# Patient Record
Sex: FEMALE | Race: WHITE | NOT HISPANIC OR LATINO | Employment: OTHER | URBAN - METROPOLITAN AREA
[De-identification: names, ages, dates, MRNs, and addresses within clinical notes are randomized per-mention and may not be internally consistent; named-entity substitution may affect disease eponyms.]

---

## 2018-04-11 LAB — HEMOCCULT STL QL IA: NEGATIVE

## 2018-08-27 NOTE — TELEPHONE ENCOUNTER
FUTURE VISIT INFORMATION      FUTURE VISIT INFORMATION:    Date: 08/29/2018    Time: 5:00 pm      [place] [facility] [place]   REFERRAL INFORMATION:    Referring provider:  Self      Referring providers clinic:      Reason for visit/diagnosis        NOTES (FOR ALL VISITS) STATUS DETAILS   OFFICE NOTE from referring provider N/A    OFFICE NOTE from other specialist N/A    DISCHARGE SUMMARY from hospital N/A    DISCHARGE REPORT from the ER Internal 11/20/2015, 08/29/2012   OPERATIVE REPORT N/A    MEDICATION LIST Internal    IMAGING  (FOR ALL VISITS)     EMG N/A    EEG N/A    ECT N/A    MRI (HEAD, NECK, SPINE) N/A    CT (HEAD, NECK, SPINE) N/A    OTHER     XR SPINE THORACIC 3 VIEWS (03/20/2017 10:06 AM)   Care Everywhere   PACS  03/20/2017

## 2018-08-28 ENCOUNTER — PATIENT OUTREACH (OUTPATIENT)
Dept: CARE COORDINATION | Facility: CLINIC | Age: 73
End: 2018-08-28

## 2018-08-29 ENCOUNTER — OFFICE VISIT (OUTPATIENT)
Dept: NEUROLOGY | Facility: CLINIC | Age: 73
End: 2018-08-29
Payer: MEDICARE

## 2018-08-29 ENCOUNTER — PRE VISIT (OUTPATIENT)
Dept: NEUROLOGY | Facility: CLINIC | Age: 73
End: 2018-08-29

## 2018-08-29 VITALS
WEIGHT: 144.5 LBS | BODY MASS INDEX: 23.22 KG/M2 | OXYGEN SATURATION: 98 % | TEMPERATURE: 98.1 F | HEIGHT: 66 IN | HEART RATE: 84 BPM | SYSTOLIC BLOOD PRESSURE: 150 MMHG | DIASTOLIC BLOOD PRESSURE: 75 MMHG

## 2018-08-29 DIAGNOSIS — G24.3 CERVICAL DYSTONIA: Primary | ICD-10-CM

## 2018-08-29 PROBLEM — S83.522A SPRAIN OF POSTERIOR CRUCIATE LIGAMENT OF LEFT KNEE: Status: ACTIVE | Noted: 2018-01-15

## 2018-08-29 PROBLEM — M43.6 TORTICOLLIS: Status: ACTIVE | Noted: 2018-08-29

## 2018-08-29 PROBLEM — F33.9 DEPRESSION, RECURRENT (H): Status: ACTIVE | Noted: 2017-01-09

## 2018-08-29 PROBLEM — M23.92 ACUTE INTERNAL DERANGEMENT OF LEFT KNEE: Status: ACTIVE | Noted: 2018-01-03

## 2018-08-29 RX ORDER — DIAZEPAM 2 MG
TABLET ORAL PRN
COMMUNITY
Start: 2018-07-18 | End: 2018-08-29

## 2018-08-29 RX ORDER — CALCIUM CARBONATE 750 MG/1
1-2 TABLET, CHEWABLE ORAL PRN
COMMUNITY
Start: 2014-09-29 | End: 2022-04-28

## 2018-08-29 RX ORDER — ACETAMINOPHEN 500 MG
500 TABLET ORAL PRN
COMMUNITY
Start: 2018-08-08 | End: 2021-10-07

## 2018-08-29 RX ORDER — ONDANSETRON 4 MG/1
4 TABLET, FILM COATED ORAL PRN
COMMUNITY
Start: 2018-05-09 | End: 2021-04-16

## 2018-08-29 ASSESSMENT — MOVEMENT DISORDERS SOCIETY - UNIFIED PARKINSONS DISEASE RATING SCALE (MDS-UPDRS)
TREMOR: SLIGHT: SHAKING OR TREMOR OCCURS BUT DOES NOT CAUSE PROBLEMS WITH ANY ACTIVITIES.
SALIVA_AND_DROOLING: NORMAL: NOT AT ALL (NO PROBLEMS).
HYGIENE: NORMAL: NOT AT ALL (NO PROBLEMS).
GETTING_OUT_OF_BED_CAR_DEEP_CHAIR: NORMAL: NOT AT ALL (NO PROBLEMS).
HANDWRITING: NORMAL: NOT AT ALL (NO PROBLEMS).
EATING_TASKS: NORMAL: NOT AT ALL (NO PROBLEMS).
CHEWING_AND_SWALLOWING: NORMAL: NO PROBLEMS.
TOTAL_SCORE: 1
DRESSING: NORMAL: NOT AT ALL (NO PROBLEMS).
HOBBIES_AND_OTHER_ACTIVITIES: NORMAL:  NOT AT ALL (NO PROBLEMS).
SPEECH: NORMAL: NOT AT ALL (NO PROBLEMS).
TURNING_IN_BED: NORMAL: NOT AT ALL (NO PROBLEMS).
WALKING_AND_BALANCE: NORMAL: NOT AT ALL (NO PROBLEMS).
FREEZING: NORMAL: NOT AT ALL (NO PROBLEMS).

## 2018-08-29 ASSESSMENT — ENCOUNTER SYMPTOMS
PALPITATIONS: 1
ARTHRALGIAS: 1
LEG PAIN: 0
MUSCLE CRAMPS: 1
JOINT SWELLING: 0
LIGHT-HEADEDNESS: 0
STIFFNESS: 1
SYNCOPE: 0
BACK PAIN: 0
HYPERTENSION: 1
ORTHOPNEA: 0
HYPOTENSION: 0
NECK PAIN: 1
MUSCLE WEAKNESS: 0
MYALGIAS: 1
SLEEP DISTURBANCES DUE TO BREATHING: 0
EXERCISE INTOLERANCE: 0

## 2018-08-29 ASSESSMENT — PAIN SCALES - GENERAL: PAINLEVEL: MILD PAIN (3)

## 2018-08-29 NOTE — PATIENT INSTRUCTIONS
1. I highly recommend you consider botox injections (we would start with a low dose and avoid injecting muscles that may have any risk of swallowing difficulty)    2.Unfortunately, they are no longer recruiting for the transcranial magnetic stimulation study

## 2018-08-29 NOTE — PROGRESS NOTES
Department of Neurology  Movement Disorders Division   Initial Clinic Evaluation     Patient: Morenita Moore   MRN: [id number]   : 1945   Date of Visit: 2018     Referring Physician: self-referred      History of Present Illness  Morenita Moore is a 73 year old right-handed woman with cervical dystonia with onset at approximately age 34 years.She had tension in the neck and head-pulling to the left. She developed sensory tricks. She recalls that it came and went during her 30s, then plateaued in her 40s with perhaps years of remission. However, since her 50s it has been more problematic.    She was previously followed at [place] and was recommended botox, but she has been afraid to have these injections due to fear of swallowing difficulty.    She developed tremor (at least she was only aware of it then) more recently last winter after she tried citalopram for depression (after 2 deaths in the family). She describes tremor in the trunk, as well as head tremor.    No writer's cramp.     No family history of dystonia or tremor.    She is unable to do many things because of her cervical dystonia: she would be more active, dance, likely would be working. She is embarrassed due to the abnormal head posture.     Medications tried:  In the past she has tried diazepam, which is quite helpful, but she doesn't take it due to the addictive potential.  Baclofen - helped for a while then lost benefit. Cause imbalance.          Review of Systems:  Other than that noted at the end of this note, the remainder of 12 systems reviewed were negative.    Medications:  Current Outpatient Prescriptions   Medication Sig Dispense Refill     acetaminophen (TYLENOL) 500 MG tablet Take 500 mg by mouth as needed       albuterol (PROVENTIL HFA: VENTOLIN HFA) 108 (90 BASE) MCG/ACT inhaler Inhale 2 puffs into the lungs every 6 hours.         calcium carbonate 750 MG CHEW Take 1-2 tablets by mouth as needed        "fluticasone-salmeterol (ADVAIR) 100-50 MCG/DOSE diskus inhaler Inhale 1 puff into the lungs every 12 hours       OMEPRAZOLE PO Take 20 mg by mouth as needed        ondansetron (ZOFRAN) 4 MG tablet Take 4 mg by mouth as needed       CIPROFLOXACIN PO Take 500 mg by mouth daily       ipratropium - albuterol 0.5 mg/2.5 mg/3 mL (DUONEB) 0.5-2.5 (3) MG/3ML nebulization Take 3 mLs by nebulization every 4 hours as needed for shortness of breath / dyspnea. (Patient not taking: Reported on 8/29/2018) 1 Box 1          Movement Disorder-related Medications                                     None                                                                                                         Allergies: is allergic to levalbuterol hydrochloride; cats; dust mites; fluticasone-salmeterol; proair hfa [albuterol]; and escitalopram.    Past Medical History:   Past Medical History:   Diagnosis Date     Anxiety      Asthma 2012    adult-onset asthma diagnosed in 2012     Cancer (H)      DCIS (ductal carcinoma in situ) 2001    stage 0 status post left mastectomy in 2001     Depressive disorder      Uncomplicated asthma        Mildly depressed mood.     Past Surgical History:   Past Surgical History:   Procedure Laterality Date     BREAST SURGERY         Social History:   Social History     Social History     Marital status: Single     Spouse name: N/A     Number of children: N/A     Years of education: N/A     Social History Main Topics     Smoking status: Former Smoker     Smokeless tobacco: Never Used     Alcohol use No     Drug use: None     Sexual activity: Not Asked     Other Topics Concern     None     Social History Narrative       Family History:  Family History   Problem Relation Age of Onset     Cancer Sister      breast cancer            Physical Exam:  The patient's  height is 1.676 m (5' 6\") and weight is 65.5 kg (144 lb 8 oz). Her oral temperature is 98.1  F (36.7  C). Her blood pressure is 150/75 and her pulse is " 84. Her oxygen saturation is 98%.    Neurological Examination:   She is alert and oriented and has fluent speech without dysarthria and is able to provide an interval medical history  Extraocular movements are full. She has normal smooth pursuits and saccades. Her face is symmetric with equal activation.   Normal tone. Full strength in all 4 extremities. Normal reflexes.   She has moderate right torticollis to 40-50 degrees, left laterocollis 25-35 degrees, slight retrocollis, intermittent sagittal shift, left shoulder elevation.  She also has some involvement of the spine with some slumping to the right.     [place] Spasmodic Torticollis Rating Scale (TWSTRS)      I. Torticollis Severity Scale    ROTATION TURN: right  3   LATEROCOLLIS TILT: left 2   ANTEROCOLLIS  OR 0   RETROCOLLIS  1   LATERAL SHIFT right or left 0   SAGITTAL SHIFT forward of backward 1   DURATION FACTOR (weighted x 2) SCORE:  4  x2 = 8   EFFECT OF SENSORY TRICKS  1   SHOULDER ELEVATION/ ANTERIOR DISPLACEMENT right or left 2 (elevation)   RANGE OF MOTION (without sensory tricks) 2   TIME (without sensory tricks) 4     TOTAL SCORE: 24     II. Disability Scale    WORK 4   ACTIVITIES OF DAILY LIVING 0   DRIVING 3   READING 3   TELEVISION 3   ACTIVITIES OUTSIDE THE HOME 4   EMBARRASSMENT RELATED TO ABOVE 1   TOTAL SCORE: 18     III. Pain Scale     Best Worst Average   NECK PAIN (within past week) 3 6 4     PAIN SEVERITY: [(2 x average) + worst + best]                                                 4       4.25   DURATION 5   PAIN 2   TOTAL SCORE 11.25         Scale Score   Severity Scale 24   Disability Scale 18   Pain Scale 11.25   Total Score 53.25            Impression:  Morenita Moore is a 73 year old right-handed woman with cervical dystonia with onset at approximately age 34 years who had possible spontaneous remission (or at least lessening) of symptoms in her 40s and significant worsening in her 50s. She now has significant  disability related to her cervical dystonia. She has been hesitant to try botox injections due to fear of side effects and frustration that it would require repeated injections to maintain. However, after detailed discussion today reviewing that botox is 1st line therapy for cervical dystonia. She would like to proceed at this time.     Recommendations:   1. I highly recommend you consider botox injections (we would start with a low dose and avoid injecting muscles that may have any risk of swallowing difficulty) for treatment of your cervical dystonia.    2.Unfortunately, they are no longer recruiting for the transcranial magnetic stimulation study        Time spent with patient: Greater than 50% of this 65 minute visit was spent in counseling and coordination of care related to the issues detailed above, including diagnosis, treatment options, and plan of care.      Thank you for the opportunity to share in the care of this patient.  Please feel free to contact me if you have any further questions or comments.    Sincerely,    Gladis Oliva       Answers for HPI/ROS submitted by the patient on 8/29/2018   General Symptoms: No  Skin Symptoms: No  HENT Symptoms: No  EYE SYMPTOMS: No  HEART SYMPTOMS: Yes  LUNG SYMPTOMS: No  INTESTINAL SYMPTOMS: No  URINARY SYMPTOMS: No  GYNECOLOGIC SYMPTOMS: No  BREAST SYMPTOMS: No  SKELETAL SYMPTOMS: Yes  BLOOD SYMPTOMS: No  NERVOUS SYSTEM SYMPTOMS: No  MENTAL HEALTH SYMPTOMS: No  Chest pain or pressure: No  Fast or irregular heartbeat: Yes  Pain in legs with walking: No  Trouble breathing while lying down: No  Fingers or toes appear blue: No  High blood pressure: Yes  Low blood pressure: No  Fainting: No  Murmurs: No  Pacemaker: No  Varicose veins: No  Edema or swelling: No  Wake up at night with shortness of breath: No  Light-headedness: No  Exercise intolerance: No  Back pain: No  Muscle aches: Yes  Neck pain: Yes  Swollen joints: No  Joint pain: Yes  Bone pain: No  Muscle  cramps: Yes  Muscle weakness: No  Joint stiffness: Yes  Bone fracture: No  PHQ-2 Score: 0

## 2018-08-29 NOTE — NURSING NOTE
Chief Complaint   Patient presents with     Consult     [facility] NEW - MOVEMENT DISORDER     Jamie Miller, EMT

## 2018-08-29 NOTE — MR AVS SNAPSHOT
After Visit Summary   2018    Morenita Moore    MRN: [id number]           Patient Information     Date Of Birth          1945        Visit Information        Provider Department      2018 5:00 PM Gladis Oliva MD [facility] Neurology        Care Instructions    1. I highly recommend you consider botox injections (we would start with a low dose and avoid injecting muscles that may have any risk of swallowing difficulty)    2.Unfortunately, they are no longer recruiting for the transcranial magnetic stimulation study              Follow-ups after your visit        Follow-up notes from your care team     Return in about 3 weeks (around 2018), or RV botox at 11:30AM on  (OK per [facility]).      Your next 10 appointments already scheduled     Sep 20, 2018 11:30 AM CDT   (Arrive by 11:15 AM)   Return Botox with Gladis Oliva MD   [facility] Neurology ([facility])    [address] [place] [id number] [phone]              Who to contact     Please call your clinic at [phone] to:    Ask questions about your health    Make or cancel appointments    Discuss your medicines    Learn about your test results    Speak to your doctor            Additional Information About Your Visit        MyChart Information     [employer] is an electronic gateway that provides easy, online access to your medical records. With [facility], you can request a clinic appointment, read your test results, renew a prescription or communicate with your care team.     To sign up for [employer] visit the website at [email or web address]   You will be asked to enter the access code listed below, as well as some personal information. Please follow the directions to create your username and password.     Your access code is: [email or web address]  Expires: 2018  6:30 AM     Your access code will  in 90 days. If you need help or a new  "code, please contact your [facility] or call [phone] for assistance.        Care EveryWhere ID     This is your Care EveryWhere ID. This could be used by other organizations to access your [place] medical records  [id number]        Your Vitals Were     Pulse Temperature Height Pulse Oximetry Breastfeeding? BMI (Body Mass Index)    84 98.1  F (36.7  C) (Oral) 1.676 m (5' 6\") 98% No 23.32 kg/m2       Blood Pressure from Last 3 Encounters:   08/29/18 150/75   11/10/15 (!) 143/112   08/29/12 131/89    Weight from Last 3 Encounters:   08/29/18 65.5 kg (144 lb 8 oz)   11/10/15 64.9 kg (143 lb)   08/29/12 68 kg (150 lb)              Today, you had the following     No orders found for display         Today's Medication Changes          These changes are accurate as of 8/29/18  6:09 PM.  If you have any questions, ask your nurse or doctor.               Stop taking these medicines if you haven't already. Please contact your care team if you have questions.     diazepam 2 MG tablet   Commonly known as:  VALIUM   Stopped by:  Gladis Oliva MD           LORazepam 0.5 MG tablet   Commonly known as:  ATIVAN   Stopped by:  Gladis Oliva MD                    Primary Care Provider Office Phone # Fax #    Cuong Barba [phone]       [address] [place] [id number]        Equal Access to Services     LUCRECIA BINGHAM AH: Hadii liz madera hadasho Sonicole, waaxda luqadaha, qaybta kaalmada adeegyada, wax adalgisa dior [facility] . So [facility] [phone].    ATENCIÓN: Si habla español, tiene a woods disposición servicios gratuitos de asistencia lingüística. Griffin al [phone].    We comply with applicable federal civil rights laws and [place] laws. We do not discriminate on the basis of race, color, national origin, age, disability, sex, sexual orientation, or gender identity.            Thank you!     Thank you for choosing [facility] NEUROLOGY  " for your care. Our goal is always to provide you with excellent care. Hearing back from our patients is one way we can continue to improve our services. Please take a few minutes to complete the written survey that you may receive in the mail after your visit with us. Thank you!             Your Updated Medication List - Protect others around you: Learn how to safely use, store and throw away your medicines at www.disposemymeds.org.          This list is accurate as of 8/29/18  6:09 PM.  Always use your most recent med list.                   Brand Name Dispense Instructions for use Diagnosis    acetaminophen 500 MG tablet    TYLENOL     Take 500 mg by mouth as needed        albuterol 108 (90 Base) MCG/ACT inhaler    PROAIR HFA/PROVENTIL HFA/VENTOLIN HFA     Inhale 2 puffs into the lungs every 6 hours.        calcium carbonate 750 MG Chew      Take 1-2 tablets by mouth as needed        CIPROFLOXACIN PO      Take 500 mg by mouth daily        fluticasone-salmeterol 100-50 MCG/DOSE diskus inhaler    ADVAIR     Inhale 1 puff into the lungs every 12 hours        ipratropium - albuterol 0.5 mg/2.5 mg/3 mL 0.5-2.5 (3) MG/3ML neb solution    DUONEB    1 Box    Take 3 mLs by nebulization every 4 hours as needed for shortness of breath / dyspnea.        OMEPRAZOLE PO      Take 20 mg by mouth as needed        ondansetron 4 MG tablet    ZOFRAN     Take 4 mg by mouth as needed

## 2018-08-29 NOTE — LETTER
2018       RE: Morenita Moore  [address]  [place] [address]     Dear Colleague,    Thank you for referring your patient, Morenita Moore, to the [facility] NEUROLOGY at [facility]. Please see a copy of my visit note below.    Department of Neurology  Movement Disorders Division   Initial Clinic Evaluation     Patient: Morenita Moore   MRN: [id number]   : 1945   Date of Visit: 2018     Referring Physician: self-referred      History of Present Illness  Morenita Moore is a 73 year old right-handed woman with cervical dystonia with onset at approximately age 34 years.She had tension in the neck and head-pulling to the left. She developed sensory tricks. She recalls that it came and went during her 30s, then plateaued in her 40s with perhaps years of remission. However, since her 50s it has been more problematic.    She was previously followed at [place] and was recommended botox, but she has been afraid to have these injections due to fear of swallowing difficulty.    She developed tremor (at least she was only aware of it then) more recently last winter after she tried citalopram for depression (after 2 deaths in the family). She describes tremor in the trunk, as well as head tremor.    No writer's cramp.     No family history of dystonia or tremor.    She is unable to do many things because of her cervical dystonia: she would be more active, dance, likely would be working. She is embarrassed due to the abnormal head posture.     Medications tried:  In the past she has tried diazepam, which is quite helpful, but she doesn't take it due to the addictive potential.  Baclofen - helped for a while then lost benefit. Cause imbalance.          Review of Systems:  Other than that noted at the end of this note, the remainder of 12 systems reviewed were negative.    Medications:  Current Outpatient Prescriptions   Medication Sig  Dispense Refill     acetaminophen (TYLENOL) 500 MG tablet Take 500 mg by mouth as needed       albuterol (PROVENTIL HFA: VENTOLIN HFA) 108 (90 BASE) MCG/ACT inhaler Inhale 2 puffs into the lungs every 6 hours.         calcium carbonate 750 MG CHEW Take 1-2 tablets by mouth as needed       fluticasone-salmeterol (ADVAIR) 100-50 MCG/DOSE diskus inhaler Inhale 1 puff into the lungs every 12 hours       OMEPRAZOLE PO Take 20 mg by mouth as needed        ondansetron (ZOFRAN) 4 MG tablet Take 4 mg by mouth as needed       CIPROFLOXACIN PO Take 500 mg by mouth daily       ipratropium - albuterol 0.5 mg/2.5 mg/3 mL (DUONEB) 0.5-2.5 (3) MG/3ML nebulization Take 3 mLs by nebulization every 4 hours as needed for shortness of breath / dyspnea. (Patient not taking: Reported on 8/29/2018) 1 Box 1          Movement Disorder-related Medications                                     None                                                                                                         Allergies: is allergic to levalbuterol hydrochloride; cats; dust mites; fluticasone-salmeterol; proair hfa [albuterol]; and escitalopram.    Past Medical History:   Past Medical History:   Diagnosis Date     Anxiety      Asthma 2012    adult-onset asthma diagnosed in 2012     Cancer (H)      DCIS (ductal carcinoma in situ) 2001    stage 0 status post left mastectomy in 2001     Depressive disorder      Uncomplicated asthma        Mildly depressed mood.     Past Surgical History:   Past Surgical History:   Procedure Laterality Date     BREAST SURGERY         Social History:   Social History     Social History     Marital status: Single     Spouse name: N/A     Number of children: N/A     Years of education: N/A     Social History Main Topics     Smoking status: Former Smoker     Smokeless tobacco: Never Used     Alcohol use No     Drug use: None     Sexual activity: Not Asked     Other Topics Concern     None     Social History Narrative       Family  "History:  Family History   Problem Relation Age of Onset     Cancer Sister      breast cancer            Physical Exam:  The patient's  height is 1.676 m (5' 6\") and weight is 65.5 kg (144 lb 8 oz). Her oral temperature is 98.1  F (36.7  C). Her blood pressure is 150/75 and her pulse is 84. Her oxygen saturation is 98%.    Neurological Examination:   She is alert and oriented and has fluent speech without dysarthria and is able to provide an interval medical history  Extraocular movements are full. She has normal smooth pursuits and saccades. Her face is symmetric with equal activation.   Normal tone. Full strength in all 4 extremities. Normal reflexes.   She has moderate right torticollis to 40-50 degrees, left laterocollis 25-35 degrees, slight retrocollis, intermittent sagittal shift, left shoulder elevation.  She also has some involvement of the spine with some slumping to the right.     [place] Spasmodic Torticollis Rating Scale (TWSTRS)      I. Torticollis Severity Scale    ROTATION TURN: right  3   LATEROCOLLIS TILT: left 2   ANTEROCOLLIS  OR 0   RETROCOLLIS  1   LATERAL SHIFT right or left 0   SAGITTAL SHIFT forward of backward 1   DURATION FACTOR (weighted x 2) SCORE:  4  x2 = 8   EFFECT OF SENSORY TRICKS  1   SHOULDER ELEVATION/ ANTERIOR DISPLACEMENT right or left 2 (elevation)   RANGE OF MOTION (without sensory tricks) 2   TIME (without sensory tricks) 4     TOTAL SCORE: 24     II. Disability Scale    WORK 4   ACTIVITIES OF DAILY LIVING 0   DRIVING 3   READING 3   TELEVISION 3   ACTIVITIES OUTSIDE THE HOME 4   EMBARRASSMENT RELATED TO ABOVE 1   TOTAL SCORE: 18     III. Pain Scale     Best Worst Average   NECK PAIN (within past week) 3 6 4     PAIN SEVERITY: [(2 x average) + worst + best]                                                 4       4.25   DURATION 5   PAIN 2   TOTAL SCORE 11.25         Scale Score   Severity Scale 24   Disability Scale 18   Pain Scale 11.25   Total Score 53.25        "     Impression:  Morenita Moore is a 73 year old right-handed woman with cervical dystonia with onset at approximately age 34 years who had possible spontaneous remission (or at least lessening) of symptoms in her 40s and significant worsening in her 50s. She now has significant disability related to her cervical dystonia. She has been hesitant to try botox injections due to fear of side effects and frustration that it would require repeated injections to maintain. However, after detailed discussion today reviewing that botox is 1st line therapy for cervical dystonia. She would like to proceed at this time.     Recommendations:   1. I highly recommend you consider botox injections (we would start with a low dose and avoid injecting muscles that may have any risk of swallowing difficulty) for treatment of your cervical dystonia.    2.Unfortunately, they are no longer recruiting for the transcranial magnetic stimulation study        Time spent with patient: Greater than 50% of this 65 minute visit was spent in counseling and coordination of care related to the issues detailed above, including diagnosis, treatment options, and plan of care.      Thank you for the opportunity to share in the care of this patient.  Please feel free to contact me if you have any further questions or comments.      Again, thank you for allowing me to participate in the care of your patient.      Sincerely,    Gladis Oliva MD

## 2018-08-30 ASSESSMENT — PATIENT HEALTH QUESTIONNAIRE - PHQ9: SUM OF ALL RESPONSES TO PHQ QUESTIONS 1-9: 0

## 2019-07-12 ENCOUNTER — TRANSFERRED RECORDS (OUTPATIENT)
Dept: HEALTH INFORMATION MANAGEMENT | Facility: CLINIC | Age: 74
End: 2019-07-12

## 2020-02-13 ENCOUNTER — COMMUNICATION - HEALTHEAST (OUTPATIENT)
Dept: SCHEDULING | Facility: CLINIC | Age: 75
End: 2020-02-13

## 2020-02-19 ENCOUNTER — OFFICE VISIT - HEALTHEAST (OUTPATIENT)
Dept: INTERNAL MEDICINE | Facility: CLINIC | Age: 75
End: 2020-02-19

## 2020-02-19 DIAGNOSIS — I10 ESSENTIAL HYPERTENSION: ICD-10-CM

## 2020-02-19 DIAGNOSIS — I31.39 IDIOPATHIC PERICARDIAL EFFUSION: ICD-10-CM

## 2020-02-19 DIAGNOSIS — F41.1 GENERALIZED ANXIETY DISORDER: ICD-10-CM

## 2020-02-19 DIAGNOSIS — Z87.09 HISTORY OF ASTHMA: ICD-10-CM

## 2020-02-19 DIAGNOSIS — B37.0 THRUSH: ICD-10-CM

## 2020-02-19 ASSESSMENT — ANXIETY QUESTIONNAIRES
1. FEELING NERVOUS, ANXIOUS, OR ON EDGE: MORE THAN HALF THE DAYS
6. BECOMING EASILY ANNOYED OR IRRITABLE: NOT AT ALL
3. WORRYING TOO MUCH ABOUT DIFFERENT THINGS: SEVERAL DAYS
5. BEING SO RESTLESS THAT IT IS HARD TO SIT STILL: NOT AT ALL
2. NOT BEING ABLE TO STOP OR CONTROL WORRYING: SEVERAL DAYS
7. FEELING AFRAID AS IF SOMETHING AWFUL MIGHT HAPPEN: SEVERAL DAYS
GAD7 TOTAL SCORE: 7
4. TROUBLE RELAXING: MORE THAN HALF THE DAYS
IF YOU CHECKED OFF ANY PROBLEMS ON THIS QUESTIONNAIRE, HOW DIFFICULT HAVE THESE PROBLEMS MADE IT FOR YOU TO DO YOUR WORK, TAKE CARE OF THINGS AT HOME, OR GET ALONG WITH OTHER PEOPLE: SOMEWHAT DIFFICULT

## 2020-02-19 ASSESSMENT — MIFFLIN-ST. JEOR: SCORE: 998.57

## 2020-02-27 ENCOUNTER — OFFICE VISIT - HEALTHEAST (OUTPATIENT)
Dept: INTERNAL MEDICINE | Facility: CLINIC | Age: 75
End: 2020-02-27

## 2020-02-27 DIAGNOSIS — J45.40 MODERATE PERSISTENT ASTHMA IN ADULT WITHOUT COMPLICATION: ICD-10-CM

## 2020-02-27 DIAGNOSIS — G47.00 INSOMNIA, UNSPECIFIED TYPE: ICD-10-CM

## 2020-02-27 DIAGNOSIS — R06.02 SHORTNESS OF BREATH: ICD-10-CM

## 2020-02-27 DIAGNOSIS — I31.9 PERICARDITIS, UNSPECIFIED CHRONICITY, UNSPECIFIED TYPE: ICD-10-CM

## 2020-02-27 DIAGNOSIS — F41.1 GAD (GENERALIZED ANXIETY DISORDER): ICD-10-CM

## 2020-02-27 ASSESSMENT — MIFFLIN-ST. JEOR: SCORE: 988.96

## 2020-02-27 ASSESSMENT — PATIENT HEALTH QUESTIONNAIRE - PHQ9: SUM OF ALL RESPONSES TO PHQ QUESTIONS 1-9: 12

## 2020-03-02 ENCOUNTER — COMMUNICATION - HEALTHEAST (OUTPATIENT)
Dept: INTERNAL MEDICINE | Facility: CLINIC | Age: 75
End: 2020-03-02

## 2020-03-02 DIAGNOSIS — R06.02 SHORTNESS OF BREATH: ICD-10-CM

## 2020-03-03 ENCOUNTER — COMMUNICATION - HEALTHEAST (OUTPATIENT)
Dept: SCHEDULING | Facility: CLINIC | Age: 75
End: 2020-03-03

## 2020-03-04 ENCOUNTER — COMMUNICATION - HEALTHEAST (OUTPATIENT)
Dept: INTERNAL MEDICINE | Facility: CLINIC | Age: 75
End: 2020-03-04

## 2020-03-04 ENCOUNTER — OFFICE VISIT - HEALTHEAST (OUTPATIENT)
Dept: INTERNAL MEDICINE | Facility: CLINIC | Age: 75
End: 2020-03-04

## 2020-03-04 DIAGNOSIS — Z79.52 CURRENT CHRONIC USE OF SYSTEMIC STEROIDS: ICD-10-CM

## 2020-03-04 DIAGNOSIS — L29.9 PRURITIC DISORDER: ICD-10-CM

## 2020-03-04 DIAGNOSIS — F41.1 GAD (GENERALIZED ANXIETY DISORDER): ICD-10-CM

## 2020-03-04 DIAGNOSIS — I31.9 PERICARDITIS, UNSPECIFIED CHRONICITY, UNSPECIFIED TYPE: ICD-10-CM

## 2020-03-04 LAB
ALBUMIN SERPL-MCNC: 4.2 G/DL (ref 3.5–5)
ALP SERPL-CCNC: 75 U/L (ref 45–120)
ALT SERPL W P-5'-P-CCNC: 18 U/L (ref 0–45)
ANION GAP SERPL CALCULATED.3IONS-SCNC: 9 MMOL/L (ref 5–18)
AST SERPL W P-5'-P-CCNC: 12 U/L (ref 0–40)
BASOPHILS # BLD AUTO: 0 THOU/UL (ref 0–0.2)
BASOPHILS NFR BLD AUTO: 1 % (ref 0–2)
BILIRUB SERPL-MCNC: 0.6 MG/DL (ref 0–1)
BUN SERPL-MCNC: 13 MG/DL (ref 8–28)
CALCIUM SERPL-MCNC: 9.7 MG/DL (ref 8.5–10.5)
CHLORIDE BLD-SCNC: 102 MMOL/L (ref 98–107)
CO2 SERPL-SCNC: 26 MMOL/L (ref 22–31)
CREAT SERPL-MCNC: 0.68 MG/DL (ref 0.6–1.1)
EOSINOPHIL # BLD AUTO: 0.1 THOU/UL (ref 0–0.4)
EOSINOPHIL NFR BLD AUTO: 1 % (ref 0–6)
ERYTHROCYTE [DISTWIDTH] IN BLOOD BY AUTOMATED COUNT: 12.1 % (ref 11–14.5)
GFR SERPL CREATININE-BSD FRML MDRD: >60 ML/MIN/1.73M2
GLUCOSE BLD-MCNC: 104 MG/DL (ref 70–125)
HCT VFR BLD AUTO: 39 % (ref 35–47)
HGB BLD-MCNC: 13.7 G/DL (ref 12–16)
LYMPHOCYTES # BLD AUTO: 1.1 THOU/UL (ref 0.8–4.4)
LYMPHOCYTES NFR BLD AUTO: 16 % (ref 20–40)
MCH RBC QN AUTO: 32.8 PG (ref 27–34)
MCHC RBC AUTO-ENTMCNC: 35.1 G/DL (ref 32–36)
MCV RBC AUTO: 93 FL (ref 80–100)
MONOCYTES # BLD AUTO: 0.4 THOU/UL (ref 0–0.9)
MONOCYTES NFR BLD AUTO: 6 % (ref 2–10)
NEUTROPHILS # BLD AUTO: 5.4 THOU/UL (ref 2–7.7)
NEUTROPHILS NFR BLD AUTO: 77 % (ref 50–70)
PLATELET # BLD AUTO: 389 THOU/UL (ref 140–440)
PMV BLD AUTO: 6.9 FL (ref 7–10)
POTASSIUM BLD-SCNC: 4.1 MMOL/L (ref 3.5–5)
PROT SERPL-MCNC: 7 G/DL (ref 6–8)
RBC # BLD AUTO: 4.17 MILL/UL (ref 3.8–5.4)
SODIUM SERPL-SCNC: 137 MMOL/L (ref 136–145)
WBC: 7 THOU/UL (ref 4–11)

## 2020-03-04 ASSESSMENT — MIFFLIN-ST. JEOR: SCORE: 979.88

## 2020-03-05 ENCOUNTER — COMMUNICATION - HEALTHEAST (OUTPATIENT)
Dept: INTERNAL MEDICINE | Facility: CLINIC | Age: 75
End: 2020-03-05

## 2020-03-06 ENCOUNTER — HOSPITAL ENCOUNTER (OUTPATIENT)
Dept: CT IMAGING | Facility: CLINIC | Age: 75
Discharge: HOME | End: 2020-03-06

## 2020-03-06 ENCOUNTER — OFFICE VISIT - HEALTHEAST (OUTPATIENT)
Dept: INTERNAL MEDICINE | Facility: CLINIC | Age: 75
End: 2020-03-06

## 2020-03-06 DIAGNOSIS — J45.40 MODERATE PERSISTENT ASTHMA IN ADULT WITHOUT COMPLICATION: ICD-10-CM

## 2020-03-06 DIAGNOSIS — R63.4 WEIGHT LOSS: ICD-10-CM

## 2020-03-06 DIAGNOSIS — R10.84 ABDOMINAL PAIN, GENERALIZED: ICD-10-CM

## 2020-03-06 DIAGNOSIS — R05.9 COUGH: ICD-10-CM

## 2020-03-06 ASSESSMENT — MIFFLIN-ST. JEOR: SCORE: 988.96

## 2020-03-09 ENCOUNTER — COMMUNICATION - HEALTHEAST (OUTPATIENT)
Dept: PULMONOLOGY | Facility: OTHER | Age: 75
End: 2020-03-09

## 2020-03-09 ENCOUNTER — COMMUNICATION - HEALTHEAST (OUTPATIENT)
Dept: INTERNAL MEDICINE | Facility: CLINIC | Age: 75
End: 2020-03-09

## 2020-03-09 ENCOUNTER — AMBULATORY - HEALTHEAST (OUTPATIENT)
Dept: PULMONOLOGY | Facility: OTHER | Age: 75
End: 2020-03-09

## 2020-03-09 DIAGNOSIS — R06.02 SOB (SHORTNESS OF BREATH): ICD-10-CM

## 2020-03-11 ENCOUNTER — COMMUNICATION - HEALTHEAST (OUTPATIENT)
Dept: INTERNAL MEDICINE | Facility: CLINIC | Age: 75
End: 2020-03-11

## 2020-03-12 ENCOUNTER — COMMUNICATION - HEALTHEAST (OUTPATIENT)
Dept: INTERNAL MEDICINE | Facility: CLINIC | Age: 75
End: 2020-03-12

## 2020-03-12 ENCOUNTER — COMMUNICATION - HEALTHEAST (OUTPATIENT)
Dept: GERIATRIC MEDICINE | Facility: CLINIC | Age: 75
End: 2020-03-12

## 2020-03-13 ENCOUNTER — COMMUNICATION - HEALTHEAST (OUTPATIENT)
Dept: INTERNAL MEDICINE | Facility: CLINIC | Age: 75
End: 2020-03-13

## 2020-03-13 DIAGNOSIS — R30.0 DYSURIA: ICD-10-CM

## 2020-03-17 ENCOUNTER — COMMUNICATION - HEALTHEAST (OUTPATIENT)
Dept: INTERNAL MEDICINE | Facility: CLINIC | Age: 75
End: 2020-03-17

## 2020-03-17 ENCOUNTER — RECORDS - HEALTHEAST (OUTPATIENT)
Dept: ADMINISTRATIVE | Facility: OTHER | Age: 75
End: 2020-03-17

## 2020-03-23 ENCOUNTER — COMMUNICATION - HEALTHEAST (OUTPATIENT)
Dept: INTERNAL MEDICINE | Facility: CLINIC | Age: 75
End: 2020-03-23

## 2020-03-23 ENCOUNTER — RECORDS - HEALTHEAST (OUTPATIENT)
Dept: ADMINISTRATIVE | Facility: OTHER | Age: 75
End: 2020-03-23

## 2020-03-23 DIAGNOSIS — R06.02 SHORTNESS OF BREATH: ICD-10-CM

## 2020-03-23 DIAGNOSIS — J45.40 MODERATE PERSISTENT ASTHMA IN ADULT WITHOUT COMPLICATION: ICD-10-CM

## 2020-03-24 ENCOUNTER — AMBULATORY - HEALTHEAST (OUTPATIENT)
Dept: LAB | Facility: CLINIC | Age: 75
End: 2020-03-24

## 2020-03-24 DIAGNOSIS — R30.0 DYSURIA: ICD-10-CM

## 2020-03-24 LAB
ALBUMIN UR-MCNC: NEGATIVE MG/DL
APPEARANCE UR: CLEAR
BILIRUB UR QL STRIP: NEGATIVE
COLOR UR AUTO: YELLOW
GLUCOSE UR STRIP-MCNC: NEGATIVE MG/DL
HGB UR QL STRIP: NEGATIVE
KETONES UR STRIP-MCNC: NEGATIVE MG/DL
LEUKOCYTE ESTERASE UR QL STRIP: NEGATIVE
NITRATE UR QL: NEGATIVE
PH UR STRIP: 7 [PH] (ref 5–8)
SP GR UR STRIP: 1.01 (ref 1–1.03)
UROBILINOGEN UR STRIP-ACNC: NORMAL

## 2020-03-25 ENCOUNTER — COMMUNICATION - HEALTHEAST (OUTPATIENT)
Dept: INTERNAL MEDICINE | Facility: CLINIC | Age: 75
End: 2020-03-25

## 2020-03-25 DIAGNOSIS — R06.02 SHORTNESS OF BREATH: ICD-10-CM

## 2020-03-26 ENCOUNTER — RECORDS - HEALTHEAST (OUTPATIENT)
Dept: ADMINISTRATIVE | Facility: OTHER | Age: 75
End: 2020-03-26

## 2020-03-27 ENCOUNTER — COMMUNICATION - HEALTHEAST (OUTPATIENT)
Dept: GERIATRIC MEDICINE | Facility: CLINIC | Age: 75
End: 2020-03-27

## 2020-04-01 ENCOUNTER — COMMUNICATION - HEALTHEAST (OUTPATIENT)
Dept: INTERNAL MEDICINE | Facility: CLINIC | Age: 75
End: 2020-04-01

## 2020-04-01 ENCOUNTER — AMBULATORY - HEALTHEAST (OUTPATIENT)
Dept: CARDIOLOGY | Facility: CLINIC | Age: 75
End: 2020-04-01

## 2020-04-01 DIAGNOSIS — R06.02 SHORTNESS OF BREATH: ICD-10-CM

## 2020-04-06 ENCOUNTER — OFFICE VISIT - HEALTHEAST (OUTPATIENT)
Dept: CARDIOLOGY | Facility: CLINIC | Age: 75
End: 2020-04-06

## 2020-04-06 DIAGNOSIS — I31.9 PERICARDITIS: ICD-10-CM

## 2020-04-06 DIAGNOSIS — R06.09 DYSPNEA ON EXERTION: ICD-10-CM

## 2020-04-06 DIAGNOSIS — I47.10 SVT (SUPRAVENTRICULAR TACHYCARDIA) (H): ICD-10-CM

## 2020-04-13 ENCOUNTER — OFFICE VISIT - HEALTHEAST (OUTPATIENT)
Dept: INTERNAL MEDICINE | Facility: CLINIC | Age: 75
End: 2020-04-13

## 2020-04-13 DIAGNOSIS — I31.9 PERICARDITIS, UNSPECIFIED CHRONICITY, UNSPECIFIED TYPE: ICD-10-CM

## 2020-04-13 DIAGNOSIS — J45.40 MODERATE PERSISTENT ASTHMA IN ADULT WITHOUT COMPLICATION: ICD-10-CM

## 2020-04-15 ENCOUNTER — AMBULATORY - HEALTHEAST (OUTPATIENT)
Dept: PULMONOLOGY | Facility: OTHER | Age: 75
End: 2020-04-15

## 2020-04-15 ENCOUNTER — OFFICE VISIT - HEALTHEAST (OUTPATIENT)
Dept: PULMONOLOGY | Facility: OTHER | Age: 75
End: 2020-04-15

## 2020-04-15 DIAGNOSIS — R91.8 PULMONARY NODULES: ICD-10-CM

## 2020-04-15 DIAGNOSIS — J45.909 UNCOMPLICATED ASTHMA, UNSPECIFIED ASTHMA SEVERITY, UNSPECIFIED WHETHER PERSISTENT: ICD-10-CM

## 2020-04-15 ASSESSMENT — MIFFLIN-ST. JEOR: SCORE: 988.96

## 2020-04-23 ENCOUNTER — COMMUNICATION - HEALTHEAST (OUTPATIENT)
Dept: SCHEDULING | Facility: CLINIC | Age: 75
End: 2020-04-23

## 2020-04-30 ENCOUNTER — NURSE TRIAGE (OUTPATIENT)
Dept: NURSING | Facility: CLINIC | Age: 75
End: 2020-04-30

## 2020-04-30 ENCOUNTER — COMMUNICATION - HEALTHEAST (OUTPATIENT)
Dept: INTERNAL MEDICINE | Facility: CLINIC | Age: 75
End: 2020-04-30

## 2020-04-30 ENCOUNTER — VIRTUAL VISIT (OUTPATIENT)
Dept: URGENT CARE | Facility: CLINIC | Age: 75
End: 2020-04-30
Payer: MEDICARE

## 2020-04-30 ENCOUNTER — COMMUNICATION - HEALTHEAST (OUTPATIENT)
Dept: SCHEDULING | Facility: CLINIC | Age: 75
End: 2020-04-30

## 2020-04-30 DIAGNOSIS — I31.9 PERICARDITIS, UNSPECIFIED CHRONICITY, UNSPECIFIED TYPE: ICD-10-CM

## 2020-04-30 DIAGNOSIS — R23.9 ABNORMAL APPEARANCE OF SKIN: Primary | ICD-10-CM

## 2020-04-30 PROCEDURE — 99207 ZZC NO BILLABLE SERVICE THIS VISIT: CPT | Performed by: NURSE PRACTITIONER

## 2020-04-30 NOTE — TELEPHONE ENCOUNTER
Morenita called into [facility] regarding inflammation under the skin on her arms.  Denies rash, not on top of the skin.  Looks thinner with protruding veins on the top part of the arm.     Per protocol through [facility] pt to be seen within 4 hours.  Pt will schedule virtual urgent care visit kev.     Millie Tariq RN   [facility] RN Triage

## 2020-04-30 NOTE — PROGRESS NOTES
"  Morenita Moore is a 74 year old female who is being evaluated via a billable telephone visit.      The patient has been notified of following:     \"This telephone visit will be conducted via a call between you and your physician/provider. We have found that certain health care needs can be provided without the need for a physical exam.  This service lets us provide the care you need with a short phone conversation.  If a prescription is necessary we can send it directly to your pharmacy.  If lab work is needed we can place an order for that and you can then stop by our lab to have the test done at a later time.    If during the course of the call the physician/provider feels a telephone visit is not appropriate, you will not be charged for this service.\"     Patient has given verbal consent for Telephone visit?  Yes    Chief Complaint:    Chief Complaint   Patient presents with     Derm Problem       HPI: Morenita Moore is an 74 year old female who presents for evaluation and treatment of skin problem. She reports the vessels in her skin seem to be closer to the surface and she has some purple spots on her arms. She denies her arms being swollen or hot, there are no vesicles. She feels well otherwise. She denies any new detergents, lotions, or soaps. She has never had this issue before. She is concerned that this may be related to a virus she had last fall that they never figured out.        Family History   Family History   Problem Relation Age of Onset     Cancer Sister         breast cancer       Social History  Social History     Socioeconomic History     Marital status: Single     Spouse name: Not on file     Number of children: Not on file     Years of education: Not on file     Highest education level: Not on file   Occupational History     Not on file   Social Needs     Financial resource strain: Not on file     Food insecurity     Worry: Not on file     Inability: Not on file     " Transportation needs     Medical: Not on file     Non-medical: Not on file   Tobacco Use     Smoking status: Former Smoker     Smokeless tobacco: Never Used   Substance and Sexual Activity     Alcohol use: No     Drug use: Not on file     Sexual activity: Not on file   Lifestyle     Physical activity     Days per week: Not on file     Minutes per session: Not on file     Stress: Not on file   Relationships     Social connections     Talks on phone: Not on file     Gets together: Not on file     Attends Zoroastrianism service: Not on file     Active member of club or organization: Not on file     Attends meetings of clubs or organizations: Not on file     Relationship status: Not on file     Intimate partner violence     Fear of current or ex partner: Not on file     Emotionally abused: Not on file     Physically abused: Not on file     Forced sexual activity: Not on file   Other Topics Concern     Parent/sibling w/ CABG, MI or angioplasty before 65F 55M? Not Asked   Social History Narrative     Not on file        Surgical History:  Past Surgical History:   Procedure Laterality Date     BREAST SURGERY          Problem List:  Patient Active Problem List   Diagnosis     Acute internal derangement of left knee     Asthma in adult     Breast cancer in situ     Depression, recurrent (H)     Sprain of posterior cruciate ligament of left knee     SVT (supraventricular tachycardia) (H)     Torticollis        Allergies:  Allergies   Allergen Reactions     Levalbuterol Hydrochloride Shortness Of Breath     Cats Other (See Comments)     Itchy eyes     Dust Mites      Other reaction(s): Wheezing  Wheezing/shortness/breath/see (IRP 6/1)     Fluticasone-Salmeterol Swelling     Proair Hfa [Albuterol] Other (See Comments)     DRY THROAT, SWALLOWING ISSUES     Escitalopram Anxiety        Current Meds:    Current Outpatient Medications:      acetaminophen (TYLENOL) 500 MG tablet, Take 500 mg by mouth as needed, Disp: , Rfl:      albuterol  (PROVENTIL HFA: VENTOLIN HFA) 108 (90 BASE) MCG/ACT inhaler, Inhale 2 puffs into the lungs every 6 hours.  , Disp: , Rfl:      calcium carbonate 750 MG CHEW, Take 1-2 tablets by mouth as needed, Disp: , Rfl:      CIPROFLOXACIN PO, Take 500 mg by mouth daily, Disp: , Rfl:      fluticasone-salmeterol (ADVAIR) 100-50 MCG/DOSE diskus inhaler, Inhale 1 puff into the lungs every 12 hours, Disp: , Rfl:      ipratropium - albuterol 0.5 mg/2.5 mg/3 mL (DUONEB) 0.5-2.5 (3) MG/3ML nebulization, Take 3 mLs by nebulization every 4 hours as needed for shortness of breath / dyspnea. (Patient not taking: Reported on 8/29/2018), Disp: 1 Box, Rfl: 1     OMEPRAZOLE PO, Take 20 mg by mouth as needed , Disp: , Rfl:      ondansetron (ZOFRAN) 4 MG tablet, Take 4 mg by mouth as needed, Disp: , Rfl:      PHYSICAL EXAM:   No physical exam is completed due to telephone visit.  Patient is alert and oriented over the phone. No coughing, wheezing or shortness of breath is heard.       ASSESSMENT:     1. Abnormal appearance of skin           PLAN:     Instructed patient that she would need to come in to be seen today or tomorrow for an examination of the skin. She does not want to come in to a clinic. Advised her to call and schedule a video visit with her primary care provider. She requests an ID referral, but she needs to be seen at an Urgent Care or primary medicine for physical examination first.    Patient instructed to follow up with PCP in 1-2days  Sooner if symptoms worsen.  Worrisome symptoms discussed with instructions to go to the ED.  Patient verbalized understanding and agreed with this plan.     Patricia Carlos CNP  4/30/2020, 5:19 PM      Phone call duration:  26 minutes

## 2020-05-01 ENCOUNTER — OFFICE VISIT (OUTPATIENT)
Dept: URGENT CARE | Facility: URGENT CARE | Age: 75
End: 2020-05-01
Payer: MEDICARE

## 2020-05-01 ENCOUNTER — TRANSFERRED RECORDS (OUTPATIENT)
Dept: HEALTH INFORMATION MANAGEMENT | Facility: CLINIC | Age: 75
End: 2020-05-01

## 2020-05-01 VITALS
TEMPERATURE: 102.9 F | DIASTOLIC BLOOD PRESSURE: 70 MMHG | HEART RATE: 125 BPM | BODY MASS INDEX: 18.72 KG/M2 | OXYGEN SATURATION: 98 % | WEIGHT: 116 LBS | SYSTOLIC BLOOD PRESSURE: 134 MMHG

## 2020-05-01 DIAGNOSIS — I87.8 PROMINENT VEIN: ICD-10-CM

## 2020-05-01 DIAGNOSIS — R50.9 FEVER IN ADULT: Primary | ICD-10-CM

## 2020-05-01 PROCEDURE — 99204 OFFICE O/P NEW MOD 45 MIN: CPT | Performed by: FAMILY MEDICINE

## 2020-05-01 RX ORDER — ASPIRIN 81 MG/1
81 TABLET ORAL DAILY
COMMUNITY
End: 2021-12-06

## 2020-05-01 NOTE — PROGRESS NOTES
SUBJECTIVE:   Morenita Moore is a 74 year old female with a history of intrinsic asthma (non-allergic, adult-onset asthma) presenting with a chief complaint of 8 days of progressively worsening prominent veins at the bilateral arms and now at the legs.  The tips of the fingers had recently turned white and the forearms have turned gray.  .   She has a fever up to 102.9 F today.    Onset of symptoms was 7 days ago.  Course of illness is worsening. .    Current and Associated symptoms: No shortness of breath.  No cough.  No headache.  No loss of taste/smell.  No chest pain/discomfort.  No  muscle aches.   No sore throat.  No urinary problems.  No diarrhea.  No abdominal pain.      Patient started Baby Aspirin one week ago.    Treatment measures tried include none.  .    Past Medical History:   Diagnosis Date     Anxiety      Asthma 2012    adult-onset asthma diagnosed in 2012     Cancer (H)      DCIS (ductal carcinoma in situ) 2001    stage 0 status post left mastectomy in 2001     Depressive disorder      Uncomplicated asthma    Pericarditis    Current Outpatient Medications   Medication Sig Dispense Refill     aspirin 81 MG EC tablet Take 81 mg by mouth daily       calcium carbonate 750 MG CHEW Take 1-2 tablets by mouth as needed       fluticasone-salmeterol (ADVAIR) 100-50 MCG/DOSE diskus inhaler Inhale 1 puff into the lungs every 12 hours       acetaminophen (TYLENOL) 500 MG tablet Take 500 mg by mouth as needed       albuterol (PROVENTIL HFA: VENTOLIN HFA) 108 (90 BASE) MCG/ACT inhaler Inhale 2 puffs into the lungs every 6 hours.         CIPROFLOXACIN PO Take 500 mg by mouth daily       ipratropium - albuterol 0.5 mg/2.5 mg/3 mL (DUONEB) 0.5-2.5 (3) MG/3ML nebulization Take 3 mLs by nebulization every 4 hours as needed for shortness of breath / dyspnea. (Patient not taking: Reported on 8/29/2018) 1 Box 1     OMEPRAZOLE PO Take 20 mg by mouth as needed        ondansetron (ZOFRAN) 4 MG tablet Take 4 mg by  mouth as needed       Social History     Tobacco Use     Smoking status: Former Smoker     Smokeless tobacco: Never Used   Substance Use Topics     Alcohol use: No       ROS:  CONSTITUTIONAL:POSITIVE  for fevers.   INTEGUMENTARY/SKIN: NEGATIVE for worrisome rashes  RESP:NEGATIVE for significant cough or SOB  CV: NEGATIVE for chest pain, palpitations or peripheral edema  MUSCULOSKELETAL: POSITIVE  for distended veins in the arms and legs.      OBJECTIVE:  /70   Pulse 125   Temp 102.9  F (39.4  C) (Tympanic)   Wt 52.6 kg (116 lb)   SpO2 98%   BMI 18.72 kg/m    GENERAL APPEARANCE: healthy, alert and no distress.  No acute respiratory distress.    Extremities: forearms have very prominent, distended veins without accompanying edema nor erythema.  No cyanosis at the hands/fingers.  The lower legs and feet also have distended veins (not as distended compared to the upper extremities).   SKIN: No cyanosis.      ASSESSMENT:  Fever  Distended veins in the arms, limbs.  I am concerned about a COVID-19-related hypercoagulopathy.      PLAN:  I gave report to the [facility], since she has been seen there in the past and since the patient lives in [facility].    Patient will go to the emergency room via private vehicle.      Rory Sethi MD

## 2020-05-01 NOTE — PATIENT INSTRUCTIONS
I recommend that you go to the emergency room now for further evaluation of the distended veins in your arms and legs accompanied by fever.

## 2020-05-02 ENCOUNTER — COMMUNICATION - HEALTHEAST (OUTPATIENT)
Dept: EMERGENCY MEDICINE | Facility: CLINIC | Age: 75
End: 2020-05-02

## 2020-05-18 ENCOUNTER — COMMUNICATION - HEALTHEAST (OUTPATIENT)
Dept: SCHEDULING | Facility: CLINIC | Age: 75
End: 2020-05-18

## 2020-05-19 ENCOUNTER — NURSE TRIAGE (OUTPATIENT)
Dept: NURSING | Facility: CLINIC | Age: 75
End: 2020-05-19

## 2020-05-19 ENCOUNTER — COMMUNICATION - HEALTHEAST (OUTPATIENT)
Dept: SCHEDULING | Facility: CLINIC | Age: 75
End: 2020-05-19

## 2020-05-19 ENCOUNTER — OFFICE VISIT - HEALTHEAST (OUTPATIENT)
Dept: INTERNAL MEDICINE | Facility: CLINIC | Age: 75
End: 2020-05-19

## 2020-05-19 DIAGNOSIS — I47.10 SVT (SUPRAVENTRICULAR TACHYCARDIA) (H): ICD-10-CM

## 2020-05-19 DIAGNOSIS — R25.1 TREMOR, PHYSIOLOGICAL: ICD-10-CM

## 2020-05-19 DIAGNOSIS — I10 HYPERTENSION, UNSPECIFIED TYPE: ICD-10-CM

## 2020-05-19 DIAGNOSIS — F41.1 GAD (GENERALIZED ANXIETY DISORDER): ICD-10-CM

## 2020-05-22 ENCOUNTER — COMMUNICATION - HEALTHEAST (OUTPATIENT)
Dept: SCHEDULING | Facility: CLINIC | Age: 75
End: 2020-05-22

## 2020-05-27 ENCOUNTER — COMMUNICATION - HEALTHEAST (OUTPATIENT)
Dept: INTERNAL MEDICINE | Facility: CLINIC | Age: 75
End: 2020-05-27

## 2020-05-28 ENCOUNTER — OFFICE VISIT - HEALTHEAST (OUTPATIENT)
Dept: INTERNAL MEDICINE | Facility: CLINIC | Age: 75
End: 2020-05-28

## 2020-05-28 DIAGNOSIS — R23.9 SKIN CHANGE: ICD-10-CM

## 2020-05-31 ENCOUNTER — COMMUNICATION - HEALTHEAST (OUTPATIENT)
Dept: SCHEDULING | Facility: CLINIC | Age: 75
End: 2020-05-31

## 2020-05-31 ENCOUNTER — NURSE TRIAGE (OUTPATIENT)
Dept: NURSING | Facility: CLINIC | Age: 75
End: 2020-05-31

## 2020-05-31 ENCOUNTER — RECORDS - HEALTHEAST (OUTPATIENT)
Dept: ADMINISTRATIVE | Facility: OTHER | Age: 75
End: 2020-05-31

## 2020-06-02 ENCOUNTER — COMMUNICATION - HEALTHEAST (OUTPATIENT)
Dept: INTERNAL MEDICINE | Facility: CLINIC | Age: 75
End: 2020-06-02

## 2020-06-02 ENCOUNTER — OFFICE VISIT - HEALTHEAST (OUTPATIENT)
Dept: INTERNAL MEDICINE | Facility: CLINIC | Age: 75
End: 2020-06-02

## 2020-06-02 DIAGNOSIS — I10 ESSENTIAL HYPERTENSION: ICD-10-CM

## 2020-06-02 LAB
C REACTIVE PROTEIN LHE: 0.1 MG/DL (ref 0–0.8)
ERYTHROCYTE [SEDIMENTATION RATE] IN BLOOD BY WESTERGREN METHOD: 8 MM/HR (ref 0–20)
POTASSIUM BLD-SCNC: 4 MMOL/L (ref 3.5–5)

## 2020-06-02 ASSESSMENT — MIFFLIN-ST. JEOR: SCORE: 1028.65

## 2020-06-03 ENCOUNTER — COMMUNICATION - HEALTHEAST (OUTPATIENT)
Dept: INTERNAL MEDICINE | Facility: CLINIC | Age: 75
End: 2020-06-03

## 2020-06-03 ENCOUNTER — COMMUNICATION - HEALTHEAST (OUTPATIENT)
Dept: SCHEDULING | Facility: CLINIC | Age: 75
End: 2020-06-03

## 2020-06-04 ENCOUNTER — OFFICE VISIT - HEALTHEAST (OUTPATIENT)
Dept: INTERNAL MEDICINE | Facility: CLINIC | Age: 75
End: 2020-06-04

## 2020-06-04 DIAGNOSIS — R25.1 TREMOR, PHYSIOLOGICAL: ICD-10-CM

## 2020-06-04 DIAGNOSIS — I47.10 SVT (SUPRAVENTRICULAR TACHYCARDIA) (H): ICD-10-CM

## 2020-06-04 DIAGNOSIS — R42 DIZZINESS: ICD-10-CM

## 2020-06-04 DIAGNOSIS — R30.0 DYSURIA: ICD-10-CM

## 2020-06-05 ENCOUNTER — COMMUNICATION - HEALTHEAST (OUTPATIENT)
Dept: INTERNAL MEDICINE | Facility: CLINIC | Age: 75
End: 2020-06-05

## 2020-06-05 ENCOUNTER — OFFICE VISIT - HEALTHEAST (OUTPATIENT)
Dept: INTERNAL MEDICINE | Facility: CLINIC | Age: 75
End: 2020-06-05

## 2020-06-05 ENCOUNTER — COMMUNICATION - HEALTHEAST (OUTPATIENT)
Dept: SCHEDULING | Facility: CLINIC | Age: 75
End: 2020-06-05

## 2020-06-05 DIAGNOSIS — R25.1 TREMOR: ICD-10-CM

## 2020-06-05 ASSESSMENT — MIFFLIN-ST. JEOR: SCORE: 1024.11

## 2020-06-10 ENCOUNTER — COMMUNICATION - HEALTHEAST (OUTPATIENT)
Dept: INTERNAL MEDICINE | Facility: CLINIC | Age: 75
End: 2020-06-10

## 2020-06-15 ENCOUNTER — OFFICE VISIT - HEALTHEAST (OUTPATIENT)
Dept: INTERNAL MEDICINE | Facility: CLINIC | Age: 75
End: 2020-06-15

## 2020-06-15 DIAGNOSIS — R25.1 TREMOR, PHYSIOLOGICAL: ICD-10-CM

## 2020-06-15 DIAGNOSIS — R42 LIGHT HEADED: ICD-10-CM

## 2020-06-15 DIAGNOSIS — D64.9 ANEMIA, UNSPECIFIED TYPE: ICD-10-CM

## 2020-06-15 DIAGNOSIS — E55.9 VITAMIN D DEFICIENCY: ICD-10-CM

## 2020-06-15 DIAGNOSIS — R63.4 ABNORMAL WEIGHT LOSS: ICD-10-CM

## 2020-06-15 DIAGNOSIS — R21 RASH: ICD-10-CM

## 2020-06-15 DIAGNOSIS — F41.1 GAD (GENERALIZED ANXIETY DISORDER): ICD-10-CM

## 2020-06-15 LAB
ALBUMIN SERPL-MCNC: 4.3 G/DL (ref 3.5–5)
ALBUMIN UR-MCNC: NEGATIVE MG/DL
ALP SERPL-CCNC: 82 U/L (ref 45–120)
ALT SERPL W P-5'-P-CCNC: 14 U/L (ref 0–45)
ANION GAP SERPL CALCULATED.3IONS-SCNC: 12 MMOL/L (ref 5–18)
APPEARANCE UR: CLEAR
AST SERPL W P-5'-P-CCNC: 13 U/L (ref 0–40)
BACTERIA #/AREA URNS HPF: ABNORMAL HPF
BILIRUB SERPL-MCNC: 0.7 MG/DL (ref 0–1)
BILIRUB UR QL STRIP: NEGATIVE
BUN SERPL-MCNC: 12 MG/DL (ref 8–28)
C REACTIVE PROTEIN LHE: <0.1 MG/DL (ref 0–0.8)
CALCIUM SERPL-MCNC: 9.8 MG/DL (ref 8.5–10.5)
CHLORIDE BLD-SCNC: 106 MMOL/L (ref 98–107)
CO2 SERPL-SCNC: 23 MMOL/L (ref 22–31)
COLOR UR AUTO: YELLOW
CREAT SERPL-MCNC: 0.65 MG/DL (ref 0.6–1.1)
ERYTHROCYTE [DISTWIDTH] IN BLOOD BY AUTOMATED COUNT: 13.5 % (ref 11–14.5)
ERYTHROCYTE [SEDIMENTATION RATE] IN BLOOD BY WESTERGREN METHOD: 11 MM/HR (ref 0–20)
GFR SERPL CREATININE-BSD FRML MDRD: >60 ML/MIN/1.73M2
GLUCOSE BLD-MCNC: 90 MG/DL (ref 70–125)
GLUCOSE UR STRIP-MCNC: NEGATIVE MG/DL
HCT VFR BLD AUTO: 41.3 % (ref 35–47)
HGB BLD-MCNC: 13.3 G/DL (ref 12–16)
HGB UR QL STRIP: ABNORMAL
KETONES UR STRIP-MCNC: NEGATIVE MG/DL
LEUKOCYTE ESTERASE UR QL STRIP: NEGATIVE
MCH RBC QN AUTO: 30.9 PG (ref 27–34)
MCHC RBC AUTO-ENTMCNC: 32.2 G/DL (ref 32–36)
MCV RBC AUTO: 96 FL (ref 80–100)
NITRATE UR QL: NEGATIVE
PH UR STRIP: 7 [PH] (ref 5–8)
PLATELET # BLD AUTO: 303 THOU/UL (ref 140–440)
PMV BLD AUTO: 10.2 FL (ref 8.5–12.5)
POTASSIUM BLD-SCNC: 4.4 MMOL/L (ref 3.5–5)
PROT SERPL-MCNC: 6.9 G/DL (ref 6–8)
RBC # BLD AUTO: 4.3 MILL/UL (ref 3.8–5.4)
RBC #/AREA URNS AUTO: ABNORMAL HPF
RHEUMATOID FACT SERPL-ACNC: <15 IU/ML (ref 0–30)
SODIUM SERPL-SCNC: 141 MMOL/L (ref 136–145)
SP GR UR STRIP: 1.01 (ref 1–1.03)
SQUAMOUS #/AREA URNS AUTO: ABNORMAL LPF
TSH SERPL DL<=0.005 MIU/L-ACNC: 1.79 UIU/ML (ref 0.3–5)
UROBILINOGEN UR STRIP-ACNC: ABNORMAL
VIT B12 SERPL-MCNC: 501 PG/ML (ref 213–816)
WBC #/AREA URNS AUTO: ABNORMAL HPF
WBC: 6.7 THOU/UL (ref 4–11)

## 2020-06-15 ASSESSMENT — MIFFLIN-ST. JEOR: SCORE: 1042.26

## 2020-06-16 LAB
25(OH)D3 SERPL-MCNC: 35.8 NG/ML (ref 30–80)
ANA SER QL: 0.3 U

## 2020-06-18 ENCOUNTER — COMMUNICATION - HEALTHEAST (OUTPATIENT)
Dept: INTERNAL MEDICINE | Facility: CLINIC | Age: 75
End: 2020-06-18

## 2020-06-18 DIAGNOSIS — R25.1 TREMOR, PHYSIOLOGICAL: ICD-10-CM

## 2020-06-18 LAB
ARSENIC, WHOLE BLOOD: <10 NG/ML
LAB SAMPLE TYPE: NORMAL
LAB STATE REPORTED TO: NORMAL
LEAD, WHOLE BLOOD - HISTORICAL: <1 UG/DL
MERCURY, WHOLE BLOOD - HISTORICAL: <5 NG/ML
SPECIMEN STATUS: NORMAL

## 2020-06-25 ENCOUNTER — TELEPHONE (OUTPATIENT)
Dept: NEUROLOGY | Facility: CLINIC | Age: 75
End: 2020-06-25

## 2020-06-25 ENCOUNTER — AMBULATORY - HEALTHEAST (OUTPATIENT)
Dept: SCHEDULING | Facility: CLINIC | Age: 75
End: 2020-06-25

## 2020-06-25 ENCOUNTER — RECORDS - HEALTHEAST (OUTPATIENT)
Dept: ADMINISTRATIVE | Facility: OTHER | Age: 75
End: 2020-06-25

## 2020-06-25 ENCOUNTER — OFFICE VISIT (OUTPATIENT)
Dept: NEUROLOGY | Facility: CLINIC | Age: 75
End: 2020-06-25
Payer: MEDICARE

## 2020-06-25 VITALS — BODY MASS INDEX: 19.99 KG/M2 | HEIGHT: 65 IN | WEIGHT: 120 LBS

## 2020-06-25 DIAGNOSIS — R25.1 TREMOR: Primary | ICD-10-CM

## 2020-06-25 DIAGNOSIS — R42 DIZZINESS: ICD-10-CM

## 2020-06-25 DIAGNOSIS — R25.1 TREMOR: ICD-10-CM

## 2020-06-25 PROCEDURE — 99443 ZZC PHYSICIAN TELEPHONE EVALUATION 21-30 MIN: CPT | Performed by: PSYCHIATRY & NEUROLOGY

## 2020-06-25 RX ORDER — LORAZEPAM 0.5 MG/1
TABLET ORAL PRN
COMMUNITY
Start: 2020-06-06 | End: 2021-12-06

## 2020-06-25 RX ORDER — METOPROLOL TARTRATE 25 MG/1
12.5 TABLET, FILM COATED ORAL
COMMUNITY
Start: 2020-06-16 | End: 2021-04-16

## 2020-06-25 ASSESSMENT — MIFFLIN-ST. JEOR: SCORE: 1055.2

## 2020-06-25 NOTE — TELEPHONE ENCOUNTER
I left message asking patient to call me back regarding plan. Patient had phone visit with Dr. Purcell today who ordered MR of brain, request for records from [facility] and follow up visit.  Anika Canas on 6/25/2020 at 10:05 AM

## 2020-06-25 NOTE — NURSING NOTE
Phone visit [phone].  Having lab draw next Tuesday if any further lab work recommended.  Chief Complaint   Patient presents with     Tremors     Lightheaded, off balance.     Anika Canas on 6/25/2020 at 8:52 AM

## 2020-06-25 NOTE — LETTER
6/25/2020         RE: Morenita Moore  [address]  [place] [address]        Dear Colleague,    Thank you for referring your patient, Morenita Moore, to the [facility] NEUROLOGY [place]. Please see a copy of my visit note below.          Morenita Moore  Age:74 year old  MRN [id number]  PCP Clinic, [place]    Consult requested by: Aminah Tamayo  Regarding: Tremor and dizziness    Allergies: Levalbuterol hydrochloride; Cats; Dust mites; Fluticasone-salmeterol; Proair hfa [albuterol]; Amitriptyline; and Escitalopram  Medications:  Current Outpatient Medications   Medication Sig Dispense Refill     acetaminophen (TYLENOL) 500 MG tablet Take 500 mg by mouth as needed       albuterol (PROVENTIL HFA: VENTOLIN HFA) 108 (90 BASE) MCG/ACT inhaler Inhale 2 puffs into the lungs every 6 hours.         aspirin 81 MG EC tablet Take 81 mg by mouth daily       calcium carbonate 750 MG CHEW Take 1-2 tablets by mouth as needed       fluticasone-salmeterol (ADVAIR) 100-50 MCG/DOSE diskus inhaler Inhale 1 puff into the lungs every 12 hours       LORazepam (ATIVAN) 0.5 MG tablet daily        metoprolol tartrate (LOPRESSOR) 25 MG tablet Take 12.5 mg by mouth        ondansetron (ZOFRAN) 4 MG tablet Take 4 mg by mouth as needed       CIPROFLOXACIN PO Take 500 mg by mouth daily       ipratropium - albuterol 0.5 mg/2.5 mg/3 mL (DUONEB) 0.5-2.5 (3) MG/3ML nebulization Take 3 mLs by nebulization every 4 hours as needed for shortness of breath / dyspnea. (Patient not taking: Reported on 8/29/2018) 1 Box 1     OMEPRAZOLE PO Take 20 mg by mouth as needed          History of Present Illness: A telephone encounter was done today in lieu of office visit due to the COVID-19 pandemic.  Patient was agreeable to this type of encounter.  A telephone encounter was done with Ms. Moore at the request of Dr. Tamayo.  She is a 74-year-old right-handed female who has had a history of cervical dystonia in  the past since her 30s and also has had issues of tremor.  She had been seen in the past at the [facility] neurological clinic and had been put on clonazepam.  The tremor had occurred after she had been tried on an SSRI for anxiety problems.  The clonazepam helped but she was worried about becoming dependent on the medication.  She went off of it abruptly and had symptoms of withdrawal.  She has been given lorazepam and this has helped with her anxiety as well as the tremor.  She had been doing fairly well until the mid part of May when she noticed increasing dizziness problems this is not a vertiginous type of process.  She has not had any hearing loss.  She has had occasional tinnitus.  She had gone to the emergency room at [facility] on 5/31/2020, with dizziness she had an elevation of her blood pressure to 170 systolic.  She underwent a CT scan of the head which showed nonspecific white matter change and was unchanged from her other CTs done in the past.  In 2018, she had had an MRI scan of the brain done which had report demonstrated small vessel changes mild volume loss, which were nonspecific in nature.  She does have a history of viral pericarditis last November this treated with colchicine.  She had been put on 1800 mg of ibuprofen that did not tolerated apparently due to acute kidney dysfunction.  She does report some blurriness of vision.  There is no loss of vision or double vision.  No speech or swallowing problems.  No focal weakness or numbness problems or coordination in general is a day.  Ivone history of tremor.  She is currently on metoprolol and lorazepam of this in general is helped with her tremor there is been no clear resting tremor that is problematic.  No tremor of her voice.  Caffeine consumption-will have a couple coffee and tea in the morning and will have decaffeinated coffee in the afternoon.  Heavy metal screen had been normal.  B12 level been normal 1.  TSH has been normal.  CRP  normal at 0.1 WANDA negative.  Rheumatoid factor negative.  Sed rate normal at 11.  Vitamin D level was in the low normal range.  Will apparently be getting testing of the renal glasses and there        PAST ILLNESSES:   Medical:   Past Medical History:   Diagnosis Date     Anxiety      Asthma 2012    adult-onset asthma diagnosed in 2012     Cancer (H)      DCIS (ductal carcinoma in situ) 2001    stage 0 status post left mastectomy in 2001     Depressive disorder      Uncomplicated asthma    Viral pericarditis  Bicuspid aortic valve  Internal derangement of the left knee  Cervical dystonia  Physiologic tremor  Patient Active Problem List   Diagnosis     Acute internal derangement of left knee     Asthma in adult     Breast cancer in situ     Depression, recurrent (H)     Sprain of posterior cruciate ligament of left knee     SVT (supraventricular tachycardia) (H)     Torticollis        Surgical:    Past Surgical History:   Procedure Laterality Date     BREAST SURGERY           SOCIAL: She is .  She has 1 son who is age 26.  Social History     Socioeconomic History     Marital status: Single     Spouse name: Not on file     Number of children: Not on file     Years of education: Not on file     Highest education level: Not on file   Occupational History     Not on file   Social Needs     Financial resource strain: Not on file     Food insecurity     Worry: Not on file     Inability: Not on file     Transportation needs     Medical: Not on file     Non-medical: Not on file   Tobacco Use     Smoking status: Former Smoker     Smokeless tobacco: Never Used   Substance and Sexual Activity     Alcohol use: No     Drug use: Never     Sexual activity: Not on file   Lifestyle     Physical activity     Days per week: Not on file     Minutes per session: Not on file     Stress: Not on file   Relationships     Social connections     Talks on phone: Not on file     Gets together: Not on file     Attends Confucianism service:  Not on file     Active member of club or organization: Not on file     Attends meetings of clubs or organizations: Not on file     Relationship status: Not on file     Intimate partner violence     Fear of current or ex partner: Not on file     Emotionally abused: Not on file     Physically abused: Not on file     Forced sexual activity: Not on file   Other Topics Concern     Parent/sibling w/ CABG, MI or angioplasty before 65F 55M? Not Asked   Social History Narrative     Not on file     Employment:     FAMILY HISTORY:  Family History   Problem Relation Age of Onset     Cancer Sister         breast cancer     Parents: Father  in his 70s apparently had cancer.  Mother  at age 97 with congestive heart failure.  Had atrial fibrillation and breast cancer.  Siblings: Sister  at age 42 from breast cancer.  Children: Has 1 son age 26 who is had testicular cancer                        REVIEW OF SYSTEMS  Constitutional: Did have 30 pound weight loss from 140 pounds to 110 pounds in January of this year has gained 10 pounds back.  No fever or chills.  Skin: Has had a rash.  Will be seeing her dermatologist again.  HEENT: See HPI  Respiratory: Occasional shortness of breath.  No cough.  Cardiovascular: Rare chest discomfort.  Rare palpitations.  Gastrointestinal: No abdominal pain, blood in the stool black tarry stools  Genitourinary: Recent frequency.  Musculoskeletal: Positive joint pain  Neurologic: See above.  Psychiatric: Positive for anxiety.  Negative for depression.    Endocrine: Negative for diabetes.      PHYSICAL EXAMINATION:    General appearance: Unable-telephone encounter      NEUROLOGIC EXAMINATION:  Oriented x3.  Speech appeared fluent.  No tremor or voice was appreciated.  LAB DATA /imaging:EXAM DATE:         2018    [facility] RADIOLOGY    EXAM: MRI HEAD W/O CONTRAST  LOCATION: [facility]  DATE/TIME: 2018 12:00 PM    INDICATION: Dizziness and Giddiness, Tremor,  Unspecified  COMPARISON: None.  TECHNIQUE: Routine multiplanar multisequence head MRI without intravenous  contrast.    FINDINGS:  INTRACRANIAL CONTENTS: No evidence for acute or subacute infarction based on  diffusion-weighted imaging. There is leftward tilt of the head, secondary to  known cervical dystonia (per clinical history form). No mass, acute hemorrhage,  or extra-axial fluid collections. Scattered nonspecific foci of T2/FLAIR  hyperintense signal in the cerebral white matter. Mild generalized cerebral  atrophy. No hydrocephalus. Normal position of the cerebellar tonsils.    SELLA: No significant abnormality accounting for technique.    OSSEOUS STRUCTURES/SOFT TISSUES: No aggressive osseous lesion involving the  calvarium, skull base, or visualized upper cervical spine. The major  intracranial vascular flow voids are maintained.    ORBITS: No significant abnormality accounting for technique.    SINUSES/MASTOIDS: No significant paranasal sinus mucosal disease. No significant  middle ear or mastoid effusion.      CONCLUSION:  1.  No acute or subacute infarction, intracranial hemorrhage, or mass effect.  2.  Mild global brain parenchymal volume loss with presumed sequelae of very  mild chronic small vessel ischemic disease, though within normal limits for  patient age.  3.  Leftward tilt of the head, secondary to known cervical dystonia.  EXAM: CT HEAD BRAIN WO  LOCATION: [facility] MEDICAL IMAGING  DATE/TIME: 5/31/2020 9:19 PM    INDICATION: dizziness, htn  COMPARISON: 12/31/2019 head CT  TECHNIQUE: Routine without IV contrast. Multiplanar reformats. Dose reduction  techniques were used.    FINDINGS:  INTRACRANIAL CONTENTS: No intracranial hemorrhage, extraaxial collection, or  mass effect.  No CT evidence of acute infarct. Mild volume loss and presumed  chronic small vessel ischemia are stable.     VISUALIZED ORBITS/SINUSES/MASTOIDS: No intraorbital abnormality. No paranasal  sinus mucosal disease. No middle ear  or mastoid effusion.    BONES/SOFT TISSUES: No acute abnormality.    IMPRESSION:  1.  No change. No acute intercranial abnormality      IMPRESSION: Increased tremor and dizziness.  Does not sound like she has had symptoms of Parkinson's disease from discussion the patient.  We did discuss doing a noncontrast MRI of the brain, given the dizziness and increased tremor noted in mid-later in May.  We discussed this would have greater sensitivity to make sure there was no evidence of an ischemic event or other process that may have occurred during that time we also discussed we have comparison that can be done to the MRI done in December 2018.  There may have been a worsening of her tremor that sounds to have possibly been an essential tremor with increased stress going on in her life.  It is reassuring that the laboratory studies to date have been on I also like to get her outside records from the [facility] for review with plans for follow-up afterwards.  Start of telephone encounter: 9:08 AM  End of telephone encounter 9:32 AM  additional time with review of outside records 10 minutes      Antoni Purcell MD, MD    Again, thank you for allowing me to participate in the care of your patient.        Sincerely,        Antoni Purcell MD, MD

## 2020-06-25 NOTE — PATIENT INSTRUCTIONS
Will first check a noncontrast MRI scan of the head to see if there was any changes compared to the MRI that was done in 2018 and that might explain the dizziness and increased tremor you had in mid May.  We will also see about getting your records from the [facility] for review.  We will see about seeing you in the office after getting that information.

## 2020-06-25 NOTE — PROGRESS NOTES
Morenita Moore  Age:74 year old  MRN [id number]  PCP Clinic, [place]    Consult requested by: Aminah Tamayo  Regarding: Tremor and dizziness    Allergies: Levalbuterol hydrochloride; Cats; Dust mites; Fluticasone-salmeterol; Proair hfa [albuterol]; Amitriptyline; and Escitalopram  Medications:  Current Outpatient Medications   Medication Sig Dispense Refill     acetaminophen (TYLENOL) 500 MG tablet Take 500 mg by mouth as needed       albuterol (PROVENTIL HFA: VENTOLIN HFA) 108 (90 BASE) MCG/ACT inhaler Inhale 2 puffs into the lungs every 6 hours.         aspirin 81 MG EC tablet Take 81 mg by mouth daily       calcium carbonate 750 MG CHEW Take 1-2 tablets by mouth as needed       fluticasone-salmeterol (ADVAIR) 100-50 MCG/DOSE diskus inhaler Inhale 1 puff into the lungs every 12 hours       LORazepam (ATIVAN) 0.5 MG tablet daily        metoprolol tartrate (LOPRESSOR) 25 MG tablet Take 12.5 mg by mouth        ondansetron (ZOFRAN) 4 MG tablet Take 4 mg by mouth as needed       CIPROFLOXACIN PO Take 500 mg by mouth daily       ipratropium - albuterol 0.5 mg/2.5 mg/3 mL (DUONEB) 0.5-2.5 (3) MG/3ML nebulization Take 3 mLs by nebulization every 4 hours as needed for shortness of breath / dyspnea. (Patient not taking: Reported on 8/29/2018) 1 Box 1     OMEPRAZOLE PO Take 20 mg by mouth as needed          History of Present Illness: A telephone encounter was done today in lieu of office visit due to the COVID-19 pandemic.  Patient was agreeable to this type of encounter.  A telephone encounter was done with Ms. Moore at the request of Dr. Tamayo.  She is a 74-year-old right-handed female who has had a history of cervical dystonia in the past since her 30s and also has had issues of tremor.  She had been seen in the past at the [facility] neurological clinic and had been put on clonazepam.  The tremor had occurred after she had been tried on an SSRI for anxiety problems.  The clonazepam helped but she was worried  about becoming dependent on the medication.  She went off of it abruptly and had symptoms of withdrawal.  She has been given lorazepam and this has helped with her anxiety as well as the tremor.  She had been doing fairly well until the mid part of May when she noticed increasing dizziness problems this is not a vertiginous type of process.  She has not had any hearing loss.  She has had occasional tinnitus.  She had gone to the emergency room at [facility] on 5/31/2020, with dizziness she had an elevation of her blood pressure to 170 systolic.  She underwent a CT scan of the head which showed nonspecific white matter change and was unchanged from her other CTs done in the past.  In 2018, she had had an MRI scan of the brain done which had report demonstrated small vessel changes mild volume loss, which were nonspecific in nature.  She does have a history of viral pericarditis last November this treated with colchicine.  She had been put on 1800 mg of ibuprofen that did not tolerated apparently due to acute kidney dysfunction.  She does report some blurriness of vision.  There is no loss of vision or double vision.  No speech or swallowing problems.  No focal weakness or numbness problems or coordination in general is a day.  Ivone history of tremor.  She is currently on metoprolol and lorazepam of this in general is helped with her tremor there is been no clear resting tremor that is problematic.  No tremor of her voice.  Caffeine consumption-will have a couple coffee and tea in the morning and will have decaffeinated coffee in the afternoon.  Heavy metal screen had been normal.  B12 level been normal 1.  TSH has been normal.  CRP normal at 0.1 WANDA negative.  Rheumatoid factor negative.  Sed rate normal at 11.  Vitamin D level was in the low normal range.  Will apparently be getting testing of the renal glasses and there        PAST ILLNESSES:   Medical:   Past Medical History:   Diagnosis Date     Anxiety       Asthma 2012    adult-onset asthma diagnosed in 2012     Cancer (H)      DCIS (ductal carcinoma in situ) 2001    stage 0 status post left mastectomy in 2001     Depressive disorder      Uncomplicated asthma    Viral pericarditis  Bicuspid aortic valve  Internal derangement of the left knee  Cervical dystonia  Physiologic tremor  Patient Active Problem List   Diagnosis     Acute internal derangement of left knee     Asthma in adult     Breast cancer in situ     Depression, recurrent (H)     Sprain of posterior cruciate ligament of left knee     SVT (supraventricular tachycardia) (H)     Torticollis        Surgical:    Past Surgical History:   Procedure Laterality Date     BREAST SURGERY           SOCIAL: She is .  She has 1 son who is age 26.  Social History     Socioeconomic History     Marital status: Single     Spouse name: Not on file     Number of children: Not on file     Years of education: Not on file     Highest education level: Not on file   Occupational History     Not on file   Social Needs     Financial resource strain: Not on file     Food insecurity     Worry: Not on file     Inability: Not on file     Transportation needs     Medical: Not on file     Non-medical: Not on file   Tobacco Use     Smoking status: Former Smoker     Smokeless tobacco: Never Used   Substance and Sexual Activity     Alcohol use: No     Drug use: Never     Sexual activity: Not on file   Lifestyle     Physical activity     Days per week: Not on file     Minutes per session: Not on file     Stress: Not on file   Relationships     Social connections     Talks on phone: Not on file     Gets together: Not on file     Attends [employer] service: Not on file     Active member of club or organization: Not on file     Attends meetings of clubs or organizations: Not on file     Relationship status: Not on file     Intimate partner violence     Fear of current or ex partner: Not on file     Emotionally abused: Not on file      Physically abused: Not on file     Forced sexual activity: Not on file   Other Topics Concern     Parent/sibling w/ CABG, MI or angioplasty before 65F 55M? Not Asked   Social History Narrative     Not on file     Employment:     FAMILY HISTORY:  Family History   Problem Relation Age of Onset     Cancer Sister         breast cancer     Parents: Father  in his 70s apparently had cancer.  Mother  at age 97 with congestive heart failure.  Had atrial fibrillation and breast cancer.  Siblings: Sister  at age 42 from breast cancer.  Children: Has 1 son age 26 who is had testicular cancer                        REVIEW OF SYSTEMS  Constitutional: Did have 30 pound weight loss from 140 pounds to 110 pounds in January of this year has gained 10 pounds back.  No fever or chills.  Skin: Has had a rash.  Will be seeing her dermatologist again.  HEENT: See HPI  Respiratory: Occasional shortness of breath.  No cough.  Cardiovascular: Rare chest discomfort.  Rare palpitations.  Gastrointestinal: No abdominal pain, blood in the stool black tarry stools  Genitourinary: Recent frequency.  Musculoskeletal: Positive joint pain  Neurologic: See above.  Psychiatric: Positive for anxiety.  Negative for depression.    Endocrine: Negative for diabetes.      PHYSICAL EXAMINATION:    General appearance: Unable-telephone encounter      NEUROLOGIC EXAMINATION:  Oriented x3.  Speech appeared fluent.  No tremor or voice was appreciated.  LAB DATA /imaging:EXAM DATE:         2018    [facility] RADIOLOGY    EXAM: MRI HEAD W/O CONTRAST  LOCATION: [facility] RADIOLOGY [facility]  DATE/TIME: 2018 12:00 PM    INDICATION: Dizziness and Giddiness, Tremor, Unspecified  COMPARISON: None.  TECHNIQUE: Routine multiplanar multisequence head MRI without intravenous  contrast.    FINDINGS:  INTRACRANIAL CONTENTS: No evidence for acute or subacute infarction based on  diffusion-weighted imaging. There is leftward tilt of the head, secondary  to  known cervical dystonia (per clinical history form). No mass, acute hemorrhage,  or extra-axial fluid collections. Scattered nonspecific foci of T2/FLAIR  hyperintense signal in the cerebral white matter. Mild generalized cerebral  atrophy. No hydrocephalus. Normal position of the cerebellar tonsils.    SELLA: No significant abnormality accounting for technique.    OSSEOUS STRUCTURES/SOFT TISSUES: No aggressive osseous lesion involving the  calvarium, skull base, or visualized upper cervical spine. The major  intracranial vascular flow voids are maintained.    ORBITS: No significant abnormality accounting for technique.    SINUSES/MASTOIDS: No significant paranasal sinus mucosal disease. No significant  middle ear or mastoid effusion.      CONCLUSION:  1.  No acute or subacute infarction, intracranial hemorrhage, or mass effect.  2.  Mild global brain parenchymal volume loss with presumed sequelae of very  mild chronic small vessel ischemic disease, though within normal limits for  patient age.  3.  Leftward tilt of the head, secondary to known cervical dystonia.  EXAM: CT HEAD BRAIN WO  LOCATION: [facility] MEDICAL IMAGING  DATE/TIME: 5/31/2020 9:19 PM    INDICATION: dizziness, htn  COMPARISON: 12/31/2019 head CT  TECHNIQUE: Routine without IV contrast. Multiplanar reformats. Dose reduction  techniques were used.    FINDINGS:  INTRACRANIAL CONTENTS: No intracranial hemorrhage, extraaxial collection, or  mass effect.  No CT evidence of acute infarct. Mild volume loss and presumed  chronic small vessel ischemia are stable.     VISUALIZED ORBITS/SINUSES/MASTOIDS: No intraorbital abnormality. No paranasal  sinus mucosal disease. No middle ear or mastoid effusion.    BONES/SOFT TISSUES: No acute abnormality.    IMPRESSION:  1.  No change. No acute intercranial abnormality      IMPRESSION: Increased tremor and dizziness.  Does not sound like she has had symptoms of Parkinson's disease from discussion the patient.  We did  discuss doing a noncontrast MRI of the brain, given the dizziness and increased tremor noted in mid-later in May.  We discussed this would have greater sensitivity to make sure there was no evidence of an ischemic event or other process that may have occurred during that time we also discussed we have comparison that can be done to the MRI done in December 2018.  There may have been a worsening of her tremor that sounds to have possibly been an essential tremor with increased stress going on in her life.  It is reassuring that the laboratory studies to date have been on I also like to get her outside records from the [facility] for review with plans for follow-up afterwards.  Start of telephone encounter: 9:08 AM  End of telephone encounter 9:32 AM  additional time with review of outside records 10 minutes      Antoni Purcell MD, MD

## 2020-06-25 NOTE — LETTER
July 14, 2020      Morenita Moore  [address]   [place] [address]        Dear ,    We are writing to inform you of your test results.  The MRI of the brain showed no changes as compared to the study done on 12/20/2018.  In particular, no evidence of acute stroke, hemorrhage or mass lesion.  No changes that would explain tremor were seen on this study.  I am enclosing a copy of the MRI results below.        EXAM: MR BRAIN WO CONTRAST  LOCATION: [facility]  DATE/TIME: 7/1/2020 9:41 AM    INDICATION: Tremor, unspecified  COMPARISON: CT head 5/31/2020. Head MRI 12/20/2018.  TECHNIQUE: Routine multiplanar multisequence head MRI without intravenous contrast.    FINDINGS:  INTRACRANIAL CONTENTS: Multiple sequences degraded by patient motion. Some rapid imaging sequences were employed. No acute or subacute infarct. No mass, acute hemorrhage, or extra-axial fluid collections. Scattered nonspecific T2/FLAIR hyperintensities   within the cerebral white matter most consistent with mild chronic microvascular ischemic change. Mild generalized cerebral atrophy. No hydrocephalus. Mild cerebellar atrophy.     SELLA: No abnormality accounting for technique.    OSSEOUS STRUCTURES/SOFT TISSUES: Normal marrow signal. The major intracranial vascular flow voids are maintained.     ORBITS: Prior bilateral cataract surgery. Visualized portions of the orbits are otherwise unremarkable.     SINUSES/MASTOIDS: No paranasal sinus mucosal disease. No middle ear or mastoid effusion.     IMPRESSION:   1.  No acute intracranial process.  2.  Generalized brain atrophy and presumed microvascular ischemic changes similar to findings on the 2018 MRI.      If you have any questions or concerns, please call the clinic at the number listed above.       Sincerely,        Antoni Purcell MD, MD

## 2020-06-29 ENCOUNTER — COMMUNICATION - HEALTHEAST (OUTPATIENT)
Dept: SCHEDULING | Facility: CLINIC | Age: 75
End: 2020-06-29

## 2020-06-29 ENCOUNTER — COMMUNICATION - HEALTHEAST (OUTPATIENT)
Dept: GERIATRIC MEDICINE | Facility: CLINIC | Age: 75
End: 2020-06-29

## 2020-06-30 ENCOUNTER — AMBULATORY - HEALTHEAST (OUTPATIENT)
Dept: INTERNAL MEDICINE | Facility: CLINIC | Age: 75
End: 2020-06-30

## 2020-06-30 ENCOUNTER — AMBULATORY - HEALTHEAST (OUTPATIENT)
Dept: LAB | Facility: CLINIC | Age: 75
End: 2020-06-30

## 2020-06-30 DIAGNOSIS — Z11.59 ENCOUNTER FOR HEPATITIS C SCREENING TEST FOR LOW RISK PATIENT: ICD-10-CM

## 2020-06-30 DIAGNOSIS — R25.1 TREMOR, PHYSIOLOGICAL: ICD-10-CM

## 2020-06-30 LAB
CORTIS SERPL-MCNC: 14.6 UG/DL
HBV SURFACE AG SERPL QL IA: NEGATIVE
HCV AB SERPL QL IA: NEGATIVE

## 2020-07-01 ENCOUNTER — HOSPITAL ENCOUNTER (OUTPATIENT)
Dept: MRI IMAGING | Facility: HOSPITAL | Age: 75
Discharge: HOME | End: 2020-07-01

## 2020-07-01 ENCOUNTER — TRANSFERRED RECORDS (OUTPATIENT)
Dept: HEALTH INFORMATION MANAGEMENT | Facility: CLINIC | Age: 75
End: 2020-07-01

## 2020-07-01 DIAGNOSIS — R25.1 TREMOR: ICD-10-CM

## 2020-07-01 DIAGNOSIS — R42 DIZZINESS: ICD-10-CM

## 2020-07-14 NOTE — TELEPHONE ENCOUNTER
I left message for patient that I will forward her message to Dr. Purcell for review.  Please see MRI result in [facility] Everywhere.  Is there message I can give patient at this time?  Anika Canas on 7/14/2020 at 1:33 PM

## 2020-07-14 NOTE — TELEPHONE ENCOUNTER
May inform the patient that the MRI of the brain showed no changes as compared to the study done on 12/20/2018.  In particular, no evidence of acute stroke, hemorrhage or mass lesion.  No changes that would explain tremor seen on this study.  I will send the patient a copy of the report along with the results letter.

## 2020-07-28 ENCOUNTER — RECORDS - HEALTHEAST (OUTPATIENT)
Dept: ADMINISTRATIVE | Facility: OTHER | Age: 75
End: 2020-07-28

## 2020-08-03 ENCOUNTER — COMMUNICATION - HEALTHEAST (OUTPATIENT)
Dept: INTERNAL MEDICINE | Facility: CLINIC | Age: 75
End: 2020-08-03

## 2020-08-10 ENCOUNTER — COMMUNICATION - HEALTHEAST (OUTPATIENT)
Dept: INTERNAL MEDICINE | Facility: CLINIC | Age: 75
End: 2020-08-10

## 2020-08-18 ENCOUNTER — OFFICE VISIT - HEALTHEAST (OUTPATIENT)
Dept: INTERNAL MEDICINE | Facility: CLINIC | Age: 75
End: 2020-08-18

## 2020-08-18 DIAGNOSIS — R25.1 TREMOR, PHYSIOLOGICAL: ICD-10-CM

## 2020-08-18 DIAGNOSIS — I47.10 SVT (SUPRAVENTRICULAR TACHYCARDIA) (H): ICD-10-CM

## 2020-08-18 DIAGNOSIS — I31.9 PERICARDITIS, UNSPECIFIED CHRONICITY, UNSPECIFIED TYPE: ICD-10-CM

## 2020-08-18 DIAGNOSIS — J45.40 MODERATE PERSISTENT ASTHMA IN ADULT WITHOUT COMPLICATION: ICD-10-CM

## 2020-08-18 DIAGNOSIS — K21.9 GASTROESOPHAGEAL REFLUX DISEASE WITHOUT ESOPHAGITIS: ICD-10-CM

## 2020-08-18 ASSESSMENT — MIFFLIN-ST. JEOR: SCORE: 1074.01

## 2020-08-18 ASSESSMENT — PATIENT HEALTH QUESTIONNAIRE - PHQ9: SUM OF ALL RESPONSES TO PHQ QUESTIONS 1-9: 0

## 2020-08-27 ENCOUNTER — OFFICE VISIT - HEALTHEAST (OUTPATIENT)
Dept: FAMILY MEDICINE | Facility: CLINIC | Age: 75
End: 2020-08-27

## 2020-08-27 ENCOUNTER — COMMUNICATION - HEALTHEAST (OUTPATIENT)
Dept: SCHEDULING | Facility: CLINIC | Age: 75
End: 2020-08-27

## 2020-08-27 DIAGNOSIS — M20.40 HAMMER TOE, UNSPECIFIED LATERALITY: ICD-10-CM

## 2020-08-27 DIAGNOSIS — S90.221A CONTUSION OF TOENAIL OF RIGHT FOOT, INITIAL ENCOUNTER: ICD-10-CM

## 2020-09-25 ENCOUNTER — COMMUNICATION - HEALTHEAST (OUTPATIENT)
Dept: CARDIOLOGY | Facility: CLINIC | Age: 75
End: 2020-09-25

## 2020-09-25 ENCOUNTER — COMMUNICATION - HEALTHEAST (OUTPATIENT)
Dept: GERIATRIC MEDICINE | Facility: CLINIC | Age: 75
End: 2020-09-25

## 2020-09-29 ENCOUNTER — COMMUNICATION - HEALTHEAST (OUTPATIENT)
Dept: GERIATRIC MEDICINE | Facility: CLINIC | Age: 75
End: 2020-09-29

## 2020-10-01 ENCOUNTER — AMBULATORY - HEALTHEAST (OUTPATIENT)
Dept: CARDIOLOGY | Facility: CLINIC | Age: 75
End: 2020-10-01

## 2020-10-05 ENCOUNTER — COMMUNICATION - HEALTHEAST (OUTPATIENT)
Dept: INTERNAL MEDICINE | Facility: CLINIC | Age: 75
End: 2020-10-05

## 2020-10-05 DIAGNOSIS — K21.9 GASTROESOPHAGEAL REFLUX DISEASE WITHOUT ESOPHAGITIS: ICD-10-CM

## 2020-10-07 ENCOUNTER — OFFICE VISIT - HEALTHEAST (OUTPATIENT)
Dept: INTERNAL MEDICINE | Facility: CLINIC | Age: 75
End: 2020-10-07

## 2020-10-07 ENCOUNTER — COMMUNICATION - HEALTHEAST (OUTPATIENT)
Dept: SCHEDULING | Facility: CLINIC | Age: 75
End: 2020-10-07

## 2020-10-07 DIAGNOSIS — J45.40 MODERATE PERSISTENT ASTHMA IN ADULT WITHOUT COMPLICATION: ICD-10-CM

## 2020-10-07 DIAGNOSIS — R05.9 COUGH: ICD-10-CM

## 2020-10-12 ENCOUNTER — COMMUNICATION - HEALTHEAST (OUTPATIENT)
Dept: CARDIOLOGY | Facility: CLINIC | Age: 75
End: 2020-10-12

## 2020-10-12 ENCOUNTER — AMBULATORY - HEALTHEAST (OUTPATIENT)
Dept: PULMONOLOGY | Facility: OTHER | Age: 75
End: 2020-10-12

## 2020-10-12 DIAGNOSIS — R06.02 SOB (SHORTNESS OF BREATH): ICD-10-CM

## 2020-10-13 ENCOUNTER — AMBULATORY - HEALTHEAST (OUTPATIENT)
Dept: FAMILY MEDICINE | Facility: CLINIC | Age: 75
End: 2020-10-13

## 2020-10-13 DIAGNOSIS — R05.9 COUGH: ICD-10-CM

## 2020-10-14 ENCOUNTER — COMMUNICATION - HEALTHEAST (OUTPATIENT)
Dept: INTERNAL MEDICINE | Facility: CLINIC | Age: 75
End: 2020-10-14

## 2020-10-15 ENCOUNTER — COMMUNICATION - HEALTHEAST (OUTPATIENT)
Dept: SCHEDULING | Facility: CLINIC | Age: 75
End: 2020-10-15

## 2020-10-27 ENCOUNTER — COMMUNICATION - HEALTHEAST (OUTPATIENT)
Dept: CARDIOLOGY | Facility: CLINIC | Age: 75
End: 2020-10-27

## 2020-10-28 ENCOUNTER — OFFICE VISIT - HEALTHEAST (OUTPATIENT)
Dept: CARDIOLOGY | Facility: CLINIC | Age: 75
End: 2020-10-28

## 2020-10-28 DIAGNOSIS — I31.9 PERICARDITIS: ICD-10-CM

## 2020-10-28 DIAGNOSIS — I47.10 SVT (SUPRAVENTRICULAR TACHYCARDIA) (H): ICD-10-CM

## 2020-10-28 ASSESSMENT — MIFFLIN-ST. JEOR: SCORE: 1087.62

## 2020-10-30 ENCOUNTER — OFFICE VISIT - HEALTHEAST (OUTPATIENT)
Dept: INTERNAL MEDICINE | Facility: CLINIC | Age: 75
End: 2020-10-30

## 2020-10-30 DIAGNOSIS — I31.9 PERICARDITIS, UNSPECIFIED CHRONICITY, UNSPECIFIED TYPE: ICD-10-CM

## 2020-10-30 DIAGNOSIS — J45.40 MODERATE PERSISTENT ASTHMA IN ADULT WITHOUT COMPLICATION: ICD-10-CM

## 2020-10-30 DIAGNOSIS — I47.10 SVT (SUPRAVENTRICULAR TACHYCARDIA) (H): ICD-10-CM

## 2020-11-02 ENCOUNTER — OFFICE VISIT - HEALTHEAST (OUTPATIENT)
Dept: RHEUMATOLOGY | Facility: CLINIC | Age: 75
End: 2020-11-02

## 2020-11-02 ENCOUNTER — COMMUNICATION - HEALTHEAST (OUTPATIENT)
Dept: RHEUMATOLOGY | Facility: CLINIC | Age: 75
End: 2020-11-02

## 2020-11-16 ENCOUNTER — COMMUNICATION - HEALTHEAST (OUTPATIENT)
Dept: GERIATRIC MEDICINE | Facility: CLINIC | Age: 75
End: 2020-11-16

## 2020-11-23 ENCOUNTER — COMMUNICATION - HEALTHEAST (OUTPATIENT)
Dept: SCHEDULING | Facility: CLINIC | Age: 75
End: 2020-11-23

## 2020-11-23 ENCOUNTER — NURSE TRIAGE (OUTPATIENT)
Dept: NURSING | Facility: CLINIC | Age: 75
End: 2020-11-23

## 2020-11-23 ENCOUNTER — RECORDS - HEALTHEAST (OUTPATIENT)
Dept: ADMINISTRATIVE | Facility: OTHER | Age: 75
End: 2020-11-23

## 2020-11-23 DIAGNOSIS — F33.8 SEASONAL AFFECTIVE DISORDER (H): ICD-10-CM

## 2020-11-23 DIAGNOSIS — Z11.59 SCREENING FOR VIRAL DISEASE: ICD-10-CM

## 2020-11-24 ENCOUNTER — AMBULATORY - HEALTHEAST (OUTPATIENT)
Dept: LAB | Facility: CLINIC | Age: 75
End: 2020-11-24

## 2020-11-24 DIAGNOSIS — Z11.59 SCREENING FOR VIRAL DISEASE: ICD-10-CM

## 2020-11-27 ENCOUNTER — COMMUNICATION - HEALTHEAST (OUTPATIENT)
Dept: SCHEDULING | Facility: CLINIC | Age: 75
End: 2020-11-27

## 2020-12-02 ENCOUNTER — COMMUNICATION - HEALTHEAST (OUTPATIENT)
Dept: GERIATRIC MEDICINE | Facility: CLINIC | Age: 75
End: 2020-12-02

## 2020-12-14 ENCOUNTER — COMMUNICATION - HEALTHEAST (OUTPATIENT)
Dept: SCHEDULING | Facility: CLINIC | Age: 75
End: 2020-12-14

## 2020-12-14 ENCOUNTER — OFFICE VISIT (OUTPATIENT)
Dept: URGENT CARE | Facility: URGENT CARE | Age: 75
End: 2020-12-14
Payer: MEDICARE

## 2020-12-14 VITALS
SYSTOLIC BLOOD PRESSURE: 142 MMHG | TEMPERATURE: 98 F | RESPIRATION RATE: 20 BRPM | DIASTOLIC BLOOD PRESSURE: 78 MMHG | OXYGEN SATURATION: 98 % | HEART RATE: 68 BPM

## 2020-12-14 DIAGNOSIS — H81.10 BENIGN PAROXYSMAL POSITIONAL VERTIGO, UNSPECIFIED LATERALITY: Primary | ICD-10-CM

## 2020-12-14 LAB
ANION GAP SERPL CALCULATED.3IONS-SCNC: <1 MMOL/L (ref 3–14)
BASOPHILS # BLD AUTO: 0.1 10E9/L (ref 0–0.2)
BASOPHILS NFR BLD AUTO: 0.8 %
BUN SERPL-MCNC: 12 MG/DL (ref 7–30)
CALCIUM SERPL-MCNC: 9.7 MG/DL (ref 8.5–10.1)
CHLORIDE SERPL-SCNC: 111 MMOL/L (ref 94–109)
CO2 SERPL-SCNC: 30 MMOL/L (ref 20–32)
CREAT SERPL-MCNC: 0.7 MG/DL (ref 0.52–1.04)
CREAT SERPL-MCNC: 0.7 MG/DL (ref 0.52–1.04)
DIFFERENTIAL METHOD BLD: ABNORMAL
EOSINOPHIL # BLD AUTO: 0.1 10E9/L (ref 0–0.7)
EOSINOPHIL NFR BLD AUTO: 1.2 %
ERYTHROCYTE [DISTWIDTH] IN BLOOD BY AUTOMATED COUNT: 13.3 % (ref 10–15)
GFR ESTIMATE EXT - HISTORICAL: 84 ML/MIN/1.73M2
GFR ESTIMATE, IF BLACK EXT - HISTORICAL: 98 ML/MIN/1.73M2
GFR SERPL CREATININE-BSD FRML MDRD: 84 ML/MIN/{1.73_M2}
GLUCOSE SERPL-MCNC: 106 MG/DL (ref 70–99)
HCT VFR BLD AUTO: 39.4 % (ref 35–47)
HGB BLD-MCNC: 13.1 G/DL (ref 11.7–15.7)
LYMPHOCYTES # BLD AUTO: 0.7 10E9/L (ref 0.8–5.3)
LYMPHOCYTES NFR BLD AUTO: 10.9 %
MCH RBC QN AUTO: 31.6 PG (ref 26.5–33)
MCHC RBC AUTO-ENTMCNC: 33.2 G/DL (ref 31.5–36.5)
MCV RBC AUTO: 95 FL (ref 78–100)
MONOCYTES # BLD AUTO: 0.3 10E9/L (ref 0–1.3)
MONOCYTES NFR BLD AUTO: 5.2 %
NEUTROPHILS # BLD AUTO: 5.4 10E9/L (ref 1.6–8.3)
NEUTROPHILS NFR BLD AUTO: 81.9 %
PLATELET # BLD AUTO: 276 10E9/L (ref 150–450)
POTASSIUM SERPL-SCNC: 4.4 MMOL/L (ref 3.4–5.3)
RBC # BLD AUTO: 4.15 10E12/L (ref 3.8–5.2)
SODIUM SERPL-SCNC: 141 MMOL/L (ref 133–144)
WBC # BLD AUTO: 6.5 10E9/L (ref 4–11)

## 2020-12-14 PROCEDURE — 99214 OFFICE O/P EST MOD 30 MIN: CPT | Performed by: PHYSICIAN ASSISTANT

## 2020-12-14 PROCEDURE — U0003 INFECTIOUS AGENT DETECTION BY NUCLEIC ACID (DNA OR RNA); SEVERE ACUTE RESPIRATORY SYNDROME CORONAVIRUS 2 (SARS-COV-2) (CORONAVIRUS DISEASE [COVID-19]), AMPLIFIED PROBE TECHNIQUE, MAKING USE OF HIGH THROUGHPUT TECHNOLOGIES AS DESCRIBED BY CMS-2020-01-R: HCPCS | Performed by: PHYSICIAN ASSISTANT

## 2020-12-14 PROCEDURE — 36415 COLL VENOUS BLD VENIPUNCTURE: CPT | Performed by: PHYSICIAN ASSISTANT

## 2020-12-14 PROCEDURE — 85025 COMPLETE CBC W/AUTO DIFF WBC: CPT | Performed by: PHYSICIAN ASSISTANT

## 2020-12-14 PROCEDURE — 80048 BASIC METABOLIC PNL TOTAL CA: CPT | Performed by: PHYSICIAN ASSISTANT

## 2020-12-14 NOTE — PATIENT INSTRUCTIONS
I suspect your symptoms are from Vertigo.   I want you to move your head slowly. These symptoms will come and go.  I am reluctant to start you on medication because of your reactions to medications.   If they do not improve in one week, please follow-up with PCP.       Patient Education     Benign Paroxysmal Positional Vertigo    Benign paroxysmal positional vertigo is a common condition. You feel as if the room is spinning after changing position, moving your head quickly, or even just rolling over in bed.  Vertigo is a false feeling of motion plus disorientation that makes it seem as though the room is spinning. A vertigo attack may cause sudden nausea, vomiting, and heavy sweating. Severe vertigo causes a loss of balance. You may even fall down.  Vertigo is caused by a problem with the inner ear. The inner ear is located behind the middle ear. It is a part of the balance center of the body. It contains small calcium particles within fluid-filled canals (semi-circular canals). These particles can move out of position. This may happen as a result of aging, head injury, or disease of the inner ear. Once that happens, moving your head in certain ways may cause the particles to stimulate the inner ear. This creates the feeling of vertigo.  An episode of vertigo may last seconds, minutes, or hours. Once you are over the first episode of vertigo, it may never return. Sometimes symptoms return off and on for several weeks or longer.  Home care  Follow these guidelines when caring for yourself at home:    Rest quietly in bed if your symptoms are severe. Change position slowly. There is usually 1 position that will feel best. This might be lying on 1 side or lying on your back with your head slightly raised on pillows. Until you have no symptoms, you are at a higher risk of falling. Let someone help you when you get up. Get rid of home hazards such as loose electrical cords and throw rugs. Don t walk in unfamiliar areas  that are not lighted. Use night lights in bathrooms and kitchen areas.    Do not drive or work with dangerous machinery for 1 week after symptoms go away. This is in case symptoms return suddenly.    Take medicine as prescribed to relieve your symptoms. Unless another medicine was prescribed for nausea, vomiting, and vertigo, you may use over-the-counter motion sickness medicine. Examples of this include meclizine and dimenhydrinate.  Follow-up care  Follow up with your healthcare provider, or as directed. Tell your provider about any ringing in your ear or hearing loss.  If you had a CT or MRI scan, a specialist will review it. You will be told of any new findings that may affect your care.  When to seek medical advice  Call your healthcare provider right away if any of these occur:    Vertigo gets worse even after taking prescribed medicine    Repeated vomiting even after taking prescribed medicine    Weakness that gets worse    Fainting    Severe headache or unusual drowsiness or confusion    Weakness of an arm or leg or 1 side of the face    Trouble walking    Trouble with speech or vision    Seizure    Trouble hearing    Fever of 100.4 F (38 C) or higher, or as directed by your healthcare provider    Fast heart rate    Chest pain   [employer] last reviewed this educational content on 11/1/2017 2000-2020 [employer]. [address], [place] [address]. All rights reserved. This information is not intended as a substitute for professional medical care. Always follow your healthcare professional's instructions.

## 2020-12-14 NOTE — PROGRESS NOTES
CHIEF COMPLAINT:   Chief Complaint   Patient presents with     Urgent Care     dizzy/tired/up BP        HPI: Morenita Moore is a 75 year old female who presents to clinic today for evaluation of dizziness. Patient reports that this AM, she was bent over putting on her socks and shoes, when she had an acute onset that the room was spinning around her. The symptoms lasted for about 5 minutes and then resolved. Again, she went to [facility] and the symptoms returned and resolved after about 5 minutes.   She has a long history of lightheadedness, but has not had dizziness like this in the past. Additionally, she has had fatigue for the past 3 days, but also is not sleeping well. Feels intermittently SOB, and has been using her Advair.  Denies fever, chills, URI symptoms, Chest pain, abdominal pain, nausea, vomiting, diarrhea, weakness in her face or extremities or syncope.     Past Medical History:   Diagnosis Date     Anxiety      Asthma 2012    adult-onset asthma diagnosed in 2012     Cancer (H)      DCIS (ductal carcinoma in situ) 2001    stage 0 status post left mastectomy in 2001     Depressive disorder      Uncomplicated asthma      Past Surgical History:   Procedure Laterality Date     BREAST SURGERY       Social History     Tobacco Use     Smoking status: Former Smoker     Smokeless tobacco: Never Used   Substance Use Topics     Alcohol use: No     Current Outpatient Medications   Medication     acetaminophen (TYLENOL) 500 MG tablet     albuterol (PROVENTIL HFA: VENTOLIN HFA) 108 (90 BASE) MCG/ACT inhaler     aspirin 81 MG EC tablet     calcium carbonate 750 MG CHEW     CIPROFLOXACIN PO     fluticasone-salmeterol (ADVAIR) 100-50 MCG/DOSE diskus inhaler     LORazepam (ATIVAN) 0.5 MG tablet     metoprolol tartrate (LOPRESSOR) 25 MG tablet     OMEPRAZOLE PO     ondansetron (ZOFRAN) 4 MG tablet     ipratropium - albuterol 0.5 mg/2.5 mg/3 mL (DUONEB) 0.5-2.5 (3) MG/3ML nebulization     No current  facility-administered medications for this visit.      Allergies   Allergen Reactions     Levalbuterol Hydrochloride Shortness Of Breath     Cats Other (See Comments)     Itchy eyes     Dust Mites      Other reaction(s): Wheezing  Wheezing/shortness/breath/see (IRP 6/1)     Fluticasone-Salmeterol Swelling     Proair Hfa [Albuterol] Other (See Comments)     DRY THROAT, SWALLOWING ISSUES     Amitriptyline Palpitations     Escitalopram Anxiety       10 point ROS of systems including Constitutional, Eyes, Respiratory, Cardiovascular, Gastroenterology, Genitourinary, Integumentary, Muscularskeletal, Psychiatric were all negative except for pertinent positives noted in my HPI.        Exam:  BP (!) 142/78   Pulse 68   Temp 98  F (36.7  C)   Resp 20   SpO2 98%   Constitutional: healthy, alert and no distress  Head: Normocephalic, atraumatic.  Eyes: conjunctiva clear, no drainage  ENT: TMs clear and shiny manjinder, nasal mucosa pink and moist, throat without tonsillar hypertrophy or erythema  Neck: neck is supple, no cervical lymphadenopathy or nuchal rigidity  Cardiovascular: RRR  Respiratory: CTA bilaterally, no rhonchi or rales  Gastrointestinal: soft and nontender  Skin: no rashes  Neurologic: CN 2-12 intact. No facial or extremity weakness. Gait normal. Coordination intact. Neg Pronator. Sensation and strength intact.     Results for orders placed or performed in visit on 12/14/20   CBC with platelets and differential     Status: Abnormal   Result Value Ref Range    WBC 6.5 4.0 - 11.0 10e9/L    RBC Count 4.15 3.8 - 5.2 10e12/L    Hemoglobin 13.1 11.7 - 15.7 g/dL    Hematocrit 39.4 35.0 - 47.0 %    MCV 95 78 - 100 fl    MCH 31.6 26.5 - 33.0 pg    MCHC 33.2 31.5 - 36.5 g/dL    RDW 13.3 10.0 - 15.0 %    Platelet Count 276 150 - 450 10e9/L    % Neutrophils 81.9 %    % Lymphocytes 10.9 %    % Monocytes 5.2 %    % Eosinophils 1.2 %    % Basophils 0.8 %    Absolute Neutrophil 5.4 1.6 - 8.3 10e9/L    Absolute Lymphocytes 0.7 (L)  0.8 - 5.3 10e9/L    Absolute Monocytes 0.3 0.0 - 1.3 10e9/L    Absolute Eosinophils 0.1 0.0 - 0.7 10e9/L    Absolute Basophils 0.1 0.0 - 0.2 10e9/L    Diff Method Automated Method    Basic metabolic panel  (Ca, Cl, CO2, Creat, Gluc, K, Na, BUN)     Status: Abnormal   Result Value Ref Range    Sodium 141 133 - 144 mmol/L    Potassium 4.4 3.4 - 5.3 mmol/L    Chloride 111 (H) 94 - 109 mmol/L    Carbon Dioxide 30 20 - 32 mmol/L    Anion Gap <1 (L) 3 - 14 mmol/L    Glucose 106 (H) 70 - 99 mg/dL    Urea Nitrogen 12 7 - 30 mg/dL    Creatinine 0.70 0.52 - 1.04 mg/dL    GFR Estimate 84 >60 mL/min/[1.73_m2]    GFR Estimate If Black 98 >60 mL/min/[1.73_m2]    Calcium 9.7 8.5 - 10.1 mg/dL         ASSESSMENT/PLAN:  1. Benign paroxysmal positional vertigo, unspecified laterality  Patients dizziness is vertiginous in nature. She is completely neurologically intact and not currently dizzy. Additionally, does not have cardiac / pulm symptoms to suggest cardiac component of dizziness. She has hx of pericarditis, heart exam is normal. No increased CP.   No lab abnormality to provide different explanation of symptoms.   She has reactions to several medications, so will hold off on Meclizine for now  Encouraged her to move her head slowly. Follow-up with PCP in one week if symptoms persist.  - CBC with platelets and differential  - Basic metabolic panel  (Ca, Cl, CO2, Creat, Gluc, K, Na, BUN)  - Symptomatic COVID-19 Virus (Coronavirus) by PCR      Diagnosis and treatment plan was reviewed with patient and/or family.   We went over any labs or imaging. Discussed worsening symptoms or little to no relief despite treatment plan to follow-up with PCP or return to clinic.  Patient verbalizes understanding. All questions were addressed and answered.   Ivone Oliveira PA-C

## 2020-12-15 LAB
SARS-COV-2 RNA SPEC QL NAA+PROBE: NOT DETECTED
SPECIMEN SOURCE: NORMAL

## 2020-12-16 ENCOUNTER — COMMUNICATION - HEALTHEAST (OUTPATIENT)
Dept: SCHEDULING | Facility: CLINIC | Age: 75
End: 2020-12-16

## 2020-12-17 ENCOUNTER — OFFICE VISIT - HEALTHEAST (OUTPATIENT)
Dept: INTERNAL MEDICINE | Facility: CLINIC | Age: 75
End: 2020-12-17

## 2020-12-17 DIAGNOSIS — R42 VERTIGO: ICD-10-CM

## 2020-12-17 DIAGNOSIS — G47.00 INSOMNIA, UNSPECIFIED TYPE: ICD-10-CM

## 2020-12-17 DIAGNOSIS — I47.10 SVT (SUPRAVENTRICULAR TACHYCARDIA) (H): ICD-10-CM

## 2020-12-29 ENCOUNTER — COMMUNICATION - HEALTHEAST (OUTPATIENT)
Dept: GERIATRIC MEDICINE | Facility: CLINIC | Age: 75
End: 2020-12-29

## 2020-12-29 ASSESSMENT — ACTIVITIES OF DAILY LIVING (ADL): DEPENDENT_IADLS:: TRANSPORTATION;SHOPPING;LAUNDRY;CLEANING

## 2021-01-26 ENCOUNTER — RECORDS - HEALTHEAST (OUTPATIENT)
Dept: SCHEDULING | Facility: CLINIC | Age: 76
End: 2021-01-26

## 2021-01-26 ENCOUNTER — COMMUNICATION - HEALTHEAST (OUTPATIENT)
Dept: GERIATRIC MEDICINE | Facility: CLINIC | Age: 76
End: 2021-01-26

## 2021-01-26 ENCOUNTER — COMMUNICATION - HEALTHEAST (OUTPATIENT)
Dept: SCHEDULING | Facility: CLINIC | Age: 76
End: 2021-01-26

## 2021-01-27 ENCOUNTER — COMMUNICATION - HEALTHEAST (OUTPATIENT)
Dept: GERIATRIC MEDICINE | Facility: CLINIC | Age: 76
End: 2021-01-27

## 2021-02-01 ENCOUNTER — OFFICE VISIT - HEALTHEAST (OUTPATIENT)
Dept: INTERNAL MEDICINE | Facility: CLINIC | Age: 76
End: 2021-02-01

## 2021-02-01 DIAGNOSIS — Z00.00 WELLNESS EXAMINATION: ICD-10-CM

## 2021-02-01 DIAGNOSIS — R42 DIZZINESS: ICD-10-CM

## 2021-02-01 DIAGNOSIS — E55.9 VITAMIN D DEFICIENCY: ICD-10-CM

## 2021-02-01 LAB
ALBUMIN SERPL-MCNC: 4.3 G/DL (ref 3.5–5)
ALP SERPL-CCNC: 82 U/L (ref 45–120)
ALT SERPL W P-5'-P-CCNC: 12 U/L (ref 0–45)
ANION GAP SERPL CALCULATED.3IONS-SCNC: 9 MMOL/L (ref 5–18)
AST SERPL W P-5'-P-CCNC: 16 U/L (ref 0–40)
BILIRUB SERPL-MCNC: 0.6 MG/DL (ref 0–1)
BUN SERPL-MCNC: 10 MG/DL (ref 8–28)
CALCIUM SERPL-MCNC: 9.1 MG/DL (ref 8.5–10.5)
CHLORIDE BLD-SCNC: 106 MMOL/L (ref 98–107)
CO2 SERPL-SCNC: 25 MMOL/L (ref 22–31)
CREAT SERPL-MCNC: 0.67 MG/DL (ref 0.6–1.1)
ERYTHROCYTE [DISTWIDTH] IN BLOOD BY AUTOMATED COUNT: 12.9 % (ref 11–14.5)
GFR SERPL CREATININE-BSD FRML MDRD: >60 ML/MIN/1.73M2
GLUCOSE BLD-MCNC: 89 MG/DL (ref 70–125)
HCT VFR BLD AUTO: 41.1 % (ref 35–47)
HGB BLD-MCNC: 13.7 G/DL (ref 12–16)
MCH RBC QN AUTO: 31.4 PG (ref 27–34)
MCHC RBC AUTO-ENTMCNC: 33.3 G/DL (ref 32–36)
MCV RBC AUTO: 94 FL (ref 80–100)
PLATELET # BLD AUTO: 267 THOU/UL (ref 140–440)
PMV BLD AUTO: 8.9 FL (ref 7–10)
POTASSIUM BLD-SCNC: 4 MMOL/L (ref 3.5–5)
PROT SERPL-MCNC: 6.8 G/DL (ref 6–8)
RBC # BLD AUTO: 4.37 MILL/UL (ref 3.8–5.4)
SODIUM SERPL-SCNC: 140 MMOL/L (ref 136–145)
VIT B12 SERPL-MCNC: 683 PG/ML (ref 213–816)
WBC: 5.2 THOU/UL (ref 4–11)

## 2021-02-01 ASSESSMENT — PATIENT HEALTH QUESTIONNAIRE - PHQ9: SUM OF ALL RESPONSES TO PHQ QUESTIONS 1-9: 0

## 2021-02-02 ENCOUNTER — COMMUNICATION - HEALTHEAST (OUTPATIENT)
Dept: INTERNAL MEDICINE | Facility: CLINIC | Age: 76
End: 2021-02-02

## 2021-02-02 LAB — 25(OH)D3 SERPL-MCNC: 37.5 NG/ML (ref 30–80)

## 2021-02-08 ENCOUNTER — COMMUNICATION - HEALTHEAST (OUTPATIENT)
Dept: FAMILY MEDICINE | Facility: CLINIC | Age: 76
End: 2021-02-08

## 2021-02-08 DIAGNOSIS — R25.1 TREMOR: ICD-10-CM

## 2021-02-08 DIAGNOSIS — R42 DIZZINESS: ICD-10-CM

## 2021-02-08 DIAGNOSIS — G24.3 CERVICAL DYSTONIA: ICD-10-CM

## 2021-02-11 ENCOUNTER — TRANSCRIBE ORDERS (OUTPATIENT)
Dept: OTHER | Age: 76
End: 2021-02-11

## 2021-02-11 DIAGNOSIS — R25.1 TREMOR: Primary | ICD-10-CM

## 2021-02-11 DIAGNOSIS — R42 DIZZINESS: ICD-10-CM

## 2021-02-16 ENCOUNTER — DOCUMENTATION ONLY (OUTPATIENT)
Dept: CARE COORDINATION | Facility: CLINIC | Age: 76
End: 2021-02-16

## 2021-02-22 ENCOUNTER — TELEPHONE (OUTPATIENT)
Dept: FAMILY MEDICINE | Facility: CLINIC | Age: 76
End: 2021-02-22

## 2021-02-22 NOTE — TELEPHONE ENCOUNTER
Patient called asking for advice on establishing with a new provider. Advised to go to website and view the provider profiles.     Ambrosio HOUSTON RN, BSN

## 2021-03-09 ENCOUNTER — IMMUNIZATION (OUTPATIENT)
Dept: NURSING | Facility: CLINIC | Age: 76
End: 2021-03-09
Payer: MEDICARE

## 2021-03-09 PROCEDURE — 0001A PR COVID VAC PFIZER DIL RECON 30 MCG/0.3 ML IM: CPT

## 2021-03-09 PROCEDURE — 91300 PR COVID VAC PFIZER DIL RECON 30 MCG/0.3 ML IM: CPT

## 2021-03-10 ENCOUNTER — NURSE TRIAGE (OUTPATIENT)
Dept: NURSING | Facility: CLINIC | Age: 76
End: 2021-03-10

## 2021-03-10 ENCOUNTER — PRE VISIT (OUTPATIENT)
Dept: NEUROLOGY | Facility: CLINIC | Age: 76
End: 2021-03-10

## 2021-03-10 NOTE — TELEPHONE ENCOUNTER
FUTURE VISIT INFORMATION      FUTURE VISIT INFORMATION:    Date: 3/15/2021    Time: 1030am    [place] [facility] [place]  REFERRAL INFORMATION:    Referring provider:  Dr. Tamayo    Referring providers clinic:  [facility]     Reason for visit/diagnosis  Dizziness     RECORDS REQUESTED FROM:       Clinic name Comments Records Status Imaging Status   [facility]  Dr. Tamayo-2/1/2021, 12/17/2020    MR Brain-7/1/2020 Care EVerywhere Requested to PACS         [facility] CT Head-5/31/2020, 12/31/2019    MR Head-12/21/2018 Care Everywhere Requested to PACS                       3/10/2021-Request for Images faxed to [facility], Spoke with [facility] to have images pushed ASAP-MR @ 114pm    4/12/2021-[facility] and [facility] Images now in PACS-MR @ 226pm

## 2021-03-11 ENCOUNTER — COMMUNICATION - HEALTHEAST (OUTPATIENT)
Dept: GERIATRIC MEDICINE | Facility: CLINIC | Age: 76
End: 2021-03-11

## 2021-03-11 ENCOUNTER — COMMUNICATION - HEALTHEAST (OUTPATIENT)
Dept: INTERNAL MEDICINE | Facility: CLINIC | Age: 76
End: 2021-03-11

## 2021-03-11 ENCOUNTER — COMMUNICATION - HEALTHEAST (OUTPATIENT)
Dept: SCHEDULING | Facility: CLINIC | Age: 76
End: 2021-03-11

## 2021-03-11 ENCOUNTER — COMMUNICATION - HEALTHEAST (OUTPATIENT)
Dept: FAMILY MEDICINE | Facility: CLINIC | Age: 76
End: 2021-03-11

## 2021-03-11 DIAGNOSIS — T78.40XD ALLERGIC REACTION, SUBSEQUENT ENCOUNTER: ICD-10-CM

## 2021-03-17 ENCOUNTER — COMMUNICATION - HEALTHEAST (OUTPATIENT)
Dept: INTERNAL MEDICINE | Facility: CLINIC | Age: 76
End: 2021-03-17

## 2021-03-17 DIAGNOSIS — I31.9 PERICARDITIS, UNSPECIFIED CHRONICITY, UNSPECIFIED TYPE: ICD-10-CM

## 2021-03-19 ENCOUNTER — TRANSCRIBE ORDERS (OUTPATIENT)
Dept: OTHER | Age: 76
End: 2021-03-19

## 2021-03-19 DIAGNOSIS — I31.9 PERICARDITIS: Primary | ICD-10-CM

## 2021-03-22 ENCOUNTER — TELEPHONE (OUTPATIENT)
Dept: INFECTIOUS DISEASES | Facility: CLINIC | Age: 76
End: 2021-03-22

## 2021-03-22 NOTE — TELEPHONE ENCOUNTER
[facility] Call Center    Phone Message    May a detailed message be left on voicemail: yes     Reason for Call: Other: Patient is being referred by Dr Aminah Tamayo to be seen for Pericarditis. Writer contacted patient to schedule her appointment but patient stated she is unable to do a video visit due to technology issues. Sending encounter to clinic to help schedule appointment for patient since patient is unable to do a video visit. Please review and contact patient to schedule.     Action Taken: Message routed to:  [facility] ([facility]): ID    Travel Screening: Not Applicable

## 2021-03-23 ENCOUNTER — COMMUNICATION - HEALTHEAST (OUTPATIENT)
Dept: GERIATRIC MEDICINE | Facility: CLINIC | Age: 76
End: 2021-03-23

## 2021-03-24 NOTE — TELEPHONE ENCOUNTER
Aster Farah Abby, LC; Roxana Payne MD 1 hour ago (2:41 PM)     Hello,   Patient is now scheduled on 4/6 at 1:30 with Dr. Payne for an in-person visit.   Thank you,   Aster

## 2021-03-24 NOTE — TELEPHONE ENCOUNTER
RECORDS RECEIVED FROM: [facility]   DATE RECEIVED: 04.06.2021   NOTES (Gather within 2 years) STATUS DETAILS   OFFICE NOTE from referring provider   Care Everywhere 03.17.2021 Aminah Tamayo MD     OFFICE NOTE from other specialist N/A    DISCHARGE SUMMARY from hospital N/A    DISCHARGE REPORT from the ER N/A    LABS (any labs) Internal / CE    MEDICATION LIST Internal  / [facility]    IMAGING  (NEED IMAGES AND REPORTS)     Osteomyelitis: Foot imaging  N/A    Liver Abscess: Abdominal imaging N/A    Other (anything related to diagnoses N/A

## 2021-03-27 ENCOUNTER — HEALTH MAINTENANCE LETTER (OUTPATIENT)
Age: 76
End: 2021-03-27

## 2021-03-30 ENCOUNTER — IMMUNIZATION (OUTPATIENT)
Dept: NURSING | Facility: CLINIC | Age: 76
End: 2021-03-30
Attending: FAMILY MEDICINE
Payer: MEDICARE

## 2021-03-30 PROCEDURE — 0002A PR COVID VAC PFIZER DIL RECON 30 MCG/0.3 ML IM: CPT

## 2021-03-30 PROCEDURE — 91300 PR COVID VAC PFIZER DIL RECON 30 MCG/0.3 ML IM: CPT

## 2021-03-31 ENCOUNTER — COMMUNICATION - HEALTHEAST (OUTPATIENT)
Dept: GERIATRIC MEDICINE | Facility: CLINIC | Age: 76
End: 2021-03-31

## 2021-04-05 ASSESSMENT — ENCOUNTER SYMPTOMS
LIGHT-HEADEDNESS: 1
SORE THROAT: 0
LEG PAIN: 0
INCREASED ENERGY: 1
NECK MASS: 0
TINGLING: 0
SMELL DISTURBANCE: 0
MUSCLE CRAMPS: 0
PARALYSIS: 0
HYPOTENSION: 0
ALTERED TEMPERATURE REGULATION: 0
MUSCLE WEAKNESS: 0
WEIGHT GAIN: 0
ARTHRALGIAS: 1
POLYPHAGIA: 0
WEAKNESS: 0
FEVER: 0
HALLUCINATIONS: 0
MEMORY LOSS: 0
JOINT SWELLING: 1
PALPITATIONS: 0
WEIGHT LOSS: 0
NECK PAIN: 1
SLEEP DISTURBANCES DUE TO BREATHING: 0
SPEECH CHANGE: 0
DECREASED APPETITE: 1
SINUS CONGESTION: 0
FATIGUE: 1
ORTHOPNEA: 0
SINUS PAIN: 0
HOARSE VOICE: 0
NUMBNESS: 0
SEIZURES: 0
EXERCISE INTOLERANCE: 1
POLYDIPSIA: 0
SYNCOPE: 0
CHILLS: 0
DISTURBANCES IN COORDINATION: 0
DIZZINESS: 1
TREMORS: 1
NIGHT SWEATS: 0
BACK PAIN: 1
HYPERTENSION: 0
MYALGIAS: 1
TASTE DISTURBANCE: 0
STIFFNESS: 1
LOSS OF CONSCIOUSNESS: 0
TROUBLE SWALLOWING: 0
HEADACHES: 0

## 2021-04-06 ENCOUNTER — RECORDS - HEALTHEAST (OUTPATIENT)
Dept: ADMINISTRATIVE | Facility: OTHER | Age: 76
End: 2021-04-06

## 2021-04-06 ENCOUNTER — OFFICE VISIT (OUTPATIENT)
Dept: INFECTIOUS DISEASES | Facility: CLINIC | Age: 76
End: 2021-04-06
Attending: INTERNAL MEDICINE
Payer: MEDICARE

## 2021-04-06 ENCOUNTER — PRE VISIT (OUTPATIENT)
Dept: INFECTIOUS DISEASES | Facility: CLINIC | Age: 76
End: 2021-04-06

## 2021-04-06 VITALS
WEIGHT: 128.6 LBS | DIASTOLIC BLOOD PRESSURE: 80 MMHG | SYSTOLIC BLOOD PRESSURE: 164 MMHG | HEART RATE: 92 BPM | BODY MASS INDEX: 21.4 KG/M2 | OXYGEN SATURATION: 97 % | TEMPERATURE: 98.2 F

## 2021-04-06 DIAGNOSIS — I30.0 ACUTE IDIOPATHIC PERICARDITIS: ICD-10-CM

## 2021-04-06 DIAGNOSIS — I31.9 PERICARDITIS: ICD-10-CM

## 2021-04-06 DIAGNOSIS — Z11.59 ENCOUNTER FOR SCREENING FOR OTHER VIRAL DISEASES: ICD-10-CM

## 2021-04-06 DIAGNOSIS — R53.83 FATIGUE, UNSPECIFIED TYPE: ICD-10-CM

## 2021-04-06 DIAGNOSIS — R53.83 FATIGUE, UNSPECIFIED TYPE: Primary | ICD-10-CM

## 2021-04-06 PROCEDURE — 86635 COCCIDIOIDES ANTIBODY: CPT | Mod: 90 | Performed by: PATHOLOGY

## 2021-04-06 PROCEDURE — 86780 TREPONEMA PALLIDUM: CPT | Mod: GZ | Performed by: INTERNAL MEDICINE

## 2021-04-06 PROCEDURE — G0463 HOSPITAL OUTPT CLINIC VISIT: HCPCS

## 2021-04-06 PROCEDURE — 36415 COLL VENOUS BLD VENIPUNCTURE: CPT | Performed by: PATHOLOGY

## 2021-04-06 PROCEDURE — 87385 HISTOPLASMA CAPSUL AG IA: CPT | Mod: 90 | Performed by: PATHOLOGY

## 2021-04-06 PROCEDURE — 87340 HEPATITIS B SURFACE AG IA: CPT | Performed by: INTERNAL MEDICINE

## 2021-04-06 PROCEDURE — 99205 OFFICE O/P NEW HI 60 MIN: CPT | Performed by: INTERNAL MEDICINE

## 2021-04-06 PROCEDURE — 86706 HEP B SURFACE ANTIBODY: CPT | Performed by: INTERNAL MEDICINE

## 2021-04-06 PROCEDURE — 99000 SPECIMEN HANDLING OFFICE-LAB: CPT | Performed by: PATHOLOGY

## 2021-04-06 PROCEDURE — 86704 HEP B CORE ANTIBODY TOTAL: CPT | Performed by: INTERNAL MEDICINE

## 2021-04-06 PROCEDURE — 99417 PROLNG OP E/M EACH 15 MIN: CPT | Performed by: INTERNAL MEDICINE

## 2021-04-06 RX ORDER — PROMETHAZINE HYDROCHLORIDE 12.5 MG/1
12.5 TABLET ORAL PRN
COMMUNITY
Start: 2021-03-11 | End: 2021-08-10

## 2021-04-06 NOTE — NURSING NOTE
Chief Complaint   Patient presents with     Consult     initial visit       BP (!) 164/80   Pulse 92   Temp 98.2  F (36.8  C)   Wt 58.3 kg (128 lb 9.6 oz)   SpO2 97%   BMI 21.40 kg/m        Becca ADAMS, [place]

## 2021-04-06 NOTE — LETTER
2021       RE: Morenita Moore  [address]  [place] [address]     Dear Colleague,    Thank you for referring your patient, Morenita Moore, to the [facility]FECT[facility] [place] at [facility]. Please see a copy of my visit note below.    [facility]  New Patient Visit  2021     Chief Complaint:  Chronic fatigue and dizziness following pericarditis    HPI:  Morenita Moore is a 75 year old female with a hx of paracarditis thought to be 2/2 virus in 2019 who presents as she has had symptoms since.     She developed chest pain and lightheadedness in 2019. Since then she has had work-ups in the ER, cardiology clinic, and rheumatology clinic. She did have a TTE at one point which showed trivial pericardial effusion. Clinically, she was given the diagnosis of pericarditis. She has been on various treatments including prednisone (self prescribed), colchicine, and NSAIDs. She reports some improvement with all of these therapies. She did have a elevated WANDA and saw a rheumatologist. No other findings were seen on exam or labs, an elevated WANDA was thought to be a nonspecific finding.    She reports that although she was treated with cochicine and her chest pain has improved she has had trouble with fatigue and dizziness. She says these have both improved but continue. She had a brief period of vertigo but this was related to metoprolol and resolved with the discontinuation of that drug. During the last year she has lost significant weight. She reports about 30 lbs of unintentional weight loss.     She says prior to the pericarditis she had had a lot of stress with moving,  her son took off, her son's dad went to a nursing home, and her mother .     She reports she has lived in [place], [place] and [place]. She visited [place] but few other places.     She comes as she  would like to know what virus caused her pericarditis.    ROS: Complete 12-point ROS is negative except as noted above.    Past Medical History:  Past Medical History:   Diagnosis Date     Anxiety      Asthma 2012    adult-onset asthma diagnosed in 2012     Cancer (H)      DCIS (ductal carcinoma in situ) 2001    stage 0 status post left mastectomy in 2001     Depressive disorder      Uncomplicated asthma        Past Surgical History:  Past Surgical History:   Procedure Laterality Date     BREAST SURGERY         Social History:  Social History     Socioeconomic History     Marital status: Single     Spouse name: Not on file     Number of children: Not on file     Years of education: Not on file     Highest education level: Not on file   Occupational History     Not on file   Social Needs     Financial resource strain: Not on file     Food insecurity     Worry: Not on file     Inability: Not on file     Transportation needs     Medical: Not on file     Non-medical: Not on file   Tobacco Use     Smoking status: Former Smoker     Smokeless tobacco: Never Used   Substance and Sexual Activity     Alcohol use: No     Drug use: Never     Sexual activity: Not on file   Lifestyle     Physical activity     Days per week: Not on file     Minutes per session: Not on file     Stress: Not on file   Relationships     Social connections     Talks on phone: Not on file     Gets together: Not on file     Attends Denominational service: Not on file     Active member of club or organization: Not on file     Attends meetings of clubs or organizations: Not on file     Relationship status: Not on file     Intimate partner violence     Fear of current or ex partner: Not on file     Emotionally abused: Not on file     Physically abused: Not on file     Forced sexual activity: Not on file   Other Topics Concern     Parent/sibling w/ CABG, MI or angioplasty before 65F 55M? Not Asked   Social History Narrative     Not on file       Family Medical  History:  Family History   Problem Relation Age of Onset     Cancer Sister         breast cancer       Allergies:     Allergies   Allergen Reactions     Levalbuterol Hydrochloride Shortness Of Breath     Cats Other (See Comments)     Itchy eyes     Dust Mites      Other reaction(s): Wheezing  Wheezing/shortness/breath/see (IRP 6/1)     Fluticasone-Salmeterol Swelling     Proair Hfa [Albuterol] Other (See Comments)     DRY THROAT, SWALLOWING ISSUES     Amitriptyline Palpitations     Escitalopram Anxiety       Medications:  Current Outpatient Medications   Medication Sig Dispense Refill     acetaminophen (TYLENOL) 500 MG tablet Take 500 mg by mouth as needed       albuterol (PROVENTIL HFA: VENTOLIN HFA) 108 (90 BASE) MCG/ACT inhaler Inhale 2 puffs into the lungs every 6 hours.         aspirin 81 MG EC tablet Take 81 mg by mouth daily       calcium carbonate 750 MG CHEW Take 1-2 tablets by mouth as needed       fluticasone-salmeterol (ADVAIR) 100-50 MCG/DOSE diskus inhaler Inhale 1 puff into the lungs every 12 hours       LORazepam (ATIVAN) 0.5 MG tablet daily        promethazine (PHENERGAN) 12.5 MG tablet Take 12.5 mg by mouth as needed       CIPROFLOXACIN PO Take 500 mg by mouth daily       ipratropium - albuterol 0.5 mg/2.5 mg/3 mL (DUONEB) 0.5-2.5 (3) MG/3ML nebulization Take 3 mLs by nebulization every 4 hours as needed for shortness of breath / dyspnea. (Patient not taking: Reported on 8/29/2018) 1 Box 1     metoprolol tartrate (LOPRESSOR) 25 MG tablet Take 12.5 mg by mouth        OMEPRAZOLE PO Take 20 mg by mouth as needed        ondansetron (ZOFRAN) 4 MG tablet Take 4 mg by mouth as needed         Immunizations:  Immunization History   Administered Date(s) Administered     COVID-19,PF,Pfizer 03/09/2021, 03/30/2021     Influenza Vaccine IM > 6 months Valent IIV4 10/18/2019, 10/30/2020     Pneumo Conj 13-V (2010&after) 02/15/2016     Pneumococcal 23 valent 10/19/2009, 09/27/2017     Pneumococcal, Unspecified  10/19/2009     TDAP Vaccine (Boostrix) 06/23/2009, 09/09/2015     Zoster vaccine, live 07/21/2009       Exam:  B/P: 164/80, T: 98.2, P: 92, R: Data Unavailable, Weight: 128 lbs 9.6 oz  Gen: Alert and in no distress. Appears stated age.  Psych: Normal affect. Alert and oriented.   HEENT: PERRL. No icterus. Oropharynx pink and moist without lesions.   Neck: No lymphadenopathy.   CV: Regular rate and rhythm without m/r/g.   Chest: Clear to auscultation bilaterally without wheezes or crackles.   Abdomen: Soft, non-distended. Non-tender. Normal bowel sounds.   Extremities: Warm and well perfused.   Skin: No rashes or lesions noted.     Labs:  WBC   Date Value Ref Range Status   12/14/2020 6.5 4.0 - 11.0 10e9/L Final       No results found for: CRP    Creatinine   Date Value Ref Range Status   12/14/2020 0.70 0.52 - 1.04 mg/dL Final   11/10/2015 0.66 0.52 - 1.04 mg/dL Final   08/29/2012 0.64 0.52 - 1.04 mg/dL Final       1/6/2020 CRP and ESR wnl  1/8/2020 HIV negative, WANDA positive 1:320   1/20/2020 Anti DNA negative, C3 and C4 wnl, Ro, SSB  1/23/2020 rapid strep negative  5/1/2020 negative aerobic and anaerobic blood cultures  5/1/2020 Rapid flu A and B negative  6/15/2020 heavy metals negative  Assessment and Plan:  Morenita Moore is a 75 year old female with a hx of pericarditis who would like help knowing what caused her pericarditis.     Pericarditis. It appears the patient has improved her symptoms. She saw my colleague Dr. Rory Grullon at [facility] on 5/18/2020 who told her that pericarditis is usually caused by short term viruses and it is basically impossible to determine which months later. I feel similarly and told her so. Most studies have never found a cause of presumed viral pericarditis. Most viruses such as coxsackievirus, echovirus, adenoviruses, there is no way to find them 18 months later and even for things we might test like mumps we have no way of knowing if her having titers would be related  to her pericarditis. I offered to test for coccidioides, syphilis, Hepatitis B, Histoplasma. She is eager for this testing. I told her I highly doubt these will be revealing. She would still like the testing.  -coccidioides  -RPR  -Hepatitis B panel  -histoplasma antigen    Return to clinic if infection found or new issue arises.     Roxana Payne MD    The entire visit including talking with and examining the patient as well as reviewing records here and at other institutions took 90 minutes     Answers for HPI/ROS submitted by the patient on 4/5/2021   General Symptoms: Yes  Skin Symptoms: No  HENT Symptoms: Yes  EYE SYMPTOMS: No  HEART SYMPTOMS: Yes  LUNG SYMPTOMS: No  INTESTINAL SYMPTOMS: No  URINARY SYMPTOMS: No  GYNECOLOGIC SYMPTOMS: No  BREAST SYMPTOMS: No  SKELETAL SYMPTOMS: Yes  BLOOD SYMPTOMS: No  NERVOUS SYSTEM SYMPTOMS: Yes  MENTAL HEALTH SYMPTOMS: No  Fever: No  Loss of appetite: Yes  Weight loss: No  Weight gain: No  Fatigue: Yes  Night sweats: No  Chills: No  Increased stress: No  Excessive hunger: No  Excessive thirst: No  Feeling hot or cold when others believe the temperature is normal: No  Loss of height: No  Post-operative complications: No  Surgical site pain: No  Hallucinations: No  Change in or Loss of Energy: Yes  Hyperactivity: No  Confusion: No  Ear pain: Yes  Ear discharge: No  Hearing loss: No  Tinnitus: Yes  Nosebleeds: No  Congestion: No  Sinus pain: No  Trouble swallowing: No   Voice hoarseness: No  Mouth sores: No  Sore throat: No  Tooth pain: No  Gum tenderness: No  Bleeding gums: No  Change in taste: No  Change in sense of smell: No  Dry mouth: No  Hearing aid used: No  Neck lump: No  Chest pain or pressure: Yes  Fast or irregular heartbeat: No  Pain in legs with walking: No  Trouble breathing while lying down: No  Fingers or toes appear blue: No  High blood pressure: No  Low blood pressure: No  Fainting: No  Murmurs: No  Pacemaker: No  Varicose veins: Yes  Edema or swelling:  No  Wake up at night with shortness of breath: No  Light-headedness: Yes  Exercise intolerance: Yes  Back pain: Yes  Muscle aches: Yes  Neck pain: Yes  Swollen joints: Yes  Joint pain: Yes  Bone pain: No  Muscle cramps: No  Muscle weakness: No  Joint stiffness: Yes  Bone fracture: No  Trouble with coordination: No  Dizziness or trouble with balance: Yes  Fainting or black-out spells: No  Memory loss: No  Headache: No  Seizures: No  Speech problems: No  Tingling: No  Tremor: Yes  Weakness: No  Difficulty walking: Yes  Paralysis: No  Numbness: No

## 2021-04-07 LAB
HBV CORE AB SERPL QL IA: NONREACTIVE
HBV SURFACE AB SERPL IA-ACNC: 0 M[IU]/ML
HBV SURFACE AG SERPL QL IA: NONREACTIVE
T PALLIDUM AB SER QL: NONREACTIVE

## 2021-04-09 LAB
LAB SCANNED RESULT: NORMAL
LAB SCANNED RESULT: NORMAL

## 2021-04-10 LAB — COCCIDIOIDES AB SPEC QL ID: NORMAL

## 2021-04-12 ENCOUNTER — COMMUNICATION - HEALTHEAST (OUTPATIENT)
Dept: INTERNAL MEDICINE | Facility: CLINIC | Age: 76
End: 2021-04-12

## 2021-04-12 ENCOUNTER — OFFICE VISIT - HEALTHEAST (OUTPATIENT)
Dept: INTERNAL MEDICINE | Facility: CLINIC | Age: 76
End: 2021-04-12

## 2021-04-12 DIAGNOSIS — R42 DIZZINESS: ICD-10-CM

## 2021-04-13 ENCOUNTER — COMMUNICATION - HEALTHEAST (OUTPATIENT)
Dept: INTERNAL MEDICINE | Facility: CLINIC | Age: 76
End: 2021-04-13

## 2021-04-13 DIAGNOSIS — R25.1 TREMOR: ICD-10-CM

## 2021-04-14 ASSESSMENT — ENCOUNTER SYMPTOMS
ORTHOPNEA: 0
SMELL DISTURBANCE: 0
WEIGHT LOSS: 0
NIGHT SWEATS: 0
NUMBNESS: 0
BACK PAIN: 1
LEG PAIN: 0
STIFFNESS: 1
TASTE DISTURBANCE: 0
SORE THROAT: 0
ARTHRALGIAS: 1
JOINT SWELLING: 1
MUSCLE WEAKNESS: 0
MUSCLE CRAMPS: 0
PARALYSIS: 0
SINUS PAIN: 0
MYALGIAS: 1
INCREASED ENERGY: 1
SEIZURES: 0
WEIGHT GAIN: 0
SPEECH CHANGE: 0
LIGHT-HEADEDNESS: 1
FATIGUE: 1
SYNCOPE: 0
TINGLING: 0
NECK PAIN: 1
HEADACHES: 1
PALPITATIONS: 0
NECK MASS: 0
POLYPHAGIA: 0
DISTURBANCES IN COORDINATION: 0
ALTERED TEMPERATURE REGULATION: 0
DIZZINESS: 1
LOSS OF CONSCIOUSNESS: 0
WEAKNESS: 0
HOARSE VOICE: 0
DECREASED APPETITE: 1
TREMORS: 1
SLEEP DISTURBANCES DUE TO BREATHING: 0
SINUS CONGESTION: 0
HALLUCINATIONS: 0
FEVER: 0
EXERCISE INTOLERANCE: 1
HYPOTENSION: 0
POLYDIPSIA: 0
TROUBLE SWALLOWING: 0
HYPERTENSION: 0
CHILLS: 0
MEMORY LOSS: 0

## 2021-04-16 ENCOUNTER — OFFICE VISIT (OUTPATIENT)
Dept: NEUROLOGY | Facility: CLINIC | Age: 76
End: 2021-04-16
Attending: INTERNAL MEDICINE
Payer: MEDICARE

## 2021-04-16 ENCOUNTER — RECORDS - HEALTHEAST (OUTPATIENT)
Dept: ADMINISTRATIVE | Facility: OTHER | Age: 76
End: 2021-04-16

## 2021-04-16 VITALS
SYSTOLIC BLOOD PRESSURE: 166 MMHG | RESPIRATION RATE: 16 BRPM | WEIGHT: 131 LBS | BODY MASS INDEX: 21.83 KG/M2 | DIASTOLIC BLOOD PRESSURE: 85 MMHG | HEART RATE: 89 BPM | OXYGEN SATURATION: 99 % | HEIGHT: 65 IN

## 2021-04-16 DIAGNOSIS — R42 DIZZINESS: Primary | ICD-10-CM

## 2021-04-16 PROCEDURE — 99205 OFFICE O/P NEW HI 60 MIN: CPT | Performed by: INTERNAL MEDICINE

## 2021-04-16 RX ORDER — ASCORBIC ACID 500 MG
500 TABLET ORAL
COMMUNITY
End: 2021-10-07

## 2021-04-16 ASSESSMENT — PAIN SCALES - GENERAL: PAINLEVEL: MILD PAIN (2)

## 2021-04-16 ASSESSMENT — MIFFLIN-ST. JEOR: SCORE: 1100.09

## 2021-04-16 NOTE — LETTER
4/16/2021      RE: Morenita Moore  [address]  [place] [address]       [facility] Neurology Consultation    Morenita Moore MRN# [id number]   Age: 75 year old YOB: 1945     Requesting physician: Aminah Guaman     Reason for Consultation: head tremor, dizziness      History of Presenting Symptoms:   Morenita Moore is a 75 year old female who presents today for evaluation of head tremor, and dizziness/balance problems.     Patient had lots of significant family stress in her life around 2018. She got depressed. She was on an selective serotonin reuptake inhibitor (she looks on celexa and lexapro) and she developed a tremor in the head. She also noticed some dizziness on the medications. She went off of these medications, but the head tremor has persisted since. She denies head tremor prior to this. Tremor is in the head, neck, and the trunk. She denies any tremor in the hands. Patient has history of cervical dystonia. She saw Dr Oliva in 2018, but decided not to get botox.    In November 2018 patient had a severe unidentified viral infection. She developed pericarditis. She lost 34 pounds. She wasn't eating as much. She was significantly fatigued. It is around then that the dizziness started. The dizziness is her main complaint today. She has dizziness most days. She describes it as a feeling of off balance. She notices it when she turns too fast or moves her head too much. It can feel like a swaying sensation. Morning and at night are the worst. She denies any feeling of getting close to passing out. She doesn't have dizziness with lying down. She denies any spinning sensation. She had vertigo previous thought to be related to a blood pressure medication. She has has feeling of fatigue with the dizziness.    Patient has history of SVT which is currently under control.       Past Medical History:     Patient Active Problem List   Diagnosis     Acute  internal derangement of left knee     Asthma in adult     Breast cancer in situ     Depression, recurrent (H)     Sprain of posterior cruciate ligament of left knee     SVT (supraventricular tachycardia) (H)     Torticollis     Past Medical History:   Diagnosis Date     Anxiety      Asthma 2012    adult-onset asthma diagnosed in 2012     Cancer (H)      DCIS (ductal carcinoma in situ) 2001    stage 0 status post left mastectomy in 2001     Depressive disorder      Uncomplicated asthma         Past Surgical History:     Past Surgical History:   Procedure Laterality Date     BREAST SURGERY          Social History:     Social History     Tobacco Use     Smoking status: Former Smoker     Smokeless tobacco: Never Used   Substance Use Topics     Alcohol use: No     Drug use: Never        Family History:     Family History   Problem Relation Age of Onset     Cancer Sister         breast cancer        Medications:     Current Outpatient Medications   Medication Sig     acetaminophen (TYLENOL) 500 MG tablet Take 500 mg by mouth as needed     albuterol (PROVENTIL HFA: VENTOLIN HFA) 108 (90 BASE) MCG/ACT inhaler Inhale 2 puffs into the lungs every 6 hours.       aspirin 81 MG EC tablet Take 81 mg by mouth daily     calcium carbonate 750 MG CHEW Take 1-2 tablets by mouth as needed     CIPROFLOXACIN PO Take 500 mg by mouth daily     fluticasone-salmeterol (ADVAIR) 100-50 MCG/DOSE diskus inhaler Inhale 1 puff into the lungs every 12 hours     ipratropium - albuterol 0.5 mg/2.5 mg/3 mL (DUONEB) 0.5-2.5 (3) MG/3ML nebulization Take 3 mLs by nebulization every 4 hours as needed for shortness of breath / dyspnea. (Patient not taking: Reported on 8/29/2018)     LORazepam (ATIVAN) 0.5 MG tablet daily      metoprolol tartrate (LOPRESSOR) 25 MG tablet Take 12.5 mg by mouth      OMEPRAZOLE PO Take 20 mg by mouth as needed      ondansetron (ZOFRAN) 4 MG tablet Take 4 mg by mouth as needed     promethazine (PHENERGAN) 12.5 MG tablet Take  "12.5 mg by mouth as needed     No current facility-administered medications for this visit.         Allergies:     Allergies   Allergen Reactions     Levalbuterol Hydrochloride Shortness Of Breath     Cats Other (See Comments)     Itchy eyes     Dust Mites      Other reaction(s): Wheezing  Wheezing/shortness/breath/see (IRP 6/1)     Fluticasone-Salmeterol Swelling     Proair Hfa [Albuterol] Other (See Comments)     DRY THROAT, SWALLOWING ISSUES     Amitriptyline Palpitations     Escitalopram Anxiety        Review of Systems:   A comprehensive 10 point review of systems (constitutional, ENT, cardiac, peripheral vascular, lymphatic, respiratory, GI, , Musculoskeletal, skin, Neurological) was performed and found to be negative except as described in this note.      Physical Exam:   Vitals: BP (!) 166/85   Pulse 89   Resp 16   Ht 1.651 m (5' 5\")   Wt 59.4 kg (131 lb)   SpO2 99%   BMI 21.80 kg/m     Sitting: /85 HR 75 / Standing 3 minutes 163/88 HR 75  General: Seated comfortably in no acute distress.  Lungs: breathing comfortably  Extremities: no edema  Skin: No rashes  Neurologic:     Mental Status: Fully alert, attentive and oriented. Normal memory and fund of knowledge. Language normal, speech clear and fluent, no paraphasic errors.      Cranial Nerves: Visual fields intact. EOMI with normal smooth pursuit. Facial sensation intact/symmetric. Facial movements symmetric. Hearing not formally tested but intact to conversation. Palate elevation symmetric, uvula midline. No dysarthria. Shoulder shrug strong bilaterally. Tongue protrusion midline.     Motor: Continuous head tremor with side to side and up/down components. No tremors in the extremities or other abnormal movements observed. Muscle tone with slight paratonia in the arms, but otherwise normal tone. Possible subtle bradykinesia in bilateral finger taps and open/close of hands. Strength 5/5 throughout upper and lower extremities.     Deep Tendon " Reflexes: 2+/symmetric throughout upper and lower extremities. Toes downgoing bilaterally.     Sensory: Vibration is 10 seconds in bilateral pinky toe and 25 seconds in bilateral thumbs. Intact/symmetric to light touch, temperature, vibration and proprioception throughout upper and lower extremities. Negative Romberg.      Coordination: Finger-nose-finger and heel-shin intact without dysmetria.      Gait: Normal, steady casual gait. After resting for a few seconds and stabilizing self, able to walk on toes, heels and tandem without difficulty. On pull test takes 2-3 steps backwards.   HINTS exam negative         Data: Pertinent prior to visit   Imaging:  MRI brain without contrast 7/2020  1.  No acute intracranial process.  2.  Generalized brain atrophy and presumed microvascular ischemic changes similar to findings on the 2018 MRI.    Procedures:  None    Laboratory:  TSH normal 1/2020         Assessment and Plan:   Assessment:  Morenita Moore is a 75 year old female who presents today for evaluation of head tremor, and dizziness/balance problems. Head tremor developed after started selective serotonin reuptake inhibitor in 2018. She has prior history of cervical dystonia. We discussed that I believe head tremor is likely secondary to worsening of cervical dystonia. She is currently not interested in botox due to possible side effects. She thinks the head tremor is tolerable.     Dizziness/balance issues developed after nonspecific viral infection in 2018 as well. She also had pericarditis. Dizziness is present when she moves her head too fast or turns too quickly. It is described as intermittent swaying, feeling of imbalance sensation. Neurological exam today does not point to clear etiology of symptoms. Head tremor is prominent, but I suspect this is likely an unrelated issue to the dizziness. Prior MRI brain in 7/2020 did not show any clear abnormality correlating with symptoms. There is mild  bradykinesia on exam, but no clear overt parkinsonism. Orthostatic vitals were negative. We discussed pursuing VNG with audiology to evaluate for peripheral vestibular dysfunction. Patient would also benefit from referral for vestibular therapy.      Plan:  - VNG  - PT referral for vestibular therapy     Follow up in Neurology clinic in 2-3 months or earlier as needed should new concerns arise.    Hong Hansen MD   of Neurology  [facility]      The total time of this encounter amounted to 63 minutes. This time included time spent with the patient, prep work, ordering tests, and performing post visit documentation.        Hong Hansen MD

## 2021-04-16 NOTE — LETTER
4/16/2021       RE: Morenita Moore  [address]  [place] [address]     Dear Colleague,    Thank you for referring your patient, Morenita Moore, to the [facility] NEUROLOGY CLIN[facility] [place] at [facility]. Please see a copy of my visit note below.    [facility] Neurology Consultation    Morenita Moore MRN# [id number]   Age: 75 year old YOB: 1945     Requesting physician: Aminah Guaman     Reason for Consultation: head tremor, dizziness      History of Presenting Symptoms:   Morenita Moore is a 75 year old female who presents today for evaluation of head tremor, and dizziness/balance problems.     Patient had lots of significant family stress in her life around 2018. She got depressed. She was on an selective serotonin reuptake inhibitor (she looks on celexa and lexapro) and she developed a tremor in the head. She also noticed some dizziness on the medications. She went off of these medications, but the head tremor has persisted since. She denies head tremor prior to this. Tremor is in the head, neck, and the trunk. She denies any tremor in the hands. Patient has history of cervical dystonia. She saw Dr Oliva in 2018, but decided not to get botox.    In November 2018 patient had a severe unidentified viral infection. She developed pericarditis. She lost 34 pounds. She wasn't eating as much. She was significantly fatigued. It is around then that the dizziness started. The dizziness is her main complaint today. She has dizziness most days. She describes it as a feeling of off balance. She notices it when she turns too fast or moves her head too much. It can feel like a swaying sensation. Morning and at night are the worst. She denies any feeling of getting close to passing out. She doesn't have dizziness with lying down. She denies any spinning sensation. She had vertigo previous  thought to be related to a blood pressure medication. She has has feeling of fatigue with the dizziness.    Patient has history of SVT which is currently under control.       Past Medical History:     Patient Active Problem List   Diagnosis     Acute internal derangement of left knee     Asthma in adult     Breast cancer in situ     Depression, recurrent (H)     Sprain of posterior cruciate ligament of left knee     SVT (supraventricular tachycardia) (H)     Torticollis     Past Medical History:   Diagnosis Date     Anxiety      Asthma 2012    adult-onset asthma diagnosed in 2012     Cancer (H)      DCIS (ductal carcinoma in situ) 2001    stage 0 status post left mastectomy in 2001     Depressive disorder      Uncomplicated asthma         Past Surgical History:     Past Surgical History:   Procedure Laterality Date     BREAST SURGERY          Social History:     Social History     Tobacco Use     Smoking status: Former Smoker     Smokeless tobacco: Never Used   Substance Use Topics     Alcohol use: No     Drug use: Never        Family History:     Family History   Problem Relation Age of Onset     Cancer Sister         breast cancer        Medications:     Current Outpatient Medications   Medication Sig     acetaminophen (TYLENOL) 500 MG tablet Take 500 mg by mouth as needed     albuterol (PROVENTIL HFA: VENTOLIN HFA) 108 (90 BASE) MCG/ACT inhaler Inhale 2 puffs into the lungs every 6 hours.       aspirin 81 MG EC tablet Take 81 mg by mouth daily     calcium carbonate 750 MG CHEW Take 1-2 tablets by mouth as needed     CIPROFLOXACIN PO Take 500 mg by mouth daily     fluticasone-salmeterol (ADVAIR) 100-50 MCG/DOSE diskus inhaler Inhale 1 puff into the lungs every 12 hours     ipratropium - albuterol 0.5 mg/2.5 mg/3 mL (DUONEB) 0.5-2.5 (3) MG/3ML nebulization Take 3 mLs by nebulization every 4 hours as needed for shortness of breath / dyspnea. (Patient not taking: Reported on 8/29/2018)     LORazepam (ATIVAN) 0.5 MG  "tablet daily      metoprolol tartrate (LOPRESSOR) 25 MG tablet Take 12.5 mg by mouth      OMEPRAZOLE PO Take 20 mg by mouth as needed      ondansetron (ZOFRAN) 4 MG tablet Take 4 mg by mouth as needed     promethazine (PHENERGAN) 12.5 MG tablet Take 12.5 mg by mouth as needed     No current facility-administered medications for this visit.         Allergies:     Allergies   Allergen Reactions     Levalbuterol Hydrochloride Shortness Of Breath     Cats Other (See Comments)     Itchy eyes     Dust Mites      Other reaction(s): Wheezing  Wheezing/shortness/breath/see (IRP 6/1)     Fluticasone-Salmeterol Swelling     Proair Hfa [Albuterol] Other (See Comments)     DRY THROAT, SWALLOWING ISSUES     Amitriptyline Palpitations     Escitalopram Anxiety        Review of Systems:   A comprehensive 10 point review of systems (constitutional, ENT, cardiac, peripheral vascular, lymphatic, respiratory, GI, , Musculoskeletal, skin, Neurological) was performed and found to be negative except as described in this note.      Physical Exam:   Vitals: BP (!) 166/85   Pulse 89   Resp 16   Ht 1.651 m (5' 5\")   Wt 59.4 kg (131 lb)   SpO2 99%   BMI 21.80 kg/m     Sitting: /85 HR 75 / Standing 3 minutes 163/88 HR 75  General: Seated comfortably in no acute distress.  Lungs: breathing comfortably  Extremities: no edema  Skin: No rashes  Neurologic:     Mental Status: Fully alert, attentive and oriented. Normal memory and fund of knowledge. Language normal, speech clear and fluent, no paraphasic errors.      Cranial Nerves: Visual fields intact. EOMI with normal smooth pursuit. Facial sensation intact/symmetric. Facial movements symmetric. Hearing not formally tested but intact to conversation. Palate elevation symmetric, uvula midline. No dysarthria. Shoulder shrug strong bilaterally. Tongue protrusion midline.     Motor: Continuous head tremor with side to side and up/down components. No tremors in the extremities or other " abnormal movements observed. Muscle tone with slight paratonia in the arms, but otherwise normal tone. Possible subtle bradykinesia in bilateral finger taps and open/close of hands. Strength 5/5 throughout upper and lower extremities.     Deep Tendon Reflexes: 2+/symmetric throughout upper and lower extremities. Toes downgoing bilaterally.     Sensory: Vibration is 10 seconds in bilateral pinky toe and 25 seconds in bilateral thumbs. Intact/symmetric to light touch, temperature, vibration and proprioception throughout upper and lower extremities. Negative Romberg.      Coordination: Finger-nose-finger and heel-shin intact without dysmetria.      Gait: Normal, steady casual gait. After resting for a few seconds and stabilizing self, able to walk on toes, heels and tandem without difficulty. On pull test takes 2-3 steps backwards.   HINTS exam negative         Data: Pertinent prior to visit   Imaging:  MRI brain without contrast 7/2020  1.  No acute intracranial process.  2.  Generalized brain atrophy and presumed microvascular ischemic changes similar to findings on the 2018 MRI.    Procedures:  None    Laboratory:  TSH normal 1/2020         Assessment and Plan:   Assessment:  Morenita Moore is a 75 year old female who presents today for evaluation of head tremor, and dizziness/balance problems. Head tremor developed after started selective serotonin reuptake inhibitor in 2018. She has prior history of cervical dystonia. We discussed that I believe head tremor is likely secondary to worsening of cervical dystonia. She is currently not interested in botox due to possible side effects. She thinks the head tremor is tolerable.     Dizziness/balance issues developed after nonspecific viral infection in 2018 as well. She also had pericarditis. Dizziness is present when she moves her head too fast or turns too quickly. It is described as intermittent swaying, feeling of imbalance sensation. Neurological exam today  does not point to clear etiology of symptoms. Head tremor is prominent, but I suspect this is likely an unrelated issue to the dizziness. Prior MRI brain in 7/2020 did not show any clear abnormality correlating with symptoms. There is mild bradykinesia on exam, but no clear overt parkinsonism. Orthostatic vitals were negative. We discussed pursuing VNG with audiology to evaluate for peripheral vestibular dysfunction. Patient would also benefit from referral for vestibular therapy.      Plan:  - VNG  - PT referral for vestibular therapy     Follow up in Neurology clinic in 2-3 months or earlier as needed should new concerns arise.    Hong Hansen MD   of Neurology  [facility]      The total time of this encounter amounted to 63 minutes. This time included time spent with the patient, prep work, ordering tests, and performing post visit documentation.        Again, thank you for allowing me to participate in the care of your patient.      Sincerely,    Hong Hansen MD

## 2021-04-16 NOTE — PROGRESS NOTES
[facility] Neurology Consultation    Morenita Moore MRN# [id number]   Age: 75 year old YOB: 1945     Requesting physician: Aminah Guaman     Reason for Consultation: head tremor, dizziness      History of Presenting Symptoms:   Morenita Moore is a 75 year old female who presents today for evaluation of head tremor, and dizziness/balance problems.     Patient had lots of significant family stress in her life around 2018. She got depressed. She was on an selective serotonin reuptake inhibitor (she looks on celexa and lexapro) and she developed a tremor in the head. She also noticed some dizziness on the medications. She went off of these medications, but the head tremor has persisted since. She denies head tremor prior to this. Tremor is in the head, neck, and the trunk. She denies any tremor in the hands. Patient has history of cervical dystonia. She saw Dr Oliva in 2018, but decided not to get botox.    In November 2018 patient had a severe unidentified viral infection. She developed pericarditis. She lost 34 pounds. She wasn't eating as much. She was significantly fatigued. It is around then that the dizziness started. The dizziness is her main complaint today. She has dizziness most days. She describes it as a feeling of off balance. She notices it when she turns too fast or moves her head too much. It can feel like a swaying sensation. Morning and at night are the worst. She denies any feeling of getting close to passing out. She doesn't have dizziness with lying down. She denies any spinning sensation. She had vertigo previous thought to be related to a blood pressure medication. She has has feeling of fatigue with the dizziness.    Patient has history of SVT which is currently under control.       Past Medical History:     Patient Active Problem List   Diagnosis     Acute internal derangement of left knee     Asthma in adult     Breast cancer in situ     Depression,  recurrent (H)     Sprain of posterior cruciate ligament of left knee     SVT (supraventricular tachycardia) (H)     Torticollis     Past Medical History:   Diagnosis Date     Anxiety      Asthma 2012    adult-onset asthma diagnosed in 2012     Cancer (H)      DCIS (ductal carcinoma in situ) 2001    stage 0 status post left mastectomy in 2001     Depressive disorder      Uncomplicated asthma         Past Surgical History:     Past Surgical History:   Procedure Laterality Date     BREAST SURGERY          Social History:     Social History     Tobacco Use     Smoking status: Former Smoker     Smokeless tobacco: Never Used   Substance Use Topics     Alcohol use: No     Drug use: Never        Family History:     Family History   Problem Relation Age of Onset     Cancer Sister         breast cancer        Medications:     Current Outpatient Medications   Medication Sig     acetaminophen (TYLENOL) 500 MG tablet Take 500 mg by mouth as needed     albuterol (PROVENTIL HFA: VENTOLIN HFA) 108 (90 BASE) MCG/ACT inhaler Inhale 2 puffs into the lungs every 6 hours.       aspirin 81 MG EC tablet Take 81 mg by mouth daily     calcium carbonate 750 MG CHEW Take 1-2 tablets by mouth as needed     CIPROFLOXACIN PO Take 500 mg by mouth daily     fluticasone-salmeterol (ADVAIR) 100-50 MCG/DOSE diskus inhaler Inhale 1 puff into the lungs every 12 hours     ipratropium - albuterol 0.5 mg/2.5 mg/3 mL (DUONEB) 0.5-2.5 (3) MG/3ML nebulization Take 3 mLs by nebulization every 4 hours as needed for shortness of breath / dyspnea. (Patient not taking: Reported on 8/29/2018)     LORazepam (ATIVAN) 0.5 MG tablet daily      metoprolol tartrate (LOPRESSOR) 25 MG tablet Take 12.5 mg by mouth      OMEPRAZOLE PO Take 20 mg by mouth as needed      ondansetron (ZOFRAN) 4 MG tablet Take 4 mg by mouth as needed     promethazine (PHENERGAN) 12.5 MG tablet Take 12.5 mg by mouth as needed     No current facility-administered medications for this visit.   "       Allergies:     Allergies   Allergen Reactions     Levalbuterol Hydrochloride Shortness Of Breath     Cats Other (See Comments)     Itchy eyes     Dust Mites      Other reaction(s): Wheezing  Wheezing/shortness/breath/see (IRP 6/1)     Fluticasone-Salmeterol Swelling     Proair Hfa [Albuterol] Other (See Comments)     DRY THROAT, SWALLOWING ISSUES     Amitriptyline Palpitations     Escitalopram Anxiety        Review of Systems:   A comprehensive 10 point review of systems (constitutional, ENT, cardiac, peripheral vascular, lymphatic, respiratory, GI, , Musculoskeletal, skin, Neurological) was performed and found to be negative except as described in this note.      Physical Exam:   Vitals: BP (!) 166/85   Pulse 89   Resp 16   Ht 1.651 m (5' 5\")   Wt 59.4 kg (131 lb)   SpO2 99%   BMI 21.80 kg/m     Sitting: /85 HR 75 / Standing 3 minutes 163/88 HR 75  General: Seated comfortably in no acute distress.  Lungs: breathing comfortably  Extremities: no edema  Skin: No rashes  Neurologic:     Mental Status: Fully alert, attentive and oriented. Normal memory and fund of knowledge. Language normal, speech clear and fluent, no paraphasic errors.      Cranial Nerves: Visual fields intact. EOMI with normal smooth pursuit. Facial sensation intact/symmetric. Facial movements symmetric. Hearing not formally tested but intact to conversation. Palate elevation symmetric, uvula midline. No dysarthria. Shoulder shrug strong bilaterally. Tongue protrusion midline.     Motor: Continuous head tremor with side to side and up/down components. No tremors in the extremities or other abnormal movements observed. Muscle tone with slight paratonia in the arms, but otherwise normal tone. Possible subtle bradykinesia in bilateral finger taps and open/close of hands. Strength 5/5 throughout upper and lower extremities.     Deep Tendon Reflexes: 2+/symmetric throughout upper and lower extremities. Toes downgoing bilaterally.     " Sensory: Vibration is 10 seconds in bilateral pinky toe and 25 seconds in bilateral thumbs. Intact/symmetric to light touch, temperature, vibration and proprioception throughout upper and lower extremities. Negative Romberg.      Coordination: Finger-nose-finger and heel-shin intact without dysmetria.      Gait: Normal, steady casual gait. After resting for a few seconds and stabilizing self, able to walk on toes, heels and tandem without difficulty. On pull test takes 2-3 steps backwards.   HINTS exam negative         Data: Pertinent prior to visit   Imaging:  MRI brain without contrast 7/2020  1.  No acute intracranial process.  2.  Generalized brain atrophy and presumed microvascular ischemic changes similar to findings on the 2018 MRI.    Procedures:  None    Laboratory:  TSH normal 1/2020         Assessment and Plan:   Assessment:  oMrenita Moore is a 75 year old female who presents today for evaluation of head tremor, and dizziness/balance problems. Head tremor developed after started selective serotonin reuptake inhibitor in 2018. She has prior history of cervical dystonia. We discussed that I believe head tremor is likely secondary to worsening of cervical dystonia. She is currently not interested in botox due to possible side effects. She thinks the head tremor is tolerable.     Dizziness/balance issues developed after nonspecific viral infection in 2018 as well. She also had pericarditis. Dizziness is present when she moves her head too fast or turns too quickly. It is described as intermittent swaying, feeling of imbalance sensation. Neurological exam today does not point to clear etiology of symptoms. Head tremor is prominent, but I suspect this is likely an unrelated issue to the dizziness. Prior MRI brain in 7/2020 did not show any clear abnormality correlating with symptoms. There is mild bradykinesia on exam, but no clear overt parkinsonism. Orthostatic vitals were negative. We discussed  pursuing VNG with audiology to evaluate for peripheral vestibular dysfunction. Patient would also benefit from referral for vestibular therapy.      Plan:  - VNG  - PT referral for vestibular therapy     Follow up in Neurology clinic in 2-3 months or earlier as needed should new concerns arise.    Hong Hansen MD   of Neurology  [facility]      The total time of this encounter amounted to 63 minutes. This time included time spent with the patient, prep work, ordering tests, and performing post visit documentation.

## 2021-04-19 ENCOUNTER — COMMUNICATION - HEALTHEAST (OUTPATIENT)
Dept: INTERNAL MEDICINE | Facility: CLINIC | Age: 76
End: 2021-04-19

## 2021-04-19 DIAGNOSIS — R25.1 TREMOR: ICD-10-CM

## 2021-04-28 ENCOUNTER — TELEPHONE (OUTPATIENT)
Dept: NEUROLOGY | Facility: CLINIC | Age: 76
End: 2021-04-28

## 2021-04-28 ENCOUNTER — TELEPHONE (OUTPATIENT)
Dept: INFECTIOUS DISEASES | Facility: CLINIC | Age: 76
End: 2021-04-28

## 2021-04-28 DIAGNOSIS — H92.03 OTALGIA OF BOTH EARS: Primary | ICD-10-CM

## 2021-04-28 NOTE — TELEPHONE ENCOUNTER
[facility]    Phone Message    May a detailed message be left on voicemail: yes     Reason for Call: Symptoms or Concerns     If patient has red-flag symptoms, warm transfer to triage line    Current symptom or concern: Pain in ears    Symptoms have been present for:  3 day(s)    Has patient previously been seen for this? Yes    By Dr. Hansen    Are there any new or worsening symptoms? Yes: Pt reports that after seeing audiologist, she is experiencing a sharp pain in her left ear and some pain in the right ear. She is wondering if Dr. Hansen thinks she should see an ENT.    Please call pt back to advise.      Action Taken: Message routed to:  [facility] Neurology    Travel Screening: Not Applicable

## 2021-04-28 NOTE — TELEPHONE ENCOUNTER
"I called pt back to let he know Dr. Hansen placed a referral for her to be seen in ENT for her new sharp ear pain. She also wanted him to know she has a new pain to the left side of her head. Not sure if it is radiating from her ear or if it is separate. She said it is not a pressure or throbbing pain; \"just a new pain\".     I will update Dr. Hansen and have schedulers call to help set up ENT visit.     Eryn PLATT  "

## 2021-04-28 NOTE — TELEPHONE ENCOUNTER
"[facility] Call Center    Phone Message    May a detailed message be left on voicemail: yes     Reason for Call:     Pt requesting a call back from Dr Payne, would like to discuss ear pain and \"viral conditions\"     Action Taken: Message routed to:  [facility] endo    Travel Screening: Not Applicable                                                                      "

## 2021-04-30 ENCOUNTER — OFFICE VISIT - HEALTHEAST (OUTPATIENT)
Dept: INTERNAL MEDICINE | Facility: CLINIC | Age: 76
End: 2021-04-30

## 2021-04-30 DIAGNOSIS — R42 VERTIGO: ICD-10-CM

## 2021-04-30 DIAGNOSIS — Z12.31 VISIT FOR SCREENING MAMMOGRAM: ICD-10-CM

## 2021-04-30 DIAGNOSIS — I31.9 PERICARDITIS, UNSPECIFIED CHRONICITY, UNSPECIFIED TYPE: ICD-10-CM

## 2021-04-30 DIAGNOSIS — H92.03 OTALGIA OF BOTH EARS: ICD-10-CM

## 2021-04-30 DIAGNOSIS — M43.6 TORTICOLLIS: ICD-10-CM

## 2021-04-30 ASSESSMENT — MIFFLIN-ST. JEOR: SCORE: 1088.18

## 2021-04-30 NOTE — TELEPHONE ENCOUNTER
FUTURE VISIT INFORMATION      FUTURE VISIT INFORMATION:    Date: 6/8/2021    Time: 1PM    [place] [facility]  REFERRAL INFORMATION:    Referring provider:  Hong Hansen MD    Referring providers clinic:  [facility] NEurology     Reason for visit/diagnosis  Otalgia of both ears [H92.03]    RECORDS REQUESTED FROM:       Clinic name Comments Records Status Imaging Status   [facility]  5/3/2021 vestibular eval with Jodie Bello PT [facility]    [facility] NEurology  4/28/2021 referral   4/16/2021 note from Hong Hansen MD [facility] imaging  7/1/2020 MRI Brain  [facility] PACS   [facility] 04/12/2021 note from Aminah Tamayo MD   [facility] everywhere     [facility] 5/31/2020 CT Head Brain  [facility] everywhere  PACS

## 2021-05-03 ENCOUNTER — HOSPITAL ENCOUNTER (OUTPATIENT)
Dept: PHYSICAL THERAPY | Facility: CLINIC | Age: 76
End: 2021-05-03
Attending: INTERNAL MEDICINE
Payer: MEDICARE

## 2021-05-03 ENCOUNTER — RECORDS - HEALTHEAST (OUTPATIENT)
Dept: HEALTH INFORMATION MANAGEMENT | Facility: CLINIC | Age: 76
End: 2021-05-03

## 2021-05-03 DIAGNOSIS — R42 DIZZINESS: Primary | ICD-10-CM

## 2021-05-03 DIAGNOSIS — R26.89 BALANCE PROBLEMS: ICD-10-CM

## 2021-05-03 PROCEDURE — 97162 PT EVAL MOD COMPLEX 30 MIN: CPT | Mod: GP | Performed by: PHYSICAL THERAPIST

## 2021-05-09 NOTE — PROGRESS NOTES
[facility]        OUTPATIENT PHYSICAL THERAPY FUNCTIONAL EVALUATION  PLAN OF TREATMENT FOR OUTPATIENT REHABILITATION  (COMPLETE FOR INITIAL CLAIMS ONLY)  Patient's Last Name, First Name, M.I.  YOB: 1945  Morenita Moore     Provider's Name   [facility]   Medical Record No.  [id number]     Start of Care Date:  05/03/21   Onset Date:  04/16/21   Type:     _X__PT   ____OT  ____SLP Medical Diagnosis:   Dizziness     PT Diagnosis:  Decreased safe functional mobility Visits from SOC:  1                              __________________________________________________________________________________  Plan of Treatment/Functional Goals:  balance training, ADL retraining, IADL retraining, gait training, motor coordination training, neuromuscular re-education, ROM, strengthening, stretching, transfer training, manual therapy, other (see comments)(Vestibular rheab)           GOALS  Postural stability  The patietn will be able to maintain rhomberg and sharpened rhomberg stance with EO/EC conditioning x 30 seconds with minimal observed sway to demonstrate a significant improvement in postural stability.   07/31/21    DGI  The patietn will score 19/24 or greater on DGI assessment iwth us eof least restrictive AD to demonstrate a significant improvement in dynamic balance and to show that she is at a minimal risk of falling with particiaption in community based ambualtion.   07/31/21    30s STS  The patient will be able to perfomr 13 STS trasnfers in 30 seconds or less without UE suport to demonstrate a significant improvement in LE strength and to show that she is at a minimal risk of falling iwth participation in functional mobility tasks.   07/31/21                                                           Therapy Frequency:  (1-2x/week)   Predicted Duration of Therapy  Intervention:  4-6 weeks    Jodie Bello, PT                                    I CERTIFY THE NEED FOR THESE SERVICES FURNISHED UNDER        THIS PLAN OF TREATMENT AND WHILE UNDER MY CARE     (Physician co-signature of this document indicates review and certification of the therapy plan).                Certification Date From:    5/3/21  Certification Date To:   7/31/21    Referring Provider:  Dr Hong Hansen    Initial Assessment  See Epic Evaluation- Start of Care Date: 05/03/21

## 2021-05-09 NOTE — PROGRESS NOTES
05/03/21 1200   Quick Adds   Quick Adds Vestibular Eval   Type of Visit Initial OP PT Evaluation   General Information   Start of Care Date 05/03/21   Referring Physician Dr Hong Hansen   Orders Evaluate and Treat as Indicated   Order Date 04/16/21   Medical Diagnosis Dizziness   Onset of illness/injury or Date of Surgery 04/16/21   Special Instructions Dizziness since viral infection   Pertinent history of current problem (include personal factors and/or comorbidities that impact the POC) Here today with history of cervical dystonia, head tremor and dizziness. Dizziness started November of either 2018 or 2019 per patient reporting. Started as a light headed sensation though patient indicating this to be an off balance or off kilter sensation at this time. She notes worsening symptoms when moving, performing head turns and when turning her body. She also notes a pressure in her head and intermittent ear pain. States that dizziness will wax and wane. No observable pattern to symptoms. Of note, the virus in 2018 or 2019 did cause pericarditis, GI issues, lung problems and other systemic presentations. Patient had been on a steroid for about 2 months following. No anti-biotics were used per patient reporting. Patient has not had PT in the past for dizziness or balance. Had PT for neck and knee. Much improvement with PT for knee. Limited for neck. Will be meeting with an ENT in July. Of note, is not pursuing BOTOX injections for cervical dystonia, reports a general decline in vision recently (blurry vision) and intermittent ringing in the ears. Patient also noting that BP can fluctuate greatly throughout the day. Also presenting with head tremor at baseline.    [facility]. )   Assistive Devices Comments utilize a cart for support and to carry items. This appearing as 4WW with basket.    Patient/Family Goals Statement Address Dizziness and off kilter  sensation; fatigue.    Fall Risk Screen   Fall screen completed by PT   Have you fallen 2 or more times in the past year? No   Have you fallen and had an injury in the past year? No   Is patient a fall risk? Yes   Fall screen comments Inherently at risk of falling due to dizziness symptosm and apparent instability without use of device- see further comments below.    Abuse Screen (yes response referral indicated)   Feels Unsafe at Home or Work/School no   Physical Signs of Abuse Present no   Functional Scales   Functional Scales and Outcomes DHI = 62/100   Pain   Pain comments Reproting headaches intermittently, this new since dizziness.    Cognitive Status Examination   Orientation orientation to person, place and time   Level of Consciousness alert   Follows Commands and Answers Questions 100% of the time   Personal Safety and Judgment intact   Cognitive Comment Did not formally assess memory or cognition. Patient appearing to be approrpiate historian with regards to personal and medical history.    Posture   Posture Forward head position;Protracted shoulders   Posture Comments Dystonia noted through cervical spine causing head titl. Compensatory curvature noted through spine in scoliotic presentation.    Range of Motion (ROM)   ROM Comment B LEs functionally assessed through general mobility screening and found to be within functional limits.    Strength   Strength Comments Strength screening deffered this date to allow time for vestibualr screening. Will conduct further screening at subsequent session.    Transfer Skills   Transfer Comments Mod IND with heavy use of UE support to compelte task.    Gait   Gait Comments Ataxic presentation with excessive sway and excessive deviation from midline with integration of head turns. Performing this date with AD as patient does ambualte within home with cart.    Balance   Balance Comments Deficits in both static and dynamic balance with functional mobility screening.  Patient demonstrating difficulty assuming rhomberg stance. Able to maintain stance with EC conditioning, though excessive sway noted. Unable to hold rhomberg with EC conditioning. Unable to assume sharpened rhomberg stance.    Sensory Examination   Sensory Perception no deficits were identified   Sensory Perception Comments Per patient reporting.    Muscle Tone   Muscle Tone Comments Dystonia noted through cervical spine. Compensatory curvature noted through tthoracic and lumbar spine. Limited ability to correct.    Cervicogenic Screen   Neck ROM globally restricted and dyskinetic due to dystonia.    Oculomotor Exam   Smooth Pursuit Other   Smooth Pursuit Comment Tracking normally, felt that tremor was aggrevating.    Saccades Other   Saccades Comments tracking normally, though slow.    VOR Comments Unable to accurately assess due to cervical dystonia   Rapid Head Thrust Comments Unable to accurately assess due to cervical dystonia   Infrared Goggle Exam or Frenzel Lense Exam   Vestibular Suppressant in Last 24 Hours? Yes   Planned Therapy Interventions   Planned Therapy Interventions balance training;ADL retraining;IADL retraining;gait training;motor coordination training;neuromuscular re-education;ROM;strengthening;stretching;transfer training;manual therapy;other (see comments)  (Vestibular rheab)   Clinical Impression   Criteria for Skilled Therapeutic Interventions Met yes, treatment indicated   PT Diagnosis Decreased safe functional mobility   Influenced by the following impairments Deficits in vestibular function, compensatory head titl due to dystonia, head tremor, deficits in strength and balance.    Functional limitations due to impairments Decreased safe functional mobility   Clinical Presentation Evolving/Changing   Clinical Decision Making (Complexity) Moderate complexity   Therapy Frequency   (1-2x/week)   Predicted Duration of Therapy Intervention (days/wks) 4-6 weeks   Risk & Benefits of therapy have  been explained Yes   Patient, Family & other staff in agreement with plan of care Yes   Clinical Impression Comments Patient presenting with intermittent dizziness complaints since expeirencing a viral infection in 2018. Describes dizziness as light headed and off kilter. Also presents with notable balance deficits, cervical dystonia, compensatory head titl, and head tremor. Further assessment needed for strength, though anticiapting deficits based on functional mobility screening and observed compensations. Will benefit from skilled PT services to address limitatiosn and facilitate improved IND functional mobility.    GOALS   PT Eval Goals 1;2;3;4   Goal 1   Goal Identifier Postural stability   Goal Description The patietn will be able to maintain rhomberg and sharpened rhomberg stance with EO/EC conditioning x 30 seconds with minimal observed sway to demonstrate a significant improvement in postural stability.    Target Date 07/31/21   Goal 2   Goal Identifier DGI   Goal Description The patietn will score 19/24 or greater on DGI assessment iwth us eof least restrictive AD to demonstrate a significant improvement in dynamic balance and to show that she is at a minimal risk of falling with particiaption in community based ambualtion.    Target Date 07/31/21   Goal 3   Goal Identifier 30s STS   Goal Description The patient will be able to perfomr 13 STS trasnfers in 30 seconds or less without UE suport to demonstrate a significant improvement in LE strength and to show that she is at a minimal risk of falling iw participation in functional mobility tasks.    Target Date 07/31/21   Total Evaluation Time   PT Rodo, Moderate Complexity Minutes (85693) 60

## 2021-05-12 ENCOUNTER — HOSPITAL ENCOUNTER (OUTPATIENT)
Dept: PHYSICAL THERAPY | Facility: CLINIC | Age: 76
End: 2021-05-12
Payer: MEDICARE

## 2021-05-12 DIAGNOSIS — R42 DIZZINESS: Primary | ICD-10-CM

## 2021-05-12 DIAGNOSIS — R26.89 BALANCE PROBLEMS: ICD-10-CM

## 2021-05-12 PROCEDURE — 97110 THERAPEUTIC EXERCISES: CPT | Mod: GP | Performed by: PHYSICAL THERAPIST

## 2021-05-12 PROCEDURE — 97112 NEUROMUSCULAR REEDUCATION: CPT | Mod: GP | Performed by: PHYSICAL THERAPIST

## 2021-05-18 ENCOUNTER — OFFICE VISIT (OUTPATIENT)
Dept: OTOLARYNGOLOGY | Facility: CLINIC | Age: 76
End: 2021-05-18
Payer: MEDICARE

## 2021-05-18 ENCOUNTER — OFFICE VISIT (OUTPATIENT)
Dept: AUDIOLOGY | Facility: CLINIC | Age: 76
End: 2021-05-18
Payer: MEDICARE

## 2021-05-18 ENCOUNTER — RECORDS - HEALTHEAST (OUTPATIENT)
Dept: ADMINISTRATIVE | Facility: OTHER | Age: 76
End: 2021-05-18

## 2021-05-18 VITALS — WEIGHT: 127.87 LBS | BODY MASS INDEX: 21.3 KG/M2 | HEIGHT: 65 IN

## 2021-05-18 DIAGNOSIS — H90.3 BILATERAL SENSORINEURAL HEARING LOSS: ICD-10-CM

## 2021-05-18 DIAGNOSIS — H90.3 SENSORINEURAL HEARING LOSS (SNHL) OF BOTH EARS: Primary | ICD-10-CM

## 2021-05-18 DIAGNOSIS — H92.03 EAR PAIN, BILATERAL: Primary | ICD-10-CM

## 2021-05-18 DIAGNOSIS — R42 DIZZINESS: ICD-10-CM

## 2021-05-18 DIAGNOSIS — R42 DIZZINESS AND GIDDINESS: ICD-10-CM

## 2021-05-18 PROCEDURE — 92504 EAR MICROSCOPY EXAMINATION: CPT | Performed by: REGISTERED NURSE

## 2021-05-18 PROCEDURE — 92550 TYMPANOMETRY & REFLEX THRESH: CPT | Performed by: AUDIOLOGIST

## 2021-05-18 PROCEDURE — 99204 OFFICE O/P NEW MOD 45 MIN: CPT | Mod: 25 | Performed by: REGISTERED NURSE

## 2021-05-18 PROCEDURE — 92557 COMPREHENSIVE HEARING TEST: CPT | Performed by: AUDIOLOGIST

## 2021-05-18 ASSESSMENT — PATIENT HEALTH QUESTIONNAIRE - PHQ9: SUM OF ALL RESPONSES TO PHQ QUESTIONS 1-9: 2

## 2021-05-18 ASSESSMENT — PAIN SCALES - GENERAL: PAINLEVEL: SEVERE PAIN (6)

## 2021-05-18 ASSESSMENT — MIFFLIN-ST. JEOR: SCORE: 1085.88

## 2021-05-18 NOTE — PROGRESS NOTES
Otolaryngology Clinic  May 18, 2021    Chief Complaint:   Dizziness       History of Present Illness:   Morenita Moore is a 75 year old female with a complex medical history that includes cervical dystonia, SVTs, hx of pericarditis, tremors and anxiety/depression who presents today for evaluation of bilateral otalgia and dizziness. Patient reports that ear pain began about 5 weeks ago. Started in left but also began to occur on the right side. Describes pain as primarily aching pain with intermittent acute stabbing pain, left ear greater than right ear lasting 1-2 minutes. She has not had ear pain recently but this was concerning to patient.     More concerning to patient is her dizziness that she is experiencing. Patient states that dizziness started after she had a severe unidentified viral infection in November 2019. She is being followed by neurology for these symptoms and is currently in vestibular therapy. She is scheduled for vestibular testing at the end of July. Patient reports that dizziness is primarily a sense of imbalance and a swaying sensation. She did have an episode of vertigo felt to be related to metoprolol. Since stopping medication, she denies any room spinning vertigo. She states that when she moves her had too fast she feels imbalanced requiring her to hold onto something to steady herself. She denies any nausea/vomiting or falls. She is concerned that waiting too long may worsen her dizziness and wonders if there is any imaging that can be done to evaluate her inner ear function to see if it is related to her dizziness symptoms.     Patient denies any current otalgia, otorrhea, hearing loss, history of frequent ear infections, or ear surgeries.     Patient does report that she has had recent eye pressure and blurred vision and is now on a prednisone burst prescribed by neurology that have improved symptoms.     Past Medical History:  Past Medical History:   Diagnosis Date     Anxiety   "    Asthma 2012    adult-onset asthma diagnosed in 2012     Cancer (H)      DCIS (ductal carcinoma in situ) 2001    stage 0 status post left mastectomy in 2001     Depressive disorder      Uncomplicated asthma      Past Surgical History:  Past Surgical History:   Procedure Laterality Date     BREAST SURGERY       Medications:  Current Outpatient Medications   Medication Sig Dispense Refill     acetaminophen (TYLENOL) 500 MG tablet Take 500 mg by mouth as needed       albuterol (PROVENTIL HFA: VENTOLIN HFA) 108 (90 BASE) MCG/ACT inhaler Inhale 2 puffs into the lungs every 6 hours.         calcium carbonate 750 MG CHEW Take 1-2 tablets by mouth as needed       calcium carbonate-vitamin D (OYSTER SHELL CALCIUM/D) 500-200 MG-UNIT tablet Take 1 tablet by mouth       Cyanocobalamin 50 MCG TABS Take 50 mcg by mouth       fluticasone-salmeterol (ADVAIR) 100-50 MCG/DOSE diskus inhaler Inhale 1 puff into the lungs every 12 hours       predniSONE (DELTASONE) 50 MG tablet Take 1 tablet (50 mg) by mouth daily 5 tablet 0     vitamin C (ASCORBIC ACID) 500 MG tablet Take 500 mg by mouth       aspirin 81 MG EC tablet Take 81 mg by mouth daily       LORazepam (ATIVAN) 0.5 MG tablet as needed        promethazine (PHENERGAN) 12.5 MG tablet Take 12.5 mg by mouth as needed       Allergies:  Allergies   Allergen Reactions     Levalbuterol Hydrochloride Shortness Of Breath     Cats Other (See Comments)     Itchy eyes     Dust Mites      Other reaction(s): Wheezing  Wheezing/shortness/breath/see (IRP 6/1)     Amitriptyline Palpitations     Escitalopram Anxiety        Social History:  Social History     Tobacco Use     Smoking status: Former Smoker     Smokeless tobacco: Never Used     Tobacco comment: smoked about 10 years   Substance Use Topics     Alcohol use: No     Drug use: Never     ROS: 10 point ROS neg other than the symptoms noted above in the HPI.    Physical Exam:    Ht 1.651 m (5' 5\")   Wt 58 kg (127 lb 13.9 oz)   BMI 21.28 " kg/m       Constitutional:  The patient was unaccompanied, well-groomed, and in no acute distress.     Skin: Normal:  Warm without rash    Neurologic: Alert and oriented x 3. HB 1/6 bilaterally   Psychiatric: The patient's affect was anxious and cooperative   Communication:  Normal; communicates verbally, normal voice quality.    Respiratory: Breathing comfortably without stridor or exertion of accessory muscles.    Head/Face:  Normocephalic and atraumatic.     Eyes: Pupils were equal and reactive.  Extraocular movement intact.    Ears: Pinnae and tragus non-tender.        Otologic microscope exam:    Patient's ear pathology required use of the binocular microscope for the purpose of cleaning and improving visualization in order to assure a more accurate diagnostic evaluation.    Right ear was examined under the microscope. TM intact with scarring on posterior inferior portion of drum, nicely aerated middle ear space.    Left ear was also examined under the microscope.  Normal appearing TM, nicely aerated middle ear space.      Audiogram: 5/18/2021- data independently reviewed  Normal hearing sloping to moderately severe SNHL bilaterally  WR right: 88% left: 100%   Acoustic Reflexes: present at 1K   Tympanograms: type Ad right, type A left          Assessment and Plan:    1. Bilateral ear pain and pressure  Patient with 5 week history of ear pain/pressure that seems to have resolved with prednisone burst. There is no indication of any effusion or otitis media that could be cause of ear symptoms on today's exam. There was scarring on right drum that is appears to be due to a TM perforation although patient does not have any recollection of a perforation in the past. There may have been some sort of inflammatory process causing this pain and pressure. Because it is not bothersome to patient at this time, will continue to monitor.     2. Dizziness  Patient with chronic dizziness and imbalance that is currently being  managed by neurology. Based on patient's history presented today, this does not seem to be ear related but cannot confirm without balance testing. Patient is scheduled at the end of July. Explained to patient that balance testing is the best test to determine the function of the inner ear. Also encouraged patient to continue with vestibular therapy as this is the typical treatment that we recommend to many of our patients with imbalance. Based on balance testing results, patient can be scheduled with neurotology for further management if a peripheral cause is found to be contributing to balance issues.     3. Bilateral SNHL  Patient with bilateral sensorineural hearing loss. This hearing loss is not bothersome to patient at this time. She would be a candidate for amplification but she is not interested in pursuing hearing aids at this time.     Patient will follow up as needed after balance testing or if ear pain returns.    Mildred Liriano DNP, APRN, CNP  Otolaryngology  Head & Neck Surgery  [phone]    45 minutes spent on the date of the encounter doing chart review, history and exam, documentation and further activities per the note

## 2021-05-18 NOTE — PROGRESS NOTES
AUDIOLOGY REPORT    SUMMARY: Audiology visit completed. See audiogram for results.      RECOMMENDATIONS: Follow-up with ENT.    Mini Lo CCC-A  Licensed Audiologist   [place] #[id number]

## 2021-05-18 NOTE — LETTER
5/18/2021       RE: Morenita Moore  [address]  [place] [address]     Dear Colleague,    Thank you for referring your patient, Morenita Moore, to the [facility] [place] at [facility]. Please see a copy of my visit note below.      Otolaryngology Clinic  May 18, 2021    Chief Complaint:   Dizziness       History of Present Illness:   Morenita Moore is a 75 year old female with a complex medical history that includes cervical dystonia, SVTs, hx of pericarditis, tremors and anxiety/depression who presents today for evaluation of bilateral otalgia and dizziness. Patient reports that ear pain began about 5 weeks ago. Started in left but also began to occur on the right side. Describes pain as primarily aching pain with intermittent acute stabbing pain, left ear greater than right ear lasting 1-2 minutes. She has not had ear pain recently but this was concerning to patient.     More concerning to patient is her dizziness that she is experiencing. Patient states that dizziness started after she had a severe unidentified viral infection in November 2019. She is being followed by neurology for these symptoms and is currently in vestibular therapy. She is scheduled for vestibular testing at the end of July. Patient reports that dizziness is primarily a sense of imbalance and a swaying sensation. She did have an episode of vertigo felt to be related to metoprolol. Since stopping medication, she denies any room spinning vertigo. She states that when she moves her had too fast she feels imbalanced requiring her to hold onto something to steady herself. She denies any nausea/vomiting or falls. She is concerned that waiting too long may worsen her dizziness and wonders if there is any imaging that can be done to evaluate her inner ear function to see if it is related to her dizziness symptoms.     Patient denies any  current otalgia, otorrhea, hearing loss, history of frequent ear infections, or ear surgeries.     Patient does report that she has had recent eye pressure and blurred vision and is now on a prednisone burst prescribed by neurology that have improved symptoms.     Past Medical History:  Past Medical History:   Diagnosis Date     Anxiety      Asthma 2012    adult-onset asthma diagnosed in 2012     Cancer (H)      DCIS (ductal carcinoma in situ) 2001    stage 0 status post left mastectomy in 2001     Depressive disorder      Uncomplicated asthma      Past Surgical History:  Past Surgical History:   Procedure Laterality Date     BREAST SURGERY       Medications:  Current Outpatient Medications   Medication Sig Dispense Refill     acetaminophen (TYLENOL) 500 MG tablet Take 500 mg by mouth as needed       albuterol (PROVENTIL HFA: VENTOLIN HFA) 108 (90 BASE) MCG/ACT inhaler Inhale 2 puffs into the lungs every 6 hours.         calcium carbonate 750 MG CHEW Take 1-2 tablets by mouth as needed       calcium carbonate-vitamin D (OYSTER SHELL CALCIUM/D) 500-200 MG-UNIT tablet Take 1 tablet by mouth       Cyanocobalamin 50 MCG TABS Take 50 mcg by mouth       fluticasone-salmeterol (ADVAIR) 100-50 MCG/DOSE diskus inhaler Inhale 1 puff into the lungs every 12 hours       predniSONE (DELTASONE) 50 MG tablet Take 1 tablet (50 mg) by mouth daily 5 tablet 0     vitamin C (ASCORBIC ACID) 500 MG tablet Take 500 mg by mouth       aspirin 81 MG EC tablet Take 81 mg by mouth daily       LORazepam (ATIVAN) 0.5 MG tablet as needed        promethazine (PHENERGAN) 12.5 MG tablet Take 12.5 mg by mouth as needed       Allergies:  Allergies   Allergen Reactions     Levalbuterol Hydrochloride Shortness Of Breath     Cats Other (See Comments)     Itchy eyes     Dust Mites      Other reaction(s): Wheezing  Wheezing/shortness/breath/see (IRP 6/1)     Amitriptyline Palpitations     Escitalopram Anxiety        Social History:  Social History  "    Tobacco Use     Smoking status: Former Smoker     Smokeless tobacco: Never Used     Tobacco comment: smoked about 10 years   Substance Use Topics     Alcohol use: No     Drug use: Never     ROS: 10 point ROS neg other than the symptoms noted above in the HPI.    Physical Exam:    Ht 1.651 m (5' 5\")   Wt 58 kg (127 lb 13.9 oz)   BMI 21.28 kg/m       Constitutional:  The patient was unaccompanied, well-groomed, and in no acute distress.     Skin: Normal:  Warm without rash    Neurologic: Alert and oriented x 3. HB 1/6 bilaterally   Psychiatric: The patient's affect was anxious and cooperative   Communication:  Normal; communicates verbally, normal voice quality.    Respiratory: Breathing comfortably without stridor or exertion of accessory muscles.    Head/Face:  Normocephalic and atraumatic.     Eyes: Pupils were equal and reactive.  Extraocular movement intact.    Ears: Pinnae and tragus non-tender.        Otologic microscope exam:    Patient's ear pathology required use of the binocular microscope for the purpose of cleaning and improving visualization in order to assure a more accurate diagnostic evaluation.    Right ear was examined under the microscope. TM intact with scarring on posterior inferior portion of drum, nicely aerated middle ear space.    Left ear was also examined under the microscope.  Normal appearing TM, nicely aerated middle ear space.      Audiogram: 5/18/2021- data independently reviewed  Normal hearing sloping to moderately severe SNHL bilaterally  WR right: 88% left: 100%   Acoustic Reflexes: present at 1K   Tympanograms: type Ad right, type A left          Assessment and Plan:    1. Bilateral ear pain and pressure  Patient with 5 week history of ear pain/pressure that seems to have resolved with prednisone burst. There is no indication of any effusion or otitis media that could be cause of ear symptoms on today's exam. There was scarring on right drum that is appears to be due to a TM " perforation although patient does not have any recollection of a perforation in the past. There may have been some sort of inflammatory process causing this pain and pressure. Because it is not bothersome to patient at this time, will continue to monitor.     2. Dizziness  Patient with chronic dizziness and imbalance that is currently being managed by neurology. Based on patient's history presented today, this does not seem to be ear related but cannot confirm without balance testing. Patient is scheduled at the end of July. Explained to patient that balance testing is the best test to determine the function of the inner ear. Also encouraged patient to continue with vestibular therapy as this is the typical treatment that we recommend to many of our patients with imbalance. Based on balance testing results, patient can be scheduled with neurotology for further management if a peripheral cause is found to be contributing to balance issues.     3. Bilateral SNHL  Patient with bilateral sensorineural hearing loss. This hearing loss is not bothersome to patient at this time. She would be a candidate for amplification but she is not interested in pursuing hearing aids at this time.     Patient will follow up as needed after balance testing or if ear pain returns.    Mildred Liriano DNP, APRN, CNP  Otolaryngology  Head & Neck Surgery  [phone]    45 minutes spent on the date of the encounter doing chart review, history and exam, documentation and further activities per the note        Again, thank you for allowing me to participate in the care of your patient.      Sincerely,    Ludy Liriano, NP

## 2021-05-19 ENCOUNTER — HOSPITAL ENCOUNTER (OUTPATIENT)
Dept: PHYSICAL THERAPY | Facility: CLINIC | Age: 76
End: 2021-05-19
Payer: MEDICARE

## 2021-05-19 DIAGNOSIS — R26.89 BALANCE PROBLEMS: ICD-10-CM

## 2021-05-19 DIAGNOSIS — R42 DIZZINESS: Primary | ICD-10-CM

## 2021-05-19 PROCEDURE — 97112 NEUROMUSCULAR REEDUCATION: CPT | Performed by: PHYSICAL THERAPIST

## 2021-05-21 ENCOUNTER — HOSPITAL ENCOUNTER (OUTPATIENT)
Dept: PHYSICAL THERAPY | Facility: CLINIC | Age: 76
End: 2021-05-21
Payer: MEDICARE

## 2021-05-21 DIAGNOSIS — R26.89 BALANCE PROBLEMS: Primary | ICD-10-CM

## 2021-05-21 DIAGNOSIS — R42 DIZZINESS: ICD-10-CM

## 2021-05-21 PROCEDURE — 97112 NEUROMUSCULAR REEDUCATION: CPT | Performed by: PHYSICAL THERAPIST

## 2021-05-21 PROCEDURE — 97110 THERAPEUTIC EXERCISES: CPT | Performed by: PHYSICAL THERAPIST

## 2021-05-25 ENCOUNTER — RECORDS - HEALTHEAST (OUTPATIENT)
Dept: ADMINISTRATIVE | Facility: CLINIC | Age: 76
End: 2021-05-25

## 2021-05-25 NOTE — TELEPHONE ENCOUNTER
FUTURE VISIT INFORMATION      FUTURE VISIT INFORMATION:    Date: 6/3/21    Time: 8:20am    Location: [facility]  REFERRAL INFORMATION:    Referring provider:  self    Referring providers clinic:  N/A    Reason for visit/diagnosis  Blurry vision, trouble focusing    RECORDS REQUESTED FROM:       No recs to collect per pt

## 2021-05-26 ENCOUNTER — RECORDS - HEALTHEAST (OUTPATIENT)
Dept: ADMINISTRATIVE | Facility: CLINIC | Age: 76
End: 2021-05-26

## 2021-05-26 VITALS — SYSTOLIC BLOOD PRESSURE: 145 MMHG | DIASTOLIC BLOOD PRESSURE: 80 MMHG

## 2021-05-26 VITALS — SYSTOLIC BLOOD PRESSURE: 148 MMHG | HEART RATE: 69 BPM | DIASTOLIC BLOOD PRESSURE: 84 MMHG

## 2021-05-27 VITALS — HEART RATE: 80 BPM | DIASTOLIC BLOOD PRESSURE: 76 MMHG | SYSTOLIC BLOOD PRESSURE: 142 MMHG

## 2021-05-27 ASSESSMENT — PATIENT HEALTH QUESTIONNAIRE - PHQ9
SUM OF ALL RESPONSES TO PHQ QUESTIONS 1-9: 12
SUM OF ALL RESPONSES TO PHQ QUESTIONS 1-9: 0
SUM OF ALL RESPONSES TO PHQ QUESTIONS 1-9: 0

## 2021-05-28 ASSESSMENT — ANXIETY QUESTIONNAIRES: GAD7 TOTAL SCORE: 7

## 2021-05-28 ASSESSMENT — ASTHMA QUESTIONNAIRES
ACT_TOTALSCORE: 25
ACT_TOTALSCORE: 22
ACT_TOTALSCORE: 11
ACT_TOTALSCORE: 19
ACT_TOTALSCORE: 22
ACT_TOTALSCORE: 18

## 2021-06-02 ENCOUNTER — HOSPITAL ENCOUNTER (OUTPATIENT)
Dept: PHYSICAL THERAPY | Facility: CLINIC | Age: 76
End: 2021-06-02
Payer: MEDICARE

## 2021-06-02 DIAGNOSIS — R42 DIZZINESS: Primary | ICD-10-CM

## 2021-06-02 DIAGNOSIS — R26.89 BALANCE PROBLEMS: ICD-10-CM

## 2021-06-02 PROCEDURE — 97112 NEUROMUSCULAR REEDUCATION: CPT | Mod: GP | Performed by: PHYSICAL THERAPIST

## 2021-06-03 ENCOUNTER — PRE VISIT (OUTPATIENT)
Dept: OPHTHALMOLOGY | Facility: CLINIC | Age: 76
End: 2021-06-03

## 2021-06-03 ENCOUNTER — OFFICE VISIT (OUTPATIENT)
Dept: OPHTHALMOLOGY | Facility: CLINIC | Age: 76
End: 2021-06-03
Payer: MEDICARE

## 2021-06-03 DIAGNOSIS — Z96.1 PSEUDOPHAKIA, BOTH EYES: Primary | ICD-10-CM

## 2021-06-03 DIAGNOSIS — Z98.890 HX OF DETACHED RETINA REPAIR: ICD-10-CM

## 2021-06-03 DIAGNOSIS — H52.13 MYOPIA OF BOTH EYES: ICD-10-CM

## 2021-06-03 DIAGNOSIS — Z86.69 HX OF DETACHED RETINA REPAIR: ICD-10-CM

## 2021-06-03 PROCEDURE — 92004 COMPRE OPH EXAM NEW PT 1/>: CPT | Performed by: OPHTHALMOLOGY

## 2021-06-03 PROCEDURE — 92015 DETERMINE REFRACTIVE STATE: CPT | Mod: GY | Performed by: OPHTHALMOLOGY

## 2021-06-03 ASSESSMENT — TONOMETRY
IOP_METHOD: ICARE
OD_IOP_MMHG: 12
OS_IOP_MMHG: 13

## 2021-06-03 ASSESSMENT — SLIT LAMP EXAM - LIDS
COMMENTS: NORMAL
COMMENTS: NORMAL

## 2021-06-03 ASSESSMENT — VISUAL ACUITY
OD_CC+: -2
OS_CC: 20/40
OD_CC: 20/25
METHOD: SNELLEN - LINEAR
CORRECTION_TYPE: GLASSES

## 2021-06-03 ASSESSMENT — CUP TO DISC RATIO
OD_RATIO: 0.1
OS_RATIO: 0.1

## 2021-06-03 ASSESSMENT — EXTERNAL EXAM - LEFT EYE: OS_EXAM: NORMAL

## 2021-06-03 ASSESSMENT — REFRACTION_MANIFEST
OD_CYLINDER: +1.75
OD_ADD: +2.25
OS_AXIS: 160
OD_AXIS: 152
OD_SPHERE: -1.75
OS_CYLINDER: +1.50
OS_SPHERE: -2.75
OS_ADD: +2.25

## 2021-06-03 ASSESSMENT — REFRACTION_WEARINGRX
OD_AXIS: 165
OS_AXIS: 156
SPECS_TYPE: SVL
OD_SPHERE: -1.50
OS_CYLINDER: +1.25
OS_SPHERE: -2.75
OD_CYLINDER: +1.25

## 2021-06-03 ASSESSMENT — EXTERNAL EXAM - RIGHT EYE: OD_EXAM: NORMAL

## 2021-06-03 ASSESSMENT — CONF VISUAL FIELD
OS_NORMAL: 1
COMMENTS: CHECKED WITH HM OU
OD_NORMAL: 1

## 2021-06-03 NOTE — NURSING NOTE
Chief Complaints and History of Present Illnesses   Patient presents with     Consult For     Difficulty tracking/focusing at near     Chief Complaint(s) and History of Present Illness(es)     Consult For     Laterality: both eyes    Associated symptoms: dryness (pt notes that she has tried systane AT and noted that they dont help and burned eyes when instilled per pt).  Negative for eye pain, tearing, flashes and floaters    Treatments tried: artificial tears    Pain scale: 0/10    Comments: Difficulty tracking/focusing at near              Comments     Patient notes that she has been doing PT for dizziness, caused from a virus long ago per pt, has been causing issues now with eyes she feels, having difficulties seeing at near, does NOT have any type of gls with reading RX, and difficulty tracking when reading.    POHx: cataract sx OU, repaired RD OS 2012    Michela ALBA Coco 3, 2021 8:27 AM

## 2021-06-03 NOTE — PROGRESS NOTES
HPI  Morenita Moore is a 75 year old female here for comprehensive eye exam.  She has longstanding medical issues she states are related to remote viral infection.  She has been following with neurology and now seeing physical therapy for vertigo.   Says she only has distance glasses and reads without correction.  She never tolerated a bifocal. She has difficulty tracking and focusing at near     PMH:   Past Medical History:   Diagnosis Date     Anxiety      Asthma 2012    adult-onset asthma diagnosed in 2012     Cancer (H)      DCIS (ductal carcinoma in situ) 2001    stage 0 status post left mastectomy in 2001     Depressive disorder      Uncomplicated asthma     cervical dystonia in the past since her 30s   physiologic tremor  Viral pericarditis  Torticollis    Reviewed last brain mri 7/2020:  IMPRESSION:   1. No acute intracranial process.  2. Generalized brain atrophy and presumed microvascular ischemic changes similar to findings on the 2018 MRI.      POH: Glasses for myopia,   POHx: cataract sx each eye 2017 right eye , 2012 os, repaired RD OS 2012, status-post YAG capsulotomy both eyes   Oc Meds: none  FH: Denies any glaucoma, age related macular degeneration, or other known eye diseases         Assessment & Plan     (Z96.1) Pseudophakia, both eyes - Both Eyes  (primary encounter diagnosis)  Comment: status-post YAG capsulotomy, clear media  Plan: follow    (H52.13) Myopia of both eyes - Both Eyes  Comment: has distance prescription but never had reading glasses, takes glasses off to read   Plan:     Manifest refraction done and prescription for glasses given for near work as needed may help with tracking/focusing, however her vertigo issues may contribute    (Z98.890,  Z86.69) Hx of detached retina repair - Left Eye  Comment:  Best corrected visual acuity today 20/40 left eye, retina attached   Plan: reviewed signs and symptoms of flashes/floaters and to call if this occurs     -----------------------------------------------------------------------------------       Patient disposition:   Return in about 1 year (around 6/3/2022) for Comprehensive Exam, OCT macula OU.  Patient to call sooner as needed.    Complete documentation of historical and exam elements from today's encounter can be found in the full encounter summary report (not reduplicated in this progress note). I personally obtained the chief complaint(s) and history of present illness.  I have confirmed and edited as necessary the CC, HPI, PMH/PSH, social history, FMH, ROS, and exam/neuro findings as obtained by the technician or others. I have examined this patient myself and I personally viewed the image(s) and studies listed above and the documentation reflects my findings and interpretation.     Juen Espana MD

## 2021-06-04 VITALS
OXYGEN SATURATION: 100 % | HEART RATE: 76 BPM | BODY MASS INDEX: 21.16 KG/M2 | HEIGHT: 65 IN | WEIGHT: 127 LBS | DIASTOLIC BLOOD PRESSURE: 80 MMHG | SYSTOLIC BLOOD PRESSURE: 128 MMHG

## 2021-06-04 VITALS
HEART RATE: 79 BPM | OXYGEN SATURATION: 100 % | DIASTOLIC BLOOD PRESSURE: 79 MMHG | BODY MASS INDEX: 21.46 KG/M2 | TEMPERATURE: 98.1 F | RESPIRATION RATE: 16 BRPM | SYSTOLIC BLOOD PRESSURE: 168 MMHG | WEIGHT: 127 LBS

## 2021-06-04 VITALS
BODY MASS INDEX: 18.44 KG/M2 | WEIGHT: 108 LBS | DIASTOLIC BLOOD PRESSURE: 68 MMHG | HEIGHT: 64 IN | HEART RATE: 90 BPM | SYSTOLIC BLOOD PRESSURE: 130 MMHG | OXYGEN SATURATION: 96 %

## 2021-06-04 VITALS
DIASTOLIC BLOOD PRESSURE: 66 MMHG | BODY MASS INDEX: 19.49 KG/M2 | HEART RATE: 80 BPM | OXYGEN SATURATION: 98 % | SYSTOLIC BLOOD PRESSURE: 138 MMHG | WEIGHT: 117 LBS | HEIGHT: 65 IN

## 2021-06-04 VITALS
HEIGHT: 65 IN | HEART RATE: 82 BPM | TEMPERATURE: 98.3 F | BODY MASS INDEX: 19.33 KG/M2 | SYSTOLIC BLOOD PRESSURE: 152 MMHG | WEIGHT: 116 LBS | OXYGEN SATURATION: 97 % | DIASTOLIC BLOOD PRESSURE: 77 MMHG

## 2021-06-04 VITALS
HEART RATE: 87 BPM | OXYGEN SATURATION: 99 % | SYSTOLIC BLOOD PRESSURE: 140 MMHG | BODY MASS INDEX: 18.78 KG/M2 | DIASTOLIC BLOOD PRESSURE: 72 MMHG | WEIGHT: 110 LBS | HEIGHT: 64 IN | TEMPERATURE: 97.8 F

## 2021-06-04 VITALS
DIASTOLIC BLOOD PRESSURE: 80 MMHG | HEIGHT: 64 IN | SYSTOLIC BLOOD PRESSURE: 140 MMHG | OXYGEN SATURATION: 100 % | WEIGHT: 110 LBS | BODY MASS INDEX: 18.78 KG/M2 | HEART RATE: 104 BPM

## 2021-06-04 VITALS
SYSTOLIC BLOOD PRESSURE: 138 MMHG | WEIGHT: 112.12 LBS | OXYGEN SATURATION: 99 % | HEART RATE: 92 BPM | BODY MASS INDEX: 19.14 KG/M2 | DIASTOLIC BLOOD PRESSURE: 74 MMHG | HEIGHT: 64 IN

## 2021-06-04 VITALS — WEIGHT: 110 LBS | HEIGHT: 64 IN | BODY MASS INDEX: 18.78 KG/M2

## 2021-06-04 VITALS
SYSTOLIC BLOOD PRESSURE: 140 MMHG | DIASTOLIC BLOOD PRESSURE: 80 MMHG | BODY MASS INDEX: 19.99 KG/M2 | OXYGEN SATURATION: 100 % | WEIGHT: 120 LBS | HEART RATE: 78 BPM | HEIGHT: 65 IN

## 2021-06-05 VITALS
OXYGEN SATURATION: 99 % | SYSTOLIC BLOOD PRESSURE: 118 MMHG | DIASTOLIC BLOOD PRESSURE: 62 MMHG | WEIGHT: 130 LBS | HEART RATE: 74 BPM | BODY MASS INDEX: 21.97 KG/M2

## 2021-06-05 VITALS
HEIGHT: 65 IN | HEART RATE: 82 BPM | SYSTOLIC BLOOD PRESSURE: 122 MMHG | WEIGHT: 130 LBS | BODY MASS INDEX: 21.66 KG/M2 | RESPIRATION RATE: 16 BRPM | DIASTOLIC BLOOD PRESSURE: 70 MMHG

## 2021-06-05 VITALS
HEART RATE: 92 BPM | DIASTOLIC BLOOD PRESSURE: 74 MMHG | OXYGEN SATURATION: 98 % | HEIGHT: 65 IN | BODY MASS INDEX: 21.68 KG/M2 | WEIGHT: 130.13 LBS | SYSTOLIC BLOOD PRESSURE: 146 MMHG | TEMPERATURE: 98.1 F

## 2021-06-05 VITALS
DIASTOLIC BLOOD PRESSURE: 70 MMHG | BODY MASS INDEX: 21.97 KG/M2 | WEIGHT: 130 LBS | TEMPERATURE: 96.2 F | SYSTOLIC BLOOD PRESSURE: 146 MMHG | OXYGEN SATURATION: 100 % | HEART RATE: 96 BPM

## 2021-06-06 NOTE — TELEPHONE ENCOUNTER
Who is calling: Patient   Reason for Call:  Requesting a call back in regards to general Medical conditions that she has. Patient states she forgot to ask at most recent apt . Patient did not want to go into detail. Please call back   Date of last appointment with primary care: 03/06/2020  Okay to leave a detailed message: Yes

## 2021-06-06 NOTE — TELEPHONE ENCOUNTER
She had a normal urinalysis on 2/13 and has normal kidney function.  I'm not sure what she means by monitoring her urine but if she wants to arrange another urinalysis, please forward the order.  Thanks.

## 2021-06-06 NOTE — PROGRESS NOTES
Office Visit   Morenita Moore   74 y.o. female    Date of Visit: 2/27/2020    Chief Complaint   Patient presents with     Establish care visit     Shortness of breath     Follow-up     ER follow up        Assessment and Plan   1. Moderate persistent asthma in adult without complication  She has a history of asthma and is on inhalers.  She has been having more shortness of breath and I do think reevaluation in pulmonary would be beneficial.  Recent chest x-ray was satisfactory.  She is not anemic and a thorough evaluation has been done recently that has been negative.  She is in agreement with this plan.  - Ambulatory referral to Pulmonology    2. Shortness of breath  Recent heart evaluation was normal.  D-dimer was normal and BNP was normal.  Again we will have her see pulmonary.  - Ambulatory referral to Pulmonology    3. Pericarditis, unspecified chronicity, unspecified type  She sees a cardiologist.  Recommend that she follow-up with them as planned.  Recent echo was satisfactory.    4. Insomnia, unspecified type  She has a lot of trouble with anxiety and sleep disorder.  I did discuss starting trazodone and she is interested in trying that at bedtime.  I am giving her prescription.  - traZODone (DESYREL) 50 MG tablet; Take 1 tablet (50 mg total) by mouth at bedtime.  Dispense: 90 tablet; Refill: 3    5. CELESTE (generalized anxiety disorder)  She has been on Ativan.  I discussed that I am not comfortable continuing this for her anxiety.  I think that she should see a psychiatrist to determine best treatment for her anxiety.  She is also going to be seeing a therapist.       No follow-ups on file.     History of Present Illness   This 74 y.o. old female comes in with a couple of concerns.  She has had an increase in her anxiety recently.  She was diagnosed in December with pericarditis.  She was treated with colchicine and ibuprofen.  Since then she has been worried that she is developed problems with her  kidneys and liver.  She is also developed shortness of breath.  She has been evaluated multiple times in the last few months and has had labs that have shown normal kidney function, electrolytes, liver test, thyroid, BNP, d-dimer, and vitamin levels.  She was evaluated by rheumatology due to an elevated antinuclear antibody but follow-up testing was negative.  She has had echocardiograms that have shown normal heart function and a normal chest x-ray.  Today she states that she continues to have shortness of breath and she is quite anxious.  Over the last 4 years she has had a lot of situational stressors that have increased her anxiety.  At one point she was put on Klonopin and was taking significant dosing.  She states that she stopped it last summer.  Over the last couple of months she was put back on Ativan by another provider.  She is scheduled to see a therapist and we discussed that it would also be helpful for her to see a psychiatrist.  She has no other acute concerns today.  We did review all of her recent test results.    Review of Systems: As above, systems otherwise reviewed and negative.     Medications, Allergies and Problem List   Patient Active Problem List   Diagnosis     Breast cancer in situ     Asthma in adult     Acute internal derangement of left knee     Depression, recurrent (H)     CELESTE (generalized anxiety disorder)     SVT (supraventricular tachycardia) (H)     Torticollis     Tremor, physiological     Pericarditis     Neck pain     Insomnia, unspecified type     Current Outpatient Medications   Medication Sig Dispense Refill     albuterol (VENTOLIN HFA) 90 mcg/actuation inhaler Inhale 2 puffs 4 (four) times a day as needed.       fluticasone propion-salmeteroL (ADVAIR HFA) 230-21 mcg/actuation inhaler Inhale 1 puff daily.       LORazepam (ATIVAN) 0.5 MG tablet Take 0.25 mg by mouth 3 (three) times a day as needed.       predniSONE (DELTASONE) 20 MG tablet Take 20 mg by mouth daily for 5  "days. 5 tablet 0     traZODone (DESYREL) 50 MG tablet Take 1 tablet (50 mg total) by mouth at bedtime. 90 tablet 3     No current facility-administered medications for this visit.      Allergies   Allergen Reactions     Levalbuterol Shortness Of Breath     Cat Dander Itching and Other (See Comments)     Itchy eyes     Amitriptyline Palpitations     Escitalopram Anxiety          Physical Exam     /72 (Patient Site: Right Arm, Patient Position: Sitting)   Pulse 87   Temp 97.8  F (36.6  C)   Ht 5' 4\" (1.626 m)   Wt 110 lb (49.9 kg)   SpO2 99%   BMI 18.88 kg/m      General: This is an alert female, no apparent distress.     Additional Information   Social History     Tobacco Use     Smoking status: Former Smoker     Types: Cigarettes     Last attempt to quit: 1988     Years since quittin.1     Smokeless tobacco: Never Used     Tobacco comment: Quit in 's   Substance Use Topics     Alcohol use: Never     Frequency: Never     Drug use: Not on file          Aminah Tamayo MD    " Statement Selected

## 2021-06-06 NOTE — TELEPHONE ENCOUNTER
Spoke with patient- went over CT results again- she was concerned about malignancy    Advised that nothing noted from radiologist to suggest that    She is mainly concerned about her prednisone taper- she is feeling a bit better in regards to her fatigue, itchiness, and breathing. Currently she is on the 10mg- she thinks going down to 5mg would be too much of a decrease. Wondering if she should have a longer course of time on the 10mg before tapering down further?

## 2021-06-06 NOTE — TELEPHONE ENCOUNTER
Medication Request  Medication name:     1.   Disp Refills Start End    albuterol (VENTOLIN HFA) 90 mcg/actuation inhaler   9/19/2018     Sig - Route: Inhale 2 puffs 4 (four) times a day as needed. - Inhalation    Class: Historical Med    Notes to Pharmacy: May substitute the equivalent medication per insurance preference.        2.   Disp Refills Start End    predniSONE (DELTASONE) 20 MG tablet 5 tablet 0 2/27/2020 3/3/2020    Sig - Route: Take 20 mg by mouth daily for 5 days. - Oral    Sent to pharmacy as: predniSONE 20 mg tablet (DELTASONE)    E-Prescribing Status: Receipt confirmed by pharmacy (2/27/2020 12:41 PM CST)        Requested Pharmacy: [facility] #[id number]  Reason for request: New prescription   When did you use medication last?:  Today  Patient offered appointment:  n/a  Okay to leave a detailed message: yes      FYI: This medication is listed as an historical medication or was prescribed by another provider. Please review and advise. Patient stated that the Prednisone has helped her and she is able to breathe. Patient is questioning if she can get another prescription for Prednisone just to get her through until she can get in to see her cardiology.     Please change PCP. Patient had established care with Dr. Tamayo 2/27/2020.

## 2021-06-06 NOTE — TELEPHONE ENCOUNTER
I recommend that we see how she does until then.  If her breathing worsens again, then she should come in for a recheck.  She can also call for cancellations at Pulmonary.  Thanks.

## 2021-06-06 NOTE — TELEPHONE ENCOUNTER
Spoke with patient- she would like a UA ordered to see if there is protein in her urine    She is concerned about her NSAID use and kidney function    Lab appt set for Monday morning

## 2021-06-06 NOTE — PROGRESS NOTES
"OFFICE VISIT NOTE    Subjective:   Chief Complaint:  Fatigue (wants to check blood, also losing weight)    74-year-old woman who is in today with a complaint of itching, fatigue, concerned about her heart.  She does have an appointment to see cardiology tomorrow.  She tells me she has had a \"virus\" for about 4 months.  Apparently she developed some viral pericarditis.  Continues to complain of shortness of breath and generalized weakness.  More itching of the arms neck and legs recently.    Current Outpatient Medications   Medication Sig     albuterol (VENTOLIN HFA) 90 mcg/actuation inhaler Inhale 2 puffs 4 (four) times a day as needed.     fluticasone propion-salmeteroL (ADVAIR HFA) 230-21 mcg/actuation inhaler Inhale 1 puff daily.     LORazepam (ATIVAN) 0.5 MG tablet Take 0.25 mg by mouth as needed.      predniSONE (DELTASONE) 20 MG tablet Take 20 mg by mouth daily for 5 days.     traZODone (DESYREL) 50 MG tablet Take 1 tablet (50 mg total) by mouth at bedtime.       PSFHx: Tobacco Status:  She  reports that she quit smoking about 32 years ago. Her smoking use included cigarettes. She has never used smokeless tobacco.    Review of Systems:  A comprehensive review of systems is negative except for the comments above    Objective:    Ht 5' 4\" (1.626 m)   Wt 108 lb (49 kg)   BMI 18.54 kg/m    GENERAL: No acute distress.  This is a nervous woman who is in no distress.  She does answer questions appropriately.  Blood pressure is 130/68.  Pulse is 90.  Oxygen saturations 96%.  She has some secondary excoriations of the arms from constant scratching.  There is some minor ecchymoses from scratching.  There is no edema.  No jaundice or cyanosis.  Lungs are clear.  Heart shows a sinus rhythm.  No pericardial rub is heard.  No gallop.  No JVD.  Neurologic exam seems grossly intact.  She has a chronic spastic torticollis.    Assessment & Plan   Morenita Moore is a 74 y.o. female.    Generalized anxiety disorder.  She " seems overly concerned about some of her symptoms.  She tells me she has kidney failure but her creatinine was normal.  Asked her to quit scratching which I think is making the itching even worse.  Use a lubricating lotion.  I am going to check her kidney functions, CBC, liver profile.  She will keep a cardiology appointment.  I do not see any signs here of heart failure, pericarditis etc.  She continues on prednisone 20 mg daily.  Hopefully, this can be weaned in the future.    Diagnoses and all orders for this visit:    Pericarditis, unspecified chronicity, unspecified type    CELESTE (generalized anxiety disorder)    Pruritic disorder  -     Comprehensive Metabolic Panel  -     HM1(CBC and Differential)  -     HM1 (CBC with Diff)            Bart Roper MD  Transcription using voice recognition software, may contain typographical errors.

## 2021-06-06 NOTE — TELEPHONE ENCOUNTER
I think that adding Boost with each meal can be helpful and she could start that before her visit next week.  Thanks.

## 2021-06-06 NOTE — TELEPHONE ENCOUNTER
"Declined triage - just wants to be seen in clinic           CC:  \"Extraordinary fatigue\"      Recently seen by Dr Tamayo - noted pericarditis in history but she is concerned is something else causing this         \"Wondeirng if I am anemic?\"       Would like to be seen this afternoon for eval of this       \"just swing by\", \"really quick\"  (would like to have lab tests done to eval anemia)          I will send over to scheduling to coordinate a visit           roman alvarez rn                 "

## 2021-06-06 NOTE — TELEPHONE ENCOUNTER
Spoke with the patient and relayed message below from Dr. Tamayo.  She verbalized understanding and had no further questions at this time.  Ember MALONE, [place]/CMT....................4:15 PM

## 2021-06-06 NOTE — TELEPHONE ENCOUNTER
Medication Request  Medication name: Nutritional Supplements  Requested Pharmacy: [facility] #[id number]  Reason for request: Weight loss  When did you use medication last?:  n/a  Patient offered appointment:  patient declined  Okay to leave a detailed message: yes  [phone]    FYI: Patient stated that she is aware that she has an appointment scheduled with the nutritionist next week but she is concerned about her weight loss.

## 2021-06-06 NOTE — TELEPHONE ENCOUNTER
Patient Returning Call  Reason for call:  CT  Information relayed to patient:  The writer read the following to patient per Dr Tamayo:  Please call and let her know that her CT did not show any abnormalities that would cause her weight loss.  No masses or signs of cancer.  She has some nodules in her lungs which appear benign and are likely due to her history of asthma.  These would not cause her breathing symptoms.  We'll await her appointment in pulmonary.  She also has a few benign cysts which do not need any follow up.  Thanks.   Patient has additional questions:  Yes  If YES, what are your questions/concerns:  Patient breathing at this time is ok.  Patient does have a concern  as she cannot get into pulmo until April 15 2020. The prednisone taper will be finished by then and patient is afraid her shortness of breath pepe return.  Please advise.  Okay to leave a detailed message?: Yes [phone]

## 2021-06-06 NOTE — TELEPHONE ENCOUNTER
Left message to call back for: Morenita  Information to relay to patient:  Please relay message from Dr Tamayo below. Thanks.

## 2021-06-06 NOTE — PROGRESS NOTES
Assessment/Plan  1. Generalized anxiety disorder  Significant issue.  At least 15 to 20 minutes of my time was spent reassuring the patient and reviewing all of her labs and notes with her indicating that she has normal renal function.  Was hoping to have imaging and/or further labs done today, both of which I declined to order.  Counseled on importance of establishing care with a psychiatrist instead of a therapist as they can write prescriptions to help manage her lorazepam.  She believed [facility] would be sending a therapist to her residence, and passively noted that she could work with the psychiatrist at [facility]; but she wants to manage it herself.  Counseled her that I would not feel comfortable writing these prescriptions without her seeing a psychiatrist.  Adamant that she does not want to be on any antidepressants, and I reassured her that there are other alternatives for anxiety but it would be best if the specialist manage this.    2. Idiopathic pericardial effusion  5. Essential hypertension  Unsure of etiology, does not appear to be rheumatologic based on work-up.  Currently not taking any ibuprofen/NSAIDs or colchicine due to concern about her kidney function.  Potentially postviral etiology when originally diagnosed. Without symptoms or pertinent exam findings today.  Systolic blood pressure based on review of records from [facility] and ED visit from 2/13/2020 at [facility] indicates elevated blood pressure.  Unsure what role anxiety/stress plays a role.  Currently not on any medication.  Definitely needs to be addressed once patient decides where she wants to establish care.  Likely can be addressed during cardiology referral.  States she has tried BuSpar in the past.    3. Thrush  4. History of asthma  Asymptomatic at the moment.  Unable to complete asthma action plan due to time limits and persistent focus on her kidney function.  Has albuterol inhaler as needed.  Likely  secondary to steroid inhaler use.  Counseled patient pressures resolved and likely did not need to continue to use nystatin swish and swallow.     Much or all of the text in this note was generated through the use of Dragon Dictate voice-to-text software. Errors in spelling or words which seem out of context are unintentional. Sound alike errors, in particular, may have escaped editing    Post Discharge Medication Reconciliation Status: discharge medications reconciled and changed, per note/orders (see AVS)    Gideon Boogie MD    No follow-ups on file.    Subjective  This 74 y.o. old female is concerned about her kidney function.    Seen at [facility] ED on 2/13/20 for flank pain.  Per documentation, patient was incredibly anxious about 2 weeks of flank pain, with associated clear-colored urine.  Stating her urine had never been clear in the past.  Is concerned of kidney failure/injury.  Denies any trauma or pain on her flank.  Was requesting elaborate testing, some of which was done.  BMP, troponin, d-dimer, CBC, chest radiograph and UA all unremarkable (the latter only consistent for hyperinflated lungs, scoliosis and degenerative disease in the thoracic spine).  EKG was not done.  Sees cardiology at [facility] for this. Reportedly told that symptoms likely MSK in nature in the setting of anxiety and recommended to follow-up with PCP.    History pertinent for breast cancer with mastectomy, SVT, asthma, major depression, cervical dystonia, anxiety disorder    Inquiring more about the diagnosis of pericarditis, TTE from 12/11/2019 pertinent for trivial pericardial effusion, grade 1 diastolic dysfunction, trivial AR, EF 60-65% with normal global systolic function.  Initially treated with colchicine however when seen by cardiology on 1/8/2020, she complained of recurrent chest discomfort/pain.  Labs ordered.  Assessment suggest patient had atypical symptoms suggestive of pericarditis.  Counseled to stop aspirin,  continue to take colchicine 0.6 mg twice daily for 6 months and given course of ibuprofen with taper over a span total of 4 weeks.  To note patient has not smoked cigarettes for over 30 years and denies a family history of heart disease.  WANDA positive with titer 1:320 and recommended to follow-up with rheumatologist.  TSH and HIV were normal.  Provider that saw her in rheumatology clinic on 1/20/2020 deduced symptoms not consistent with CTD and reportedly counseled patient that WANDA is nonspecific.  Wanted to check dsDNA, complement and other labs during that visit.  Anti-Ro, antiDsDNA, SSB, C4 and C3 all negative  Then seen by?  PCP on 2/7/2020 with multiple concerns.  Communicated concerns about pericarditis, sore throat, taper of her lorazepam for chronic anxiety.  Reportedly had recently been treated with nystatin for concern about thrush whilst on inhaled steroids.  Diagnosed with oropharyngeal candidiasis, CELESTE and labs were done (BMP normal).    Today she reports request to no longer see Dr. Vicente cardiologist at [facility] and will rather be referred to a new cardiologist for this diagnosis of pericarditis.  Mainly because she is concerned that her kidneys are failing because the urine is now clear.  To note, she is incontinent of urine at times.  Denies flank pain, chest pain, palpitations, fever, sweats or chills.  Will endorse a periodic chest flutter.  Despite reviewing all the multiple BMPs that have been done during her ambulatory and ED visit, showing normal renal function and GFR, she is adamant that she has kidney failure/kidney disease.  As a result, she has stopped taking both the colchicine and the ibuprofen.    In respect to her anxiety.  States she was initially started with clonazepam to help with her cervical dystonia.  Then neurologist decided to taper off with lorazepam.  She is managing this taper on her own.  Prescription last written by Wero Phillips on 1/13/30, with 6 scripts prior to the  "written by Roxana Randall (starting 8/13/19).  Currently takes 0.5-0.25 tabs of a 0.5 mg tablet once daily.  States she has established relationship with a therapist at [facility] and they will be coming to her house as part of a special program called ?[facility].  Reluctant to see a psychiatrist because she is not \"crazy\".  States she is adamant with trying to wean off her lorazepam. Endorses poor reaction to all antidepressants as an additional reason for not wanting to see a psychiatrist. States her last 3 to 4 years have been very hard, with lots of loss in her family/friendship [place].  Is drinking alcohol.    ROS A comprehensive review of systems was performed and was otherwise negative    Medications, allergies, and problem list were reviewed and updated    Exam  Vitals:    02/19/20 1017   BP: 138/74   Patient Site: Right Arm   Patient Position: Sitting   Cuff Size: Adult Regular   Pulse: 92   SpO2: 99%   Weight: 112 lb 1.9 oz (50.9 kg)   Height: 5' 4\" (1.626 m)   General appearance: Pleasant, nontoxic-appearing, thin, Jonathan anxious with periodic tearing when being told that her kidney function is normal, alert and oriented x4  EYES: Eyelids, conjunctiva, and sclera were normal.   HEAD, EARS, NOSE, MOUTH, AND THROAT: Head and face were normal. Hearing was normal to voice. Oropharynx was normal, without thrush  NECK: Rightward deviating cervical dystonia  RESPIRATORY: Bilaterally with no crackles, wheezing or rhonchi  CARDIOVASCULAR: Tachycardic, regular S1 and S2.  Radial pulses intact.  No edema.  No friction rub  GASTROINTESTINAL: NABS, soft, nontender, nondistended, no flank pain/CVA tenderness bilaterally  NEUROLOGIC: Alert and oriented to person, place, time, and circumstance. Speech was pressured at times.   PSYCHIATRIC:  Mood and affect were normal and the patient had normal recent and remote memory.  Very anxious.  The patient's judgment and insight was poor  Additional Information   Current " Outpatient Medications   Medication Sig Dispense Refill     albuterol (VENTOLIN HFA) 90 mcg/actuation inhaler Inhale 2 puffs 4 (four) times a day as needed.       fluticasone propion-salmeteroL (ADVAIR HFA) 230-21 mcg/actuation inhaler Inhale 1 puff daily.       LORazepam (ATIVAN) 0.5 MG tablet Take 0.25 mg by mouth 3 (three) times a day as needed.       nystatin (MYCOSTATIN) 100,000 unit/mL suspension Take 500,000 Units by mouth 4 (four) times a day.       No current facility-administered medications for this visit.      Allergies   Allergen Reactions     Levalbuterol Shortness Of Breath     Cat Dander Itching and Other (See Comments)     Itchy eyes     Amitriptyline Palpitations     Escitalopram Anxiety     Social History     Social History Narrative     Not on file     Social History     Tobacco Use     Smoking status: Former Smoker     Types: Cigarettes     Last attempt to quit: 1988     Years since quittin.1     Smokeless tobacco: Never Used   Substance Use Topics     Alcohol use: Not on file     Drug use: Not on file     Family History   Problem Relation Age of Onset     Asthma Sister      Breast cancer Sister      No past surgical history on file.    Review and/or order of clinical lab tests: Extensive review of lab studies done from [facility] ED visit from 20, and [facility] visits from 2020, 2020 and 2020.  All indicating more or less normal liver, renal and bone marrow function   Review and/or order of radiology tests:Review of echocardiogram from 2019 indicating more or less normal cardiac function.  EKG from 2020-[facility]  Review and summarization of old records and/or obtaining history from someone other than the patient and.or discussion of case with another health care provider: Review of cardiology, rheumatology and internal medicine documentation from January and 2020 all at [facility], as well [facility] ED visit from 2020.  Notes indicate patient's  management minimal pericardial effusion, and work-up for her multiple complaints/concerns about her renal function and anxiety management.  Also reviewed  highlighting prescription pattern for her benzodiazepine regimen.    Time: total time spent with the patient was 45 minutes of which >50% was spent in counseling and coordination of care

## 2021-06-06 NOTE — TELEPHONE ENCOUNTER
Orders being requested: Prescription needed for Boost   Reason service is needed/diagnosis: weight loss  When are orders needed by: asap  Where to send Orders: Fax: [phone] [facility]  Okay to leave detailed message?  Yes    Patient will not be attending her appointment today.

## 2021-06-06 NOTE — PROGRESS NOTES
[facility] Care Coordination Contact    Member became effective with  [facility] on 3/1/20 with [facility]  Previous Health Plan: [facility] [employer]  [facility]  Previous care coordinators name and number: Ale Silverman [phone].  Waiver Type: EW     Last MMIS Entry: Date 1/7/20 and [facility]  MMIS visit date if different from above: N/A  Services Listed in MMIS: Homemaking  UTF received: No: Requested on 3/12/20 through [facility].     Prem Borja  Care Management Specialist  [facility]  [phone]

## 2021-06-06 NOTE — TELEPHONE ENCOUNTER
Spoke with patient and she has not tried these yet but would like an order sent in. Placed order on your desk. Need to know how she should use these. She is worried with the COVID19 about all the weight she lost. She doesn't want to loose more.  Shruti Sampson CSS

## 2021-06-06 NOTE — PROGRESS NOTES
Office Visit   Morenita Moore   74 y.o. female    Date of Visit: 3/6/2020    Chief Complaint   Patient presents with     Follow-up     On Prednisone, weight loss        Assessment and Plan   1. Moderate persistent asthma in adult without complication  She has a history of asthma and has ongoing symptoms of shortness of breath.  It improved a lot with prednisone and she is hoping to taper down rather than just stop suddenly.  I have given her a new prescription with recommendations for tapering.  When I saw her a couple of weeks ago I did order a pulmonary consultation but that has not been arranged yet.  She is going to work with the schedulers on that.  - predniSONE (DELTASONE) 10 mg tablet; 20 mg for 3 days, then 10 mg for 3 days, then 5 mg for 3 days, then stop..  Dispense: 12 tablet; Refill: 0    2. Cough  As above we will have her see pulmonary.  Her chest x-ray was stable recently.    3. Weight loss  She notes that over the last 10 months she has had about 35 pounds of weight loss.  She is unsure why this is been happening.  She has had a lot of blood work done both here and when she was at [facility] and no cause of weight loss has been found.  She has had a mammogram in the last few months and is up-to-date on colon cancer screening.  I am going to get a CT scan of her chest and abdomen to evaluate this weight loss further.  She also notes some mild abdominal pain.    4. Abdominal pain, generalized       No follow-ups on file.     History of Present Illness   This 74 y.o. old female comes in with a couple of questions.  I saw her for the first time just about 10 days ago.  She was previously being evaluated at [facility].  She has had a lot of issues over the past year or 2.  Recently she was diagnosed with pericarditis but that has resolved and she has had normal cardiac follow-up.  When I saw her she was having significant difficulties with sleep and we recommended that she start trazodone.  She has not started  "it yet.  She was also having some increased shortness of breath and has a history of asthma.  She was given a course of prednisone which she states did help but she is hoping to taper off of it rather than stop after 5 days of treatment.  Today she has some questions regarding her test results that I did try to reassure her about.  In addition she notes that she has lost about 30 pounds over the past year.  She has not tried to lose weight.  She notes some mild abdominal pain.  We reviewed that she has had a normal mammogram and a colonoscopy in the last couple of years.    Review of Systems: As above, systems otherwise reviewed and negative.     Medications, Allergies and Problem List   Patient Active Problem List   Diagnosis     Breast cancer in situ     Asthma in adult     Acute internal derangement of left knee     Depression, recurrent (H)     CELESTE (generalized anxiety disorder)     SVT (supraventricular tachycardia) (H)     Torticollis     Tremor, physiological     Pericarditis     Neck pain     Insomnia, unspecified type     Current Outpatient Medications   Medication Sig Dispense Refill     albuterol (VENTOLIN HFA) 90 mcg/actuation inhaler Inhale 2 puffs 4 (four) times a day as needed. 1 Inhaler 0     fluticasone propion-salmeteroL (ADVAIR HFA) 230-21 mcg/actuation inhaler Inhale 1 puff daily.       LORazepam (ATIVAN) 0.5 MG tablet Take 0.25 mg by mouth as needed.        predniSONE (DELTASONE) 10 mg tablet 20 mg for 3 days, then 10 mg for 3 days, then 5 mg for 3 days, then stop.. 12 tablet 0     traZODone (DESYREL) 50 MG tablet Take 1 tablet (50 mg total) by mouth at bedtime. 90 tablet 3     No current facility-administered medications for this visit.      Allergies   Allergen Reactions     Levalbuterol Shortness Of Breath     Cat Dander Itching and Other (See Comments)     Itchy eyes     Amitriptyline Palpitations     Escitalopram Anxiety          Physical Exam     /80   Pulse (!) 104   Ht 5' 4\" " (1.626 m)   Wt 110 lb (49.9 kg)   SpO2 100%   BMI 18.88 kg/m      General: This is an alert female, appears anxious but in no apparent distress.     Additional Information   Social History     Tobacco Use     Smoking status: Former Smoker     Types: Cigarettes     Last attempt to quit: 1988     Years since quittin.2     Smokeless tobacco: Never Used     Tobacco comment: Quit in 20's   Substance Use Topics     Alcohol use: Never     Frequency: Never     Drug use: Not on file          Aminah Tamayo MD

## 2021-06-06 NOTE — TELEPHONE ENCOUNTER
Left detailed message for the patient relaying message below from Dr. Tamayo regarding refill requests.  Asked that she call with any further questions.    Prescriptions have been set up for Dr. Tamayo to review per message below.  Ember MALONE CMA/MEI....................9:37 AM

## 2021-06-06 NOTE — TELEPHONE ENCOUNTER
----- Message from Aminah Tamayo MD sent at 3/9/2020  9:09 AM CDT -----  Please call and let her know that her CT did not show any abnormalities that would cause her weight loss.  No masses or signs of cancer.  She has some nodules in her lungs which appear benign and are likely due to her history of asthma.  These would not cause her breathing symptoms.  We'll await her appointment in pulmonary.  She also has a few benign cysts which do not need any follow up.  Thanks.

## 2021-06-06 NOTE — TELEPHONE ENCOUNTER
Left her a detailed message with PCP response    Did advise that Medicare requires nutritional supplements sent to medical supply stores in order to be covered    She can check with them to see where it would be covered or can purchase OTC at [facility] for now.

## 2021-06-06 NOTE — TELEPHONE ENCOUNTER
Dr. Tamayo,    Please advise on requested medications.    Stacey QUIROGA LPN .......... 7:51 AM  03/02/20

## 2021-06-06 NOTE — TELEPHONE ENCOUNTER
There was no sign of malignancy and I don't think she needs a slower taper.  This is the appropriate way to taper off prednisone.  Thanks.

## 2021-06-06 NOTE — TELEPHONE ENCOUNTER
Symptom  Describe your symptoms: reports urine output is decreased, urine has a lot of bubbles in it and has itching on her arms bilateral. Patient is requesting to have her urine monitored. Please advise!  Any pain: no  New/Ongoing: Ongoing  How long have you been having symptoms:  4  week(s)  Have you been seen for this:  Yes, Appt: declines  Have your symptoms changed since this visit?:  No  Appointment offered?: Patient declines  Triage offered?: Yes, declined  Home remedies tried: none  Requested Pharmacy: [facility]  Okay to leave a detailed message? Yes

## 2021-06-06 NOTE — TELEPHONE ENCOUNTER
"Dr. Tamayo,  Please advise on the following concern from the patient: \"Patient breathing at this time is ok.  Patient does have a concern  as she cannot get into pulmo until April 15 2020. The prednisone taper will be finished by then and patient is afraid her shortness of breath pepe return.  Please advise.\"  Thank you.  Ember MALONE CMA/MEI....................4:09 PM    "

## 2021-06-06 NOTE — TELEPHONE ENCOUNTER
Triage call: NO PCP with [facility]    Thinks that she yeast in her system    /??  178/??  - after initial triage disposition- she reports a heaviness in her chest - doesn't feel like pressure  - has asthma     Has Chills as well- doesn't have a temperature      2 hours ago and small amount of clear urine-   Not having any issues with incontinence    Stools are light brown    Pain- not bad- aching pain on the right and left side 4-5/10   No abdominal pain     Still has sore throat- nystatin     Recent hx of pericarditis     Hx of SVTs     Hx of anxiety    Triaged to be seen in the office today- reviewed additional care advice with patient and she verbalizes understanding. Patient warm transferred to scheduling for appointment. She will keep her appointment with Dr Klein tomorrow @ 10 am     After scheduling patient she started talking about having shortness of breath and a heaviness in her chest. With new information triage disposition changed to be seen in the ER at this time- reviewed disposition with patient and she she states that she will go into either [facility] or [facility].     Kathe Gurrola RN [facility] Triage/Med Refill 2/13/2020 9:09 AM     Reason for Disposition    MODERATE pain (e.g., interferes with normal activities or awakens from sleep)    Systolic BP >= 160 OR Diastolic >= 100, and any cardiac or neurologic symptoms (e.g., chest pain, difficulty breathing, unsteady gait, blurred vision)    Protocols used: FLANK PAIN-A-OH, HIGH BLOOD PRESSURE-A-OH

## 2021-06-07 NOTE — TELEPHONE ENCOUNTER
Triage nurse is arranging her to be seen at urgent care [facility]- any thoughts on referral?

## 2021-06-07 NOTE — TELEPHONE ENCOUNTER
Since she is worried about abnormal weight loss, I think that she should have sugar which helps with weight gain.  Please let her know.  Thanks.

## 2021-06-07 NOTE — TELEPHONE ENCOUNTER
Valeria Breaux for updated Boost prescription as requested below?  Please advise.  Thank you.  Ember MALONE, [place]/MEI....................8:54 AM

## 2021-06-07 NOTE — PROGRESS NOTES
"Morenita Moore is a 74 y.o. female who is being evaluated via a billable telephone visit.      The patient has been notified of following:     \"This telephone visit will be conducted via a call between you and your physician/provider. We have found that certain health care needs can be provided without the need for a physical exam.  This service lets us provide the care you need with a short phone conversation.  If a prescription is necessary we can send it directly to your pharmacy.  If lab work is needed we can place an order for that and you can then stop by our lab to have the test done at a later time.    Telephone visits are billed at different rates depending on your insurance coverage. During this emergency period, for some insurers they may be billed the same as an in-person visit.  Please reach out to your insurance provider with any questions.    If during the course of the call the physician/provider feels a telephone visit is not appropriate, you will not be charged for this service.\"    Patient has given verbal consent to a Telephone visit? Yes    Patient would like to receive their AVS by AVS Preference: [place].    Additional provider notes:   I am speaking with Morenita as a phone consultation for shortness of breath, wheezing and asthma evaluation. She was started on advair by her PMD. She has hx of severe pericarditis Dec 2019. She had some weight loss wich has improved.   She has a history of asthma diagnosed at the [facility]. She used to lived in [place]. She was also see by [facility] in 2016, was on flovent and recommended pulmonary rehab at that time but she did not return to [facility] for follow up. She says she was diagnosed with adult onset asthma based on symptoms of wheezing and shortness of breath. She has been on advair and albuterol with improvement in symptoms. She uses the albuterol once per day at night.  Her shortness of breath is mostly related to her heart.   She saw rheum for " nonspecific +WANDA level and pericardial effusion. No evidence of rheum disease based on serologic evaluation.  Denies hemoptysis, phlegm production. She notes occasional wheezing. She has no dyspnea on exertion these days.   She only leaves the house to go to [employer].   She lives in an apartment building.   She has cat allergies.     She is a former smoker, 1 pack per day for 10 years. She hasn't smoked for the last 30 years.   She is a retired  for 5 years. She moved to the [facility] a few years ago to take care of family. She has one son who is healthy.    PFTs from 2016  Feno mildly elevated at 27  Spirometry  Spirometry:   Date ratio FEV1 FVC   August, 2012 1.62 2.41   November 05, 2013 59 1.57/65% 2.62/86%   April 27, 2016 73 1.83/76% 2.56/80%    Labs:  WANDA positive 1:320 homogenous  SSB neg  CRP, ESR wnl  dsDNA neg  Complement levels wnl  CBC wnl but had lymphopenia on 3/4; no eos  HIV neg  LFTs wnl  TSH wnl    CT chest March 2020  IMPRESSION:   1.  Pulmonary nodules with the largest being a 0.6 cm groundglass nodule. See guidelines below.  2.  Small pericardial effusion.   3.  A 6.5 cm cyst in the right mid abdominal mesentery. 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  4.  A simple 3.8 cm left adnexal cyst. See guidelines below.  5.  Trace free fluid in the pelvis.    Echo Dec 2019  Final Impressions:   1. Normal LV size, normal wall thickness, normal global systolic function with an estimated EF of 60 - 65%.   2. Right ventricular cavity size is normal, global systolic RV function is normal.   3. The aortic valve is trileaflet and sclerotic, no stenosis and trivial regurgitation.   4. Grade 1 pattern of LV diastolic filling.   5. Trivial pericardial effusion.   6. Likely liver cysts.    Impression: 74F w/ history of percarditis, previously diagnosed asthma (followed at [facility] [place] and [facility]), former minimal smoking history, presents as a phone visit to  discuss her asthma. She appears to be well-controlled on ICS/LABA and HARRISON therapy. Her symptoms of episodic wheezing, chest tightness, and elevated Alverto to 27ppb in 2016 do support the diagnosis of asthma. She does NOT have COPD based on spirometry from 2016, with normal FEV1/FVC ratio. No recent exacerbations. Discussed pathophysiology of asthma and rationale for controller vs. Rescue inhalers. I also explained that we would need to repeat her PFTs once the clinics open again. Her CT chest done recently showed healthy appearing lungs with some benign appearing ground glass nodules that we will continue to keep an eye on.    Recommendations:  #Asthma, hx elevated Alverto, well controlled  - continue Advair HFA 1-2 puffs two times a day, recommended to add spacer. She will youtube proper inhaler technique. Rinse/gargle/spit after use.  - continue albuterol rescue inhaler prn  - UTD with flu, pneumonia shots  - COVID-19 precautious reinforced  - allergen avoidance discussed  - encouraged her to exercise and remain active as able  - azithromycin and prednisone will  Be prescribed for asthma action plan.     #CEDRIC ground glass nodule, 0.6cm  - repeat CT chest in 6 months (at next visit) to document stability.    #Hx Pericarditis with normal echo:  - continue follow up with cardiology, rheumatology.    Phone call duration: 25 minutes      Pacheco Perdomo MD (Avi)  [facility]  Pulmonary & Critical Care  Pager [phone]  Clinic [phone]

## 2021-06-07 NOTE — TELEPHONE ENCOUNTER
Referral Request  Type of referral: Infectious disease  Who s requesting: Patient  Why the request: Patient stated her she has redness on her arms, has thin skin, and her veins are popping out. Patient stated she had a virus that attacked her heart this past November and feels it's related to her current symptoms. Patient stated she is wanting to know what virus she currently has. Patient stated the clinic at [facility] is within walking-distance to her home and she does not have to take a cab.    Patient was transferred to triage.  Have you been seen for this request: No:  Appointment Offered:  Yes  Does patient have a preference on a group/provider? [facility] fax [phone]  Okay to leave a detailed message?  Yes

## 2021-06-07 NOTE — TELEPHONE ENCOUNTER
FYI - Status Update  Who is Calling: Patient  Update: Patient stated to fax to [facility] at fax number [phone]. patient stated that the fax number below is incorrect. Patient stated that the fax number is also provided on the form that is faxed to the clinic.  Okay to leave a detailed message?:  No

## 2021-06-07 NOTE — TELEPHONE ENCOUNTER
Left message to call back for: Morenita  Information to relay to patient:  Consultation order has been placed.

## 2021-06-07 NOTE — TELEPHONE ENCOUNTER
Who is calling:  Patient   Reason for Call:  Caller is wondering if Aminah Tamayo MD can do an order for the Glucose control Boost instead due to it having less sugar and patient would prefer that over the regular. 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   Date of last appointment with primary care:   Okay to leave a detailed message: Yes

## 2021-06-07 NOTE — PROGRESS NOTES
Spoke with [facility], provider does not accepts new members [employer] no more for [facility] and Transportation. Auth did not sent to provider and are. Due to the COVID-19, provider expressed it is hard to see member for face-to-face for intake as well. [facility] does not accepts [employer]/MSC+ members. Emailed to CC. CC can task again once CC know another provider that can provide these services.       Prem Borja  Care Management Specialist  [facility]  [phone]

## 2021-06-07 NOTE — TELEPHONE ENCOUNTER
Refill Approved    Rx renewed per Medication Renewal Policy. Medication was last renewed on 3/2/20.    Beata Yanez, [facility] Triage/Med Refill 3/24/2020     Requested Prescriptions   Pending Prescriptions Disp Refills     albuterol (VENTOLIN HFA) 90 mcg/actuation inhaler 1 Inhaler 0     Sig: Inhale 2 puffs 4 (four) times a day as needed.       Albuterol/Levalbuterol Refill Protocol Passed - 3/23/2020  7:59 AM        Passed - PCP or prescribing provider visit in last year     Last office visit with prescriber/PCP: 3/6/2020 Aminah Tamayo MD OR same dept: 3/6/2020 Aminah Tamayo MD OR same specialty: 3/6/2020 Aminah Tamayo MD Last physical: Visit date not found       Next appt within 3 mo: Visit date not found  Next physical within 3 mo: Visit date not found  Prescriber OR PCP: Aminah Tamayo MD  Last diagnosis associated with med order: 1. Shortness of breath  - albuterol (VENTOLIN HFA) 90 mcg/actuation inhaler; Inhale 2 puffs 4 (four) times a day as needed.  Dispense: 1 Inhaler; Refill: 0    If protocol passes may refill for 6 months if within 3 months of last provider visit (or a total of 9 months). If patient requesting >1 inhaler per month refill x 6 months and have patient make appointment with provider.

## 2021-06-07 NOTE — TELEPHONE ENCOUNTER
Spoke to pt. She states [facility] will see her but needs the order placed first. She is not able to do a video visit. Thanks.

## 2021-06-07 NOTE — TELEPHONE ENCOUNTER
I agree with her being seen possibly with a video visit due to her skin changes.  I don't think we are going to be able to determine what caused her pericarditis months ago and so I'm a little reluctant to order an ID consult without new evidence of a new infection.  She can sure contact the clinic close to her home and see if they will see her.  If she finds out that they will and then needs me to put in an order, I could sure do that, but they sometimes do not.  Please let me know.  Thanks.

## 2021-06-07 NOTE — PROGRESS NOTES
DME Provider: Alisha  Date Faxed: 4/15/2020  Ordering Provider: Leanne  Equipment ordered: Aerochamber with Mask

## 2021-06-07 NOTE — PROGRESS NOTES
"Morenita Moore is a 74 y.o. female who is being evaluated via a billable telephone visit.      The patient has been notified of following:     \"This telephone visit will be conducted via a call between you and your physician/provider. We have found that certain health care needs can be provided without the need for a physical exam.  This service lets us provide the care you need with a short phone conversation.  If a prescription is necessary we can send it directly to your pharmacy.  If lab work is needed we can place an order for that and you can then stop by our lab to have the test done at a later time.    Telephone visits are billed at different rates depending on your insurance coverage. During this emergency period, for some insurers they may be billed the same as an in-person visit.  Please reach out to your insurance provider with any questions.    If during the course of the call the physician/provider feels a telephone visit is not appropriate, you will not be charged for this service.\"    Patient has given verbal consent to a Telephone visit? Yes    Patient would like to receive their AVS by AVS Preference: [place].      Office Visit   Morenita Moore   74 y.o. female    Date of Visit: 4/13/2020    Chief Complaint   Patient presents with     Follow-up        Assessment and Plan   1. Pericarditis, unspecified chronicity, unspecified type  We discussed her symptoms through this telephone visit.  She states that her breathing is gradually improving.  She will have an occasional pain in her chest cavity but for the most part is much better.  She had a phone visit with cardiology last week and is going to keep them informed of her symptoms.  She has no acute concerns in this regard today.    2. Moderate persistent asthma in adult without complication  She has a visit scheduled with pulmonary later this week.  Her breathing has improved but does continue to be a little bit bothersome.  We will continue " with her inhalers.  She was having quite a bit of weight loss but that has now improved.  She has no acute concerns today and we did review her recent testing.  - fluticasone propion-salmeteroL (ADVAIR HFA) 230-21 mcg/actuation inhaler; Inhale 1 puff daily.  Dispense: 1 Inhaler; Refill: 11       Return in about 3 months (around 7/13/2020) for Annual physical.     History of Present Illness   This 74 y.o. old female was evaluated with a telephone visit today.  She developed pericarditis in December and had severe symptoms.  It has taken a long time for her to get back to her baseline.  She does continue to feel some weakness as well as shortness of breath.  She does note that she is continuing to have improvement though and no worrisome symptoms at this time.  She lost quite a bit of weight and was worried about that.  We did CT scanning as well as significant lab evaluation.  She does have lung nodules but no signs of cancer or other alarming abnormalities.  She states that over the last couple of weeks that she is now starting to gain weight again.  Her appetite is improving.  She is not having any significant symptoms.  She does have underlying asthma and has an evaluation with pulmonary scheduled for later this week.  Again she has no acute concerns today.    Review of Systems: As above, systems otherwise reviewed and negative.     Medications, Allergies and Problem List   Patient Active Problem List   Diagnosis     Breast cancer in situ     Asthma in adult     Depression, recurrent (H)     CELESTE (generalized anxiety disorder)     SVT (supraventricular tachycardia) (H)     Torticollis     Tremor, physiological     Pericarditis     Insomnia, unspecified type     Current Outpatient Medications   Medication Sig Dispense Refill     albuterol (VENTOLIN HFA) 90 mcg/actuation inhaler Inhale 2 puffs 4 (four) times a day as needed. 1 Inhaler 3     fluticasone propion-salmeteroL (ADVAIR HFA) 230-21 mcg/actuation inhaler  Inhale 1 puff daily. 1 Inhaler 11     LORazepam (ATIVAN) 0.5 MG tablet Take 0.25 mg by mouth as needed.        No current facility-administered medications for this visit.      Allergies   Allergen Reactions     Levalbuterol Shortness Of Breath     Cat Dander Itching and Other (See Comments)     Itchy eyes     Amitriptyline Palpitations     Escitalopram Anxiety          Physical Exam     There were no vitals taken for this visit.    During our phone discussion she did not seem to be short of breath and did not cough throughout.     Additional Information   Social History     Tobacco Use     Smoking status: Former Smoker     Types: Cigarettes     Last attempt to quit: 1988     Years since quittin.3     Smokeless tobacco: Never Used     Tobacco comment: Quit in 's   Substance Use Topics     Alcohol use: Never     Frequency: Never     Drug use: Not on file          Aminah Tamayo MD        Phone call duration:  14 minutes    Vielka Phan CMA

## 2021-06-07 NOTE — TELEPHONE ENCOUNTER
Spoke with the patient and relayed message below from Dr. Tamayo.  She verbalized understanding and had no further questions at this time.  Ember MALONE, [place]/CMT....................10:29 AM

## 2021-06-07 NOTE — TELEPHONE ENCOUNTER
Forms Request  Name of form/paperwork: Other:  [employer] forms for boost  Have you been seen for this request: N/A  Do we have the form: yes.  Patient states that [employer] told her that they never received the forms for her boost.  Please re fax forms to [phone].  Call patient when faxed again.    Okay to leave a detailed message? Yes

## 2021-06-07 NOTE — TELEPHONE ENCOUNTER
"Pt wondering what to do if she gets COVID19. Not having symptoms but wants the information \"just in case\".     Reviewed COVID19 information and instructions with pt per protocol. Advised pt if she develops symptoms to call and speak to a nurse. Pt given phone number for main call back.     Pt verbalizes understanding and no further concerns at this time.     COVID 19 Nurse Triage Plan/Patient Instructions    Please be aware that novel coronavirus (COVID-19) may be circulating in the community. If you develop symptoms such as fever, cough, or SOB or if you have concerns about the presence of another infection including coronavirus (COVID-19), please contact your health care provider or visit [email or web address].     Disposition/Instructions    Additional COVID19 information to add for patients.     Additional General Information About COVID-19    COVID-19 - General Information:  Regardless of if you have been tested or not:  Patient who have symptoms (cough, fever, or shortness of breath), need to isolate for 7 days from when symptoms started AND 72 hours after fever resolves (without fever reducing medications) AND improvement of respiratory symptoms (whichever is longer).      Isolate yourself at home (in own room/own bathroom if possible)    Do Not allow any visitors    Do Not go to work or school    Do Not go to Buddhism,  centers, shopping, or other public places.    Do Not shake hands.    Avoid close and intimate contact with others (hugging, kissing).    Follow CDC recommendations for household cleaning of frequently touched services.     After the initial 7 days, continue to isolate yourself from household members as much as possible. To continue decrease the risk of community spread and exposure, you and any members of your household should limit activities in public for 14 days after starting home isolation.     You can reference the following CDC link for helpful home isolation/care " tips:  https://www.cdc.gov/coronavirus/2019-ncov/downloads/10Things.pdf    COVID-19 - Symptoms:     The COVID-19 can cause a respiratory illness, such as bronchitis or pneumonia.    The most common symptoms are: cough, fever, and shortness of breath.     Other symptoms are: body aches, chills, diarrhea, fatigue, headache, runny nose, and sore throat     COVID-19 - Exposure Risk Factors:    Exposure to a person who has been diagnosed with COVID-19 .    Travel from an area with recent local transmission of COVID-19 .    The CDC (www.cdc.gov) has the most up-to-date list of where the COVID-19 outbreak is occurring.    COVID-19 - Spreading:     The virus likely spreads through respiratory droplets produced when a person coughs or sneezes. These respiratory droplets can travel approximately 6 feet and can remain on surfaces.  Common disinfectants will kill the virus.    The CDC currently does not recommend healthy people wearing masks.    COVID-19 - Protect Yourself:     Avoid close contact with people known to have this new COVID-19 infection.    Wash hands often with soap and water or alcohol-based hand .    Avoid touching the eyes, nose or mouth.       Thank you for limiting contact with others, wearing a simple mask to cover your cough, practice good hand hygiene habits and accessing our virtual services where possible to limit the spread of this virus.    For more information about COVID19 and options for caring for yourself at home, please visit the CDC website at https://www.cdc.gov/coronavirus/2019-ncov/about/steps-when-sick.html  For more options for care at [facility], please visit our website at [email or web address]    For more information, please use the [place] Department of Health COVID-19 Website: https://www.health.state.[place]/diseases/coronavirus/index.html  [place] Department of Health ([facility]) COVID-19 Hotlines (Interpreters available):      Health questions:  Phone Number: [phone] or 1-131.259.8495 and Hours: 7 a.m. to 7 p.m.    Schools and  questions: Phone Number: [phone] or [phone] and Hours 7 a.m. to 7 p.m.                    Reason for Disposition    COVID -19, questions about    Protocols used: CORONAVIRUS (COVID-19) EXPOSURE-A- 3.30.20

## 2021-06-07 NOTE — PROGRESS NOTES
ROS was done over the phone and was positive for shortness of breath, irregular heart beat, anxiety.  All other ROS was WNL.

## 2021-06-07 NOTE — TELEPHONE ENCOUNTER
Valeria Breaux for refill requested?  Refill has been set up for you to review.  Ember MALONE, [place]/CMT....................8:50 AM

## 2021-06-07 NOTE — TELEPHONE ENCOUNTER
"Pt states she had a virus last November and has a heart inflammation.   Pt states they did a CT scan, had lost a lot of weight.   Pt states she is wanting testing to see if she can see a provider to find out what type of illness/virus she has.     Pt is wanting a referral from the primary doctor to see infectious disease regarding her symptoms.  Pt states she can walk to the infectious disease clinic and prefers to see them so she does not need to take a cab.  Pt states they told her they would see her in clinic but would need a referral.     Symptoms:   Both arms havered inflammation under her skin on the forearm. Denies it being a rash and states it is not on the top of the skin. Skin also looks thinner.   Top part of her arm has protruding veins are sticking out and grey looking.   Pt also states she has lot quite a bit of weight.     States she feels \"okay\"   Per protocol pt to be seen within 4 hours.     Pt transferred to scheduling to make appointment with urgent care tonight for phone visit.   Millie Tariq, RN   [facility] RN Triage      COVID 19 Nurse Triage Plan/Patient Instructions    Please be aware that novel coronavirus (COVID-19) may be circulating in the community. If you develop symptoms such as fever, cough, or SOB or if you have concerns about the presence of another infection including coronavirus (COVID-19), please contact your health care provider or visit [email or web address].     Disposition/Instructions    Patient to have an Urgent Care Telephone Visit with a provider. Follow System Ambulatory Workflow for COVID 19.     Urgent Care Telephone Visits are available between the hours of 8 am to 9 pm. Staff will assist patent in scheduling an appointment for this Urgent Care Telephone Visit.     Call Back If: Your symptoms worsen before you are able to complete your Urgent Care Telephone Visit with a provider.        Thank you for limiting contact with others, wearing a simple mask to cover " your cough, practice good hand hygiene habits and accessing our virtual services where possible to limit the spread of this virus.    For more information about COVID19 and options for caring for yourself at home, please visit the CDC website at https://www.cdc.gov/coronavirus/2019-ncov/about/steps-when-sick.html  For more options for care at [facility], please visit our website at [email or web address]    For more information, please use the [place] Department of Health COVID-19 Website: https://www.health.[facility].[place]./diseases/coronavirus/index.html  [place] Department of Health ([facility]) COVID-19 Hotlines (Interpreters available):      Health questions: Phone Number: 100.816.2639 or 1-635.201.1186 and Hours: 7 a.m. to 7 p.m.    Schools and  questions: Phone Number: [phone] or [phone] and Hours 7 a.m. to 7 p.m.                        Reason for Disposition    [1] Localized purple or blood-colored spots or dots AND [2] not from injury or friction AND [3] no fever    Protocols used: RASH OR REDNESS - [id number]

## 2021-06-07 NOTE — TELEPHONE ENCOUNTER
Medication Request  Medication name: fluticasone propion-salmeteroL (ADVAIR HFA) 230-21 mcg/actuation inhaler   Requested Pharmacy: [facility]  Reason for request: pharmacy wants refills on hand, but medication is listed as historical  When did you use medication last?:  n/a  Patient offered appointment:  patient declined  Okay to leave a detailed message: yes

## 2021-06-07 NOTE — TELEPHONE ENCOUNTER
Who is calling:  Patient  Reason for Call:  Patient stated I am requesting the boost glucose control due to my prediabetic state.  I did not realize there was so many options and I know the boost order was entered but the [employer] stated There were several types to chose from.  Date of last appointment with primary care: 3/6/20  Okay to leave a detailed message: Yes

## 2021-06-07 NOTE — PROGRESS NOTES
[facility] Care Coordination Contact    Member became effective with [facility] on 03/01/2020 with [employer] MSC+.  Previous Health Plan: [facility] MSC+  Previous Care System: [facility]  Previous care coordinators name and number:   Name:  Ale Robles RN  Phone #:  [phone]  Waiver Type: EW  Last MMIS Entry: Date 01/07/20 and Type Annual  MMIS visit date if different from above: NA  Services Listed in MMIS: Homemaking, [facility] worker, supplies and equipment  Member currently receiving the following home care services:   None  Member currently receiving the following community resources:   [facility] services  UTF received: Yes: Received and saved to member file      3/27/2020 - Care Coordinator called Emani today to review her LTCC, POC and complete the Transitional HRA.  She shared that she current has [facility] services via telephone during the COVID19 situation.  She shared getting food has become harder during this time.  Care Coordinator discussed home delivered meals with Morenita and Morenita decided she would be interested in [employer].  She would prefer half regular meals and half vegetarian meals if possible.  She'd like as many meals as she can get.  Morenita also stated she would be interested in Homemaking Services if they provided transportation.  Care Coordinator will look into this for her.  Care Coordinator will contact [employer] to make referral.  Morenita reports her health was bad in the past few months but she is now feeling better and doing better.  She is waiting on a Boost order from [facility] medical that her MD prescribed her.  No further questions, cocnerns, or needs.  LTCC, POC and Transitional HRA updated/completed.    ASHLEIGH Guerra  [facility]  [phone]

## 2021-06-07 NOTE — TELEPHONE ENCOUNTER
Name of form/paperwork: Other:  [employer] [employer] for Boost Supplement    Date form received by clinic:  Has the form been received?  When is the form needed by? As soon as possible   Patient Notified form requests are processed in 3-5 business days: Yes. This writer asked the caller to inform the patient.   (If patient needs for sooner, please note that in this message) Needs form faxed today. Caller states the patient is stressed out over attempting to get this completed.  Okay to leave a detailed message: No    Fax: [phone]

## 2021-06-08 ENCOUNTER — PRE VISIT (OUTPATIENT)
Dept: OTOLARYNGOLOGY | Facility: CLINIC | Age: 76
End: 2021-06-08

## 2021-06-08 NOTE — PROGRESS NOTES
Office Visit - Follow up    Morenita Moore   74 y.o. female    Date of Visit: 6/2/2020    Chief Complaint   Patient presents with     Dizziness     lightheaded     Follow-up     ER 5/31/2020       Subjective: Hypertension.    Pericarditis in February 2020 complicated by acute kidney injury from ibuprofen use for same.    PCP is Dr. Tamayo.    Prior history of generalized anxiety disorder.    Recently in the emergency room for shaky spell and serum creatinine level was normal 0.66 with potassium level 3.2.  Blood pressure has been up and down somewhat labile.  1 7154 systolic.  Now on metoprolol and as needed clonidine if systolic pressure over 160 per the direction of the emergency room physician.    No blood in stool or urine medication list reviewed and reconciled.    ROS: A comprehensive review of systems was performed and was otherwise negative    Medications:  Prior to Admission medications    Medication Sig Start Date End Date Taking? Authorizing Provider   ascorbic acid, vitamin C, (ASCORBIC ACID WITH KHUSHBOO HIPS) 500 MG tablet Take 500 mg by mouth daily.   Yes PROVIDER, HISTORICAL   aspirin (ASPIR-81 ORAL) Take by mouth.   Yes PROVIDER, HISTORICAL   calcium-vitamin D 500 mg(1,250mg) -200 unit per tablet Take 1 tablet by mouth 2 (two) times a day with meals.   Yes PROVIDER, HISTORICAL   cyanocobalamin (VITAMIN B-12) 50 mcg tablet Take 50 mcg by mouth daily. weekly   Yes PROVIDER, HISTORICAL   fluticasone propion-salmeteroL (ADVAIR HFA) 230-21 mcg/actuation inhaler Inhale 2 puffs 2 (two) times a day.   Yes PROVIDER, HISTORICAL   LORazepam (ATIVAN) 0.5 MG tablet Take 0.25 mg by mouth as needed.  1/9/20  Yes PROVIDER, HISTORICAL   triamcinolone (KENALOG) 0.1 % cream  5/19/20  Yes PROVIDER, HISTORICAL   albuterol (VENTOLIN HFA) 90 mcg/actuation inhaler Inhale 2 puffs 4 (four) times a day as needed. 4/1/20   Ina Garcia MD   metoprolol tartrate (LOPRESSOR) 25 MG tablet Take 0.5 tablets (12.5 mg total) by  mouth 2 (two) times a day.  Patient taking differently: Take 12.5 mg by mouth 2 (two) times a day. Takes 1/4 in am and 1/2 in pm 5/19/20   Aminah Tamayo MD       Allergies:   Allergies   Allergen Reactions     Levalbuterol Shortness Of Breath     Amitriptyline Palpitations     Escitalopram Anxiety       Immunizations:   Immunization History   Administered Date(s) Administered     Influenza high dose,seasonal,PF, 65+ yrs 10/15/2014     Influenza, Seasonal, Inj PF IIV3 11/16/2011, 10/30/2012, 10/15/2013     Influenza,seasonal quad, PF, =/> 6months 12/14/2017, 10/25/2018, 10/18/2019     Influenza,seasonal, Inj IIV3 11/16/2011, 10/30/2012, 10/15/2013, 10/15/2014     Pneumo Conj 13-V (2010&after) 02/15/2016     Pneumo Polysac 23-V 10/19/2009, 09/27/2017     Tdap 06/23/2009, 09/09/2015     ZOSTER, LIVE 07/21/2009       Exam Chest clear to auscultation and percussion.  Heart tones regular rhythm without murmur rub or gallop.  Abdomen soft nontender no organomegaly.  No peritoneal signs.  Extremities free of edema cyanosis or clubbing.  Neck veins nondistended no thyromegaly or scleral icterus noted, carotids full.  Skin warm and dry easily conversant good spirited.  Normal intelligence.  Neurologically intact no gross localizing findings.  Not resting.    138/66 pulse 80 regular respirations 18 O2 sats 98% weight 217 pounds up 1 pound from previous.  Patient complains of redness in her extremities especially the upper.  She is not resting seems anxious.    Assessment and Plan  Hypertension controlled.  138/66.    Hypokalemia recent potassium level 3.2 May 31, 2020 with normal serum creatinine 0.66.  Recheck potassium level today.    Skin inflammation and history of pericarditis February 2020 recheck sedimentation rate and CRP.    Shakiness not fully explained and generalized anxiety disorder.    Multiple drug allergies including albuterol amitriptyline and citalopram.    Call or see for telephone visit or video  virtual visit with PCP Dr. Aminah Tamayo next week.    Time: total time spent with the patient was 25 minutes of which >50% was spent in counseling and coordination of care    The following low BMI interventions were performed this visit: weight gain advised    Alexis Burnett MD    Patient Active Problem List   Diagnosis     Breast cancer in situ     Asthma in adult     Depression, recurrent (H)     CELESTE (generalized anxiety disorder)     SVT (supraventricular tachycardia) (H)     Torticollis     Tremor, physiological     Pericarditis     Insomnia, unspecified type

## 2021-06-08 NOTE — TELEPHONE ENCOUNTER
To me and Dr. Tamayo is not on vacation this week I would run this by her tomorrow and let her make the decision.

## 2021-06-08 NOTE — PROGRESS NOTES
Morenita Moore is a 74 y.o. female who is being evaluated via a billable telephone visit.        Telephone visit   Morenita Moore   74 y.o. female    Date of Visit: 5/28/2020    Chief Complaint   Patient presents with     Follow-up     Inflammation and redness all over body        Assessment and Plan   1. Skin change  She notes that her skin on her arms is reddened and changing.  Her wrists and ankles seem swollen.  She would like to be seen and I think that that would be beneficial.  I am going to ask that she get scheduled with 1 of my partners at the [facility].         No follow-ups on file.     History of Present Illness   This 74 y.o. old female was evaluated with a telephone visit.  She notes that over the last week or so she has developed redness and what she feels is skin breakdown.  She notes some wrist swelling and ankle swelling.  She would like to be seen in person and that sounds appropriate.  She is not having any other symptoms of infection.  She was having some high blood pressure and has done well on the metoprolol that we started last week.  She has no other acute concerns today.    Review of Systems: As above, systems otherwise reviewed and negative.     Medications, Allergies and Problem List   Patient Active Problem List   Diagnosis     Breast cancer in situ     Asthma in adult     Depression, recurrent (H)     CELESTE (generalized anxiety disorder)     SVT (supraventricular tachycardia) (H)     Torticollis     Tremor, physiological     Pericarditis     Insomnia, unspecified type     Current Outpatient Medications   Medication Sig Dispense Refill     albuterol (VENTOLIN HFA) 90 mcg/actuation inhaler Inhale 2 puffs 4 (four) times a day as needed. 1 Inhaler 3     ascorbic acid, vitamin C, (ASCORBIC ACID WITH KHUSHBOO HIPS) 500 MG tablet Take 500 mg by mouth daily.       aspirin (ASPIR-81 ORAL) Take by mouth.       calcium-vitamin D 500 mg(1,250mg) -200 unit per tablet Take 1 tablet by mouth 2  "(two) times a day with meals.       cyanocobalamin (VITAMIN B-12) 50 mcg tablet Take 50 mcg by mouth daily.       fluticasone propion-salmeteroL (ADVAIR HFA) 230-21 mcg/actuation inhaler Inhale 2 puffs 2 (two) times a day.       metoprolol tartrate (LOPRESSOR) 25 MG tablet Take 0.5 tablets (12.5 mg total) by mouth 2 (two) times a day. (Patient taking differently: Take 12.5 mg by mouth 2 (two) times a day. Takes 1/4 in am and 1/2 in pm) 30 tablet 11     triamcinolone (KENALOG) 0.1 % cream        LORazepam (ATIVAN) 0.5 MG tablet Take 0.25 mg by mouth as needed.        No current facility-administered medications for this visit.      Allergies   Allergen Reactions     Levalbuterol Shortness Of Breath     Amitriptyline Palpitations     Escitalopram Anxiety          Physical Exam     /84   Pulse 69     During our discussion there was no evidence of shortness of breath.     Additional Information   Social History     Tobacco Use     Smoking status: Former Smoker     Types: Cigarettes     Last attempt to quit: 1988     Years since quittin.4     Smokeless tobacco: Never Used     Tobacco comment: Quit in 's   Substance Use Topics     Alcohol use: Never     Frequency: Never     Drug use: Not on file            Aminah Tamayo MD      The patient has been notified of following:     \"This telephone visit will be conducted via a call between you and your physician/provider. We have found that certain health care needs can be provided without the need for a physical exam.  This service lets us provide the care you need with a short phone conversation.  If a prescription is necessary we can send it directly to your pharmacy.  If lab work is needed we can place an order for that and you can then stop by our lab to have the test done at a later time.    Telephone visits are billed at different rates depending on your insurance coverage. During this emergency period, for some insurers they may be billed the same " "as an in-person visit.  Please reach out to your insurance provider with any questions.    If during the course of the call the physician/provider feels a telephone visit is not appropriate, you will not be charged for this service.\"    Patient has given verbal consent to a Telephone visit? Yes    What phone number would you like to be contacted at? [phone]    Patient would like to receive their AVS by AVS Preference: [place].        Phone call duration:  2 minutes    Vielka Phan CMA  "

## 2021-06-08 NOTE — TELEPHONE ENCOUNTER
Call back from patient. EMS responded to house yesterday. Evaluated at house. Feeling better by time they arrived.  Did not go to ED.  Did telephone visit with [facility] provider after that and was deemed doing well.  This morning she woke up this am and after breakfast is having a spell this morning of elevated blood pressure. She is worried that it elevates after eating and is working herself up. She denies anxiety vigorously. She mentions a rash that she is not really attending to it seems.  It is she states all  Over and mostly on her arms. She thinks it is related to something last November.    Have her making an telehealth visit today with her primary Dr. Tamayo to discuss elevated blood pressure and rash.  She has it scheduled for 2:30 pm.     Morenita agrees to this plan.  Tried to tell before hanging up if it worsens to call back in.      Reason for Disposition    Nursing judgment    Protocols used: NO PROTOCOL AVAILABLE - SICK ADULT-A-[place]

## 2021-06-08 NOTE — TELEPHONE ENCOUNTER
Dr. Tamayo,  Spoke with the patient and relayed message below.  She verbalized understanding and states that she is feeling better today.  Patient has been trying to talk to her infectious disease doctor.  She has been taking metoprolol and that has made thing better.  Patient states that she has not been taking sertraline at this time and she is not sure if she wants to take it again.  She does have a phone visit with you on 6/2/2020.  Ember MALONE CMA/MEI....................8:37 AM

## 2021-06-08 NOTE — TELEPHONE ENCOUNTER
"Lightheaded.  Not feeling well.  Blood pressure 187 \ 86 pulse 111.  She has SVT.  She states her pulse is going down.  It was 76 at last check. She denies it feeling like anxiety. She states she has to hold on things to walk around the room. She is not feeling well.  She does not have a car and she has no one to take her to ER. Protocol indicates she should go.   She did not want to call 911 but she states she might faint.  Told her if she faints no one could find her so we need to call 911.   Offered to call 911 and so Aaron and JOSE did call 911.  They have someone on route to her now.  They will medically assess her.    Morenita agrees to this plan.      Answer Assessment - Initial Assessment Questions  1. DESCRIPTION: \"Describe your dizziness.\"      Lightheadedness/ Can't stand up.   2. LIGHTHEADED: \"Do you feel lightheaded?\" (e.g., somewhat faint, woozy, weak upon standing)      Shaky, woozy  3. VERTIGO: \"Do you feel like either you or the room is spinning or tilting?\" (i.e. vertigo)      No  4. SEVERITY: \"How bad is it?\"  \"Do you feel like you are going to faint?\" \"Can you stand and walk?\"    - MILD - walking normally    - MODERATE - interferes with normal activities (e.g., work, school)     - SEVERE - unable to stand, requires support to walk, feels like passing out now.       Maybe faint/ shaking  5. ONSET:  \"When did the dizziness begin?\"      Few days ago.   6. AGGRAVATING FACTORS: \"Does anything make it worse?\" (e.g., standing, change in head position)Standing        7. HEART RATE: \"Can you tell me your heart rate?\" \"How many beats in 15 seconds?\"  (Note: not all patients can do this)  HF was 111 on bP cuff        8. CAUSE: \"What do you think is causing the dizziness?\"      Tested negative for covid  9. RECURRENT SYMPTOM: \"Have you had dizziness before?\" If so, ask: \"When was the last time?\" \"What happened that time?\"      Yes, not this bad.   10. OTHER SYMPTOMS: \"Do you have any other symptoms?\" (e.g., " "fever, chest pain, vomiting, diarrhea, bleeding)        No  11. PREGNANCY: \"Is there any chance you are pregnant?\" \"When was your last menstrual period?\"        No    Protocols used: DIZZINESS-A-OH      "

## 2021-06-08 NOTE — TELEPHONE ENCOUNTER
Please contact her to follow up on how she's doing.  This came to me after I was gone for the day Friday.  Thanks.

## 2021-06-08 NOTE — TELEPHONE ENCOUNTER
Who is calling:  Patient.  Reason for Call:  Wanted provider to know that her tremors are getting worse and wants to be seen by neurology as soon as possible.  Did transfer patient to specialty scheduling to help with appointment.  Date of last appointment with primary care: 6/4/2020  Okay to leave a detailed message: Yes

## 2021-06-08 NOTE — TELEPHONE ENCOUNTER
Dr Garcia,    Are you able to put in orders?  She has an appointment on 6/2 with Dr Belkis Tomlin CMA ([facility])

## 2021-06-08 NOTE — TELEPHONE ENCOUNTER
Patient Returning Call  Reason for call:  Patient called back.  Information relayed to patient:  n/a  Patient has additional questions:  Yes  If YES, what are your questions/concerns:  Calling on the status of this message. Please review today and call patient.  Okay to leave a detailed message?: Yes

## 2021-06-08 NOTE — TELEPHONE ENCOUNTER
Pt was looking for Potassium results which aren't back yet. Pt wanted to make sure the correct pharm of [facility] on [address] was in the chart in case a rx needs to be called in.

## 2021-06-08 NOTE — TELEPHONE ENCOUNTER
Question following Office Visit  When did you see your provider: yesterday.    What is your question: Patient is very concerned about what she states is excessive urination.   She is concerned about her lab levels of kidney and liver  were. Wondering if this was causing the excessive urination.  Writer shared the results from [place] 5/31/20. Morenita also is asking if she should have a repeat UA?  She states her urinary tract does feel irritated. The next question is can the medication Macrobid be changed to amoxicillin.  Morenita states it is much easier on her kidney and more effective in the past .  She use [facility] [address].     Okay to leave a detailed message: Yes [phone]

## 2021-06-08 NOTE — TELEPHONE ENCOUNTER
Her kidney function was perfectly normal on 5/31.  Her urine was normal on 5/31.  And, she should be done with the Macrobid so I would not see a reason to start amoxicillin.  If she wants to come in for a repeat urine test, please help her set it up and I will order it.  Thanks.

## 2021-06-08 NOTE — TELEPHONE ENCOUNTER
"Peeing x 3 days.  [facility]. A lot of urine is coming out.    Seen in clinic yesterday and labs.    Today woke up today and was so dizzy.    Bp 168/? At 45 minutes ago.  Shaking again, trembling.    Just took a \"clonidine\" to make her Bp go down , she got it from the ED. It is not on her med list.    She would like call back from care team    Bailey Graves RN [facility] Triage Nurse Advisor        "

## 2021-06-08 NOTE — TELEPHONE ENCOUNTER
What specific questions would she like asked? Patient already was triaged per protcol and declined.  She asked to talk to clinic team.  She is confused and having a hard tinme deciding what to do since she is now having an incease in her symptoms.  Urinating every hour or so.  She feels this is not normal. Elevated BP. She says it is not anxiety related and declined offer for a visit.    She would like to talk to provider or know what to do?    Bailey Graves RN [facility] Triage Nurse Advisor  Reason for Disposition    Urinating more frequently than usual (i.e., frequency)    Additional Information    Negative: Shock suspected (e.g., cold/pale/clammy skin, too weak to stand, low BP, rapid pulse)    Negative: Sounds like a life-threatening emergency to the triager    Negative: Followed a genital area injury    Negative: Followed a genital area injury (penis, scrotum)    Negative: Vaginal discharge    Negative: Pus (white, yellow) or bloody discharge from end of penis    Negative: [1] Taking antibiotic for urinary tract infection (UTI) AND [2] female    Negative: [1] Taking antibiotic for urinary tract infection (UTI) AND [2] male    Negative: [1] Discomfort (pain, burning or stinging) when passing urine AND [2] pregnant    Negative: [1] Discomfort (pain, burning or stinging) when passing urine AND [2] postpartum (from 0 to 6 weeks after delivery)    Negative: [1] Discomfort (pain, burning or stinging) when passing urine AND [2] female    Negative: [1] Discomfort (pain, burning or stinging) when passing urine AND [2] male    Negative: Pain or itching in the vulvar area    Negative: Pain in scrotum is main symptom    Negative: Blood in the urine is main symptom    Negative: Symptoms arising from use of a urinary catheter ([place] or [place])    Negative: [1] Unable to urinate (or only a few drops) > 4 hours AND     [2] bladder feels very full (e.g., palpable bladder or strong urge to urinate)    Negative: [1] Decreased  urination and [2] drinking very little AND [2] dehydration suspected (e.g., dark urine, no urine > 12 hours, very dry mouth, very lightheaded)    Negative: Patient sounds very sick or weak to the triager    Negative: Fever > 100.5 F (38.1 C)    Negative: Side (flank) or lower back pain present    Negative: [1] Can't control passage of urine (i.e., urinary incontinence) AND [2] new onset (< 2 weeks) or worsening    Protocols used: URINARY SYMPTOMS-A-AH

## 2021-06-08 NOTE — PROGRESS NOTES
Morenita Moore is a 74 y.o. female who is being evaluated via a billable telephone visit.        Telephone visit   Morenita Moore   74 y.o. female    Date of Visit: 5/19/2020    Chief Complaint   Patient presents with     Dizziness     For 3 days, no nausea or vomitting, also very shakey        Assessment and Plan   1. CELETSE (generalized anxiety disorder)  I think a lot of her symptoms could be related to anxiety.  She does as well.  She has had significant lab work with no abnormalities.  She like to try medication for anxiety and I am giving her prescription for sertraline.  Discussed potential side effects.  We will have a follow-up virtual visit in 2 weeks with her to reassess.  - sertraline (ZOLOFT) 25 MG tablet; Take 1 tablet (25 mg total) by mouth daily.  Dispense: 30 tablet; Refill: 2    2. Hypertension, unspecified type  Her blood pressure at times has been quite high but then it does come down.  Again she wonders if it is related to her anxiety.  She also has a history of a tremor and SVT.  I think that we could possibly treat her with a beta-blocker to keep her blood pressure down.  I am giving her prescription for metoprolol to take 12.5 mg twice daily.  She is going to try the sertraline first but may add this as well.    3. SVT (supraventricular tachycardia) (H)  - metoprolol tartrate (LOPRESSOR) 25 MG tablet; Take 0.5 tablets (12.5 mg total) by mouth 2 (two) times a day.  Dispense: 30 tablet; Refill: 11    4. Tremor, physiological  - metoprolol tartrate (LOPRESSOR) 25 MG tablet; Take 0.5 tablets (12.5 mg total) by mouth 2 (two) times a day.  Dispense: 30 tablet; Refill: 11         Return in about 2 weeks (around 6/2/2020) for Video Visit.     History of Present Illness   This 74 y.o. old female is evaluated with a telephone visit today.  She has a history of anxiety.  She had pericarditis last winter.  Over the last few days she has been having increased blood pressures and tachycardia.  She feels  dizzy.  She has not had any fevers or chills or other symptoms of infection.  She feels that she is much more anxious and that is probably what is causing her symptoms.  She did take an Ativan and states that that was helpful.  She has no other acute concerns today.  She was in the emergency department on May 1 and had significant evaluation that was negative.  We reviewed those test today.    Review of Systems: As above, systems otherwise reviewed and negative.     Medications, Allergies and Problem List   Patient Active Problem List   Diagnosis     Breast cancer in situ     Asthma in adult     Depression, recurrent (H)     CELESTE (generalized anxiety disorder)     SVT (supraventricular tachycardia) (H)     Torticollis     Tremor, physiological     Pericarditis     Insomnia, unspecified type     Current Outpatient Medications   Medication Sig Dispense Refill     albuterol (VENTOLIN HFA) 90 mcg/actuation inhaler Inhale 2 puffs 4 (four) times a day as needed. 1 Inhaler 3     ascorbic acid, vitamin C, (ASCORBIC ACID WITH KHUSHBOO HIPS) 500 MG tablet Take 500 mg by mouth daily.       aspirin (ASPIR-81 ORAL) Take by mouth.       calcium-vitamin D 500 mg(1,250mg) -200 unit per tablet Take 1 tablet by mouth 2 (two) times a day with meals.       cyanocobalamin (VITAMIN B-12) 50 mcg tablet Take 50 mcg by mouth daily.       fluticasone propion-salmeteroL (ADVAIR HFA) 230-21 mcg/actuation inhaler Inhale 2 puffs 2 (two) times a day.       LORazepam (ATIVAN) 0.5 MG tablet Take 0.25 mg by mouth as needed.        triamcinolone (KENALOG) 0.1 % cream        metoprolol tartrate (LOPRESSOR) 25 MG tablet Take 0.5 tablets (12.5 mg total) by mouth 2 (two) times a day. 30 tablet 11     sertraline (ZOLOFT) 25 MG tablet Take 1 tablet (25 mg total) by mouth daily. 30 tablet 2     No current facility-administered medications for this visit.      Allergies   Allergen Reactions     Levalbuterol Shortness Of Breath     Amitriptyline Palpitations      "Escitalopram Anxiety          Physical Exam     /80     During our discussion there was no evidence of shortness of breath.     Additional Information   Social History     Tobacco Use     Smoking status: Former Smoker     Types: Cigarettes     Last attempt to quit: 1988     Years since quittin.4     Smokeless tobacco: Never Used     Tobacco comment: Quit in 's   Substance Use Topics     Alcohol use: Never     Frequency: Never     Drug use: Not on file            Aminah Tamayo MD      The patient has been notified of following:     \"This telephone visit will be conducted via a call between you and your physician/provider. We have found that certain health care needs can be provided without the need for a physical exam.  This service lets us provide the care you need with a short phone conversation.  If a prescription is necessary we can send it directly to your pharmacy.  If lab work is needed we can place an order for that and you can then stop by our lab to have the test done at a later time.    Telephone visits are billed at different rates depending on your insurance coverage. During this emergency period, for some insurers they may be billed the same as an in-person visit.  Please reach out to your insurance provider with any questions.    If during the course of the call the physician/provider feels a telephone visit is not appropriate, you will not be charged for this service.\"    Patient has given verbal consent to a Telephone visit? Yes    What phone number would you like to be contacted at? [phone]    Patient would like to receive their AVS by AVS Preference: [place].            Phone call duration:  15 minutes    Vielka Phan CMA  "

## 2021-06-08 NOTE — TELEPHONE ENCOUNTER
Orders being requested: inflammatory markers, hepatitis, and any blood test to test for viruses  Reason service is needed/diagnosis: arms are red in color, patient stated she went to urgent care for this 2 weeks ago to [facility], see triage note on 4/30/20 also  When are orders needed by: as soon as possible   Where to send Orders: [facility]  Okay to leave detailed message?  Yes        FYI - patient stated she forgot to let Aminah Tamayo MD know that she was having tremors when she had her shakiness. Patient stated the tremors have minimized though due to the metoprolol.
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
## 2021-06-08 NOTE — PROGRESS NOTES
Office Visit - Follow up    Morenita Moore   74 y.o. female    Date of Visit: 6/5/2020    Chief Complaint   Patient presents with     Tremors     Dizziness       Subjective: Tremor.    Lightheaded.  Patient is a patient with torticollis and prior history of physiological tremor.  She has been seen by neurology.  In the past lorazepam helped her tremor.  She already is on metoprolol.  She is also had a history of SVT.  Patient is intolerant of SSRIs.  Tremor was worse when she took citalopram.  Clonazepam really helped the tremor.    No blood in stool or urine.  Medication list reviewed reconciled.    PCP Dr. Aminah Tamayo [facility].    ROS: A comprehensive review of systems was performed and was otherwise negative    Medications:  Prior to Admission medications    Medication Sig Start Date End Date Taking? Authorizing Provider   ascorbic acid, vitamin C, (ASCORBIC ACID WITH KHUSHBOO HIPS) 500 MG tablet Take 500 mg by mouth daily.   Yes PROVIDER, HISTORICAL   aspirin (ASPIR-81 ORAL) Take by mouth.   Yes PROVIDER, HISTORICAL   calcium-vitamin D 500 mg(1,250mg) -200 unit per tablet Take 1 tablet by mouth 2 (two) times a day with meals.   Yes PROVIDER, HISTORICAL   cyanocobalamin (VITAMIN B-12) 50 mcg tablet Take 50 mcg by mouth daily. weekly   Yes PROVIDER, HISTORICAL   fluticasone propion-salmeteroL (ADVAIR HFA) 230-21 mcg/actuation inhaler Inhale 2 puffs 2 (two) times a day.   Yes PROVIDER, HISTORICAL   LORazepam (ATIVAN) 0.5 MG tablet Take 0.25 mg by mouth as needed.  1/9/20  Yes PROVIDER, HISTORICAL   metoprolol tartrate (LOPRESSOR) 25 MG tablet Take 0.5 tablets (12.5 mg total) by mouth 2 (two) times a day. 6/4/20  Yes Aminah Tamayo MD   triamcinolone (KENALOG) 0.1 % cream  5/19/20  Yes PROVIDER, HISTORICAL   albuterol (VENTOLIN HFA) 90 mcg/actuation inhaler Inhale 2 puffs 4 (four) times a day as needed. 4/1/20   Ina Garcia MD   LORazepam (ATIVAN) 0.5 MG tablet Take 1 tablet (0.5 mg total) by mouth 2  (two) times a day as needed for anxiety. 6/5/20   Alexis Burnett MD   nitrofurantoin, macrocrystal-monohydrate, (MACROBID) 100 MG capsule Take 1 capsule (100 mg total) by mouth 2 (two) times a day for 5 days. 6/4/20 6/9/20  Aminah Tamayo MD       Allergies:   Allergies   Allergen Reactions     Levalbuterol Shortness Of Breath     Amitriptyline Palpitations     Escitalopram Anxiety       Immunizations:   Immunization History   Administered Date(s) Administered     Influenza high dose,seasonal,PF, 65+ yrs 10/15/2014     Influenza, Seasonal, Inj PF IIV3 11/16/2011, 10/30/2012, 10/15/2013     Influenza,seasonal quad, PF, =/> 6months 12/14/2017, 10/25/2018, 10/18/2019     Influenza,seasonal, Inj IIV3 11/16/2011, 10/30/2012, 10/15/2013, 10/15/2014     Pneumo Conj 13-V (2010&after) 02/15/2016     Pneumo Polysac 23-V 10/19/2009, 09/27/2017     Tdap 06/23/2009, 09/09/2015     ZOSTER, LIVE 07/21/2009       Exam Chest clear to auscultation and percussion.  Heart tones regular rhythm without murmur rub or gallop.  Abdomen soft nontender no organomegaly.  No peritoneal signs.  Extremities free of edema cyanosis or clubbing.  Neck veins nondistended no thyromegaly or scleral icterus noted, carotids full.  Skin warm and dry easily conversant good spirited.  Normal intelligence.  Neurologically intact no gross localizing findings.  174/72 recheck 152/77 pulse 82 respirations 18 O2 sats 97% this afternoon at 2:50 PM her temperature is 98.3 degrees weight down 1 pound from previous at 116 pounds BMI 20.    Assessment and Plan  Tremor with history of torticollis.  Neurologic consultation has been ordered.  Refill lorazepam per patient's request caution regarding excessive use of same take 1 tablet twice a day dispense #21 refill.    Hypertension not resting with history of supraventricular tachycardia continue metoprolol same.    Multiple drug allergies including Lexapro amitriptyline plus albuterol.    Encouraged follow-up  with PCP Dr. MARILEE Tamayo within the next 7 days.    Time: total time spent with the patient was 25 minutes of which >50% was spent in counseling and coordination of care    The following low BMI interventions were performed this visit: weight gain advised    Alexis Burnett MD    Patient Active Problem List   Diagnosis     Breast cancer in situ     Asthma in adult     Depression, recurrent (H)     CELESTE (generalized anxiety disorder)     SVT (supraventricular tachycardia) (H)     Torticollis     Tremor, physiological     Pericarditis     Insomnia, unspecified type

## 2021-06-08 NOTE — PROGRESS NOTES
Office Visit   Morenita Moore   74 y.o. female    Date of Visit: 6/15/2020    Chief Complaint   Patient presents with     Follow-up     Lightheadedness, tremors        Assessment and Plan   1. Tremor, physiological  She feels her tremor is worsening.  A neurology consultation was ordered a few weeks ago but unfortunately has not been scheduled yet.  I am going to reorder that so that we can get her in sooner than later.  She also has significant symptoms of feeling lightheaded and has noted a rash.  These are of unclear etiology.  She is worried about her water.  She also had some weight loss.  I am going to recheck her blood work.  She is in agreement with this plan.  - Ambulatory referral to Neurology  - Heavy Metals Screen, Blood    2. Abnormal weight loss   This has stabilized.  Again she is worried about heavy metals in her water and we will check that today.  - Heavy Metals Screen, Blood    3. Light headed  She does have ongoing lightheadedness.  Her blood pressure is satisfactory.  She will be seeing neurology.  I did review her cardiology evaluation from last winter when she had pericarditis.  Her heart exam today is normal.  I do not see any signs of recurrent pericarditis.  Blood pressure is satisfactory and her pulse is normal.  I will recheck her labs as she is concerned about thyroid and electrolytes.  - Thyroid [place]  - Comprehensive Metabolic Panel  - Urinalysis-UC if Indicated  - Vitamin D, Total (25-Hydroxy)  - Antinuclear Antibody (WANDA) Cascade  - Rheumatoid Factor Quant  - Sedimentation Rate  - C-Reactive Protein(CRP)  - Heavy Metals Screen, Blood    4. Rash  She saw dermatology about her rash.  It seems to be improving but I can see that it continues on her arms.  She had an abnormal antinuclear antibody test done in January and I will go ahead and read check that.  - Antinuclear Antibody (WANDA) Cascade  - Rheumatoid Factor Quant  - Sedimentation Rate  - C-Reactive Protein(CRP)  - Heavy  Metals Screen, Blood    5. CELESTE (generalized anxiety disorder)  We discussed that some of her symptoms could certainly be related to her anxiety.  States that she does not tolerate SSRIs.   - HM2(CBC w/o Differential)  - Thyroid [place]  - Vitamin D, Total (25-Hydroxy)    6. Anemia, unspecified type  - HM2(CBC w/o Differential)  - Thyroid [place]  - Comprehensive Metabolic Panel  - Vitamin B12  - Antinuclear Antibody (WANDA) Cascade  - Rheumatoid Factor Quant  - Sedimentation Rate  - C-Reactive Protein(CRP)  - Heavy Metals Screen, Blood    7. Vitamin D deficiency  - Vitamin D, Total (25-Hydroxy)       Return in about 2 months (around 8/15/2020) for In-Clinic Visit.     History of Present Illness   This 74 y.o. old female comes in to follow-up.  She has multiple concerns.  Last November she was diagnosed with pericarditis.  It was of unclear etiology.  She was seen by cardiology and I did review those records today.  She has had follow-up with them and had stabilization and improvement.  There is no concerns about ongoing problems with her heart.  However over the last couple of months she has had significant symptoms of lightheadedness, rash, worsening tremor, and just feeling that something severe is wrong.  She wonders about encephalopathy or recurrent virus.  She has not had chest pain.  No fevers or chills.  She wonders about her thyroid.  It was normal in January.  She did have an antinuclear antibody that was positive at 1 point and she saw a rheumatology then.  I reviewed that and the rheumatologist did not feel that it was cause for concern.  She recently saw dermatology due to her rash and is using a cortisone cream on it.  I ordered a neurology consult not too long ago but unfortunately that has not been scheduled for her.  She feels the tremor is quite concerning and again worsening.      Review of Systems: As above, systems otherwise reviewed and negative.     Medications, Allergies and Problem List  "  Patient Active Problem List   Diagnosis     Breast cancer in situ     Asthma in adult     Depression, recurrent (H)     CELESTE (generalized anxiety disorder)     SVT (supraventricular tachycardia) (H)     Torticollis     Tremor, physiological     Pericarditis     Insomnia, unspecified type     Current Outpatient Medications   Medication Sig Dispense Refill     albuterol (VENTOLIN HFA) 90 mcg/actuation inhaler Inhale 2 puffs 4 (four) times a day as needed. 1 Inhaler 3     ascorbic acid, vitamin C, (ASCORBIC ACID WITH KHUSHBOO HIPS) 500 MG tablet Take 500 mg by mouth daily.       aspirin (ASPIR-81 ORAL) Take by mouth.       calcium-vitamin D 500 mg(1,250mg) -200 unit per tablet Take 1 tablet by mouth 2 (two) times a day with meals.       cyanocobalamin (VITAMIN B-12) 50 mcg tablet Take 50 mcg by mouth daily. weekly       fluticasone propion-salmeteroL (ADVAIR HFA) 230-21 mcg/actuation inhaler Inhale 2 puffs 2 (two) times a day.       LORazepam (ATIVAN) 0.5 MG tablet Take 1 tablet (0.5 mg total) by mouth 2 (two) times a day as needed for anxiety. 20 tablet 1     metoprolol tartrate (LOPRESSOR) 25 MG tablet Take 0.5 tablets (12.5 mg total) by mouth 2 (two) times a day. 90 tablet 3     triamcinolone (KENALOG) 0.1 % cream        No current facility-administered medications for this visit.      Allergies   Allergen Reactions     Levalbuterol Shortness Of Breath     Amitriptyline Palpitations     Escitalopram Anxiety          Physical Exam     /80 (Patient Site: Left Arm, Patient Position: Sitting)   Pulse 78   Ht 5' 4.5\" (1.638 m)   Wt 120 lb (54.4 kg)   SpO2 100%   BMI 20.28 kg/m      General:  Patient is alert and in no apparent distress.  She does have a visible resting tremor.  Neck:  Supple with no adenopathy or thyroid abnormality noted.  Cardiovascular:  Regular rate and rhythm, normal S1/S2, no murmurs, rubs, or gallop.  Pulmonary:  Lungs are clear to auscultation bilaterally with normal respiratory " effort.           Additional Information   Social History     Tobacco Use     Smoking status: Former Smoker     Types: Cigarettes     Last attempt to quit: 1988     Years since quittin.4     Smokeless tobacco: Never Used     Tobacco comment: Quit in 20's   Substance Use Topics     Alcohol use: Never     Frequency: Never     Drug use: Not on file          Aminah Tamayo MD

## 2021-06-08 NOTE — TELEPHONE ENCOUNTER
Dr. Tamayo,  Spoke with the patient and relayed message below.  She verbalized understanding, but states that she never started the macrobid.  Patient states that she wanted to make sure that it was an infection and not an irritation, she doesn't like taking unneeded medications.  Morenita states that she still feels pressure and an achiness.  Patient still feels like something isn't right.  Should she start the prescription for macrobid?  Please advise.  Thank you.  Ember MALONE CMA/MEI....................4:25 PM

## 2021-06-08 NOTE — TELEPHONE ENCOUNTER
Let us try this note again.  Assuming Dr. Tamayo is not on vacation this week I would prefer to let her deal with this tomorrow.

## 2021-06-08 NOTE — TELEPHONE ENCOUNTER
Question following Office Visit  When did you see your provider: Today   What is your question: Patient states that when she came for office visit this morning she requested to send her prescriptions to Different [facility] pharmacy but she funded out her original pharmacy is now re-opened requested to send her Rx to [facility] # [id number].  Okay to leave a detailed message: No

## 2021-06-08 NOTE — TELEPHONE ENCOUNTER
Patient calling. She is reporting that she  Talked to Dr. Tamayo yesterday, and she is calling today to say that she is getting worse.with the Tremors     On BP meds that are helping. Lopressor.    Tremors are getting worse, patient is very concerned that her whole body is shaking, and she states it is involuntary, and not   Psychological .  She is asking if she can take some Ativan , she states she has some left over.    Patient is asking for an appointment with MD, and Dr. Tamayo   Not available today.    Appointment made for patient today at  2:40pm with Dr. Burnett.    Hortensia Angela RN  [facility] Triage/refill nurse        Reason for Disposition    [1] Caller requesting NON-URGENT health information AND [2] PCP's office is the best resource    Protocols used: INFORMATION ONLY CALL-A-

## 2021-06-08 NOTE — TELEPHONE ENCOUNTER
Calling in as a follow up to her visit today.  Marlen is feeling very shaky lately, and she feels as though maybe she could be hypoglycemic.  She would like a lab test to check her blood sugar.  States that she is shaky and light-headed in the morning. Shaky now.  Advised to go drink a glass of juice and to see if that would make a difference. States that she has been doing that and makes slight difference. Shakiness started about a week ago. Would like labs.  Blood sugar, and an inflammatory marker?  Was hoping to speak to Dr. Tamayo's nurse, but was diverted to [facility].  Could someone call her.  She would like to see someone in person.  Also would like to see her ID MD in person as well.    Reason for Disposition    [1] Caller requesting NON-URGENT health information AND [2] PCP's office is the best resource    Protocols used: INFORMATION ONLY CALL-A-

## 2021-06-08 NOTE — TELEPHONE ENCOUNTER
Dr. Garcia,  Please advise on what you would like ordered and we can help set that up for you to review.  Thank you.  Ember MALONE, [place]/CMT....................4:19 PM

## 2021-06-08 NOTE — PROGRESS NOTES
Morenita Moore is a 74 y.o. female who is being evaluated via a billable telephone visit.        Telephone visit   Morenita Moore   74 y.o. female    Date of Visit: 6/4/2020    Chief Complaint   Patient presents with     Follow-up     [place] [facility] 5/31- tremors, blood pressure spikes     Urinary Frequency     For 3 days, no pain        Assessment and Plan   1. Dizziness  She has been having dizziness and tremor off and on.  It worsened recently.  She has had a lot of lab work that was satisfactory.  She has not been taking the metoprolol routinely and I did recommend that she take half tablet twice daily scheduled.  I discussed that some symptoms can be due to missing doses of metoprolol.  Also discussed that her goal pulse is around 60 bpm and blood pressure should be above 110 systolic.  She has multiple concerns and symptoms recently.  I do feel that she would benefit from being able to see me in the clinic.  I am going to set up a visit with her during my face-to-face week during [facility].  She is in agreement with this plan.    2. Tremor, physiological  In addition to the ongoing dizziness she has had a tremor.  I am not able to evaluate that today.  Again I am going to bring her into the clinic to see me for a face-to-face visit.  I am also going to order a consultation in neurology as she is having a worsening tremor.  - metoprolol tartrate (LOPRESSOR) 25 MG tablet; Take 0.5 tablets (12.5 mg total) by mouth 2 (two) times a day.  Dispense: 90 tablet; Refill: 3  - Ambulatory referral to Neurology    3. SVT (supraventricular tachycardia) (H)  We discussed the importance of taking the metoprolol scheduled.  - metoprolol tartrate (LOPRESSOR) 25 MG tablet; Take 0.5 tablets (12.5 mg total) by mouth 2 (two) times a day.  Dispense: 90 tablet; Refill: 3    4. Dysuria  She is now had a couple days of increased urination.  We discussed that her urine was negative for infection about 5 days ago but with the new symptoms  I am going to send a prescription for nitrofurantoin.  - nitrofurantoin, macrocrystal-monohydrate, (MACROBID) 100 MG capsule; Take 1 capsule (100 mg total) by mouth 2 (two) times a day for 5 days.  Dispense: 10 capsule; Refill: 0       Return in about 11 days (around 6/15/2020) for In-Clinic Visit - 40 minutes.     History of Present Illness   This 74 y.o. old female is evaluated with a telephone visit today.  She has been having difficulty with multiple symptoms.  She has had numerous visits at the ER and the clinic.  I have not been able to see patients face-to-face due to the pandemic for several weeks.  Over the last week she has had a lot of trouble with dizziness and tremor.  She has not been taking metoprolol scheduled.  We discussed that missing doses of this could certainly make her symptoms worse.  She has had lab work that is all been satisfactory.  The tremor and the dizziness are quite bothersome.  It seems to worsen when her blood pressure and pulse go up.  She also notes over the last couple of days she has had increased urination.  States she went to the bathroom about 14 times yesterday.  No fevers or chills.  She is also been having some skin changes and saw dermatology recently.    Review of Systems: As above, systems otherwise reviewed and negative.     Medications, Allergies and Problem List   Patient Active Problem List   Diagnosis     Breast cancer in situ     Asthma in adult     Depression, recurrent (H)     CELESTE (generalized anxiety disorder)     SVT (supraventricular tachycardia) (H)     Torticollis     Tremor, physiological     Pericarditis     Insomnia, unspecified type     Current Outpatient Medications   Medication Sig Dispense Refill     albuterol (VENTOLIN HFA) 90 mcg/actuation inhaler Inhale 2 puffs 4 (four) times a day as needed. 1 Inhaler 3     ascorbic acid, vitamin C, (ASCORBIC ACID WITH KHUSHBOO HIPS) 500 MG tablet Take 500 mg by mouth daily.       aspirin (ASPIR-81 ORAL) Take by  "mouth.       calcium-vitamin D 500 mg(1,250mg) -200 unit per tablet Take 1 tablet by mouth 2 (two) times a day with meals.       cyanocobalamin (VITAMIN B-12) 50 mcg tablet Take 50 mcg by mouth daily. weekly       fluticasone propion-salmeteroL (ADVAIR HFA) 230-21 mcg/actuation inhaler Inhale 2 puffs 2 (two) times a day.       LORazepam (ATIVAN) 0.5 MG tablet Take 0.25 mg by mouth as needed.        triamcinolone (KENALOG) 0.1 % cream        metoprolol tartrate (LOPRESSOR) 25 MG tablet Take 0.5 tablets (12.5 mg total) by mouth 2 (two) times a day. 90 tablet 3     nitrofurantoin, macrocrystal-monohydrate, (MACROBID) 100 MG capsule Take 1 capsule (100 mg total) by mouth 2 (two) times a day for 5 days. 10 capsule 0     No current facility-administered medications for this visit.      Allergies   Allergen Reactions     Levalbuterol Shortness Of Breath     Amitriptyline Palpitations     Escitalopram Anxiety          Physical Exam     There were no vitals taken for this visit.    During our discussion there was no evidence of shortness of breath.     Additional Information   Social History     Tobacco Use     Smoking status: Former Smoker     Types: Cigarettes     Last attempt to quit: 1988     Years since quittin.4     Smokeless tobacco: Never Used     Tobacco comment: Quit in 's   Substance Use Topics     Alcohol use: Never     Frequency: Never     Drug use: Not on file          Aminah Tamayo MD      The patient has been notified of following:     \"This telephone visit will be conducted via a call between you and your physician/provider. We have found that certain health care needs can be provided without the need for a physical exam.  This service lets us provide the care you need with a short phone conversation.  If a prescription is necessary we can send it directly to your pharmacy.  If lab work is needed we can place an order for that and you can then stop by our lab to have the test done at a later " "time.    Telephone visits are billed at different rates depending on your insurance coverage. During this emergency period, for some insurers they may be billed the same as an in-person visit.  Please reach out to your insurance provider with any questions.    If during the course of the call the physician/provider feels a telephone visit is not appropriate, you will not be charged for this service.\"    Patient has given verbal consent to a Telephone visit? Yes    What phone number would you like to be contacted at? [phone]        Phone call duration:  17 minutes    Vielka Phan [facility]  "

## 2021-06-08 NOTE — TELEPHONE ENCOUNTER
Spoke with the patient and relayed message below from Dr. Garcia.  Patient verbalized understanding and has scheduled a phone visit with Dr. Tamayo for tomorrow morning.  Ember MALONE CMA/MEI....................3:10 PM

## 2021-06-08 NOTE — TELEPHONE ENCOUNTER
New Appointment Needed  What is the reason for the visit:    Same Date/Next Day Appt Request - in office  What is the reason for your visit?:  Inflammation in hands with skin breakdown    Provider Preference: Any available  How soon do you need to be seen?: Friday 5/29  Waitlist offered?: No  Okay to leave a detailed message:  Yes

## 2021-06-08 NOTE — PROGRESS NOTES
Please call and let her know that all of her labs were normal including her test for autoimmune (WANDA), B12 level, kidney function, glucose, electrolytes, liver tests, thyroid, inflammation markers (ESR, CRP), CBC, and rheumatoid factor.  There is no sign of any autoimmune, infection, or vitamin deficiency causing her symptoms.  The test for heavy metals is still pending and will take a few days.  Thanks.

## 2021-06-08 NOTE — TELEPHONE ENCOUNTER
Who is calling:  Patient   Reason for Call:  Calling and asking for Ember to please call her again.  Date of last appointment with primary care: 5/19/2020  Okay to leave a detailed message: Yes

## 2021-06-08 NOTE — TELEPHONE ENCOUNTER
If she's having ongoing urinary symptoms, she could take the Macrobid.  It is a better antibiotic for UTI than amoxicillin.  If her symptoms are not better when I see her next week, we can then check her urine then.  Thanks.

## 2021-06-09 NOTE — PROGRESS NOTES
[facility] Care Coordination Contact    Completed following tasks:  Updated services in access and Submitted referrals/auths for ICLS 6 hrs/wk (24units/wk[facility]       Member Signature - POC Change:  Per CC, member has made a change to their POC.  Care Plan Change Letter mailed to member for signature with a self-addressed return envelope.    Prem Borja  Care Management Specialist  [facility]  [phone]

## 2021-06-09 NOTE — TELEPHONE ENCOUNTER
Who is calling:  Patient  Reason for Call:  The patient calls back to ask if a Hep B and C screening should also be added to her labs draw on 6/30/20.  She will ask the lab when she comes in if these were added.  Also wanted PCP to know that she is having an MRI done on 7/1/20 ordered by her Neurologist.  Date of last appointment with primary care: 6/15/20  Okay to leave a detailed message: Yes

## 2021-06-09 NOTE — TELEPHONE ENCOUNTER
----- Message from Aminah Tamayo MD sent at 6/18/2020  9:57 AM CDT -----  Please call and let her know that her heavy metal screen and her vitamin D level also came back normal.  ALIZA

## 2021-06-09 NOTE — PROGRESS NOTES
Change In Care letter received from member, save letter in member's folder.     Prem Borja  Care Management Specialist  [facility]  [phone]

## 2021-06-09 NOTE — TELEPHONE ENCOUNTER
Called pt with normal results. Pt is crying and upset. Wondering if there is something wrong with her adrenal glands and also wants her Cortisol level checked. Please advise. Thanks.

## 2021-06-10 NOTE — TELEPHONE ENCOUNTER
Who is calling:  Patient   Reason for Call:  Patient states she is scheduled appointment with provider   tomorrow for having dark stools , patient states her stools are better now its not dark any more , she is having more frequent stools still but its not an emergency to see the provider, she will reschedule tomorrows appointment .  Date of last appointment with primary care: 06/15/20  Okay to leave a detailed message: No

## 2021-06-10 NOTE — TELEPHONE ENCOUNTER
Patient has been scheduled to see Dr. Tamayo on 8/11/2020.  She states that she is just having dark stools at this time and will call if she starts to have any blood in the stool.   Patient had no further questions at this time.  Ember MALONE CMA/MEI....................8:55 AM

## 2021-06-10 NOTE — TELEPHONE ENCOUNTER
States her 2nd toe on right foot has a hammer toe, and noticed the toe has turned  purple and has mild pain.  Swollen a little.  She reports no injury , just turned purple yesterday. Kept getting more and more purple.  NO COVID SYMPTOMS.  Has feeling in the toe.  Patient advised to have the toe examined today. She was advised to go to [facility] , as there were no other available   Appointments today.    Patient going to [facility] , she reports.    Hortensia Angela RN  Care Connection Triage/refill nurse    Additional Information    Negative: Followed an injury    Negative: Wound looks infected    Negative: Caused by an animal bite    Negative: Caused by frostbite    Negative: Athlete's Foot suspected (i.e., itchy red rash in web space between toes)    Negative: Foot pain is the main symptom    Negative: Foot is cool or blue in comparison to other foot    Purple or black skin on toe (EXCEPTION: simple recalled injury with bruise)    Protocols used: TOE PAIN-A-OH

## 2021-06-10 NOTE — TELEPHONE ENCOUNTER
Sooner appointment then when?  I'm not allowed back in the clinic for face to face care until 8/11/20.  That's the first I could see her.  If she needs to be seen sooner than that, she would need to see someone else.  Thanks.

## 2021-06-10 NOTE — PROGRESS NOTES
Office Visit   Morenita Moore   75 y.o. female    Date of Visit: 8/18/2020    Chief Complaint   Patient presents with     Follow-up        Assessment and Plan   1. Moderate persistent asthma in adult without complication  Her asthma has been well controlled recently.  We will continue with the Advair.  - fluticasone propion-salmeteroL (ADVAIR HFA) 230-21 mcg/actuation inhaler; Inhale 2 puffs 2 (two) times a day.  Dispense: 3 Inhaler; Refill: 3    2. SVT (supraventricular tachycardia) (H)  She has had significant improvement with metoprolol.  I did discuss the importance of taking it twice a day.  She will let me know if she has any new concerns going forward.  - metoprolol tartrate (LOPRESSOR) 25 MG tablet; Take 1 tablet (25 mg total) by mouth 2 (two) times a day.  Dispense: 180 tablet; Refill: 3    3. Tremor, physiological  This has been well controlled and she has been feeling pretty good lately.  - metoprolol tartrate (LOPRESSOR) 25 MG tablet; Take 1 tablet (25 mg total) by mouth 2 (two) times a day.  Dispense: 180 tablet; Refill: 3    4. Pericarditis, unspecified chronicity, unspecified type  She feels that her symptoms have finally resolved.  She occasionally will have a little discomfort and I recommended that she take her aspirin daily.  She feels she is back to her baseline and has no concerns in this regard today.    5. Gastroesophageal reflux disease without esophagitis  With the aspirin she has had a little bit of black stools at times.  I think it would be a good idea for her to take omeprazole so that she can also take her aspirin daily.  If she starts having more black stools she will let me know.  It is been several weeks since the last time that happened.  - omeprazole (PRILOSEC) 20 MG capsule; Take 1 capsule (20 mg total) by mouth daily before breakfast.  Dispense: 90 capsule; Refill: 3         Return in about 6 months (around 2/18/2021) for Annual physical.     History of Present Illness    This 75 y.o. old female comes in to follow-up.  I last saw her a couple of months ago.  She has a chronic history of asthma, dystonia, and SVT.  Last winter she developed pericarditis and had significant ongoing symptoms until just a couple of months ago.  She has had quite a bit of evaluation.  She was having a lot of tremor, weight loss, and chest symptoms.  Fortunately those things have now resolved.  She feels that she is back to her baseline.  She has been able to eat better and has gained a little bit of weight back.  She will just occasionally have a little bit of chest discomfort but not very often.  Her blood pressure and pulse are controlled with the metoprolol.  She has no other acute concerns today.  Again she feels she is back to her baseline.    Review of Systems: As above, systems otherwise reviewed and negative.     Medications, Allergies and Problem List   Patient Active Problem List   Diagnosis     Breast cancer in situ     Asthma in adult     SVT (supraventricular tachycardia) (H)     Torticollis     Tremor, physiological     Pericarditis     Gastroesophageal reflux disease without esophagitis     Current Outpatient Medications   Medication Sig Dispense Refill     albuterol (VENTOLIN HFA) 90 mcg/actuation inhaler Inhale 2 puffs 4 (four) times a day as needed. 1 Inhaler 3     ascorbic acid, vitamin C, (ASCORBIC ACID WITH KHUSHBOO HIPS) 500 MG tablet Take 500 mg by mouth daily.       aspirin (ASPIR-81 ORAL) Take by mouth.       calcium-vitamin D 500 mg(1,250mg) -200 unit per tablet Take 1 tablet by mouth 2 (two) times a day with meals.       cyanocobalamin (VITAMIN B-12) 50 mcg tablet Take 50 mcg by mouth daily. weekly       fluticasone propion-salmeteroL (ADVAIR HFA) 230-21 mcg/actuation inhaler Inhale 2 puffs 2 (two) times a day. 3 Inhaler 3     LORazepam (ATIVAN) 0.5 MG tablet Take 1 tablet (0.5 mg total) by mouth 2 (two) times a day as needed for anxiety. 20 tablet 1     metoprolol tartrate  "(LOPRESSOR) 25 MG tablet Take 1 tablet (25 mg total) by mouth 2 (two) times a day. 180 tablet 3     triamcinolone (KENALOG) 0.1 % cream        omeprazole (PRILOSEC) 20 MG capsule Take 1 capsule (20 mg total) by mouth daily before breakfast. 90 capsule 3     No current facility-administered medications for this visit.      Allergies   Allergen Reactions     Levalbuterol Shortness Of Breath     Amitriptyline Palpitations     Escitalopram Anxiety          Physical Exam     /80 (Patient Site: Left Arm, Patient Position: Sitting)   Pulse 76   Ht 5' 4.5\" (1.638 m)   Wt 127 lb (57.6 kg)   SpO2 100%   BMI 21.46 kg/m      General: This is an alert female, sitting comfortably, no apparent distress.     Additional Information   Social History     Tobacco Use     Smoking status: Former Smoker     Types: Cigarettes     Last attempt to quit: 1988     Years since quittin.6     Smokeless tobacco: Never Used     Tobacco comment: Quit in 20's   Substance Use Topics     Alcohol use: Never     Frequency: Never     Drug use: Not on file          Aminah Tamayo MD    "

## 2021-06-10 NOTE — TELEPHONE ENCOUNTER
Who is calling:  Patient   Reason for Call:  Caller stated that she spoke to triage got some advise to watch for any bleeding then go transfer to scheduling, and spoke with scheduling but they said that there were no sooner appointments and got transfer back to the care team to see if we can get a sooner appointment. Michael stated that she has been having blood stool for couple weeks now but with no pain at all and isn't sure if she does need to come in or not.   Date of last appointment with primary care:   Okay to leave a detailed message: Yes

## 2021-06-11 NOTE — PROGRESS NOTES
[facility] Care Coordination Contact  Completed following tasks:    Updated services in access and Submitted referrals/auths for ICLS from 6 to 10 hrs/wk effective 9/21/20-1/31/21 with [facility].      Prem Borja  Care Management Specialist  [facility]  [phone]

## 2021-06-11 NOTE — TELEPHONE ENCOUNTER
----- Message from Nathalie Dyer sent at 9/25/2020  9:46 AM CDT -----  General phone call:  DOES DR FOSTER WANT A CT SCAN OF HER HEART?  PULMONARY ORDERED A CT SCAN OF HER HEART, CAN WE DO THIS AT ONCE?  ALSO, SHE IS STILL HAVING  PAIN IN HER CHEST, LIKE PERICARDITIS.  PLEASE CALL    Caller: PATIENT  Primary cardiologist: DR FOSTER  Detailed reason for call: SEE ABOVE  New or active symptoms? YES, SEE ABOVE  Best phone number: [phone]  Best time to contact: ANY TIME  Ok to leave a detailed message? YES  Device? NO    Additional Info:

## 2021-06-11 NOTE — PROGRESS NOTES
[facility] Care Coordination Contact    Care Coordinator received a voicemail from Morenita requesting an increase in her ICLS hours from 6 hours per week to 10 hours per week as she is going over her hours often due to having more appointments and shopping htat needs to get done.      Care Coordinator also received an e-mail from Nirav Welch with [facility] Care requesting the same increase in ICLS hours - 6 hours to 10 hours.    Care Coordinator text Morenita and emailed Nirav back to let them both know Care Coordinator will increase Morenita's ICLS hours to 10 hours per week as of 9/21/2020.    Care Coordinator updated Morenita's POC to change service plan/ICLS hours and then tasked CMS to submit auth to [facility].    Gloria Stanford, ASHLEIGH  [facility]  [phone]

## 2021-06-11 NOTE — PROGRESS NOTES
[facility] Care Coordination Contact      [facility] Six-Month Telephone Assessment    6 month telephone assessment completed on 9/25/2020.    ER visits: Yes -  [facility]  Hospitalizations: No  TCU stays: No  Significant health status changes: None  Falls/Injuries: No  ADL/IADL changes: No  Changes in services: Yes: Recently started receiving ICLS services and just requested to increase ICLS hours to 10 hours per day.  Care Coordinator will make that change.  Morenita continues to report wanting to move and is working with a [facility]  and she has her name on a few wait lists for apartments.  Morenita continues to see her PCP for a few medical concerns but reports it is being managed.  Reports no falls in past 6 months.  Has had a few Er visits in the past 6 months.  No hospital stays.  Other than the ICLS hours increase, Morenita had no questions, concerns or needs at this time.    Caregiver Assessment follow up:  Care Coordinator spoke with Morenita for her 6 month interim assessment.  She shared that her ICLS services are going well.  She really enjoys her staff person and all the help she provides to her.  She requested to increase her ICLS hours     Goals: See POC in chart for goal progress documentation.      Will see member in 6 months for an annual health risk assessment.   Encouraged member to call CC with any questions or concerns in the meantime.     Gloria Stanford, ASHLEIGH  [facility]  [phone]

## 2021-06-12 NOTE — TELEPHONE ENCOUNTER
COVID 19 Nurse Triage Plan/Patient Instructions    Please be aware that novel coronavirus (COVID-19) may be circulating in the community. If you develop symptoms such as fever, cough, or SOB or if you have concerns about the presence of another infection including coronavirus (COVID-19), please contact your health care provider or visit [email or web address].     Disposition/Instructions    Virtual Visit with provider recommended. Reference Visit Selection Guide.    Thank you for taking steps to prevent the spread of this virus.  o Limit your contact with others.  o Wear a simple mask to cover your cough.  o Wash your hands well and often.    Resources    [facility]: About COVID-19: [email or web address]    CDC: What to Do If You're Sick: www.cdc.gov/coronavirus/2019-ncov/about/steps-when-sick.html    CDC: Ending Home Isolation: www.cdc.gov/coronavirus/2019-ncov/hcp/disposition-in-home-patients.html     CDC: Caring for Someone: www.cdc.gov/coronavirus/2019-ncov/if-you-are-sick/care-for-someone.html     [facility]: Interim Guidance for Hospital Discharge to Home: www.[facility].[place]/diseases/coronavirus/hcp/hospdischarge.pdf    [facility] clinical trials (COVID-19 research studies): clinicalaffairs.[facility]-clinical-trials     Below are the COVID-19 hotlines at the [place] Department of [facility] ([facility]). Interpreters are available.   o For health questions: Call 213-926-8964 or 1-963.485.4500 (7 a.m. to 7 p.m.)  o For questions about schools and childcare: Call 812-217-9951 or 1-732.549.4780 (7 a.m. to 7 p.m.)         RN triage -   Call from pt   Pt states she has been sick since Saturday   Started w/ sniffles and sneezing -- then cough   No rattling -- occ wheeze - not today   Taking malena seltzer   Not feverish -- no thermometer  Pt states she thinks she has a cold and usually needs antibx and steroids  Pt wants to know about getting covid 19 test   Reviewed home care advice and isolation advice    Transferred to    Shawnee Dixon RN [facility] Connection RN triage         Reason for Disposition    HIGH RISK patient (e.g., age > 64 years, diabetes, heart or lung disease, weak immune system) (Exception: Has already been evaluated by healthcare provider and has no new or worsening symptoms)    Additional Information    Negative: SEVERE difficulty breathing (e.g., struggling for each breath, speaks in single words)    Negative: Difficult to awaken or acting confused (e.g., disoriented, slurred speech)    Negative: Bluish (or gray) lips or face now    Negative: Shock suspected (e.g., cold/pale/clammy skin, too weak to stand, low BP, rapid pulse)    Negative: Sounds like a life-threatening emergency to the triager    Negative: [1] COVID-19 exposure AND [2] no symptoms    Negative: COVID-19 and Breastfeeding, questions about    Negative: [1] Adult with possible COVID-19 symptoms AND [2] triager concerned about severity of symptoms or other causes    Negative: SEVERE or constant chest pain or pressure (Exception: mild central chest pain, present only when coughing)    Negative: MODERATE difficulty breathing (e.g., speaks in phrases, SOB even at rest, pulse 100-120)    Negative: Patient sounds very sick or weak to the triager    Negative: [1] Fever > 100.0 F (37.8 C) AND [2] bedridden (e.g., nursing home patient, CVA, chronic illness, recovering from surgery)    Negative: [1] Fever > 101 F (38.3 C) AND [2] age > 60    Negative: Fever > 103 F (39.4 C)    Negative: Chest pain or pressure    Negative: MILD difficulty breathing (e.g., minimal/no SOB at rest, SOB with walking, pulse <100)    Protocols used: CORONAVIRUS (COVID-19) DIAGNOSED OR [id number]-EX 8.4.20

## 2021-06-12 NOTE — TELEPHONE ENCOUNTER
Tried troubleshooting for 20+ minutes with patient for video call although video component connected via both [employer] and [employer], significant echo involved in audio component for both [employer] and [employer] hence, unable to proceed with visit.     Also tried combination of audio with phone and video through [employer] however patient lost video component in the process.  Patient plans on having Internet set up over the next few weeks suggested that when fully connected she can contact our staff to perform a test video visit prior to rescheduling.    Emphasized to the patient that she should only schedule another new patient visit when has video access and tested with our staff.  Patient expressed understanding and agreement.  For now states is stable enough to follow-up with PCP.

## 2021-06-12 NOTE — TELEPHONE ENCOUNTER
Spoke with the patient and relayed message below from Dr. Tamayo.  Patient verbalized understanding and will direct her concerns to Dr. Storey.      Message has been sent to Dr. Storey as requested below by Dr. Tamayo.  Ember MALONE CMA/MEI....................12:07 PM

## 2021-06-12 NOTE — PROGRESS NOTES
Morenita called to say she had started her action plan medications because she felt shortness of breath and had a cough and runny nose. Denies fever or Covid exposure. Medications reordered to have on hand and instructed to call if no improvement.

## 2021-06-12 NOTE — TELEPHONE ENCOUNTER
Please call patient to reschedule (to next available) appointment scheduled 10/14. She has a persistent harsh cough and will have COVID swab 10/13 so results will not be available for 10/14.  Thank you - Anne

## 2021-06-12 NOTE — TELEPHONE ENCOUNTER
Left detailed message with message below and the clinic direct number for return call with further questions or concerns.

## 2021-06-12 NOTE — PATIENT INSTRUCTIONS - HE
Summary of Your Rheumatology Visit    Next Appointment:   Months    Medications:      Referrals:      Tests:        Injections:        Other:

## 2021-06-12 NOTE — PROGRESS NOTES
"Morenita Moore is a 75 y.o. female who is being evaluated via a billable telephone visit.      The patient has been notified of following:     \"This telephone visit will be conducted via a call between you and your physician/provider. We have found that certain health care needs can be provided without the need for a physical exam.  This service lets us provide the care you need with a short phone conversation.  If a prescription is necessary we can send it directly to your pharmacy.  If lab work is needed we can place an order for that and you can then stop by our lab to have the test done at a later time.    Telephone visits are billed at different rates depending on your insurance coverage. During this emergency period, for some insurers they may be billed the same as an in-person visit.  Please reach out to your insurance provider with any questions.    If during the course of the call the physician/provider feels a telephone visit is not appropriate, you will not be charged for this service.\"    Patient has given verbal consent to a Telephone visit? Yes    What phone number would you like to be contacted at? [phone]    Patient would like to receive their AVS by AVS Preference: Mail a copy.    Additional provider notes: The patient reports on 10/3/2020 she started to have sneezing and sniffles.  She reports taking some Magi-[place] which was somewhat helpful.  She reports that she has a history of asthma, she gets these colds that sometimes turn into bacterial infections.    She reports continuing today to have some sniffles, sneezing, and dry cough.  She reports no shortness of breath, fever, or chills.    She reports running out of her first batch of Magi-[place] so she bought some new Magi-[place].  She reports that this had a nasal decongestant in it.  She reports taking this is the same time of her metoprolol and this made her slightly dizzy.  She reports talking with the pharmacist and they " informed that she should not be taking a nasal decongestant with her metoprolol.  She denies any dizziness or lightheadedness today.    She would like COVID-19 testing ordered.  She is due for follow-up with her primary care provider on 10/20.    Assessment/Plan:  1. Cough/Moderate persistent asthma in adult without complication: Noted out is sneezing and sniffles.  Has turned into a dry cough.  She is not short of breath.  She is not running a fever.  She is not having chills.  COVID-19 testing ordered.  Encouraged her to self isolate until results are back.  She does have a history of asthma.  She continues on her albuterol inhaler as needed, Advair. Discussed home supportive measures.  - Symptomatic COVID-19 Virus (CORONAVIRUS) PCR; Future  -Continue off of Magi-[place].  -Start a daily Zyrtec or Claritin over-the-counter for your allergies.  -Tylenol as needed for pain or fever.  -Follow-up if starting to have shortness of breath, chest pain, chest pressure, fever, or chills.    Phone call duration:  11 minutes    Barby Cunningham, CNP

## 2021-06-12 NOTE — TELEPHONE ENCOUNTER
Who is calling:  Patient   Reason for Call:  Patient is requesting for Echo- cardiogram order. Patient states she is going to see  Cardiology on 10/20/20 . Patient stated that she need Echo- Cardiogram  Done before she see Cardiology . Patient requested a call from clinic when orders are placed . For questions please reach out patient .  Date of last appointment with primary care: unknown   Okay to leave a detailed message: No

## 2021-06-12 NOTE — TELEPHONE ENCOUNTER
FYI - Status Update  Who is Calling: Patient  Update: Please let Aminah Tamayo MD know I am taking Magi-[place] right now due to my cold.  Okay to leave a detailed message?:  Yes

## 2021-06-12 NOTE — PROGRESS NOTES
Office Visit   Morenita Moore   75 y.o. female    Date of Visit: 10/30/2020    Chief Complaint   Patient presents with     Follow-up     Flu Vaccine        Assessment and Plan   1. Pericarditis, unspecified chronicity, unspecified type  Her symptoms have resolved.  She saw cardiology yesterday and no further evaluation was necessary.  Blood pressure is well controlled and she will continue with her current medications.  We did review all of her evaluation and there is no outstanding issues with this.    2. SVT (supraventricular tachycardia) (H)  She is having fewer episodes of SVT.  She will continue with metoprolol.    3. Moderate persistent asthma in adult without complication  Recently had an exacerbation but she is back to her baseline and doing well.         No follow-ups on file.     History of Present Illness   This 75 y.o. old female comes in to follow-up.  She had difficulty this past year with pericarditis and an ongoing viral illness.  She was quite sick for a long time.  She developed SVT, tremor, dizziness and high blood pressure.  We reviewed all of her testing which was significant.  In the end all of her test came back normal.  She is now back to her baseline.  She is no longer having trouble with her blood pressure or SVT.  She has no acute concerns today.    Review of Systems: As above, systems otherwise reviewed and negative.     Medications, Allergies and Problem List   Patient Active Problem List   Diagnosis     Breast cancer in situ     Asthma in adult     SVT (supraventricular tachycardia) (H)     Torticollis     Tremor, physiological     Pericarditis     Gastroesophageal reflux disease without esophagitis     Current Outpatient Medications   Medication Sig Dispense Refill     albuterol (VENTOLIN HFA) 90 mcg/actuation inhaler Inhale 2 puffs 4 (four) times a day as needed. 1 Inhaler 3     ascorbic acid, vitamin C, (ASCORBIC ACID WITH KHUSHBOO HIPS) 500 MG tablet Take 500 mg by mouth daily.        aspirin (ASPIR-81 ORAL) Take by mouth.       calcium-vitamin D 500 mg(1,250mg) -200 unit per tablet Take 1 tablet by mouth 2 (two) times a day with meals.       cyanocobalamin (VITAMIN B-12) 50 mcg tablet Take 50 mcg by mouth daily. weekly       fluticasone propion-salmeteroL (ADVAIR HFA) 230-21 mcg/actuation inhaler Inhale 2 puffs 2 (two) times a day. 3 Inhaler 3     LORazepam (ATIVAN) 0.5 MG tablet Take 1 tablet (0.5 mg total) by mouth 2 (two) times a day as needed for anxiety. 20 tablet 1     metoprolol tartrate (LOPRESSOR) 25 MG tablet Take 1 tablet (25 mg total) by mouth 2 (two) times a day. (Patient taking differently: 1/2 tablet three times a day) 180 tablet 3     omeprazole (PRILOSEC) 20 MG capsule Take 1 capsule (20 mg total) by mouth daily before breakfast. 90 capsule 3     No current facility-administered medications for this visit.      Allergies   Allergen Reactions     Levalbuterol Shortness Of Breath     Cat Dander Itching and Other (See Comments)     Itchy eyes     Amitriptyline Palpitations     Escitalopram Anxiety          Physical Exam     /62   Pulse 74   Wt 130 lb (59 kg)   SpO2 99%   BMI 21.97 kg/m      General: This is an alert female in no apparent distress.     Additional Information   Social History     Tobacco Use     Smoking status: Former Smoker     Types: Cigarettes     Quit date: 1988     Years since quittin.8     Smokeless tobacco: Never Used     Tobacco comment: Quit in 's   Substance Use Topics     Alcohol use: Never     Frequency: Never     Drug use: Not on file          Aminah Tamaoy MD

## 2021-06-12 NOTE — TELEPHONE ENCOUNTER
Medication Question or Clarification  Who is calling: Patient  What medication are you calling about (include dose and sig)?:   omeprazole (PRILOSEC) 20 MG capsule 90 capsule 3 2020     Sig - Route: Take 1 capsule (20 mg total) by mouth daily before breakfast. - Oral    Class: No Print        Who prescribed the medication?: Aminah Tamayo MD  What is your question/concern?: The above medication is marked class no print and never made it to the pharmacy.  Patient said My Rx has  and I need this to be approved and sent to my pharmacy.   Requested Pharmacy: [facility] to leave a detailed message?: Yes

## 2021-06-12 NOTE — TELEPHONE ENCOUNTER
----- Message from Nathalie Dyer sent at 10/12/2020 10:33 AM CDT -----  General phone call:  PATIENT SEEING DR FOSTER Wednesday, HOWEVER PT NOW HAS A LUNG INFECTION, COUGH, NOT SURE IF SHE SHOULD COME IN, PLEASE CALL    Caller: PATIENT  Primary cardiologist: DR FOSTER  Detailed reason for call: SEE ABOVE  New or active symptoms? YES, SEE ABOVE  Best phone number: [phone]  Best time to contact: ANY TIME  Ok to leave a detailed message? YES  Device? NO    Additional Info:

## 2021-06-12 NOTE — TELEPHONE ENCOUNTER
Wellness Screening Tool  Symptom Screening:  Do you have one of the following NEW symptoms:    Fever (subjective or >100.0)?  No    A new cough?  No    Shortness of breath?  No     Chills? No     New loss of taste or smell? No     Generalized body aches? No     New persistent headache? No     New sore throat? No     Nausea, vomiting, or diarrhea?  No    Within the past 2 weeks, have you been exposed to someone with a known positive illness below:    COVID-19 (known or suspected)?  No    Chicken pox?  No    Mealses?  No    Pertussis?  No    Patient notified of visitor policy- They may have one person accompany them to their appointment, but they will need to wear a mask and will be screened upon arrival for symptoms: Yes  Pt informed to wear a mask: Yes  Pt notified if they develop any symptoms listed above, prior to their appointment, they are to call the clinic directly at [phone] for further instructions.  Yes  Patient's appointment status: 10/28

## 2021-06-12 NOTE — TELEPHONE ENCOUNTER
I defer to Dr. Storey on what tests she needs before his appointment.  Please have her forward her message to him.  Thanks

## 2021-06-13 NOTE — PROGRESS NOTES
[facility] Care Coordination Contact    Completed following tasks:    Updated services in access and Submitted referrals/auths for J[employer] shopping cart(grocery cart) $80.00, and Heating pad $40.00 with [employer].      Prem Borja  Care Management Specialist  [facility]  [phone]

## 2021-06-13 NOTE — TELEPHONE ENCOUNTER
Morenita asks if PCP can prescribe a SAD (seasonal affective disorder) lamp for her. FNA informed that this can be purchase at [employer] or [employer].     She states she might not be able to pay for it and asks if this PCP can prescribe so insurance can cover.    Message routed to PCP.    FNA also set her up for COVID antibody testing. See notes below.    Deidre Navarro RN/[place] Nurse Advisor        Reason for Disposition    Nursing judgment    Protocols used: INFORMATION ONLY CALL - NO TRIAGE-A-OH

## 2021-06-13 NOTE — PROGRESS NOTES
Morenita Moore is a 75 y.o. female who is being evaluated via a billable telephone visit.        Telephone visit   Morenita Moore   75 y.o. female    Date of Visit: 12/17/2020    Chief Complaint   Patient presents with     Dizziness     Off and on since Tuesday, no nausea- Went to Urgent Care Tuesday     Insomnia        Assessment and Plan   1. Vertigo  Her symptoms are most consistent with positional vertigo.  She has had 3 brief episodes over the last couple of days.  My recommendation is that she continue to just monitor her symptoms.  Recommend that she push fluids and continue with her current medications.  We reviewed that her MRI last summer was normal.    2. SVT (supraventricular tachycardia) (H)  She has been taking a low-dose of metoprolol and I do encourage her to continue with that.    3. Insomnia, unspecified type  She is having trouble with insomnia and has been told that she snores a lot.  We will refer her to the sleep clinic to evaluate for possible sleep apnea.  - Ambulatory referral to Sleep Medicine       No follow-ups on file.     History of Present Illness   This 75 y.o. old female is evaluated with a telephone visit today.  Over the last few days she has had 3 episodes of vertigo.  She describes the room spinning.  The first time it occurred after leaning over.  She has not had any fevers or chills or other symptoms of infection.  Again the episode lasted just a couple of minutes.  She also notes that she has had increasing trouble with sleep recently.  She has been told that she snores.  Wonders if she has sleep apnea.  She has no other acute concerns.  She had labs and had a evaluation at urgent care 2 days prior to this visit.    Review of Systems: As above, systems otherwise reviewed and negative.     Medications, Allergies and Problem List   Patient Active Problem List   Diagnosis     Breast cancer in situ     Asthma in adult     SVT (supraventricular tachycardia) (H)      Torticollis     Tremor, physiological     Pericarditis     Gastroesophageal reflux disease without esophagitis     Current Outpatient Medications   Medication Sig Dispense Refill     albuterol (VENTOLIN HFA) 90 mcg/actuation inhaler Inhale 2 puffs 4 (four) times a day as needed. 1 Inhaler 3     ascorbic acid, vitamin C, (ASCORBIC ACID WITH KHUSHBOO HIPS) 500 MG tablet Take 500 mg by mouth daily.       aspirin (ASPIR-81 ORAL) Take by mouth.       calcium-vitamin D 500 mg(1,250mg) -200 unit per tablet Take 1 tablet by mouth 2 (two) times a day with meals.       cyanocobalamin (VITAMIN B-12) 50 mcg tablet Take 50 mcg by mouth daily. weekly       fluticasone propion-salmeteroL (ADVAIR HFA) 230-21 mcg/actuation inhaler Inhale 2 puffs 2 (two) times a day. 3 Inhaler 3     LORazepam (ATIVAN) 0.5 MG tablet Take 1 tablet (0.5 mg total) by mouth 2 (two) times a day as needed for anxiety. 20 tablet 1     metoprolol tartrate (LOPRESSOR) 25 MG tablet Take 1 tablet (25 mg total) by mouth 2 (two) times a day. (Patient taking differently: 1/2 tablet three times a day) 180 tablet 3     omeprazole (PRILOSEC) 20 MG capsule Take 1 capsule (20 mg total) by mouth daily before breakfast. 90 capsule 3     No current facility-administered medications for this visit.      Allergies   Allergen Reactions     Levalbuterol Shortness Of Breath     Cat Dander Itching and Other (See Comments)     Itchy eyes     Amitriptyline Palpitations     Escitalopram Anxiety          Physical Exam     There were no vitals taken for this visit.    During our discussion there was no evidence of shortness of breath.     Additional Information   Social History     Tobacco Use     Smoking status: Former Smoker     Types: Cigarettes     Quit date: 1988     Years since quittin.9     Smokeless tobacco: Never Used     Tobacco comment: Quit in 's   Substance Use Topics     Alcohol use: Never     Frequency: Never     Drug use: Not on file            Aminah JONES  "MD [place]      The patient has been notified of following:     \"This telephone visit will be conducted via a call between you and your physician/provider. We have found that certain health care needs can be provided without the need for a physical exam.  This service lets us provide the care you need with a short phone conversation.  If a prescription is necessary we can send it directly to your pharmacy.  If lab work is needed we can place an order for that and you can then stop by our lab to have the test done at a later time.    Telephone visits are billed at different rates depending on your insurance coverage. During this emergency period, for some insurers they may be billed the same as an in-person visit.  Please reach out to your insurance provider with any questions.    If during the course of the call the physician/provider feels a telephone visit is not appropriate, you will not be charged for this service.\"    Patient has given verbal consent to a Telephone visit? Yes    What phone number would you like to be contacted at? [phone]    Patient would like to receive their AVS by AVS Preference: [place].      Phone call duration:  12 minutes    Vielka Phan CMA  "

## 2021-06-13 NOTE — PROGRESS NOTES
[facility] Care Coordination Contact    Called member to schedule annual HRA home visit. HRA has been scheduled for December 29 at 10:00am.     ASHLEIGH Guerra  [facility]  [phone]

## 2021-06-13 NOTE — PROGRESS NOTES
[facility] Care Coordination Contact    [facility] Care Coordination Contact     Situation: Patient primary care clinic location, [facility]  is closing. CC calling to review letters that were sent to Member, and discuss next steps.      Assessment: Member reports that they have decided to stay with Dr. Tamayo and change to the [facility].      Plan/Recommendations: CC will send information to CMS to update spreadsheet and submit the PCC change to [employer] as needed.        JERAMIE Coreas  [facility]  [phone]

## 2021-06-13 NOTE — TELEPHONE ENCOUNTER
Since I started on the Metoprolol., I seem to be getting Vertigo.  I get episodes of it during the day,  She reports she just seems to be getting it   In the middle of the day sometimes for no reason.    Been on Metoprolol for a few months., and she is not sure this is what is causing her vertigo/.    Patient asking for telephone appointment with her MD to discuss her Vertigo.   Patient sent to PSR for telephone appointment.    Hotrensia Angela RN  [facility] Triage/refill nurse    Reason for Disposition    Patient wants to be seen    Additional Information    Negative: Vomiting occurs with dizziness    Negative: Lightheadedness (dizziness) present now, after 2 hours of rest and fluids    Negative: Spinning or tilting sensation (vertigo) present now    Negative: Fever > 103 F (39.4 C)    Negative: Fever > 100.0 F (37.8 C) and has diabetes mellitus or a weak immune system (e.g., HIV positive, cancer chemotherapy, organ transplant, splenectomy, chronic steroids)    Protocols used: DIZZINESS-A-OH

## 2021-06-13 NOTE — TELEPHONE ENCOUNTER
"Patient is calling requesting COVID serologic antibody testing.  NOTE: Serologic testing is a blood test for 'antibodies' which are made at 10-14 days after you have had symptoms of COVID or were exposed and had an asymptomatic infection.  This does NOT test you for 'active' infection or tell you if you are contagious.    Are you a healthcare worker?  No  Do you currently have a cough, fever, body aches, shortness of breath, or difficulty breathing?  No  Did you previously have cough, fever, body aches, shortness of breath, or difficulty breathing that have now resolved? Has had previous covid symptoms.   Symptoms began in January 2020   Symptoms started > 14 days ago. Lab order placed per SARS-CoV-2 Serology test Standing Order using indication \"Previously symptomatic >14d since onset, currently asymptomatic\" and diagnosis code \"Screening for viral disease\" (Z11.59)          The patient was informed: \"Testing is limited each day and it may take time for testing to be available to everyone who has called. You will receive a call within 48-72 hours to schedule the serology testing. Please confirm the best number to reach you is [phone]. If you have any questions about scheduling, call [phone].\"       "

## 2021-06-13 NOTE — TELEPHONE ENCOUNTER
"Pt states \"I've been dizzy on and off for months.\"  Currently tremorous also.    Evaluated for these issues 6/25/2020.  However no improvement over the months.    Currently states \"I'm with my , going to an urgent care.\"  Apparently [facility].    When attempting triage, pt states \"I'm just real sleepy.\"  Not answering triage questions accurately.  Re-states \"I'm just real sleepy.\"  Asked pt if she can walk ....  Pt states \"I don't know.\"    Pt and her  now arrived at [facility].  Urged pt to accept their provider's advice in case ED/911 is advised....  Pt disconnects call.    Flor Allison RN  Care Connection Triage     Reason for Disposition    Difficult to awaken or acting confused (disoriented, slurred speech) and new onset    Additional Information    Negative: Difficult to awaken or acting confused (disoriented, slurred speech) and has diabetes    Protocols used: CONFUSION - DELIRIUM-A-OH    _________________    COVID 19 Nurse Triage Plan/Patient Instructions    Please be aware that novel coronavirus (COVID-19) may be circulating in the community. If you develop symptoms such as fever, cough, or SOB or if you have concerns about the presence of another infection including coronavirus (COVID-19), please contact your health care provider or visit [email or web address].     Disposition/Instructions    Additional COVID19 information to add for patients.   How can I protect others?  If you have symptoms (fever, cough, body aches or trouble breathing): Stay home and away from others (self-isolate) until:    At least 10 days have passed since your symptoms started, And     You ve had no fever--and no medicine that reduces fever--for 1 full day (24 hours), And      Your other symptoms have resolved (gotten better).     If you don t have symptoms, but a test showed that you have COVID-19 (you tested positive):    Stay home and away from others (self-isolate). Follow the tips under \"How do I " "self-isolate?\" below for 10 days (20 days if you have a weak immune system).    You don't need to be retested for COVID-19 before going back to school or work. As long as you're fever-free and feeling better, you can go back to school, work and other activities after waiting the 10 or 20 days.     How do I self-isolate?    Stay in your own room, even for meals. Use your own bathroom if you can.     Stay away from others in your home. No hugging, kissing or shaking hands. No visitors.    Don t go to work, school or anywhere else.     Clean  high touch  surfaces often (doorknobs, counters, handles, etc.). Use a household cleaning spray or wipes. You ll find a full list on the EPA website:  www.epa.gov/pesticide-registration/list-n-disinfectants-use-against-sars-cov-2.    Cover your mouth and nose with a mask, tissue or washcloth to avoid spreading germs.    Wash your hands and face often. Use soap and water.    Caregivers in these groups are at risk for severe illness due to COVID-19:  o People 65 years and older  o People who live in a nursing home or long-term care facility  o People with chronic disease (lung, heart, cancer, diabetes, kidney, liver, immunologic)  o People who have a weakened immune system, including those who:  - Are in cancer treatment  - Take medicine that weakens the immune system, such as corticosteroids  - Had a bone marrow or organ transplant  - Have an immune deficiency  - Have poorly controlled HIV or AIDS  - Are obese (body mass index of 40 or higher)  - Smoke regularly    Caregivers should wear gloves while washing dishes, handling laundry and cleaning bedrooms and bathrooms.    Use caution when washing and drying laundry: Don t shake dirty laundry, and use the warmest water setting that you can.    For more tips, go to www.cdc.gov/coronavirus/2019-ncov/downloads/10Things.pdf.    How can I take care of myself?  1. Get lots of rest. Drink extra fluids (unless a doctor has told you not to). "     2. Take Tylenol (acetaminophen) for fever or pain. If you have liver or kidney problems, ask your family doctor if it s okay to take Tylenol.     Adults can take either:     650 mg (two 325 mg pills) every 4 to 6 hours, or     1,000 mg (two 500 mg pills) every 8 hours as needed.     Note: Don t take more than 3,000 mg in one day.   Acetaminophen is found in many medicines (both prescribed and over-the-counter medicines). Read all labels to be sure you don t take too much.     For children, check the Tylenol bottle for the right dose. The dose is based on the child s age or weight.    3. If you have other health problems (like cancer, heart failure, an organ transplant or severe kidney disease): Call your specialty clinic if you don t feel better in the next 2 days.    4. Know when to call 911: Emergency warning signs include:    Trouble breathing or shortness of breath    Pain or pressure in the chest that doesn t go away    Feeling confused like you haven t felt before, or not being able to wake up    Bluish-colored lips or face    What are the symptoms of COVID-19?     The most common symptoms are cough, fever and trouble breathing.     Less common symptoms include body aches, chills, diarrhea (loose, watery poops), fatigue (feeling very tired), headache, runny nose, sore throat and loss of smell.    COVID-19 can cause severe coughing (bronchitis) and lung infection (pneumonia).    How does it spread?     The virus may spread when a person coughs or sneezes into the air. The virus can travel about 6 feet this way, and it can live on surfaces.      Common  (household disinfectants) will kill the virus.    Who is at risk?  Anyone can catch COVID-19 if they re around someone who has the virus.    How can others protect themselves?     Stay away from people who have COVID-19 (or symptoms of COVID-19).    Wash hands often with soap and water. Or, use hand  with at least 60% alcohol.    Avoid  touching the eyes, nose or mouth.     Wear a face mask when you go out in public, when sick or when caring for a sick person.    Where can I get more information?    [facility]: About COVID-19: [email or web address]    CDC: What to Do If You re Sick: www.cdc.gov/coronavirus/2019-ncov/about/steps-when-sick.html    CDC: Ending Home Isolation: www.cdc.gov/coronavirus/2019-ncov/hcp/disposition-in-home-patients.html     CDC: Caring for Someone: www.cdc.gov/coronavirus/2019-ncov/if-you-are-sick/care-for-someone.html     [facility]: Interim Guidance for Hospital Discharge to Home: www.[facility]./diseases/coronavirus/hcp/hospdischarge.pdf    [facility] clinical trials (COVID-19 research studies): clinicalaffairs.[facility]-clinical-trials     Below are the COVID-19 hotlines at the [place] [facility] ([facility]). Interpreters are available.   o For health questions: Call 849-614-7612 or 1-193.290.2430 (7 a.m. to 7 p.m.)  o For questions about schools and childcare: Call 948-338-4865 or 1-693.986.8525 (7 a.m. to 7 p.m.)              Thank you for taking steps to prevent the spread of this virus.  o Limit your contact with others.  o Wear a simple mask to cover your cough.  o Wash your hands well and often.    Resources    [facility]: About COVID-19: [email or web address]    CDC: What to Do If You're Sick: www.cdc.gov/coronavirus/2019-ncov/about/steps-when-sick.html    CDC: Ending Home Isolation: www.cdc.gov/coronavirus/2019-ncov/hcp/disposition-in-home-patients.html     CDC: Caring for Someone: www.cdc.gov/coronavirus/2019-ncov/if-you-are-sick/care-for-someone.html     [facility]: Interim Guidance for Hospital Discharge to Home: www.[facility].us/diseases/coronavirus/hcp/hospdischarge.pdf    [facility] clinical trials (COVID-19 research studies): clinicalaffairs.[facility]/n-clinical-trials     Below are the COVID-19 hotlines at the [facility] ([facility]). Interpreters are available.   o For health questions: Call 678-097-2397 or 1-241.501.9740 (7 a.m. to 7 p.m.)  o For questions about schools and childcare: Call 975-219-5620 or 1-893.194.8575 (7 a.m. to 7 p.m.)

## 2021-06-13 NOTE — TELEPHONE ENCOUNTER
Patient notified PCP placed an order for SAD lamp. Order printed and will be fax to [facility] at [phone]. Advised patient to return call with any further questions or concerns.

## 2021-06-14 NOTE — TELEPHONE ENCOUNTER
"RN Triage:     Patient is calling in about a virus she had last year. She said it attacked her heart. She was never told what virus this is, she stated it was not covid19.   She is dizzy. She stated she did not feel like the room is spinning. She stopped her metoprolol, she said since stopping the medication the vertigo has stopped.   She does not feel like she is going to pass out now. Her feeling dizzy comes and goes. Heart is not racing and she has no palpitations. Tremors in her whole body which is not new. 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 She stated she wanted wait until her physical mid February. Patient advised that she should probably speak to a provider about the dizziness and stopping her metoprolol.   Patient ended the call stating she had another call to take. She stated she will call back to make an appointment.     Betty Springer RN, BSN Care Connection Triage Nurse    Reason for Disposition    Dizziness not present now, but is a chronic symptom (recurrent or ongoing AND lasting > 4 weeks)    Additional Information    Negative: Shock suspected (e.g., cold/pale/clammy skin, too weak to stand, low BP, rapid pulse)    Negative: Difficult to awaken or acting confused (e.g., disoriented, slurred speech)    Negative: Fainted, and still feels dizzy afterwards    Negative: SEVERE difficulty breathing (e.g., struggling for each breath, speaks in single words)    Negative: Overdose (accidental or intentional) of medications    Negative: New neurologic deficit that is present now: * Weakness of the face, arm, or leg on one side of the body * Numbness of the face, arm, or leg on one side of the body * Loss of speech or garbled speech    Negative: Heart beating < 50 beats per minute OR > 140 beats per minute    Negative: Sounds like a life-threatening emergency to the triager    Negative: Chest pain    Negative: " Rectal bleeding, bloody stool, or tarry-black stool    Negative: Vomiting is the main symptom    Negative: Diarrhea is the main symptom    Negative: Headache is the main symptom    Negative: Heat exhaustion suspected (i.e., dehydration from heat exposure)    Negative: Patient states that he/she is having an anxiety/panic attack    Negative: SEVERE dizziness (e.g., unable to stand, requires support to walk, feels like passing out now)    Negative: SEVERE headache or neck pain    Negative: Spinning or tilting sensation (vertigo) present now and one or more stroke risk factors (i.e., hypertension, diabetes, prior stroke/TIA, heart attack, age over 60) (Exception: prior physician evaluation for this AND no different/worse than usual)    Negative: Loss of vision or double vision    Negative: Extra heart beats OR irregular heart beating (i.e., 'palpitations')    Negative: Difficulty breathing    Negative: Drinking very little and has signs of dehydration (e.g., no urine > 12 hours, very dry mouth, very lightheaded)    Negative: Follows bleeding (e.g., stomach, rectum, vagina) (Exception: became dizzy from sight of small amount blood)    Negative: Patient sounds very sick or weak to the triager    Negative: Lightheadedness (dizziness) present now, after 2 hours of rest and fluids    Negative: Spinning or tilting sensation (vertigo) present now    Negative: Fever > 103 F (39.4 C)    Negative: Fever > 100.0 F (37.8 C) and has diabetes mellitus or a weak immune system (e.g., HIV positive, cancer chemotherapy, organ transplant, splenectomy, chronic steroids)    Negative: Vomiting occurs with dizziness    Negative: Patient wants to be seen    Negative: Taking a medicine that could cause dizziness (e.g., blood pressure medications, diuretics)    Negative: Diabetes    Protocols used: DIZZINESS-A-OH

## 2021-06-14 NOTE — PROGRESS NOTES
[facility] Care Coordination Contact  Completed following tasks:    Updated services in access and Submitted referrals/auths for Forehead Thermometer of $95.00 with APA auth from 1/27/21-6/30/21.     Prem Borja  Care Management Specialist  [facility]  [phone]

## 2021-06-14 NOTE — PROGRESS NOTES
Assessment and Plan:     1. Wellness examination  Her examination is satisfactory today.  She is going to be due for mammogram but gets them elsewhere.  She is up-to-date on vaccinations.  I did recommend that she get the COVID-19 vaccine when it is available to her.  We reviewed the documentation for her wellness visit and she is not having any problems with activities of daily living, falling, or memory.  She has an advanced care directive.  She is otherwise up-to-date on age-appropriate health maintenance.    2. Dizziness  Her only concern today is that she feels dizzy.  It seems to wax and wane.  She wonders if it is a brain problem or an ENT problem.  She drinks plenty of fluids and eats well.  I will go ahead and recheck her labs.  She would like a referral to both neurology and ENT to evaluate further.  - [facility]  - Ambulatory referral to ENT [facility], [place] [address](CBC w/o Differential)  - Comprehensive Metabolic Panel  - Vitamin B12    3. Vitamin D deficiency  - Vitamin D, Total (25-Hydroxy)      The patient's current medical problems were reviewed.    The following health maintenance schedule was reviewed with the patient and provided in printed form in the after visit summary:   Health Maintenance   Topic Date Due     ZOSTER VACCINES (2 of 3) 09/15/2009     MAMMOGRAM  08/14/2020     ASTHMA ACTION PLAN  02/27/2021     LIPID  03/16/2021     Asthma Control Test  02/01/2022     MEDICARE ANNUAL WELLNESS VISIT  02/01/2022     FALL RISK ASSESSMENT  02/01/2022     COLORECTAL CANCER SCREENING  10/09/2023     TD 18+ HE  09/09/2025     ADVANCE CARE PLANNING  02/01/2026     DEXA SCAN  07/17/2033     DEPRESSION ACTION PLAN  Completed     HEPATITIS C SCREENING  Completed     Pneumococcal Vaccine: 65+ Years  Completed     INFLUENZA VACCINE RULE BASED  Completed     Pneumococcal Vaccine: Pediatrics (0 to 5 Years) and At-Risk Patients (6 to 64  Years)  Aged Out     HEPATITIS B VACCINES  Aged Out        Subjective:   Chief Complaint: Morenita Moore is an 75 y.o. female here for an Annual Wellness visit.   HPI: She comes in for an annual wellness visit.  We reviewed the documentation for her wellness visit and she is not having any problems with activities of daily living, falls, or memory.  She has an advanced care directive.  She will be due for mammogram and gets them at the [facility].  She is otherwise up-to-date on age-appropriate health maintenance.  Her only concern today is that she has ongoing symptoms of dizziness.  She feels off balance at times.  Feels a little bit foggy sometimes.  Wonders if it could be a problem with her brain or ear.  Would like to see both neurology and ENT.  She states that she has been eating well and drinking plenty of fluids.  She has no other acute concerns today.    Review of Systems:   Please see above.  The rest of the review of systems are negative for all systems.    Patient Care Team:  Aminah Tamayo MD as PCP - General (Internal Medicine)  Aminah Tamayo MD as Assigned PCP  Gloria Stanford SW as Lead Care Coordinator (Primary Care - CC)  Pacheco Perdomo MD as Assigned Pulmonology Provider  Devin Vilchis MD as Assigned Heart and Vascular Provider     Patient Active Problem List   Diagnosis     Breast cancer in situ     Asthma in adult     SVT (supraventricular tachycardia) (H)     Torticollis     Tremor, physiological     Pericarditis     Gastroesophageal reflux disease without esophagitis     History reviewed. No pertinent past medical history.   Past Surgical History:   Procedure Laterality Date     MASTECTOMY      DCIS      Family History   Problem Relation Age of Onset     Asthma Sister      Breast cancer Sister      Heart failure Mother          at 97      Social History     Socioeconomic History     Marital status: Single     Spouse name: Not on file     Number of children: 1      Years of education: Not on file     Highest education level: Not on file   Occupational History     Occupation: Sales -    Social Needs     Financial resource strain: Not on file     Food insecurity     Worry: Not on file     Inability: Not on file     Transportation needs     Medical: Not on file     Non-medical: Not on file   Tobacco Use     Smoking status: Former Smoker     Types: Cigarettes     Quit date: 1988     Years since quittin.1     Smokeless tobacco: Never Used     Tobacco comment: Quit in 20's   Substance and Sexual Activity     Alcohol use: Never     Frequency: Never     Drug use: Not on file     Sexual activity: Not Currently   Lifestyle     Physical activity     Days per week: Not on file     Minutes per session: Not on file     Stress: Not on file   Relationships     Social connections     Talks on phone: Not on file     Gets together: Not on file     Attends Orthodox service: Not on file     Active member of club or organization: Not on file     Attends meetings of clubs or organizations: Not on file     Relationship status: Not on file     Intimate partner violence     Fear of current or ex partner: Not on file     Emotionally abused: Not on file     Physically abused: Not on file     Forced sexual activity: Not on file   Other Topics Concern     Not on file   Social History Narrative     Not on file      Current Outpatient Medications   Medication Sig Dispense Refill     albuterol (VENTOLIN HFA) 90 mcg/actuation inhaler Inhale 2 puffs 4 (four) times a day as needed. 1 Inhaler 3     aspirin (ASPIR-81 ORAL) Take by mouth.       calcium-vitamin D 500 mg(1,250mg) -200 unit per tablet Take 1 tablet by mouth 2 (two) times a day with meals.       cyanocobalamin (VITAMIN B-12) 50 mcg tablet Take 50 mcg by mouth daily. weekly       fluticasone propion-salmeteroL (ADVAIR HFA) 230-21 mcg/actuation inhaler Inhale 2 puffs 2 (two) times a day. 3 Inhaler 3     LORazepam  (ATIVAN) 0.5 MG tablet Take 1 tablet (0.5 mg total) by mouth 2 (two) times a day as needed for anxiety. 20 tablet 1     omeprazole (PRILOSEC) 20 MG capsule Take 1 capsule (20 mg total) by mouth daily before breakfast. 90 capsule 3     ascorbic acid, vitamin C, (ASCORBIC ACID WITH KHUSHBOO HIPS) 500 MG tablet Take 500 mg by mouth daily.       No current facility-administered medications for this visit.       Objective:   Vital Signs:   Visit Vitals  /70   Pulse 96   Temp (!) 96.2  F (35.7  C)   Wt 130 lb (59 kg)   SpO2 100%   BMI 21.97 kg/m           VisionScreening:  No exam data present     PHYSICAL EXAM  General:  Patient is alert and in no apparent distress.  Neck:  Supple with no adenopathy or thyroid abnormality noted.  Cardiovascular:  Regular rate and rhythm, normal S1/S2, no murmurs, rubs, or gallop.  Pulmonary:  Lungs are clear to auscultation bilaterally with normal respiratory effort.  Gastrointestinal:  Abdomen is soft, non-tender, non-distended, with no organomegaly, rebound or guarding.  Extremities:  No joint abnormalities with no LE edema.  Strong dorsalis pedis pulses.  Neurologic Cranial nerves are intact.  No focal deficits.  Psychiatric:  Pleasant, no confusion or agitation   Skin:  Warm and well perfused with no rashes or abnormalities.      Assessment Results 2/1/2021   Activities of Daily Living 2-4 - Moderate impairment   Instrumental Activities of Daily Living 1 - Full function   Mini Cog Total Score 5   Some recent data might be hidden     A Mini-Cog score of 0-2 suggests the possibility of dementia, score of 3-5 suggests no dementia    Identified Health Risks:     She is at risk for lack of exercise and has been provided with information to increase physical activity for the benefit of her well-being.  The patient reports that she has difficulty with activities of daily living.   She is at risk for falling and has been provided with information to reduce the risk of falling at home.

## 2021-06-14 NOTE — PROGRESS NOTES
"Care Coordinator received a return email back from Gloria at [facility] stating:    Gloria,  I will put the forehead thermometer thru ender glass with an auth. I will send a quote seperately.  The neck brace can go thru [facility] I would need a size for that first. It is a cervical collar universal size 2.5\" or 3.25\". Please let me know and I can send an RX to the PCP. Please let me know which PCP she has spoke to on the need for the brace as well.  Thank you Gloria      Care Coordinator emailed Gloria back to let her know that Care Coordinator will auth the thermometer and talk to Morenita about the neck brace.      Gloria Stanford, [facility]  [phone]  "

## 2021-06-14 NOTE — PROGRESS NOTES
[facility] Care Coordination Contact  [facility] Home Visit Assessment     Home visit for Health Risk Assessment with Morenita Moore completed on 12/29/2020    Type of residence:: Apartment  Current living arrangement:: I live alone     Assessment completed with: Patient    Current Care Plan  Member currently receiving the following home care services:   None  Member currently receiving the following community resources: [facility] Programs, Other (see comment)(ICLS)    Medication Review  Medication reconciliation completed in Epic: YES  Medication set-up & administration: Independent-does not set up  Self-administers medications  Medication Risk Assessment Medication (1 or more, place referral to MTM):  N/A: No risk factors identified  MTM Referral Placed: No: No risk factors idenified    Mental/Behavioral Health   Depression Screening:            Mental health DX:: No        Falls Assessment:   Fallen 2 or more times in the past year?: No   Any fall with injury in the past year?: No    ADL/IADL Dependencies:   Dependent ADLs:: Independent  Dependent IADLs:: Transportation, Shopping, Laundry, Cleaning    [facility] sponsored benefits: Shared information re: Silver Sneakers/gym memberships, ASA, Calcium +D.    PCA Assessment completed at visit: No     Elderly Waiver Eligibility: Yes - will continue on EW    Care Plan & Recommendations: Annual Health Risk Assessment/EW Reassessment visit completed with Morenita via phone due to COVID-19 restrictions.  Morenita reports that her health is getting better since her viral infection she had last year.  She reports she still gets dizzy often but just takes her time, sits down as needed.  Morenita is up to date on visits with her PCP and her PCP is aware of her dizziness.  Morenita shared that she is going to be moving to a new apartment in Feb 2021 and she is very excited.  Morenita is very happy with her ICLS staff and really appreciates the support with shopping,  laundry, etc.  Morenita reports no falls in the past year.  Morenita reports no hospital stays in the past year but had 5 ER visits this past year.      Morenita asked if it would be possible to increase her ICLS hours during the weeks she is moving to have her worker help with packing and moving.  Care Coordinator will talk to supervisor about this question and get back to Morenita.  No further questions, concerns or needs at this time.    See [facility] for detailed assessment information.    Follow-Up Plan: Member informed of future contact, plan to f/u with member with a 6 month telephone assessment.  Contact information shared with member and family, encouraged member to call with any questions or concerns at any time.    [place] care continuum providers: Please refer to Health Care Home on the Epic Problem List to view this patient's [facility] Care Plan Summary.    Gloria Stanford, ASHLEIGH  [facility]  [phone]

## 2021-06-14 NOTE — TELEPHONE ENCOUNTER
Patient returned call and wanted to be transferred over to scheduling. Patient states that a nurse already triaged her sx. Patient just wanted to see if she can get in sooner for her physical with Dr. Tamayo. Patient was transferred over to scheduling who was informed patient was already triaged.  Gisella took over conversation and will assist patient with scheduling a sooner appointment. RN advised patient if she develops any new or worsening sx to call back nurse line. RN also gave mychart help desk number ot patient to help her get her mychart set up for when the COVID-19 vaccine becomes available to her. Patient verbalized understanding and agrees with plan.     Mayela Cast RN, BSN Nurse Triage Advisor 2:26 PM 1/26/2021

## 2021-06-14 NOTE — PROGRESS NOTES
[facility] Care Coordination Contact    Received after visit chart from care coordinator.  Completed following tasks:    Mailed copy of care plan to client.     Updated services in access and Submitted referrals/auths for : ICLS 65 hrs for the month of 2/1/21-2/28/21 (10 hrs/wk plus additional 25 hours for moviing), and from 3/1/21-1/31/22, 10 hrs/wk with [facility].     Provider Signature - No POC Shared:  Member indicates that they do not want their POC shared with any EW providers.    Mailed: POC Sig page and GIOVANNY form to member with a stamped  return envelope.    Prem Borja  Care Management Specialist  [facility]  [phone]

## 2021-06-15 NOTE — TELEPHONE ENCOUNTER
Patient calling, and would like to change her drug store.    Changed to [facility] in [place], [place].    Hortensia Angela RN  Care Connection Triage/refill nurse    Reason for Disposition    Information only question and nurse able to answer    Protocols used: NO PROTOCOL AVAILABLE - INFORMATION ONLY-A-[place]

## 2021-06-15 NOTE — PROGRESS NOTES
The thermometer delivered to member p/[facility].    Prem Borja  Care Management Specialist  [facility]  [phone]

## 2021-06-15 NOTE — TELEPHONE ENCOUNTER
Spoke to Morenita.      1.  She continues to have lightheadedness. She would like to pursue another neurologist who specializes in this area.  She is wondering about going to the [facility].  Would Dr. Tamayo think this is appropriate and do a referral there?    2.  Order for soft collar cervical dystonia     3.  Ativan refill.  She is only taking a 1/2 tablet about every 3 to 4 days as this helps with tremors and being off balance.

## 2021-06-15 NOTE — TELEPHONE ENCOUNTER
Prior Authorization Request  Who s requesting:  Pharmacy  Pharmacy Name and Location: [facility]  Medication Name: Promethazine HCI 12.5 mg  Insurance Plan:   Insurance Member ID Number:    CoverMyMeds Key: [id number]  Informed patient that prior authorizations can take up to 10 business days for response:   No  Okay to leave a detailed message: No

## 2021-06-15 NOTE — TELEPHONE ENCOUNTER
Reason for Call:  Other call back      Detailed comments: PT RECEIVED HER 1VACCINE - REACTION OF shortness of breath, AND STUFFY NOSE - SOME HEART PALPITATIONS-- BUT BETTER TODAY    PATIENT REQUESTING MEDICATION  PROMETHAZINE - HELPS FOR HER DIZZINESS    Phone Number Patient can be reached at: Cell number on file:    Telephone Information:   Mobile [phone]       Best Time: ANYTIME    Can we leave a detailed message on this number?: Yes    Call taken on 3/11/2021 at 8:09 AM by Sherie Laguerre

## 2021-06-15 NOTE — TELEPHONE ENCOUNTER
Order for collar faxed to [facility].  Contacted pt to let her know about referral and ativan. Pt understanding.

## 2021-06-15 NOTE — TELEPHONE ENCOUNTER
Reason for Call:  Other   1.pt is confused about neurology appt, wants to see in person not the same one she was seeing before. Wants one that knows more about dizziness.     2 Would also like RX for soft collar neck brace please fax to [facility] [phone]     3. also would like a refill of  Ativan    4 would like to go over lab results    Phone Number Patient can be reached at: Home number on file [phone] (home)    Best Time: anytime    Can we leave a detailed message on this number?: Yes    Call taken on 2/8/2021 at 2:15 PM by Rajan Malik

## 2021-06-15 NOTE — TELEPHONE ENCOUNTER
Central PA team  [phone]  Pool: [facility] ([id number])          PA has been initiated.       PA form completed and faxed insurance via Cover My Meds     Key:  [id number] - PA Case ID: [id number] - Rx #: [id number]     Medication:  Promethazine HCl 12.5MG tablets    Insurance:  [employer]        Response will be received via fax and may take up to 5-10 business days depending on plan

## 2021-06-15 NOTE — PROGRESS NOTES
[facility] Care Coordination    Care Coordinator received a voicemail from Morenita stating she does not like the forehead thermometer she received.  She said the tempeture is inaccurate and changes every single time she uses it.  She stated she called [facility] and spoke with a man named Saul who said to have Care Coordinator contact [facility] and they will help get her a new thermometer.  Morenita stated she would probably prefer an under the tongue thermometer this time.  Care Coordinator stated I will contact [facility] about getting her a new thermometer and then follow up with her once I hear back from [facility].    Care Coordinator e-mailed [facility] requesting links or pictures of other thermometers Morenita could get.  Care Coordinator stated Morenita is not happy with the forehead one she has and would like to return if possible.  Care Coordinator requested a return e-mail.    Gloria Stanford, [facility]  [phone]

## 2021-06-16 NOTE — PROGRESS NOTES
Mailed: Annual Exam Incentive form to member.    Prem Borja  Care Management Specialist  [facility]  [phone]

## 2021-06-16 NOTE — PROGRESS NOTES
"[facility] Care Coordination    Care Coordinator received a call from Morenita stating her ICLS worker's last day working with her was yesterday.  Morenita stated she called [facility] to ask about a new staff and if she could get one and they said \"We will try.\".  Morenita stated she really needs a new ICLS worker and wondered if we should look for a new agency.  Care Coordinator stated that I would call the ICLS provider and ask about a new staff and if they cannot find her a new staff in a reasonable amount of time we will find a new provider.  Care Coordinator stated that I would call Morenita back after I hear from ICLS provider.    Care Coordinator called [facility] and left a voicemail requesting a return call to discuss getting a new ICLS worker for Morenita.    Gloria Stanford, [facility]  [phone]    "

## 2021-06-16 NOTE — PROGRESS NOTES
[facility] Care Coordination    Care Coordinator e-mailed [facility] to order a soft cervical collar for Morenita.  Care Coordinator attached the PCP orders pulled from Epic.    ASHLEIGH Guerra  [facility]  [phone]

## 2021-06-16 NOTE — PROGRESS NOTES
[facility] Care Coordination    Completed following tasks:    Updated services in access and Submitted referrals/auths for Digital thermometer $25 with [facility].     Prem Borja  Care Management Specialist  [facility]  [phone]

## 2021-06-16 NOTE — TELEPHONE ENCOUNTER
Prescription Monitoring Program activity reviewed with no discrepancies noted.      Last fill per : 2/11/21  Quantity/days supply: 20 tabs x 10 days     Controlled Substance Agreement on file: No  Date: NA    Last office visit with provider:  10/30/2020 Aminah Tamayo MD    Please advise.

## 2021-06-16 NOTE — TELEPHONE ENCOUNTER
Telephone Encounter by Keerthi Herbert LPN at 3/18/2020 12:46 PM     Author: Keerthi Herbert LPN Service: -- Author Type: Licensed Nurse    Filed: 3/18/2020 12:46 PM Encounter Date: 3/17/2020 Status: Signed    : Keerthi Herbert LPN (Licensed Nurse)       Hortensia Sampson, [place]  You 1 hour ago (11:08 AM)       No, I left it on her desk so maybe check with Kathy

## 2021-06-16 NOTE — TELEPHONE ENCOUNTER
Telephone Encounter by Ember Connolly CMA at 3/23/2020 10:26 AM     Author: Ember Connolly CMA Service: -- Author Type: Medical Assistant    Filed: 3/23/2020 10:28 AM Encounter Date: 3/23/2020 Status: Signed    : Ember Connolly CMA (Medical Assistant)       Spoke with the patient and relayed message below from Dr. Tamayo:  Aminah Tamayo MD [place], Kristen Care Team [place] 1 hour ago (8:54 AM)       I sent the prescription.  Thanks.    Routing comment      She verbalized understanding and had no further questions at this time.  Ember MALONE CMA/MEI....................10:28 AM

## 2021-06-16 NOTE — TELEPHONE ENCOUNTER
Reason for Call:  Medication or medication refill:    Do you use a [facility]?  Name of the pharmacy and phone number for the current request:   [facility]    Name of the medication requested:   Lorazepam    Other request: n/a    Can we leave a detailed message on this number? Yes    Phone number patient can be reached at: Cell number on file:    Telephone Information:   Mobile [phone]       Best Time: anytime    Call taken on 4/13/2021 at 8:15 AM by Sherie Laguerre

## 2021-06-16 NOTE — TELEPHONE ENCOUNTER
Telephone Encounter by Ember Connolly CMA at 3/16/2020  8:33 AM     Author: Ember Connolly CMA Service: -- Author Type: Medical Assistant    Filed: 3/16/2020  8:33 AM Encounter Date: 3/13/2020 Status: Signed    : Ember Connolly CMA (Medical Assistant)       Per the following message from Dr. Tamayo:  Aminah Tamayo MD Hanzel, Kristen Care Team Pool 2 days ago       This is ordered.  Thanks.      Ember MALONE CMA/MEI....................8:33 AM

## 2021-06-16 NOTE — PROGRESS NOTES
Morenita Moore is a 75 y.o. female who is being evaluated via a billable telephone visit.      What phone number would you like to be contacted at? [phone]  How would you like to obtain your AVS? AVS Preference: Mail a copy.      Telephone Visit   Morenita Moore   75 y.o. female    Date of Visit: 4/12/2021    Chief Complaint   Patient presents with     Follow Up     med check, new Dr information        Assessment and Plan   1. Dizziness  She has ongoing dizziness which came on after a viral illness and pericarditis over a year ago.  She sees Neurology later this week and will proceed with that consult.           Return in about 3 months (around 7/12/2021) for Establish Care - [place].     History of Present Illness   This 75 y.o. old female is evaluated with a telephone visit.  She has had ongoing dizziness and fatigue since having a viral illness and pericarditis over a year ago.  No recent changes, just touching base.  No other acute concerns today.    Review of Systems: As above, systems otherwise reviewed and negative.     Medications, Allergies and Problem List   Patient Active Problem List   Diagnosis     Breast cancer in situ     Asthma in adult     SVT (supraventricular tachycardia) (H)     Torticollis     Tremor, physiological     Pericarditis     Gastroesophageal reflux disease without esophagitis     Current Outpatient Medications   Medication Sig Dispense Refill     albuterol (VENTOLIN HFA) 90 mcg/actuation inhaler Inhale 2 puffs 4 (four) times a day as needed. 1 Inhaler 3     ascorbic acid, vitamin C, (ASCORBIC ACID WITH KHUSHBOO HIPS) 500 MG tablet Take 500 mg by mouth daily.       aspirin (ASPIR-81 ORAL) Take by mouth.       calcium-vitamin D 500 mg(1,250mg) -200 unit per tablet Take 1 tablet by mouth 2 (two) times a day with meals.       cyanocobalamin (VITAMIN B-12) 50 mcg tablet Take 50 mcg by mouth daily. weekly       fluticasone propion-salmeteroL (ADVAIR HFA) 230-21 mcg/actuation inhaler  Inhale 2 puffs 2 (two) times a day. 3 Inhaler 3     LORazepam (ATIVAN) 0.5 MG tablet Take 1 tablet (0.5 mg total) by mouth 2 (two) times a day as needed for anxiety. 20 tablet 1     omeprazole (PRILOSEC) 20 MG capsule Take 1 capsule (20 mg total) by mouth daily before breakfast. 90 capsule 3     promethazine (PHENERGAN) 12.5 MG tablet Take 1 tablet (12.5 mg total) by mouth every 8 (eight) hours as needed for nausea. 20 tablet 0     No current facility-administered medications for this visit.      Allergies   Allergen Reactions     Levalbuterol Shortness Of Breath     Cat Dander Itching and Other (See Comments)     Itchy eyes     Amitriptyline Palpitations     Escitalopram Anxiety          Physical Exam     There were no vitals taken for this visit.    During our discussion, no evidence of shortness of breath.     Additional Information   Social History     Tobacco Use     Smoking status: Former Smoker     Types: Cigarettes     Quit date: 1988     Years since quittin.3     Smokeless tobacco: Never Used     Tobacco comment: Quit in 's   Substance Use Topics     Alcohol use: Never     Frequency: Never     Drug use: Not on file            Aminah Tamayo MD      Phone call duration: 11 minutes

## 2021-06-16 NOTE — TELEPHONE ENCOUNTER
Referral Requested  Referral being requested: Infectious Disease at the [facility]    Reason service is needed/diagnosis: She can not get an appointment for sometime with the [facility] Infectious Disease.  Patient is going to the [facility] for Neurology and would like to see Infectious Disease doctor there as well.    When is the referral needed: As soon as possible   Where to send referral (if applicable): [facility]  Okay to leave detailed message  No    She has a chronic viral infection that no one can diagnosis.  The viral infection gives her chronic fatigue.  Patient states Dr. Tamayo knows about her condition.      She did get her Covid-19 vaccine on 3/9/20 with bad reaction.  She had shortness of breath, an episode of tongue and lip swelling.

## 2021-06-16 NOTE — TELEPHONE ENCOUNTER
Reason for Call:  Other call back      Detailed comments: Pt stating Rx for:  Diazepam was sent to wrong pharmacy, please  Send to:  [facility] [place]  Phone:  [phone]  Store #:  [id number]    Please call patient when RX has been resent    Phone Number Patient can be reached at: Cell number on file:    Telephone Information:   Mobile [phone]       Best Time: anytime    Can we leave a detailed message on this number?: Yes    Call taken on 4/19/2021 at 8:07 AM by Sherie Laguerre

## 2021-06-17 NOTE — TELEPHONE ENCOUNTER
Telephone Encounter by Keerthi Herbert LPN at 6/18/2020  2:59 PM     Author: Keerthi Herbert LPN Service: -- Author Type: Licensed Nurse    Filed: 6/18/2020  3:09 PM Encounter Date: 6/18/2020 Status: Signed    : Keerthi Herbert LPN (Licensed Nurse)       Aminah Tamayo MD Hanzel, Kristen Care Team Pool 2 hours ago (12:13 PM)       Please let her know that I've ordered a cortisol level.  It needs to be done first thing in the am.  We will also await her Neurology evaluation.  Thanks.    Routing comment      Called and scheduled patient for lab work- she is scheduled for tele visit w/neuro next Thursday

## 2021-06-17 NOTE — TELEPHONE ENCOUNTER
Telephone Encounter by Jasmyne Granados at 3/15/2021  3:08 PM     Author: Jasmyne Granados Service: -- Author Type: --    Filed: 3/15/2021  3:09 PM Encounter Date: 3/11/2021 Status: Signed    : Jasmyne Granados APPROVED:    Approval start date: 01/01/2021  Approval end date:  03/15/2022    Pharmacy has been notified of approval and will contact patient when medication is ready for pickup.

## 2021-06-17 NOTE — TELEPHONE ENCOUNTER
Telephone Encounter by Nelly Avalos LPN at 4/19/2021  9:10 AM     Author: Nelly Avalos LPN Service: -- Author Type: Licensed Nurse    Filed: 4/19/2021  9:19 AM Encounter Date: 4/19/2021 Status: Signed    : Nelly Avalos LPN (Licensed Nurse)       Spoke to Morenita.  She corrected herself in that it should be lorazepam.     Dr. Tamayo sent this prescription on 4/13/21 but it went to the wrong pharmacy.    Incorrect pharmacy was notified to cancel the prescription.  Please resend.    Medication: Lorazepam 0.5 mg  Last Date Filled 2/16/21   pulled: YES        Only PCP Prescribing? : YES  Taken as prescribed from physician notes? YES    CSA in last year: NO  Random Utox in last year: NO  Opioids + benzodiazepines? YES     Patient had a visit with Dr. Tamayo on 4/12/21.    Is patient on the Executive Review Team? No    All responses suggest: Refilling prescription

## 2021-06-17 NOTE — TELEPHONE ENCOUNTER
"Telephone Encounter by Anne Chung RN at 9/25/2020 10:16 AM     Author: Anne Chung RN Service: -- Author Type: Registered Nurse    Filed: 9/25/2020 10:53 AM Encounter Date: 9/25/2020 Status: Signed    : Anne Chung RN (Registered Nurse)       Dr. Vilchis,  Please see patient concerns below.  Follow up is scheduled 10/7/20.  Do you have any new orders or recommendations in the interim?  Thank you - Anne    ------------------------------------  PC to patient to discuss her concerns. She reports an achiness in her chest identical to previous pericarditis (diagnosed 11/25/19)  stating she has \"residual pain around my heart\". She is sure the pain is not cardiac and denies lightheadedness, dizziness, shortness of breath, palpitations, nausea and syncope.    She is taking aspirin 81 mg daily recommended by Dr. Tamayo.  She is followed by Dr. Perdomo of Pulmonology and a 6 month f/u Chest CT was ordered for abnormalities found on CT in March of 2020.  Since having abnormal kidney function from ibuprofen she wonders if Dr. Vilchis will order a CT of her heart. Informed patient this is doubtful; however Dr. Vilchis will be updated and he will likely make recommendations after she is seen in clinic 10/7/2020.   Encouraged patient to call Dr. Perdomo's clinic with Chest CT questions and concerns.  Patient will only be called back if new recommendations or orders are made in the interim. She verbalized understanding and has no further questions or concerns at this time.  ------------------------------  Virtual Visit     4/15/2020  [facility]  Pacheco Perdomo MD   Critical Care Medicine  Uncomplicated asthma, unspecified asthma severity, unspecified whether persistent +1 more   Dx  Establish Care   , Asthma , Consult ; Referred by Aminah Tamayo MD   Reason for Visit      #CEDRIC ground glass nodule, 0.6cm  - repeat CT chest in 6 months (at next visit) to document stability.       "

## 2021-06-17 NOTE — PROGRESS NOTES
"  Office Visit - Follow Up   Morenita Moore   75 y.o. female    Date of Visit: 4/30/2021    Chief Complaint   Patient presents with     Ear Pain     bilateral, mostly left side x 2 weeks, sharp pain on and off, some dizziness     Mammogram     patient due, orders pending        Assessment and Plan   1. Vertigo  I reviewed her neurology visit.  Agree with vestibular therapy through physical therapy    2. Otalgia of both ears  Her ear exam is unremarkable today    3. Torticollis  Follows with neurology    4. Pericarditis, unspecified chronicity, unspecified type  Cardiology, stable.  She does have a little bit of hypertension and historically her blood pressures have generally been okay.  I encouraged close follow-up and she should establish with a new primary physician    5. Visit for screening mammogram  - Mammo Screening Bilateral; Future    Return in about 4 weeks (around 5/28/2021) for follow up with PCP.     History of Present Illness   This 75 y.o. old patient historically of Dr. Aminah Tamayo comes in for evaluation of bilateral ear pain.  She has been struggling recently.  She had an episode of viral pericarditis.  This seems to be improving.  Has been having some vertigo as well as neck pain and saw neurology.  She has some torticollis.  She has been recommended to do vestibular therapy by neurology.  Recently both ears hurt for a brief period but now doing better.  No fever chills sore throat cough.    Review of Systems: A comprehensive review of systems was negative except as noted.     Medications, Allergies and Problem List   Reviewed, reconciled and updated  Post Discharge Medication Reconciliation Status:      Physical Exam   General Appearance:   No acute distress    /74   Pulse 92   Temp 98.1  F (36.7  C) (Tympanic)   Ht 5' 4.5\" (1.638 m)   Wt 130 lb 2 oz (59 kg)   SpO2 98%   BMI 21.99 kg/m      HEENT exam is unremarkable  Neck supple no thyromegaly or nodule palpable  Lymphatic no " cervical lymphadenopathy  Cardiovascular regular rate and rhythm no murmur gallop or rub  Pulmonary lungs are clear to auscultation bilaterally  Gastrointestinal abdomen soft nontender nondistended no organomegaly  Neurologic exam is non focal  Psychiatric pleasant, no confusion or agitation        Additional Information   Current Outpatient Medications   Medication Sig Dispense Refill     albuterol (VENTOLIN HFA) 90 mcg/actuation inhaler Inhale 2 puffs 4 (four) times a day as needed. 1 Inhaler 3     ascorbic acid, vitamin C, (ASCORBIC ACID WITH KHUSHBOO HIPS) 500 MG tablet Take 500 mg by mouth daily.       aspirin (ASPIR-81 ORAL) Take by mouth.       calcium-vitamin D 500 mg(1,250mg) -200 unit per tablet Take 1 tablet by mouth 2 (two) times a day with meals.       cyanocobalamin (VITAMIN B-12) 50 mcg tablet Take 50 mcg by mouth daily. weekly       fluticasone propion-salmeteroL (ADVAIR HFA) 230-21 mcg/actuation inhaler Inhale 2 puffs 2 (two) times a day. 3 Inhaler 3     LORazepam (ATIVAN) 0.5 MG tablet Take 1 tablet (0.5 mg total) by mouth 2 (two) times a day as needed for anxiety. 20 tablet 1     omeprazole (PRILOSEC) 20 MG capsule Take 1 capsule (20 mg total) by mouth daily before breakfast. 90 capsule 3     promethazine (PHENERGAN) 12.5 MG tablet Take 1 tablet (12.5 mg total) by mouth every 8 (eight) hours as needed for nausea. 20 tablet 0     No current facility-administered medications for this visit.      Allergies   Allergen Reactions     Levalbuterol Shortness Of Breath     Cat Dander Itching and Other (See Comments)     Itchy eyes     Amitriptyline Palpitations     Escitalopram Anxiety     Social History     Tobacco Use     Smoking status: Former Smoker     Types: Cigarettes     Quit date: 1988     Years since quittin.3     Smokeless tobacco: Never Used     Tobacco comment: Quit in    Substance Use Topics     Alcohol use: Never     Frequency: Never     Drug use: Not on file       Review and/or  order of clinical lab tests:  Review and/or order of radiology tests:  Review and/or order of medicine tests:  Discussion of test results with performing physician:  Decision to obtain old records and/or obtain history from someone other than the patient:  Review and summarization of old records and/or obtaining history from someone other than the patient and.or discussion of case with another health care provider:  Independent visualization of image, tracing or specimen itself:    Time:      Khalif Medina MD

## 2021-06-18 ENCOUNTER — HOSPITAL ENCOUNTER (OUTPATIENT)
Dept: PHYSICAL THERAPY | Facility: CLINIC | Age: 76
End: 2021-06-18
Payer: MEDICARE

## 2021-06-18 DIAGNOSIS — R42 DIZZINESS: ICD-10-CM

## 2021-06-18 DIAGNOSIS — R26.89 BALANCE PROBLEMS: Primary | ICD-10-CM

## 2021-06-18 PROCEDURE — 97112 NEUROMUSCULAR REEDUCATION: CPT | Mod: GP | Performed by: PHYSICAL THERAPIST

## 2021-06-18 NOTE — PATIENT INSTRUCTIONS - HE
Patient Instructions by Aminah Tamayo MD at 2/1/2021 10:40 AM     Author: Aminah Tamayo MD Service: -- Author Type: Physician    Filed: 2/1/2021 11:09 AM Encounter Date: 2/1/2021 Status: Signed    : Aminah Tamayo MD (Physician)         Patient Education     Exercise for a Healthier Heart  You may wonder how you can improve the health of your heart. If youre thinking about exercise, youre on the right track. You dont need to become an athlete, but you do need a certain amount of brisk exercise to help strengthen your heart. If you have been diagnosed with a heart condition, your doctor may recommend exercise to help stabilize your condition. To help make exercise a habit, choose safe, fun activities.       Be sure to check with your health care provider before starting an exercise program.    Why exercise?  Exercising regularly offers many healthy rewards. It can help you do all of the following:    Improve your blood cholesterol levels to help prevent further heart trouble    Lower your blood pressure to help prevent a stroke or heart attack    Control diabetes, or reduce your risk of getting this disease    Improve your heart and lung function    Reach and maintain a healthy weight    Make your muscles stronger and more limber so you can stay active    Prevent falls and fractures by slowing the loss of bone mass (osteoporosis)    Manage stress better  Exercise tips  Ease into your routine. Set small goals. Then build on them.  Exercise on most days. Aim for a total of 150 or more minutes of moderate to  vigorous intensity activity each week. Consider 40 minutes, 3 to 4 times a week. For best results, activity should last for 40 minutes on average. It is OK to work up to the 40 minute period over time. Examples of moderate-intensity activity is walking one mile in 15 minutes or 30 to 45 minutes of yard work.  Step up your daily activity level. Along with your exercise program, try being more  active throughout the day. Walk instead of drive. Do more household tasks or yard work.  Choose one or more activities you enjoy. Walking is one of the easiest things you can do. You can also try swimming, riding a bike, or taking an exercise class.  Stop exercising and call your doctor if you:    Have chest pain or feel dizzy or lightheaded    Feel burning, tightness, pressure, or heaviness in your chest, neck, shoulders, back, or arms    Have unusual shortness of breath    Have increased joint or muscle pain    Have palpitations or an irregular heartbeat      1165-6093 [employer]. [address] [place] [address]367. All rights reserved. This information is not intended as a substitute for professional medical care. Always follow your healthcare professional's instructions.         Patient Education   Activities of Daily Living  Your Health Risk Assessment indicates you have difficulties with activities of daily living such as eating, getting dressed, grooming, bathing, walking, or using the toilet. Please make a follow up appointment for us to address this issue in more detail.     Patient Education   Instrumental Activities of Daily Living  Your Health Risk Assessment indicates you have difficulties with instrumental activities of daily living which include laundry, housekeeping, banking, shopping, using the telephone, food preparation, transportation, or taking your own medications. Please make a follow up appointment for us to address this issue in more detail.    [employer] has resources available on the following website: [email or web address]     Also, here is a local agency that provides help with meals and other assistance:   [facility] Line: [phone]     Patient Education   Preventing Falls in the Home  As you get older, falls are more likely. Thats because your reaction time slows. Your muscles and joints may also get stiffer, making them less flexible.  Illness, medications, and vision changes can also affect your balance. A fall could leave you unable to live on your own. To make your home safer, follow these tips:    Floors    Put nonskid pads under area rugs.    Remove throw rugs.    Replace worn floor coverings.    Tack carpets firmly to each step on carpeted stairs. Put nonskid strips on the edges of uncarpeted stairs.    Keep floors and stairs free of clutter and cords.    Arrange furniture so there are clear pathways.    Clean up any spills right away.    Bathrooms    Install grab bars in the tub or shower.    Apply nonskid strips or put a nonskid rubber mat in the tub or shower.    Sit on a bath chair to bathe.    Use bathmats with nonskid backing.    Lighting    Keep a flashlight in each room.    Put a nightlight along the pathway between the bedroom and the bathroom.    3543-2014 [employer]. [address] [place] [address]. All rights reserved. This information is not intended as a substitute for professional medical care. Always follow your healthcare professional's instructions.         Patient Education   Personalized Prevention Plan  You are due for the preventive services outlined below.  Your care team is available to assist you in scheduling these services.  If you have already completed any of these items, please share that information with your care team to update in your medical record.  Health Maintenance   Topic Date Due   ? ZOSTER VACCINES (2 of 3) 09/15/2009   ? MAMMOGRAM  08/14/2020   ? ASTHMA ACTION PLAN  02/27/2021   ? LIPID  03/16/2021   ? Asthma Control Test  02/01/2022   ? MEDICARE ANNUAL WELLNESS VISIT  02/01/2022   ? FALL RISK ASSESSMENT  02/01/2022   ? COLORECTAL CANCER SCREENING  10/09/2023   ? TD 18+ HE  09/09/2025   ? ADVANCE CARE PLANNING  02/01/2026   ? DEXA SCAN  07/17/2033   ? DEPRESSION ACTION PLAN  Completed   ? HEPATITIS C SCREENING  Completed   ? Pneumococcal Vaccine: 65+ Years  Completed   ?  INFLUENZA VACCINE RULE BASED  Completed   ? Pneumococcal Vaccine: Pediatrics (0 to 5 Years) and At-Risk Patients (6 to 64 Years)  Aged Out   ? HEPATITIS B VACCINES  Aged Out

## 2021-06-20 NOTE — LETTER
Letter by Aminah Tamayo MD at      Author: Aminah Tamayo MD Service: -- Author Type: --    Filed:  Encounter Date: 6/3/2020 Status: (Other)         Morenita Moore  [address] [facility] [address]  [place] [address]             Coco 3, 2020         Dear Ms. Moore,    Below are the results from your recent visit:    Resulted Orders   Potassium   Result Value Ref Range    Potassium 4.0 3.5 - 5.0 mmol/L   Sedimentation Rate   Result Value Ref Range    Sed Rate 8 0 - 20 mm/hr   C-Reactive Protein(CRP)   Result Value Ref Range    CRP 0.1 0.0 - 0.8 mg/dL       All very good results     Please call with questions or contact us using [employer].    Sincerely,        Electronically signed by Aminah Tamayo MD

## 2021-06-20 NOTE — LETTER
Letter by Bart Roper MD at      Author: Bart Roper MD Service: -- Author Type: --    Filed:  Encounter Date: 3/4/2020 Status: (Other)         Morenita Moore  [address]  [place] [address]             March 4, 2020         Dear Ms. Moore,    Below are the results from your recent visit:    Resulted Orders   Comprehensive Metabolic Panel   Result Value Ref Range    Sodium 137 136 - 145 mmol/L    Potassium 4.1 3.5 - 5.0 mmol/L    Chloride 102 98 - 107 mmol/L    CO2 26 22 - 31 mmol/L    Anion Gap, Calculation 9 5 - 18 mmol/L    Glucose 104 70 - 125 mg/dL    BUN 13 8 - 28 mg/dL    Creatinine 0.68 0.60 - 1.10 mg/dL    GFR MDRD Af Amer >60 >60 mL/min/1.73m2    GFR MDRD Non Af Amer >60 >60 mL/min/1.73m2    Bilirubin, Total 0.6 0.0 - 1.0 mg/dL    Calcium 9.7 8.5 - 10.5 mg/dL    Protein, Total 7.0 6.0 - 8.0 g/dL    Albumin 4.2 3.5 - 5.0 g/dL    Alkaline Phosphatase 75 45 - 120 U/L    AST 12 0 - 40 U/L    ALT 18 0 - 45 U/L    Narrative    Fasting Glucose reference range is 70-99 mg/dL per  American Diabetes Association (ADA) guidelines.   HM1 (CBC with Diff)   Result Value Ref Range    WBC 7.0 4.0 - 11.0 thou/uL    RBC 4.17 3.80 - 5.40 mill/uL    Hemoglobin 13.7 12.0 - 16.0 g/dL    Hematocrit 39.0 35.0 - 47.0 %    MCV 93 80 - 100 fL    MCH 32.8 27.0 - 34.0 pg    MCHC 35.1 32.0 - 36.0 g/dL    RDW 12.1 11.0 - 14.5 %    Platelets 389 140 - 440 thou/uL    MPV 6.9 (L) 7.0 - 10.0 fL    Neutrophils % 77 (H) 50 - 70 %    Lymphocytes % 16 (L) 20 - 40 %    Monocytes % 6 2 - 10 %    Eosinophils % 1 0 - 6 %    Basophils % 1 0 - 2 %    Neutrophils Absolute 5.4 2.0 - 7.7 thou/uL    Lymphocytes Absolute 1.1 0.8 - 4.4 thou/uL    Monocytes Absolute 0.4 0.0 - 0.9 thou/uL    Eosinophils Absolute 0.1 0.0 - 0.4 thou/uL    Basophils Absolute 0.0 0.0 - 0.2 thou/uL       Morenita, recent labs are reviewed.  Your electrolytes, blood sugar, kidney functions, and all liver function tests, are normal.  Hemoglobin is good at  13.7.  I see no evidence here of iron deficiency.  I know that was a concern of yours.    Please call with questions or contact us using [employer].    Sincerely,        Electronically signed by Bart Roper MD

## 2021-06-20 NOTE — LETTER
Letter by Aminah Tamayo MD at      Author: Aminah Tamayo MD Service: -- Author Type: --    Filed:  Encounter Date: 6/3/2020 Status: (Other)         Morenita Moore  [address]  [place] [address]             Coco 3, 2020         Dear Ms. [place],    Below are the results from your recent visit:    Resulted Orders   Sedimentation Rate   Result Value Ref Range    Sed Rate 8 0 - 20 mm/hr       All very good results     Please call with questions or contact us using [employer].    Sincerely,        Electronically signed by Aminah Tamayo MD

## 2021-06-20 NOTE — LETTER
Letter by Pacheco Perdomo MD at      Author: Pacheco Perdomo MD Service: -- Author Type: --    Filed:  Encounter Date: 3/9/2020 Status: (Other)         Morenita Moore  [address]  [place] [address]    March 9, 2020    Dear Ms. Moore,    Welcome to [facility]! Your appointment information is below.   Please bring the following to your appointment:    Insurance Card, so we may scan it for our records    Drivers license or valid ID, so we may scan it for our records    Co-pay (as applicable per your insurance plan)    A current list of your medications including over the counter products such as vitamins and supplements    Your medical records including copies of X-Ray films if you are transferring your care from another clinic.  If you do not have your records, please fill out the release of information form and we will request those records.     Provider: Pacheco Perdomo MD  Appointment Date: April 15, 2020  Arrival Time: 1:00PM for PFT and 2:00PM for Consult   Arrive 15 minutes early     Location: [facility]         [address]        [place], [address]    **Please allow adequate time for your commute and parking. If you are more than 10 minutes late, you may be asked to reschedule.     If you need to cancel or reschedule your appointment, please notify us at least 24 hours prior to your appointment time so we are able to make this time available for another patient.    Thank you for choosing the [facility] for your health care needs. If you have any questions, please do not hesitate to contact us at any time at   [phone]. We look forward to caring for you.     Sincerely,     [facility] [place] staff

## 2021-06-20 NOTE — LETTER
Letter by Severson, Tammie F, LPN at      Author: Severson, Tammie F, LPN Service: -- Author Type: --    Filed:  Encounter Date: 5/2/2020 Status: (Other)       5/2/2020        Morenita Moore  [address] [facility] [place] [address]    This letter provides a written record that you were tested for COVID-19 on 5/1/20.     Your result was negative.    This means that we didnt find the virus that causes COVID-19 in your sample. A test may show negative when you do actually have the virus. This can happen when the virus is in the early stages of infection, before you feel illness symptoms.    Even if you dont have symptoms, they may still appear. For safety, its very important to follow these rules.    Keep yourself away from others (self-isolation):      Stay home. Dont go to work, school or anywhere else.     Stay away from others in your home. No hugging, kissing or shaking hands.    Dont let anyone visit.    Cover your mouth and nose with a mask, tissue or wash cloth to avoid spreading germs.    Stay in self-isolation until you meet ALL of the guidelines below:    1. You have had no fever for at least 72 hours (that is 3 full days of no fever without the use of medicine that reduces fevers), AND  2. other symptoms (such as cough, shortness of breath) have gotten better, AND  3. at least 7 days have passed since your symptoms first appeared.    Going back to work  Check with your employer for any guidelines to follow for going back to work.    Employers: This document serves as formal notice that your employee tested negative for COVID-19, as of the testing date shown above.    For questions regarding this letter or your Negative COVID-19 result, call [phone] between 8A to 6:30P (M-F) and 10A to 6:30P (weekends).

## 2021-06-20 NOTE — LETTER
Letter by Gloria Stanford SW at      Author: Gloria Stanford SW Service: -- Author Type: --    Filed:  Encounter Date: 3/12/2020 Status: (Other)         March 12, 2020    OPHELIA ZEE  [address]  [place] [address]        Dear  Frandy Xaviercome to [facility] ([facility] health program. My name is ASHLEIGH Guerra. I am your MSC+ care coordinator.     I will call you soon to see how you are doing and determine what needs you may have. My job is to help connect you to services, complete an assessment, and develop a care plan with you. There is no charge to you for the care coordination and assessment services. Our goal is to keep you as healthy and independent as possible.     [facility] includes the benefits you may receive from Medical Assistance.    Soon, you will receive a new MSC+ member identification (ID) card from [facility]. When you receive it, please use this card along with your [place] [facility] Programs card and Prescription Drug Coverage Program card. When you receive, it please use this card where you get your health services. If you have Medicare, you will need to show your Medicare card when you get health services.    If you have questions, please call me at [phone]. If you reach my voice mail, leave a message and your phone number. If you are hearing impaired, please call the [place] Relay at 245 or [phone] (jhfagv-[email or web address] relay service).    Sincerely,        ASHLEIGH Guerra    E-mail: [email or web address]  [phone]      [facility] [id number] DHS Approved ([id number])  [id number] (11/18)

## 2021-06-21 NOTE — LETTER
Letter by Aminah Tamayo MD at      Author: Aminah Tamayo MD Service: -- Author Type: --    Filed:  Encounter Date: 2/2/2021 Status: (Other)         oMrenita Moore  [address]  [place] [address]             February 2, 2021         Dear Ms. Moore,    Below are the results from your recent visit:    Resulted Orders   HM2(CBC w/o Differential)   Result Value Ref Range    WBC 5.2 4.0 - 11.0 thou/uL    RBC 4.37 3.80 - 5.40 mill/uL    Hemoglobin 13.7 12.0 - 16.0 g/dL    Hematocrit 41.1 35.0 - 47.0 %    MCV 94 80 - 100 fL    MCH 31.4 27.0 - 34.0 pg    MCHC 33.3 32.0 - 36.0 g/dL    RDW 12.9 11.0 - 14.5 %    Platelets 267 140 - 440 thou/uL    MPV 8.9 7.0 - 10.0 fL   Comprehensive Metabolic Panel   Result Value Ref Range    Sodium 140 136 - 145 mmol/L    Potassium 4.0 3.5 - 5.0 mmol/L    Chloride 106 98 - 107 mmol/L    CO2 25 22 - 31 mmol/L    Anion Gap, Calculation 9 5 - 18 mmol/L    Glucose 89 70 - 125 mg/dL    BUN 10 8 - 28 mg/dL    Creatinine 0.67 0.60 - 1.10 mg/dL    GFR MDRD Af Amer >60 >60 mL/min/1.73m2    GFR MDRD Non Af Amer >60 >60 mL/min/1.73m2    Bilirubin, Total 0.6 0.0 - 1.0 mg/dL    Calcium 9.1 8.5 - 10.5 mg/dL    Protein, Total 6.8 6.0 - 8.0 g/dL    Albumin 4.3 3.5 - 5.0 g/dL    Alkaline Phosphatase 82 45 - 120 U/L    AST 16 0 - 40 U/L    ALT 12 0 - 45 U/L    Narrative    Fasting Glucose reference range is 70-99 mg/dL per  American Diabetes Association (ADA) guidelines.   Vitamin B12   Result Value Ref Range    Vitamin B-12 683 213 - 816 pg/mL   Vitamin D, Total (25-Hydroxy)   Result Value Ref Range    Vitamin D, Total (25-Hydroxy) 37.5 30.0 - 80.0 ng/mL    Narrative    Deficiency <10.0 ng/mL  Insufficiency 10.0-29.9 ng/mL  Sufficiency 30.0-80.0 ng/mL  Toxicity (possible) >100.0 ng/mL       Hi Morenita.  Your labs were all normal this week.  Please let me know if you have any questions.  [facility]      Electronically signed by Amniah Tamayo MD

## 2021-06-21 NOTE — LETTER
Letter by Aminah Tamayo MD at      Author: Aminah Tamayo MD Service: -- Author Type: --    Filed:  Encounter Date: 2/2/2021 Status: (Other)

## 2021-06-21 NOTE — LETTER
Letter by Gloria Stanford SW at      Author: Gloria Stanford SW Service: -- Author Type: --    Filed:  Encounter Date: 1/26/2021 Status: (Other)       January 26, 2021    MORENITA ZEE  [address] [facility] [place] [address]        Dear Morenita:    At [facility], we are dedicated to improving your health and well-being. Enclosed is the Comprehensive Care Plan that we developed with you on 12/29/20. Please review the Care Plan carefully.    As a reminder, some of the things we discussed at your visit include:    Your physical and mental health    Ways to reduce falls    Health care needs you may have    Dont forget to contact your care coordinator if you:    Have been hospitalized or plan to be hospitalized     Have had a fall     Have experienced a change in physical health    Are experiencing emotional problems     If you do not agree with your Care Plan, have questions about it, or have experienced a change in your needs, please call me at [phone]. If you are hearing impaired, please call the [place] Relay at 965 or [phone] (nmphbc-qc-ixokwl relay service).    Sincerely,          ASHLEIGH Guerra    E-mail: [email or web address]  [phone]      Alisha Talley                [id number] [place] ([id number]     [id number] (11/18)

## 2021-06-25 ENCOUNTER — OFFICE VISIT (OUTPATIENT)
Dept: NEUROLOGY | Facility: CLINIC | Age: 76
End: 2021-06-25
Payer: MEDICARE

## 2021-06-25 VITALS
OXYGEN SATURATION: 98 % | WEIGHT: 130.4 LBS | DIASTOLIC BLOOD PRESSURE: 93 MMHG | RESPIRATION RATE: 16 BRPM | BODY MASS INDEX: 21.7 KG/M2 | SYSTOLIC BLOOD PRESSURE: 170 MMHG | HEART RATE: 89 BPM

## 2021-06-25 DIAGNOSIS — R42 DIZZINESS: Primary | ICD-10-CM

## 2021-06-25 PROCEDURE — 99215 OFFICE O/P EST HI 40 MIN: CPT | Performed by: INTERNAL MEDICINE

## 2021-06-25 ASSESSMENT — PAIN SCALES - GENERAL: PAINLEVEL: NO PAIN (0)

## 2021-06-25 NOTE — NURSING NOTE
Chief Complaint   Patient presents with     RECHECK     [facility] RETURN - 2 MO. F/U IN PERSON     Leon Jones

## 2021-06-25 NOTE — PROGRESS NOTES
[facility] Follow Up Visit    Morenita Moore MRN# [id number]   Age: 75 year old YOB: 1945     Brief history of symptoms: The patient was initially seen in neurologic consultation on 4/16/2021 for evaluation of dizziness. Please see the comprehensive neurologic consultation note from that date in the Epic records for details.     Interval history:   After last visit patient was given 5 day course of prednisone for left ear pain and dizziness. She had improvement in left ear pain, but dizziness continues. She is doing vestibular therapy and believes that symptoms are mildly improved with this. Her anxiety has been severe lately. The dizziness gets worse when her anxiety is high. Her blood pressure has also been elevated as of late. She is going to schedule a visit with a primary care provider.         Past Medical History:     Patient Active Problem List   Diagnosis     Acute internal derangement of left knee     Asthma in adult     Breast cancer in situ     Depression, recurrent (H)     Sprain of posterior cruciate ligament of left knee     SVT (supraventricular tachycardia) (H)     Torticollis     Past Medical History:   Diagnosis Date     Anxiety      Asthma 2012    adult-onset asthma diagnosed in 2012     Cancer (H)      DCIS (ductal carcinoma in situ) 2001    stage 0 status post left mastectomy in 2001     Depressive disorder      Uncomplicated asthma         Past Surgical History:     Past Surgical History:   Procedure Laterality Date     BREAST SURGERY       CATARACT IOL, RT/LT       RETINAL REATTACHMENT          Social History:     Social History     Tobacco Use     Smoking status: Former Smoker     Smokeless tobacco: Never Used     Tobacco comment: smoked about 10 years   Substance Use Topics     Alcohol use: No     Drug use: Never        Family History:     Family History   Problem Relation Age of Onset     Cancer Sister         breast cancer     Glaucoma No family hx of      Macular  Degeneration No family hx of         Medications:     Current Outpatient Medications   Medication Sig     acetaminophen (TYLENOL) 500 MG tablet Take 500 mg by mouth as needed     albuterol (PROVENTIL HFA: VENTOLIN HFA) 108 (90 BASE) MCG/ACT inhaler Inhale 2 puffs into the lungs every 6 hours.       aspirin 81 MG EC tablet Take 81 mg by mouth daily     calcium carbonate 750 MG CHEW Take 1-2 tablets by mouth as needed     calcium carbonate-vitamin D (OYSTER SHELL CALCIUM/D) 500-200 MG-UNIT tablet Take 1 tablet by mouth     Cyanocobalamin 50 MCG TABS Take 50 mcg by mouth     fluticasone-salmeterol (ADVAIR) 100-50 MCG/DOSE diskus inhaler Inhale 1 puff into the lungs every 12 hours     LORazepam (ATIVAN) 0.5 MG tablet as needed      promethazine (PHENERGAN) 12.5 MG tablet Take 12.5 mg by mouth as needed     vitamin C (ASCORBIC ACID) 500 MG tablet Take 500 mg by mouth     No current facility-administered medications for this visit.         Allergies:     Allergies   Allergen Reactions     Levalbuterol Hydrochloride Shortness Of Breath     Cats Other (See Comments)     Itchy eyes     Dust Mites      Other reaction(s): Wheezing  Wheezing/shortness/breath/see (IRP 6/1)     Amitriptyline Palpitations     Escitalopram Anxiety        Review of Systems:   As above     Physical Exam:   Vitals: BP (!) 170/93   Pulse 89   Resp 16   Wt 59.1 kg (130 lb 6.4 oz)   SpO2 98%   BMI 21.70 kg/m     General: Seated comfortably in no acute distress.  Lungs: breathing comfortably  Extremities: no edema  Skin: No rashes  Neurologic:     Mental Status: Fully alert, attentive. Normal memory and fund of knowledge. Language normal, speech clear and fluent, no paraphasic errors.     Cranial Nerves: Visual fields intact. EOMI with normal smooth pursuit. Facial sensation intact/symmetric. Facial movements symmetric. Hearing not formally tested but intact to conversation. Palate elevation symmetric, uvula midline. No dysarthria. Shoulder shrug  strong bilaterally. Tongue protrusion midline.     Motor: Left head tilt and continuous horizontal neck tremor. Muscle tone normal throughout. Normal/symmetric rapid finger tapping. Strength 5/5 throughout upper and lower extremities. Bilateral hammer toes.     Deep Tendon Reflexes: 2+/symmetric throughout upper and lower extremities. Toes downgoing bilaterally.     Sensory: Intact/symmetric to light touch, temperature, vibration and proprioception throughout upper and lower extremities. Negative Romberg.      Coordination: Finger-nose-finger and heel-shin intact without dysmetria. Rapid alternating movements intact/symmetric with normal speed and rhythm.     Gait: Casual gait with normal stance width and stride length. Mild ataxia with casual gait at times. Has mild difficulties with heel, toe, and tandem gait intermittently needing support.     Data reviewed on previous visits    Imaging:  MRI brain without contrast 7/2020  1.  No acute intracranial process.  2.  Generalized brain atrophy and presumed microvascular ischemic changes similar to findings on the 2018 MRI.    Laboratory:  TSH normal 1/2020    Pertinent Investigations since last visit:   None         Assessment and Plan:   Assessment:  Morenita Moore is a 75 year old female who presents for follow-up of head tremor and dizziness/balance problems. Head tremor developed after started selective serotonin reuptake inhibitor in 2018. She has prior history of cervical dystonia. Head tremor is likely secondary to worsening of cervical dystonia. She is currently not interested in botox due to possible side effects. She thinks the head tremor is tolerable.       Dizziness/balance issues developed after nonspecific viral infection in 2018 as well. She also had pericarditis. Dizziness is present when she moves her head too fast or turns too quickly. It is described as intermittent swaying, feeling of imbalance sensation. Unclear etiology of dizziness.  There is no overt parkinsonism on examination. Prior MRI brain in 7/2020 did not show any clear abnormality correlating with symptoms. Orthostatic vitals are negative. VNG with audiology is scheduled for next month. I encouraged her to continue vestibular therapy exercises. We discussed possibility of possible PPPD component to symptoms. Sertraline or Venlafaxine trial could be considered, but will first investigate risks of worsening cervical dystonia with these medications. I would be hesitant to also start Venlafaxine currently given high blood pressure. Referral to Dr Bruno in neuro-otology can be considered if there is not improvement with above measures.    Plan:  - VNG as scheduled  - Continue vestibular therapy   - Consider SSRI or SNRI     Follow up in Neurology clinic pending above    Hong Hansen MD   of Neurology  [facility]    The total time of this encounter today amounted to 56 minutes. This time included time spent with the patient, prep work, ordering tests, and performing post visit documentation.

## 2021-06-25 NOTE — LETTER
6/25/2021       RE: Morenita Moore  [address]  [place] [facility] [place] [address]     Dear Colleague,    Thank you for referring your patient, Morenita Moore, to the [facility]. Please see a copy of my visit note below.    [facility] Follow Up Visit    Morenita Moore MRN# [id number]   Age: 75 year old YOB: 1945     Brief history of symptoms: The patient was initially seen in neurologic consultation on 4/16/2021 for evaluation of dizziness. Please see the comprehensive neurologic consultation note from that date in the Epic records for details.     Interval history:   After last visit patient was given 5 day course of prednisone for left ear pain and dizziness. She had improvement in left ear pain, but dizziness continues. She is doing vestibular therapy and believes that symptoms are mildly improved with this. Her anxiety has been severe lately. The dizziness gets worse when her anxiety is high. Her blood pressure has also been elevated as of late. She is going to schedule a visit with a primary care provider.         Past Medical History:     Patient Active Problem List   Diagnosis     Acute internal derangement of left knee     Asthma in adult     Breast cancer in situ     Depression, recurrent (H)     Sprain of posterior cruciate ligament of left knee     SVT (supraventricular tachycardia) (H)     Torticollis     Past Medical History:   Diagnosis Date     Anxiety      Asthma 2012    adult-onset asthma diagnosed in 2012     Cancer (H)      DCIS (ductal carcinoma in situ) 2001    stage 0 status post left mastectomy in 2001     Depressive disorder      Uncomplicated asthma         Past Surgical History:     Past Surgical History:   Procedure Laterality Date     BREAST SURGERY       CATARACT IOL, RT/LT       RETINAL REATTACHMENT          Social History:     Social History     Tobacco Use      Smoking status: Former Smoker     Smokeless tobacco: Never Used     Tobacco comment: smoked about 10 years   Substance Use Topics     Alcohol use: No     Drug use: Never        Family History:     Family History   Problem Relation Age of Onset     Cancer Sister         breast cancer     Glaucoma No family hx of      Macular Degeneration No family hx of         Medications:     Current Outpatient Medications   Medication Sig     acetaminophen (TYLENOL) 500 MG tablet Take 500 mg by mouth as needed     albuterol (PROVENTIL HFA: VENTOLIN HFA) 108 (90 BASE) MCG/ACT inhaler Inhale 2 puffs into the lungs every 6 hours.       aspirin 81 MG EC tablet Take 81 mg by mouth daily     calcium carbonate 750 MG CHEW Take 1-2 tablets by mouth as needed     calcium carbonate-vitamin D (OYSTER SHELL CALCIUM/D) 500-200 MG-UNIT tablet Take 1 tablet by mouth     Cyanocobalamin 50 MCG TABS Take 50 mcg by mouth     fluticasone-salmeterol (ADVAIR) 100-50 MCG/DOSE diskus inhaler Inhale 1 puff into the lungs every 12 hours     LORazepam (ATIVAN) 0.5 MG tablet as needed      promethazine (PHENERGAN) 12.5 MG tablet Take 12.5 mg by mouth as needed     vitamin C (ASCORBIC ACID) 500 MG tablet Take 500 mg by mouth     No current facility-administered medications for this visit.         Allergies:     Allergies   Allergen Reactions     Levalbuterol Hydrochloride Shortness Of Breath     Cats Other (See Comments)     Itchy eyes     Dust Mites      Other reaction(s): Wheezing  Wheezing/shortness/breath/see (IRP 6/1)     Amitriptyline Palpitations     Escitalopram Anxiety        Review of Systems:   As above     Physical Exam:   Vitals: BP (!) 170/93   Pulse 89   Resp 16   Wt 59.1 kg (130 lb 6.4 oz)   SpO2 98%   BMI 21.70 kg/m     General: Seated comfortably in no acute distress.  Lungs: breathing comfortably  Extremities: no edema  Skin: No rashes  Neurologic:     Mental Status: Fully alert, attentive. Normal memory and fund of knowledge.  Language normal, speech clear and fluent, no paraphasic errors.     Cranial Nerves: Visual fields intact. EOMI with normal smooth pursuit. Facial sensation intact/symmetric. Facial movements symmetric. Hearing not formally tested but intact to conversation. Palate elevation symmetric, uvula midline. No dysarthria. Shoulder shrug strong bilaterally. Tongue protrusion midline.     Motor: Left head tilt and continuous horizontal neck tremor. Muscle tone normal throughout. Normal/symmetric rapid finger tapping. Strength 5/5 throughout upper and lower extremities. Bilateral hammer toes.     Deep Tendon Reflexes: 2+/symmetric throughout upper and lower extremities. Toes downgoing bilaterally.     Sensory: Intact/symmetric to light touch, temperature, vibration and proprioception throughout upper and lower extremities. Negative Romberg.      Coordination: Finger-nose-finger and heel-shin intact without dysmetria. Rapid alternating movements intact/symmetric with normal speed and rhythm.     Gait: Casual gait with normal stance width and stride length. Mild ataxia with casual gait at times. Has mild difficulties with heel, toe, and tandem gait intermittently needing support.     Data reviewed on previous visits    Imaging:  MRI brain without contrast 7/2020  1.  No acute intracranial process.  2.  Generalized brain atrophy and presumed microvascular ischemic changes similar to findings on the 2018 MRI.    Laboratory:  TSH normal 1/2020    Pertinent Investigations since last visit:   None         Assessment and Plan:   Assessment:  Morenita Moore is a 75 year old female who presents for follow-up of head tremor and dizziness/balance problems. Head tremor developed after started selective serotonin reuptake inhibitor in 2018. She has prior history of cervical dystonia. Head tremor is likely secondary to worsening of cervical dystonia. She is currently not interested in botox due to possible side effects. She thinks  the head tremor is tolerable.       Dizziness/balance issues developed after nonspecific viral infection in 2018 as well. She also had pericarditis. Dizziness is present when she moves her head too fast or turns too quickly. It is described as intermittent swaying, feeling of imbalance sensation. Unclear etiology of dizziness. There is no overt parkinsonism on examination. Prior MRI brain in 7/2020 did not show any clear abnormality correlating with symptoms. Orthostatic vitals are negative. VNG with audiology is scheduled for next month. I encouraged her to continue vestibular therapy exercises. We discussed possibility of possible PPPD component to symptoms. Sertraline or Venlafaxine trial could be considered, but will first investigate risks of worsening cervical dystonia with these medications. I would be hesitant to also start Venlafaxine currently given high blood pressure. Referral to Dr Bruno in neuro-otology can be considered if there is not improvement with above measures.    Plan:  - VNG as scheduled  - Continue vestibular therapy   - Consider SSRI or SNRI     Follow up in Neurology clinic pending above    Hong Hansen MD   of Neurology  [facility]    The total time of this encounter today amounted to 56 minutes. This time included time spent with the patient, prep work, ordering tests, and performing post visit documentation.        Again, thank you for allowing me to participate in the care of your patient.      Sincerely,    Hong Hansen MD

## 2021-06-28 ENCOUNTER — MYC MEDICAL ADVICE (OUTPATIENT)
Dept: NEUROLOGY | Facility: CLINIC | Age: 76
End: 2021-06-28

## 2021-06-28 DIAGNOSIS — F41.9 ANXIETY: ICD-10-CM

## 2021-06-28 DIAGNOSIS — R42 DIZZINESS: Primary | ICD-10-CM

## 2021-06-28 NOTE — PROGRESS NOTES
"Progress Notes by Devin Vilchis MD at 4/6/2020  1:10 PM     Author: Devin Vilchis MD Service: -- Author Type: Physician    Filed: 4/9/2020  4:14 PM Encounter Date: 4/6/2020 Status: Addendum    : Devin Vilchis MD (Physician)    Related Notes: Original Note by Devin Vilchis MD (Physician) filed at 4/6/2020  2:19 PM       The patient has been notified of following:     \"This telephone visit will be conducted via a call between you and your physician/provider. We have found that certain health care needs can be provided without the need for a physical exam.  This service lets us provide the care you need with a phone conversation.  If a prescription is necessary we can send it directly to your pharmacy.  If lab work is needed we can place an order for that and you can then stop by our lab to have the test done at a later time. If during the course of the call the physician/provider feels a telephone visit is not appropriate, you will not be charged for this service.\"     \"The patient has chosen to have the visit conducted as a telephone visit, to reduce risk of exposure given the current status of Coronavirus in our community. This telephone visit is being conducted via a call between the patient and physician/provider. Health care needs are being provided without a physical exam.\"   HEART CARE PHONE ENCOUNTER        Appointment is being conducted as a telephone visit, to reduce risk of exposure given the current status of Coronovirus in our community. This telephone visit is being conducted via a call between the patient and physician/provider. Health care needs are being provided without a physical exam.     Assessment/Recommendations   Assessment/Plan:    1. Pleuritic chest pain consistent with pericarditis.  Symptomatically improved at the present time.  We discussed the various medication regimens she has been treated with recently, it sounds as if aspirin was the most effective for her.  " We discussed that if her symptoms return that she could resume aspirin therapy, but would discontinue aspirin at this time.  2. Dyspnea on exertion, fatigue.  She has been quite sedentary over the last 5 months, and this could be contributing to her dyspnea on exertion.  Alternatively, some malnutrition related to her significant weight loss could could be contributing to her fatigue.  Coronary disease appears somewhat less likely given her overall risk profile.  I have recommended that she initiate a regular walking program, 20 minutes daily.  If she does not feel improvement in her exertional dyspnea in 1 month she will call and we will evaluate further for a cardiac cause of her symptoms.      Follow Up Plan: Follow up as needed, patient will call in 1 month if symptoms are not improved.  I have reviewed the note as documented.  This accurately captures the substance of my conversation with the patient.    Total time of call between patient and provider was 17 minutes        History of Present Illness/Subjective    Morenita Moore is a 74 y.o. female who is being evaluated via a billable telephone visit.  She has a history of paroxysmal supraventricular tachycardia and palpitations, along with a history of asthma depression, and breast cancer.  She was diagnosed with pericarditis 12/2019 after noting nonexertional pleuritic chest pain following a upper respiratory illness.  She was initially treated with prednisone with improvement in her symptoms.  She was then switched to colchicine , then ibuprofen which she tolerated poorly.  In January a 14-day monitor did show a 6 beat run of supraventricular tachycardia, with heart rate from  beats a minute and no pauses.  She did take aspirin 1000 mg twice daily with improvement in her symptoms.    She notes that over the last month, her pleuritic chest pain has resolved.  She still occasionally takes a baby aspirin or half a baby aspirin, about twice a week.  She  "is concerned that she is still somewhat short of breath with activities though she does not engage in any regular exercise.  She reports that she has lost 30 pounds over the last several months, a CT scan last month showed small pericardial effusion and small pulmonary nodules.    She reports walking 2 miles a day until she developed her viral illness 2019.  She has been very limited in her activity since that time.  She does feel that she becomes short of breath with activities, somewhat reminiscent of her asthma symptoms.  She also notes episodes of \"squeezing\" in her chest lasting 2 seconds without associated pain or shortness of breath.  These episodes can occur day or night at rest and are unrelated to activity.    He quit smoking 30 years ago.  She has no history of hypertension, diabetes mellitus, or hyperlipidemia.  There is no family history of premature coronary atherosclerosis.          Echocardiogram 2019 at [facility]:  1. Normal LV size, normal wall thickness, normal global systolic function with an estimated EF of 60 - 65%.   2. Right ventricular cavity size is normal, global systolic RV function is normal.   3. The aortic valve is trileaflet and sclerotic, no stenosis and trivial regurgitation.   4. Grade 1 pattern of LV diastolic filling.   5. Trivial pericardial effusion.   6. Likely liver cysts.    I have reviewed and updated the patient's Past Medical History, Social History, Family History and Medication List.     Physical Examination not perform given phone encounter Review of Systems                                                Medical History  Surgical History Family History Social History   No past medical history on file. Past Surgical History:   Procedure Laterality Date   ? MASTECTOMY      DCIS    Family History   Problem Relation Age of Onset   ? Asthma Sister    ? Breast cancer Sister    ? Heart failure Mother          at 97    Social History     Socioeconomic History "   ? Marital status: Single     Spouse name: Not on file   ? Number of children: 1   ? Years of education: Not on file   ? Highest education level: Not on file   Occupational History   ? Occupation: Sales -    Social Needs   ? Financial resource strain: Not on file   ? Food insecurity     Worry: Not on file     Inability: Not on file   ? Transportation needs     Medical: Not on file     Non-medical: Not on file   Tobacco Use   ? Smoking status: Former Smoker     Types: Cigarettes     Last attempt to quit: 1988     Years since quittin.2   ? Smokeless tobacco: Never Used   ? Tobacco comment: Quit in 's   Substance and Sexual Activity   ? Alcohol use: Never     Frequency: Never   ? Drug use: Not on file   ? Sexual activity: Not Currently   Lifestyle   ? Physical activity     Days per week: Not on file     Minutes per session: Not on file   ? Stress: Not on file   Relationships   ? Social connections     Talks on phone: Not on file     Gets together: Not on file     Attends Evangelical service: Not on file     Active member of club or organization: Not on file     Attends meetings of clubs or organizations: Not on file     Relationship status: Not on file   ? Intimate partner violence     Fear of current or ex partner: Not on file     Emotionally abused: Not on file     Physically abused: Not on file     Forced sexual activity: Not on file   Other Topics Concern   ? Not on file   Social History Narrative   ? Not on file          Exercise History: Minimal in recent months    Medications  Allergies   Current Outpatient Medications   Medication Sig Dispense Refill   ? albuterol (VENTOLIN HFA) 90 mcg/actuation inhaler Inhale 2 puffs 4 (four) times a day as needed. 1 Inhaler 3   ? fluticasone propion-salmeteroL (ADVAIR HFA) 230-21 mcg/actuation inhaler Inhale 1 puff daily. 1 Inhaler 11   ? LORazepam (ATIVAN) 0.5 MG tablet Take 0.25 mg by mouth as needed.      ? predniSONE (DELTASONE) 10 mg  tablet 20 mg for 3 days, then 10 mg for 3 days, then 5 mg for 3 days, then stop.. 12 tablet 0   ? traZODone (DESYREL) 50 MG tablet Take 1 tablet (50 mg total) by mouth at bedtime. 90 tablet 3     No current facility-administered medications for this visit.     Allergies   Allergen Reactions   ? Levalbuterol Shortness Of Breath   ? Cat Dander Itching and Other (See Comments)     Itchy eyes   ? Amitriptyline Palpitations   ? Escitalopram Anxiety         Lab Results    Chemistry/lipid CBC Cardiac Enzymes/BNP/TSH/INR   Lab Results   Component Value Date    CREATININE 0.68 03/04/2020    BUN 13 03/04/2020    K 4.1 03/04/2020     03/04/2020     03/04/2020    CO2 26 03/04/2020    Lab Results   Component Value Date    WBC 7.0 03/04/2020    HGB 13.7 03/04/2020    HCT 39.0 03/04/2020    MCV 93 03/04/2020     03/04/2020    Lab Results   Component Value Date    TROPONINI <0.01 02/27/2020    BNP <10 02/27/2020        Devin Vilchis MD, FACC

## 2021-06-29 RX ORDER — SERTRALINE HYDROCHLORIDE 25 MG/1
TABLET, FILM COATED ORAL
Qty: 60 TABLET | Refills: 2 | Status: SHIPPED | OUTPATIENT
Start: 2021-06-29 | End: 2021-06-29

## 2021-06-29 RX ORDER — SERTRALINE HYDROCHLORIDE 25 MG/1
TABLET, FILM COATED ORAL
Qty: 60 TABLET | Refills: 2 | Status: SHIPPED | OUTPATIENT
Start: 2021-06-29 | End: 2021-08-10

## 2021-06-29 NOTE — PROGRESS NOTES
Progress Notes by Arvin Taylor PA-C at 8/27/2020  9:40 AM     Author: Arvin Taylor PA-C Service: -- Author Type: Physician Assistant    Filed: 8/27/2020 10:52 AM Encounter Date: 8/27/2020 Status: Signed    : Arvin Taylor PA-C (Physician Assistant)       Chief Complaint:    Chief Complaint   Patient presents with   ? Toe issue     started yesterday, R 2nd toe is turning purple       HPI: Morenita Moore is an 75 y.o. female who presents for evaluation and treatment of discolored R second toe.  Symptoms started 1 days ago and have not changed.  Patient has had hammer toe of this digit. She denies any acute injury.  The toe is slightly painful, but she is able to walk on it.  She denies any numbness or tingling in the toe.  No dysfunction of the toe.    ROS:    Review of Systems   Constitutional: Negative.  Negative for activity change, appetite change, fatigue, fever and unexpected weight change.   HENT: Negative.  Negative for congestion, ear discharge, ear pain, sinus pressure, sinus pain, sore throat and trouble swallowing.    Eyes: Negative for pain, discharge, redness and itching.   Respiratory: Negative.  Negative for chest tightness, shortness of breath and wheezing.    Cardiovascular: Negative.  Negative for chest pain and palpitations.   Gastrointestinal: Negative.  Negative for abdominal pain, blood in stool, diarrhea, nausea and vomiting.   Endocrine: Negative.  Negative for polydipsia.   Genitourinary: Negative.  Negative for dysuria, frequency and urgency.   Musculoskeletal: Negative.  Negative for arthralgias and myalgias.   Skin: Positive for color change. Negative for rash and wound.   Allergic/Immunologic: Negative.  Negative for immunocompromised state.   Neurological: Negative.  Negative for dizziness and headaches.   Hematological: Negative.  Negative for adenopathy.        Family History  Family History   Problem Relation Age of Onset   ? Asthma Sister    ? Breast cancer Sister     ? Heart failure Mother          at 97       Social History  Social History     Socioeconomic History   ? Marital status: Single     Spouse name: Not on file   ? Number of children: 1   ? Years of education: Not on file   ? Highest education level: Not on file   Occupational History   ? Occupation: Sales -    Social Needs   ? Financial resource strain: Not on file   ? Food insecurity     Worry: Not on file     Inability: Not on file   ? Transportation needs     Medical: Not on file     Non-medical: Not on file   Tobacco Use   ? Smoking status: Former Smoker     Types: Cigarettes     Last attempt to quit: 1988     Years since quittin.6   ? Smokeless tobacco: Never Used   ? Tobacco comment: Quit in 's   Substance and Sexual Activity   ? Alcohol use: Never     Frequency: Never   ? Drug use: Not on file   ? Sexual activity: Not Currently   Lifestyle   ? Physical activity     Days per week: Not on file     Minutes per session: Not on file   ? Stress: Not on file   Relationships   ? Social connections     Talks on phone: Not on file     Gets together: Not on file     Attends Tenriism service: Not on file     Active member of club or organization: Not on file     Attends meetings of clubs or organizations: Not on file     Relationship status: Not on file   ? Intimate partner violence     Fear of current or ex partner: Not on file     Emotionally abused: Not on file     Physically abused: Not on file     Forced sexual activity: Not on file   Other Topics Concern   ? Not on file   Social History Narrative   ? Not on file        Surgical History:  Past Surgical History:   Procedure Laterality Date   ? MASTECTOMY      DCIS        Problem List:  Patient Active Problem List   Diagnosis   ? Breast cancer in situ   ? Asthma in adult   ? SVT (supraventricular tachycardia) (H)   ? Torticollis   ? Tremor, physiological   ? Pericarditis   ? Gastroesophageal reflux disease without esophagitis         Allergies:  Allergies   Allergen Reactions   ? Levalbuterol Shortness Of Breath   ? Amitriptyline Palpitations   ? Escitalopram Anxiety        Current Meds:    Current Outpatient Medications:   ?  albuterol (VENTOLIN HFA) 90 mcg/actuation inhaler, Inhale 2 puffs 4 (four) times a day as needed., Disp: 1 Inhaler, Rfl: 3  ?  ascorbic acid, vitamin C, (ASCORBIC ACID WITH KHUSHBOO HIPS) 500 MG tablet, Take 500 mg by mouth daily., Disp: , Rfl:   ?  aspirin (ASPIR-81 ORAL), Take by mouth., Disp: , Rfl:   ?  calcium-vitamin D 500 mg(1,250mg) -200 unit per tablet, Take 1 tablet by mouth 2 (two) times a day with meals., Disp: , Rfl:   ?  cyanocobalamin (VITAMIN B-12) 50 mcg tablet, Take 50 mcg by mouth daily. weekly, Disp: , Rfl:   ?  fluticasone propion-salmeteroL (ADVAIR HFA) 230-21 mcg/actuation inhaler, Inhale 2 puffs 2 (two) times a day., Disp: 3 Inhaler, Rfl: 3  ?  LORazepam (ATIVAN) 0.5 MG tablet, Take 1 tablet (0.5 mg total) by mouth 2 (two) times a day as needed for anxiety., Disp: 20 tablet, Rfl: 1  ?  metoprolol tartrate (LOPRESSOR) 25 MG tablet, Take 1 tablet (25 mg total) by mouth 2 (two) times a day., Disp: 180 tablet, Rfl: 3  ?  omeprazole (PRILOSEC) 20 MG capsule, Take 1 capsule (20 mg total) by mouth daily before breakfast., Disp: 90 capsule, Rfl: 3  ?  triamcinolone (KENALOG) 0.1 % cream, , Disp: , Rfl:      PHYSICAL EXAM:   Vital signs noted and reviewed by Arvin Taylor    /79 (Patient Site: Right Arm, Patient Position: Sitting, Cuff Size: Adult Regular)   Pulse 79   Temp 98.1  F (36.7  C) (Oral)   Resp 16   Wt 127 lb (57.6 kg)   SpO2 100%   BMI 21.46 kg/m       PEFR:  Physical Exam  Vitals signs reviewed.   Constitutional:       Appearance: Normal appearance. She is well-developed. She is not ill-appearing or toxic-appearing.   HENT:      Head: Normocephalic and atraumatic.      Right Ear: Hearing, tympanic membrane, ear canal and external ear normal. No drainage, swelling or tenderness.  Tympanic membrane is not perforated, erythematous, retracted or bulging.      Left Ear: Hearing, tympanic membrane, ear canal and external ear normal. No drainage, swelling or tenderness. Tympanic membrane is not perforated, erythematous, retracted or bulging.      Nose: No nasal deformity, mucosal edema, congestion or rhinorrhea.      Right Sinus: No maxillary sinus tenderness or frontal sinus tenderness.      Left Sinus: No maxillary sinus tenderness or frontal sinus tenderness.      Mouth/Throat:      Pharynx: No pharyngeal swelling, oropharyngeal exudate, posterior oropharyngeal erythema or uvula swelling.      Tonsils: No tonsillar exudate or tonsillar abscesses. 0 on the right. 0 on the left.   Eyes:      General: Lids are normal.         Right eye: No foreign body or discharge.         Left eye: No foreign body or discharge.      Conjunctiva/sclera:      Right eye: Right conjunctiva is not injected.      Left eye: Left conjunctiva is not injected.   Neck:      Musculoskeletal: Full passive range of motion without pain and normal range of motion. No neck rigidity.   Cardiovascular:      Rate and Rhythm: Normal rate and regular rhythm.      Heart sounds: Normal heart sounds, S1 normal and S2 normal. No murmur. No friction rub. No gallop.    Pulmonary:      Effort: Pulmonary effort is normal. No accessory muscle usage, prolonged expiration, respiratory distress or retractions.      Breath sounds: Normal breath sounds and air entry. No stridor, decreased air movement or transmitted upper airway sounds. No decreased breath sounds, wheezing or rales.   Abdominal:      General: Bowel sounds are normal.      Palpations: Abdomen is soft.      Tenderness: There is no abdominal tenderness. There is no right CVA tenderness or left CVA tenderness.   Musculoskeletal:      Right foot: Normal capillary refill. No tenderness, bony tenderness, swelling, crepitus or deformity.        Feet:       Comments: Bruising and erythema  of the R foot 2nd digit. No swelling.  Significant hammer toe.   Lymphadenopathy:      Head:      Right side of head: No submental, submandibular, tonsillar, preauricular or posterior auricular adenopathy.      Left side of head: No submental, submandibular, tonsillar, preauricular or posterior auricular adenopathy.      Cervical:      Right cervical: No superficial or posterior cervical adenopathy.     Left cervical: No superficial or posterior cervical adenopathy.   Skin:     General: Skin is warm.      Coloration: Skin is not cyanotic or jaundiced.   Neurological:      Mental Status: She is alert.      Motor: No weakness or abnormal muscle tone.      Coordination: Coordination is intact.      Gait: Gait is intact. Gait normal.      Deep Tendon Reflexes: Reflexes are normal and symmetric.   Psychiatric:         Attention and Perception: Attention normal.         Mood and Affect: Mood normal.         Speech: Speech normal.         Behavior: Behavior normal.         Thought Content: Thought content normal.          Labs:     Results for orders placed or performed in visit on 06/30/20   Cortisol   Result Value Ref Range    Cortisol 14.6 ug/dL   Hepatitis B Surface Antigen (HBsAG)   Result Value Ref Range    Hepatitis B Surface Ag Negative Negative   Hepatitis C Antibody (Anti-HCV)   Result Value Ref Range    Hepatitis C Ab Negative Negative     Medical Decision Making:    Differential Diagnosis:    Toe fracture, hematoma     ASSESSMENT:     1. Contusion of toenail of right foot, initial encounter    2. Hammer toe, unspecified laterality         PLAN:     XR of the R foot was negative for acute fracture per my read.  Reassurance given that this will resolve on its own.  Patient has significant bilateral hammer toe of the second digit and bunions.  She has never seen a podiatrist for this.  Order placed for her to follow up with podiatry.  Sooner if symptoms worsen.  Worrisome symptoms discussed with instructions to go  to the ED.  Patient verbalized understanding and agreed with this plan.     Arvin Taylor  8/27/2020, 9:37 AM          7

## 2021-06-29 NOTE — PROGRESS NOTES
Progress Notes by Devin Vilchis MD at 10/28/2020  1:10 PM     Author: Devin Vilchis MD Service: -- Author Type: Physician    Filed: 10/28/2020  2:17 PM Encounter Date: 10/28/2020 Status: Signed    : Devin Vilchis MD (Physician)         Cardiology Clinic Office Note    Assessment / Plan:    1.  Pericarditis.  Symptomatic improvement with use of colchicine, aspirin.  Reassured that she can now resume full activities without limitations.  No evidence of myocardial involvement.  Echocardiogram 1 month after viral illness developed showed normal function.  2.  Physical deconditioning.  Strongly encouraged to begin a walking program back up to 20 to 30 minutes daily.  3.  Chest pains.  Atypical for angina.  Likely costochondritis.  Again, suggested regular moderate walking program.  Would suggest exercise nuclear stress testing for the development of chest pains with increasing exercise; this does not appear appropriate at this point      Follow-up as needed  ______________________________________________________________________    Subjective:    I had the opportunity to see Morenita Moore at the [facility]. Morenita Moore is a 75 y.o. female with a history of paroxysmal supraventricular tachycardia and palpitations, along with a history of asthma, depression, and breast cancer.  She was diagnosed with pericarditis 12/2019 after noting nonexertional pleuritic chest pain following a upper respiratory illness.  She was initially treated with prednisone with improvement in her symptoms.  She was then switched to colchicine , then ibuprofen which she tolerated poorly.  She did take aspirin 1000 mg twice daily with improvement in her symptoms.    In  1/2020, a 14-day monitor did show a 6 beat run of supraventricular tachycardia, with heart rate from  beats a minute and no pauses.  Is not had any symptomatic recurrences of this.    She returns today for  "routine follow-up, concerned about the long-term effects of the virus on her heart.  She still has occasional well localized left parasternal chest pains, reproducible with local pressure but not with inspiration.  She will occasionally take half of a baby aspirin or sometimes a full baby aspirin as she is quite concerned about the effects of nonsteroidal anti-inflammatory agents.  She had initially lost 30 pounds but reports having gained back about 20.  She is feeling considerably improved.  She still feels that her mind is \"foggy\" due to the virus but her activity tolerance is good.  She does not notice any chest discomfort with climbing the steps, which is her chief form of exercise.    She reports walking 2 miles a day until she developed her viral illness 11/2019.  She still has occasional flares of asthma and is currently course of prednisone for this.  She has not yet started any walking program.  He quit smoking 30 years ago.  She has no history of hypertension, diabetes mellitus, or hyperlipidemia.  There is no family history of premature coronary atherosclerosis.    ______________________________________________________________________    Problem List:  Patient Active Problem List   Diagnosis   ? Breast cancer in situ   ? Asthma in adult   ? SVT (supraventricular tachycardia) (H)   ? Torticollis   ? Tremor, physiological   ? Pericarditis   ? Gastroesophageal reflux disease without esophagitis     Medical History:  No past medical history on file.  Surgical History:  Past Surgical History:   Procedure Laterality Date   ? MASTECTOMY      DCIS     Social History:  Social History     Socioeconomic History   ? Marital status: Single     Spouse name: Not on file   ? Number of children: 1   ? Years of education: Not on file   ? Highest education level: Not on file   Occupational History   ? Occupation: Sales -    Social Needs   ? Financial resource strain: Not on file   ? Food " insecurity     Worry: Not on file     Inability: Not on file   ? Transportation needs     Medical: Not on file     Non-medical: Not on file   Tobacco Use   ? Smoking status: Former Smoker     Types: Cigarettes     Quit date: 1988     Years since quittin.8   ? Smokeless tobacco: Never Used   ? Tobacco comment: Quit in 20's   Substance and Sexual Activity   ? Alcohol use: Never     Frequency: Never   ? Drug use: Not on file   ? Sexual activity: Not Currently   Lifestyle   ? Physical activity     Days per week: Not on file     Minutes per session: Not on file   ? Stress: Not on file   Relationships   ? Social connections     Talks on phone: Not on file     Gets together: Not on file     Attends Restorationist service: Not on file     Active member of club or organization: Not on file     Attends meetings of clubs or organizations: Not on file     Relationship status: Not on file   ? Intimate partner violence     Fear of current or ex partner: Not on file     Emotionally abused: Not on file     Physically abused: Not on file     Forced sexual activity: Not on file   Other Topics Concern   ? Not on file   Social History Narrative   ? Not on file       Exercise History:  Minimal.  Previously walked regularly    Review of Systems:   General: Weight Loss  Eyes: WNL  Ears/Nose/Throat: WNL  Lungs: Cough  Heart: Chest Pain  Stomach: WNL  Bladder: WNL  Muscle/Joints: WNL  Skin: WNL  Nervous System: WNL  Mental Health: WNL     Blood: WNL          Family History:  Family History   Problem Relation Age of Onset   ? Asthma Sister    ? Breast cancer Sister    ? Heart failure Mother          at 97         Allergies:  Allergies   Allergen Reactions   ? Levalbuterol Shortness Of Breath   ? Amitriptyline Palpitations   ? Escitalopram Anxiety     Medications:  Current Outpatient Medications   Medication Sig Dispense Refill   ? albuterol (VENTOLIN HFA) 90 mcg/actuation inhaler Inhale 2 puffs 4 (four) times a day as needed. 1  "Inhaler 3   ? ascorbic acid, vitamin C, (ASCORBIC ACID WITH KHUSHBOO HIPS) 500 MG tablet Take 500 mg by mouth daily.     ? aspirin (ASPIR-81 ORAL) Take by mouth.     ? calcium-vitamin D 500 mg(1,250mg) -200 unit per tablet Take 1 tablet by mouth 2 (two) times a day with meals.     ? cyanocobalamin (VITAMIN B-12) 50 mcg tablet Take 50 mcg by mouth daily. weekly     ? fluticasone propion-salmeteroL (ADVAIR HFA) 230-21 mcg/actuation inhaler Inhale 2 puffs 2 (two) times a day. 3 Inhaler 3   ? LORazepam (ATIVAN) 0.5 MG tablet Take 1 tablet (0.5 mg total) by mouth 2 (two) times a day as needed for anxiety. 20 tablet 1   ? metoprolol tartrate (LOPRESSOR) 25 MG tablet Take 1 tablet (25 mg total) by mouth 2 (two) times a day. 180 tablet 3   ? omeprazole (PRILOSEC) 20 MG capsule Take 1 capsule (20 mg total) by mouth daily before breakfast. 90 capsule 3     No current facility-administered medications for this visit.        Objective:   Wt Readings from Last 3 Encounters:   10/28/20 130 lb (59 kg)   08/27/20 127 lb (57.6 kg)   08/18/20 127 lb (57.6 kg)     Vital signs:  /70 (Patient Site: Left Arm, Patient Position: Sitting, Cuff Size: Adult Regular)   Pulse 82   Resp 16   Ht 5' 4.5\" (1.638 m)   Wt 130 lb (59 kg)   BMI 21.97 kg/m        Physical Exam:    General Appearance : Awake, Alert, No acute distress.  Anxious, fidgety  HEENT: No Scleral icterus; the mucous membranes were pink and moist.  Conjunctivae not injected  Neck:  No cervical bruits, jugular venous distention, or thyromegaly.  Torticollis  Chest: Mild kyphosis.  Chest wall symmetric.  Left mastectomy  Lungs: Respirations unlabored; the lungs are clear to auscultation.  No wheezing   Cardiovascular:   Normal point of maximal impulse.  Auscultation reveals normal first and second heart sounds with no murmurs, rubs, or gallops.  Carotid, radial, and dorsalis pedal pulses are intact and symmetric.  Abdomen: No organomegaly, masses, bruits, or tenderness. " Bowels sounds are present  Extremities:  No clubbing, cyanosis.  No edema  Skin: No rash, bruising  Musculoskeletal: No tenderness.  Diffuse osteoarthritic changes in hands feet  Neurologic: Alert and oriented ×3. Speech is fluent.    Lab Results:      Echocardiogram 12/2019 at [facility]:  1. Normal LV size, normal wall thickness, normal global systolic function with an estimated EF of 60 - 65%.   2. Right ventricular cavity size is normal, global systolic RV function is normal.   3. The aortic valve is trileaflet and sclerotic, no stenosis and trivial regurgitation.   4. Grade 1 pattern of LV diastolic filling.   5. Trivial pericardial effusion.   6. Likely liver cysts.          MANISHA FOSTER MD [facility]  [phone]    This note created using Dragon voice recognition software.  Sound alike errors may have escaped editing.

## 2021-06-30 ENCOUNTER — TELEPHONE (OUTPATIENT)
Dept: NEUROLOGY | Facility: CLINIC | Age: 76
End: 2021-06-30

## 2021-06-30 ENCOUNTER — HOSPITAL ENCOUNTER (OUTPATIENT)
Dept: PHYSICAL THERAPY | Facility: CLINIC | Age: 76
End: 2021-06-30
Payer: MEDICARE

## 2021-06-30 DIAGNOSIS — R26.89 BALANCE PROBLEMS: ICD-10-CM

## 2021-06-30 DIAGNOSIS — R42 DIZZINESS: Primary | ICD-10-CM

## 2021-06-30 PROCEDURE — 97112 NEUROMUSCULAR REEDUCATION: CPT | Mod: GP | Performed by: PHYSICAL THERAPIST

## 2021-06-30 NOTE — TELEPHONE ENCOUNTER
Health Call Center    Phone Message    May a detailed message be left on voicemail: no     Reason for Call: Medication Question or concern regarding medication   Prescription Clarification  Name of Medication: sertraline (ZOLOFT) 25 MG tablet  Prescribing Provider: Dr. Hansen   Pharmacy: [id number] [place] [address] AT SEC OF [address]   What on the order needs clarification? Brenda requesting a call back due to having some questions regarding this medication.    Action Taken: Message routed to:  [facility] ([facility]    Travel Screening: Not Applicable

## 2021-06-30 NOTE — TELEPHONE ENCOUNTER
I returned call to the pharmacy. They stated pt insurance will only cover generic sertraline. Pt is willing to try generic first. If pt willing to trial generic that is okay with clinic. They will dispense 2 week supply. If pt tolerates and wants to increase to 50mg then pt will need 50mg tablet as insurance will only cover one pill daily. Gave okay to give 50mg in 2 weeks if tolerated.     Pt will update clinic if any issues with medication or side effects.     Eryn PLATT

## 2021-07-03 NOTE — ADDENDUM NOTE
Addendum Note by Bart Roper MD at 3/4/2020  7:40 AM     Author: Bart Roper MD Service: -- Author Type: Physician    Filed: 3/4/2020  7:59 AM Encounter Date: 3/4/2020 Status: Signed    : Bart Roper MD (Physician)    Addended by: BART ROPER on: 3/4/2020 07:59 AM        Modules accepted: Orders

## 2021-07-03 NOTE — ADDENDUM NOTE
Addendum Note by Shanda Cruz RN at 4/15/2020  2:00 PM     Author: Shanda Cruz RN Service: -- Author Type: Registered Nurse    Filed: 4/15/2020  1:43 PM Encounter Date: 4/15/2020 Status: Signed    : Shanda Cruz RN (Registered Nurse)    Addended by: SHANDA CRUZ on: 4/15/2020 01:43 PM        Modules accepted: Orders

## 2021-07-03 NOTE — ADDENDUM NOTE
Addendum Note by Pacheco Haider MD at 4/15/2020  2:00 PM     Author: Pacheco Haider MD Service: -- Author Type: Physician    Filed: 4/15/2020 10:18 AM Encounter Date: 4/15/2020 Status: Signed    : Pacheco Haider MD (Physician)    Addended by: PACHECO HAIDER on: 4/15/2020 10:18 AM        Modules accepted: Orders

## 2021-07-03 NOTE — ADDENDUM NOTE
Addendum Note by Shanda Cruz RN at 4/15/2020  2:00 PM     Author: Shanda Cruz RN Service: -- Author Type: Registered Nurse    Filed: 4/15/2020 10:47 AM Encounter Date: 4/15/2020 Status: Signed    : Shanda Cruz RN (Registered Nurse)    Addended by: SHANDA CRUZ on: 4/15/2020 10:47 AM        Modules accepted: Orders

## 2021-07-13 ENCOUNTER — RECORDS - HEALTHEAST (OUTPATIENT)
Dept: ADMINISTRATIVE | Facility: CLINIC | Age: 76
End: 2021-07-13

## 2021-07-14 PROBLEM — F33.9 DEPRESSION, RECURRENT (H): Status: RESOLVED | Noted: 2017-01-09 | Resolved: 2020-08-18

## 2021-07-16 ENCOUNTER — HOSPITAL ENCOUNTER (OUTPATIENT)
Dept: PHYSICAL THERAPY | Facility: CLINIC | Age: 76
End: 2021-07-16
Payer: MEDICARE

## 2021-07-16 DIAGNOSIS — R42 DIZZINESS: Primary | ICD-10-CM

## 2021-07-16 DIAGNOSIS — R26.89 BALANCE PROBLEMS: ICD-10-CM

## 2021-07-16 PROCEDURE — 97112 NEUROMUSCULAR REEDUCATION: CPT | Mod: GP | Performed by: PHYSICAL THERAPIST

## 2021-07-16 NOTE — PROGRESS NOTES
OUTPATIENT PHYSICAL THERAPY  PLAN OF TREATMENT FOR OUTPATIENT REHABILITATION AND PROGRESS NOTE           Patient's Last Name, First Name, Morenita Barragan Date of Birth  1945   Provider's Name  [facility] Medical Record No.  [id number]    Onset Date  4/16/2021 Start of Care Date  5/3/2021   Type:     _X_PT   ___OT   ___SLP Medical Diagnosis  Dizziness   PT Diagnosis  Decreased safe functional mobility Plan of Treatment  Frequency/Duration: up to 2x/week for 90 days  Certification date from 7/16/2021 to 10/13/2021     Goals:  Goal Identifier Modified goal: Postural stability   Goal Description The patient will be able to maintain rhomberg with eyes closed x 30 seconds with minimal observed sway to demonstrate a significant improvement in postural stability.   Target Date 10/13/21   Date Met      Progress (detail required for progress note): Modified goal to excluded sharpened rhomberg.  At evaluation, patient had difficulty assuming rhomberg stance and was able to maintain the position with eyes opened, however, excessive postural sway observed.  Today, patient able to obtain rhomberg stance independently and hold for 30 seconds with minimal postural sway and was able to hold position with eyes closed for 10-15 seconds with verbal cues to focus on breathing and moderate postural sway observed.     Goal Identifier DGI   Goal Description The patient will score 19/24 or greater on DGI assessment iw us eof least restrictive AD to demonstrate a significant improvement in dynamic balance and to show that she is at a minimal risk of falling with particiaption in community based ambualtion.    Target Date 10/13/21   Date Met      Progress (detail required for progress note):  Unknown due to not having assessed DGI secondary to time and focusing on establishing HEP.     Goal Identifier 30s STS   Goal Description The patient will be able to perfomr 13 STS trasnfers in  30 seconds or less without UE suport to demonstrate a significant improvement in LE strength and to show that she is at a minimal risk of falling iwth participation in functional mobility tasks.    Target Date 10/13/21   Date Met      Progress (detail required for progress note):  Today 9 reps no upper extremity support and no significant instability.  5/12/2021: 6 reps with no upper extremity support and no significant instability.     Beginning/End Dates of Progress Note Reporting Period:  5/3/2021 to 7/16/2021, total of 6 visits    Progress Toward Goals: See above and below comments.  Patient has made some progress towards above goals, however, continues to report significant dizziness symptoms.  Patient motivated to participate in physical therapy, however, with recent changes (looking for new medical providers, stopping Ativan) patient is feeling overwhelmed with attending physical therapy appointments at this time.  Patient has verbalized understanding of current HEP and will focus on performing while taking some off from physical therapy to focus on other medical issues and/or concerns.    Client Self (Subjective) Report for Progress Note Reporting Period: Patient arriving today and states that she is worse with regards to dizziness and balance because of going off Ativan; asking to slow treatment a bit.  Reports that she had problems with traveling this past week - with movement and in the car.  Patient is feeling a bit overwhelmed with everything currently going on (going off Ativan, finding new dentist and cardiologist, preparing for family to be in town).  Hasn't been able to do some of the exercises because of how she is feeling.    Objective Measurements:   Objective Measure: 30 second sit to/from stand  Details: 9 reps no UE support, no significant instability (5/12/21: 6 reps no UE, no sign. instability)      Objective Measure: rhomberg  Details: rhomberg EO: 30 seconds & EC: 10-15 seconds (verbal  cues to focus on breathing); unable to achieve tandem stance, able to step forward with support and hold for 5-10 seconds with CGA to min assist of one (Eval: difficulty assuming rhomberg.)       I CERTIFY THE NEED FOR THESE SERVICES FURNISHED UNDER        THIS PLAN OF TREATMENT AND WHILE UNDER MY CARE     (Physician co-signature of this document indicates review and certification of the therapy plan).                Referring Provider: Dr. Hong Marcial, PT

## 2021-07-23 ENCOUNTER — PATIENT OUTREACH (OUTPATIENT)
Dept: GERIATRIC MEDICINE | Facility: CLINIC | Age: 76
End: 2021-07-23

## 2021-07-23 NOTE — PROGRESS NOTES
[facility] Care Coordination Contact    Internal CC change effective 8/1/2021.  Mailed member CC Change letter.  Additional tasks to be completed by CMS include: update database & EPIC, enter CC Change in MMIS, and move member files on Q drive.  Isi Barbosa  Case Management Specialist  [facility]  [phone]

## 2021-07-23 NOTE — LETTER
July 23, 2021    MORENITA ZEE  [address],   [facility] [address]      Dear Morenita:    As a member of [facility] ([facility] you are provided a care coordinator. I will be your new care coordinator as of 8/1/2021. I will be calling you soon to see how you are doing and determine your needs.    If you have any questions, please feel free to call me at  [phone]. If you reach my voice mail, please leave a message and your phone number. If you are hearing impaired, please call the [place] Relay at [phone] or [phone] ([id number] relay service).    I look forward to speaking with you soon.    Sincerely,      ASHLEIGH Villalpando          [facility]  [id number] DHS Approved ([id number])  [id number] (11/18)

## 2021-08-10 ENCOUNTER — OFFICE VISIT (OUTPATIENT)
Dept: INTERNAL MEDICINE | Facility: CLINIC | Age: 76
End: 2021-08-10
Payer: MEDICARE

## 2021-08-10 ENCOUNTER — PATIENT OUTREACH (OUTPATIENT)
Dept: GERIATRIC MEDICINE | Facility: CLINIC | Age: 76
End: 2021-08-10

## 2021-08-10 VITALS
TEMPERATURE: 97.6 F | OXYGEN SATURATION: 95 % | SYSTOLIC BLOOD PRESSURE: 144 MMHG | HEIGHT: 65 IN | BODY MASS INDEX: 20.99 KG/M2 | RESPIRATION RATE: 16 BRPM | HEART RATE: 118 BPM | DIASTOLIC BLOOD PRESSURE: 82 MMHG | WEIGHT: 126 LBS

## 2021-08-10 DIAGNOSIS — I10 ESSENTIAL HYPERTENSION: ICD-10-CM

## 2021-08-10 DIAGNOSIS — I47.10 SVT (SUPRAVENTRICULAR TACHYCARDIA) (H): Primary | ICD-10-CM

## 2021-08-10 DIAGNOSIS — M43.6 TORTICOLLIS: ICD-10-CM

## 2021-08-10 DIAGNOSIS — R42 DIZZINESS: ICD-10-CM

## 2021-08-10 DIAGNOSIS — J45.20 MILD INTERMITTENT ASTHMA IN ADULT WITHOUT COMPLICATION: ICD-10-CM

## 2021-08-10 DIAGNOSIS — R25.1 TREMOR, PHYSIOLOGICAL: ICD-10-CM

## 2021-08-10 DIAGNOSIS — F32.5 MAJOR DEPRESSION IN REMISSION (H): ICD-10-CM

## 2021-08-10 DIAGNOSIS — I31.9 PERICARDITIS, UNSPECIFIED CHRONICITY, UNSPECIFIED TYPE: ICD-10-CM

## 2021-08-10 PROBLEM — K21.9 GASTROESOPHAGEAL REFLUX DISEASE WITHOUT ESOPHAGITIS: Status: ACTIVE | Noted: 2020-08-18

## 2021-08-10 PROBLEM — M54.2 NECK PAIN: Status: ACTIVE | Noted: 2019-07-09

## 2021-08-10 PROBLEM — F41.1 GAD (GENERALIZED ANXIETY DISORDER): Status: ACTIVE | Noted: 2019-12-26

## 2021-08-10 PROBLEM — Z78.9 IMPAIRED INSTRUMENTAL ACTIVITIES OF DAILY LIVING (IADL): Status: ACTIVE | Noted: 2019-07-09

## 2021-08-10 PROBLEM — M17.0 PRIMARY OSTEOARTHRITIS OF BOTH KNEES: Status: ACTIVE | Noted: 2020-09-21

## 2021-08-10 PROCEDURE — 99215 OFFICE O/P EST HI 40 MIN: CPT | Performed by: INTERNAL MEDICINE

## 2021-08-10 PROCEDURE — 99417 PROLNG OP E/M EACH 15 MIN: CPT | Performed by: INTERNAL MEDICINE

## 2021-08-10 PROCEDURE — 93000 ELECTROCARDIOGRAM COMPLETE: CPT | Performed by: INTERNAL MEDICINE

## 2021-08-10 PROCEDURE — 96127 BRIEF EMOTIONAL/BEHAV ASSMT: CPT | Performed by: INTERNAL MEDICINE

## 2021-08-10 RX ORDER — AMLODIPINE BESYLATE 2.5 MG/1
2.5 TABLET ORAL DAILY
Qty: 30 TABLET | Refills: 0 | Status: SHIPPED | OUTPATIENT
Start: 2021-08-10 | End: 2021-12-06

## 2021-08-10 ASSESSMENT — MIFFLIN-ST. JEOR: SCORE: 1072.41

## 2021-08-10 ASSESSMENT — PATIENT HEALTH QUESTIONNAIRE - PHQ9: SUM OF ALL RESPONSES TO PHQ QUESTIONS 1-9: 3

## 2021-08-10 NOTE — PROGRESS NOTES
[facility] Care Coordination Contact    Care coordinator received voicemail from member stating that she received the new care coordinator letter in the mail yesterday and was just calling to update care coordinator on her current health status and services. Member left contact information.     Care coordinator called member back, member did not answer so care coordinator left a voice mail message with care coordinator name and contact information and requested a call back.     ASHLEIGH Villalpando  [facility]  Cell: [phone]

## 2021-08-10 NOTE — PROGRESS NOTES
Assessment & Plan     (I10) Essential hypertension  Comment: Elevated blood pressure on several occasions(reviewed vitals flowsheet)  Plan: Discussed sodium restriction, and regular exercise program as physiologic means to achieve blood pressure control.  Patient was started on amLODIPine (NORVASC) 2.5 MG tablet 1 tablet daily as directed.explained clearly about the medication,insructions and side effects.  Follow-up in 1 month for blood pressure recheck       (I31.9) Pericarditis, unspecified chronicity, unspecified type  Plan: Diagnosed in 2019 and treated with colchicine and ibuprofen, follows up with cardiologist requesting referral to see cardiologist here.  Patient states she continues to have on and off pain due to pericarditis, patient was referred to cardiology in [place] for further evaluation and management.    (I47.1) SVT (supraventricular tachycardia) (H)     Plan: EKG obtained which showed sinus rhythm with a heart rate of 85 at this time , patient was referred to cardiology as requested.. Adult Cardiology Eval Referral, EKG 12-lead         complete w/read - Clinics            (F32.5) Major depression in remission (H)  Plan: Patient says she will be seeing psychologist and will be discussing CBT , does not want to see psychiatrist at this time . patient will be starting Zoloft 25 mg daily as recommended by her neurologist.     (R25.1) Tremor, physiological  Plan: Patient states she was initially on Klonopin but then switched to Ativan, patient has been tapering down lorazepam as recommended by her neurologist. Patient was informed that I do not prescribe benzodiazepines due to their side effects and addiction potential . Pt follows up with neurologist     (M43.6) Torticollis  Plan: stable    (J45.20) Mild intermittent asthma in adult without complication  Plan: stable on Advair regularly and prn albuterol        (R42) Dizziness  Plan: Patient will be following up with neurologist  and also ENT  and dizziness clinic.      Care everywhere reviewed and reviewed notes from pts neurologist/cardiologist/ Inf dz and PCP..  Review of external notes as documented elsewhere in note  Ordering of each unique test  70 minutes spent on the date of the encounter doing chart review, history and exam, documentation and further activities per the note       Return in about 4 weeks (around 9/7/2021) for BP Recheck.    Otilia Sánchez MD  [facility] JYOTI Gee is a 76 year old who presents for the following health issue   Patient is being seen to establish care and tachycardia.  HPI     Pt is a 76 year old female  who is seen here to day to establish care and follow-up on tachycardia/SVT/pericarditis.    Patient complains of having tachycardia on and off since few years and has been diagnosed with SVT after she was diagnosed with acute pericarditis in 11/2019, patient was treated with colchicine, ibuprofen from her cardiologist.. Patient states she continues to have on and off chest pains since her pericarditis, and she has been advised by her cardiologist to get MRI of the heart.  Patient would like to establish a cardiologist here.      Patient also has history of chronic dizziness and has been followed up by neurologist and has been recently diagnosed with PPPD and advised to start the Zoloft 25 mg daily from her neurologist but patient has not started the medication yet.     Patient states she also had tremors and she was prescribed Klonopin initially and and then changed to Ativan and currently she is weaning down Ativan and stopping.    Patient also has history of depression and was on citalopram in the past but did not tolerate.  Patient declines referral to psychiatrist but would be seeing psychologist for possible CBT.    Also has history of asthma which has been stable on inhalers.      Has history of on and off elevated blood pressures and has tried metoprolol in the  past but had dizziness and stopped metoprolol and  currently not on any medications for blood pressure.        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         amLODIPine (NORVASC) 2.5 MG tablet Take 1 tablet (2.5 mg) by mouth daily 30 tablet 0     aspirin 81 MG EC tablet Take 81 mg by mouth daily       calcium carbonate 750 MG CHEW Take 1-2 tablets by mouth as needed       calcium carbonate-vitamin D (OYSTER SHELL CALCIUM/D) 500-200 MG-UNIT tablet Take 1 tablet by mouth       Cyanocobalamin 50 MCG TABS Take 50 mcg by mouth       fluticasone-salmeterol (ADVAIR) 100-50 MCG/DOSE diskus inhaler Inhale 1 puff into the lungs every 12 hours       LORazepam (ATIVAN) 0.5 MG tablet as needed Through previous PCP, tapering down and discontinuing.       vitamin C (ASCORBIC ACID) 500 MG tablet Take 500 mg by mouth           Review of Systems   CONSTITUTIONAL: NEGATIVE for fever, chills, change in weight  EYES: NEGATIVE for vision changes or irritation  ENT/MOUTH: NEGATIVE for ear, mouth and throat problems  RESP: NEGATIVE for significant cough or SOB  CV: NEGATIVE for chest pain, palpitations or peripheral edema  NEURO: dizziness( seeing  "neurologist)  PSYCHIATRIC: NEGATIVE for changes in mood or affect      Objective  initial vital in clinic   BP (!) 181/94   Pulse 118   Temp 97.6  F (36.4  C) (Oral)   Resp 16   Ht 1.651 m (5' 5\")   Wt 57.2 kg (126 lb)   SpO2 95%   BMI 20.97 kg/m    Body mass index is 21.7 kg/m .  Rpt /82 and pulse 84   Physical Exam   GENERAL:  Pt was initially anxious per nursing staff.  But later was calm ,alert and no distress  EYES: Eyes grossly normal to inspection, PERRL and conjunctivae and sclerae normal  NECK: torticollis   RESP: lungs clear to auscultation - no rales, rhonchi or wheezes  CV: regular rate and rhythm currently ,    MS:   Chest wall tenderness Lt upper chest,no edema, no calf tenderness  NEURO: Normal strength and tone, mentation intact and speech normal  PSYCH: mentation appears normal, affect normal/bright           "

## 2021-08-11 ENCOUNTER — PATIENT OUTREACH (OUTPATIENT)
Dept: GERIATRIC MEDICINE | Facility: CLINIC | Age: 76
End: 2021-08-11

## 2021-08-11 ASSESSMENT — ASTHMA QUESTIONNAIRES: ACT_TOTALSCORE: 22

## 2021-08-11 NOTE — PROGRESS NOTES
[facility] Care Coordination Contact    Care coordinator received phone call from member to give an update on her current health status and changes. Member reports that she has been struggling with her health since she contracted a virus back in 2018. Member reports she has seen several doctors over the past two years. Member reports yesterday that she went to see her new PCP at [facility] and her blood pressure was high 144/82. PCP recommended that member schedule an appt with a cardiologist to check her heart. Member also wants discontinue a current medication she is taking and consulted with her doctor on how to do so safely. Member reports that she is currently seeing a dentist in [place] but would like to switch to the [facility] to complete the removal of three teeth and two crown replacements. Member stated she will need to be put under for this surgery and wants to consult with her oral surgeon first. Member reports no concerns with her current services, reports things are going well but she might need to add another staff at the end of August. Member reports she would like to get a new neck support and asked if care coordinator can help coordinate this. Care coordinator will reach out to [facility] and have them give member a call.       Care coordinator sent e-mail to [facility] requesting that they reach out to member to place a new order for neck support.         Care coordinator received quote back from [facility] for cervical neck support. Service authorization has been requested by CMS.   Care coordinator called member to inform her that cervical neck support request has been received and approved. Member reports that her [employer] staff is sick and unable to come this week. Member stated she left a voice mail message for the agency to find a replacement. Member would like to find a new [facility] surgery with the assistance of care coordinator.       Care coordinator contacted  [facility] to confirm if they have available [facility] staffing for member. Care coordinator left a voice mail message with contact information and requesting a call back. Care coordinator contacted [facility] and [place] locations to inquire on open staffing for ICLS services. Each location stated they do not have available staffing at this time. Care coordinator contacted member's current [facility] agency [facility] to confirm if they could find back up staffing or replacement staffing for member before she switches to a new agency. [facility] stated they did speak with member yesterday via telephone about getting a back up [facility]CL[employer] staff ongoing. [facility] stated they are working on finding a new/backup [employer] staff for member and will contact care coordinator with an update next week.   ASHLEIGH Villalpando  [facility]  Cell: [phone]

## 2021-08-12 ENCOUNTER — MYC MEDICAL ADVICE (OUTPATIENT)
Dept: INTERNAL MEDICINE | Facility: CLINIC | Age: 76
End: 2021-08-12

## 2021-08-12 DIAGNOSIS — J45.909 UNCOMPLICATED ASTHMA, UNSPECIFIED ASTHMA SEVERITY, UNSPECIFIED WHETHER PERSISTENT: Primary | ICD-10-CM

## 2021-08-13 ENCOUNTER — MYC MEDICAL ADVICE (OUTPATIENT)
Dept: INTERNAL MEDICINE | Facility: CLINIC | Age: 76
End: 2021-08-13

## 2021-08-13 NOTE — TELEPHONE ENCOUNTER
LMOM that Dr. Burnett does not feel comfortable refilling Ativan.  Please call one of the providers at the [place] [facility]

## 2021-08-16 NOTE — TELEPHONE ENCOUNTER
This patient is no longer a [facility] patient.  She has established with Dr. Sánchez, who is an internist in [place].  I will defer to Dr. Sánchez regarding management of her medications.  Dr. Tamayo no longer works at this clinic.     Click to add…

## 2021-08-17 DIAGNOSIS — R91.8 PULMONARY NODULES: Primary | ICD-10-CM

## 2021-08-17 NOTE — TELEPHONE ENCOUNTER
Safe Ativan taper should come from the prescribing physician under a supervising physician  as I am not the prescribing physician (pt is new to me) I have recommended that she f/u with addiction medicine for safe benzo taper.     Please inform pt about message from her previous clinic and also my recommendation about addiction clinic referral.

## 2021-08-19 ENCOUNTER — TELEPHONE (OUTPATIENT)
Dept: CARDIOLOGY | Facility: CLINIC | Age: 76
End: 2021-08-19

## 2021-08-19 NOTE — TELEPHONE ENCOUNTER
Reviewed with Dr Parekh and he will see patient first and discuss her symptoms and will most likely order leadless monitor at appt.  Message left for patient to discuss.   LC Obrien

## 2021-08-19 NOTE — TELEPHONE ENCOUNTER
Spoke to patient in follow up to OV scheduled with Dr Parekh on 8/23.  Patient referred per PCP for SVT.  Last leadless monitor worn was 1/2020 showing 1 episode of SVT.  Patient reports in the last few months she has been having frequent episodes of palpitations.  Episodes lasting seconds to minutes.  Explained to patient that often times a  monitor will need to be worn to identify what we are dealing with.  Informed patient that I will discuss with Dr Parekh the above to see if he wants monitor placed prior to appt.  Patient provided verbal understanding regarding above.  LC Obrien

## 2021-08-19 NOTE — TELEPHONE ENCOUNTER
Spoke to patient to let her know no monitor will be needed for appt.  Asked patient to put together her questions and concerns for regarding her appt.  Patient provided verbal understanding.  LC Obrien

## 2021-08-31 NOTE — TELEPHONE ENCOUNTER
Patient requested referral from previous provider to a specialist to taper off benzodiazepine.  LIZET Beltran R.N.

## 2021-09-03 NOTE — PROGRESS NOTES
Per [facility] - CC notified.    Morenita Moore 1945 cervial collar 8/12/2021 DELIVERED 8/18/21         Jodie Talavera  Care Management Specialist   [facility]   [phone]

## 2021-09-11 ENCOUNTER — HEALTH MAINTENANCE LETTER (OUTPATIENT)
Age: 76
End: 2021-09-11

## 2021-09-13 ENCOUNTER — MYC MEDICAL ADVICE (OUTPATIENT)
Dept: INTERNAL MEDICINE | Facility: CLINIC | Age: 76
End: 2021-09-13

## 2021-09-14 ENCOUNTER — TELEPHONE (OUTPATIENT)
Dept: AUDIOLOGY | Facility: CLINIC | Age: 76
End: 2021-09-14

## 2021-09-14 DIAGNOSIS — J45.909 UNCOMPLICATED ASTHMA, UNSPECIFIED ASTHMA SEVERITY, UNSPECIFIED WHETHER PERSISTENT: Primary | ICD-10-CM

## 2021-09-14 DIAGNOSIS — J45.909 UNCOMPLICATED ASTHMA, UNSPECIFIED ASTHMA SEVERITY, UNSPECIFIED WHETHER PERSISTENT: ICD-10-CM

## 2021-09-14 RX ORDER — ALBUTEROL SULFATE 90 UG/1
2 AEROSOL, METERED RESPIRATORY (INHALATION) EVERY 6 HOURS
Qty: 18 G | Refills: 11 | Status: SHIPPED | OUTPATIENT
Start: 2021-09-14 | End: 2022-03-23

## 2021-09-14 NOTE — TELEPHONE ENCOUNTER
"\"I m calling from the Audiology and Balance Testing department at the . This is just a call to remind you of your upcoming Balance Testing appointment on [Date], and to see if you have any questions or concerns regarding the balance testing you'll be doing. You should have received an itinerary via mail or via [employer], if you are active, that goes over what to expect and explains the dos and don ts both 48 hours before, and the day of. There is a list of medications for you to review on the itinerary that we would like you to stop taking beforehand. If you didn t receive the itinerary or you still have questions, please give our clinic a call at [phone]. Otherwise, we will see you on [Date] starting at [Time].\"    Please send encounter if patient would like to reschedule.    "

## 2021-09-15 DIAGNOSIS — J45.909 ASTHMA: Primary | ICD-10-CM

## 2021-09-15 DIAGNOSIS — J45.909 UNCOMPLICATED ASTHMA, UNSPECIFIED ASTHMA SEVERITY, UNSPECIFIED WHETHER PERSISTENT: ICD-10-CM

## 2021-09-15 RX ORDER — FLUTICASONE PROPIONATE AND SALMETEROL XINAFOATE 230; 21 UG/1; UG/1
2 AEROSOL, METERED RESPIRATORY (INHALATION) 2 TIMES DAILY
Qty: 36 G | Refills: 0 | Status: SHIPPED | OUTPATIENT
Start: 2021-09-15 | End: 2022-03-23

## 2021-09-16 ENCOUNTER — TELEPHONE (OUTPATIENT)
Dept: AUDIOLOGY | Facility: CLINIC | Age: 76
End: 2021-09-16

## 2021-09-16 NOTE — TELEPHONE ENCOUNTER
Pts  history of pericarditis has been updated in Epic when she established care in this clinic on  08/10/21, please advise to f/u with cardiologist  Pl advise pt to f/u with addiction clinic for weaning down on ativan

## 2021-09-28 ASSESSMENT — PATIENT HEALTH QUESTIONNAIRE - PHQ9
SUM OF ALL RESPONSES TO PHQ QUESTIONS 1-9: 4
10. IF YOU CHECKED OFF ANY PROBLEMS, HOW DIFFICULT HAVE THESE PROBLEMS MADE IT FOR YOU TO DO YOUR WORK, TAKE CARE OF THINGS AT HOME, OR GET ALONG WITH OTHER PEOPLE: SOMEWHAT DIFFICULT
SUM OF ALL RESPONSES TO PHQ QUESTIONS 1-9: 4

## 2021-09-28 ASSESSMENT — ANXIETY QUESTIONNAIRES
6. BECOMING EASILY ANNOYED OR IRRITABLE: NOT AT ALL
GAD7 TOTAL SCORE: 2
GAD7 TOTAL SCORE: 2
2. NOT BEING ABLE TO STOP OR CONTROL WORRYING: NOT AT ALL
5. BEING SO RESTLESS THAT IT IS HARD TO SIT STILL: NOT AT ALL
7. FEELING AFRAID AS IF SOMETHING AWFUL MIGHT HAPPEN: NOT AT ALL
4. TROUBLE RELAXING: NOT AT ALL
7. FEELING AFRAID AS IF SOMETHING AWFUL MIGHT HAPPEN: NOT AT ALL
GAD7 TOTAL SCORE: 2
8. IF YOU CHECKED OFF ANY PROBLEMS, HOW DIFFICULT HAVE THESE MADE IT FOR YOU TO DO YOUR WORK, TAKE CARE OF THINGS AT HOME, OR GET ALONG WITH OTHER PEOPLE?: SOMEWHAT DIFFICULT
1. FEELING NERVOUS, ANXIOUS, OR ON EDGE: SEVERAL DAYS
3. WORRYING TOO MUCH ABOUT DIFFERENT THINGS: SEVERAL DAYS

## 2021-09-29 ENCOUNTER — VIRTUAL VISIT (OUTPATIENT)
Dept: BEHAVIORAL HEALTH | Facility: CLINIC | Age: 76
End: 2021-09-29
Attending: INTERNAL MEDICINE
Payer: MEDICARE

## 2021-09-29 DIAGNOSIS — F13.20 BENZODIAZEPINE DEPENDENCE (H): ICD-10-CM

## 2021-09-29 PROCEDURE — 99205 OFFICE O/P NEW HI 60 MIN: CPT | Mod: 95 | Performed by: INTERNAL MEDICINE

## 2021-09-29 RX ORDER — BUSPIRONE HYDROCHLORIDE 5 MG/1
5 TABLET ORAL 3 TIMES DAILY
Qty: 63 TABLET | Refills: 0 | Status: SHIPPED | OUTPATIENT
Start: 2021-09-29 | End: 2021-12-06

## 2021-09-29 RX ORDER — LORAZEPAM 0.5 MG/1
0.25 TABLET ORAL EVERY MORNING
Qty: 8 TABLET | Refills: 0 | Status: SHIPPED | OUTPATIENT
Start: 2021-09-29 | End: 2021-12-06

## 2021-09-29 ASSESSMENT — PATIENT HEALTH QUESTIONNAIRE - PHQ9: SUM OF ALL RESPONSES TO PHQ QUESTIONS 1-9: 4

## 2021-09-29 ASSESSMENT — ANXIETY QUESTIONNAIRES: GAD7 TOTAL SCORE: 2

## 2021-09-29 NOTE — NURSING NOTE
This video/telephone visit will be conducted via a call between you and your physician/provider. We have found that certain health care needs can be provided without the need for an in-person physical exam. This service lets us provide the care you need with a video /telephone conversation. If a prescription is necessary we can send it directly to your pharmacy. If lab work is needed we can place an order for that and you can then stop by our lab to have the test done at a later time.    Just as we bill insurance for in-person visits, we also bill insurance for video/telephone visits. If you have questions about your insurance coverage, we recommend that you speak with your insurance company.    Patient has given verbal consent for video/Telephone visit? Yes  Patient would like the video visit invitation sent by: [place] TOLEDO/ANGY: Bennett MUNSON LPN  New pt, referred to us for Ativan taper, currently alternates 0.25 mg every other day with 0.125 mg every other day. She is following up with neurology and  going to schedule a balance test sometime soon.   Patient verified allergies, medications and pharmacy via phone. PHQ : 4 and CELESTE: 2 done verbally with writer. Patient states she  is ready for visit.    ________________________________________  Medications Phoned  to Pharmacy [] yes [x]no  Name of Pharmacist:  List Medications, including dose, quantity and instructions    Medications ordered this visit were e-scribed.  Verified by order class [x] yes  [] no  Buspar and Lorazepam  Medication changes or discontinuations were communicated to patient's pharmacy: [] yes  [x] no    Dictation completed at time of chart check: [] yes  [x] no    I have checked the documentation for today s encounters and the above information has been reviewed and completed.

## 2021-09-29 NOTE — NURSING NOTE
"Date:     Tele-Visit Details    Type of service:  Video Visit    Time Service Began (time 1st connected with pt): 1422    Time Service Ended (time completely finished with pt): 1511    Originating Location (pt. Location): Patient Home    Distant Location (provider location): [facility]     Reason for Televisit: COVID 19    Mode of Communication:  Video Conference via AmWell    Physician has received verbal consent for a video visit from the patient? Yes      Addiction Medicine Outpatient Clinic  New Patient Assessment    Date:  9/29/2021    Referred By:  Rosemarie  Reason for Referral:      DIAGNOSES  HTN  Viral illness 2019 with  Pericarditis.  No organism was her identified   supraventricular tachycardia  Asthma  Depression  Anxiety  Tremor  Torticollis    Vestibular migraine she has intermittent dizziness and several episodes of vertigo: \"Difficulty getting off Ativan\"    HPI: Marlen is a 76-year-old female who was referred to the addiction clinic by her primary care physician for help getting off of benzodiazepines.  Insists that she wants to stop taking them but has not been able to tolerate the increased anxiety and tremulousness that she gets when she reduces the dose.  She reports that she was first prescribed Klonopin about 5 years ago and took 0.5 mg 2 times a day for tremors.  About 2 years ago she was switched to Ativan but has found that she cannot tolerate tapering off of it completely.  Initially she was 1 mg twice daily as needed and reports that she generally took this much.  She currently is taking 0.5 mg Ativan daily alternating with 0.25 mg on the other days.  Last week when she stopped completely she developed palpitations fatigue and lightheadedness and could not leave her home.  She also reports that she felt mentally \"foggy\" and recently has been feeling off balance as well.    Patient does admit to cravings for benzodiazepines when she is " off of them.  She also reports interference with her being able to leave her home or participate with friends.  She has not used more than planned but has had successful attempts to control or cut down her use.    She notes that she developed torticollis in her neck after her divorce but it never fully resolved.  She at one point was given citalopram and then she developed tremors in her head and trunk which are improved by the benzodiazepines but not completely eliminated.  She is followed by neurology at [facility] in [place].  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  Also considered diagnosis of PPPD.   she has declined Botox injections.    Past medical History  Patient Active Problem List   Diagnosis     Acute internal derangement of left knee     Asthma in adult     Breast cancer in situ     Sprain of posterior cruciate ligament of left knee     SVT (supraventricular tachycardia) (H)     Torticollis     Gastroesophageal reflux disease without esophagitis     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)     Major depression in remission (H)     Neck pain     Pericarditis     Primary osteoarthritis of both knees     CELESTE (generalized anxiety disorder)     Presbyopia     Senile nuclear sclerosis     Tremor, physiological     Essential hypertension     Dizziness       Mental Health History  Depression particularly following her divorce 40 years ago.  Generalized anxiety disorder    Family History  Sister with breast cancer      Social History  Patient lives alone in an assisted living facility.  She does function fairly independently but recently has stopped driving because of the dizziness.  She has a son who is 27 years old.      CURRENT MEDICATIONS  Prior to Admission medications    Medication Sig Start Date End Date Taking? Authorizing Provider   acetaminophen (TYLENOL) 500 MG tablet Take 500 mg by mouth as  needed 8/8/18  Yes Reported, Patient   albuterol (PROAIR HFA/PROVENTIL HFA/VENTOLIN HFA) 108 (90 Base) MCG/ACT inhaler Inhale 2 puffs into the lungs every 6 hours 9/14/21  Yes Pacheco Perdomo MD   aspirin 81 MG EC tablet Take 81 mg by mouth daily   Yes Reported, Patient            calcium carbonate 750 MG CHEW Take 1-2 tablets by mouth as needed 9/29/14  Yes Reported, Patient   calcium carbonate-vitamin D (OYSTER SHELL CALCIUM/D) 500-200 MG-UNIT tablet Take 1 tablet by mouth   Yes Reported, Patient   Cyanocobalamin 50 MCG TABS Take 50 mcg by mouth   Yes Reported, Patient   fluticasone-salmeterol (ADVAIR-HFA) 230-21 MCG/ACT inhaler Inhale 2 puffs into the lungs 2 times daily 9/15/21  Yes Pacheco Perdomo MD           LORazepam (ATIVAN) 0.5 MG tablet as needed Through previous PCP, tapering down and discontinuing. 6/6/20  Yes Reported, Patient   vitamin C (ASCORBIC ACID) 500 MG tablet Take 500 mg by mouth   Yes Reported, Patient   amLODIPine (NORVASC) 2.5 MG tablet Take 1 tablet (2.5 mg) by mouth daily 8/10/21   Otilia Sánchez MD        ALLERGIES  Allergies   Allergen Reactions     Levalbuterol Hydrochloride Shortness Of Breath     Cats Other (See Comments)     Itchy eyes     Dust Mites      Other reaction(s): Wheezing  Wheezing/shortness/breath/see (IRP 6/1)     Amitriptyline Palpitations     Escitalopram Anxiety       MN :    Reviewed.  Lorazepam 0.5 mg 20 tabs on 2/11/2021, 4/19/2021, 6/2/2021, and 8/26/21      REVIEW OF SYSTEMS   Constitutional:   No sweats   Pulmonary   No current wheezing or cough   Cardiovascular   Occasional stabbing pain in left chest, similar to her pericarditis pain.  Also aware of intermittent palpitations which can be brought on by caffeine or her asthma inhaler.  Gastrointestinal    Appetite is normal   Denies nausea, vomiting, diarrhea or constipation.    Urologic   Denies dysuria or change in frequency.  Neurologic   Denies headache, or focal weakness or numbness.   Complains of intermittent vertigo, lightheadedness, questionable history of seizure.  Psychiatric   Denies depression.  Does endorse frequent symptoms of anxiety    Denies suicidal thoughts, plan or intent.   Rheumatologic   Denies muscle or joint pains.     Hematologic   No unusual bleeding or bruising:      PHYSICAL EXAM     This visit was via video and therefore exam is limited  Current Findings:    There were no vitals taken for this visit.      General appearance:    Appropriate dress and hygiene.   Skin:    No diaphoresis or piloerection noted.  Neurologic   Alert, oriented x 4  Tremor of head and trunk.  No significant tremor in her hands.   No chronic movements or facial grimacing  Psychiatric Mental Status Examination       Cooperative     Mood:   Anxious               Affect:   Constricted               Thought content:   Anxious and worried about medication side effects.  No SI, HI or hallucinations               Thought processes:  Linear                Speech:  Normal                Motor:   Tremors as above                Insight/judgement:   Fair/fair     LABORATORY   Laboratory 2/1/2021: Comprehensive metabolic panel completely normal                                    CBC within normal limits                                     Thyroid function tests obtained    IMAGING  MRI of brain without contrast 7/1/2020:  FINDINGS:  INTRACRANIAL CONTENTS: Multiple sequences degraded by patient motion. Some rapid imaging sequences were employed. No acute or subacute infarct. No mass, acute hemorrhage, or extra-axial fluid collections. Scattered nonspecific T2/FLAIR hyperintensities   within the cerebral white matter most consistent with mild chronic microvascular ischemic change. Mild generalized cerebral atrophy. No hydrocephalus. Mild cerebellar atrophy.          ASSESSMENT and PLAN:    1.  Chronic prescription benzodiazepine use-with evidence for moderate use disorder.    Patient reports she is motivated to  stop all benzodiazepines as she knows this can be a dangerous medication.  However she is also quite hesitant to try other medications for anxiety such as BuSpar, gabapentin or even metoprolol despite the fact that some of these may help with her tremor as well.  She reports that she reads information about medications and is concerned about side effects.    I did discuss three options for tapering off of benzodiazepines including:  A.  Slow taper of the Ativan that she has been on.  She has been doing this but has difficulty getting off completely.  Be.  Switch to a long-acting benzodiazepine, such as diazepam and an equivalent dose and taper that.  C.  Switch to an equivalent dose of phenobarbital and taper that.  Discussed the pros and cons of all three of these approaches.  She wants to try to continue to taper off taper off but now is willing to try BuSpar to help with anxiety as she comes off of the lorazepam.    If she is unsuccessful with this approach she will consider the diazepam.    2.    Generalized anxiety disorder -likely it is recurring as she is trying to stop the lorazepam.  Also likely that the head tremor and torticollis is a manifestation of her anxiety disorder as well.  Hopefully the BuSpar will help although I think gabapentin might be of more benefit quicker.  She is hesitant to take gabapentin because of side effects.  Furthermore given her adverse effect to citalopram she is very hesitant to take any SSRIs.      Sonam Houston MD  Addiction Medicine Service  [facility]   Page me (click here for Roseline Houston)

## 2021-09-29 NOTE — PROGRESS NOTES
"     Sonam Houston MD   Physician   Specialty: Addiction Medicine            Date:   Tele-Visit Details   Type of service: Video Visit   Time Service Began (time 1st connected with pt): 1422   Time Service Ended (time completely finished with pt): 1511     Originating Location (pt. Location): Patient Home   Distant Location (provider location): [facility]DI[facility]   Reason for Televisit: COVID 19   Mode of Communication: Video Conference via AmWell   Physician has received verbal consent for a video visit from the patient? Yes   Addiction Medicine Outpatient Clinic   New Patient Assessment   Date: 9/29/2021   Referred By: Rosemarie   Reason for Referral:   DIAGNOSES   HTN   Viral illness 2019 with Pericarditis. No organism was her identified   supraventricular tachycardia   Asthma   Depression   Anxiety   Tremor   Torticollis   Vestibular migraine she has intermittent dizziness and several episodes of vertigo: \"Difficulty getting off Ativan\"   HPI: Marlen is a 76-year-old female who was referred to the addiction clinic by her primary care physician for help getting off of benzodiazepines. Insists that she wants to stop taking them but has not been able to tolerate the increased anxiety and tremulousness that she gets when she reduces the dose. She reports that she was first prescribed Klonopin about 5 years ago and took 0.5 mg 2 times a day for tremors. About 2 years ago she was switched to Ativan but has found that she cannot tolerate tapering off of it completely. Initially she was 1 mg twice daily as needed and reports that she generally took this much. She currently is taking 0.5 mg Ativan daily alternating with 0.25 mg on the other days. Last week when she stopped completely she developed palpitations fatigue and lightheadedness and could not leave her home. She also reports that she felt mentally \"foggy\" and recently has been feeling off balance as well.   Patient " does admit to cravings for benzodiazepines when she is off of them. She also reports interference with her being able to leave her home or participate with friends. She has not used more than planned but has had successful attempts to control or cut down her use.   She notes that she developed torticollis in her neck after her divorce but it never fully resolved. She at one point was given citalopram and then she developed tremors in her head and trunk which are improved by the benzodiazepines but not completely eliminated. She is followed by neurology at [facility] clinic in [place]. 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 Also considered diagnosis of PPPD. she has declined Botox injections.   Past medical History        Patient Active Problem List   Diagnosis     Acute internal derangement of left knee     Asthma in adult     Breast cancer in situ     Sprain of posterior cruciate ligament of left knee     SVT (supraventricular tachycardia) (H)     Torticollis     Gastroesophageal reflux disease without esophagitis     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)     Major depression in remission (H)     Neck pain     Pericarditis     Primary osteoarthritis of both knees     CELESTE (generalized anxiety disorder)     Presbyopia     Senile nuclear sclerosis     Tremor, physiological     Essential hypertension     Dizziness     Mental Health History   Depression particularly following her divorce 40 years ago.   Generalized anxiety disorder   Family History   Sister with breast cancer   Social History   Patient lives alone in an assisted living facility. She does function fairly independently but recently has stopped driving because of the dizziness. She has a son who is 27 years old.   CURRENT MEDICATIONS            Prior to Admission medications    Medication Sig Start Date End Date Taking? Authorizing Provider    acetaminophen (TYLENOL) 500 MG tablet Take 500 mg by mouth as needed 8/8/18  Yes Reported, Patient   albuterol (PROAIR HFA/PROVENTIL HFA/VENTOLIN HFA) 108 (90 Base) MCG/ACT inhaler Inhale 2 puffs into the lungs every 6 hours 9/14/21  Yes Pacheco Perdomo MD   aspirin 81 MG EC tablet Take 81 mg by mouth daily   Yes Reported, Patient           calcium carbonate 750 MG CHEW Take 1-2 tablets by mouth as needed 9/29/14  Yes Reported, Patient   calcium carbonate-vitamin D (OYSTER SHELL CALCIUM/D) 500-200 MG-UNIT tablet Take 1 tablet by mouth   Yes Reported, Patient   Cyanocobalamin 50 MCG TABS Take 50 mcg by mouth   Yes Reported, Patient   fluticasone-salmeterol (ADVAIR-HFA) 230-21 MCG/ACT inhaler Inhale 2 puffs into the lungs 2 times daily 9/15/21  Yes Pacheco Perdomo MD           LORazepam (ATIVAN) 0.5 MG tablet as needed Through previous PCP, tapering down and discontinuing. 6/6/20  Yes Reported, Patient   vitamin C (ASCORBIC ACID) 500 MG tablet Take 500 mg by mouth   Yes Reported, Patient   amLODIPine (NORVASC) 2.5 MG tablet Take 1 tablet (2.5 mg) by mouth daily 8/10/21   Otilia Sánchez MD   ALLERGIES          Allergies   Allergen Reactions     Levalbuterol Hydrochloride Shortness Of Breath     Cats Other (See Comments)     Itchy eyes     Dust Mites      Other reaction(s): Wheezing   Wheezing/shortness/breath/see (IRP 6/1)     Amitriptyline Palpitations     Escitalopram Anxiety     MN :   Reviewed. Lorazepam 0.5 mg 20 tabs on 2/11/2021, 4/19/2021, 6/2/2021, and 8/26/21   REVIEW OF SYSTEMS   Constitutional: No sweats   Pulmonary   No current wheezing or cough   Cardiovascular   Occasional stabbing pain in left chest, similar to her pericarditis pain. Also aware of intermittent palpitations which can be brought on by caffeine or her asthma inhaler. Gastrointestinal   Appetite is normal   Denies nausea, vomiting, diarrhea or constipation.   Urologic   Denies dysuria or change in frequency.   Neurologic    Denies headache, or focal weakness or numbness. Complains of intermittent vertigo, lightheadedness, questionable history of seizure.   Psychiatric   Denies depression. Does endorse frequent symptoms of anxiety   Denies suicidal thoughts, plan or intent.   Rheumatologic   Denies muscle or joint pains.   Hematologic   No unusual bleeding or bruising:     PHYSICAL EXAM   This visit was via video and therefore exam is limited   There were no vitals taken for this visit.          General appearance:    Appropriate dress and hygiene.                                              Wearing neck collar       Skin:  No diaphoresis or piloerection noted.        Neurologic:   Alert, oriented x 4                            Tremor of head and trunk. No significant tremor in her hands.                            No myoclonic  movements or facial grimacing                            Rotational torticollos       Psychiatric Mental Status Examination                           Cooperative                           Mood: Anxious                           Affect: Constricted                            Thought content: Anxious and worried about medication side effects.                                                        No SI, HI or hallucinations                            Thought processes: Linear                            Speech: Normal                            Motor: Tremors as above                            Insight/judgement: Fair/fair       LABORATORY   Laboratory 2/1/2021: Comprehensive metabolic panel completely normal   CBC within normal limits   Thyroid function tests obtained     IMAGING   MRI of brain without contrast 7/1/2020:   FINDINGS:   INTRACRANIAL CONTENTS: Multiple sequences degraded by patient motion. Some rapid imaging sequences were employed. No acute or subacute infarct. No mass, acute hemorrhage, or extra-axial fluid collections. Scattered nonspecific T2/FLAIR hyperintensities   within the cerebral white  matter most consistent with mild chronic microvascular ischemic change. Mild generalized cerebral atrophy. No hydrocephalus. Mild cerebellar atrophy.       ASSESSMENT and PLAN:   1. Chronic prescription benzodiazepine use-with evidence for moderate use disorder. Patient reports she is motivated to stop all benzodiazepines as she knows this can be a dangerous medication. However she is also quite hesitant to try other medications for anxiety such as BuSpar, gabapentin or even metoprolol despite the fact that some of these may help with her tremor as well. She reports that she reads information about medications and is concerned about side effects.     I did discuss three options for tapering off of benzodiazepines including:          A. Slow taper of the Ativan that she has been on. She has been doing this but has difficulty getting off completely.          B. Switch to a long-acting benzodiazepine, such as diazepam and an equivalent dose and taper that.          C. Switch to an equivalent dose of phenobarbital and taper that.     Discussed the pros and cons of all three of these approaches. She wants to try to continue to taper off, and now is willing to try BuSpar to help with anxiety as she comes off of the lorazepam. If she is unsuccessful with this approach she will consider the diazepam.         Starting tomorrow: Begin buspar 5 mg tid                                      reduce to 0.25 mg lorazepam every other day, alternating with 0.5 mg x 1 wk,   Then 0.25 mg daily                                        After 1 more wk, discontinue lorazepam completely.                                        Expect this will be challenging for the patient and she will need support and encouragement.            2. Generalized anxiety disorder -likely it is recurring as she is trying to stop the lorazepam. Also likely that the head tremor and torticollis is a manifestation of her anxiety disorder, rather than related to  stopping citalopram years ago.    Hopefully the BuSpar will help although I think gabapentin might be of more benefit quicker. She is hesitant to take gabapentin because of side effects. Furthermore, given her adverse effect to citalopram she is very hesitant to take any SSRIs. Therefore treatment options for anxiety are limited   Could add hydroxyzine prn and increase buspar dose next visit if anxiety increased.       Return visit 2 wks.         Sonam Houston MD   Addiction Medicine Service   [facility]   Page me (click here for Roseline Houston)          Answers for HPI/ROS submitted by the patient on 9/28/2021  If you checked off any problems, how difficult have these problems made it for you to do your work, take care of things at home, or get along with other people?: Somewhat difficult  PHQ9 TOTAL SCORE: 4  CELESTE 7 TOTAL SCORE: 2

## 2021-09-29 NOTE — PATIENT INSTRUCTIONS
1.   Lorazepam 0.5 mg.   take 1/2 tablet daily and after 1 week take 1/2 tablet alternating with one fourth of a tablet daily.   ##8 tabs given.    2.  BuSpar 5 mg 3 times daily number #42 tabs    Next visit 2 weeks by video.

## 2021-10-08 ENCOUNTER — MYC MEDICAL ADVICE (OUTPATIENT)
Dept: INTERNAL MEDICINE | Facility: CLINIC | Age: 76
End: 2021-10-08

## 2021-10-08 DIAGNOSIS — Z01.89 PATIENT REQUEST FOR DIAGNOSTIC TESTING: Primary | ICD-10-CM

## 2021-10-14 NOTE — TELEPHONE ENCOUNTER
Patient calls requesting response to her [id number] message.    Patient would like lab work ordered due to her muscle pain.

## 2021-10-20 ENCOUNTER — LAB (OUTPATIENT)
Dept: LAB | Facility: CLINIC | Age: 76
End: 2021-10-20
Payer: COMMERCIAL

## 2021-10-20 DIAGNOSIS — Z01.89 PATIENT REQUEST FOR DIAGNOSTIC TESTING: ICD-10-CM

## 2021-10-20 LAB
ALBUMIN SERPL-MCNC: 4.1 G/DL (ref 3.5–5)
ALP SERPL-CCNC: 81 U/L (ref 45–120)
ALT SERPL W P-5'-P-CCNC: 14 U/L (ref 0–45)
ANION GAP SERPL CALCULATED.3IONS-SCNC: 9 MMOL/L (ref 5–18)
AST SERPL W P-5'-P-CCNC: 17 U/L (ref 0–40)
BILIRUB SERPL-MCNC: 0.5 MG/DL (ref 0–1)
BUN SERPL-MCNC: 10 MG/DL (ref 8–28)
C REACTIVE PROTEIN LHE: <0.1 MG/DL (ref 0–0.8)
CALCIUM SERPL-MCNC: 9.4 MG/DL (ref 8.5–10.5)
CHLORIDE BLD-SCNC: 106 MMOL/L (ref 98–107)
CK SERPL-CCNC: 107 U/L (ref 30–190)
CO2 SERPL-SCNC: 24 MMOL/L (ref 22–31)
CREAT SERPL-MCNC: 0.63 MG/DL (ref 0.6–1.1)
GFR SERPL CREATININE-BSD FRML MDRD: 89 ML/MIN/1.73M2
GLUCOSE BLD-MCNC: 97 MG/DL (ref 70–125)
POTASSIUM BLD-SCNC: 4.3 MMOL/L (ref 3.5–5)
PROT SERPL-MCNC: 7.2 G/DL (ref 6–8)
SODIUM SERPL-SCNC: 139 MMOL/L (ref 136–145)

## 2021-10-20 PROCEDURE — 80053 COMPREHEN METABOLIC PANEL: CPT

## 2021-10-20 PROCEDURE — 36415 COLL VENOUS BLD VENIPUNCTURE: CPT

## 2021-10-20 PROCEDURE — 86141 C-REACTIVE PROTEIN HS: CPT

## 2021-10-20 PROCEDURE — 82550 ASSAY OF CK (CPK): CPT

## 2021-11-03 ENCOUNTER — PATIENT OUTREACH (OUTPATIENT)
Dept: GERIATRIC MEDICINE | Facility: CLINIC | Age: 76
End: 2021-11-03
Payer: COMMERCIAL

## 2021-11-03 NOTE — PROGRESS NOTES
[facility] Care Coordination Contact    Called member to schedule annual HRA home visit. HRA visit scheduled for 11/9/021.   ASHLEIGH Villalpando  [facility]  Cell: [phone]

## 2021-11-16 ENCOUNTER — PATIENT OUTREACH (OUTPATIENT)
Dept: GERIATRIC MEDICINE | Facility: CLINIC | Age: 76
End: 2021-11-16
Payer: COMMERCIAL

## 2021-11-16 ASSESSMENT — ACTIVITIES OF DAILY LIVING (ADL)
DEPENDENT_IADLS:: TRANSPORTATION;SHOPPING;LAUNDRY;CLEANING
DEPENDENT_IADLS:: TRANSPORTATION;SHOPPING;LAUNDRY;CLEANING

## 2021-11-16 NOTE — PROGRESS NOTES
[facility] Care Coordination Contact    [facility] Home Visit Assessment     Home visit for Health Risk Assessment with Morenita Moore completed on November 16, 2021    Current Care Plan  Member currently receiving the following home care services:  None    Member currently receiving the following community resources:  Morenita is currently receiving ICLS services 10 hours per week.     Medication Review  Medication reconciliation completed in Epic: Yes  Medication set-up & administration: Independent-does not set up and Independent and sets up on own every two weeks.  Self-administers medications.  Medication Risk Assessment Medication (1 or more, place referral to MTM): N/A: No risk factors identified  MTM Referral Placed: No: No risk factors idenified    Mental/Behavioral Health   Depression Screening: No concerns to report at this time. Morenita reports that her mental health is stable.      Falls Assessment: Morenita reports no falls in the past year. Morenita reports she continues to use her walker or cart when walking long distances outside her apartment.       ADL/IADL Dependencies: Morenita continues to be independent in all of her ADL tasks. Morenita continues to work with her ICLS worker to assist with cleaning,laundry, transportation and grocery shopping tasks.           [facility] Health Plan sponsored benefits: Shared information re: Silver Sneakers/gym memberships, ASA, Calcium +D.    PCA Assessment completed at visit: No and Not Applicable     Elderly Waiver Eligibility: Yes-will continue on EW    Care Plan & Recommendations: Morenita will continue on the elderly waiver program and will continue to receive weekly ICLS services to assist her in completing her IDAL tasks as needed. No concerns to report at this time. Morenita would like to check with [employer] about getting walking poles to assist her with mobility vs using her walker. Care coordinator will follow up with [employer] on equipment  request.     See [facility] for detailed assessment information.    Follow-Up Plan: Member informed of future contact, plan to f/u with member with a 6 month telephone assessment.  Contact information shared with member and family, encouraged member to call with any questions or concerns at any time.    [place] care continuum providers: Please refer to Health Care Home on the [facility] Problem List to view this patient's [facility] Care Plan Summary.

## 2021-11-17 NOTE — PROGRESS NOTES
[facility]   Hello,                                  Do you guys supply walking poles for seniors to assist with mobility? I have a client looking for these specifically.     Thank you  ASHLEIGH Rosa  Care Coordinator  [address] [address]  [email or web address]   [email or web address]     Office: [phone]  Cell: [phone]  Fax: [phone]  ASHLEIGH Villalpando  [facility]  Cell: [phone]

## 2021-11-18 ASSESSMENT — PATIENT HEALTH QUESTIONNAIRE - PHQ9: SUM OF ALL RESPONSES TO PHQ QUESTIONS 1-9: 0

## 2021-11-30 ENCOUNTER — PATIENT OUTREACH (OUTPATIENT)
Dept: GERIATRIC MEDICINE | Facility: CLINIC | Age: 76
End: 2021-11-30
Payer: COMMERCIAL

## 2021-11-30 NOTE — PROGRESS NOTES
[facility] Care Coordination Contact    Received after visit chart from care coordinator.  Completed following tasks: Mailed copy of care plan to client, Updated services in access and Submitted referrals/auths for ICLS   and Provider Signature - No POC Shared:  Member indicates that they do not want their POC shared with any EW providers.     Mailed POC sig page to member to sign and return in SASE.     Jodie Talavera  Care Management Specialist   [facility]   [phone]

## 2021-11-30 NOTE — LETTER
November 30, 2021      MORENITA ZEE  [address]  [place] [address]      Dear Morenita:    At [facility], we are dedicated to improving your health and well-being. Enclosed is the Comprehensive Care Plan that we developed with you on 11/16/2021. Please review the Care Plan carefully.    As a reminder, some of the things we discussed at your visit include:    Your physical and mental health    Ways to reduce falls    Health care needs you may have    Don t forget to contact your care coordinator if you:    Have been hospitalized or plan to be hospitalized     Have had a fall     Have experienced a change in physical health    Are experiencing emotional problems     If you do not agree with your Care Plan, have questions about it, or have experienced a change in your needs, please call me at [phone]. If you are hearing impaired, please call the [place] [employer] at 582 or [phone] (wrgmti-wt-bnfiby relay service).    Sincerely,    ASHLEIGH Villalpando    E-mail: [email or web address]  Phone: [phone]      [facility] ([facility]) is a health plan that contracts with both Medicare and the [place] Medical Assistance (Medicaid) program to provide benefits of both programs to enrollees. Enrollment in [facility] depends on contract renewal.    [id number])     [id number] (11/18)

## 2021-12-02 NOTE — PROGRESS NOTES
[facility] Care Coordination Contact    Order placed with [facility] (p: [phone]; f: [phone]) for walking poles.  Order placed on 12/2/21. MONIQUE updated.  As required, authorization submitted to health plan. DME tracking updated.     Jodie Talavera  Care Management Specialist   [facility]   [phone]

## 2021-12-06 ENCOUNTER — VIRTUAL VISIT (OUTPATIENT)
Dept: NEUROLOGY | Facility: CLINIC | Age: 76
End: 2021-12-06
Payer: COMMERCIAL

## 2021-12-06 DIAGNOSIS — R25.1 TREMOR: ICD-10-CM

## 2021-12-06 DIAGNOSIS — R42 DIZZINESS: Primary | ICD-10-CM

## 2021-12-06 PROCEDURE — 99214 OFFICE O/P EST MOD 30 MIN: CPT | Mod: 95 | Performed by: INTERNAL MEDICINE

## 2021-12-06 NOTE — PROGRESS NOTES
[facility] Neurology Follow Up Visit    Morenita Moore MRN# [id number]   Age: 75 year old YOB: 1945     Brief history of symptoms: The patient was initially seen in neurologic consultation on 4/16/2021 for evaluation of dizziness. Please see the comprehensive neurologic consultation note from that date in the Epic records for details.     Interval history:   She has been off of the ativan since mid October. Her imbalance and dizziness have been a little better since getter off of the ativan. She believes it is about 50% better. She still feels a little off balance with things like going down the stairs. However she does have moments where she feels no dizziness at all. She had been on Ativan for about 2 years, so did have a little dizziness before it was started.     She still gets intermittent fatigue and brain fog. She gets muscle soreness, but this is improving.       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)     Major depression in remission (H)     Neck pain     Pericarditis     Primary osteoarthritis of both knees     CELESTE (generalized anxiety disorder)     Presbyopia     Senile nuclear sclerosis     Tremor, physiological     Essential hypertension     Dizziness     Past Medical History:   Diagnosis Date     Anxiety      Asthma 2012    adult-onset asthma diagnosed in 2012     Cancer (H)      DCIS (ductal carcinoma in situ) 2001    stage 0 status post left mastectomy in 2001     Depressive disorder         Past Surgical History:     Past Surgical History:   Procedure Laterality Date     BREAST SURGERY       CATARACT IOL, RT/LT       MASTECTOMY      DCIS      RETINAL REATTACHMENT          Social History:     Social History     Tobacco Use     Smoking status: Former Smoker     Smokeless tobacco: Never Used     Tobacco comment: smoked about 10 years   Vaping Use     Vaping Use: Never used   Substance Use Topics     Alcohol use: No     Drug use: Never        Family History:     Family History   Problem Relation Age of Onset     Cancer Sister         breast cancer     Glaucoma No family hx of      Macular Degeneration No family hx of      Asthma Sister      Breast Cancer Sister      Heart Failure Mother          at 97        Medications:     Current Outpatient Medications   Medication Sig     albuterol (PROAIR HFA/PROVENTIL HFA/VENTOLIN HFA) 108 (90 Base) MCG/ACT inhaler Inhale 2 puffs into the lungs every 6 hours     amLODIPine (NORVASC) 2.5 MG tablet Take 1 tablet (2.5 mg) by mouth daily     aspirin 81 MG EC tablet Take 81 mg by mouth daily     busPIRone (BUSPAR) 5 MG tablet Take 1 tablet (5 mg) by mouth 3 times daily     calcium carbonate 750 MG CHEW Take 1-2 tablets by mouth as needed     calcium carbonate-vitamin D (OYSTER SHELL CALCIUM/D) 500-200 MG-UNIT tablet Take 1 tablet by mouth     Cyanocobalamin 50 MCG TABS Take 50 mcg by mouth     fluticasone-salmeterol (ADVAIR-HFA) 230-21 MCG/ACT inhaler Inhale 2 puffs into the lungs 2 times daily     LORazepam (ATIVAN) 0.5 MG tablet Take 0.5 tablets (0.25 mg) by mouth every morning     LORazepam (ATIVAN) 0.5 MG tablet as needed Through previous PCP, tapering down and discontinuing.     No current facility-administered medications for this visit.        Allergies:     Allergies   Allergen Reactions     Levalbuterol Hydrochloride Shortness Of Breath     Cats Other (See Comments)     Itchy eyes     Dust Mites      Other reaction(s): Wheezing  Wheezing/shortness/breath/see (IRP /)     Amitriptyline Palpitations     Escitalopram Anxiety        Review of Systems:   As above     Physical Exam:   Vitals: There were no vitals  taken for this visit.   No examination given nature of phone visit     Data reviewed on previous visits    Imaging:  MRI brain without contrast 7/2020  1.  No acute intracranial process.  2.  Generalized brain atrophy and presumed microvascular ischemic changes similar to findings on the 2018 MRI.    Laboratory:  TSH normal 1/2020    Pertinent Investigations since last visit:   None         Assessment and Plan:   Assessment:  Morenita Moore is a 75 year old female who presents for follow-up of head tremor and dizziness/balance problems. Head tremor developed after started selective serotonin reuptake inhibitor in 2018. She has prior history of cervical dystonia. Head tremor is likely secondary to worsening of cervical dystonia. She is currently not interested in botox due to possible side effects. She thinks the head tremor is tolerable.       Dizziness/balance issues developed after nonspecific viral infection in 2018 as well. She also had pericarditis. Dizziness is present when she moves her head too fast or turns too quickly. It is described as intermittent swaying, feeling of imbalance sensation. Prior MRI brain in 7/2020 did not show any clear abnormality correlating with symptoms. Orthostatic vitals at previous visits were negative. She had been on ativan for 2 years and it was stopped in mid October. Dizziness is now at least 50% better and seems to be improving. There was dizziness present prior to ativan start, so it may not account for all of symptoms. If dizziness does not continue to improve we will consider VNG and/or thoracic outlet vascular ultrasound. It is also possible there is a component of PPPD to her symptoms, given description. I encouraged her to continue vestibular therapy exercises.     Plan:  - Continue vestibular therapy exercises    Follow up in Neurology clinic in 2-3 months or sooner if new issues arise    Hong Hansen MD   of Neurology  [facility]  [place]    The total time of this encounter today amounted to 37 minutes. This time included time spent with the patient on the telephone, prep work, ordering tests, and performing post visit documentation.

## 2021-12-06 NOTE — PROGRESS NOTES
Marlen is a 76 year old who is being evaluated via a billable telephone visit.      What phone number would you like to be contacted at? [phone]  How would you like to obtain your AVS? [place]  Phone call duration: 37 minutes

## 2021-12-06 NOTE — LETTER
12/6/2021       RE: Morenita Moore  [address]  [place] [address]     Dear Colleague,    Thank you for referring your patient, Morenita Moore, to the [facility]. Please see a copy of my visit note below.    [facility] Neurology Follow Up Visit    Morenita Moore MRN# [id number]   Age: 75 year old YOB: 1945     Brief history of symptoms: The patient was initially seen in neurologic consultation on 4/16/2021 for evaluation of dizziness. Please see the comprehensive neurologic consultation note from that date in the Epic records for details.     Interval history:   She has been off of the ativan since mid October. Her imbalance and dizziness have been a little better since getter off of the ativan. She believes it is about 50% better. She still feels a little off balance with things like going down the stairs. However she does have moments where she feels no dizziness at all. She had been on Ativan for about 2 years, so did have a little dizziness before it was started.     She still gets intermittent fatigue and brain fog. She gets muscle soreness, but this is improving.       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)     Major depression in remission (H)     Neck pain     Pericarditis     Primary osteoarthritis of both knees     CELESTE (generalized anxiety disorder)     Presbyopia     Senile nuclear sclerosis     Tremor, physiological     Essential hypertension     Dizziness     Past Medical History:   Diagnosis Date     Anxiety      Asthma 2012     adult-onset asthma diagnosed in 2012     Cancer (H)      DCIS (ductal carcinoma in situ)     stage 0 status post left mastectomy in      Depressive disorder         Past Surgical History:     Past Surgical History:   Procedure Laterality Date     BREAST SURGERY       CATARACT IOL, RT/LT       MASTECTOMY      DCIS     RETINAL REATTACHMENT          Social History:     Social History     Tobacco Use     Smoking status: Former Smoker     Smokeless tobacco: Never Used     Tobacco comment: smoked about 10 years   Vaping Use     Vaping Use: Never used   Substance Use Topics     Alcohol use: No     Drug use: Never        Family History:     Family History   Problem Relation Age of Onset     Cancer Sister         breast cancer     Glaucoma No family hx of      Macular Degeneration No family hx of      Asthma Sister      Breast Cancer Sister      Heart Failure Mother          at 97        Medications:     Current Outpatient Medications   Medication Sig     albuterol (PROAIR HFA/PROVENTIL HFA/VENTOLIN HFA) 108 (90 Base) MCG/ACT inhaler Inhale 2 puffs into the lungs every 6 hours     amLODIPine (NORVASC) 2.5 MG tablet Take 1 tablet (2.5 mg) by mouth daily     aspirin 81 MG EC tablet Take 81 mg by mouth daily     busPIRone (BUSPAR) 5 MG tablet Take 1 tablet (5 mg) by mouth 3 times daily     calcium carbonate 750 MG CHEW Take 1-2 tablets by mouth as needed     calcium carbonate-vitamin D (OYSTER SHELL CALCIUM/D) 500-200 MG-UNIT tablet Take 1 tablet by mouth     Cyanocobalamin 50 MCG TABS Take 50 mcg by mouth     fluticasone-salmeterol (ADVAIR-HFA) 230-21 MCG/ACT inhaler Inhale 2 puffs into the lungs 2 times daily     LORazepam (ATIVAN) 0.5 MG tablet Take 0.5 tablets (0.25 mg) by mouth every morning     LORazepam (ATIVAN) 0.5 MG tablet as needed Through previous PCP, tapering down and discontinuing.     No current facility-administered medications for this visit.        Allergies:     Allergies   Allergen Reactions      Levalbuterol Hydrochloride Shortness Of Breath     Cats Other (See Comments)     Itchy eyes     Dust Mites      Other reaction(s): Wheezing  Wheezing/shortness/breath/see (IRP 6/1)     Amitriptyline Palpitations     Escitalopram Anxiety        Review of Systems:   As above     Physical Exam:   Vitals: There were no vitals taken for this visit.   No examination given nature of phone visit     Data reviewed on previous visits    Imaging:  MRI brain without contrast 7/2020  1.  No acute intracranial process.  2.  Generalized brain atrophy and presumed microvascular ischemic changes similar to findings on the 2018 MRI.    Laboratory:  TSH normal 1/2020    Pertinent Investigations since last visit:   None         Assessment and Plan:   Assessment:  Morenita Moore is a 75 year old female who presents for follow-up of head tremor and dizziness/balance problems. Head tremor developed after started selective serotonin reuptake inhibitor in 2018. She has prior history of cervical dystonia. Head tremor is likely secondary to worsening of cervical dystonia. She is currently not interested in botox due to possible side effects. She thinks the head tremor is tolerable.       Dizziness/balance issues developed after nonspecific viral infection in 2018 as well. She also had pericarditis. Dizziness is present when she moves her head too fast or turns too quickly. It is described as intermittent swaying, feeling of imbalance sensation. Prior MRI brain in 7/2020 did not show any clear abnormality correlating with symptoms. Orthostatic vitals at previous visits were negative. She had been on ativan for 2 years and it was stopped in mid October. Dizziness is now at least 50% better and seems to be improving. There was dizziness present prior to ativan start, so it may not account for all of symptoms. If dizziness does not continue to improve we will consider VNG and/or thoracic outlet vascular ultrasound. It is also possible  there is a component of PPPD to her symptoms, given description. I encouraged her to continue vestibular therapy exercises.     Plan:  - Continue vestibular therapy exercises    Follow up in Neurology clinic in 2-3 months or sooner if new issues arise    Hong Hansen MD   of Neurology  [facility]    The total time of this encounter today amounted to 37 minutes. This time included time spent with the patient on the telephone, prep work, ordering tests, and performing post visit documentation.          Again, thank you for allowing me to participate in the care of your patient.      Sincerely,    Hong Hansen MD

## 2021-12-20 NOTE — PROGRESS NOTES
Per [facility] CC notified:     Morenita Moore 1945 walking poles 12/2/2021 Katey Kumari  DELIVERED  12/8/2021       Jodie Talavera  Care Management Specialist   [facility]   [phone]

## 2022-01-04 ENCOUNTER — PATIENT OUTREACH (OUTPATIENT)
Dept: GERIATRIC MEDICINE | Facility: CLINIC | Age: 77
End: 2022-01-04
Payer: COMMERCIAL

## 2022-01-11 NOTE — PROGRESS NOTES
[facility] Care Coordination Contact    Care coordinator received call from Morenita stating that she found a support staff that is kat to work with her and she would like to start [facility] services as soon as possible. Care coordinator will reach out to find a support planner to work with Morenita on creating [facility] and [employer] agency.   ASHLEIGH Villalpando  [facility]  Cell: [phone]      Great thank you! And yes she would like a phone visit as well ??     Name: Morenita Moore   Address: [facility] [address] [place] [address], [address]  Phone number: [phone]  PMI: [id number]  : 1945  Family contact : Mark- Son  [phone]  Waiver type: EW   Plan dates: 2022 to 2022  Budget/Case Mix: Case mix: B  Prorated budget for 22 is $21,307.00  FMS (if known or chosen yet) No chosen yet- thinking [place]?      Thank you!  Katey Vasquez \[facility] [place] [facility]\""  Care Coordinator  [address] [address]  [email or web address]   [email or web address]     Office: [phone]  Cell: [phone]  Fax: [phone]    Connect with [facility] on social media.       **The confidential information accompanying this transmission contains protected health information under state and federal law and is legally privileged.  This information is intended only for the use of the individual or entity named above and may be used only for carrying out treatment, payment or other healthcare operations.  The recipient or person responsible for delivering this information is prohibited by law from disclosing this information without proper authorization to any other party, unless required to do so by law or regulation.  If you are not the intended recipient, you are hereby notified that any review, dissemination, distribution or copying of this message is strictly prohibited.  If you have received this communication in error, please destroy the  materials and contact us immediately by calling the department number listed above.  No response indicates that the information was received by the appropriate authorized party.    From: Coco Bone <[email or web address]>   Sent: 2022 1:33 PM  To: Katey Vasquez <[email or web address]>  Subject: Re: new CDCS client         Darnell Del Toro,    Yes, feel free to send the referral information and I will contact the client.  Due to the pandemic, I am only doing phone meetings at this time, so hopefully the client is okay with that.    I will need:  Name, Address, Phone number, PMI,   Family contact   Waiver type   Plan dates  Budget/Case Mix  FMS (if known or chosen yet)    Thank you!      Coco Bone, Support Planner  [employer]  Cell: [phone]  Fax: [phone]  Mailing Address:  [address] [address]      On  at 1:21 PM Katey Vasquez <[email or web address]> wrote:  Good afternoon Coco,                   I have a client that is interested in switching over to CDCS services and is looking for a support planner. Do you have any openings to take on this client?     Let me know!     Thank you  Katey Vasquez, [facility]  Care Coordinator  [address] [address]  [email or web address]   [email or web address]     Office: [phone]  Cell: [phone]  Fax: [phone]

## 2022-02-03 ENCOUNTER — PATIENT OUTREACH (OUTPATIENT)
Dept: GERIATRIC MEDICINE | Facility: CLINIC | Age: 77
End: 2022-02-03
Payer: COMMERCIAL

## 2022-02-03 NOTE — PROGRESS NOTES
[facility] Care Coordination Contact    Member Signature - POC Change:  Per CC, member has made a change to their POC.  Care Plan Change Letter mailed to member for signature with a self-addressed return envelope.     Jodie Talavera  Care Management Specialist   [facility]   [phone]

## 2022-02-09 ENCOUNTER — OFFICE VISIT (OUTPATIENT)
Dept: CARDIOLOGY | Facility: CLINIC | Age: 77
End: 2022-02-09
Attending: INTERNAL MEDICINE
Payer: COMMERCIAL

## 2022-02-09 VITALS
HEART RATE: 98 BPM | DIASTOLIC BLOOD PRESSURE: 78 MMHG | BODY MASS INDEX: 20.14 KG/M2 | OXYGEN SATURATION: 98 % | RESPIRATION RATE: 18 BRPM | SYSTOLIC BLOOD PRESSURE: 126 MMHG | WEIGHT: 121 LBS

## 2022-02-09 DIAGNOSIS — I10 ESSENTIAL HYPERTENSION: ICD-10-CM

## 2022-02-09 DIAGNOSIS — I31.9 PERICARDITIS, UNSPECIFIED CHRONICITY, UNSPECIFIED TYPE: ICD-10-CM

## 2022-02-09 DIAGNOSIS — I47.10 SVT (SUPRAVENTRICULAR TACHYCARDIA) (H): Primary | ICD-10-CM

## 2022-02-09 PROCEDURE — 99204 OFFICE O/P NEW MOD 45 MIN: CPT | Performed by: INTERNAL MEDICINE

## 2022-02-09 NOTE — LETTER
2/9/2022    Otilia Sánchez MD  [address] [facility] [address]    RE: Morenita Moore       Dear Colleague,     I had the pleasure of seeing Morenita Moore in the [facility].  [facility] Heart Care Note    Assessment:  Palpitations and sensation of tachycardia-no documentation to review; a trained observer did find a regular heart rate 185 bpm one time and some previous evaluations would suggest SVT  History of pericarditis 2019-resolved  No known structural heart disease  Chronic anxiety  Vertigo  Cervical torticollis  Tremor    Plan:    You have a history of rapid heart rate and this has been described as SVT  On one occasion your heart rate was noted to be 185 bpm which would be consistent with reentrant SVT  Previous studies have shown a strong heart  Several years ago (2019) you did have pericarditis  We should try to define your arrhythmia-obtain a 30-day patient activated monitor  Obtain echocardiogram to assess heart strength  Wondered about starting beta-blocker but your prior experience was unfavorable having lots of dizziness on the medication  Do not believe you have any dangerous heart condition at this time-okay to go ahead with your dental work    I think we need to try document arrhythmias and reentrant SVT before considering ablation  Not a good candidate for beta-blocker therapy considering previous adverse reactions  Could consider calcium channel blocker  Could consider flecainide    Would want to assess the multi day monitor and echocardiogram before considering antiarrhythmic therapy      Subjective:    I had the opportunity to see.Morenita Moore , who is a 76 year old female with a known history of arrhythmias  Patient has long history of tachycardia    About 2014 she had an episode of very fast heart rate seem to come on suddenly.  She called the paramedics and was told her heart rate was 185 bpm that she had SVT.  Apparently had a terminated by  the time she got to the emergency room  Episodes of arrhythmia and tachycardia are quite difficult to sort out.    She does not give a clear description of abrupt onset onset of SVT  Does find her heart rate is elevated 1 time she was felt to be quite tachycardic and had a sinus tachycardia in the 120s  Did have a Holter monitor that was unremarkable did show a 6 beat jerri of atrial tachycardia but no SVT  On October 20 she had comprehensive blood work  CRP less than 0.1  CK normal  Comprehensive metabolic panel normal  Thyroid functions have been normal in the past  Previous EKGs have shown sinus rhythm with normal intervals with no preexcitation  Has been treated with beta-blockers on 3 occasions each time she had to stop the medicines because of lives dizziness and lightheadedness  She has had cardiac evaluation at [facility]  At [facility]  At [facility]  At [facility]-Dr. Vilchis  Tried reviewed multiple notes all talk about her history of palpitations and lack of documentation  Some mention of hypertension and treatment with low-dose lisinopril in the past    Patient has had increasing levels of anxiety  Has been weaned off benzodiapine -quite a difficult process            Problem List:  Patient Active Problem List   Diagnosis     Acute internal derangement of left knee     Asthma in adult     Breast cancer in situ     Sprain of posterior cruciate ligament of left knee     SVT (supraventricular tachycardia) (H)     Torticollis     Gastroesophageal reflux disease without esophagitis     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)     Major depression in remission (H)     Neck pain     Pericarditis     Primary osteoarthritis of both knees     CELESTE (generalized anxiety disorder)     Presbyopia     Senile nuclear sclerosis     Tremor, physiological     Essential hypertension     Dizziness     Medical History:  Past Medical History:    Diagnosis Date     Anxiety      Asthma     adult-onset asthma diagnosed in 2012     Cancer (H)      DCIS (ductal carcinoma in situ)     stage 0 status post left mastectomy in      Depressive disorder      Surgical History:  Past Surgical History:   Procedure Laterality Date     BREAST SURGERY       CATARACT IOL, RT/LT       MASTECTOMY      DCIS     RETINAL REATTACHMENT       Social History:  Social History     Socioeconomic History     Marital status: Single     Spouse name: Not on file     Number of children: Not on file     Years of education: Not on file     Highest education level: Not on file   Occupational History     Not on file   Tobacco Use     Smoking status: Former Smoker     Smokeless tobacco: Never Used     Tobacco comment: smoked about 10 years   Vaping Use     Vaping Use: Never used   Substance and Sexual Activity     Alcohol use: No     Drug use: Never     Sexual activity: Not on file   Other Topics Concern     Parent/sibling w/ CABG, MI or angioplasty before 65F 55M? Not Asked   Social History Narrative     Not on file     Social Determinants of Health     Financial Resource Strain: Not on file   Food Insecurity: Not on file   Transportation Needs: Not on file   Physical Activity: Not on file   Stress: Not on file   Social Connections: Not on file   Intimate Partner Violence: Not on file   Housing Stability: Not on file       Review of Systems   ,         Family History:  Family History   Problem Relation Age of Onset     Cancer Sister         breast cancer     Glaucoma No family hx of      Macular Degeneration No family hx of      Asthma Sister      Breast Cancer Sister      Heart Failure Mother          at 97         Allergies:  Allergies   Allergen Reactions     Levalbuterol Hydrochloride Shortness Of Breath     Cats Other (See Comments)     Itchy eyes     Dust Mites      Other reaction(s): Wheezing  Wheezing/shortness/breath/see (IRP )     Amitriptyline Palpitations      Escitalopram Anxiety       Medications:  Current Outpatient Medications   Medication Sig Dispense Refill     albuterol (PROAIR HFA/PROVENTIL HFA/VENTOLIN HFA) 108 (90 Base) MCG/ACT inhaler Inhale 2 puffs into the lungs every 6 hours 18 g 11     calcium carbonate-vitamin D (OYSTER SHELL CALCIUM/D) 500-200 MG-UNIT tablet Take 1 tablet by mouth       Cyanocobalamin 50 MCG TABS Take 50 mcg by mouth       fluticasone-salmeterol (ADVAIR-HFA) 230-21 MCG/ACT inhaler Inhale 2 puffs into the lungs 2 times daily 36 g 0     calcium carbonate 750 MG CHEW Take 1-2 tablets by mouth as needed         Objective:   Vital signs:  /78 (BP Location: Left arm, Patient Position: Sitting, Cuff Size: Adult Regular)   Pulse 98   Resp 18   Wt 54.9 kg (121 lb)   SpO2 98%   BMI 20.14 kg/m        Physical Exam:  Quite anxious  Has neck twisted in a torticollis fashion  Speech is fluent intact and smooth  Seems underweight      GENERAL APPEARANCE: Alert, cooperative and in no acute distress.  HEENT: No scleral icterus. No Xanthelasma. Oral mucous membranes pink and moist.  NECK: JVP  Normal cm. No Hepatojugular reflux. Thyroid not  Palpable  CHEST: clear to auscultation and percussion  CARDIOVASCULAR: S1, S2 without murmur    Brachial, radial  pulses are intact and symmetric.   No carotid bruits noted.  ABDOMEN: Non tender. BS+. No bruits.  EXTREMITIES: No cyanosis, clubbing or edema.    Lab Results:  LIPIDS:  No results found for: CHOL  No results found for: HDL  No components found for: LDLCALC  No results found for: TRIG  No components found for: CHOLHDL    BMP:  Lab Results   Component Value Date    BUN 10 10/20/2021     10/20/2021    CO2 24 10/20/2021       This note has been dictated using voice recognition software. Any grammatical or context distortions are unintentional and inherent to the software.      Messi Moise MD  [facility]  [phone]      cc:   Otilia Sánchez MD  [address] [place] [address]

## 2022-02-09 NOTE — PATIENT INSTRUCTIONS
Morenita Moore    Thank you for coming to the [facility] today for a cardiac evaluation  It was my pleasure to see you today  A good contact for any questions would be Oksana Esquivel  RN @ [phone]    You have a history of rapid heart rate and this has been described as SVT  On one occasion your heart rate was noted to be 185 bpm which would be consistent with reentrant SVT  Previous studies have shown a strong heart  Several years ago (2019) you did have pericarditis  We should try to define your arrhythmia-obtain a 30-day patient activated monitor  Obtain echocardiogram to assess heart strength  Wondered about starting beta-blocker but your prior experience was unfavorable having lots of dizziness on the medication  Do not believe you have any dangerous heart condition at this time-okay to go ahead with your dental work

## 2022-02-09 NOTE — PROGRESS NOTES
[facility] Heart Care Note    Assessment:  Palpitations and sensation of tachycardia-no documentation to review; a trained observer did find a regular heart rate 185 bpm one time and some previous evaluations would suggest SVT  History of pericarditis 2019-resolved  No known structural heart disease  Chronic anxiety  Vertigo  Cervical torticollis  Tremor    Plan:    You have a history of rapid heart rate and this has been described as SVT  On one occasion your heart rate was noted to be 185 bpm which would be consistent with reentrant SVT  Previous studies have shown a strong heart  Several years ago (2019) you did have pericarditis  We should try to define your arrhythmia-obtain a 30-day patient activated monitor  Obtain echocardiogram to assess heart strength  Wondered about starting beta-blocker but your prior experience was unfavorable having lots of dizziness on the medication  Do not believe you have any dangerous heart condition at this time-okay to go ahead with your dental work    I think we need to try document arrhythmias and reentrant SVT before considering ablation  Not a good candidate for beta-blocker therapy considering previous adverse reactions  Could consider calcium channel blocker  Could consider flecainide    Would want to assess the multi day monitor and echocardiogram before considering antiarrhythmic therapy      Subjective:    I had the opportunity to see.Morenita Moore , who is a 76 year old female with a known history of arrhythmias  Patient has long history of tachycardia    About 2014 she had an episode of very fast heart rate seem to come on suddenly.  She called the paramedics and was told her heart rate was 185 bpm that she had SVT.  Apparently had a terminated by the time she got to the emergency room  Episodes of arrhythmia and tachycardia are quite difficult to sort out.    She does not give a clear description of abrupt onset onset of SVT  Does find her heart rate is elevated  1 time she was felt to be quite tachycardic and had a sinus tachycardia in the 120s  Did have a Holter monitor that was unremarkable did show a 6 beat jerri of atrial tachycardia but no SVT  On October 20 she had comprehensive blood work  CRP less than 0.1  CK normal  Comprehensive metabolic panel normal  Thyroid functions have been normal in the past  Previous EKGs have shown sinus rhythm with normal intervals with no preexcitation  Has been treated with beta-blockers on 3 occasions each time she had to stop the medicines because of lives dizziness and lightheadedness  She has had cardiac evaluation at [facility]  At [facility]  At [facility]-Dr. Vilchis  Tried reviewed multiple notes all talk about her history of palpitations and lack of documentation  Some mention of hypertension and treatment with low-dose lisinopril in the past    Patient has had increasing levels of anxiety  Has been weaned off benzodiapine -quite a difficult process            Problem List:  Patient Active Problem List   Diagnosis     Acute internal derangement of left knee     Asthma in adult     Breast cancer in situ     Sprain of posterior cruciate ligament of left knee     SVT (supraventricular tachycardia) (H)     Torticollis     Gastroesophageal reflux disease without esophagitis     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)     Major depression in remission (H)     Neck pain     Pericarditis     Primary osteoarthritis of both knees     CELESTE (generalized anxiety disorder)     Presbyopia     Senile nuclear sclerosis     Tremor, physiological     Essential hypertension     Dizziness     Medical History:  Past Medical History:   Diagnosis Date     Anxiety      Asthma 2012    adult-onset asthma diagnosed in 2012     Cancer (H)      DCIS (ductal carcinoma in situ) 2001    stage 0 status post left mastectomy in 2001     Depressive disorder      Surgical  History:  Past Surgical History:   Procedure Laterality Date     BREAST SURGERY       CATARACT IOL, RT/LT       MASTECTOMY      DCIS     RETINAL REATTACHMENT       Social History:  Social History     Socioeconomic History     Marital status: Single     Spouse name: Not on file     Number of children: Not on file     Years of education: Not on file     Highest education level: Not on file   Occupational History     Not on file   Tobacco Use     Smoking status: Former Smoker     Smokeless tobacco: Never Used     Tobacco comment: smoked about 10 years   Vaping Use     Vaping Use: Never used   Substance and Sexual Activity     Alcohol use: No     Drug use: Never     Sexual activity: Not on file   Other Topics Concern     Parent/sibling w/ CABG, MI or angioplasty before 65F 55M? Not Asked   Social History Narrative     Not on file     Social Determinants of Health     Financial Resource Strain: Not on file   Food Insecurity: Not on file   Transportation Needs: Not on file   Physical Activity: Not on file   Stress: Not on file   Social Connections: Not on file   Intimate Partner Violence: Not on file   Housing Stability: Not on file       Review of Systems   ,         Family History:  Family History   Problem Relation Age of Onset     Cancer Sister         breast cancer     Glaucoma No family hx of      Macular Degeneration No family hx of      Asthma Sister      Breast Cancer Sister      Heart Failure Mother          at 97         Allergies:  Allergies   Allergen Reactions     Levalbuterol Hydrochloride Shortness Of Breath     Cats Other (See Comments)     Itchy eyes     Dust Mites      Other reaction(s): Wheezing  Wheezing/shortness/breath/see (IRP /)     Amitriptyline Palpitations     Escitalopram Anxiety       Medications:  Current Outpatient Medications   Medication Sig Dispense Refill     albuterol (PROAIR HFA/PROVENTIL HFA/VENTOLIN HFA) 108 (90 Base) MCG/ACT inhaler Inhale 2 puffs into the lungs every 6 hours  18 g 11     calcium carbonate-vitamin D (OYSTER SHELL CALCIUM/D) 500-200 MG-UNIT tablet Take 1 tablet by mouth       Cyanocobalamin 50 MCG TABS Take 50 mcg by mouth       fluticasone-salmeterol (ADVAIR-HFA) 230-21 MCG/ACT inhaler Inhale 2 puffs into the lungs 2 times daily 36 g 0     calcium carbonate 750 MG CHEW Take 1-2 tablets by mouth as needed         Objective:   Vital signs:  /78 (BP Location: Left arm, Patient Position: Sitting, Cuff Size: Adult Regular)   Pulse 98   Resp 18   Wt 54.9 kg (121 lb)   SpO2 98%   BMI 20.14 kg/m        Physical Exam:  Quite anxious  Has neck twisted in a torticollis fashion  Speech is fluent intact and smooth  Seems underweight      GENERAL APPEARANCE: Alert, cooperative and in no acute distress.  HEENT: No scleral icterus. No Xanthelasma. Oral mucous membranes pink and moist.  NECK: JVP  Normal cm. No Hepatojugular reflux. Thyroid not  Palpable  CHEST: clear to auscultation and percussion  CARDIOVASCULAR: S1, S2 without murmur    Brachial, radial  pulses are intact and symmetric.   No carotid bruits noted.  ABDOMEN: Non tender. BS+. No bruits.  EXTREMITIES: No cyanosis, clubbing or edema.    Lab Results:  LIPIDS:  No results found for: CHOL  No results found for: HDL  No components found for: LDLCALC  No results found for: TRIG  No components found for: CHOLHDL    BMP:  Lab Results   Component Value Date    BUN 10 10/20/2021     10/20/2021    CO2 24 10/20/2021         This note has been dictated using voice recognition software. Any grammatical or context distortions are unintentional and inherent to the software.  Messi Moise MD  [facility]  [phone]

## 2022-02-15 ENCOUNTER — HOSPITAL ENCOUNTER (OUTPATIENT)
Dept: CARDIOLOGY | Facility: CLINIC | Age: 77
Discharge: HOME | End: 2022-02-15
Attending: INTERNAL MEDICINE | Admitting: INTERNAL MEDICINE
Payer: COMMERCIAL

## 2022-02-15 DIAGNOSIS — I47.10 SVT (SUPRAVENTRICULAR TACHYCARDIA) (H): ICD-10-CM

## 2022-02-15 LAB — LVEF ECHO: NORMAL

## 2022-02-15 PROCEDURE — 93306 TTE W/DOPPLER COMPLETE: CPT | Mod: 26 | Performed by: INTERNAL MEDICINE

## 2022-02-15 PROCEDURE — 93306 TTE W/DOPPLER COMPLETE: CPT

## 2022-02-15 NOTE — PROGRESS NOTES
[facility] Care Coordination Contact    Call from Morenita she is requesting DME supplies. Care coordinator will contact [facility] supply to place referral for items:    [facility]-  Hello-              My member Morenita Moore : 1945 PMI: [id number]; called and spoke to someone at [facility] about ordering some supplies for her home. She is requestin lymphatic system brush   1 plastic brush for her shower.   1 box of face makes count 50.  1 tub mat.   1 shower stool.   3 packs of the light flow beverly 9  incontinence pads.     Thank you  Katey     Care coordinator received quote back from Sharon at [facility].   Care Coordinator updated POC and service authorizations for the waiver approved items.  ASHLEIGH Villalpando  [facility]  Cell: [phone]

## 2022-02-22 ENCOUNTER — TELEPHONE (OUTPATIENT)
Dept: INTERNAL MEDICINE | Facility: CLINIC | Age: 77
End: 2022-02-22
Payer: COMMERCIAL

## 2022-02-22 NOTE — TELEPHONE ENCOUNTER
Patient is calling to ask when she needs to be seen again with Dr Sánchez. She doesn't drive so it is not easy to come into the clinic.

## 2022-02-27 NOTE — TELEPHONE ENCOUNTER
No need for appointment at this time, patient has been recently evaluated by cardiologist and her blood pressure was normal

## 2022-02-28 ENCOUNTER — MYC MEDICAL ADVICE (OUTPATIENT)
Dept: INTERNAL MEDICINE | Facility: CLINIC | Age: 77
End: 2022-02-28
Payer: COMMERCIAL

## 2022-02-28 DIAGNOSIS — Z13.220 SCREENING FOR LIPID DISORDERS: Primary | ICD-10-CM

## 2022-03-03 NOTE — TELEPHONE ENCOUNTER
I do not see a lipid panel in patient's chart.   [employer] message sent to patient with Dr. Sánchez's below response.     Bessie RUSSO RN   [facility]

## 2022-03-03 NOTE — TELEPHONE ENCOUNTER
Last Metabolic panel  done 10/21 and was normal,  I don't see lipid panel in her chart, check when was her last lipids? If it is 5 yrs then we can rpt lipid panel. Orders in epic

## 2022-03-08 ENCOUNTER — MYC MEDICAL ADVICE (OUTPATIENT)
Dept: INTERNAL MEDICINE | Facility: CLINIC | Age: 77
End: 2022-03-08
Payer: COMMERCIAL

## 2022-03-08 DIAGNOSIS — I10 ESSENTIAL HYPERTENSION: ICD-10-CM

## 2022-03-08 DIAGNOSIS — Z01.89 PATIENT REQUEST FOR DIAGNOSTIC TESTING: Primary | ICD-10-CM

## 2022-03-08 NOTE — TELEPHONE ENCOUNTER
Please see patient's MyChart message and advise.     Patient wondering if she needs a CBC lab draw?    (Lab order pended for provider to review and sign if appropriate).    Last CBC was checked on 2/1/2021.     Patient last saw Dr. Sánchez on 8/10/2021 for an office visit.     Patient plans on getting her labs drawn on Thursday, 3/10/2021.     Bessie RUSSO RN   [facility]

## 2022-03-09 NOTE — TELEPHONE ENCOUNTER
[employer] message sent to patient with Dr. Sánchez's below response.     Bessie RUSSO RN   [facility]

## 2022-03-17 ENCOUNTER — ANCILLARY PROCEDURE (OUTPATIENT)
Dept: CT IMAGING | Facility: CLINIC | Age: 77
End: 2022-03-17
Attending: INTERNAL MEDICINE
Payer: COMMERCIAL

## 2022-03-17 DIAGNOSIS — R91.8 PULMONARY NODULES: ICD-10-CM

## 2022-03-17 PROCEDURE — 71250 CT THORAX DX C-: CPT | Mod: GC | Performed by: RADIOLOGY

## 2022-03-17 NOTE — PROGRESS NOTES
Per FEDERICO, CC notified:    Morenita Moore 1945 bath mat, surgical masks, bath brush x2 2/15/2022 Vicki Kumari DELIVERED 3/3/2022       Jodie Talavera  Care Management Specialist   [facility]   [phone]

## 2022-03-18 ENCOUNTER — ALLIED HEALTH/NURSE VISIT (OUTPATIENT)
Dept: PULMONOLOGY | Facility: OTHER | Age: 77
End: 2022-03-18
Attending: INTERNAL MEDICINE
Payer: COMMERCIAL

## 2022-03-18 ENCOUNTER — OFFICE VISIT (OUTPATIENT)
Dept: PULMONOLOGY | Facility: OTHER | Age: 77
End: 2022-03-18
Payer: COMMERCIAL

## 2022-03-18 VITALS
OXYGEN SATURATION: 99 % | WEIGHT: 121.6 LBS | HEART RATE: 88 BPM | SYSTOLIC BLOOD PRESSURE: 124 MMHG | BODY MASS INDEX: 20.24 KG/M2 | DIASTOLIC BLOOD PRESSURE: 74 MMHG | RESPIRATION RATE: 18 BRPM

## 2022-03-18 DIAGNOSIS — J45.20 MILD INTERMITTENT ASTHMA IN ADULT WITHOUT COMPLICATION: Primary | ICD-10-CM

## 2022-03-18 DIAGNOSIS — J45.909 UNCOMPLICATED ASTHMA, UNSPECIFIED ASTHMA SEVERITY, UNSPECIFIED WHETHER PERSISTENT: ICD-10-CM

## 2022-03-18 LAB — HGB BLD-MCNC: 13.4 G/DL

## 2022-03-18 PROCEDURE — 99214 OFFICE O/P EST MOD 30 MIN: CPT | Mod: 25 | Performed by: INTERNAL MEDICINE

## 2022-03-18 PROCEDURE — 94729 DIFFUSING CAPACITY: CPT | Performed by: INTERNAL MEDICINE

## 2022-03-18 PROCEDURE — 85018 HEMOGLOBIN: CPT

## 2022-03-18 PROCEDURE — 94726 PLETHYSMOGRAPHY LUNG VOLUMES: CPT | Performed by: INTERNAL MEDICINE

## 2022-03-18 PROCEDURE — 94060 EVALUATION OF WHEEZING: CPT | Performed by: INTERNAL MEDICINE

## 2022-03-18 ASSESSMENT — ASTHMA QUESTIONNAIRES
QUESTION_4 LAST FOUR WEEKS HOW OFTEN HAVE YOU USED YOUR RESCUE INHALER OR NEBULIZER MEDICATION (SUCH AS ALBUTEROL): ONE OR TWO TIMES PER DAY
ACT_TOTALSCORE: 18
QUESTION_1 LAST FOUR WEEKS HOW MUCH OF THE TIME DID YOUR ASTHMA KEEP YOU FROM GETTING AS MUCH DONE AT WORK, SCHOOL OR AT HOME: A LITTLE OF THE TIME
QUESTION_5 LAST FOUR WEEKS HOW WOULD YOU RATE YOUR ASTHMA CONTROL: WELL CONTROLLED
ACT_TOTALSCORE: 18
QUESTION_2 LAST FOUR WEEKS HOW OFTEN HAVE YOU HAD SHORTNESS OF BREATH: THREE TO SIX TIMES A WEEK
QUESTION_3 LAST FOUR WEEKS HOW OFTEN DID YOUR ASTHMA SYMPTOMS (WHEEZING, COUGHING, SHORTNESS OF BREATH, CHEST TIGHTNESS OR PAIN) WAKE YOU UP AT NIGHT OR EARLIER THAN USUAL IN THE MORNING: NOT AT ALL

## 2022-03-18 NOTE — ADDENDUM NOTE
Encounter addended by: Jodie Bello, PT on: 3/17/2022 7:32 PM   Actions taken: Clinical Note Signed, Episode resolved

## 2022-03-18 NOTE — PATIENT INSTRUCTIONS
Patient Education     Pulmonary Rehabilitation: Getting Started  Pulmonary rehabilitation (rehab) programs can help people with chronic lung disease such as COPD (chronic obstructive pulmonary disease). Emphysema and chronic bronchitis are two main types of COPD. It also includes other conditions such as cystic fibrosis, pulmonary fibrosis, and sarcoidosis. Pulmonary rehab programs are provided by healthcare providers who will:     Teach you the skills you need to live and breathe better    Encourage you to put these skills to good use through lifestyle changes    Help you set realistic goals so you can make these changes slowly and effectively    Pulmonary rehab professionals  The programs are often led by healthcare providers including nurses and respiratory therapists. The team may also include exercise specialists, physical and occupational therapists, dietitians, pharmacists, and counselors. Although most programs take place in a group setting, these team members will help you one-on-one when you need it.   Community-based and home-based programs work as well as hospital-based programs as long as they are held as often and are as intense. Standard home-based pulmonary rehab programs help with shortness of breath in people with COPD. Traditional, supervised pulmonary rehab is the best choice for people with COPD. These programs help manage your disease by helping with breathing methods, exercise, support, and counseling.   Talk with your healthcare provider about which rehab or self-management program is best for you. If you are not in a pulmonary rehab program, ask your healthcare provider or nurse about programs in your area or call your local hospital.   Pulmonary rehab programs  The programs may include:    Exercise training    Breathing methods    How to do things more easily    Nutritional support    Information about your condition    Self-management skills    Help to stop smoking    Emotional  support  Making changes that work for you  To reach your goals, you ll likely need to make changes to your lifestyle. To help make changes go more smoothly:     Expect new emotions. It s common to resist or feel angry or scared about having to make changes. You re not alone. Share your feelings with the pulmonary rehab team and people close to you.    Prepare yourself for slow, steady progress. Change doesn t happen overnight. To feel your best, you need to commit yourself to practicing your new skills. Over time, you ll be stronger, have more control of shortness of breath, and be able to do more. But only if you keep at it.    Get support. Get support from family and friends as you try new things. Tell the people in your life how they can help you reach your goals. Share your ideas and tips for success with other members of your pulmonary rehab group. And don t be embarrassed to ask for help.  My goals  Are there things you can t do now that you d like to be able to do when your pulmonary rehab program is finished? Check off the statements below that may apply to you.   I want to:  ? Breathe better.? Understand my lung disease and what I can do to feel better.? Have more energy to enjoy my family and friends.? Rely less on others.? Be able to walk further.? Be stronger.? Enjoy my hobbies and leisure activities.? Eat healthier foods.? Quit smoking.? Feel less anxious and depressed about my condition.? Travel and enjoy myself.? Have fewer visits to the hospital or emergency room.   Other goals:  ___________________________________________________________  ___________________________________________________________  ___________________________________________________________  ___________________________________________________________  [employer] last reviewed this educational content on 11/1/2019 2000-2021 [employer]. All rights reserved. This information is not intended as a substitute for  professional medical care. Always follow your healthcare professional's instructions.         ==========================    Patient Education     Using an Inhaler with a Spacer  To control asthma, you need to use your medicines the right way. Some medicines are inhaled using a device called a metered-dose inhaler (MDI). MDIs deliver medicine with a fine spray. Your healthcare provider has prescribed a special chamber or spacer to use with your inhaler. A spacer increases the amount of medicine that goes to your lungs. It may make your medicine work better.   Steps for using an inhaler with a spacer  Step 1:    Wash your hands.    Check the expiration date and the counter of the inhaler, if present.    Remove the cap from the inhaler.    Shake the inhaler well. If the inhaler is being used for the first time or has not been used for a while, prime it as directed by the product maker. Make sure to prime the inhaler in the air away from your face.    Check to make sure the metal canister is put correctly into the plastic boot, or manzano of the inhaler. See the package insert for instructions.    Attach the spacer to the inhaler. Then remove the cap from the spacer mouthpiece.  Step 2:    Breathe out normally, away from the spacer.    Put the mouthpiece of the spacer past your teeth and above your tongue. Close your lips tightly around it. If you are using a spacer with a mask, make sure the mask covers your nose and mouth with a good seal against your chin and cheeks. Make sure there is no space between your skin and the mask.    Keep your chin up or level.  Step 3:    Press the canister on the inhaler 1 time to release the medicine    Then breathe in through your mouth as slowly and deeply as you can. This should take about 5 to 10  seconds. If you breathe too quickly, you may hear a whistling sound in certain spacers.  Step 4:    Take the spacer mouthpiece out of your mouth. Close your lips.    Hold your breath up to  10 seconds if you are able.    Then hold your lips together and slowly breathe out through your mouth.    After using your inhaler, rinse your mouth with water by swishing, gargling, and spitting out the water. Never swallow it. Inhaled corticosteroids can cause a fungal infection called thrush in your mouth and throat.        If you re prescribed more than 1 puff of medicine at a time, wait up to 60 seconds, or as directed by your healthcare provider, between puffs. This number may be different for different medicines. Shake the inhaler again. Then repeat steps 2 to 4.   Kristin last reviewed this educational content on 5/1/2019 2000-2021 [employer]. All rights reserved. This information is not intended as a substitute for professional medical care. Always follow your healthcare professional's instructions.

## 2022-03-18 NOTE — PROGRESS NOTES
Pulmonary Clinic Follow-up Visit    Impression: 76F w/ history of percarditis, previously diagnosed asthma (followed at [facility] and [facility]), minimal smoking history, presents for follow up of asthma and pulmonary nodules after not being seen for 2 years. She feels her asthma is fairly well controlled but she is needing to use her rescue inhaler quite frequently. PFTs today showed very mild reduction in FEV1 with normal post-BD FEV/FVC ratio. She does have reduction in mid flow rates which could be consistent with small airways disease.  Will try and optimize her inhaler regimen by adding a spacer to her advair.   We also reviewed her CT scan which showed overall stable ground glass nodules.      Recommendations:  #Asthma, hx elevated Alverto, not optimally controlled.  - continue Advair HFA high dose 1-2 puffs two times a day, add spacer. Rinse/gargle/spit after use.  - continue albuterol rescue inhaler prn  - allergen avoidance discussed  - encouraged her to exercise and remain active as able  - declines pulmonary rehab referal.      #CEDRIC ground glass nodule, 0.6cm largest. Stable on recent scan.  - repeat CT chest in 2 years per Fleischner guidelines (March 2024).    #Protein-calorie malnutrition  - advised her to follow up with her PMD to discuss strategies to increase weight. Consider protein supplement shakes.      #RHM:  - UTD with flu, pneumococcal vaccines.  - UTD with covid-19 vaccine + booster.    Pacheco (Bebo Perdomo MD  [facility] Pulmonary & Critical Care  Clinic [phone]  Fax [phone]      CCx: asthma, pulmonary nodules follow up    HPI: Interim history: I last saw Marlen on 4/15/2020. I asked her to follow up 6 months from that visit. She returns almost 2 years later for follow up with a new CT scan of the chest.  Today, she reports she's doing OK.  Using albuterol bid. She is using the advair fairly regularly but not with the spacer.  No hospitalizations or ER  visits for her asthma.  Last episode of bronchitis was in 2017.   She is concerned that she is not really gaining weight.     ROS:  A 12-system review was obtained and was negative with the exception of the symptoms endorsed in the history of present illness.    PMH:  Past Medical History:   Diagnosis Date     Anxiety      Asthma     adult-onset asthma diagnosed in      Cancer (H)      DCIS (ductal carcinoma in situ)     stage 0 status post left mastectomy in      Depressive disorder        PSH:  Past Surgical History:   Procedure Laterality Date     BREAST SURGERY       CATARACT IOL, RT/LT       MASTECTOMY      DCIS     RETINAL REATTACHMENT         Allergies:  Allergies   Allergen Reactions     Levalbuterol Hydrochloride Shortness Of Breath     Cats Other (See Comments)     Itchy eyes     Dust Mites      Other reaction(s): Wheezing  Wheezing/shortness/breath/see (IRP )     Amitriptyline Palpitations     Escitalopram Anxiety       Family HX:  Family History   Problem Relation Age of Onset     Cancer Sister         breast cancer     Glaucoma No family hx of      Macular Degeneration No family hx of      Asthma Sister      Breast Cancer Sister      Heart Failure Mother          at 97       Social Hx:  Social History     Socioeconomic History     Marital status: Single     Spouse name: Not on file     Number of children: Not on file     Years of education: Not on file     Highest education level: Not on file   Occupational History     Not on file   Tobacco Use     Smoking status: Former Smoker     Packs/day: 1.00     Years: 6.00     Pack years: 6.00     Quit date:      Years since quittin.2     Smokeless tobacco: Never Used     Tobacco comment: smoked about 10 years   Vaping Use     Vaping Use: Never used   Substance and Sexual Activity     Alcohol use: No     Drug use: Never     Sexual activity: Not on file   Other Topics Concern     Parent/sibling w/ CABG, MI or angioplasty before 65F  55M? Not Asked   Social History Narrative     Not on file     Social Determinants of Health     Financial Resource Strain: Not on file   Food Insecurity: Not on file   Transportation Needs: Not on file   Physical Activity: Not on file   Stress: Not on file   Social Connections: Not on file   Intimate Partner Violence: Not on file   Housing Stability: Not on file       Current Meds:  Current Outpatient Medications   Medication Sig Dispense Refill     albuterol (PROAIR HFA/PROVENTIL HFA/VENTOLIN HFA) 108 (90 Base) MCG/ACT inhaler Inhale 2 puffs into the lungs every 6 hours 18 g 11     calcium carbonate-vitamin D (OYSTER SHELL CALCIUM/D) 500-200 MG-UNIT tablet Take 1 tablet by mouth       Cyanocobalamin 50 MCG TABS Take 50 mcg by mouth once a week        fluticasone-salmeterol (ADVAIR-HFA) 230-21 MCG/ACT inhaler Inhale 2 puffs into the lungs 2 times daily 36 g 0     calcium carbonate 750 MG CHEW Take 1-2 tablets by mouth as needed         Physical Exam:  /74 (BP Location: Left arm, Patient Position: Chair, Cuff Size: Adult Regular)   Pulse 88   Resp 18   Wt 55.2 kg (121 lb 9.6 oz)   SpO2 99%   BMI 20.24 kg/m    Gen: awake, alert, oriented, no distress  HEENT: nasal turbinates are unremarkable, no oropharyngeal lesions, no cervical or supraclavicular lymphadenopathy  CV: RRR, no M/G/R  Resp: CTAB, no focal crackles or wheezes  Skin: no apparent rashes  Ext: no cyanosis, clubbing or edema  Neuro: alert, nonfocal    Labs:  Reviewed  CBC nrmal, no eos in 2020  ESR 11 in 2020  Chem panel wnl 10/20/21    Imaging studies:  EXAMINATION: CT CHEST W/O CONTRAST, 3/17/2022 1:46 PM     CLINICAL HISTORY: Lung nodule, 6-8mm; Pulmonary nodules     COMPARISON: CT 3/6/2020.     TECHNIQUE: CT imaging obtained through the chest without contrast.  Coronal and axial MIP reformatted images obtained.      CONTRAST:  none.     FINDINGS:     Mediastinum: No cardiomegaly. No pericardial effusion. Normal caliber  thoracic aorta  midpoint trunk. Moderate coronary artery calcifications  in the left anterior descending. Calcified mediastinal lymph nodes.  Left breast mastectomy.     Lungs/pleura: The central airway is patent. No pneumothorax, pleural  effusions, or focal infectious consolidations.     Centrally lucent/cavitary nodule in the right lower lobe on series 4  image 161 measuring 4 mm, likely present on previous exam.     3 mm solid right lower lobe nodule on series 4 image 189 that  definitely seen on previous exam. Unchanged 5 mm left upper lobe  groundglass nodule on series 4 image 68.     New 5 mm ground glass nodule in the left upper lobe on series 4 image  81. Scattered calcified granulomas.     Simple cystic structures in the liver unchanged from previous exam.  Kyphotic curvature of the midthoracic spine. No acute or suspicious  osseous or soft tissue abnormalities.                                                                      IMPRESSION:  1. A few new or more conspicuous nodules are present as detailed  above, less than 6 mm.  Given history of left breast cancer, these are  technically indeterminant and short-term follow-up could be  considered.  2. Other nodules including a 5 mm left upper lobe groundglass nodule  and centrally lucent/cavitary nodule in the right lower lobe are  unchanged from previous exam.  3. Sequelae of prior granulomas disease.    TTE 2/15/2022  Interpretation Summary     Left ventricular size, wall motion and function are normal. The ejection  fraction is 60-65%.  Normal right ventricle size and systolic function.  Small pericardial effusion  There are no echocardiographic indications of cardiac tamponade.  No hemodynamically significant valvular abnormalities on 2D or color flow  imaging.    Pulmonary Function Testing  3/18/2022  FEV1 1,53L, 70%  FVC 84%  Ratio 0.64 (LLN 0.64)  No BD response.  TLC 85%  RV 89%  DLco 93% aung for hgb  Mid flow rates reduced with BD response.  Flow volume loop  suggests small airways disease

## 2022-03-18 NOTE — LETTER
3/18/2022         RE: Morenita Moore  [address]   [address]  [place] [address]        Dear Colleague,    Thank you for referring your patient, Morenita Moore, to the [facility]. Please see a copy of my visit note below.    Pulmonary Clinic Follow-up Visit    Impression: 76F w/ history of percarditis, previously diagnosed asthma (followed at [place] and [facility]), minimal smoking history, presents for follow up of asthma and pulmonary nodules after not being seen for 2 years. She feels her asthma is fairly well controlled but she is needing to use her rescue inhaler quite frequently. PFTs today showed very mild reduction in FEV1 with normal post-BD FEV/FVC ratio. She does have reduction in mid flow rates which could be consistent with small airways disease.  Will try and optimize her inhaler regimen by adding a spacer to her advair.   We also reviewed her CT scan which showed overall stable ground glass nodules.      Recommendations:  #Asthma, hx elevated Alverto, not optimally controlled.  - continue Advair HFA high dose 1-2 puffs two times a day, add spacer. Rinse/gargle/spit after use.  - continue albuterol rescue inhaler prn  - allergen avoidance discussed  - encouraged her to exercise and remain active as able  - declines pulmonary rehab referal.      #CEDRIC ground glass nodule, 0.6cm largest. Stable on recent scan.  - repeat CT chest in 2 years per Fleischner guidelines (March 2024).    #Protein-calorie malnutrition  - advised her to follow up with her PMD to discuss strategies to increase weight. Consider protein supplement shakes.      #RHM:  - UTD with flu, pneumococcal vaccines.  - UTD with covid-19 vaccine + booster.    Pacheco (Bebo Perdomo MD  [facility] Pulmonary & Critical Care  Clinic [phone]  Fax [phone]      CCx: asthma, pulmonary nodules follow up    HPI: Interim history: I last saw Marlen on 4/15/2020. I asked her to  follow up 6 months from that visit. She returns almost 2 years later for follow up with a new CT scan of the chest.  Today, she reports she's doing OK.  Using albuterol bid. She is using the advair fairly regularly but not with the spacer.  No hospitalizations or ER visits for her asthma.  Last episode of bronchitis was in 2017.   She is concerned that she is not really gaining weight.     ROS:  A 12-system review was obtained and was negative with the exception of the symptoms endorsed in the history of present illness.    PMH:  Past Medical History:   Diagnosis Date     Anxiety      Asthma     adult-onset asthma diagnosed in      Cancer (H)      DCIS (ductal carcinoma in situ)     stage 0 status post left mastectomy in      Depressive disorder        PSH:  Past Surgical History:   Procedure Laterality Date     BREAST SURGERY       CATARACT IOL, RT/LT       MASTECTOMY      DCIS     RETINAL REATTACHMENT         Allergies:  Allergies   Allergen Reactions     Levalbuterol Hydrochloride Shortness Of Breath     Cats Other (See Comments)     Itchy eyes     Dust Mites      Other reaction(s): Wheezing  Wheezing/shortness/breath/see (IRP )     Amitriptyline Palpitations     Escitalopram Anxiety       Family HX:  Family History   Problem Relation Age of Onset     Cancer Sister         breast cancer     Glaucoma No family hx of      Macular Degeneration No family hx of      Asthma Sister      Breast Cancer Sister      Heart Failure Mother          at 97       Social Hx:  Social History     Socioeconomic History     Marital status: Single     Spouse name: Not on file     Number of children: Not on file     Years of education: Not on file     Highest education level: Not on file   Occupational History     Not on file   Tobacco Use     Smoking status: Former Smoker     Packs/day: 1.00     Years: 6.00     Pack years: 6.00     Quit date:      Years since quittin.2     Smokeless tobacco: Never Used      Tobacco comment: smoked about 10 years   Vaping Use     Vaping Use: Never used   Substance and Sexual Activity     Alcohol use: No     Drug use: Never     Sexual activity: Not on file   Other Topics Concern     Parent/sibling w/ CABG, MI or angioplasty before 65F 55M? Not Asked   Social History Narrative     Not on file     Social Determinants of Health     Financial Resource Strain: Not on file   Food Insecurity: Not on file   Transportation Needs: Not on file   Physical Activity: Not on file   Stress: Not on file   Social Connections: Not on file   Intimate Partner Violence: Not on file   Housing Stability: Not on file       Current Meds:  Current Outpatient Medications   Medication Sig Dispense Refill     albuterol (PROAIR HFA/PROVENTIL HFA/VENTOLIN HFA) 108 (90 Base) MCG/ACT inhaler Inhale 2 puffs into the lungs every 6 hours 18 g 11     calcium carbonate-vitamin D (OYSTER SHELL CALCIUM/D) 500-200 MG-UNIT tablet Take 1 tablet by mouth       Cyanocobalamin 50 MCG TABS Take 50 mcg by mouth once a week        fluticasone-salmeterol (ADVAIR-HFA) 230-21 MCG/ACT inhaler Inhale 2 puffs into the lungs 2 times daily 36 g 0     calcium carbonate 750 MG CHEW Take 1-2 tablets by mouth as needed         Physical Exam:  /74 (BP Location: Left arm, Patient Position: Chair, Cuff Size: Adult Regular)   Pulse 88   Resp 18   Wt 55.2 kg (121 lb 9.6 oz)   SpO2 99%   BMI 20.24 kg/m    Gen: awake, alert, oriented, no distress  HEENT: nasal turbinates are unremarkable, no oropharyngeal lesions, no cervical or supraclavicular lymphadenopathy  CV: RRR, no M/G/R  Resp: CTAB, no focal crackles or wheezes  Skin: no apparent rashes  Ext: no cyanosis, clubbing or edema  Neuro: alert, nonfocal    Labs:  Reviewed  CBC nrmal, no eos in 2020  ESR 11 in 2020  Chem panel wnl 10/20/21    Imaging studies:  EXAMINATION: CT CHEST W/O CONTRAST, 3/17/2022 1:46 PM     CLINICAL HISTORY: Lung nodule, 6-8mm; Pulmonary nodules     COMPARISON:  CT 3/6/2020.     TECHNIQUE: CT imaging obtained through the chest without contrast.  Coronal and axial MIP reformatted images obtained.      CONTRAST:  none.     FINDINGS:     Mediastinum: No cardiomegaly. No pericardial effusion. Normal caliber  thoracic aorta midpoint trunk. Moderate coronary artery calcifications  in the left anterior descending. Calcified mediastinal lymph nodes.  Left breast mastectomy.     Lungs/pleura: The central airway is patent. No pneumothorax, pleural  effusions, or focal infectious consolidations.     Centrally lucent/cavitary nodule in the right lower lobe on series 4  image 161 measuring 4 mm, likely present on previous exam.     3 mm solid right lower lobe nodule on series 4 image 189 that  definitely seen on previous exam. Unchanged 5 mm left upper lobe  groundglass nodule on series 4 image 68.     New 5 mm ground glass nodule in the left upper lobe on series 4 image  81. Scattered calcified granulomas.     Simple cystic structures in the liver unchanged from previous exam.  Kyphotic curvature of the midthoracic spine. No acute or suspicious  osseous or soft tissue abnormalities.                                                                      IMPRESSION:  1. A few new or more conspicuous nodules are present as detailed  above, less than 6 mm.  Given history of left breast cancer, these are  technically indeterminant and short-term follow-up could be  considered.  2. Other nodules including a 5 mm left upper lobe groundglass nodule  and centrally lucent/cavitary nodule in the right lower lobe are  unchanged from previous exam.  3. Sequelae of prior granulomas disease.    TTE 2/15/2022  Interpretation Summary     Left ventricular size, wall motion and function are normal. The ejection  fraction is 60-65%.  Normal right ventricle size and systolic function.  Small pericardial effusion  There are no echocardiographic indications of cardiac tamponade.  No hemodynamically significant  valvular abnormalities on 2D or color flow  imaging.    Pulmonary Function Testing  3/18/2022  FEV1 1,53L, 70%  FVC 84%  Ratio 0.64 (LLN 0.64)  No BD response.  TLC 85%  RV 89%  DLco 93% aung for hgb  Mid flow rates reduced with BD response.  Flow volume loop suggests small airways disease        Again, thank you for allowing me to participate in the care of your patient.        Sincerely,        Pacheco Perdomo MD

## 2022-03-18 NOTE — PROGRESS NOTES
[facility]    Outpatient Physical Therapy Discharge Note  Patient: Morenita Moore  : 1945    Beginning/End Dates of Reporting Period:  5/3/2021 to 21     Referring Provider: Dr Hong Hansen         Therapy Diagnosis: Dr Hong Hansen         Client Self Report: Met with MD and recommending new medication. Haven't started new medication yet. Haven't been doing as many exercises- just haven't felt too good. Reporting that she is feeling a little dizzy today.     Objective Measurements:  See below    Outcome Measures (most recent score):  See below    Goals:  Goal Identifier Postural stability   Goal Description The patient will be able to maintain rhomberg and sharpened rhomberg stance with EO/EC conditioning x 30 seconds with minimal observed sway to demonstrate a significant improvement in postural stability.   Target Date 21   Date Met      Progress (detail required for progress note):       Goal Identifier DGI   Goal Description The patient will score 19/24 or greater on DGI assessment iwth us eof least restrictive AD to demonstrate a significant improvement in dynamic balance and to show that she is at a minimal risk of falling with particiaption in community based ambualtion.    Target Date 21   Date Met      Progress (detail required for progress note):       Goal Identifier 30s STS   Goal Description The patient will be able to perfomr 13 STS trasnfers in 30 seconds or less without UE suport to demonstrate a significant improvement in LE strength and to show that she is at a minimal risk of falling iwth participation in functional mobility tasks.    Target Date 21   Date Met      Progress (detail required for progress note):       Goal Identifier     Goal Description     Target Date     Date Met      Progress (detail required for progress note):       Goal Identifier      Goal Description     Target Date     Date Met      Progress (detail required for progress note):       Goal Identifier     Goal Description     Target Date     Date Met      Progress (detail required for progress note):       Goal Identifier     Goal Description     Target Date     Date Met      Progress (detail required for progress note):       Goal Identifier     Goal Description     Target Date     Date Met      Progress (detail required for progress note):         Plan:  Discharge from therapy.    Discharge:    Reason for Discharge: Patient has failed to schedule further appointments.    Equipment Issued: none    Discharge Plan: Patient to continue home program.

## 2022-03-20 LAB
DLCOCOR-%PRED-PRE: 93 %
DLCOCOR-PRE: 18.47 ML/MIN/MMHG
DLCOUNC-%PRED-PRE: 93 %
DLCOUNC-PRE: 18.47 ML/MIN/MMHG
DLCOUNC-PRED: 19.84 ML/MIN/MMHG
ERV-%PRED-PRE: 30 %
ERV-PRE: 0.29 L
ERV-PRED: 0.93 L
EXPTIME-PRE: 13.76 SEC
FEF2575-%PRED-POST: 46 %
FEF2575-%PRED-PRE: 39 %
FEF2575-POST: 0.83 L/SEC
FEF2575-PRE: 0.71 L/SEC
FEF2575-PRED: 1.77 L/SEC
FEFMAX-%PRED-PRE: 99 %
FEFMAX-PRE: 5.47 L/SEC
FEFMAX-PRED: 5.48 L/SEC
FEV1-%PRED-PRE: 70 %
FEV1-PRE: 1.53 L
FEV1FEV6-PRE: 67 %
FEV1FEV6-PRED: 79 %
FEV1FVC-PRE: 64 %
FEV1FVC-PRED: 78 %
FEV1SVC-PRE: 63 %
FEV1SVC-PRED: 70 %
FIFMAX-PRE: 4.77 L/SEC
FRCPLETH-%PRED-PRE: 83 %
FRCPLETH-PRE: 2.32 L
FRCPLETH-PRED: 2.77 L
FVC-%PRED-PRE: 84 %
FVC-PRE: 2.4 L
FVC-PRED: 2.82 L
IC-%PRED-PRE: 95 %
IC-PRE: 2.07 L
IC-PRED: 2.17 L
RVPLETH-%PRED-PRE: 89 %
RVPLETH-PRE: 1.95 L
RVPLETH-PRED: 2.17 L
TLCPLETH-%PRED-PRE: 85 %
TLCPLETH-PRE: 4.39 L
TLCPLETH-PRED: 5.11 L
VA-%PRED-PRE: 83 %
VA-PRE: 4.09 L
VC-%PRED-PRE: 78 %
VC-PRE: 2.43 L
VC-PRED: 3.1 L

## 2022-03-21 ENCOUNTER — LAB (OUTPATIENT)
Dept: LAB | Facility: CLINIC | Age: 77
End: 2022-03-21
Payer: COMMERCIAL

## 2022-03-21 DIAGNOSIS — Z13.220 SCREENING FOR LIPID DISORDERS: ICD-10-CM

## 2022-03-21 DIAGNOSIS — I10 ESSENTIAL HYPERTENSION: ICD-10-CM

## 2022-03-21 LAB
CHOLEST SERPL-MCNC: 162 MG/DL
ERYTHROCYTE [DISTWIDTH] IN BLOOD BY AUTOMATED COUNT: 13.7 % (ref 10–15)
FASTING STATUS PATIENT QL REPORTED: YES
HCT VFR BLD AUTO: 39.6 % (ref 35–47)
HDLC SERPL-MCNC: 57 MG/DL
HGB BLD-MCNC: 13.2 G/DL (ref 11.7–15.7)
LDLC SERPL CALC-MCNC: 82 MG/DL
MCH RBC QN AUTO: 31.2 PG (ref 26.5–33)
MCHC RBC AUTO-ENTMCNC: 33.3 G/DL (ref 31.5–36.5)
MCV RBC AUTO: 94 FL (ref 78–100)
PLATELET # BLD AUTO: 263 10E3/UL (ref 150–450)
RBC # BLD AUTO: 4.23 10E6/UL (ref 3.8–5.2)
TRIGL SERPL-MCNC: 113 MG/DL
WBC # BLD AUTO: 5.6 10E3/UL (ref 4–11)

## 2022-03-21 PROCEDURE — 85027 COMPLETE CBC AUTOMATED: CPT

## 2022-03-21 PROCEDURE — 80061 LIPID PANEL: CPT

## 2022-03-21 PROCEDURE — 36415 COLL VENOUS BLD VENIPUNCTURE: CPT

## 2022-03-23 ENCOUNTER — MYC MEDICAL ADVICE (OUTPATIENT)
Dept: INTERNAL MEDICINE | Facility: CLINIC | Age: 77
End: 2022-03-23
Payer: COMMERCIAL

## 2022-03-23 DIAGNOSIS — J45.909 UNCOMPLICATED ASTHMA, UNSPECIFIED ASTHMA SEVERITY, UNSPECIFIED WHETHER PERSISTENT: ICD-10-CM

## 2022-03-23 DIAGNOSIS — J45.909 ASTHMA: ICD-10-CM

## 2022-03-23 DIAGNOSIS — Z12.31 ENCOUNTER FOR SCREENING MAMMOGRAM FOR BREAST CANCER: Primary | ICD-10-CM

## 2022-03-23 RX ORDER — ALBUTEROL SULFATE 90 UG/1
2 AEROSOL, METERED RESPIRATORY (INHALATION) EVERY 6 HOURS
Qty: 18 G | Refills: 11 | Status: ON HOLD | OUTPATIENT
Start: 2022-03-23 | End: 2022-11-14

## 2022-03-23 RX ORDER — FLUTICASONE PROPIONATE AND SALMETEROL XINAFOATE 230; 21 UG/1; UG/1
2 AEROSOL, METERED RESPIRATORY (INHALATION) 2 TIMES DAILY
Qty: 36 G | Refills: 3 | Status: SHIPPED | OUTPATIENT
Start: 2022-03-23 | End: 2022-10-06

## 2022-03-25 NOTE — TELEPHONE ENCOUNTER
Please inform patient I have ordered mammogram, please advised to call breast center and make an appointment.   Lipids and CBC normal I have sent her a my chart message

## 2022-03-28 NOTE — TELEPHONE ENCOUNTER
[place] message sent with Dr. Sánchez's below response.     Bessie RUSSO RN   [facility]

## 2022-03-30 ENCOUNTER — MYC MEDICAL ADVICE (OUTPATIENT)
Dept: INTERNAL MEDICINE | Facility: CLINIC | Age: 77
End: 2022-03-30
Payer: COMMERCIAL

## 2022-03-31 NOTE — TELEPHONE ENCOUNTER
See patient's My Chart message.      Pneumo Conj 13-V (2010&after)  02/15/2016      Pneumococcal 23 valent   10/19/2009, 09/27/2017      Pneumococcal, Unspecified   10/19/2009

## 2022-04-04 NOTE — TELEPHONE ENCOUNTER
Patient has had both pneumonia vaccines after age 65 , so she is up-to-date on pneumonia vaccines at this time

## 2022-04-14 ENCOUNTER — PATIENT OUTREACH (OUTPATIENT)
Dept: GERIATRIC MEDICINE | Facility: CLINIC | Age: 77
End: 2022-04-14
Payer: COMMERCIAL

## 2022-04-14 NOTE — PROGRESS NOTES
[facility] Care Coordination Contact    Order placed with [facility] (p: [phone]; f: [phone]) for utility cart.  Order placed on 4/13/2022. Database updated.  As required, authorization submitted to health plan.  DME tracking updated.     Jodie Talavera  Care Management Specialist   [facility]   [phone]

## 2022-04-17 ENCOUNTER — HEALTH MAINTENANCE LETTER (OUTPATIENT)
Age: 77
End: 2022-04-17

## 2022-04-17 NOTE — TELEPHONE ENCOUNTER
Asthma is kicking in and had covid vaccine.    Will use her inhailer prior to next vaccine.    Tylenol for any faver over 99.9.    MD for J&J vaccine info.    Bailey Graves RN  [facility] Nurse Advisor     Private car

## 2022-04-18 DIAGNOSIS — K02.9 DENTAL CARIES: Primary | ICD-10-CM

## 2022-04-19 ENCOUNTER — MYC MEDICAL ADVICE (OUTPATIENT)
Dept: INTERNAL MEDICINE | Facility: CLINIC | Age: 77
End: 2022-04-19
Payer: COMMERCIAL

## 2022-04-21 NOTE — TELEPHONE ENCOUNTER
Patient is eligible for 2nd COVID booster vaccine as it has been 6 months from the previous dose , I recommend her to take the booster now, but it is up to her if she wants to wait.

## 2022-04-25 ENCOUNTER — NURSE TRIAGE (OUTPATIENT)
Dept: NURSING | Facility: CLINIC | Age: 77
End: 2022-04-25
Payer: COMMERCIAL

## 2022-04-25 NOTE — TELEPHONE ENCOUNTER
"Coronavirus (COVID-19)     Caller Name (Patient, parent, daughter/son, grandparent, etc)  Patient herself    Reason for call   Positive Coronavirus (COVID-19) lab results, assess symptoms,  review [facility] recommendations    Lab Result      Positive home test   Brief introduction script  Introduce self then review script:  \"I am calling on behalf of [employer].  We were notified that your Coronavirus test (COVID-19) for was POSITIVE for the virus.\"    Gather patient reported symptoms   Assessment   Current Symptoms at time of phone call, reported by patient: (if no symptoms, document No symptoms] Runny nose, body aches   Date of Symptom(s) onset (if applicable) today     If at time of call, Patients symptoms hare worsened, the Patient should contact 911 or have someone drive them to Emergency Dept promptly:      If Patient calling 911, inform 911 personal that you have tested positive for the Coronavirus (COVID-19).  Place mask on and await 911 to arrive.    If Emergency Dept, If possible, please have another adult drive you to the Emergency Dept but you need to wear mask when in contact with other people.      Monoclonal Antibody Administration    You may be eligible to receive a new treatment with a monoclonal antibody for preventing hospitalization in patients at high risk for complications from COVID-19. This medication is still experimental and available on a limited basis; it is given through an IV and must be given at an infusion center. Please note that not all people who are eligible will receive the medication since it is in limited supply.  Is the patient symptomatic and onset of symptoms within the last 7 days?  Yes  Is the patient interested in a visit with a provider to discuss treatment options?: No.  Reason patient declined:  Worried about side effects from the drugs    Review information with Patient    Your result was positive. This means you have COVID-19 (coronavirus).      How can " I protect others?    These guidelines are for isolating before returning to work, school or .       If you DO have symptoms:  o Stay home and away from others  - For at least 5 days after your symptoms started, AND   - You are fever free for 24 hours (with no medicine that reduces fever), AND  - Your other symptoms are better.  o Wear a mask for 10 full days any time you are around others.    If you DON'T have symptoms:  o Stay at home and away from others for at least 5 days after your positive test.  o Wear a mask for 10 full days any time you are around others.    There may be different guidelines for healthcare facilities. Please check with the specific sites before arriving.     If you've been told by a doctor that you were severely ill with COVID-19 or are immunocompromised, you should isolate for at least 10 days.    You should not go back to work until you meet the guidelines above for ending your home isolation. You don't need to be retested for COVID-19 before going back to work--studies show that you won't spread the virus if it's been at least 10 days since your symptoms started (or 20 days, if you have a weak immune system).    Employers, schools, and daycares: This is an official notice for this person's medical guidelines for returning in-person. They must meet the above guidelines before going back to work, school, or  in person.    You will receive a positive COVID-19 letter via [employer] or the mail soon with additional self-care information (exception, no letters sent to presurgical/preprocedure patients).     Would you like me to review some of that information with you now?  Yes    How can I take care of myself?      Get lots of rest. Drink extra fluids (unless a doctor has told you not to).      Take Tylenol (acetaminophen) for fever or pain. If you have liver or kidney problems, ask your family doctor if it's okay to take Tylenol.     Take either:     650 mg (two 325 mg pills)  every 4 to 6 hours, or     1,000 mg (two 500 mg pills) every 8 hours as needed.     Note: Do not take more than 3,000 mg in one day. Acetaminophen is found in many medicines (both prescribed and over-the-counter medicines). Read all labels to be sure you don't take too much.    For children, check the Tylenol bottle for the right dose (based on their age or weight).      If you have other health problems (like cancer, heart failure, an organ transplant or severe kidney disease): Call your specialty clinic if you don't feel better in the next 2 days.      Know when to call 911: Emergency warning signs include:    Trouble breathing or shortness of breath    Pain or pressure in the chest that doesn't go away    Feeling confused like you haven't felt before, or not being able to wake up    Bluish-colored lips or face        If you were tested for an upcoming procedure, please contact your provider for next steps.     Shi Salvador RN

## 2022-04-27 ENCOUNTER — TELEPHONE (OUTPATIENT)
Dept: INTERNAL MEDICINE | Facility: CLINIC | Age: 77
End: 2022-04-27
Payer: COMMERCIAL

## 2022-04-27 ENCOUNTER — MYC MEDICAL ADVICE (OUTPATIENT)
Dept: PULMONOLOGY | Facility: OTHER | Age: 77
End: 2022-04-27
Payer: COMMERCIAL

## 2022-04-27 DIAGNOSIS — J45.901 ASTHMA EXACERBATION: Primary | ICD-10-CM

## 2022-04-27 RX ORDER — PREDNISONE 20 MG/1
TABLET ORAL
Qty: 10 TABLET | Refills: 0 | Status: SHIPPED | OUTPATIENT
Start: 2022-04-27 | End: 2022-05-11

## 2022-04-27 RX ORDER — AZITHROMYCIN 250 MG/1
TABLET, FILM COATED ORAL
Qty: 6 TABLET | Refills: 0 | Status: SHIPPED | OUTPATIENT
Start: 2022-04-27 | End: 2022-05-02

## 2022-04-27 NOTE — TELEPHONE ENCOUNTER
Spoke with patient by phone. Not able to go out to get COVID PCR testing and not sure that home test was positive.      Will prescribe her an action plan for her symptoms (prednisone 40mg daily x 5 days and Zpack) and she can get that started. Also, encouraged her to buy a finger oximeter to monitor her oxygen levels at home.    Discussed with Dr. Feliz in clinic.

## 2022-04-27 NOTE — TELEPHONE ENCOUNTER
Patient did a home test for COVID today and its positive. She started getting sick late in the day Sunday. She would like medication for COVID  Patient uses [facility] in [facility]  Ok to call and  [phone]

## 2022-04-28 ENCOUNTER — NURSE TRIAGE (OUTPATIENT)
Dept: NURSING | Facility: CLINIC | Age: 77
End: 2022-04-28
Payer: COMMERCIAL

## 2022-04-28 NOTE — TELEPHONE ENCOUNTER
"Call to patient. Patient reports she talked to her \"lung doctor\" who said they could prescribe an antiviral and medication for cough. Patient informed of [place] options of either calling [employer] and press 7 or via [employer]  tile.    This RN also advised patient to watch for any red flag symptoms such as chest pain and trouble breathing and informed patient that we have a triage RN available 24/7. Patient verbalized understanding.    Bessie RUSSO RN   [facility]             "

## 2022-04-28 NOTE — TELEPHONE ENCOUNTER
Patient calling with questions about taking oral anti-viral medications for Covid.     Coronavirus (COVID-19) Notification    Caller Name (Patient, parent, daughter/son, grandparent, etc)  Patient positive for Covid, symptoms started Monday early. Patient has history of lung disease and takes Advair.       Gather patient reported symptoms   Assessment   Current Symptoms at time of phone call, reported by patient: (if no symptoms, document No symptoms] Cough, fatigue, body aches, sinus drainage   Date of Symptom(s) onset (if applicable) Monday 4/25         Monoclonal Antibody Administration    You may be eligible to receive a new treatment with a monoclonal antibody for preventing hospitalization in patients at high risk for complications from COVID-19. This medication is still experimental and available on a limited basis; it is given through an IV and must be given at an infusion center. Please note that not all people who are eligible will receive the medication since it is in limited supply.  Is the patient symptomatic and onset of symptoms within the last 7 days?  Yes  Is the patient interested in a visit with a provider to discuss treatment options?: Yes  Is the patient seen at [facility]?  No: Warm transfer caller to [phone] to be scheduled with a virtual urgent provider.  During transfer, instruct  on appropriate time frame for visit     Review information with Patient    Your result was positive. This means you have COVID-19 (coronavirus).      How can I protect others?    These guidelines are for isolating before returning to work, school or .       If you DO have symptoms:  o Stay home and away from others  - For at least 5 days after your symptoms started, AND   - You are fever free for 24 hours (with no medicine that reduces fever), AND  - Your other symptoms are better.  o Wear a mask for 10 full days any time you are around others.    If you DON'T have symptoms:  o Stay  at home and away from others for at least 5 days after your positive test.  o Wear a mask for 10 full days any time you are around others.    There may be different guidelines for healthcare facilities. Please check with the specific sites before arriving.     If you've been told by a doctor that you were severely ill with COVID-19 or are immunocompromised, you should isolate for at least 10 days.    You should not go back to work until you meet the guidelines above for ending your home isolation. You don't need to be retested for COVID-19 before going back to work--studies show that you won't spread the virus if it's been at least 10 days since your symptoms started (or 20 days, if you have a weak immune system).    Yoselin Story RN    COVID 19 Nurse Triage Plan/Patient Instructions    Please be aware that novel coronavirus (COVID-19) may be circulating in the community. If you develop symptoms such as fever, cough, or SOB or if you have concerns about the presence of another infection including coronavirus (COVID-19), please contact your health care provider or visit [email or web address].     Disposition/Instructions    Virtual Visit with provider recommended. Reference Visit Selection Guide.    Thank you for taking steps to prevent the spread of this virus.  o Limit your contact with others.  o Wear a simple mask to cover your cough.  o Wash your hands well and often.    Resources    [facility]: About COVID-19: [email or web address]    CDC: What to Do If You're Sick: www.cdc.gov/coronavirus/2019-ncov/about/steps-when-sick.html    CDC: Ending Home Isolation: www.cdc.gov/coronavirus/2019-ncov/hcp/disposition-in-home-patients.html     CDC: Caring for Someone: www.cdc.gov/coronavirus/2019-ncov/if-you-are-sick/care-for-someone.html     [facility]: Interim Guidance for Hospital Discharge to Home: www.health.[facility].mn.us/diseases/coronavirus/hcp/hospdischarge.pdf    [facility]  trials (COVID-19 research studies): [email or web address]-clinical-trials     Below are the COVID-19 hotlines at the [place] Department of Health ([facility]). Interpreters are available.   o For health questions: Call 677-384-6423 or 1-573.418.6929 (7 a.m. to 7 p.m.)  o For questions about schools and childcare: Call 452-803-0326 or 1-377.986.5442 (7 a.m. to 7 p.m.)                     Reason for Disposition    HIGH RISK for severe COVID complications (e.g., weak immune system, age > 64 years, obesity with BMI > 25, pregnant, chronic lung disease or other chronic medical condition) (Exception: Already seen by PCP and no new or worsening symptoms.)    Additional Information    Negative: SEVERE difficulty breathing (e.g., struggling for each breath, speaks in single words)    Negative: Difficult to awaken or acting confused (e.g., disoriented, slurred speech)    Negative: Bluish (or gray) lips or face now    Negative: Shock suspected (e.g., cold/pale/clammy skin, too weak to stand, low BP, rapid pulse)    Negative: Sounds like a life-threatening emergency to the triager    Negative: [1] Diagnosed or suspected COVID-19 AND [2] symptoms lasting 3 or more weeks    Negative: [1] COVID-19 exposure AND [2] no symptoms    Negative: COVID-19 vaccine reaction suspected (e.g., fever, headache, muscle aches) occurring 1 to 3 days after getting vaccine    Negative: COVID-19 vaccine, questions about    Negative: [1] Lives with someone known to have influenza (flu test positive) AND [2] flu-like symptoms (e.g., cough, runny nose, sore throat, SOB; with or without fever)    Negative: [1] Adult with possible COVID-19 symptoms AND [2] triager concerned about severity of symptoms or other causes    Negative: COVID-19 and breastfeeding, questions about    Negative: SEVERE or constant chest pain or pressure  (Exception: Mild central chest pain, present only when coughing.)    Negative: MODERATE difficulty breathing (e.g., speaks in  phrases, SOB even at rest, pulse 100-120)    Negative: Headache and stiff neck (can't touch chin to chest)    Negative: Oxygen level (e.g., pulse oximetry) 90 percent or lower    Negative: Chest pain or pressure    Negative: Patient sounds very sick or weak to the triager    Negative: MILD difficulty breathing (e.g., minimal/no SOB at rest, SOB with walking, pulse <100)    Negative: Fever > 103 F (39.4 C)    Negative: [1] Fever > 101 F (38.3 C) AND [2] over 60 years of age    Negative: [1] Fever > 100.0 F (37.8 C) AND [2] bedridden (e.g., nursing home patient, CVA, chronic illness, recovering from surgery)    Protocols used: CORONAVIRUS (COVID-19) DIAGNOSED OR [id number] 1.18.2022

## 2022-04-29 ENCOUNTER — VIRTUAL VISIT (OUTPATIENT)
Dept: URGENT CARE | Facility: CLINIC | Age: 77
End: 2022-04-29
Payer: COMMERCIAL

## 2022-04-29 DIAGNOSIS — U07.1 CLINICAL DIAGNOSIS OF COVID-19: Primary | ICD-10-CM

## 2022-04-29 DIAGNOSIS — J45.20 MILD INTERMITTENT ASTHMA IN ADULT WITHOUT COMPLICATION: ICD-10-CM

## 2022-04-29 PROCEDURE — 99213 OFFICE O/P EST LOW 20 MIN: CPT | Mod: 95 | Performed by: FAMILY MEDICINE

## 2022-04-29 NOTE — PROGRESS NOTES
"Marlen is a 76 year old who is being evaluated via a billable telephone visit.      COVID + home test Monday.  Sx muscle aches and fever and chills.  Dry cough.    What phone number would you like to be contacted at? cell  How would you like to obtain your AVS? [place]    Assessment & Plan     Clinical diagnosis of COVID-19    - nirmatrelvir and ritonavir (PAXLOVID) therapy pack; Take 2 Nirmatrelvir tablets and 1 Ritonavir tablet twice daily for 5 days.    Mild intermittent asthma in adult without complication    - beclomethasone HFA (QVAR REDIHALER) 80 MCG/ACT inhaler; Inhale 1 puff into the lungs 2 times daily             COVID-19 positive patient.  Encounter for consideration of medication intervention. Patient does qualify for a prescription. Full discussion with patient including medication options, risks and benefits. Potential drug interactions reviewed with patient.     Treatment Planned Paxlovid, Rx sent to [place] pharmacy  [facility] [phone]    [address] [place] [address]    Hours:  Mon-Fri: 8:00a - 6:00p  Sat-Sun: 8:00a - 4:00p    Drive-thru services available.    Temporary change to home medications:   HOLD Advair.  Will change to QVAR (beclomethasone) for 5 days while on Paxlovid.    Estimated body mass index is 20.24 kg/m  as calculated from the following:    Height as of 8/10/21: 1.651 m (5' 5\").    Weight as of 3/18/22: 55.2 kg (121 lb 9.6 oz).  GFR Estimate   Date Value Ref Range Status   10/20/2021 89 >60 mL/min/1.73m2 Final     Comment:     As of July 11, 2021, eGFR is calculated by the CKD-EPI creatinine equation, without race adjustment. eGFR can be influenced by muscle mass, exercise, and diet. The reported eGFR is an estimation only and is only applicable if the renal function is stable.   02/01/2021 >60 >60 mL/min/1.73m2 Final   12/14/2020 84 >60 mL/min/[1.73_m2] Final     Comment:     Starting 12/18/2018, serum creatinine based estimated GFR (eGFR) " will be   calculated using the Chronic Kidney Disease Epidemiology Collaboration   (CKD-EPI) equation.       Lab Results   Component Value Date    [id number] Not Detected 12/14/2020     Anabel Almanza MD  [facility] VIRTUAL URGENT CARE    Subjective   Marlen is a 76 year old who presents for the following health issues     HPI     + home test on MOnday for COVID.  Has not started prednisone or Zpak for respiratory sx.  Feels better today.  No cough or SOB.      Review of Systems   Constitutional, HEENT, cardiovascular, pulmonary, GI, , musculoskeletal, neuro, skin, endocrine and psych systems are negative, except as otherwise noted.      Objective           Vitals:  No vitals were obtained today due to virtual visit.    Physical Exam   healthy, alert and no distress  PSYCH: Alert and oriented times 3; coherent speech, normal   rate and volume, able to articulate logical thoughts, able   to abstract reason, no tangential thoughts, no hallucinations   or delusions  Her affect is normal and pleasant  RESP: No cough, no audible wheezing, able to talk in full sentences  Remainder of exam unable to be completed due to telephone visits                Phone call duration: 28 minutes

## 2022-05-03 NOTE — PROGRESS NOTES
Utility cart delivered 4/20/22 per [employer]. CC notified.     Jodie Talavera  Care Management Specialist   [facility]   [phone]

## 2022-05-06 ENCOUNTER — OFFICE VISIT (OUTPATIENT)
Dept: NEUROLOGY | Facility: CLINIC | Age: 77
End: 2022-05-06
Payer: COMMERCIAL

## 2022-05-06 ENCOUNTER — LAB (OUTPATIENT)
Dept: LAB | Facility: CLINIC | Age: 77
End: 2022-05-06
Payer: COMMERCIAL

## 2022-05-06 DIAGNOSIS — Z20.822 SUSPECTED 2019 NOVEL CORONAVIRUS INFECTION: ICD-10-CM

## 2022-05-06 DIAGNOSIS — R42 DIZZINESS: Primary | ICD-10-CM

## 2022-05-06 PROCEDURE — 99000 SPECIMEN HANDLING OFFICE-LAB: CPT | Performed by: PATHOLOGY

## 2022-05-06 PROCEDURE — 99214 OFFICE O/P EST MOD 30 MIN: CPT | Mod: CS | Performed by: INTERNAL MEDICINE

## 2022-05-06 PROCEDURE — U0005 INFEC AGEN DETEC AMPLI PROBE: HCPCS | Mod: 90 | Performed by: PATHOLOGY

## 2022-05-06 PROCEDURE — U0003 INFECTIOUS AGENT DETECTION BY NUCLEIC ACID (DNA OR RNA); SEVERE ACUTE RESPIRATORY SYNDROME CORONAVIRUS 2 (SARS-COV-2) (CORONAVIRUS DISEASE [COVID-19]), AMPLIFIED PROBE TECHNIQUE, MAKING USE OF HIGH THROUGHPUT TECHNOLOGIES AS DESCRIBED BY CMS-2020-01-R: HCPCS | Mod: 90 | Performed by: PATHOLOGY

## 2022-05-06 NOTE — PROGRESS NOTES
[facility] Neurology Follow Up Visit    Morenita Moore MRN# [id number]   Age: 75 year old YOB: 1945     Brief history of symptoms: The patient was initially seen in neurologic consultation on 2021 for evaluation of dizziness. Please see the comprehensive neurologic consultation note from that date in the Epic records for details.     Interval history:   She continues to be off of the ativan. She is continuing to have daily dizziness, but it is not as severe as it was last year. She describes it as a sense of imbalance or movement, worsened when in the upright position and moving her head too quickly.        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)     Major depression in remission (H)     Neck pain     Pericarditis     Primary osteoarthritis of both knees     CELESTE (generalized anxiety disorder)     Presbyopia     Senile nuclear sclerosis     Tremor, physiological     Essential hypertension     Dizziness     Past Medical History:   Diagnosis Date     Anxiety      Asthma     adult-onset asthma diagnosed in      Cancer (H)      DCIS (ductal carcinoma in situ) 2001    stage 0 status post left mastectomy in      Depressive disorder         Past Surgical History:     Past Surgical History:   Procedure Laterality Date     BREAST SURGERY       CATARACT IOL, RT/LT       MASTECTOMY      DCIS     RETINAL REATTACHMENT          Social History:     Social History     Tobacco Use     Smoking status: Former Smoker     Packs/day: 1.00     Years: 6.00     Pack years: 6.00     Quit date:      Years since quittin.4     Smokeless tobacco: Never Used     Tobacco  comment: smoked about 10 years   Vaping Use     Vaping Use: Never used   Substance Use Topics     Alcohol use: No     Drug use: Never        Family History:     Family History   Problem Relation Age of Onset     Cancer Sister         breast cancer     Glaucoma No family hx of      Macular Degeneration No family hx of      Asthma Sister      Breast Cancer Sister      Heart Failure Mother          at 97        Medications:     Current Outpatient Medications   Medication Sig     albuterol (PROAIR HFA/PROVENTIL HFA/VENTOLIN HFA) 108 (90 Base) MCG/ACT inhaler Inhale 2 puffs into the lungs every 6 hours     calcium carbonate-vitamin D (OYSTER SHELL CALCIUM/D) 500-200 MG-UNIT tablet Take 1 tablet by mouth     Cyanocobalamin 50 MCG TABS Take 50 mcg by mouth once a week      fluticasone-salmeterol (ADVAIR-HFA) 230-21 MCG/ACT inhaler Inhale 2 puffs into the lungs 2 times daily     No current facility-administered medications for this visit.        Allergies:     Allergies   Allergen Reactions     Levalbuterol Hydrochloride Shortness Of Breath     Cats Other (See Comments)     Itchy eyes     Dust Mites      Other reaction(s): Wheezing  Wheezing/shortness/breath/see (IRP /)     Qvar [Beclomethasone]      Per patient- allergic to QVAR     Amitriptyline Palpitations     Escitalopram Anxiety        Review of Systems:   As above     Physical Exam:   Vitals: There were no vitals taken for this visit.   General: Seated comfortably in no acute distress.  HEENT: Neck supple with normal range of motion. No paracervical muscle tenderness or tightness. Optic discs sharp and vasculature normal on funduscopic exam.   Lungs: breathing comfortably  Neurologic:     Mental Status: Fully alert, attentive. Normal memory and fund of knowledge. Language normal, speech clear and fluent, no paraphasic errors.      Cranial Nerves: Visual fields intact. PERRL. EOMI with normal smooth pursuit. No dysarthria. Shoulder shrug strong bilaterally.       Motor: Head turn to the left with constant head tremor through encounter. Muscle tone normal throughout extremities. Normal/symmetric rapid finger tapping. Strength 5/5 throughout upper and lower extremities.     Deep Tendon Reflexes: 2+/symmetric throughout upper and lower extremities.      Sensory: Intact/symmetric to light touch throughout upper and lower extremities. Negative Romberg.      Coordination: Finger-nose-finger and heel-shin intact without dysmetria. Rapid alternating movements intact/symmetric with normal speed and rhythm.     Gait: Steady casual gait, slight variability in speed. Able to walk on toes, heels with mild difficulty and variability in speed. Able to slowly take a few steps in tandem with occasional out step.      Data reviewed on previous visits    Imaging:  MRI brain without contrast 7/2020  1.  No acute intracranial process.  2.  Generalized brain atrophy and presumed microvascular ischemic changes similar to findings on the 2018 MRI.    Laboratory:  TSH normal 1/2020    Pertinent Investigations since last visit:   None         Assessment and Plan:   Assessment:  Morenita Moore is a 75 year old female who presents for follow-up of head tremor and dizziness/balance problems. Head tremor developed after started selective serotonin reuptake inhibitor in 2018. She has prior history of cervical dystonia. Head tremor is likely secondary to worsening of cervical dystonia. She is currently not interested in botox due to possible side effects and thinks current head tremor is tolerable.       Dizziness/balance issues developed after nonspecific viral infection in 2018 as well. She also had pericarditis. Dizziness is present when she moves her head too fast or turns too quickly. It is described as intermittent swaying, feeling of imbalance sensation. Prior MRI brain in 7/2020 did not show any clear abnormality correlating with symptoms. Orthostatic vitals at previous visits were  negative. She had been on ativan for 2 years and it was stopped in mid October 2021. Stopping the ativan has improved the dizziness, but hasn't completely improved it. I suspect symptoms are multifactorial. She has many features of PPPD. Cervical dystonia causing movement of her visual input also likely is playing at least a minor role. Based off of the dizziness starting after viral infection, vestibulopathy is also a possibility, but currently she is not interested in pursuing VNG. She would benefit from additional vestibular therapy with PT and this was ordered again. We could consider thoracic outlet vascular ultrasound in the future.       Plan:  - Physical therapy referral for vestibular therapy    Follow up in Neurology clinic pending clinical course    Hong Hansen MD   of Neurology  [facility]    The total time of this encounter today amounted to 36 minutes. This time included time spent with the patient on the telephone, prep work, ordering tests, and performing post visit documentation.

## 2022-05-06 NOTE — LETTER
5/6/2022       RE: Morenita Moore  [address]  [place] [address]     Dear Colleague,    Thank you for referring your patient, Morenita Moore, to the [facility]. Please see a copy of my visit note below.    [facility] Neurology Follow Up Visit    Morenita Moore MRN# [id number]   Age: 75 year old YOB: 1945     Brief history of symptoms: The patient was initially seen in neurologic consultation on 4/16/2021 for evaluation of dizziness. Please see the comprehensive neurologic consultation note from that date in the Epic records for details.     Interval history:   She continues to be off of the ativan. She is continuing to have daily dizziness, but it is not as severe as it was last year. She describes it as a sense of imbalance or movement, worsened when in the upright position and moving her head too quickly.        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)     Major depression in remission (H)     Neck pain     Pericarditis     Primary osteoarthritis of both knees     CELESTE (generalized anxiety disorder)     Presbyopia     Senile nuclear sclerosis     Tremor, physiological     Essential hypertension     Dizziness     Past Medical History:   Diagnosis Date     Anxiety      Asthma 2012    adult-onset asthma diagnosed in 2012     Cancer (H)      DCIS (ductal carcinoma in situ) 2001    stage 0 status post left mastectomy in 2001     Depressive disorder         Past Surgical History:     Past Surgical History:   Procedure Laterality Date     BREAST  SURGERY       CATARACT IOL, RT/LT       MASTECTOMY      DCIS     RETINAL REATTACHMENT          Social History:     Social History     Tobacco Use     Smoking status: Former Smoker     Packs/day: 1.00     Years: 6.00     Pack years: 6.00     Quit date:      Years since quittin.4     Smokeless tobacco: Never Used     Tobacco comment: smoked about 10 years   Vaping Use     Vaping Use: Never used   Substance Use Topics     Alcohol use: No     Drug use: Never        Family History:     Family History   Problem Relation Age of Onset     Cancer Sister         breast cancer     Glaucoma No family hx of      Macular Degeneration No family hx of      Asthma Sister      Breast Cancer Sister      Heart Failure Mother          at 97        Medications:     Current Outpatient Medications   Medication Sig     albuterol (PROAIR HFA/PROVENTIL HFA/VENTOLIN HFA) 108 (90 Base) MCG/ACT inhaler Inhale 2 puffs into the lungs every 6 hours     calcium carbonate-vitamin D (OYSTER SHELL CALCIUM/D) 500-200 MG-UNIT tablet Take 1 tablet by mouth     Cyanocobalamin 50 MCG TABS Take 50 mcg by mouth once a week      fluticasone-salmeterol (ADVAIR-HFA) 230-21 MCG/ACT inhaler Inhale 2 puffs into the lungs 2 times daily     No current facility-administered medications for this visit.        Allergies:     Allergies   Allergen Reactions     Levalbuterol Hydrochloride Shortness Of Breath     Cats Other (See Comments)     Itchy eyes     Dust Mites      Other reaction(s): Wheezing  Wheezing/shortness/breath/see (IRP )     Qvar [Beclomethasone]      Per patient- allergic to QVAR     Amitriptyline Palpitations     Escitalopram Anxiety        Review of Systems:   As above     Physical Exam:   Vitals: There were no vitals taken for this visit.   General: Seated comfortably in no acute distress.  HEENT: Neck supple with normal range of motion. No paracervical muscle tenderness or tightness. Optic discs sharp and vasculature normal on  funduscopic exam.   Lungs: breathing comfortably  Neurologic:     Mental Status: Fully alert, attentive. Normal memory and fund of knowledge. Language normal, speech clear and fluent, no paraphasic errors.      Cranial Nerves: Visual fields intact. PERRL. EOMI with normal smooth pursuit. No dysarthria. Shoulder shrug strong bilaterally.      Motor: Head turn to the left with constant head tremor through encounter. Muscle tone normal throughout extremities. Normal/symmetric rapid finger tapping. Strength 5/5 throughout upper and lower extremities.     Deep Tendon Reflexes: 2+/symmetric throughout upper and lower extremities.      Sensory: Intact/symmetric to light touch throughout upper and lower extremities. Negative Romberg.      Coordination: Finger-nose-finger and heel-shin intact without dysmetria. Rapid alternating movements intact/symmetric with normal speed and rhythm.     Gait: Steady casual gait, slight variability in speed. Able to walk on toes, heels with mild difficulty and variability in speed. Able to slowly take a few steps in tandem with occasional out step.      Data reviewed on previous visits    Imaging:  MRI brain without contrast 7/2020  1.  No acute intracranial process.  2.  Generalized brain atrophy and presumed microvascular ischemic changes similar to findings on the 2018 MRI.    Laboratory:  TSH normal 1/2020    Pertinent Investigations since last visit:   None         Assessment and Plan:   Assessment:  Morenita Moore is a 75 year old female who presents for follow-up of head tremor and dizziness/balance problems. Head tremor developed after started selective serotonin reuptake inhibitor in 2018. She has prior history of cervical dystonia. Head tremor is likely secondary to worsening of cervical dystonia. She is currently not interested in botox due to possible side effects and thinks current head tremor is tolerable.       Dizziness/balance issues developed after nonspecific  viral infection in 2018 as well. She also had pericarditis. Dizziness is present when she moves her head too fast or turns too quickly. It is described as intermittent swaying, feeling of imbalance sensation. Prior MRI brain in 7/2020 did not show any clear abnormality correlating with symptoms. Orthostatic vitals at previous visits were negative. She had been on ativan for 2 years and it was stopped in mid October 2021. Stopping the ativan has improved the dizziness, but hasn't completely improved it. I suspect symptoms are multifactorial. She has many features of PPPD. Cervical dystonia causing movement of her visual input also likely is playing at least a minor role. Based off of the dizziness starting after viral infection, vestibulopathy is also a possibility, but currently she is not interested in pursuing VNG. She would benefit from additional vestibular therapy with PT and this was ordered again. We could consider thoracic outlet vascular ultrasound in the future.       Plan:  - Physical therapy referral for vestibular therapy    Follow up in Neurology clinic pending clinical course      Hong Hansen MD   of Neurology  [facility]    The total time of this encounter today amounted to 36 minutes. This time included time spent with the patient on the telephone, prep work, ordering tests, and performing post visit documentation.

## 2022-05-07 ENCOUNTER — TELEPHONE (OUTPATIENT)
Dept: LAB | Facility: CLINIC | Age: 77
End: 2022-05-07
Payer: COMMERCIAL

## 2022-05-07 LAB — SARS-COV-2 RNA RESP QL NAA+PROBE: POSITIVE

## 2022-05-07 NOTE — TELEPHONE ENCOUNTER
Coronavirus (COVID-19) Notification    Caller Name (Patient, parent, daughter/son, grandparent, etc)  patient    Reason for call  Notify of Positive Coronavirus (COVID-19) lab results, assess symptoms,  review [facility] recommendations    Lab Result    Lab test:  2019-nCoV rRt-PCR or SARS-CoV-2 PCR    Oropharyngeal AND/OR nasopharyngeal swabs is POSITIVE for 2019-nCoV RNA/SARS-COV-2 PCR (COVID-19 virus)      Gather patient reported symptoms   Assessment   Current Symptoms at time of phone call, reported by patient: (if no symptoms, document: No symptoms] No symptoms   Date of symptom(s) onset (if applicable) n/a     If at time of call, Patients symptoms have worsened, the Patient should contact 911 or have someone drive them to Emergency Dept promptly:      If Patient calling 911, inform 911 personal that you have tested positive for the Coronavirus (COVID-19).  Place mask on and await 911 to arrive.    If Emergency Dept, If possible, please have another adult drive you to the Emergency Dept but you need to wear mask when in contact with other people.      Treatment Options:   Patient classified as COVID treatment eligible by Epic high risk algorithm: Yes  Is the patient symptomatic at the time of result notification? No    Review information with Patient    Your result was positive. This means you have COVID-19 (coronavirus).    How can I protect others?    These guidelines are for isolating before returning to work, school or .    If you DO have symptoms    Stay home and away from others     For at least 5 days after your symptoms started, AND    You are fever free for 24 hours (with no medicine that reduces fever), AND    Your other symptoms are better    Wear a mask for 10 full days anytime you are around others    If you DON'T have symptoms    Stay home and away from others for at least 5 days after your positive test    Wear a mask for 10 full days anytime you are around others    There may be  different guidelines for healthcare facilities.  Please check with the specific sites before arriving.    If you have been told by a doctor that you were severely ill with COVID-19 or are immunocompromised, you should isolate for at least 10 days.    You should not go back to work until you meet the guidelines above for ending your home isolation. You don't need to be retested for COVID-19 before going back to work--studies show that you won't spread the virus if it's been at least 10 days since your symptoms started (or 20 days, if you have a weak immune system).    Employers, schools, and daycares: This is an official notice for this person's medical guidelines for returning in-person.  They must meet the above guidelines before going back to work, school or  in person.    You will receive a positive COVID-19 letter via [employer] or the mail soon with additional self-care information (exception, no letters will be sent to presurgical/preprocedure patients).    Would you like me to review some of that information with you now?  No    If you were tested for an upcoming procedure, please contact your provider for next steps.    Palak Flynn LPN

## 2022-05-09 ENCOUNTER — TELEPHONE (OUTPATIENT)
Dept: FAMILY MEDICINE | Facility: CLINIC | Age: 77
End: 2022-05-09
Payer: COMMERCIAL

## 2022-05-09 NOTE — TELEPHONE ENCOUNTER
Dr. Almanza/ Provider-Please review    Call received from patient:   1. Two weeks ago felt sick  2. COVID-19 home test positive  3. Had virtual visit with Dr. Almanza   A. Recommended patient start antiviral but cannot take Advair while on antiviral   B. Instructed to use QVAR instead-this name was familiar to patient who then confirmed with [facility] she had severe reaction to QVAR in the past-she could not breathe  5. Did not take antiviral because cannot take QVAR and did not want to discontinue Advair  6. Wants QVAR discontinued from her medication list and added as an allergy    Patient informed message will be sent to Dr. Almanza and Urgent Care Provider [place].    Patient verbalized understanding and in agreement with plan.    Thank you!  KIRAN UlloaN, RN  [facility]

## 2022-05-11 ENCOUNTER — MYC MEDICAL ADVICE (OUTPATIENT)
Dept: PULMONOLOGY | Facility: OTHER | Age: 77
End: 2022-05-11
Payer: COMMERCIAL

## 2022-05-11 DIAGNOSIS — J45.909 UNCOMPLICATED ASTHMA, UNSPECIFIED ASTHMA SEVERITY, UNSPECIFIED WHETHER PERSISTENT: Primary | ICD-10-CM

## 2022-05-12 ENCOUNTER — PATIENT OUTREACH (OUTPATIENT)
Dept: GERIATRIC MEDICINE | Facility: CLINIC | Age: 77
End: 2022-05-12
Payer: COMMERCIAL

## 2022-05-12 NOTE — PROGRESS NOTES
[facility] Care Coordination Contact      [facility] Six-Month Telephone Assessment    6 month telephone assessment completed on 5/12/2022.    ER visits: No  Hospitalizations: No  TCU stays: No  Significant health status changes: None to report. Morenita reports that her health has been stable the past six months.   Falls/Injuries: No  ADL/IADL changes: No  Changes in services: No    Caregiver Assessment follow up:  NA    Goals: See POC in chart for goal progress documentation.  Reviewed goals with member.     Will see member in 6 months for an annual health risk assessment.   Encouraged member to call CC with any questions or concerns in the meantime.   ASHLEIGH Villalpando  [facility]  Cell: [phone]

## 2022-05-13 ENCOUNTER — TELEPHONE (OUTPATIENT)
Dept: PULMONOLOGY | Facility: OTHER | Age: 77
End: 2022-05-13
Payer: COMMERCIAL

## 2022-05-13 NOTE — TELEPHONE ENCOUNTER
DATE: 05/13/2022  DME PROVIDER: [facility]   SUPPLY ORDERED:O2 sat monitor   PROVIDER:Elke Lynn LPN

## 2022-05-17 ENCOUNTER — DOCUMENTATION ONLY (OUTPATIENT)
Dept: PULMONOLOGY | Facility: OTHER | Age: 77
End: 2022-05-17
Payer: COMMERCIAL

## 2022-05-17 NOTE — PROGRESS NOTES
Order for O2 sat monitor faxed to [facility] per patient request. Patient states they did not receive previous fax on 5/13.    Fax: [phone]

## 2022-05-18 ENCOUNTER — DOCUMENTATION ONLY (OUTPATIENT)
Dept: PULMONOLOGY | Facility: OTHER | Age: 77
End: 2022-05-18
Payer: COMMERCIAL

## 2022-05-18 NOTE — PROGRESS NOTES
Called [facility], they confirmed they have the order for O2 sat monitor.    June Castle LPN

## 2022-05-19 ENCOUNTER — MEDICAL CORRESPONDENCE (OUTPATIENT)
Dept: HEALTH INFORMATION MANAGEMENT | Facility: CLINIC | Age: 77
End: 2022-05-19
Payer: COMMERCIAL

## 2022-05-20 ENCOUNTER — PATIENT OUTREACH (OUTPATIENT)
Dept: GERIATRIC MEDICINE | Facility: CLINIC | Age: 77
End: 2022-05-20
Payer: COMMERCIAL

## 2022-05-20 ENCOUNTER — HOSPITAL ENCOUNTER (OUTPATIENT)
Dept: MAMMOGRAPHY | Facility: CLINIC | Age: 77
Discharge: HOME | End: 2022-05-20
Attending: INTERNAL MEDICINE | Admitting: INTERNAL MEDICINE
Payer: COMMERCIAL

## 2022-05-20 DIAGNOSIS — Z12.31 ENCOUNTER FOR SCREENING MAMMOGRAM FOR BREAST CANCER: ICD-10-CM

## 2022-05-20 PROCEDURE — 77067 SCR MAMMO BI INCL CAD: CPT | Mod: 52

## 2022-05-20 NOTE — PROGRESS NOTES
[facility] Care Coordination Contact    Order placed with [facility] (p: [phone]; f: [phone]) for oximeter.  Order placed on 5/19/22. Database updated.  As required, authorization submitted to health plan.    Jodie Talavera  Care Management Specialist   [facility]   [phone]

## 2022-05-27 ENCOUNTER — MYC MEDICAL ADVICE (OUTPATIENT)
Dept: INTERNAL MEDICINE | Facility: CLINIC | Age: 77
End: 2022-05-27
Payer: COMMERCIAL

## 2022-05-27 DIAGNOSIS — C50.919 MALIGNANT NEOPLASM OF FEMALE BREAST, UNSPECIFIED ESTROGEN RECEPTOR STATUS, UNSPECIFIED LATERALITY, UNSPECIFIED SITE OF BREAST (H): Primary | ICD-10-CM

## 2022-05-27 NOTE — TELEPHONE ENCOUNTER
DME pended. Please sign and print if in agreement and then order can be faxed as requested by patient.

## 2022-06-06 ENCOUNTER — MYC MEDICAL ADVICE (OUTPATIENT)
Dept: INTERNAL MEDICINE | Facility: CLINIC | Age: 77
End: 2022-06-06
Payer: COMMERCIAL

## 2022-06-06 DIAGNOSIS — Z12.11 SPECIAL SCREENING FOR MALIGNANT NEOPLASMS, COLON: Primary | ICD-10-CM

## 2022-06-06 NOTE — TELEPHONE ENCOUNTER
Please see my chart message and advise.  Thanks  Has lots going on.  Does she need an appointment?  Please advise, thanks.    1. What would you recommend for gas issue?  2. Would you prescribe Buspar for patient?  3. Anxiety and balance issues    FIT pended. Sign if appropriate.

## 2022-06-07 ENCOUNTER — TELEPHONE (OUTPATIENT)
Dept: INTERNAL MEDICINE | Facility: CLINIC | Age: 77
End: 2022-06-07
Payer: COMMERCIAL

## 2022-06-07 ASSESSMENT — ANXIETY QUESTIONNAIRES
2. NOT BEING ABLE TO STOP OR CONTROL WORRYING: NOT AT ALL
5. BEING SO RESTLESS THAT IT IS HARD TO SIT STILL: NOT AT ALL
6. BECOMING EASILY ANNOYED OR IRRITABLE: SEVERAL DAYS
4. TROUBLE RELAXING: NOT AT ALL
3. WORRYING TOO MUCH ABOUT DIFFERENT THINGS: NOT AT ALL
GAD7 TOTAL SCORE: 2
1. FEELING NERVOUS, ANXIOUS, OR ON EDGE: SEVERAL DAYS
7. FEELING AFRAID AS IF SOMETHING AWFUL MIGHT HAPPEN: NOT AT ALL

## 2022-06-07 ASSESSMENT — PATIENT HEALTH QUESTIONNAIRE - PHQ9: SUM OF ALL RESPONSES TO PHQ QUESTIONS 1-9: 1

## 2022-06-07 NOTE — TELEPHONE ENCOUNTER
Pt called to report she should be good to go. She has seen the neurologist, cardiologist, and PCP within last year.    Pt noted she would like to discuss medication to start for anxiety. Advised telephone visit.    Future Appointments   Date Time Provider Department Center   6/8/2022 12:00 PM Otilia Sánchez MD [facility] Joshua N, RN   [facility]

## 2022-06-07 NOTE — PROGRESS NOTES
Oximeter delivered 5/26 per [facility]. CC informed.    Jodie Talavera  Care Management Specialist   [facility]   [phone]

## 2022-06-08 ASSESSMENT — ANXIETY QUESTIONNAIRES
7. FEELING AFRAID AS IF SOMETHING AWFUL MIGHT HAPPEN: NOT AT ALL
8. IF YOU CHECKED OFF ANY PROBLEMS, HOW DIFFICULT HAVE THESE MADE IT FOR YOU TO DO YOUR WORK, TAKE CARE OF THINGS AT HOME, OR GET ALONG WITH OTHER PEOPLE?: SOMEWHAT DIFFICULT
GAD7 TOTAL SCORE: 2
GAD7 TOTAL SCORE: 2

## 2022-06-08 ASSESSMENT — PATIENT HEALTH QUESTIONNAIRE - PHQ9
10. IF YOU CHECKED OFF ANY PROBLEMS, HOW DIFFICULT HAVE THESE PROBLEMS MADE IT FOR YOU TO DO YOUR WORK, TAKE CARE OF THINGS AT HOME, OR GET ALONG WITH OTHER PEOPLE: SOMEWHAT DIFFICULT
SUM OF ALL RESPONSES TO PHQ QUESTIONS 1-9: 1

## 2022-06-15 ENCOUNTER — VIRTUAL VISIT (OUTPATIENT)
Dept: INTERNAL MEDICINE | Facility: CLINIC | Age: 77
End: 2022-06-15
Payer: COMMERCIAL

## 2022-06-15 DIAGNOSIS — Z78.0 ASYMPTOMATIC POSTMENOPAUSAL STATUS: ICD-10-CM

## 2022-06-15 DIAGNOSIS — J45.30 MILD PERSISTENT ASTHMA WITHOUT COMPLICATION: ICD-10-CM

## 2022-06-15 DIAGNOSIS — I10 ESSENTIAL HYPERTENSION: ICD-10-CM

## 2022-06-15 DIAGNOSIS — F13.20 BENZODIAZEPINE DEPENDENCE (H): ICD-10-CM

## 2022-06-15 DIAGNOSIS — R42 DIZZINESS: ICD-10-CM

## 2022-06-15 DIAGNOSIS — Z00.00 ENCOUNTER FOR MEDICARE ANNUAL WELLNESS EXAM: Primary | ICD-10-CM

## 2022-06-15 DIAGNOSIS — F32.5 MAJOR DEPRESSION IN REMISSION (H): ICD-10-CM

## 2022-06-15 DIAGNOSIS — F41.1 GAD (GENERALIZED ANXIETY DISORDER): ICD-10-CM

## 2022-06-15 DIAGNOSIS — G31.9 DEGENERATIVE DISEASE OF NERVOUS SYSTEM, UNSPECIFIED (H): ICD-10-CM

## 2022-06-15 PROCEDURE — 99213 OFFICE O/P EST LOW 20 MIN: CPT | Mod: 25 | Performed by: INTERNAL MEDICINE

## 2022-06-15 PROCEDURE — G0439 PPPS, SUBSEQ VISIT: HCPCS | Mod: 95 | Performed by: INTERNAL MEDICINE

## 2022-06-15 ASSESSMENT — ASTHMA QUESTIONNAIRES: ACT_TOTALSCORE: 22

## 2022-06-15 NOTE — NURSING NOTE
Patient calling back regarding the DME order.  Please see her mychart message from today.  States she will inform clinic where to fax to; she needs to check with her insurance first.

## 2022-06-15 NOTE — PATIENT INSTRUCTIONS
Patient Education   Personalized Prevention Plan  You are due for the preventive services outlined below.  Your care team is available to assist you in scheduling these services.  If you have already completed any of these items, please share that information with your care team to update in your medical record.  Health Maintenance Due   Topic Date Due     ANNUAL REVIEW OF HM ORDERS  Never done     Asthma Action Plan - yearly  Never done     Hepatitis B Vaccine (1 of 3 - Risk 3-dose series) Never done     Zoster (Shingles) Vaccine (2 of 3) 09/15/2009     Annual Wellness Visit  02/01/2022     FALL RISK ASSESSMENT  02/01/2022     COVID-19 Vaccine (4 - Booster for Pfizer series) 02/20/2022

## 2022-06-15 NOTE — PROGRESS NOTES
"Marlen is a 76 year old who is being evaluated via a billable video visit.      How would you like to obtain your AVS? MyChart  If the video visit is dropped, the invitation should be resent by: Text to cell phone: [phone]  Will anyone else be joining your video visit? No         Video Start Time: 10:02 AM      Subjective   Marlen is a 76 year old who presents for the following health issues     HPI     Annual Wellness Visit    Patient has been advised of split billing requirements and indicates understanding: Yes     Are you in the first 12 months of your Medicare Part B coverage?  No     Physical Health:    In general, how would you rate your overall physical health? good    Outside of work, how many days during the week do you exercise?none    Outside of work, approximately how many minutes a day do you exercise?not applicable    If you drink alcohol do you typically have >3 drinks per day or >7 drinks per week? No    Do you usually eat at least 4 servings of fruit and vegetables a day, include whole grains & fiber and avoid regularly eating high fat or \"junk\" foods? Yes    Do you have any problems taking medications regularly? No    Do you have any side effects from medications? none    Needs assistance for the following daily activities: transportation    Which of the following safety concerns are present in your home?  none identified     Hearing impairment: No    In the past 6 months, have you been bothered by leaking of urine? no    Ht 1.651 m (5' 5\")   Wt 55.3 kg (122 lb)   BMI 20.30 kg/m    Weight: Provided by patient  Height: Based on patient-provided information  BMI: Provided by patient  Blood Pressure: Provided by patient    Mental Health:    In general, how would you rate your overall mental or emotional health? good  PHQ-2 Score: (P) 1    Do you feel safe in your environment? Yes    Have you ever done Advance Care Planning? (For example, a Health Directive, POLST, or a discussion with a " medical provider or your loved ones about your wishes)? No, advance care planning information given to patient to review.  Advanced care planning was discussed at today's visit.            Patient has been advised of split billing requirements and indicates understanding: Yes      Today's PHQ-9         PHQ-9 Total Score: 1  PHQ-9 Q9 Thoughts of better off dead/self-harm past 2 weeks :   Not at all    How difficult have these problems made it for you to do your work, take care of things at home, or get along with other people: Somewhat difficult  Today's CELESTE-7 Score: 2    Do you feel safe in your environment? Yes     Fall risk         Cognitive Screenin) Repeat 3 items (Leader, Season, Table)    2) Clock draw: normal clock   3) 3 item recall: Recalls 3 objects  Results: 3 items recalled: COGNITIVE IMPAIRMENT LESS LIKELY    Mini-CogTM Copyright S Tre. Licensed by the author for use in [facility]; reprinted with permission ([email or web address]). All rights reserved.          Reviewed and updated as needed this visit by clinical staff   Tobacco  Allergies    Med Hx  Surg Hx  Fam Hx  Soc Hx          Reviewed and updated as needed this visit by Provider                Past Medical History:   Diagnosis Date     Anxiety      Asthma     adult-onset asthma diagnosed in      Cancer (H)      DCIS (ductal carcinoma in situ)     stage 0 status post left mastectomy in      Depressive disorder         Current Outpatient Medications   Medication Sig Dispense Refill     albuterol (PROAIR HFA/PROVENTIL HFA/VENTOLIN HFA) 108 (90 Base) MCG/ACT inhaler Inhale 2 puffs into the lungs every 6 hours 18 g 11     calcium carbonate-vitamin D (OYSTER SHELL CALCIUM/D) 500-200 MG-UNIT tablet Take 1 tablet by mouth       Cyanocobalamin 50 MCG TABS Take 50 mcg by mouth once a week        fluticasone-salmeterol (ADVAIR-HFA) 230-21 MCG/ACT inhaler Inhale 2 puffs into the lungs 2 times daily 36 g 3     Family History    Problem Relation Age of Onset     Cancer Sister         breast cancer     Glaucoma No family hx of      Macular Degeneration No family hx of      Asthma Sister      Breast Cancer Sister      Heart Failure Mother          at 97       Social History     Tobacco Use     Smoking status: Former Smoker     Packs/day: 1.00     Years: 6.00     Pack years: 6.00     Quit date:      Years since quittin.4     Smokeless tobacco: Never Used     Tobacco comment: smoked about 10 years   Substance Use Topics     Alcohol use: No        Also has multiple issues-patient was advised that we can discuss a few issues today and  advised to make another visit to discuss some sleep issues                Pt also follows up with neurologist for dizziness and requesting Shower chair.     Anxiety Follow up  and would like to start BuSpar    How are you doing with your depression since your last visit? No change    How are you doing with your anxiety since your last visit?  Worsened , pt c/o anxiety symptoms and would like to start Buspar.    Are you having other symptoms that might be associated with depression or anxiety? No    Have you had a significant life event? No     Do you have any concerns with your use of alcohol or other drugs? No    PHQ 2021   PHQ-9 Total Score 0 1 1   Q9: Thoughts of better off dead/self-harm past 2 weeks Not at all Not at all Not at all     CELESTE-7 SCORE 2021   Total Score 2 (minimal anxiety) - 2 (minimal anxiety)   Total Score 2 2 2        Asthma Follow-Up    Was ACT completed today?    Yes    ACT Total Scores 6/15/2022   ACT TOTAL SCORE (Goal Greater than or Equal to 20) 22   In the past 12 months, how many times did you visit the emergency room for your asthma without being admitted to the hospital? 0   In the past 12 months, how many times were you hospitalized overnight because of your asthma? 0          How many days per week do you miss taking your  asthma controller medication?  0    Have you had any Emergency Room Visits, Urgent Care Visits, or Hospital Admissions since your last office visit?  No      Current providers sharing in care for this patient include:   Patient Care Team:  Otilia Sánchez MD as PCP - General (Internal Medicine)  Clinic, [facility] as Karl Richard DO as MD (Neurology)  Hong Hansen MD as Assigned Neuroscience Provider  Roxana Payne MD as Assigned Infectious Disease Provider  Hong Hansen MD as Referring Physician (Neurology)  Janeth Cavanaugh AuD as Audiologist (Audiology)  Ludy Liriano NP as Nurse Practitioner  June Espana MD as MD (Ophthalmology)  June Espana MD as Assigned Surgical Provider  Katey Vasquez BSW as   Otilia Sánchez MD as Assigned PCP  Yulia Bruno MD as MD (Neurology)  Messi Moise MD as Assigned Heart and Vascular Provider    The following health maintenance items are reviewed in Epic and correct as of today:  Health Maintenance Due   Topic Date Due     ANNUAL REVIEW OF HM ORDERS  Never done     ASTHMA ACTION PLAN  Never done     HEPATITIS B IMMUNIZATION (1 of 3 - Risk 3-dose series) Never done     ZOSTER IMMUNIZATION (2 of 3) 09/15/2009     MEDICARE ANNUAL WELLNESS VISIT  02/01/2022     FALL RISK ASSESSMENT  02/01/2022     COVID-19 Vaccine (4 - Booster for Pfizer series) 02/20/2022         Review of Systems  CONSTITUTIONAL: NEGATIVE for fever, chills, change in weight  EYES: NEGATIVE for vision changes or irritation  ENT/MOUTH: NEGATIVE for ear, mouth and throat problems  RESP: NEGATIVE for significant cough or SOB  CV: NEGATIVE for chest pain, palpitations or peripheral edema  GI: NEGATIVE for nausea, abdominal pain, heartburn, or change in bowel habits  : NEGATIVE for frequency, dysuria, or hematuria  MUSCULOSKELETAL: NEGATIVE for significant arthralgias or myalgia  NEURO: dizziness ( sees  "neurologist )   ENDOCRINE: NEGATIVE for temperature intolerance, skin/hair changes  HEME: NEGATIVE for bleeding problems  PSYCHIATRIC: anxiety    OBJECTIVE:   /80 Ht 1.651 m (5' 5\")   Wt 55.3 kg (122 lb)   BMI 20.30 kg/m   Estimated body mass index is 20.3 kg/m  as calculated from the following:    Height as of this encounter: 1.651 m (5' 5\").    Weight as of this encounter: 55.3 kg (122 lb).  Physical Exam  GENERAL APPEARANCE: healthy, alert and no distress  EYES: Eyes grossly normal to inspection, PERRL and conjunctivae and sclerae normal  RESP:no audible cough or wheezing   PSYCH: mentation appears normal and affect normal/bright     ASSESSMENT / PLAN:      (Z00.00) Encounter for Medicare annual wellness exam  (primary encounter diagnosis)  Plan: all lab results reviewed. Up to date on mammogram, and colonoscopy, declined Covid 19 second booster.     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 Continue to monitor and contact if medication is needed.     (I10) Essential hypertension  Plan: not on any medications, home BP normal per pt .    (J45.30) Mild persistent asthma without complication  Plan: stable, follows up with pulmonary     (R42) Dizziness  Plan: follows up with neurologist.  Ordered Shower chair. Miscellaneous Order for DME - ONLY FOR DME             Patient has been advised of split billing requirements and indicates understanding: Yes    COUNSELING:  Reviewed preventive health counseling, as reflected in patient instructions       Regular exercise       Healthy diet/nutrition    Estimated body mass index is 20.3 kg/m  as calculated from the following:    Height as of this encounter: 1.651 m (5' 5\").    Weight " as of this encounter: 55.3 kg (122 lb).      She reports that she quit smoking about 42 years ago. She has a 6.00 pack-year smoking history. She has never used smokeless tobacco.     Reviewed patients plan of care and provided an AVS. The Basic Care Plan (routine screening as documented in Health Maintenance) for Morenita meets the Care Plan requirement. This Care Plan has been established and reviewed with the Patient.    Please abstract the following data from this visit with this patient into the appropriate field in Epic:  Colonoscopy done on this date: 2018 (approximately), by this group: [facility] , results were polyp .       Counseling Resources:  ATP IV Guidelines  Pooled Cohorts Equation Calculator  Breast Cancer Risk Calculator  Breast Cancer: Medication to Reduce Risk  FRAX Risk Assessment  ICSI Preventive Guidelines  Dietary Guidelines for Americans, 2010  USDA's MyPlate  ASA Prophylaxis  Lung CA Screening    Otilia Sánchez MD  [facility]    Identified Health Risks:            Video-Visit Details    Type of service:  Video Visit    Video End Time:10:28 AM    Originating Location (pt. Location): Home    Distant Location (provider location):  [facility]     Platform used for Video Visit: Dovetail

## 2022-06-16 ENCOUNTER — MYC MEDICAL ADVICE (OUTPATIENT)
Dept: INTERNAL MEDICINE | Facility: CLINIC | Age: 77
End: 2022-06-16
Payer: COMMERCIAL

## 2022-06-17 NOTE — TELEPHONE ENCOUNTER
Please see patient's mychart message below.  States she thought a Buspar prescription was to be sent to the pharmacy.    See telephone encounter 6/7/22.    Last virtual visit 6/15/22    GAD7 = 2 on 6/7/22    Please advise, thanks.  Routed to covering provider(s).

## 2022-06-17 NOTE — TELEPHONE ENCOUNTER
It appears this was discussed during 6/15/22 virtual visit but documentation is brief:    Patient complains of anxiety and would like to start BuSpar    How are you doing with your depression since your last visit? No change    How are you doing with your anxiety since your last visit?  Worsened    Per Epic patient was previously on Buspar ordered by another provider.  busPIRone (BUSPAR) 5 MG tablet (Discontinued) 63 tablet 0 9/29/2021 12/6/2021 --   Sig - Route: Take 1 tablet (5 mg) by mouth 3 times daily - Oral

## 2022-06-17 NOTE — TELEPHONE ENCOUNTER
I do not see documented in the chart that Dr. Sánchez discussed with the patient about the BuSpar.     Please schedule the patient with Dr. Sánchez to discuss about this medication

## 2022-06-20 NOTE — TELEPHONE ENCOUNTER
Routing to PCP.    Please advise on Buspar 5 mg Rx. Patient had a virtual visit 6/15/22. Please review [employer] messages.    Thank you.

## 2022-06-20 NOTE — TELEPHONE ENCOUNTER
We will wait for Dr. Sánchez to review this request    I see only a note from Patricia BENNETT.  I see no note from Dr. Sánchez

## 2022-06-26 VITALS
HEIGHT: 65 IN | BODY MASS INDEX: 20.33 KG/M2 | WEIGHT: 122 LBS | SYSTOLIC BLOOD PRESSURE: 128 MMHG | DIASTOLIC BLOOD PRESSURE: 80 MMHG

## 2022-07-08 ENCOUNTER — NURSE TRIAGE (OUTPATIENT)
Dept: NURSING | Facility: CLINIC | Age: 77
End: 2022-07-08

## 2022-07-08 NOTE — TELEPHONE ENCOUNTER
"Pt calls per conversation with her [facility] health insurance rep.  Ins rep tells pt \"no wellness visit shows up.\"  [employer] rep says \"Visit of 6/15/22 was not billed as a wellness visit.\"  \"Needs to be billed differently.\"    Per chart notes, visit of 6/5/22 was indeed indicated twice as a \"wellness visit.\"  Advised pt therefore to call her health ins customer service # on back of ins card and ask precisely what is needed for successful billing of this visit.  Pt agrees to plan.  Will f/u as needed.    Flor [facility] Nurse Advisor     Additional Information    General information question, no triage required and triager able to answer question    Protocols used: INFORMATION ONLY CALL-A-AH    ________________________      COVID 19 Nurse Triage Plan/Patient Instructions    Please be aware that novel coronavirus (COVID-19) may be circulating in the community. If you develop symptoms such as fever, cough, or SOB or if you have concerns about the presence of another infection including coronavirus (COVID-19), please contact your health care provider or visit [email or web address].     Disposition/Instructions    Additional COVID19 information to add for patients.   How can I protect others?  If you have symptoms (fever, cough, body aches or trouble breathing): Stay home and away from others (self-isolate) until:    At least 10 days have passed since your symptoms started, And     You ve had no fever--and no medicine that reduces fever--for 1 full day (24 hours), And      Your other symptoms have resolved (gotten better).     If you don t have symptoms, but a test showed that you have COVID-19 (you tested positive):    Stay home and away from others (self-isolate). Follow the tips under \"How do I self-isolate?\" below for 10 days (20 days if you have a weak immune system).    You don't need to be retested for COVID-19 before going back to school or work. As long as you're fever-free and feeling better, you can go " back to school, work and other activities after waiting the 10 or 20 days.     How do I self-isolate?    Stay in your own room, even for meals. Use your own bathroom if you can.     Stay away from others in your home. No hugging, kissing or shaking hands. No visitors.    Don t go to work, school or anywhere else.     Clean  high touch  surfaces often (doorknobs, counters, handles, etc.). Use a household cleaning spray or wipes. You ll find a full list on the EPA website:  www.epa.gov/pesticide-registration/list-n-disinfectants-use-against-sars-cov-2.    Cover your mouth and nose with a mask, tissue or washcloth to avoid spreading germs.    Wash your hands and face often. Use soap and water.    Caregivers in these groups are at risk for severe illness due to COVID-19:  o People 65 years and older  o People who live in a nursing home or long-term care facility  o People with chronic disease (lung, heart, cancer, diabetes, kidney, liver, immunologic)  o People who have a weakened immune system, including those who:  - Are in cancer treatment  - Take medicine that weakens the immune system, such as corticosteroids  - Had a bone marrow or organ transplant  - Have an immune deficiency  - Have poorly controlled HIV or AIDS  - Are obese (body mass index of 40 or higher)  - Smoke regularly    Caregivers should wear gloves while washing dishes, handling laundry and cleaning bedrooms and bathrooms.    Use caution when washing and drying laundry: Don t shake dirty laundry, and use the warmest water setting that you can.    For more tips, go to www.cdc.gov/coronavirus/2019-ncov/downloads/10Things.pdf.    How can I take care of myself?  1. Get lots of rest. Drink extra fluids (unless a doctor has told you not to).     2. Take Tylenol (acetaminophen) for fever or pain. If you have liver or kidney problems, ask your family doctor if it s okay to take Tylenol.     Adults can take either:     650 mg (two 325 mg pills) every 4 to 6  hours, or     1,000 mg (two 500 mg pills) every 8 hours as needed.     Note: Don t take more than 3,000 mg in one day.   Acetaminophen is found in many medicines (both prescribed and over-the-counter medicines). Read all labels to be sure you don t take too much.     For children, check the Tylenol bottle for the right dose. The dose is based on the child s age or weight.    3. If you have other health problems (like cancer, heart failure, an organ transplant or severe kidney disease): Call your specialty clinic if you don t feel better in the next 2 days.    4. Know when to call 911: Emergency warning signs include:    Trouble breathing or shortness of breath    Pain or pressure in the chest that doesn t go away    Feeling confused like you haven t felt before, or not being able to wake up    Bluish-colored lips or face    What are the symptoms of COVID-19?     The most common symptoms are cough, fever and trouble breathing.     Less common symptoms include body aches, chills, diarrhea (loose, watery poops), fatigue (feeling very tired), headache, runny nose, sore throat and loss of smell.    COVID-19 can cause severe coughing (bronchitis) and lung infection (pneumonia).    How does it spread?     The virus may spread when a person coughs or sneezes into the air. The virus can travel about 6 feet this way, and it can live on surfaces.      Common  (household disinfectants) will kill the virus.    Who is at risk?  Anyone can catch COVID-19 if they re around someone who has the virus.    How can others protect themselves?     Stay away from people who have COVID-19 (or symptoms of COVID-19).    Wash hands often with soap and water. Or, use hand  with at least 60% alcohol.    Avoid touching the eyes, nose or mouth.     Wear a face mask when you go out in public, when sick or when caring for a sick person.    Where can I get more information?     [facility]: About COVID-19:  [email or web address]    CDC: What to Do If You re Sick: www.cdc.gov/coronavirus/2019-ncov/about/steps-when-sick.html    CDC: Ending Home Isolation: www.cdc.gov/coronavirus/2019-ncov/hcp/disposition-in-home-patients.html     CDC: Caring for Someone: www.cdc.gov/coronavirus/2019-ncov/if-you-are-sick/care-for-someone.html     [facility]: Interim Guidance for Hospital Discharge to Home: www.[facility]/diseases/coronavirus/hcp/hospdischarge.pdf    [facility] clinical trials (COVID-19 research studies): clinicalaffairs.[facility]-clinical-trials     Below are the COVID-19 hotlines at the [place] Department of Health ([facility]). Interpreters are available.   o For health questions: Call 511-970-7932 or 1-488.496.7026 (7 a.m. to 7 p.m.)  o For questions about schools and childcare: Call 807-796-9236 or 1-558.633.8270 (7 a.m. to 7 p.m.)          Thank you for taking steps to prevent the spread of this virus.  o Limit your contact with others.  o Wear a simple mask to cover your cough.  o Wash your hands well and often.    Resources    [facility]: About COVID-19: [email or web address]    CDC: What to Do If You're Sick: www.cdc.gov/coronavirus/2019-ncov/about/steps-when-sick.html    CDC: Ending Home Isolation: www.cdc.gov/coronavirus/2019-ncov/hcp/disposition-in-home-patients.html     CDC: Caring for Someone: www.cdc.gov/coronavirus/2019-ncov/if-you-are-sick/care-for-someone.html     [facility]: Interim Guidance for Hospital Discharge to Home: www.[facility]/diseases/coronavirus/hcp/hospdischarge.pdf    [facility] clinical trials (COVID-19 research studies): clinicalaffairs.[facility]/umn-clinical-trials     Below are the COVID-19 hotlines at the [place] Department of Health ([facility]). Interpreters are available.   o For health questions: Call 740-841-2364 or 1-712.785.5057 (7 a.m. to 7 p.m.)  o For questions about schools and childcare: Call 685-024-3012 or 1-277.648.3793 (3  a.m. to 7 p.m.)

## 2022-07-11 ENCOUNTER — TELEPHONE (OUTPATIENT)
Dept: INTERNAL MEDICINE | Facility: CLINIC | Age: 77
End: 2022-07-11

## 2022-07-11 ENCOUNTER — MYC MEDICAL ADVICE (OUTPATIENT)
Dept: INTERNAL MEDICINE | Facility: CLINIC | Age: 77
End: 2022-07-11

## 2022-07-11 NOTE — TELEPHONE ENCOUNTER
"From: Morenita Moore   Sent: 7/11/2022   7:21 AM CDT   To: [facility]   Subject: Correct the Coco 15 Virtual Appointment? *      Topic: Non-Medical Question.      Hello Team,        Please know that my Virtual Wellness visit w Dr. Sánchez on Coco 15, 2022, was incorrectly submitted to my [facility] insurance provider as an \"Annual PHYSICAL in-person Visit\" and charged to [facility] as such.  This needs corrrecting because I am due for the real physical in August/September (this time period may co-incide with my pre-surgery visit at [facility] also.  I do not think [facility] will want to cover 2 physicals within 2-3 months, and this could cause delay issues for coverage then. I need this oral surgery and have already been waiting for some time for it.        Also, be aware that I tried to correct the error myself with no success, and \"Patient Relations\" is going to check into this.   Thanks for your help and attention....Be Well! :)         Marlen Moore   [phone]       "

## 2022-07-11 NOTE — TELEPHONE ENCOUNTER
Patient had virtual visit on 6/15/2022 for Medicare annual wellness exam and it has been coded as  wellness visit--virtual visit .  it will be coded the same as a regular wellness visit , and charged as a regular wellness visit.  Please advise patient to talk to billing/coding and please forward to them

## 2022-07-12 NOTE — TELEPHONE ENCOUNTER
Spoke with patient and she has spoken with the billing department and is getting her concern resolved.

## 2022-07-12 NOTE — TELEPHONE ENCOUNTER
Spoke to patient and reassured her we are aware of the coding error. She has spoken with billing to resolve her issue.

## 2022-07-15 NOTE — TELEPHONE ENCOUNTER
FUTURE VISIT INFORMATION      SURGERY INFORMATION:    Date: 22    Location: uu or    Surgeon:  Arvin Garza DDS    Anesthesia Type:  general    Procedure: SURGICAL EXTRACTION, TOOTH - Teeth #12, 14, 19    RECORDS REQUESTED FROM:        Primary Care Provider: Otilia Sánchez MD   - [facility]    Pertinent Medical History: SVT, Pericarditis, hypertension    Most recent EKG+ Tracin/10/21    Most recent ECHO: 2/15/22    Most recent PFT's: 3/18/22

## 2022-07-26 ENCOUNTER — ANESTHESIA EVENT (OUTPATIENT)
Dept: SURGERY | Facility: CLINIC | Age: 77
End: 2022-07-26
Payer: COMMERCIAL

## 2022-07-26 ENCOUNTER — PRE VISIT (OUTPATIENT)
Dept: SURGERY | Facility: CLINIC | Age: 77
End: 2022-07-26

## 2022-07-26 ENCOUNTER — MYC MEDICAL ADVICE (OUTPATIENT)
Dept: INTERNAL MEDICINE | Facility: CLINIC | Age: 77
End: 2022-07-26

## 2022-07-26 ENCOUNTER — OFFICE VISIT (OUTPATIENT)
Dept: SURGERY | Facility: CLINIC | Age: 77
End: 2022-07-26
Payer: COMMERCIAL

## 2022-07-26 VITALS
RESPIRATION RATE: 16 BRPM | SYSTOLIC BLOOD PRESSURE: 179 MMHG | DIASTOLIC BLOOD PRESSURE: 84 MMHG | HEIGHT: 65 IN | WEIGHT: 127.8 LBS | HEART RATE: 81 BPM | TEMPERATURE: 97.7 F | BODY MASS INDEX: 21.29 KG/M2 | OXYGEN SATURATION: 98 %

## 2022-07-26 DIAGNOSIS — Z01.818 PREOP EXAMINATION: Primary | ICD-10-CM

## 2022-07-26 DIAGNOSIS — K02.9 DENTAL CARIES: ICD-10-CM

## 2022-07-26 PROCEDURE — 99205 OFFICE O/P NEW HI 60 MIN: CPT | Performed by: CLINICAL NURSE SPECIALIST

## 2022-07-26 ASSESSMENT — LIFESTYLE VARIABLES: TOBACCO_USE: 1

## 2022-07-26 ASSESSMENT — ENCOUNTER SYMPTOMS: SEIZURES: 0

## 2022-07-26 ASSESSMENT — PAIN SCALES - GENERAL: PAINLEVEL: NO PAIN (0)

## 2022-07-26 NOTE — H&P (VIEW-ONLY)
"  Pre-Operative H & P     CC:  Preoperative exam to assess for increased cardiopulmonary risk while undergoing surgery and anesthesia.    Date of Encounter: 7/26/2022  Primary Care Physician:  Otilia Sánchez     Reason for visit:   Encounter Diagnoses   Name Primary?     Preop examination Yes     Dental caries        HPI  Morenita Moore is a 76 year old female who presents for pre-operative H & P in preparation for  Procedure Information     Case: [id number] Date/Time: 08/02/22 1335    Procedures:       SURGICAL EXTRACTION, TOOTH - Teeth #12, 14, 19 (Left Mouth)      possible ALVEOLOPLASTY (Left Jaw)    Anesthesia type: General    Diagnosis: Dental caries [K02.9]    Pre-op diagnosis: Dental caries [K02.9]    Location:  OR [address] [place] [address] OR    Providers: Arvin Garza DDS        History is obtained from the patient and chart review    Patient who has been recommended for dental extraction of teeth on left side. She has been evaluated by a dental provider who felt that she should be referred to the [facility] for procedure under anesthesia. She has been counseled for above procedures under general anesthesia.     Her history is otherwise complex with history of pericarditis in 2019, resolved and attributed to virus, HYPERTENSION, smoking, asthma, COVID 5/6/22, cervical torticollis, scoliosis, vertigo, tremor and anxiety.    She is followed by Cardiology and underwent preop cardiac evaluation on 2/9/22 with notes:    \"Assessment:    Palpitations and sensation of tachycardia-no documentation to review; a trained observer did find a regular heart rate 185 bpm one time and some previous evaluations would suggest SVT    History of pericarditis 2019-resolved    No known structural heart disease    Not a good candidate for beta-blocker therapy considering previous adverse reactions\"    Approved for above surgery. Plan for future evaluation of possible arrhythmia-obtain a 30-day patient activated " "monitor    Patient continues to follow with Neurology regarding her head tremor and balance issues, last seen 5/6/22. She has had vestibular therapy which helped to some degree. She has to walk with an assistive device.       She last saw her Pulmonologist on 3/18/22 who follows her for asthma and pulmonary nodules. From notes: \"PFTs today showed very mild reduction in FEV1 with normal post-BD FEV/FVC ratio. She does have reduction in mid flow rates which could be consistent with small airways disease.    Will try and optimize her inhaler regimen by adding a spacer to her advair.     We also reviewed her CT scan which showed overall stable ground glass nodules.\"        Hx of abnormal bleeding or anti-platelet use: Denies.     Menstrual history: No LMP recorded. Patient is postmenopausal.     Past Medical History  Past Medical History:   Diagnosis Date     Anxiety      Asthma 2012    adult-onset asthma diagnosed in 2012     Cancer (H)      DCIS (ductal carcinoma in situ) 2001    stage 0 status post left mastectomy in 2001     Depressive disorder      Torticollis      Tremor        Past Surgical History  Past Surgical History:   Procedure Laterality Date     BREAST SURGERY       CATARACT IOL, RT/LT       MASTECTOMY      DCIS     RETINAL REATTACHMENT         Prior to Admission Medications  Current Outpatient Medications   Medication Sig Dispense Refill     albuterol (PROAIR HFA/PROVENTIL HFA/VENTOLIN HFA) 108 (90 Base) MCG/ACT inhaler Inhale 2 puffs into the lungs every 6 hours (Patient taking differently: Inhale 2 puffs into the lungs every 6 hours as needed) 18 g 11     calcium carbonate-vitamin D 600-125 MG-UNIT TABS        Cyanocobalamin 50 MCG TABS Take 50 mcg by mouth once a week        fluticasone-salmeterol (ADVAIR-HFA) 230-21 MCG/ACT inhaler Inhale 2 puffs into the lungs 2 times daily 36 g 3     calcium carbonate-vitamin D (OYSTER SHELL CALCIUM/D) 500-200 MG-UNIT tablet Take 1 tablet by mouth (Patient not " taking: No sig reported)         Allergies  Allergies   Allergen Reactions     Levalbuterol Hydrochloride Shortness Of Breath     Cats Other (See Comments)     Itchy eyes     Dust Mites      Other reaction(s): Wheezing  Wheezing/shortness/breath/see (IRP )     Qvar [Beclomethasone]      Per patient- allergic to QVAR     Amitriptyline Palpitations     Escitalopram Anxiety       Social History  Social History     Socioeconomic History     Marital status: Single     Spouse name: Not on file     Number of children: Not on file     Years of education: Not on file     Highest education level: Not on file   Occupational History     Not on file   Tobacco Use     Smoking status: Former Smoker     Packs/day: 1.00     Years: 6.00     Pack years: 6.00     Types: Cigarettes     Quit date:      Years since quittin.5     Smokeless tobacco: Never Used     Tobacco comment: smoked about 10 years   Vaping Use     Vaping Use: Never used   Substance and Sexual Activity     Alcohol use: No     Drug use: Never     Sexual activity: Not Currently   Other Topics Concern     Parent/sibling w/ CABG, MI or angioplasty before 65F 55M? Not Asked   Social History Narrative     Not on file     Social Determinants of Health     Financial Resource Strain: Not on file   Food Insecurity: Not on file   Transportation Needs: Not on file   Physical Activity: Not on file   Stress: Not on file   Social Connections: Not on file   Intimate Partner Violence: Not on file   Housing Stability: Not on file       Family History  Family History   Problem Relation Age of Onset     Cancer Sister         breast cancer     Glaucoma No family hx of      Macular Degeneration No family hx of      Asthma Sister      Breast Cancer Sister      Heart Failure Mother          at 97       Review of Systems  The complete review of systems is negative other than noted in the HPI or here.   Anesthesia Evaluation   Pt has had prior anesthetic. Type: General.     "History of anesthetic complications  - PONV.  see Addendum at bottom of note re PONV.    ROS/MED HX  ENT/Pulmonary:     (+) tobacco use, Past use, Mild Persistent, asthma Last exacerbation: None recent,  Treatment: Inhaler prn and Inhaled steroids,   (-) recent URI   Neurologic: Comment: Dizziness followed by Neurologist  Cervical torticollis  Neck pain  Tremor   (-) no seizures and no CVA   Cardiovascular: Comment: History of rapid HR, SVT suggested but not documented.  Past history of pericarditis 2019 resolved.   Recent cardiac preop evaluation approved for surgery.       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   Hematologic:  - neg hematologic  ROS     Musculoskeletal:   (+) arthritis,     GI/Hepatic:     (+) GERD, Other,     Renal/Genitourinary: Comment: ROSS attributed to Ibuprofen    (+) renal disease, type: ARF, Pt does not require dialysis,     Endo:  - neg endo ROS     Psychiatric/Substance Use: Comment: Was given clonazepam to help with tremor but became dependent. Is not longer taking but had a difficult time getting off of it.    (+) psychiatric history anxiety and depression     Infectious Disease:  - neg infectious disease ROS     Malignancy:   (+) Malignancy, History of Breast.Breast CA Remission status post Surgery.        Other:  - neg other ROS          BP (!) 179/84 (BP Location: Right arm, Patient Position: Sitting, Cuff Size: Adult Regular)   Pulse 81   Temp 97.7  F (36.5  C) (Oral)   Resp 16   Ht 1.651 m (5' 5\")   Wt 58 kg (127 lb 12.8 oz)   SpO2 98%   Breastfeeding No   BMI 21.27 kg/m      Physical Exam   Constitutional: Awake, alert, cooperative, no apparent distress, and appears stated age.  Eyes: " Pupils equal, round and reactive to light, extra ocular muscles intact, sclera clear, conjunctiva normal.  HENT: Normocephalic, oral pharynx with moist mucus membranes. No goiter appreciated. Somewhat strained and limited mouth opening.   Head tilt to left.  Respiratory: Clear to auscultation bilaterally, no crackles or wheezing. No cough or obvious dyspnea.  Cardiovascular: Regular rate and rhythm, normal S1 and S2, and no murmur noted.  Carotids +2, no bruits. No edema. Palpable pulses to radial  DP and PT arteries.   GI: Normal bowel sounds, soft, non-distended, non-tender, no masses palpated.  Lymph/Hematologic: No cervical lymphadenopathy and no supraclavicular lymphadenopathy.  Genitourinary: Deferred.   Skin: Warm and dry.    Musculoskeletal: Limited ROM of neck with head tilt to left. There is no redness, warmth, or swelling of the joints. Scoliosis. Gross motor strength is weakened.  Neurologic: Awake, alert, oriented to name, place and time. Significant tremor involving head. Walking behind cart for balance.   Neuropsychiatric: Anxious, tearful intermittently, cooperative. Normal affect.     Prior Labs/Diagnostic Studies   All labs and imaging personally reviewed   Lab Results   Component Value Date    WBC 5.6 03/21/2022    WBC 6.5 12/14/2020     Lab Results   Component Value Date    RBC 4.23 03/21/2022    RBC 4.15 12/14/2020     Lab Results   Component Value Date    HGB 13.2 03/21/2022    HGB 13.1 12/14/2020     Lab Results   Component Value Date    HCT 39.6 03/21/2022    HCT 39.4 12/14/2020     Lab Results   Component Value Date    MCV 94 03/21/2022    MCV 95 12/14/2020     Lab Results   Component Value Date    MCH 31.2 03/21/2022    MCH 31.6 12/14/2020     Lab Results   Component Value Date    MCHC 33.3 03/21/2022    MCHC 33.2 12/14/2020     Lab Results   Component Value Date    RDW 13.7 03/21/2022    RDW 13.3 12/14/2020     Lab Results   Component Value Date     03/21/2022     12/14/2020      Last Comprehensive Metabolic Panel:  Sodium   Date Value Ref Range Status   10/20/2021 139 136 - 145 mmol/L Final   12/14/2020 141 133 - 144 mmol/L Final     Comment:     Testing performed on Geov[facility] at [facility] lab.       Potassium   Date Value Ref Range Status   10/20/2021 4.3 3.5 - 5.0 mmol/L Final   12/14/2020 4.4 3.4 - 5.3 mmol/L Final     Chloride   Date Value Ref Range Status   10/20/2021 106 98 - 107 mmol/L Final   12/14/2020 111 (H) 94 - 109 mmol/L Final     Carbon Dioxide   Date Value Ref Range Status   12/14/2020 30 20 - 32 mmol/L Final     Carbon Dioxide (CO2)   Date Value Ref Range Status   10/20/2021 24 22 - 31 mmol/L Final     Anion Gap   Date Value Ref Range Status   10/20/2021 9 5 - 18 mmol/L Final   12/14/2020 <1 (L) 3 - 14 mmol/L Final     Glucose   Date Value Ref Range Status   10/20/2021 97 70 - 125 mg/dL Final   12/14/2020 106 (H) 70 - 99 mg/dL Final     Urea Nitrogen   Date Value Ref Range Status   10/20/2021 10 8 - 28 mg/dL Final   12/14/2020 12 7 - 30 mg/dL Final     Creatinine   Date Value Ref Range Status   10/20/2021 0.63 0.60 - 1.10 mg/dL Final   12/14/2020 0.70 0.52 - 1.04 mg/dL Final     GFR Estimate   Date Value Ref Range Status   10/20/2021 89 >60 mL/min/1.73m2 Final     Comment:     As of July 11, 2021, eGFR is calculated by the CKD-EPI creatinine equation, without race adjustment. eGFR can be influenced by muscle mass, exercise, and diet. The reported eGFR is an estimation only and is only applicable if the renal function is stable.   02/01/2021 >60 >60 mL/min/1.73m2 Final   12/14/2020 84 >60 mL/min/[1.73_m2] Final     Comment:     Starting 12/18/2018, serum creatinine based estimated GFR (eGFR) will be   calculated using the Chronic Kidney Disease Epidemiology Collaboration   (CKD-EPI) equation.       Calcium   Date Value Ref Range Status   10/20/2021 9.4 8.5 - 10.5 mg/dL Final   12/14/2020 9.7 8.5 - 10.1 mg/dL Final     Lab Results   Component Value Date    AST 17  10/20/2021     Lab Results   Component Value Date    ALT 14 10/20/2021     No results found for: BILICONJ   Lab Results   Component Value Date    BILITOTAL 0.5 10/20/2021     Lab Results   Component Value Date    ALBUMIN 4.1 10/20/2021     Lab Results   Component Value Date    PROTTOTAL 7.2 10/20/2021      Lab Results   Component Value Date    ALKPHOS 81 10/20/2021     EK/10/21 Sinus rhythm    Echocardiogram  2/15/22    Interpretation Summary         Left ventricular size, wall motion and function are normal. The ejection    fraction is 60-65%.    Normal right ventricle size and systolic function.    Small pericardial effusion    There are no echocardiographic indications of cardiac tamponade.    No hemodynamically significant valvular abnormalities on 2D or color flow    imaging.      3/17/22 CT Chest                                                     IMPRESSION:    1. A few new or more conspicuous nodules are present as detailed    above, less than 6 mm.  Given history of left breast cancer, these are    technically indeterminant and short-term follow-up could be    considered.    2. Other nodules including a 5 mm left upper lobe groundglass nodule    and centrally lucent/cavitary nodule in the right lower lobe are    unchanged from previous exam.    3. Sequelae of prior granulomas disease.         2019 Carotid US        FINAL CONCLUSIONS   1.  Mild (less than 50%) stenosis of the right internal carotid artery.   2.  Mild (less than 50%) stenosis of the left internal carotid artery.   3.  Bilateral vertebral arteries are patent with antegrade flow.          PFT Latest Ref Rng & Units 3/18/2022   FVC L 2.40   FEV1 L 1.53   FVC% % 84   FEV1% % 70         The patient's records and results personally reviewed by this provider.     Outside records reviewed from: Care Everywhere    Assessment      Morenita Moore is a 76 year old female seen as a PAC referral for risk assessment and optimization for  "anesthesia.    Plan/Recommendations  Pt will be optimized for the proposed procedure.  See below for details on the assessment, risk, and preoperative recommendations    [facility] Anesthesia records reviewed by Dr. Novoa. Scanned in to Epic.    NEUROLOGY  - No history of TIA, CVA or seizure  - Chronic neck pain. Cervical torticollis. Dystonia.   - Head tremor.  -Post Op delirium risk factors:  Age and High co-morbid index    ENT  - Physical exam is concerning for complicated airway. Some difficulty opening mouth widely possibly related to torticollis/dystonia.   Mallampati: II  TM: > 3   Limited ability to move neck. Head tilt to left.     CARDIAC  Past history of pericarditis in 2019, attributed to virus, resolved.   History of palpations and sensation of tachycardia with no documentation.   Followed by cardiology and approved for above procedure.   Updated echocardiogram above. Very limited mobility due to dizziness.     - METS (Metabolic Equivalents)<4    RCRI: 0.4% risk of serious cardiac events      PULMONARY  Asthma, followed by Pulmonary. Will use Advair on DOS. May use/bring albuterol inhaler.   Lungs CTA.   Low risk for KARMEN    - Tobacco History      History   Smoking Status     Former Smoker     Packs/day: 1.00     Years: 6.00     Types: Cigarettes     Quit date: 1980   Smokeless Tobacco     Never Used     Comment: smoked about 10 years       GI: Occasional heartburn. TUMS prn.    PONV. Significant history involving prolonged time in hospital. Final decisions regarding prophylaxis by Anesthesia on DOS.    /RENAL  - Baseline Creatinine 0.63. History of ROSS attributed to Ibuprofen.     ENDOCRINE    - BMI: Estimated body mass index is 21.27 kg/m  as calculated from the following:    Height as of this encounter: 1.651 m (5' 5\").    Weight as of this encounter: 58 kg (127 lb 12.8 oz).  Healthy Weight (BMI 18.5-24.9)  - No history of Diabetes Mellitus    HEME  VTE Low Risk 0.26%            Total Score: 0      - No " history of abnormal bleeding or antiplatelet use.  - Denies history of blood transfusion.     MSK: Scoliosis    Oncology: History of DCIS left breast s/p left mastectomy 2001.    PSYCH  -Severe anxiety.   -Tearful today.   -May benefit from early sedation if appropriate for procedure.    Patient was discussed with Dr Novoa    The patient is optimized for their procedure. AVS with information on surgery time/arrival time, meds and NPO status given by nursing staff. No further diagnostic testing indicated.    --Addendum, Dr. Novoa: I spoke with the patient in clinic about her questions regarding anesthesia, as she does have anxiety about it. Of note, she has a significant PONV history with a mastectomy in 2001, which was very concerning to her. She had to stay an extra night in the hospital because of ongoing nausea/vomiting. She had her anesthetic record with her (PAC staff to scan into Epic), which shows use of sevoflurane + nitrous for the maintenance phase, and ondansetron 4mg + dexamethasone 10mg IV for PONV prophylaxis. I discussed that her anesthesiologist for surgery may consider use of less volatile anesthetic (consider TIVA/propofol infusion) and perhaps a 3rd prophylactic agent.  I also discussed with the patient that, depending on surgical needs, she may need nasal intubation. After answering her questions, she was comfortable with this possibility.  Final plan will be per day of surgery anesthesiologist.  ----  On the day of service:     Prep time: 21 minutes  Visit time: 19 minutes  Documentation time: 25 minutes  ------------------------------------------  Total time: 65 minutes      JANELLE Cerna CNS  Preoperative Assessment Center  [facility]  Clinic and Surgery Center  Phone: [phone]  Fax: [phone]

## 2022-07-26 NOTE — PATIENT INSTRUCTIONS
Preparing for Your Surgery      Name:  Morenita Moore   MRN:  [id number]   :  1945   Today's Date:  2022       Arriving for surgery:  Surgery date:  22  Arrival time:  11:35AM    Restrictions due to COVID 19       Effective 22 [employer] is implementing the following visitor policy:     1 person may accompany the patient through the Pre-Op process.      That same person may wait in the Surgery Waiting room, provided there is enough room to social distance           Visitors must wear a mask.      Visitors must not be ill.        Inpatients are allowed 2 visitors per day for the duration of their stay.      Visiting hours are 8 am to 8 pm.    [employer] parking is available for anyone with mobility limitations or disabilities.  ([place]  24 hours/ 7 days a week; [facility]  7 am- 3:30 pm, Mon- Fri)    Please come to:     [facility] [address]  [address] [place] [address]    - ? parking is available in front of the hospital      -   Parking is available in the Patient Visitor Ramp on [facility].     -   When entering the hospital you will be asked COVID screening questions, you will then be directed to Registration.  Registration will direct you to the 3rd floor Surgery waiting room.     -   Please ask if you need an escort or a wheelchair to the Surgery Waiting Room.  Preop number- [phone]     What can I eat or drink?  -  You may eat and drink normally for up to 8 hours before your surgery. (Until 22, 5:35AM)  -  You may have clear liquids until 2 hours before surgery. (Until 22, 11:35AM)    Examples of clear liquids:  Water  Clear broth  Juices (apple, white grape, white cranberry  and cider) without pulp  Noncarbonated, powder based beverages  (lemonade and Walker-Aid)  Sodas (Sprite, 7-Up, ginger ale and seltzer)  Coffee or tea (without milk or cream)  Gatorade    -  No Alcohol for at  least 24 hours before surgery     Which medicines can I take?    Hold Aspirin for 7 days before surgery.   Hold Multivitamins for 7 days before surgery.  Hold Supplements for 7 days before surgery.  Hold Ibuprofen (Advil, Motrin) for 1 day before surgery--unless otherwise directed by surgeon.  Hold Naproxen (Aleve) for 4 days before surgery.    -  DO NOT take these medications the day of surgery:    Calcium    -  PLEASE TAKE these medications the day of surgery:    Albuterol Inhaler as needed and bring the day of surgery    Advair inhaler    How do I prepare myself?  - Please take 2 showers before surgery using Scrubcare or Hibiclens soap.    Use this soap only from the neck to your toes.     Leave the soap on your skin for one minute--then rinse thoroughly.      You may use your own shampoo and conditioner; no other hair products.   - Please remove all jewelry and body piercings.  - No lotions, deodorants or fragrance.  - No makeup or fingernail polish.   - Bring your ID and insurance card.    -If you have a Deep Brain Stimulator, Spinal Cord Stimulator or any neuro stimulator device---you must bring the remote control to the hospital       ALL PATIENTS GOING HOME THE SAME DAY OF SURGERY ARE REQUIRED TO HAVE A RESPONSIBLE ADULT TO DRIVE AND BE IN ATTENDANCE WITH THEM FOR 24 HOURS FOLLOWING SURGERY.      Questions or Concerns:    - For any questions regarding the day of surgery or your hospital stay, please contact the Pre Admission Nursing Office at [phone].       - If you have health changes between today and your surgery please call your surgeon.       For questions after surgery please call your surgeons office.

## 2022-07-26 NOTE — H&P
"  Pre-Operative H & P     CC:  Preoperative exam to assess for increased cardiopulmonary risk while undergoing surgery and anesthesia.    Date of Encounter: 7/26/2022  Primary Care Physician:  Otilia Sánchez     Reason for visit:   Encounter Diagnoses   Name Primary?     Preop examination Yes     Dental caries        HPI  Morenita Moore is a 76 year old female who presents for pre-operative H & P in preparation for  Procedure Information     Case: [id number] Date/Time: 08/02/22 1335    Procedures:       SURGICAL EXTRACTION, TOOTH - Teeth #12, 14, 19 (Left Mouth)      possible ALVEOLOPLASTY (Left Jaw)    Anesthesia type: General    Diagnosis: Dental caries [K02.9]    Pre-op diagnosis: Dental caries [K02.9]    Location:  OR [address] [place] [address] OR    Providers: Arvin Garza DDS        History is obtained from the patient and chart review    Patient who has been recommended for dental extraction of teeth on left side. She has been evaluated by a dental provider who felt that she should be referred to the [facility] for procedure under anesthesia. She has been counseled for above procedures under general anesthesia.     Her history is otherwise complex with history of pericarditis in 2019, resolved and attributed to virus, HYPERTENSION, smoking, asthma, COVID 5/6/22, cervical torticollis, scoliosis, vertigo, tremor and anxiety.    She is followed by Cardiology and underwent preop cardiac evaluation on 2/9/22 with notes:    \"Assessment:    Palpitations and sensation of tachycardia-no documentation to review; a trained observer did find a regular heart rate 185 bpm one time and some previous evaluations would suggest SVT    History of pericarditis 2019-resolved    No known structural heart disease    Not a good candidate for beta-blocker therapy considering previous adverse reactions\"    Approved for above surgery. Plan for future evaluation of possible arrhythmia-obtain a 30-day patient activated " "monitor    Patient continues to follow with Neurology regarding her head tremor and balance issues, last seen 5/6/22. She has had vestibular therapy which helped to some degree. She has to walk with an assistive device.       She last saw her Pulmonologist on 3/18/22 who follows her for asthma and pulmonary nodules. From notes: \"PFTs today showed very mild reduction in FEV1 with normal post-BD FEV/FVC ratio. She does have reduction in mid flow rates which could be consistent with small airways disease.    Will try and optimize her inhaler regimen by adding a spacer to her advair.     We also reviewed her CT scan which showed overall stable ground glass nodules.\"        Hx of abnormal bleeding or anti-platelet use: Denies.     Menstrual history: No LMP recorded. Patient is postmenopausal.     Past Medical History  Past Medical History:   Diagnosis Date     Anxiety      Asthma 2012    adult-onset asthma diagnosed in 2012     Cancer (H)      DCIS (ductal carcinoma in situ) 2001    stage 0 status post left mastectomy in 2001     Depressive disorder      Torticollis      Tremor        Past Surgical History  Past Surgical History:   Procedure Laterality Date     BREAST SURGERY       CATARACT IOL, RT/LT       MASTECTOMY      DCIS     RETINAL REATTACHMENT         Prior to Admission Medications  Current Outpatient Medications   Medication Sig Dispense Refill     albuterol (PROAIR HFA/PROVENTIL HFA/VENTOLIN HFA) 108 (90 Base) MCG/ACT inhaler Inhale 2 puffs into the lungs every 6 hours (Patient taking differently: Inhale 2 puffs into the lungs every 6 hours as needed) 18 g 11     calcium carbonate-vitamin D 600-125 MG-UNIT TABS        Cyanocobalamin 50 MCG TABS Take 50 mcg by mouth once a week        fluticasone-salmeterol (ADVAIR-HFA) 230-21 MCG/ACT inhaler Inhale 2 puffs into the lungs 2 times daily 36 g 3     calcium carbonate-vitamin D (OYSTER SHELL CALCIUM/D) 500-200 MG-UNIT tablet Take 1 tablet by mouth (Patient not " taking: No sig reported)         Allergies  Allergies   Allergen Reactions     Levalbuterol Hydrochloride Shortness Of Breath     Cats Other (See Comments)     Itchy eyes     Dust Mites      Other reaction(s): Wheezing  Wheezing/shortness/breath/see (IRP )     Qvar [Beclomethasone]      Per patient- allergic to QVAR     Amitriptyline Palpitations     Escitalopram Anxiety       Social History  Social History     Socioeconomic History     Marital status: Single     Spouse name: Not on file     Number of children: Not on file     Years of education: Not on file     Highest education level: Not on file   Occupational History     Not on file   Tobacco Use     Smoking status: Former Smoker     Packs/day: 1.00     Years: 6.00     Pack years: 6.00     Types: Cigarettes     Quit date:      Years since quittin.5     Smokeless tobacco: Never Used     Tobacco comment: smoked about 10 years   Vaping Use     Vaping Use: Never used   Substance and Sexual Activity     Alcohol use: No     Drug use: Never     Sexual activity: Not Currently   Other Topics Concern     Parent/sibling w/ CABG, MI or angioplasty before 65F 55M? Not Asked   Social History Narrative     Not on file     Social Determinants of Health     Financial Resource Strain: Not on file   Food Insecurity: Not on file   Transportation Needs: Not on file   Physical Activity: Not on file   Stress: Not on file   Social Connections: Not on file   Intimate Partner Violence: Not on file   Housing Stability: Not on file       Family History  Family History   Problem Relation Age of Onset     Cancer Sister         breast cancer     Glaucoma No family hx of      Macular Degeneration No family hx of      Asthma Sister      Breast Cancer Sister      Heart Failure Mother          at 97       Review of Systems  The complete review of systems is negative other than noted in the HPI or here.   Anesthesia Evaluation   Pt has had prior anesthetic. Type: General.     "History of anesthetic complications  - PONV.  see Addendum at bottom of note re PONV.    ROS/MED HX  ENT/Pulmonary:     (+) tobacco use, Past use, Mild Persistent, asthma Last exacerbation: None recent,  Treatment: Inhaler prn and Inhaled steroids,   (-) recent URI   Neurologic: Comment: Dizziness followed by Neurologist  Cervical torticollis  Neck pain  Tremor   (-) no seizures and no CVA   Cardiovascular: Comment: History of rapid HR, SVT suggested but not documented.  Past history of pericarditis 2019 resolved.   Recent cardiac preop evaluation approved for surgery.       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   Hematologic:  - neg hematologic  ROS     Musculoskeletal:   (+) arthritis,     GI/Hepatic:     (+) GERD, Other,     Renal/Genitourinary: Comment: ROSS attributed to Ibuprofen    (+) renal disease, type: ARF, Pt does not require dialysis,     Endo:  - neg endo ROS     Psychiatric/Substance Use: Comment: Was given clonazepam to help with tremor but became dependent. Is not longer taking but had a difficult time getting off of it.    (+) psychiatric history anxiety and depression     Infectious Disease:  - neg infectious disease ROS     Malignancy:   (+) Malignancy, History of Breast.Breast CA Remission status post Surgery.        Other:  - neg other ROS          BP (!) 179/84 (BP Location: Right arm, Patient Position: Sitting, Cuff Size: Adult Regular)   Pulse 81   Temp 97.7  F (36.5  C) (Oral)   Resp 16   Ht 1.651 m (5' 5\")   Wt 58 kg (127 lb 12.8 oz)   SpO2 98%   Breastfeeding No   BMI 21.27 kg/m      Physical Exam   Constitutional: Awake, alert, cooperative, no apparent distress, and appears stated age.  Eyes: " Pupils equal, round and reactive to light, extra ocular muscles intact, sclera clear, conjunctiva normal.  HENT: Normocephalic, oral pharynx with moist mucus membranes. No goiter appreciated. Somewhat strained and limited mouth opening.   Head tilt to left.  Respiratory: Clear to auscultation bilaterally, no crackles or wheezing. No cough or obvious dyspnea.  Cardiovascular: Regular rate and rhythm, normal S1 and S2, and no murmur noted.  Carotids +2, no bruits. No edema. Palpable pulses to radial  DP and PT arteries.   GI: Normal bowel sounds, soft, non-distended, non-tender, no masses palpated.  Lymph/Hematologic: No cervical lymphadenopathy and no supraclavicular lymphadenopathy.  Genitourinary: Deferred.   Skin: Warm and dry.    Musculoskeletal: Limited ROM of neck with head tilt to left. There is no redness, warmth, or swelling of the joints. Scoliosis. Gross motor strength is weakened.  Neurologic: Awake, alert, oriented to name, place and time. Significant tremor involving head. Walking behind cart for balance.   Neuropsychiatric: Anxious, tearful intermittently, cooperative. Normal affect.     Prior Labs/Diagnostic Studies   All labs and imaging personally reviewed   Lab Results   Component Value Date    WBC 5.6 03/21/2022    WBC 6.5 12/14/2020     Lab Results   Component Value Date    RBC 4.23 03/21/2022    RBC 4.15 12/14/2020     Lab Results   Component Value Date    HGB 13.2 03/21/2022    HGB 13.1 12/14/2020     Lab Results   Component Value Date    HCT 39.6 03/21/2022    HCT 39.4 12/14/2020     Lab Results   Component Value Date    MCV 94 03/21/2022    MCV 95 12/14/2020     Lab Results   Component Value Date    MCH 31.2 03/21/2022    MCH 31.6 12/14/2020     Lab Results   Component Value Date    MCHC 33.3 03/21/2022    MCHC 33.2 12/14/2020     Lab Results   Component Value Date    RDW 13.7 03/21/2022    RDW 13.3 12/14/2020     Lab Results   Component Value Date     03/21/2022     12/14/2020      Last Comprehensive Metabolic Panel:  Sodium   Date Value Ref Range Status   10/20/2021 139 136 - 145 mmol/L Final   12/14/2020 141 133 - 144 mmol/L Final     Comment:     Testing performed on Geovanny at [facility] lab.       Potassium   Date Value Ref Range Status   10/20/2021 4.3 3.5 - 5.0 mmol/L Final   12/14/2020 4.4 3.4 - 5.3 mmol/L Final     Chloride   Date Value Ref Range Status   10/20/2021 106 98 - 107 mmol/L Final   12/14/2020 111 (H) 94 - 109 mmol/L Final     Carbon Dioxide   Date Value Ref Range Status   12/14/2020 30 20 - 32 mmol/L Final     Carbon Dioxide (CO2)   Date Value Ref Range Status   10/20/2021 24 22 - 31 mmol/L Final     Anion Gap   Date Value Ref Range Status   10/20/2021 9 5 - 18 mmol/L Final   12/14/2020 <1 (L) 3 - 14 mmol/L Final     Glucose   Date Value Ref Range Status   10/20/2021 97 70 - 125 mg/dL Final   12/14/2020 106 (H) 70 - 99 mg/dL Final     Urea Nitrogen   Date Value Ref Range Status   10/20/2021 10 8 - 28 mg/dL Final   12/14/2020 12 7 - 30 mg/dL Final     Creatinine   Date Value Ref Range Status   10/20/2021 0.63 0.60 - 1.10 mg/dL Final   12/14/2020 0.70 0.52 - 1.04 mg/dL Final     GFR Estimate   Date Value Ref Range Status   10/20/2021 89 >60 mL/min/1.73m2 Final     Comment:     As of July 11, 2021, eGFR is calculated by the CKD-EPI creatinine equation, without race adjustment. eGFR can be influenced by muscle mass, exercise, and diet. The reported eGFR is an estimation only and is only applicable if the renal function is stable.   02/01/2021 >60 >60 mL/min/1.73m2 Final   12/14/2020 84 >60 mL/min/[1.73_m2] Final     Comment:     Starting 12/18/2018, serum creatinine based estimated GFR (eGFR) will be   calculated using the Chronic Kidney Disease Epidemiology Collaboration   (CKD-EPI) equation.       Calcium   Date Value Ref Range Status   10/20/2021 9.4 8.5 - 10.5 mg/dL Final   12/14/2020 9.7 8.5 - 10.1 mg/dL Final     Lab Results   Component Value Date    AST 17  10/20/2021     Lab Results   Component Value Date    ALT 14 10/20/2021     No results found for: BILICONJ   Lab Results   Component Value Date    BILITOTAL 0.5 10/20/2021     Lab Results   Component Value Date    ALBUMIN 4.1 10/20/2021     Lab Results   Component Value Date    PROTTOTAL 7.2 10/20/2021      Lab Results   Component Value Date    ALKPHOS 81 10/20/2021     EK/10/21 Sinus rhythm    Echocardiogram  2/15/22    Interpretation Summary         Left ventricular size, wall motion and function are normal. The ejection    fraction is 60-65%.    Normal right ventricle size and systolic function.    Small pericardial effusion    There are no echocardiographic indications of cardiac tamponade.    No hemodynamically significant valvular abnormalities on 2D or color flow    imaging.      3/17/22 CT Chest                                                     IMPRESSION:    1. A few new or more conspicuous nodules are present as detailed    above, less than 6 mm.  Given history of left breast cancer, these are    technically indeterminant and short-term follow-up could be    considered.    2. Other nodules including a 5 mm left upper lobe groundglass nodule    and centrally lucent/cavitary nodule in the right lower lobe are    unchanged from previous exam.    3. Sequelae of prior granulomas disease.         2019 Carotid US        FINAL CONCLUSIONS   1.  Mild (less than 50%) stenosis of the right internal carotid artery.   2.  Mild (less than 50%) stenosis of the left internal carotid artery.   3.  Bilateral vertebral arteries are patent with antegrade flow.          PFT Latest Ref Rng & Units 3/18/2022   FVC L 2.40   FEV1 L 1.53   FVC% % 84   FEV1% % 70         The patient's records and results personally reviewed by this provider.     Outside records reviewed from: Care Everywhere    Assessment      Morenita Moore is a 76 year old female seen as a PAC referral for risk assessment and optimization for  "anesthesia.    Plan/Recommendations  Pt will be optimized for the proposed procedure.  See below for details on the assessment, risk, and preoperative recommendations    [facility] Anesthesia records reviewed by Dr. Novoa. Scanned in to Epic.    NEUROLOGY  - No history of TIA, CVA or seizure  - Chronic neck pain. Cervical torticollis. Dystonia.   - Head tremor.  -Post Op delirium risk factors:  Age and High co-morbid index    ENT  - Physical exam is concerning for complicated airway. Some difficulty opening mouth widely possibly related to torticollis/dystonia.   Mallampati: II  TM: > 3   Limited ability to move neck. Head tilt to left.     CARDIAC  Past history of pericarditis in 2019, attributed to virus, resolved.   History of palpations and sensation of tachycardia with no documentation.   Followed by cardiology and approved for above procedure.   Updated echocardiogram above. Very limited mobility due to dizziness.     - METS (Metabolic Equivalents)<4    RCRI: 0.4% risk of serious cardiac events      PULMONARY  Asthma, followed by Pulmonary. Will use Advair on DOS. May use/bring albuterol inhaler.   Lungs CTA.   Low risk for KARMEN    - Tobacco History      History   Smoking Status     Former Smoker     Packs/day: 1.00     Years: 6.00     Types: Cigarettes     Quit date: 1980   Smokeless Tobacco     Never Used     Comment: smoked about 10 years       GI: Occasional heartburn. TUMS prn.    PONV. Significant history involving prolonged time in hospital. Final decisions regarding prophylaxis by Anesthesia on DOS.    /RENAL  - Baseline Creatinine 0.63. History of ROSS attributed to Ibuprofen.     ENDOCRINE    - BMI: Estimated body mass index is 21.27 kg/m  as calculated from the following:    Height as of this encounter: 1.651 m (5' 5\").    Weight as of this encounter: 58 kg (127 lb 12.8 oz).  Healthy Weight (BMI 18.5-24.9)  - No history of Diabetes Mellitus    HEME  VTE Low Risk 0.26%            Total Score: 0      - No " history of abnormal bleeding or antiplatelet use.  - Denies history of blood transfusion.     MSK: Scoliosis    Oncology: History of DCIS left breast s/p left mastectomy 2001.    PSYCH  -Severe anxiety.   -Tearful today.   -May benefit from early sedation if appropriate for procedure.    Patient was discussed with Dr Novoa    The patient is optimized for their procedure. AVS with information on surgery time/arrival time, meds and NPO status given by nursing staff. No further diagnostic testing indicated.    --Addendum, Dr. Novoa: I spoke with the patient in clinic about her questions regarding anesthesia, as she does have anxiety about it. Of note, she has a significant PONV history with a mastectomy in 2001, which was very concerning to her. She had to stay an extra night in the hospital because of ongoing nausea/vomiting. She had her anesthetic record with her (PAC staff to scan into Epic), which shows use of sevoflurane + nitrous for the maintenance phase, and ondansetron 4mg + dexamethasone 10mg IV for PONV prophylaxis. I discussed that her anesthesiologist for surgery may consider use of less volatile anesthetic (consider TIVA/propofol infusion) and perhaps a 3rd prophylactic agent.  I also discussed with the patient that, depending on surgical needs, she may need nasal intubation. After answering her questions, she was comfortable with this possibility.  Final plan will be per day of surgery anesthesiologist.  ----  On the day of service:     Prep time: 21 minutes  Visit time: 19 minutes  Documentation time: 25 minutes  ------------------------------------------  Total time: 65 minutes      JANELLE Cerna CNS  Preoperative Assessment Center  [facility]  Cl[facility] and [facility]  Phone: [phone]  Fax: [phone]

## 2022-07-27 NOTE — TELEPHONE ENCOUNTER
5/27/22- [facility] DME 3x with 3 refills.    Call to [facility]. Patient received Bras 7/19/22  Rx good for a year. Patient is able to receive the bras without difficulty. MyChart sent to patient.

## 2022-08-01 ASSESSMENT — ENCOUNTER SYMPTOMS: SEIZURES: 0

## 2022-08-01 ASSESSMENT — LIFESTYLE VARIABLES: TOBACCO_USE: 1

## 2022-08-01 NOTE — PRE-PROCEDURE
"ORAL & MAXILLOFACIAL SURGERY   PREOPERATIVE  NOTE:  Morenita Moore,  MRN: [id number],  : 1945      Date of Procedure:   2022    Pre-Operative Diagnosis:  1. Caries    Planned Procedure:  1. Extraction teeth #12, 14, 19    Planned Anesthesia: General via oral endotracheal tube    Attending Surgeon:  Dr. Arvin Garza    Resident Surgeon:  Dr. Oksana Ignacio    Consent:  Written and verbal consent obtained from patient previously. Discussion included  risks/complications including but not limited post-operative pain, swelling, bleeding,  infection, hardware failure, nonunion, malunion, dehiscence of wounds,  temporary/permanent paresthesia/anesthesia of CN V3 mental nerve distribution/CN V2  maxillary nerve distribution, damage to CN VII (motor to muscles of facial expression)  with temporary or permanent paralysis, scar formation, malocclusion, failure to resolve  chief complaint, or need for additional procedures (occlusal equilibration/endodontic  therapy). Patient agrees to procedure as written, signed consent in chart.      Benitez Clayton DDS  Oral & Maxillofacial Surgery, PGY-1    ______________________________________________________________________________________________    ORAL & MAXILLOFACIAL SURGERY CONSULTATION NOTE    CC: \"I was supposed to get these teeth taken out by another oral surgery but he call me up and said that he couldn't give me the right medications for the sedation so he sent me here.\"    HISTORY OF PRESENT ILLNESS:   77 y/o female presents for evaluation #12, 14 and 19 for extraction upon referral from [facility]. Reports history of intermittent LL and LR pain over the last 6 weeks. Denies pain with chewing, swelling, drainage, fever, chills or oral bleeding.      PMH:  HTN  SVT - 2021 while at PCP  pericarditis  -   asthma - uses inhaler 2x/day  ROSS -   osteopenia   past history of GERD  anxiety   depression   PPPD  benzo toxicity "   vestibular injury   tremors     PAST SURGICAL HISTORY:  Mastectomy - preemptive in 2001  dental extractions with sedation in -2011  cataract surgery - 2011 and 2015     MEDICATIONS:  Advair  Albuterol  Vit B12  Vit D3  Calcium     ALLERGIES:  NKDA  Directed to reduce NSAID use due to past ROSS     SOCIAL HISTORY:  Denies current use  Former smoker x10 years     REVIEW OF SYSTEMS  ROS reviewed and negative aside from listed in HPI    PHYSICAL EXAM:  GEN: WD/WN, NAD with tremor   HEENT: NC/AT, EOMI, PERRL, no facial edema, induration or erythema, no abnormal skin lesions, inferior border of mandible is palpable bilaterally, neck soft with no palpable lymph nodes, RADHA >45mm, OP clear, uvula midline, fair oral hygiene, no vestibular edema, FOM ND/NT, no erythema/ulcerations/pigmentations/exophytic lesions, FCR of #12, 14 and 18 with recurrent decay, gingival recession and periodontal bone loss  Neuro: AAOx4, CN V and CN VII intact    Mallampati II and normal neck mobility observed.    IMAGING:  FMX - numerous restorations and full coverage crowns. endo treated #14 with PARL, recurrent decay on #12, recurrent decay on #19 with PARL     ASSESSMENT:   75 y/o female presents with carious #12, 14 and 19 indicated for extraction. Discussed findings with pt. Explained that she is not a good candidate for in office sedation given her extensive medical history and benzo toxicity. Pt expressed understanding but explained that she is not able to tolerate dental extractions without sedation and would like to receive care in the OR. Explained that there is a long wait list for extractions in the OR and she may have to wait 6-12 months before receiving treatment. Pt expressed understanding and would like to wait for treatment in the OR.     PLAN:  Ext #12, 14 and 19    Risks and benefits explained to pt including, but not limited to;   Pain, bleeding, swelling, infections, remaining tooth roots, oroantral communication, nerve injury  (permanent vs temporary).  Questions were invited and answered.  Anesthesia modalities covered, patient elects to have procedure done under GA in OR    NV: Ext #12, 14 and 19 with GA in OR    Bernarda Riley DDS  OMFS Intern/Dental Fellow    Findings, assessment, and plan discussed with Dr. Arvin Garza

## 2022-08-01 NOTE — ANESTHESIA PREPROCEDURE EVALUATION
Anesthesia Pre-Procedure Evaluation    Patient: Morenita Moore   MRN: [id number] : 1945        Procedure : Procedure(s):  SURGICAL EXTRACTION, TOOTH - Teeth #12, 14, 19  possible ALVEOLOPLASTY          Past Medical History:   Diagnosis Date     Anxiety      Asthma     adult-onset asthma diagnosed in      Cancer (H)      DCIS (ductal carcinoma in situ)     stage 0 status post left mastectomy in      Depressive disorder      Torticollis      Tremor       Past Surgical History:   Procedure Laterality Date     BREAST SURGERY       CATARACT IOL, RT/LT       MASTECTOMY      DCIS     RETINAL REATTACHMENT        Allergies   Allergen Reactions     Levalbuterol Hydrochloride Shortness Of Breath     Cats Other (See Comments)     Itchy eyes     Dust Mites      Other reaction(s): Wheezing  Wheezing/shortness/breath/see (IRP )     Qvar [Beclomethasone]      Per patient- allergic to QVAR     Amitriptyline Palpitations     Escitalopram Anxiety      Social History     Tobacco Use     Smoking status: Former Smoker     Packs/day: 1.00     Years: 6.00     Pack years: 6.00     Types: Cigarettes     Quit date:      Years since quittin.6     Smokeless tobacco: Never Used     Tobacco comment: smoked about 10 years   Substance Use Topics     Alcohol use: No      Wt Readings from Last 1 Encounters:   22 58 kg (127 lb 12.8 oz)        Anesthesia Evaluation   Pt has had prior anesthetic. Type: General.    History of anesthetic complications  - PONV.  see Addendum at bottom of note re PONV.    ROS/MED HX  ENT/Pulmonary:     (+) tobacco use, Past use, Mild Persistent, asthma Last exacerbation: None recent,  Treatment: Inhaler prn and Inhaled steroids,   (-) recent URI   Neurologic: Comment: Dizziness followed by Neurologist  Cervical torticollis  Neck pain  Tremor   (-) no seizures and no CVA   Cardiovascular: Comment: History of rapid HR, SVT suggested but not documented.  Past history of pericarditis  2019 resolved.   Recent cardiac preop evaluation approved for surgery.       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   Hematologic:  - neg hematologic  ROS     Musculoskeletal:   (+) arthritis,     GI/Hepatic:     (+) GERD, Other,     Renal/Genitourinary: Comment: ROSS attributed to Ibuprofen    (+) renal disease, type: ARF, Pt does not require dialysis,     Endo:  - neg endo ROS     Psychiatric/Substance Use: Comment: Was given clonazepam to help with tremor but became dependent. Is not longer taking but had a difficult time getting off of it.    (+) psychiatric history anxiety and depression     Infectious Disease:  - neg infectious disease ROS     Malignancy:   (+) Malignancy, History of Breast.Breast CA Remission status post Surgery.        Other:  - neg other ROS          Physical Exam    Airway        Mallampati: II   TM distance: > 3 FB   Neck ROM: limited   Mouth opening: > 3 cm    Respiratory Devices and Support         Dental     Comment: Generally poor dentition with multiple caries.        Cardiovascular             Pulmonary                   OUTSIDE LABS:  CBC:   Lab Results   Component Value Date    WBC 5.6 03/21/2022    WBC 5.2 02/01/2021    HGB 13.2 03/21/2022    HGB 13.4 03/18/2022    HCT 39.6 03/21/2022    HCT 41.1 02/01/2021     03/21/2022     02/01/2021     BMP:   Lab Results   Component Value Date     10/20/2021     02/01/2021    POTASSIUM 4.3 10/20/2021    POTASSIUM 4.0 02/01/2021    CHLORIDE 106 10/20/2021    CHLORIDE 106 02/01/2021    CO2 24 10/20/2021    CO2 25 02/01/2021    BUN 10 10/20/2021    BUN 10 02/01/2021    CR 0.63 10/20/2021    CR 0.67 02/01/2021    GLC 97 10/20/2021    GLC  89 02/01/2021     COAGS:   Lab Results   Component Value Date    PTT 33 05/01/2020    INR 0.99 05/01/2020     POC: No results found for: BGM, HCG, HCGS  HEPATIC:   Lab Results   Component Value Date    ALBUMIN 4.1 10/20/2021    PROTTOTAL 7.2 10/20/2021    ALT 14 10/20/2021    AST 17 10/20/2021    ALKPHOS 81 10/20/2021    BILITOTAL 0.5 10/20/2021     OTHER:   Lab Results   Component Value Date    LACT 0.7 05/01/2020    MARIBEL 9.4 10/20/2021    MAG 2.1 05/01/2020    TSH 1.79 06/15/2020    CRP <0.1 10/20/2021    SED 11 06/15/2020       Anesthesia Plan    ASA Status:  3      Anesthesia Type: MAC.   Induction: Intravenous.   Maintenance: N/A.        Consents    Anesthesia Plan(s) and associated risks, benefits, and realistic alternatives discussed. Questions answered and patient/representative(s) expressed understanding.    - Discussed:     - Discussed with:  Patient      - Extended Intubation/Ventilatory Support Discussed: No.      - Patient is DNR/DNI Status: No    Use of blood products discussed: No .     Postoperative Care    Pain management: IV analgesics.   PONV prophylaxis: Ondansetron (or other 5HT-3)     Comments:    Other Comments: Patient prefers doing this with MAC and not general anesthesia.  Discussed with Patient and Dr. Garza and will proceed with MAC.            Jonathan Tellez MD

## 2022-08-01 NOTE — PRE-PROCEDURE
"ORAL & MAXILLOFACIAL SURGERY   PREOPERATIVE  NOTE:  Morenita Moore,  MRN: [id number],  : 1945      Date of Procedure:   2022    Pre-Operative Diagnosis:  1. Caries    Planned Procedure:  1. Extraction teeth #12, 14, 19    Planned Anesthesia: General via oral endotracheal tube    Attending Surgeon:  Dr. Arvin Garza    Resident Surgeon:  Dr. Oksana Ignacio    Consent:  Written and verbal consent obtained from patient previously. Discussion included  risks/complications including but not limited post-operative pain, swelling, bleeding,  infection, hardware failure, nonunion, malunion, dehiscence of wounds,  temporary/permanent paresthesia/anesthesia of CN V3 mental nerve distribution/CN V2  maxillary nerve distribution, damage to CN VII (motor to muscles of facial expression)  with temporary or permanent paralysis, scar formation, malocclusion, failure to resolve  chief complaint, or need for additional procedures (occlusal equilibration/endodontic  therapy). Patient agrees to procedure as written, signed consent in chart.      Benitez Clayton DDS  Oral & Maxillofacial Surgery, PGY-1    ______________________________________________________________________________________________    ORAL & MAXILLOFACIAL SURGERY CONSULTATION NOTE    CC: \"I was supposed to get these teeth taken out by another oral surgery but he call me up and said that he couldn't give me the right medications for the sedation so he sent me here.\"    HISTORY OF PRESENT ILLNESS:   77 y/o female presents for evaluation #12, 14 and 19 for extraction upon referral from [facility]. Reports history of intermittent LL and LR pain over the last 6 weeks. Denies pain with chewing, swelling, drainage, fever, chills or oral bleeding.      PMH:  HTN  SVT - 2021 while at PCP  pericarditis  -   asthma - uses inhaler 2x/day  ROSS -   osteopenia   past history of GERD  anxiety   depression   PPPD  benzo toxicity "   vestibular injury   tremors     PAST SURGICAL HISTORY:  Mastectomy - preemptive in 2001  dental extractions with sedation in -2011  cataract surgery - 2011 and 2015     MEDICATIONS:  Advair  Albuterol  Vit B12  Vit D3  Calcium     ALLERGIES:  NKDA  Directed to reduce NSAID use due to past ROSS     SOCIAL HISTORY:  Denies current use  Former smoker x10 years     REVIEW OF SYSTEMS  ROS reviewed and negative aside from listed in HPI    PHYSICAL EXAM:  GEN: WD/WN, NAD with tremor   HEENT: NC/AT, EOMI, PERRL, no facial edema, induration or erythema, no abnormal skin lesions, inferior border of mandible is palpable bilaterally, neck soft with no palpable lymph nodes, RADHA >45mm, OP clear, uvula midline, fair oral hygiene, no vestibular edema, FOM ND/NT, no erythema/ulcerations/pigmentations/exophytic lesions, FCR of #12, 14 and 18 with recurrent decay, gingival recession and periodontal bone loss  Neuro: AAOx4, CN V and CN VII intact    Mallampati II and normal neck mobility observed.    IMAGING:  FMX - numerous restorations and full coverage crowns. endo treated #14 with PARL, recurrent decay on #12, recurrent decay on #19 with PARL     ASSESSMENT:   75 y/o female presents with carious #12, 14 and 19 indicated for extraction. Discussed findings with pt. Explained that she is not a good candidate for in office sedation given her extensive medical history and benzo toxicity. Pt expressed understanding but explained that she is not able to tolerate dental extractions without sedation and would like to receive care in the OR. Explained that there is a long wait list for extractions in the OR and she may have to wait 6-12 months before receiving treatment. Pt expressed understanding and would like to wait for treatment in the OR.     PLAN:  Ext #12, 14 and 19    Risks and benefits explained to pt including, but not limited to;   Pain, bleeding, swelling, infections, remaining tooth roots, oroantral communication, nerve injury  (permanent vs temporary).  Questions were invited and answered.  Anesthesia modalities covered, patient elects to have procedure done under GA in OR    NV: Ext #12, 14 and 19 with GA in OR    Bernarda Riley DDS  OMFS Intern/Dental Fellow    Findings, assessment, and plan discussed with Dr. Arvin Garza

## 2022-08-02 ENCOUNTER — HOSPITAL ENCOUNTER (OUTPATIENT)
Facility: CLINIC | Age: 77
Discharge: HOME | End: 2022-08-02
Attending: DENTIST | Admitting: DENTIST
Payer: COMMERCIAL

## 2022-08-02 ENCOUNTER — ANESTHESIA (OUTPATIENT)
Dept: SURGERY | Facility: CLINIC | Age: 77
End: 2022-08-02
Payer: COMMERCIAL

## 2022-08-02 VITALS
BODY MASS INDEX: 20.64 KG/M2 | WEIGHT: 123.9 LBS | HEIGHT: 65 IN | OXYGEN SATURATION: 97 % | SYSTOLIC BLOOD PRESSURE: 143 MMHG | RESPIRATION RATE: 18 BRPM | DIASTOLIC BLOOD PRESSURE: 73 MMHG | HEART RATE: 91 BPM | TEMPERATURE: 97.6 F

## 2022-08-02 DIAGNOSIS — K05.6 CHRONIC PERIODONTAL DISEASE: Primary | ICD-10-CM

## 2022-08-02 LAB — GLUCOSE BLDC GLUCOMTR-MCNC: 110 MG/DL (ref 70–99)

## 2022-08-02 PROCEDURE — 999N000141 HC STATISTIC PRE-PROCEDURE NURSING ASSESSMENT: Performed by: DENTIST

## 2022-08-02 PROCEDURE — 370N000017 HC ANESTHESIA TECHNICAL FEE, PER MIN: Performed by: DENTIST

## 2022-08-02 PROCEDURE — 360N000076 HC SURGERY LEVEL 3, PER MIN: Performed by: DENTIST

## 2022-08-02 PROCEDURE — 250N000011 HC RX IP 250 OP 636: Performed by: NURSE ANESTHETIST, CERTIFIED REGISTERED

## 2022-08-02 PROCEDURE — 258N000003 HC RX IP 258 OP 636: Performed by: NURSE ANESTHETIST, CERTIFIED REGISTERED

## 2022-08-02 PROCEDURE — 710N000012 HC RECOVERY PHASE 2, PER MINUTE: Performed by: DENTIST

## 2022-08-02 PROCEDURE — 250N000009 HC RX 250: Performed by: NURSE ANESTHETIST, CERTIFIED REGISTERED

## 2022-08-02 PROCEDURE — 250N000013 HC RX MED GY IP 250 OP 250 PS 637

## 2022-08-02 PROCEDURE — 250N000011 HC RX IP 250 OP 636

## 2022-08-02 PROCEDURE — 82962 GLUCOSE BLOOD TEST: CPT

## 2022-08-02 PROCEDURE — 272N000001 HC OR GENERAL SUPPLY STERILE: Performed by: DENTIST

## 2022-08-02 PROCEDURE — 250N000009 HC RX 250: Performed by: DENTIST

## 2022-08-02 RX ORDER — PROPOFOL 10 MG/ML
INJECTION, EMULSION INTRAVENOUS CONTINUOUS PRN
Status: DISCONTINUED | OUTPATIENT
Start: 2022-08-02 | End: 2022-08-02

## 2022-08-02 RX ORDER — PROPOFOL 10 MG/ML
INJECTION, EMULSION INTRAVENOUS PRN
Status: DISCONTINUED | OUTPATIENT
Start: 2022-08-02 | End: 2022-08-02

## 2022-08-02 RX ORDER — CHLORHEXIDINE GLUCONATE ORAL RINSE 1.2 MG/ML
SOLUTION DENTAL
Qty: 473 ML | Refills: 0 | Status: SHIPPED | OUTPATIENT
Start: 2022-08-02 | End: 2022-09-26

## 2022-08-02 RX ORDER — LIDOCAINE 40 MG/G
CREAM TOPICAL
Status: DISCONTINUED | OUTPATIENT
Start: 2022-08-02 | End: 2022-08-02 | Stop reason: HOSPADM

## 2022-08-02 RX ORDER — CEFAZOLIN SODIUM/WATER 2 G/20 ML
2 SYRINGE (ML) INTRAVENOUS SEE ADMIN INSTRUCTIONS
Status: DISCONTINUED | OUTPATIENT
Start: 2022-08-02 | End: 2022-08-02 | Stop reason: HOSPADM

## 2022-08-02 RX ORDER — KETAMINE HYDROCHLORIDE 10 MG/ML
INJECTION INTRAMUSCULAR; INTRAVENOUS PRN
Status: DISCONTINUED | OUTPATIENT
Start: 2022-08-02 | End: 2022-08-02

## 2022-08-02 RX ORDER — SODIUM CHLORIDE, SODIUM LACTATE, POTASSIUM CHLORIDE, CALCIUM CHLORIDE 600; 310; 30; 20 MG/100ML; MG/100ML; MG/100ML; MG/100ML
INJECTION, SOLUTION INTRAVENOUS CONTINUOUS PRN
Status: DISCONTINUED | OUTPATIENT
Start: 2022-08-02 | End: 2022-08-02

## 2022-08-02 RX ORDER — CEFAZOLIN SODIUM/WATER 2 G/20 ML
2 SYRINGE (ML) INTRAVENOUS
Status: COMPLETED | OUTPATIENT
Start: 2022-08-02 | End: 2022-08-02

## 2022-08-02 RX ORDER — HYDROCODONE BITARTRATE AND ACETAMINOPHEN 5; 325 MG/1; MG/1
1 TABLET ORAL EVERY 6 HOURS PRN
Qty: 10 TABLET | Refills: 0 | Status: SHIPPED | OUTPATIENT
Start: 2022-08-02 | End: 2022-08-05

## 2022-08-02 RX ORDER — CHLORHEXIDINE GLUCONATE ORAL RINSE 1.2 MG/ML
10 SOLUTION DENTAL ONCE
Status: COMPLETED | OUTPATIENT
Start: 2022-08-02 | End: 2022-08-02

## 2022-08-02 RX ORDER — ONDANSETRON 4 MG/1
4 TABLET, ORALLY DISINTEGRATING ORAL EVERY 8 HOURS PRN
Qty: 4 TABLET | Refills: 0 | Status: SHIPPED | OUTPATIENT
Start: 2022-08-02 | End: 2022-09-26

## 2022-08-02 RX ORDER — BUPIVACAINE HYDROCHLORIDE AND EPINEPHRINE 2.5; 5 MG/ML; UG/ML
INJECTION, SOLUTION INFILTRATION; PERINEURAL PRN
Status: DISCONTINUED | OUTPATIENT
Start: 2022-08-02 | End: 2022-08-02 | Stop reason: HOSPADM

## 2022-08-02 RX ADMIN — PROPOFOL 30 MG: 10 INJECTION, EMULSION INTRAVENOUS at 13:04

## 2022-08-02 RX ADMIN — PROPOFOL 20 MG: 10 INJECTION, EMULSION INTRAVENOUS at 12:55

## 2022-08-02 RX ADMIN — Medication 20 MG: at 12:55

## 2022-08-02 RX ADMIN — SODIUM CHLORIDE, POTASSIUM CHLORIDE, SODIUM LACTATE AND CALCIUM CHLORIDE: 600; 310; 30; 20 INJECTION, SOLUTION INTRAVENOUS at 12:56

## 2022-08-02 RX ADMIN — PROPOFOL 75 MCG/KG/MIN: 10 INJECTION, EMULSION INTRAVENOUS at 12:56

## 2022-08-02 RX ADMIN — PROPOFOL 30 MG: 10 INJECTION, EMULSION INTRAVENOUS at 12:58

## 2022-08-02 RX ADMIN — CHLORHEXIDINE GLUCONATE 10 ML: 1.2 SOLUTION ORAL at 12:19

## 2022-08-02 RX ADMIN — Medication 2 G: at 12:56

## 2022-08-02 RX ADMIN — MIDAZOLAM 2 MG: 1 INJECTION INTRAMUSCULAR; INTRAVENOUS at 12:54

## 2022-08-02 NOTE — ANESTHESIA POSTPROCEDURE EVALUATION
Patient: Morenita Moore    Procedure: Procedure(s):  SURGICAL EXTRACTION, TOOTH - Teeth #12, 14, 19       Anesthesia Type:  MAC    Note:  Disposition: Outpatient   Postop Pain Control: Uneventful            Sign Out: Well controlled pain   PONV: No   Neuro/Psych: Uneventful            Sign Out: Acceptable/Baseline neuro status   Airway/Respiratory: Uneventful            Sign Out: Acceptable/Baseline resp. status   CV/Hemodynamics: Uneventful            Sign Out: Acceptable CV status; No obvious hypovolemia; No obvious fluid overload   Other NRE: NONE   DID A NON-ROUTINE EVENT OCCUR? No           Last vitals:  Vitals Value Taken Time   /73 08/02/22 1355   Temp 36.4  C (97.6  F) 08/02/22 1355   Pulse 91 08/02/22 1355   Resp 18 08/02/22 1355   SpO2 97 % 08/02/22 1357   Vitals shown include unvalidated device data.    Electronically Signed By: Jonathan Tellez MD  August 2, 2022  2:34 PM

## 2022-08-02 NOTE — BRIEF OP NOTE
[facility]    Brief Operative Note    Pre-operative diagnosis: Dental caries [K02.9]  Post-operative diagnosis Same as pre-operative diagnosis    Procedure: Procedure(s):  SURGICAL EXTRACTION, TOOTH - Teeth #12, 14, 19  Surgeon: Surgeon(s) and Role:     * Arvin Garza DDS - Primary      * Oksana Ignacio DDS - Resident - Assisting  Anesthesia: General   Estimated Blood Loss: 2 ml    Drains: None  Specimens: * No specimens in log *  Findings:   See operative report.  Complications: None.  Implants: * No implants in log *      Oksana Ignacio DDS  Oral & Maxillofacial Surgery PGY-4

## 2022-08-02 NOTE — DISCHARGE INSTRUCTIONS
HOME CARE INSTRUCTIONS FOLLOWING SURGERY    CARE OF THE MOUTH    1. WOUND CARE: Do not disturb the wound. Do not rinse your mouth or use a mouthwash for the day of surgery. If you smoke, please refrain from doing so for at least 4 days. Avoid probing the wound. A waterpick should not be used during the early healing period. The above activities will cause complications with wound healing.     2. SWELLING: Facial swelling occurs following most dental extractions and oral surgery procedures. To help minimize swelling, apply an ice pack to the face for 20 minutes; remove for 20 minutes; alternating back and forth throughout the first day after surgery. To be most effective, the applications of ice packs should begin as soon as possible.   The maximum facial swelling normally occurs on days 2-5 following surgery. Once present, it can remain swollen for 7-10 days after surgery.     3. PAIN: A variable amount of pain follows most extractions or oral surgical procedures. If you are given a prescription for pain medication please use as directed. Many times analgesics are prescribed on a scheduled basis. Excessive or increased pain after the third day following surgery is not normal. Should this occur, please call the clinic and set up a follow up appointment if not already scheduled.     4. BLEEDING: A slight amount of bleeding or oozing is expected up to 24 hours after surgery. Theses conditions are no cause for alarm. Following your oral surgery, a small sterile gauze compress may be placed on the wound and we ask that you maintain steady biting pressure on the gauze for 1 hour.      5. NAUSEA: Nausea is not an uncommon event after surgery, and it is sometimes cause by narcotic pain medication. Nausea may be reduce by taking pills with food or water. Attempt to keep drinking small amounts of clear fluids if you find the nausea does not improve. Cola drinks with less carbonation may help with nausea.     6. DISCOLORATION:  "Facial discoloration (black and blue bruising) often follows many extraction and oral surgery procedures. Discoloration is normal and is no cause for alarm. It may persist for several weeks.     7. JAW STIFFNESS: For several days following most oral procedures, the jaw may become somewhat stiff. Should jaw stiffness progress or persist after one week, notify you oral surgeon.     8. DIET: Soft diet must be followed for 7 days. It is extremely important to maintain adequate hydration for normal healing. Examples of soft foods include: masked potatoes, soups, applesauce, jell-o, ice cream, overcooked pasta.     Please use the Internet to look up liquid diets to help with ideas     9. MOUTH RINSES: The day following surgery begin using warm salt water rinses after meals and several times during the day as directed by your oral surgeon. One-half teaspoon of table salt in a full glass of warm water is recommended.       10. PHYSICAL ACTIVITY/REST: Keep physical activity to a minimum. Avoid athletic and strenuous activities and get plenty of rest.    11. STICHES: Following many extractions and oral surgery procedures, stiches as placed in the gums. Your doctor will advise you if a return appointment is needed for stitch removal.    12. DRY SOCKET: Despite the best of care, a small percentage of patients who have teeth removed will develop a \"Dry Socket\". A \"Dry Socket\" is a condition where the wound healing is interrupted. This condition usually develops 3-10 days after your extraction. Typically, patients will note increased pain at the extraction site. The aching pain may worsen and spread to the ear and even eye area of the face. If you exhibit these symptoms please call our clinic and schedule a follow up appointment. \"Dry Sockets\" are an easily treatable condition.      13. BRUSHING: Resume your normal oral hygiene routine within 24 hours following surgery. Soreness ans swelling may not permit vigorous brushing of " all areas, but please make every effort to clean your teeth within comfort.     14. NUMBNESS: Local anesthetics may be effective for as long as 24-48 hours. Should you experience numbness beyone this period, notify your oral surgeon.       AFTER HOURS CONTACT FOR EMERGENCIES:  Please call the [facility] switchboard at [phone] and ask to have the Oral and Maxillofacial Surgery Resident On-call paged.     It is our desire to make your recovery as smooth as possible. These instructions are given to assist you following your surgery. If you have any questions please call the clinic at [phone].       [facility]  Same-Day Surgery   Adult Discharge Orders & Instructions     For 24 hours after surgery    Get plenty of rest.  A responsible adult must stay with you for at least 24 hours after you leave the hospital.   Do not drive or use heavy equipment.  If you have weakness or tingling, don't drive or use heavy equipment until this feeling goes away.  Do not drink alcohol.  Avoid strenuous or risky activities.  Ask for help when climbing stairs.   You may feel lightheaded.  IF so, sit for a few minutes before standing.  Have someone help you get up.   If you have nausea (feel sick to your stomach): Drink only clear liquids such as apple juice, ginger ale, broth or 7-Up.  Rest may also help.  Be sure to drink enough fluids.  Move to a regular diet as you feel able.  You may have a slight fever. Call the doctor if your fever is over 100 F (37.7 C) (taken under the tongue) or lasts longer than 24 hours.  You may have a dry mouth, a sore throat, muscle aches or trouble sleeping.  These should go away after 24 hours.  Do not make important or legal decisions.   Call your doctor for any of the followin.  Signs of infection (fever, growing tenderness at the surgery site, a large amount of drainage or bleeding, severe pain, foul-smelling drainage, redness,  swelling).    2. It has been over 8 to 10 hours since surgery and you are still not able to urinate (pass water).    3.  Headache for over 24 hours.    4.  Numbness, tingling or weakness the day after surgery (if you had spinal anesthesia).

## 2022-08-05 NOTE — OP NOTE
OPERATIVE REPORT    Morenita Moore   : 1945   SEX: female   MRN: [id number]    DATE OF SERVICE: 2022    STAFF SURGEON: Arvin Garza DDS, MD    RESIDENT SURGEON: Oksana Ignacio DDS    PREOPERATIVE DIAGNOSIS:   1. Dental caries   2. Periapical abscess    POSTOPERATIVE DIAGNOSIS:   1. Dental caries   2. Periapical abscess    PROCEDURES PERFORMED: Extraction of teeth #12, 14, 19    ANESTHESIA: MAC     INDICATIONS FOR OPERATION: Morenita Moore is a 77 year old female with a past medical history significant for HTN, SVT, pericarditis (2019), asthma, osteopenia, breast cancer, GERD, anxiety, depression, and tremors, who presents for extraction of teeth #12, 14, 19 due to recurrent dental caries and periapical abscesses. The patient reported intermittent acute pain associated with teeth #12, 14, 19. She wanted to be sedated to have the teeth extracted, but was unable to have a sedation in the clinical setting due to her significant past medical history. The decision was made to take the patient to the operating room to extract teeth #12, 14, 19.    DESCRIPTION OF PROCEDURE  The patient was met in the pre-operative holding area and informed consent was obtained. The surgical site was marked on the diagram and the patient was taken to the operating room. She was transferred to the operating room table and underwent a smooth induction of MAC with native airway. A moist throat pack was placed and 9 ml 0.25% bupivacaine with 1:200,000 epinephrine was administered intraorally. The patient was prepped and draped in standard fashion. A time-out was performed in accordance with [facility] policy.    Attention to left maxilla. Gingiva reflected from teeth #12 and 14 with periosteal elevator. Teeth #12 and 14 elevated and removed with forceps. Sites curetted and irrigated with sterile saline. Attention to left mandible. Gingiva reflected from tooth #19 with periosteal elevator. Tooth  #19 elevated and removed with forceps. Site curetted and irrigated with sterile saline. Gingiva sutured with 3-0 chromic gut.    The oral cavity was irrigated with sterile saline and the throat pack was removed with suction. The oropharynx was found to be clear.  At the completion of the procedure, all counts were found to be correct. The patient was then turned over to the anesthesia service for emergence. She was taken to the PACU in good condition. The attending surgeon, Dr. Arvin Garza, was present for the entire procedure/key portions of the procedure.    ESTIMATED BLOOD LOSS: 2 mL  FLUIDS: See anesthesia report  URINE OUTPUT: None  DRAINS: None  COMPLICATIONS: None  SPECIMENS: Teeth #12, 14, 19 discarded   DISPOSITION: Transferred to PACU in stable condition     Oksana Ignacio DDS  Oral & Maxillofacial Surgery PGY4

## 2022-08-10 ENCOUNTER — MYC MEDICAL ADVICE (OUTPATIENT)
Dept: INTERNAL MEDICINE | Facility: CLINIC | Age: 77
End: 2022-08-10

## 2022-08-10 DIAGNOSIS — R32 URINARY INCONTINENCE, UNSPECIFIED TYPE: Primary | ICD-10-CM

## 2022-08-12 ENCOUNTER — PATIENT OUTREACH (OUTPATIENT)
Dept: GERIATRIC MEDICINE | Facility: CLINIC | Age: 77
End: 2022-08-12

## 2022-08-12 ENCOUNTER — TELEPHONE (OUTPATIENT)
Dept: INTERNAL MEDICINE | Facility: CLINIC | Age: 77
End: 2022-08-12

## 2022-08-12 NOTE — PROGRESS NOTES
[facility] Contact    Care coordinator received a call from Morenita stating that she found a second [employer] agency that she would like to work with starting 8/16/22 for 6 hours per week. She will continue to work with Gloria from [facility] for 6 hours per week as well. Here is the contact information for new agency:   Lauren Javi   Maryradha Griffith   Email: [email or web address]   [phone]  NPI: [id number]    Care coordinator updated care plan and sent over new SA.   [facility]  Cell: [phone]

## 2022-08-12 NOTE — TELEPHONE ENCOUNTER
Fax received from [facility] [employer] and Bath [employer] for review and signature.  Put in Dr. Sánchez's in basket.

## 2022-08-16 ENCOUNTER — MEDICAL CORRESPONDENCE (OUTPATIENT)
Dept: HEALTH INFORMATION MANAGEMENT | Facility: CLINIC | Age: 77
End: 2022-08-16

## 2022-08-16 NOTE — TELEPHONE ENCOUNTER
Sri message sent to patient asking where she wants the DME order sent to.     Jasmyne Mcnulty RN  [facility]

## 2022-08-16 NOTE — TELEPHONE ENCOUNTER
Patient requesting DME be faxed to [facility].   Fax number: [phone]  DME order faxed.     Jasmyne Mcnulty RN  [facility]

## 2022-09-06 ENCOUNTER — PATIENT OUTREACH (OUTPATIENT)
Dept: GERIATRIC MEDICINE | Facility: CLINIC | Age: 77
End: 2022-09-06

## 2022-09-06 NOTE — PROGRESS NOTES
[facility] Care Coordination Contact    Order placed with [facility] (p: [phone]; f: [phone]) for Bath Bench.  Order placed on 9/6/2022. Database updated.  As required, authorization submitted to health plan.    So Hollins  Care Management Specialist  [facility]  [phone]

## 2022-09-26 ENCOUNTER — MYC MEDICAL ADVICE (OUTPATIENT)
Dept: INTERNAL MEDICINE | Facility: CLINIC | Age: 77
End: 2022-09-26

## 2022-09-26 NOTE — TELEPHONE ENCOUNTER
"Patient has sent the following via [employer]:    \"[facility] Hard-working Health Care Team!  I am checking in today to ask about vaccines for fall, both the yearly Flu Virus Vaccine and the new, updated Covid Vaccine.  I would like to have them at separate times (though I've heard it's ok to have them concurrently).  Do I need a Physician Order to receive them?  If so, could I have the order in my file please?  [facility]?  I'm thinking to have them both in October..soon.   Thanks so Much!  Morenita Moore\"    "

## 2022-10-04 ENCOUNTER — PATIENT OUTREACH (OUTPATIENT)
Dept: GERIATRIC MEDICINE | Facility: CLINIC | Age: 77
End: 2022-10-04

## 2022-10-04 NOTE — PROGRESS NOTES
Encounter opened due to Regulatory [employer] Update to open FVP Program.    So Hollins  Care Management Specialist  [facility]  [phone]

## 2022-10-04 NOTE — PROGRESS NOTES
Encounter opened due to Regulatory [employer] Update to close FVP Program.    So Hollins  Care Management Specialist  [facility]  [phone]

## 2022-10-04 NOTE — PROGRESS NOTES
[facility] Care Coordination Contact    Called member to schedule annual HRA home visit. HRA has been scheduled for 10/13/2022 @ 9:30am .

## 2022-10-05 ENCOUNTER — HOSPITAL ENCOUNTER (OUTPATIENT)
Dept: PHYSICAL THERAPY | Facility: CLINIC | Age: 77
Discharge: HOME | End: 2022-10-05
Payer: COMMERCIAL

## 2022-10-05 DIAGNOSIS — R42 DIZZINESS: Primary | ICD-10-CM

## 2022-10-05 DIAGNOSIS — R26.89 BALANCE PROBLEMS: ICD-10-CM

## 2022-10-05 PROCEDURE — 97535 SELF CARE MNGMENT TRAINING: CPT | Mod: GP | Performed by: PHYSICAL THERAPIST

## 2022-10-05 PROCEDURE — 97161 PT EVAL LOW COMPLEX 20 MIN: CPT | Mod: GP | Performed by: PHYSICAL THERAPIST

## 2022-10-06 ENCOUNTER — OFFICE VISIT (OUTPATIENT)
Dept: PULMONOLOGY | Facility: OTHER | Age: 77
End: 2022-10-06
Payer: COMMERCIAL

## 2022-10-06 VITALS
BODY MASS INDEX: 21.32 KG/M2 | DIASTOLIC BLOOD PRESSURE: 80 MMHG | SYSTOLIC BLOOD PRESSURE: 136 MMHG | HEART RATE: 82 BPM | WEIGHT: 128.1 LBS | OXYGEN SATURATION: 99 %

## 2022-10-06 DIAGNOSIS — J45.909 UNCOMPLICATED ASTHMA, UNSPECIFIED ASTHMA SEVERITY, UNSPECIFIED WHETHER PERSISTENT: Primary | ICD-10-CM

## 2022-10-06 PROCEDURE — 90686 IIV4 VACC NO PRSV 0.5 ML IM: CPT | Performed by: INTERNAL MEDICINE

## 2022-10-06 PROCEDURE — G0008 ADMIN INFLUENZA VIRUS VAC: HCPCS | Performed by: INTERNAL MEDICINE

## 2022-10-06 PROCEDURE — 99214 OFFICE O/P EST MOD 30 MIN: CPT | Mod: 25 | Performed by: INTERNAL MEDICINE

## 2022-10-06 RX ORDER — FLUTICASONE PROPIONATE AND SALMETEROL XINAFOATE 115; 21 UG/1; UG/1
2 AEROSOL, METERED RESPIRATORY (INHALATION) 2 TIMES DAILY
Qty: 12 G | Refills: 11 | Status: SHIPPED | OUTPATIENT
Start: 2022-10-06 | End: 2023-10-27

## 2022-10-06 ASSESSMENT — ASTHMA QUESTIONNAIRES
QUESTION_1 LAST FOUR WEEKS HOW MUCH OF THE TIME DID YOUR ASTHMA KEEP YOU FROM GETTING AS MUCH DONE AT WORK, SCHOOL OR AT HOME: NONE OF THE TIME
QUESTION_2 LAST FOUR WEEKS HOW OFTEN HAVE YOU HAD SHORTNESS OF BREATH: NOT AT ALL
QUESTION_4 LAST FOUR WEEKS HOW OFTEN HAVE YOU USED YOUR RESCUE INHALER OR NEBULIZER MEDICATION (SUCH AS ALBUTEROL): ONCE A WEEK OR LESS
ACT_TOTALSCORE: 23
QUESTION_3 LAST FOUR WEEKS HOW OFTEN DID YOUR ASTHMA SYMPTOMS (WHEEZING, COUGHING, SHORTNESS OF BREATH, CHEST TIGHTNESS OR PAIN) WAKE YOU UP AT NIGHT OR EARLIER THAN USUAL IN THE MORNING: NOT AT ALL
QUESTION_5 LAST FOUR WEEKS HOW WOULD YOU RATE YOUR ASTHMA CONTROL: WELL CONTROLLED
ACT_TOTALSCORE: 23

## 2022-10-06 NOTE — PROGRESS NOTES
Pulmonary Clinic Follow-up Visit    Impression: 77F w/ history of percarditis, previously diagnosed asthma (followed at [place] and [facility]), minimal smoking history, presents for follow up of asthma and pulmonary nodules. She is doing better on the advair HFA with spacer. Symptoms well controlled. PFTs showed very mild reduction in FEV1 with normal post-BD FEV/FVC ratio. She does have reduction in mid flow rates which could be consistent with small airways disease.  Lung exam and SpO2 are both normal today.     Recommendations:  #Asthma, hx elevated Alverto, improved control since adding Spacer. Feels high dose advair is too much for her.  - reduce advair HFA to medium dose, 115/21 1-2 puffs bid with spacer. Rinse/gargle/spit after use.  - continue albuterol rescue inhaler prn  - allergen avoidance discussed  - encouraged her to exercise and remain active as able  - declined pulmonary rehab referal previously    Marlen's action plan: prednisone 40mg x5 days and azithromycin (z-pack)     #Multiple pulmonary nodules, mostly stable; 0.6cm largest in the CEDRIC. Stable on CT scan from March 2022. New 5mm CEDRIC subsolid nodule does not require follow up per Fleischner 2017 guidelines. Will continue previous monitoring plan.   - repeat CT chest in 2 years per Fleischner guidelines (March 2024).    #Protein-calorie malnutrition. Has improved with her intake and gained some wekght.  - continue follow up with her PMD to discuss strategies to increase weight. Consider protein supplement shakes.     #RHM:  - UTD with flu, pneumococcal vaccines.  - UTD with covid-19 vaccine + booster.  - will give flu shot today (she wants the lower dose, did not tolerate the high dose previously)    Follow up in 1 year or sooner if needed.    Pacheco (Deejay) MD Leanne  [facility]monary [facility] Critical [facility]  Clinic [phone]  Fax [phone]      CCx: asthma, pulmonary nodules follow up    HPI: Interim history: I last  saw Marlen on 3/18. I recommended she use a spacer with her Advair at that visit.  Today, she reports she's doing well. Using advair 1 puff bid. She feels the dose is too high, gets jittery.  No asthma exacerbations since I last saw her.    ROS:  A 12-system review was obtained and was negative with the exception of the symptoms endorsed in the history of present illness.    PMH:  Past Medical History:   Diagnosis Date     Anxiety      Asthma     adult-onset asthma diagnosed in      Cancer (H)      DCIS (ductal carcinoma in situ)     stage 0 status post left mastectomy in      Depressive disorder      PONV (postoperative nausea and vomiting)      Torticollis      Tremor        PSH:  Past Surgical History:   Procedure Laterality Date     BREAST SURGERY       CATARACT IOL, RT/LT       MASTECTOMY      DCIS     ODONTECTOMY Left 2022    Procedure: SURGICAL EXTRACTION, TOOTH - Teeth #12, 14, 19;  Surgeon: Arvin Garza DDS;  Location: [facility] OR     RETINAL REATTACHMENT         Allergies:  Allergies   Allergen Reactions     Levalbuterol Hydrochloride Shortness Of Breath     Cats Other (See Comments)     Itchy eyes     Dust Mites      Other reaction(s): Wheezing  Wheezing/shortness/breath/see (IRP /)     Qvar [Beclomethasone]      Per patient- allergic to QVAR     Amitriptyline Palpitations     Escitalopram Anxiety       Family HX:  Family History   Problem Relation Age of Onset     Cancer Sister         breast cancer     Glaucoma No family hx of      Macular Degeneration No family hx of      Asthma Sister      Breast Cancer Sister      Heart Failure Mother          at 97       Social Hx:  Social History     Socioeconomic History     Marital status: Single     Spouse name: Not on file     Number of children: Not on file     Years of education: Not on file     Highest education level: Not on file   Occupational History     Not on file   Tobacco Use     Smoking status: Former Smoker     Packs/day: 1.00      Years: 6.00     Pack years: 6.00     Types: Cigarettes     Quit date:      Years since quittin.7     Smokeless tobacco: Never Used     Tobacco comment: smoked about 10 years   Vaping Use     Vaping Use: Never used   Substance and Sexual Activity     Alcohol use: No     Drug use: Never     Sexual activity: Not Currently   Other Topics Concern     Parent/sibling w/ CABG, MI or angioplasty before 65F 55M? Not Asked   Social History Narrative     Not on file     Social Determinants of Health     Financial Resource Strain: Not on file   Food Insecurity: Not on file   Transportation Needs: Not on file   Physical Activity: Not on file   Stress: Not on file   Social Connections: Not on file   Intimate Partner Violence: Not on file   Housing Stability: Not on file       Current Meds:  Current Outpatient Medications   Medication Sig Dispense Refill     albuterol (PROAIR HFA/PROVENTIL HFA/VENTOLIN HFA) 108 (90 Base) MCG/ACT inhaler Inhale 2 puffs into the lungs every 6 hours (Patient taking differently: Inhale 2 puffs into the lungs every 6 hours as needed) 18 g 11     calcium carbonate-vitamin D 600-125 MG-UNIT TABS        Cyanocobalamin 50 MCG TABS Take 50 mcg by mouth once a week        fluticasone-salmeterol (ADVAIR-HFA) 230-21 MCG/ACT inhaler Inhale 2 puffs into the lungs 2 times daily 36 g 3       Physical Exam:  There were no vitals taken for this visit.  Gen: awake, alert, oriented, no distress  HEENT: nasal turbinates are unremarkable, no oropharyngeal lesions, no cervical or supraclavicular lymphadenopathy  CV: RRR, no M/G/R  Resp: CTAB, no focal crackles or wheezes  Skin: no apparent rashes  Ext: no cyanosis, clubbing or edema  Neuro: alert, nonfocal    Labs:  Reviewed  CBC nrmal, no eos in 2020  ESR 11 in 2020  Chem panel wnl 10/20/21    Imaging studies:  EXAMINATION: CT CHEST W/O CONTRAST, 3/17/2022 1:46 PM     CLINICAL HISTORY: Lung nodule, 6-8mm; Pulmonary nodules     COMPARISON: CT  3/6/2020.     TECHNIQUE: CT imaging obtained through the chest without contrast.  Coronal and axial MIP reformatted images obtained.      CONTRAST:  none.     FINDINGS:     Mediastinum: No cardiomegaly. No pericardial effusion. Normal caliber  thoracic aorta midpoint trunk. Moderate coronary artery calcifications  in the left anterior descending. Calcified mediastinal lymph nodes.  Left breast mastectomy.     Lungs/pleura: The central airway is patent. No pneumothorax, pleural  effusions, or focal infectious consolidations.     Centrally lucent/cavitary nodule in the right lower lobe on series 4  image 161 measuring 4 mm, likely present on previous exam.     3 mm solid right lower lobe nodule on series 4 image 189 that  definitely seen on previous exam. Unchanged 5 mm left upper lobe  groundglass nodule on series 4 image 68.     New 5 mm ground glass nodule in the left upper lobe on series 4 image  81. Scattered calcified granulomas.     Simple cystic structures in the liver unchanged from previous exam.  Kyphotic curvature of the midthoracic spine. No acute or suspicious  osseous or soft tissue abnormalities.                                                                      IMPRESSION:  1. A few new or more conspicuous nodules are present as detailed  above, less than 6 mm.  Given history of left breast cancer, these are  technically indeterminant and short-term follow-up could be  considered.  2. Other nodules including a 5 mm left upper lobe groundglass nodule  and centrally lucent/cavitary nodule in the right lower lobe are  unchanged from previous exam.  3. Sequelae of prior granulomas disease.    TTE 2/15/2022  Interpretation Summary     Left ventricular size, wall motion and function are normal. The ejection  fraction is 60-65%.  Normal right ventricle size and systolic function.  Small pericardial effusion  There are no echocardiographic indications of cardiac tamponade.  No hemodynamically significant  valvular abnormalities on 2D or color flow  imaging.    Pulmonary Function Testing  3/18/2022  FEV1 1,53L, 70%  FVC 84%  Ratio 0.64 (LLN 0.64)  No BD response.  TLC 85%  RV 89%  DLco 93% aung for hgb  Mid flow rates reduced with BD response.  Flow volume loop suggests small airways disease

## 2022-10-06 NOTE — LETTER
10/6/2022         RE: Morenita Moore  [address]   [address]  [place] [address]        Dear Colleague,    Thank you for referring your patient, Morenita Moore, to the [facility]. Please see a copy of my visit note below.    Pulmonary Clinic Follow-up Visit    Impression: 77F w/ history of percarditis, previously diagnosed asthma (followed at [place] and [facility]), minimal smoking history, presents for follow up of asthma and pulmonary nodules. She is doing better on the advair HFA with spacer. Symptoms well controlled. PFTs showed very mild reduction in FEV1 with normal post-BD FEV/FVC ratio. She does have reduction in mid flow rates which could be consistent with small airways disease.  Lung exam and SpO2 are both normal today.     Recommendations:  #Asthma, hx elevated Alverto, improved control since adding Spacer. Feels high dose advair is too much for her.  - reduce advair HFA to medium dose, 115/21 1-2 puffs bid with spacer. Rinse/gargle/spit after use.  - continue albuterol rescue inhaler prn  - allergen avoidance discussed  - encouraged her to exercise and remain active as able  - declined pulmonary rehab referal previously     #Multiple pulmonary nodules, mostly stable; 0.6cm largest in the CEDRIC. Stable on CT scan from March 2022. New 5mm CEDRIC subsolid nodule does not require follow up per Fleischner 2017 guidelines. Will continue previous monitoring plan.   - repeat CT chest in 2 years per Fleischner guidelines (March 2024).    #Protein-calorie malnutrition. Has improved with her intake and gained some wekght.  - continue follow up with her PMD to discuss strategies to increase weight. Consider protein supplement shakes.     #RHM:  - UTD with flu, pneumococcal vaccines.  - UTD with covid-19 vaccine + booster.  - will give flu shot today (she wants the lower dose, did not tolerate the high dose previously)    Follow up in 1 year or sooner if needed.    Pacheco (Deejay)  MD Leanne  [facility] Pulmonary & Critical Care  Clinic [phone]  Fax [phone]      CCx: asthma, pulmonary nodules follow up    HPI: Interim history: I last saw Marlen on 3/18. I recommended she use a spacer with her Advair at that visit.  Today, she reports she's doing well. Using advair 1 puff bid. She feels the dose is too high, gets jittery.  No asthma exacerbations since I last saw her.    ROS:  A 12-system review was obtained and was negative with the exception of the symptoms endorsed in the history of present illness.    PMH:  Past Medical History:   Diagnosis Date     Anxiety      Asthma     adult-onset asthma diagnosed in      Cancer (H)      DCIS (ductal carcinoma in situ)     stage 0 status post left mastectomy in      Depressive disorder      PONV (postoperative nausea and vomiting)      Torticollis      Tremor        PSH:  Past Surgical History:   Procedure Laterality Date     BREAST SURGERY       CATARACT IOL, RT/LT       MASTECTOMY      DCIS     ODONTECTOMY Left 2022    Procedure: SURGICAL EXTRACTION, TOOTH - Teeth #12, 14, 19;  Surgeon: Arvin Garza DDS;  Location: [facility] OR     RETINAL REATTACHMENT         Allergies:  Allergies   Allergen Reactions     Levalbuterol Hydrochloride Shortness Of Breath     Cats Other (See Comments)     Itchy eyes     Dust Mites      Other reaction(s): Wheezing  Wheezing/shortness/breath/see (IRP /)     Qvar [Beclomethasone]      Per patient- allergic to QVAR     Amitriptyline Palpitations     Escitalopram Anxiety       Family HX:  Family History   Problem Relation Age of Onset     Cancer Sister         breast cancer     Glaucoma No family hx of      Macular Degeneration No family hx of      Asthma Sister      Breast Cancer Sister      Heart Failure Mother          at 97       Social Hx:  Social History     Socioeconomic History     Marital status: Single     Spouse name: Not on file     Number of children:  Not on file     Years of education: Not on file     Highest education level: Not on file   Occupational History     Not on file   Tobacco Use     Smoking status: Former Smoker     Packs/day: 1.00     Years: 6.00     Pack years: 6.00     Types: Cigarettes     Quit date:      Years since quittin.7     Smokeless tobacco: Never Used     Tobacco comment: smoked about 10 years   Vaping Use     Vaping Use: Never used   Substance and Sexual Activity     Alcohol use: No     Drug use: Never     Sexual activity: Not Currently   Other Topics Concern     Parent/sibling w/ CABG, MI or angioplasty before 65F 55M? Not Asked   Social History Narrative     Not on file     Social Determinants of Health     Financial Resource Strain: Not on file   Food Insecurity: Not on file   Transportation Needs: Not on file   Physical Activity: Not on file   Stress: Not on file   Social Connections: Not on file   Intimate Partner Violence: Not on file   Housing Stability: Not on file       Current Meds:  Current Outpatient Medications   Medication Sig Dispense Refill     albuterol (PROAIR HFA/PROVENTIL HFA/VENTOLIN HFA) 108 (90 Base) MCG/ACT inhaler Inhale 2 puffs into the lungs every 6 hours (Patient taking differently: Inhale 2 puffs into the lungs every 6 hours as needed) 18 g 11     calcium carbonate-vitamin D 600-125 MG-UNIT TABS        Cyanocobalamin 50 MCG TABS Take 50 mcg by mouth once a week        fluticasone-salmeterol (ADVAIR-HFA) 230-21 MCG/ACT inhaler Inhale 2 puffs into the lungs 2 times daily 36 g 3       Physical Exam:  There were no vitals taken for this visit.  Gen: awake, alert, oriented, no distress  HEENT: nasal turbinates are unremarkable, no oropharyngeal lesions, no cervical or supraclavicular lymphadenopathy  CV: RRR, no M/G/R  Resp: CTAB, no focal crackles or wheezes  Skin: no apparent rashes  Ext: no cyanosis, clubbing or edema  Neuro: alert, nonfocal    Labs:  Reviewed  CBC nrmal, no eos in   ESR 11 in  2020  Chem panel wnl 10/20/21    Imaging studies:  EXAMINATION: CT CHEST W/O CONTRAST, 3/17/2022 1:46 PM     CLINICAL HISTORY: Lung nodule, 6-8mm; Pulmonary nodules     COMPARISON: CT 3/6/2020.     TECHNIQUE: CT imaging obtained through the chest without contrast.  Coronal and axial MIP reformatted images obtained.      CONTRAST:  none.     FINDINGS:     Mediastinum: No cardiomegaly. No pericardial effusion. Normal caliber  thoracic aorta midpoint trunk. Moderate coronary artery calcifications  in the left anterior descending. Calcified mediastinal lymph nodes.  Left breast mastectomy.     Lungs/pleura: The central airway is patent. No pneumothorax, pleural  effusions, or focal infectious consolidations.     Centrally lucent/cavitary nodule in the right lower lobe on series 4  image 161 measuring 4 mm, likely present on previous exam.     3 mm solid right lower lobe nodule on series 4 image 189 that  definitely seen on previous exam. Unchanged 5 mm left upper lobe  groundglass nodule on series 4 image 68.     New 5 mm ground glass nodule in the left upper lobe on series 4 image  81. Scattered calcified granulomas.     Simple cystic structures in the liver unchanged from previous exam.  Kyphotic curvature of the midthoracic spine. No acute or suspicious  osseous or soft tissue abnormalities.                                                                      IMPRESSION:  1. A few new or more conspicuous nodules are present as detailed  above, less than 6 mm.  Given history of left breast cancer, these are  technically indeterminant and short-term follow-up could be  considered.  2. Other nodules including a 5 mm left upper lobe groundglass nodule  and centrally lucent/cavitary nodule in the right lower lobe are  unchanged from previous exam.  3. Sequelae of prior granulomas disease.    TTE 2/15/2022  Interpretation Summary     Left ventricular size, wall motion and function are normal. The ejection  fraction is  60-65%.  Normal right ventricle size and systolic function.  Small pericardial effusion  There are no echocardiographic indications of cardiac tamponade.  No hemodynamically significant valvular abnormalities on 2D or color flow  imaging.    Pulmonary Function Testing  3/18/2022  FEV1 1,53L, 70%  FVC 84%  Ratio 0.64 (LLN 0.64)  No BD response.  TLC 85%  RV 89%  DLco 93% aung for hgb  Mid flow rates reduced with BD response.  Flow volume loop suggests small airways disease        Again, thank you for allowing me to participate in the care of your patient.        Sincerely,        Pacheco Perdomo MD

## 2022-10-11 NOTE — PROGRESS NOTES
[facility]                                                                                   OUTPATIENT PHYSICAL THERAPY FUNCTIONAL EVALUATION  PLAN OF TREATMENT FOR OUTPATIENT REHABILITATION  (COMPLETE FOR INITIAL CLAIMS ONLY)  Patient's Last Name, First Name, M.I.  YOB: 1945  Morenita Moore     Provider's Name   [facility]   Medical Record No.  [id number]     Start of Care Date:  10/05/22   Onset Date:  05/06/22 (date of order)   Type:     _X__PT   ____OT  ____SLP Medical Diagnosis:  Dizziness     PT Diagnosis:  unsteady balance Visits from SOC:  1                              __________________________________________________________________________________  Plan of Treatment/Functional Goals:  balance training, gait training, neuromuscular re-education, strengthening, other (see comments) (self care/home management)           GOALS  Gait speed  Pt will ambulate at a comfortable gait speed of >/=0.55 m/s with LRD in order to improve ease of mobility.  01/03/22    DHI  Pt will score </=25 on the Dizziness Handicap Inventory in order to demonstrate improved QOL.  01/03/22    TUG  Pt will complete timed up and go <15 seconds with AAD in order to reduce fall risk  01/03/22    HEP  Pt will be independent with a HEP to self manage symptoms  01/03/22      Therapy Frequency:  1 time/week   Predicted Duration of Therapy Intervention:  up to 90 days    Virgil Guthrie, PT                                    I CERTIFY THE NEED FOR THESE SERVICES FURNISHED UNDER        THIS PLAN OF TREATMENT AND WHILE UNDER MY CARE     (Physician co-signature of this document indicates review and certification of the therapy plan).                Certification Date From:  10/05/22   Certification Date To:  01/03/23    Referring Provider:  Hong Hansen MD    Initial Assessment  See Epic  Evaluation- Start of Care Date: 10/05/22

## 2022-10-11 NOTE — PROGRESS NOTES
10/05/22 0930   Quick Adds   Quick Adds Certification;Vestibular Eval   Type of Visit Initial OP PT Evaluation   General Information   Start of Care Date 10/05/22   Referring Physician Hong Hansen MD   Orders Evaluate and Treat as Indicated   Order Date 05/06/22   Medical Diagnosis Dizziness   Onset of illness/injury or Date of Surgery 05/06/22  (date of order)   Surgical/Medical history reviewed Yes   Pertinent history of current vestibular problem (include personal factors and/or comorbidities that impact the POC)  Anxiety   Pertinent history of current problem (include personal factors and/or comorbidities that impact the POC) Dizziness/balance issues developed after nonspecific viral infection in 2018. She also had pericarditis. She is continuing to have daily dizziness, but it is not as severe as it was last year. She describes it as a sense of imbalance or movement, worsened when in the upright position and moving her head too quickly.  . Dizziness is present when she moves her head too fast or turns too quickly. It is described as intermittent swaying, feeling of imbalance sensation. Prior MRI brain in 7/2020 did not show any clear abnormality correlating with symptoms.  She had been on ativan for 2 years and it was stopped in mid October 2021. Stopping the ativan has improved the dizziness, but hasn't completely improved it. Dr. Hansen believes she has many features of PPPD. Cervical dystonia causing movement of her visual input also likely is playing at least a minor role.    She uses a cart and walking stick for balance when she is out of her apartment. PMH: depression, PTSD per pt report, some incontinence, cervical dystonia, tremor from anti depressant.   Patient/Family Goals Statement improve balance and stability   Fall Risk Screen   Fall screen completed by PT   Have you fallen 2 or more times in the past year? No   Have you fallen and had an injury in the past year? No   Is patient a fall risk?  Yes   Fall screen comments high fall risk on DGI   Abuse Screen (yes response referral indicated)   Feels Unsafe at Home or Work/School no   Feels Threatened by Someone no   Does Anyone Try to Keep You From Having Contact with Others or Doing Things Outside Your Home? no   Physical Signs of Abuse Present no   System Outcome Measures   Outcome Measures BPPV   Dizziness Handicap Inventory (score out of 100) A decrease in score by 17.18 or greater indicates a clinically significant change in symptoms. 40   Cognitive Status Examination   Orientation orientation to person, place and time   Level of Consciousness alert   Observation   Observation cervical dytonia with resting position in left lateral flexion.  Pt wears soft neck brace at time   Posture   Posture Forward head position;Protracted shoulders;Kyphosis   Transfer Skills   Transfer Comments Independent, however cautious and prefers use of UEs to assist   Gait   Gait Comments Prefers to use rolling shopping cart for AD; slower speed.  Ambulation without AD: path deviations, reduced step length and heel strike B, arms in high guard for balance, cautious with all movements   Gait Special Tests   Gait Special Tests DYNAMIC GAIT INDEX;25 FOOT TIMED WALK   Gait Special Tests 25 Foot Timed Walk   Seconds 18.5   Comments no AD, path deviations observed, reduced step length, diminished heel strike; 0.41 m/s gait speed   Gait Special Tests Dynamic Gait Index   Score out of 24 10   Comments <19/24 indicates fall risk   Balance Special Tests Romberg   Seconds 30 Seconds   Comments Romberg EC: 3 seconds   Cervicogenic Screen   Neck ROM WFL, however limited by cervical dystonia; pt donned soft cervical collar to assist with ROM support   Oculomotor Exam   Smooth Pursuit Normal   Saccades Normal   VOR Normal   VOR Cancellation Normal   Rapid Head Thrust Comments deferred due to mm guarding   Planned Therapy Interventions   Planned Therapy Interventions balance training;gait  training;neuromuscular re-education;strengthening;other (see comments)  (self care/home management)   Clinical Impression   Criteria for Skilled Therapeutic Interventions Met yes, treatment indicated   PT Diagnosis unsteady balance   Influenced by the following impairments imbalance, dizziness, reduced activity tolerance, reduced functional strength upon observation   Functional limitations due to impairments difficulty with walking, cautious with functional transfers due to unsteadiness leading to reduced particpation in home and community activities   Clinical Presentation Stable/Uncomplicated   Clinical Presentation Rationale presentation, gait speed, DGI   Clinical Decision Making (Complexity) Low complexity   Therapy Frequency 1 time/week   Predicted Duration of Therapy Intervention (days/wks) up to 90 days   Risk & Benefits of therapy have been explained Yes   Patient, Family & other staff in agreement with plan of care Yes   Clinical Impression Comments Pt is a 78 yo female who presents with ongoing dizziness for a few years and has suspected PPPD.  PT evaluation revealed high fall risk, slow gait speed, poor posture. Pt was very cautious with all movements and appeared anxious about her balance throughout especially if not able to hold onto something. Skilled PT indicated to address deficits and optimize function   Education Assessment   Preferred Learning Style Demonstration;Pictures/video   GOALS   PT Eval Goals 1;2;4;3   Goal 1   Goal Identifier Gait speed   Goal Description Pt will ambulate at a comfortable gait speed of >/=0.55 m/s with LRD in order to improve ease of mobility.   Goal Progress baseline: 0.41 m/s   Target Date 01/03/22   Goal 2   Goal Identifier DHI   Goal Description Pt will score </=25 on the Dizziness Handicap Inventory in order to demonstrate improved QOL.   Goal Progress baseline: 40/100   Target Date 01/03/22   Goal 3   Goal Identifier TUG   Goal Description Pt will complete timed  up and go <15 seconds with AAD in order to reduce fall risk   Target Date 01/03/22   Goal 4   Goal Identifier HEP   Goal Description Pt will be independent with a HEP to self manage symptoms   Target Date 01/03/22   Total Evaluation Time   PT Eval, Low Complexity Minutes (63781) 35   Therapy Certification   Certification date from 10/05/22   Certification date to 01/03/23   Medical Diagnosis Dizziness   Certification I certify the need for these services furnished under this plan of treatment and while under my care.  (Physician co-signature of this document indicates review and certification of the therapy plan).

## 2022-10-18 ENCOUNTER — PATIENT OUTREACH (OUTPATIENT)
Dept: GERIATRIC MEDICINE | Facility: CLINIC | Age: 77
End: 2022-10-18

## 2022-10-18 ASSESSMENT — ACTIVITIES OF DAILY LIVING (ADL): DEPENDENT_IADLS:: TRANSPORTATION;SHOPPING;LAUNDRY;CLEANING

## 2022-10-18 NOTE — PROGRESS NOTES
[facility] Care Coordination Contact    [facility] Home Visit Assessment     Home visit for Health Risk Assessment with Morenita Moore completed on October 18, 2022 over the phone.       Current Care Plan  Member currently receiving the following home care services:  None   Member currently receiving the following community resources:  ICLS services and DME supplies.       Medication Review  Medication reconciliation completed in Epic: Yes  Medication set-up & administration: Independent and sets up on own weekly.  Self-administers medications.  Medication Risk Assessment Medication (1 or more, place referral to MTM): N/A: No risk factors identified  MTM Referral Placed: No: No risk factors idenified    Mental/Behavioral Health   Depression Screening:   PHQ-2: 0    Falls Assessment: No falls to report.         ADL/IADL Dependencies: IDALS:Cleaning, shopping, transportation, laundry.   ADL's: Behavior          [facility] Health Plan sponsored benefits: Shared information re: Silver Sneakers/gym memberships, ASA, Calcium +D.    PCA Assessment completed at visit: Not Applicable     Elderly Waiver Eligibility: Yes-will continue on EW    Care Plan & Recommendations: Morenita will continue to reside in her own apartment in the community and receive weekly ICLS services to assist her in completing her IDAL and ADL tasks as needed. Morenita also receive DME Supplies as needed or requested. Morenita is currently working with [facility] medical supply and care coordinator to order a new shower chair. No further services or equipment needs requested at this time.     See LTCC for detailed assessment information.    Follow-Up Plan: Member informed of future contact, plan to f/u with member with a 6 month telephone assessment.  Contact information shared with member and family, encouraged member to call with any questions or concerns at any time.    [place] care continuum providers: Please see Snapshot and Care Management  Flowsheets for Specific details of care plan.    This CC note routed to PCP.  ASHLEIGH Villalpando  [facility]  Cell: [phone]

## 2022-10-19 ENCOUNTER — HOSPITAL ENCOUNTER (OUTPATIENT)
Dept: PHYSICAL THERAPY | Facility: CLINIC | Age: 77
Discharge: HOME | End: 2022-10-19
Payer: COMMERCIAL

## 2022-10-19 DIAGNOSIS — R26.89 BALANCE PROBLEMS: ICD-10-CM

## 2022-10-19 DIAGNOSIS — R42 DIZZINESS: Primary | ICD-10-CM

## 2022-10-19 PROCEDURE — 97112 NEUROMUSCULAR REEDUCATION: CPT | Mod: GP | Performed by: PHYSICAL THERAPIST

## 2022-10-24 ENCOUNTER — PATIENT OUTREACH (OUTPATIENT)
Dept: GERIATRIC MEDICINE | Facility: CLINIC | Age: 77
End: 2022-10-24

## 2022-10-24 NOTE — LETTER
October 24, 2022      MORENITA ZEE  [address]  [place] [address]      Dear Morenita:    At [facility], we re dedicated to improving your health and wellness. Enclosed is the Care Plan developed with you on 10/18/2022. Please review the Care Plan carefully.    As a reminder, during your visit we talked about:    Ways to manage your physical and mental health    Using health care to maintain and improve your health     Your preventive care needs     Remember to contact your care coordinator if you:    Are hospitalized, or plan to be hospitalized     Have a fall      Have a change in your physical or mental health    Need help finding support or services    If you have questions, or don t agree with your Care Plan, call me at [phone]. You can also call me if your needs change. TTY users, call the [place] [employer] at (417) or 1-889.437.5539 (cokshv-re-pdvprp relay service).    Sincerely,    ASHLEIGH Villalpando    E-mail: [email or web address]  Phone: [phone]      [facility] ([facility]) is a health plan that contracts with both Medicare and the [place] Medical Assistance (Medicaid) program to provide benefits of both programs to enrollees. Enrollment in [facility] depends on contract renewal.    [id number] accepted    [id number] (07/2022)

## 2022-10-24 NOTE — PROGRESS NOTES
[facility] Care Coordination Contact    Received after visit chart from care coordinator.  Completed following tasks: Mailed copy of care plan to client, Updated services in Database, Submitted referrals/auths for ICLS, Mailed copy of POC signature sheet for member to sign and return in SASE  and Mailed [facility] Safe Medication Disposal    and Provider Signature - No POC Shared:  Member indicates that they do not want their POC shared with any EW providers.     So Hollins  Care Management Specialist  [facility]  [phone]

## 2022-10-25 ENCOUNTER — IMMUNIZATION (OUTPATIENT)
Dept: FAMILY MEDICINE | Facility: CLINIC | Age: 77
End: 2022-10-25
Payer: COMMERCIAL

## 2022-10-25 PROCEDURE — 91312 COVID-19,PF,PFIZER BOOSTER BIVALENT: CPT

## 2022-10-25 PROCEDURE — 0124A COVID-19,PF,PFIZER BOOSTER BIVALENT: CPT

## 2022-11-08 ENCOUNTER — HOSPITAL ENCOUNTER (INPATIENT)
Facility: CLINIC | Age: 77
LOS: 6 days | Discharge: NURSING, REHAB OR LONG-TERM CARE | DRG: 100 | End: 2022-11-14
Attending: EMERGENCY MEDICINE | Admitting: INTERNAL MEDICINE
Payer: COMMERCIAL

## 2022-11-08 ENCOUNTER — APPOINTMENT (OUTPATIENT)
Dept: GENERAL RADIOLOGY | Facility: CLINIC | Age: 77
DRG: 100 | End: 2022-11-08
Attending: EMERGENCY MEDICINE
Payer: COMMERCIAL

## 2022-11-08 ENCOUNTER — APPOINTMENT (OUTPATIENT)
Dept: MRI IMAGING | Facility: CLINIC | Age: 77
DRG: 100 | End: 2022-11-08
Payer: COMMERCIAL

## 2022-11-08 ENCOUNTER — APPOINTMENT (OUTPATIENT)
Dept: CT IMAGING | Facility: CLINIC | Age: 77
DRG: 100 | End: 2022-11-08
Attending: EMERGENCY MEDICINE
Payer: COMMERCIAL

## 2022-11-08 ENCOUNTER — PATIENT OUTREACH (OUTPATIENT)
Dept: GERIATRIC MEDICINE | Facility: CLINIC | Age: 77
End: 2022-11-08

## 2022-11-08 DIAGNOSIS — R56.9 SEIZURE (H): ICD-10-CM

## 2022-11-08 DIAGNOSIS — R91.8 PULMONARY NODULES: ICD-10-CM

## 2022-11-08 DIAGNOSIS — I51.81 STRESS-INDUCED CARDIOMYOPATHY: Primary | ICD-10-CM

## 2022-11-08 DIAGNOSIS — N94.9 ADNEXAL CYST: ICD-10-CM

## 2022-11-08 DIAGNOSIS — J45.30 MILD PERSISTENT ASTHMA WITHOUT COMPLICATION: ICD-10-CM

## 2022-11-08 DIAGNOSIS — J45.909 UNCOMPLICATED ASTHMA, UNSPECIFIED ASTHMA SEVERITY, UNSPECIFIED WHETHER PERSISTENT: ICD-10-CM

## 2022-11-08 DIAGNOSIS — R41.82 ALTERED MENTAL STATUS, UNSPECIFIED ALTERED MENTAL STATUS TYPE: ICD-10-CM

## 2022-11-08 DIAGNOSIS — K59.01 SLOW TRANSIT CONSTIPATION: ICD-10-CM

## 2022-11-08 DIAGNOSIS — I30.0 IDIOPATHIC PERICARDITIS, UNSPECIFIED CHRONICITY: ICD-10-CM

## 2022-11-08 DIAGNOSIS — N30.00 ACUTE CYSTITIS WITHOUT HEMATURIA: ICD-10-CM

## 2022-11-08 LAB
ALBUMIN SERPL-MCNC: 3.7 G/DL (ref 3.4–5)
ALBUMIN UR-MCNC: 30 MG/DL
ALP SERPL-CCNC: 81 U/L (ref 40–150)
ALT SERPL W P-5'-P-CCNC: 29 U/L (ref 0–50)
AMPHETAMINES UR QL SCN: NORMAL
ANION GAP SERPL CALCULATED.3IONS-SCNC: 21 MMOL/L (ref 3–14)
APPEARANCE UR: CLEAR
AST SERPL W P-5'-P-CCNC: 31 U/L (ref 0–45)
ATRIAL RATE - MUSE: 116 BPM
BARBITURATES UR QL: NORMAL
BASOPHILS # BLD AUTO: 0.1 10E3/UL (ref 0–0.2)
BASOPHILS NFR BLD AUTO: 1 %
BENZODIAZ UR QL: NORMAL
BILIRUB SERPL-MCNC: 0.8 MG/DL (ref 0.2–1.3)
BILIRUB UR QL STRIP: NEGATIVE
BUN SERPL-MCNC: 12 MG/DL (ref 7–30)
CALCIUM SERPL-MCNC: 8.9 MG/DL (ref 8.5–10.1)
CANNABINOIDS UR QL SCN: NORMAL
CHLORIDE BLD-SCNC: 105 MMOL/L (ref 94–109)
CK SERPL-CCNC: 157 U/L (ref 30–225)
CK SERPL-CCNC: 160 U/L (ref 30–225)
CO2 SERPL-SCNC: 13 MMOL/L (ref 20–32)
COCAINE UR QL: NORMAL
COLOR UR AUTO: ABNORMAL
CREAT SERPL-MCNC: 0.55 MG/DL (ref 0.52–1.04)
DIASTOLIC BLOOD PRESSURE - MUSE: NORMAL MMHG
EOSINOPHIL # BLD AUTO: 0.3 10E3/UL (ref 0–0.7)
EOSINOPHIL NFR BLD AUTO: 3 %
ERYTHROCYTE [DISTWIDTH] IN BLOOD BY AUTOMATED COUNT: 13.4 % (ref 10–15)
GFR SERPL CREATININE-BSD FRML MDRD: >90 ML/MIN/1.73M2
GLUCOSE BLD-MCNC: 141 MG/DL (ref 70–99)
GLUCOSE BLDC GLUCOMTR-MCNC: 101 MG/DL (ref 70–99)
GLUCOSE BLDC GLUCOMTR-MCNC: 102 MG/DL (ref 70–99)
GLUCOSE UR STRIP-MCNC: NEGATIVE MG/DL
HCO3 BLDV-SCNC: 15 MMOL/L (ref 21–28)
HCT VFR BLD AUTO: 40.5 % (ref 35–47)
HGB BLD-MCNC: 13.3 G/DL (ref 11.7–15.7)
HGB UR QL STRIP: ABNORMAL
HOLD SPECIMEN: NORMAL
HYALINE CASTS: 5 /LPF
IMM GRANULOCYTES # BLD: 0.1 10E3/UL
IMM GRANULOCYTES NFR BLD: 1 %
INTERPRETATION ECG - MUSE: NORMAL
KETONES UR STRIP-MCNC: 10 MG/DL
LACTATE BLD-SCNC: 12.7 MMOL/L
LACTATE SERPL-SCNC: 1.3 MMOL/L (ref 0.7–2)
LACTATE SERPL-SCNC: 2 MMOL/L (ref 0.7–2)
LACTATE SERPL-SCNC: 3.5 MMOL/L (ref 0.7–2)
LEUKOCYTE ESTERASE UR QL STRIP: NEGATIVE
LIPASE SERPL-CCNC: 138 U/L (ref 73–393)
LYMPHOCYTES # BLD AUTO: 1.5 10E3/UL (ref 0.8–5.3)
LYMPHOCYTES NFR BLD AUTO: 16 %
MAGNESIUM SERPL-MCNC: 2.4 MG/DL (ref 1.6–2.3)
MCH RBC QN AUTO: 32 PG (ref 26.5–33)
MCHC RBC AUTO-ENTMCNC: 32.8 G/DL (ref 31.5–36.5)
MCV RBC AUTO: 98 FL (ref 78–100)
MONOCYTES # BLD AUTO: 0.5 10E3/UL (ref 0–1.3)
MONOCYTES NFR BLD AUTO: 5 %
MUCOUS THREADS #/AREA URNS LPF: PRESENT /LPF
NEUTROPHILS # BLD AUTO: 7 10E3/UL (ref 1.6–8.3)
NEUTROPHILS NFR BLD AUTO: 74 %
NITRATE UR QL: NEGATIVE
NRBC # BLD AUTO: 0 10E3/UL
NRBC BLD AUTO-RTO: 0 /100
OPIATES UR QL SCN: NORMAL
P AXIS - MUSE: 66 DEGREES
PCO2 BLDV: 59 MM HG (ref 40–50)
PCP UR QL SCN: NORMAL
PH BLDV: 7.02 [PH] (ref 7.32–7.43)
PH UR STRIP: 5.5 [PH] (ref 5–7)
PLATELET # BLD AUTO: 325 10E3/UL (ref 150–450)
PO2 BLDV: 82 MM HG (ref 25–47)
POTASSIUM BLD-SCNC: 4.2 MMOL/L (ref 3.4–5.3)
PR INTERVAL - MUSE: 126 MS
PROT SERPL-MCNC: 7.3 G/DL (ref 6.8–8.8)
QRS DURATION - MUSE: 92 MS
QT - MUSE: 354 MS
QTC - MUSE: 492 MS
R AXIS - MUSE: 43 DEGREES
RBC # BLD AUTO: 4.15 10E6/UL (ref 3.8–5.2)
RBC URINE: 0 /HPF
SAO2 % BLDV: 88 % (ref 94–100)
SARS-COV-2 RNA RESP QL NAA+PROBE: NEGATIVE
SODIUM SERPL-SCNC: 139 MMOL/L (ref 133–144)
SP GR UR STRIP: 1.02 (ref 1–1.03)
SYSTOLIC BLOOD PRESSURE - MUSE: NORMAL MMHG
T AXIS - MUSE: -3 DEGREES
TROPONIN I SERPL HS-MCNC: 183 NG/L
TSH SERPL DL<=0.005 MIU/L-ACNC: 3.5 MU/L (ref 0.4–4)
UROBILINOGEN UR STRIP-MCNC: NORMAL MG/DL
VENTRICULAR RATE- MUSE: 116 BPM
WBC # BLD AUTO: 9.4 10E3/UL (ref 4–11)
WBC URINE: 1 /HPF

## 2022-11-08 PROCEDURE — 81001 URINALYSIS AUTO W/SCOPE: CPT | Performed by: EMERGENCY MEDICINE

## 2022-11-08 PROCEDURE — 99285 EMERGENCY DEPT VISIT HI MDM: CPT | Mod: 25

## 2022-11-08 PROCEDURE — A9585 GADOBUTROL INJECTION: HCPCS | Performed by: INTERNAL MEDICINE

## 2022-11-08 PROCEDURE — C9803 HOPD COVID-19 SPEC COLLECT: HCPCS

## 2022-11-08 PROCEDURE — 36415 COLL VENOUS BLD VENIPUNCTURE: CPT | Performed by: SURGERY

## 2022-11-08 PROCEDURE — 99291 CRITICAL CARE FIRST HOUR: CPT | Mod: GC | Performed by: INTERNAL MEDICINE

## 2022-11-08 PROCEDURE — 250N000009 HC RX 250: Performed by: EMERGENCY MEDICINE

## 2022-11-08 PROCEDURE — 200N000001 HC R&B ICU

## 2022-11-08 PROCEDURE — 250N000013 HC RX MED GY IP 250 OP 250 PS 637: Performed by: SURGERY

## 2022-11-08 PROCEDURE — 83605 ASSAY OF LACTIC ACID: CPT | Performed by: SURGERY

## 2022-11-08 PROCEDURE — 80307 DRUG TEST PRSMV CHEM ANLYZR: CPT | Performed by: PSYCHIATRY & NEUROLOGY

## 2022-11-08 PROCEDURE — 36415 COLL VENOUS BLD VENIPUNCTURE: CPT | Performed by: EMERGENCY MEDICINE

## 2022-11-08 PROCEDURE — 96368 THER/DIAG CONCURRENT INF: CPT

## 2022-11-08 PROCEDURE — 250N000011 HC RX IP 250 OP 636: Performed by: PSYCHIATRY & NEUROLOGY

## 2022-11-08 PROCEDURE — 999N000157 HC STATISTIC RCP TIME EA 10 MIN

## 2022-11-08 PROCEDURE — 74177 CT ABD & PELVIS W/CONTRAST: CPT

## 2022-11-08 PROCEDURE — 258N000003 HC RX IP 258 OP 636: Performed by: SURGERY

## 2022-11-08 PROCEDURE — 255N000002 HC RX 255 OP 636: Performed by: INTERNAL MEDICINE

## 2022-11-08 PROCEDURE — 96365 THER/PROPH/DIAG IV INF INIT: CPT | Mod: 59

## 2022-11-08 PROCEDURE — 82550 ASSAY OF CK (CPK): CPT | Performed by: SURGERY

## 2022-11-08 PROCEDURE — 250N000011 HC RX IP 250 OP 636

## 2022-11-08 PROCEDURE — 70496 CT ANGIOGRAPHY HEAD: CPT

## 2022-11-08 PROCEDURE — 84484 ASSAY OF TROPONIN QUANT: CPT | Performed by: EMERGENCY MEDICINE

## 2022-11-08 PROCEDURE — 250N000011 HC RX IP 250 OP 636: Performed by: EMERGENCY MEDICINE

## 2022-11-08 PROCEDURE — 85004 AUTOMATED DIFF WBC COUNT: CPT | Performed by: EMERGENCY MEDICINE

## 2022-11-08 PROCEDURE — 70498 CT ANGIOGRAPHY NECK: CPT

## 2022-11-08 PROCEDURE — 93005 ELECTROCARDIOGRAM TRACING: CPT

## 2022-11-08 PROCEDURE — 96375 TX/PRO/DX INJ NEW DRUG ADDON: CPT

## 2022-11-08 PROCEDURE — 96366 THER/PROPH/DIAG IV INF ADDON: CPT

## 2022-11-08 PROCEDURE — 5A1935Z RESPIRATORY VENTILATION, LESS THAN 24 CONSECUTIVE HOURS: ICD-10-PCS | Performed by: EMERGENCY MEDICINE

## 2022-11-08 PROCEDURE — 83735 ASSAY OF MAGNESIUM: CPT | Performed by: EMERGENCY MEDICINE

## 2022-11-08 PROCEDURE — 72170 X-RAY EXAM OF PELVIS: CPT

## 2022-11-08 PROCEDURE — 80053 COMPREHEN METABOLIC PANEL: CPT | Performed by: EMERGENCY MEDICINE

## 2022-11-08 PROCEDURE — 83690 ASSAY OF LIPASE: CPT | Performed by: EMERGENCY MEDICINE

## 2022-11-08 PROCEDURE — 70553 MRI BRAIN STEM W/O & W/DYE: CPT

## 2022-11-08 PROCEDURE — 70450 CT HEAD/BRAIN W/O DYE: CPT

## 2022-11-08 PROCEDURE — 83605 ASSAY OF LACTIC ACID: CPT

## 2022-11-08 PROCEDURE — 31500 INSERT EMERGENCY AIRWAY: CPT

## 2022-11-08 PROCEDURE — 96376 TX/PRO/DX INJ SAME DRUG ADON: CPT

## 2022-11-08 PROCEDURE — 999N000009 HC STATISTIC AIRWAY CARE

## 2022-11-08 PROCEDURE — 99291 CRITICAL CARE FIRST HOUR: CPT | Mod: GC | Performed by: PSYCHIATRY & NEUROLOGY

## 2022-11-08 PROCEDURE — 94002 VENT MGMT INPAT INIT DAY: CPT

## 2022-11-08 PROCEDURE — 82550 ASSAY OF CK (CPK): CPT | Performed by: EMERGENCY MEDICINE

## 2022-11-08 PROCEDURE — 84443 ASSAY THYROID STIM HORMONE: CPT | Performed by: EMERGENCY MEDICINE

## 2022-11-08 PROCEDURE — U0003 INFECTIOUS AGENT DETECTION BY NUCLEIC ACID (DNA OR RNA); SEVERE ACUTE RESPIRATORY SYNDROME CORONAVIRUS 2 (SARS-COV-2) (CORONAVIRUS DISEASE [COVID-19]), AMPLIFIED PROBE TECHNIQUE, MAKING USE OF HIGH THROUGHPUT TECHNOLOGIES AS DESCRIBED BY CMS-2020-01-R: HCPCS | Performed by: EMERGENCY MEDICINE

## 2022-11-08 PROCEDURE — 999N000065 XR CHEST PORT 1 VIEW

## 2022-11-08 PROCEDURE — 250N000011 HC RX IP 250 OP 636: Performed by: SURGERY

## 2022-11-08 RX ORDER — LEVETIRACETAM 10 MG/ML
1000 INJECTION INTRAVASCULAR EVERY 12 HOURS
Status: DISCONTINUED | OUTPATIENT
Start: 2022-11-08 | End: 2022-11-09

## 2022-11-08 RX ORDER — FENTANYL CITRATE 50 UG/ML
INJECTION, SOLUTION INTRAMUSCULAR; INTRAVENOUS
Status: COMPLETED
Start: 2022-11-08 | End: 2022-11-08

## 2022-11-08 RX ORDER — PROPOFOL 10 MG/ML
5-75 INJECTION, EMULSION INTRAVENOUS CONTINUOUS
Status: DISCONTINUED | OUTPATIENT
Start: 2022-11-08 | End: 2022-11-08

## 2022-11-08 RX ORDER — LEVETIRACETAM 10 MG/ML
2000 INJECTION INTRAVASCULAR ONCE
Status: COMPLETED | OUTPATIENT
Start: 2022-11-08 | End: 2022-11-08

## 2022-11-08 RX ORDER — POTASSIUM CHLORIDE 29.8 MG/ML
20 INJECTION INTRAVENOUS ONCE
Status: DISCONTINUED | OUTPATIENT
Start: 2022-11-08 | End: 2022-11-08

## 2022-11-08 RX ORDER — GADOBUTROL 604.72 MG/ML
6 INJECTION INTRAVENOUS ONCE
Status: COMPLETED | OUTPATIENT
Start: 2022-11-08 | End: 2022-11-08

## 2022-11-08 RX ORDER — NALOXONE HYDROCHLORIDE 0.4 MG/ML
0.4 INJECTION, SOLUTION INTRAMUSCULAR; INTRAVENOUS; SUBCUTANEOUS
Status: DISCONTINUED | OUTPATIENT
Start: 2022-11-08 | End: 2022-11-14 | Stop reason: HOSPADM

## 2022-11-08 RX ORDER — LEVETIRACETAM 10 MG/ML
1000 INJECTION INTRAVASCULAR EVERY 12 HOURS
Status: DISCONTINUED | OUTPATIENT
Start: 2022-11-08 | End: 2022-11-08

## 2022-11-08 RX ORDER — NICOTINE POLACRILEX 4 MG
15-30 LOZENGE BUCCAL
Status: DISCONTINUED | OUTPATIENT
Start: 2022-11-08 | End: 2022-11-14 | Stop reason: HOSPADM

## 2022-11-08 RX ORDER — FENTANYL CITRATE 50 UG/ML
100 INJECTION, SOLUTION INTRAMUSCULAR; INTRAVENOUS ONCE
Status: COMPLETED | OUTPATIENT
Start: 2022-11-08 | End: 2022-11-08

## 2022-11-08 RX ORDER — SODIUM CHLORIDE, SODIUM LACTATE, POTASSIUM CHLORIDE, CALCIUM CHLORIDE 600; 310; 30; 20 MG/100ML; MG/100ML; MG/100ML; MG/100ML
INJECTION, SOLUTION INTRAVENOUS CONTINUOUS
Status: DISCONTINUED | OUTPATIENT
Start: 2022-11-08 | End: 2022-11-12

## 2022-11-08 RX ORDER — AMOXICILLIN 250 MG
2 CAPSULE ORAL 2 TIMES DAILY PRN
Status: DISCONTINUED | OUTPATIENT
Start: 2022-11-08 | End: 2022-11-14 | Stop reason: HOSPADM

## 2022-11-08 RX ORDER — ONDANSETRON 4 MG/1
4 TABLET, ORALLY DISINTEGRATING ORAL EVERY 6 HOURS PRN
Status: DISCONTINUED | OUTPATIENT
Start: 2022-11-08 | End: 2022-11-14 | Stop reason: HOSPADM

## 2022-11-08 RX ORDER — NALOXONE HYDROCHLORIDE 0.4 MG/ML
0.2 INJECTION, SOLUTION INTRAMUSCULAR; INTRAVENOUS; SUBCUTANEOUS
Status: DISCONTINUED | OUTPATIENT
Start: 2022-11-08 | End: 2022-11-14 | Stop reason: HOSPADM

## 2022-11-08 RX ORDER — PROPOFOL 10 MG/ML
5-75 INJECTION, EMULSION INTRAVENOUS CONTINUOUS
Status: DISCONTINUED | OUTPATIENT
Start: 2022-11-08 | End: 2022-11-09

## 2022-11-08 RX ORDER — AMOXICILLIN 250 MG
1 CAPSULE ORAL 2 TIMES DAILY PRN
Status: DISCONTINUED | OUTPATIENT
Start: 2022-11-08 | End: 2022-11-14 | Stop reason: HOSPADM

## 2022-11-08 RX ORDER — LORAZEPAM 2 MG/ML
2 INJECTION INTRAMUSCULAR ONCE
Status: COMPLETED | OUTPATIENT
Start: 2022-11-08 | End: 2022-11-08

## 2022-11-08 RX ORDER — ONDANSETRON 2 MG/ML
4 INJECTION INTRAMUSCULAR; INTRAVENOUS EVERY 6 HOURS PRN
Status: DISCONTINUED | OUTPATIENT
Start: 2022-11-08 | End: 2022-11-14 | Stop reason: HOSPADM

## 2022-11-08 RX ORDER — IOPAMIDOL 755 MG/ML
75 INJECTION, SOLUTION INTRAVASCULAR ONCE
Status: COMPLETED | OUTPATIENT
Start: 2022-11-08 | End: 2022-11-08

## 2022-11-08 RX ORDER — DEXTROSE MONOHYDRATE 25 G/50ML
25-50 INJECTION, SOLUTION INTRAVENOUS
Status: DISCONTINUED | OUTPATIENT
Start: 2022-11-08 | End: 2022-11-14 | Stop reason: HOSPADM

## 2022-11-08 RX ORDER — LORAZEPAM 2 MG/ML
INJECTION INTRAMUSCULAR
Status: COMPLETED
Start: 2022-11-08 | End: 2022-11-08

## 2022-11-08 RX ORDER — CHLORHEXIDINE GLUCONATE ORAL RINSE 1.2 MG/ML
15 SOLUTION DENTAL EVERY 12 HOURS
Status: DISCONTINUED | OUTPATIENT
Start: 2022-11-08 | End: 2022-11-09

## 2022-11-08 RX ORDER — ETOMIDATE 2 MG/ML
20 INJECTION INTRAVENOUS ONCE
Status: COMPLETED | OUTPATIENT
Start: 2022-11-08 | End: 2022-11-08

## 2022-11-08 RX ORDER — FLUTICASONE PROPIONATE AND SALMETEROL XINAFOATE 115; 21 UG/1; UG/1
2 AEROSOL, METERED RESPIRATORY (INHALATION) 2 TIMES DAILY
Status: DISCONTINUED | OUTPATIENT
Start: 2022-11-08 | End: 2022-11-14 | Stop reason: HOSPADM

## 2022-11-08 RX ORDER — LEVETIRACETAM 10 MG/ML
1000 INJECTION INTRAVASCULAR EVERY 12 HOURS
Status: DISCONTINUED | OUTPATIENT
Start: 2022-11-09 | End: 2022-11-08

## 2022-11-08 RX ADMIN — Medication 25 MCG: at 20:22

## 2022-11-08 RX ADMIN — ETOMIDATE 20 MG: 2 INJECTION, SOLUTION INTRAVENOUS at 11:05

## 2022-11-08 RX ADMIN — IOPAMIDOL 75 ML: 755 INJECTION, SOLUTION INTRAVENOUS at 12:03

## 2022-11-08 RX ADMIN — PROPOFOL 25 MCG/KG/MIN: 10 INJECTION, EMULSION INTRAVENOUS at 19:00

## 2022-11-08 RX ADMIN — GADOBUTROL 6 ML: 604.72 INJECTION INTRAVENOUS at 22:12

## 2022-11-08 RX ADMIN — FENTANYL CITRATE 100 MCG: 50 INJECTION, SOLUTION INTRAMUSCULAR; INTRAVENOUS at 11:14

## 2022-11-08 RX ADMIN — LEVETIRACETAM 2000 MG: 10 INJECTION INTRAVENOUS at 11:13

## 2022-11-08 RX ADMIN — SODIUM CHLORIDE 90 ML: 900 INJECTION INTRAVENOUS at 12:03

## 2022-11-08 RX ADMIN — LORAZEPAM 2 MG: 2 INJECTION INTRAMUSCULAR; INTRAVENOUS at 10:59

## 2022-11-08 RX ADMIN — LORAZEPAM 2 MG: 2 INJECTION INTRAMUSCULAR at 10:59

## 2022-11-08 RX ADMIN — SODIUM CHLORIDE, POTASSIUM CHLORIDE, SODIUM LACTATE AND CALCIUM CHLORIDE: 600; 310; 30; 20 INJECTION, SOLUTION INTRAVENOUS at 16:19

## 2022-11-08 RX ADMIN — PROPOFOL 30 MCG/KG/MIN: 10 INJECTION, EMULSION INTRAVENOUS at 11:10

## 2022-11-08 RX ADMIN — CHLORHEXIDINE GLUCONATE 15 ML: 1.2 SOLUTION ORAL at 19:33

## 2022-11-08 RX ADMIN — FENTANYL CITRATE 100 MCG: 50 INJECTION INTRAMUSCULAR; INTRAVENOUS at 12:34

## 2022-11-08 RX ADMIN — ROCURONIUM BROMIDE 100 MG: 10 SOLUTION INTRAVENOUS at 11:06

## 2022-11-08 RX ADMIN — LEVETIRACETAM 1000 MG: 10 INJECTION INTRAVENOUS at 19:33

## 2022-11-08 RX ADMIN — PROPOFOL 35 MCG/KG/MIN: 10 INJECTION, EMULSION INTRAVENOUS at 22:43

## 2022-11-08 RX ADMIN — Medication 25 MCG/HR: at 16:38

## 2022-11-08 ASSESSMENT — ACTIVITIES OF DAILY LIVING (ADL)
ADLS_ACUITY_SCORE: 41
ADLS_ACUITY_SCORE: 35
ADLS_ACUITY_SCORE: 41
ADLS_ACUITY_SCORE: 35
ADLS_ACUITY_SCORE: 43
ADLS_ACUITY_SCORE: 35
ADLS_ACUITY_SCORE: 35

## 2022-11-08 NOTE — PROGRESS NOTES
Intubation Note    Date of intubation: 11/08/2022  Time of intubation: 1106  Location of intubation: [place] [id number]  Intubated by: MD  Size of ETT: 7.5  Location (cm) at lip: 21    ETT placement confirmed by: [employer] ETCo2, BBS, CHX      Syed Pitts, RT

## 2022-11-08 NOTE — ED NOTES
Within minutes of arriving patient using a few illogical words then proceeded into seizure. Nasal airway inserted and BVM initiated while preparing to intubate.

## 2022-11-08 NOTE — CONSULTS
"[facility]    Stroke Consult Note    Reason for Consult:  New onset GTC    Chief Complaint: Altered Mental Status       HPI  Morenita Moore is a 76 YO F w/PMHx sig for SVT, HTN who presents on 11/8 after being found down at living facility from unwitnessed event. EMS noted encephalopathy and seizure like activity. She was intubated in ED and given 2 mg IV lorazepam, 2 gm keppra. NCCT, CTA H/N unrevealing for acute pathology.     HPI limited by pt condition.     In ED vitals unremarkable. Labs with trop 183, lactate 12.7, utox negative.     Stroke Evaluation Summarized    MRI/Head CT CT head  No acute intracranial abnormality.   Intracranial Vasculature No evidence of large vessel occlusion or high-grade  stenosis.    Cervical Vasculature No evidence of large vessel occlusion or high grade  stenosis.      Echocardiogram Pending   EKG/Telemetry Sinus tachycardia   Low voltage QRS   Septal infarct    Other Testing Not Applicable     LDL  No lab value available in past 30 days   A1C  No lab value available in past 90 days   Troponin 11/8/2022: 183 ng/L       Impression/Recommensations  # New onset GTC.   # Encephalopathy from presumed seizures. Unclear trigger for new onset seizures given lack of prior hx for epilepsy. NCCT, CTA H/N reassuring against acute pathology such as ICH. Tox screen negative.   - MRI brain w/wo ordered  - cEEG ordered  - S/p keppra 2 gm load in ED   - 1 gm BID maintenance dosi ordered    Patient Follow-up    - final recommendation pending work-up    Thank you for this consult. We will continue to follow.     The Stroke Staff is Dr. Rudd.    Jm Hodge MD  Vascular Neurology Fellow  To page me or covering stroke neurology team member, click here: AMCOM   Choose \"On Call\" tab at top, then search dropdown box for \"Neurology Adult\", select location, press Enter, then look for stroke/neuro " ICU/telestroke.    _____________________________________________________    Clinically Significant Risk Factors Present on Admission             # Anion Gap Metabolic Acidosis: Highest Anion Gap = 21 mmol/L (Ref range: 7-15) in last 2 days, will monitor and treat as appropriate             Past Medical History   Past Medical History:   Diagnosis Date     Anxiety      Asthma 2012    adult-onset asthma diagnosed in 2012     Cancer (H)      DCIS (ductal carcinoma in situ) 2001    stage 0 status post left mastectomy in 2001     Depressive disorder      PONV (postoperative nausea and vomiting)      Torticollis      Tremor      Past Surgical History   Past Surgical History:   Procedure Laterality Date     BREAST SURGERY       CATARACT IOL, RT/LT       MASTECTOMY      DCIS     ODONTECTOMY Left 8/2/2022    Procedure: SURGICAL EXTRACTION, TOOTH - Teeth #12, 14, 19;  Surgeon: Arvin Garza DDS;  Location: [facility] OR     RETINAL REATTACHMENT       Medications   Home Meds  Prior to Admission medications    Medication Sig Start Date End Date Taking? Authorizing Provider   albuterol (PROAIR HFA/PROVENTIL HFA/VENTOLIN HFA) 108 (90 Base) MCG/ACT inhaler Inhale 2 puffs into the lungs every 6 hours  Patient taking differently: Inhale 2 puffs into the lungs every 6 hours as needed 3/23/22  Yes Pacheco Perdomo MD   fluticasone-salmeterol (ADVAIR HFA) 115-21 MCG/ACT inhaler Inhale 2 puffs into the lungs 2 times daily 10/6/22  Yes Pacheco Perdomo MD   calcium carbonate-vitamin D 600-125 MG-UNIT TABS Take 1 tablet by mouth daily    Reported, Patient   Cyanocobalamin 50 MCG TABS Take 50 mcg by mouth once a week     Reported, Patient       Scheduled Meds    chlorhexidine  15 mL Mouth/Throat Q12H     fluticasone-vilanterol  1 puff Inhalation Daily     levETIRAcetam  1,000 mg Intravenous Q12H     [START ON 11/9/2022] pantoprazole  40 mg Per Feeding Tube QAM AC    Or     [START ON 11/9/2022] pantoprazole  40 mg Intravenous QAM AC       Infusion  Meds    fentaNYL       lactated ringers       propofol      And     - MEDICATION INSTRUCTIONS -         PRN Meds  glucose **OR** dextrose **OR** glucagon, fentaNYL, propofol **AND** propofol **AND** CK total **AND** Triglycerides **AND** - MEDICATION INSTRUCTIONS - **AND** Notify Physician, naloxone **OR** naloxone **OR** naloxone **OR** naloxone, ondansetron **OR** ondansetron, senna-docusate **OR** senna-docusate    Allergies   Allergies   Allergen Reactions     Levalbuterol Hydrochloride Shortness Of Breath     Cats Other (See Comments)     Itchy eyes     Dust Mites      Other reaction(s): Wheezing  Wheezing/shortness/breath/see (IRP )     Qvar [Beclomethasone]      Per patient- allergic to QVAR     Amitriptyline Palpitations     Escitalopram Anxiety     Family History   Family History   Problem Relation Age of Onset     Cancer Sister         breast cancer     Glaucoma No family hx of      Macular Degeneration No family hx of      Asthma Sister      Breast Cancer Sister      Heart Failure Mother          at 97     Social History   Social History     Tobacco Use     Smoking status: Former     Packs/day: 1.00     Years: 6.00     Pack years: 6.00     Types: Cigarettes     Quit date:      Years since quittin.8     Smokeless tobacco: Never     Tobacco comments:     smoked about 10 years   Vaping Use     Vaping Use: Never used   Substance Use Topics     Alcohol use: No     Drug use: Never       Review of Systems   The 10 point Review of Systems is negative other than noted in the HPI or here.        PHYSICAL EXAMINATION   Temp:  [97.7  F (36.5  C)-98.3  F (36.8  C)] 97.7  F (36.5  C)  Pulse:  [] 69  Resp:  [11-30] 18  BP: ()/(54-84) 133/84  FiO2 (%):  [40 %] 40 %  SpO2:  [82 %-100 %] 100 %    Neuro  Exam c/b sedation, gio-intubation medications    MS: E1, intubated, M4  CN: PEERL, symmetric facial features  Motor: Withdraws to nox stim in BLE    Dysphagia Screen  Per Nursing    Imaging  I  personally reviewed all imaging; relevant findings per HPI.    Labs Data   CBC  Recent Labs   Lab 11/08/22  1122   WBC 9.4   RBC 4.15   HGB 13.3   HCT 40.5        Basic Metabolic Panel   Recent Labs   Lab 11/08/22  1434 11/08/22  1122   NA  --  139   POTASSIUM  --  4.2   CHLORIDE  --  105   CO2  --  13*   BUN  --  12   CR  --  0.55   * 141*   MARIBEL  --  8.9     Liver Panel  Recent Labs   Lab 11/08/22  1122   PROTTOTAL 7.3   ALBUMIN 3.7   BILITOTAL 0.8   ALKPHOS 81   AST 31   ALT 29     INR    Recent Labs   Lab Test 05/01/20  1810   INR 0.99      Lipid Profile    Recent Labs   Lab Test 03/21/22  0721   CHOL 162   HDL 57   LDL 82   TRIG 113     A1C  No lab results found.  Troponin    Recent Labs   Lab 11/08/22  1122   TROPONINIS 183*

## 2022-11-08 NOTE — H&P
[facility]    ICU History and Physical    Primary Team: ICU  Reason for Critical Care Admission: AMS  Admitting Physician: Abran Benavides MD  Date of Admission:  11/8/2022    Assessment: Critical Care   Morenita Moore is a 77 year old female admitted on 11/8/2022 to the ICU after she was found down from an unwitnessed event. Per EMS report the patient said a few illogical words and then proceeded to have seizure like activities. She was brought to the ED and intubated. NCC consulted on admission. Will obtain echo to evaluate for cardiac etiology of fall, EEG to assess for seizure disorder. Given remote history of left breast DCIS and possibility of metastatic breast cancer, will obtain brain MRI w/wo contrast to evaluate for intracranial lesion as possible etiology of seizure.        Plan: Critical Care   Neuro/ pain/ sedation:  # AMS, encephalopathy  # r/o seizure  - Monitor neurological status. Notify provider for any acute changes in exam  - pain and sedation with prop and fent- minimize as able to obtain neuro exam  - Neuro critical care consult, appreciate recommendations  - Loaded with keppra in the ed. Continue 1g BID  - EEG  - CTA head and neck r/o CVA  - MRI brain w/wo when able    Pulmonary:  #acute hypoxic, hypercarbic respiratory failure 2/2 to AMS  # respiratory acidosis, addressing with mechanical ventilation  - Continue ET tube and mechanical ventilator, wean as able. Daily sedation vacation  - supplemental oxygen to keep saturation about 92%    Cardiovascular:  #HDS  #HTN, chronic  - Monitor hemodynamic status.  - echo to evaluate for cardiogenic cause of syncope    GI/Nutrition:  - Diet:  NPO  - Nutrition consulted. Appreciate recommendations.   - OG to LIS  - PPI for pud ppx while intubated  - bowel regimen    Renal/ Fluid Balance:   # Anion gap Metabolic acidosis 2/2 to lactic acidosis, present on admission, unclear etiology. Admission CT C/A/P negative  -  trend lactate q 4 hours  - IVF resuscitation with LR  - Will monitor intake and output  - LR at 125 for IV fluid hydration  - ICU electrolyte replacement protocol  - check CK    Endocrine:  # No acute issues  - monitor glucose PRN    ID:  # No acute issues  - monitor for signs of infection    Hematology:   #no acute issues  - monitor    MSK:   - PT and OT to be consulted after extubation. Appreciate recommendations.     Lines/ tubes/ drains:  Mcdowell Catheter: Not present  Central Lines: None    Prophylaxis:  - DVT Prophylaxis: Pneumatic Compression Devices  - PUD Prophylaxis: PPI    Code Status:   FULL    Disposition:  - ICU,    The patient's care was discussed with the Attending Physician, Dr. Benavides, Bedside Nurse and NCC Consultant.  Critical care time exclusive of procedures: 45    Clinically Significant Risk Factors Present on Admission             # Anion Gap Metabolic Acidosis: Highest Anion Gap = 21 mmol/L (Ref range: 7-15) in last 2 days, will monitor and treat as appropriate      # Acute Respiratory Failure: Documented O2 saturation < 91%.  Continue supplemental oxygen as needed         Farheen Clayton MD  [facility]  Securely message with the Vocera Web Console (learn more here)  Text page via [employer] Paging/Directory         ______________________________________________________________________    Chief Complaint   AMS, found down    History is obtained from the electronic health record  Patient is intubated and sedated, unable to participate in history     History of Present Illness   Morenita Moore is a 77 year old relatively healthy female who presents via EMS after she was found down from an unwitnessed event with seizure like activity earlier today. In the ED she was found to have a non anion gap metabolic acidosis from lactic acidosis but otherwise negative CT head, CTA neck, CT C/A/P. While in the ED she was reported to have tonic clonic type movement and was given  keppra for seizure like activity. She was intubated and admitted to the ICU for ongoing management.     Review of Systems    Review of systems not obtained due to patient factors - intubation and sedation    Past Medical History      I have reviewed this patient's medical history and updated it with pertinent information if needed.   Past Medical History:   Diagnosis Date     Anxiety      Asthma     adult-onset asthma diagnosed in      Cancer (H)      DCIS (ductal carcinoma in situ)     stage 0 status post left mastectomy in      Depressive disorder      PONV (postoperative nausea and vomiting)      Torticollis      Tremor        Past Surgical History   I have reviewed this patient's surgical history and updated it with pertinent information if needed.  Past Surgical History:   Procedure Laterality Date     BREAST SURGERY       CATARACT IOL, RT/LT       MASTECTOMY      DCIS     ODONTECTOMY Left 2022    Procedure: SURGICAL EXTRACTION, TOOTH - Teeth #12, 14, 19;  Surgeon: Arvin Garza DDS;  Location: [facility] OR     RETINAL REATTACHMENT         Social History   I have reviewed this patient's social history and updated it with pertinent information if needed.  Social History     Tobacco Use     Smoking status: Former     Packs/day: 1.00     Years: 6.00     Pack years: 6.00     Types: Cigarettes     Quit date:      Years since quittin.8     Smokeless tobacco: Never     Tobacco comments:     smoked about 10 years   Vaping Use     Vaping Use: Never used   Substance Use Topics     Alcohol use: No     Drug use: Never       Family History   I have reviewed this patient's family history and updated it with pertinent information if needed.  Family History   Problem Relation Age of Onset     Cancer Sister         breast cancer     Glaucoma No family hx of      Macular Degeneration No family hx of      Asthma Sister      Breast Cancer Sister      Heart Failure Mother          at 97       Prior to  Admission Medications   Prior to Admission Medications   Prescriptions Last Dose Informant Patient Reported? Taking?   Cyanocobalamin 50 MCG TABS   Yes No   Sig: Take 50 mcg by mouth once a week    albuterol (PROAIR HFA/PROVENTIL HFA/VENTOLIN HFA) 108 (90 Base) MCG/ACT inhaler   No Yes   Sig: Inhale 2 puffs into the lungs every 6 hours   Patient taking differently: Inhale 2 puffs into the lungs every 6 hours as needed   calcium carbonate-vitamin D 600-125 MG-UNIT TABS   Yes No   Sig: Take 1 tablet by mouth daily   fluticasone-salmeterol (ADVAIR HFA) 115-21 MCG/ACT inhaler   No Yes   Sig: Inhale 2 puffs into the lungs 2 times daily      Facility-Administered Medications: None     Allergies   Allergies   Allergen Reactions     Levalbuterol Hydrochloride Shortness Of Breath     Cats Other (See Comments)     Itchy eyes     Dust Mites      Other reaction(s): Wheezing  Wheezing/shortness/breath/see (IRP 6/1)     Qvar [Beclomethasone]      Per patient- allergic to QVAR     Amitriptyline Palpitations     Escitalopram Anxiety       Physical Exam   Vital Signs: Temp: 98.3  F (36.8  C) Temp src: Temporal BP: 116/77 Pulse: 84   Resp: 17 SpO2: 100 % O2 Device: Mechanical Ventilator Oxygen Delivery: 15 LPM  Weight: 130 lbs 15.25 oz    General: sedated on propofol, grimaces to examination.   HEENT: does not open eyes spontaneously. Sclera are non icteric. Pupils are equal bilaterally. ET tube in place. OG in place  Neuro: [id number]- GCS 5T. Does not follow commands.   CV: cool extremities, non mottled appearance. palpable radial pulses, stronger in the right compared to the left  Pulm: on mechanical ventilation, no increased work of breathing  Abd: soft, non tender, non distended  : zamorano in place  Extremities: superficial bruising without abrasions across BL knees, no associated swelling. Small hematoma over left forehead.   Skin: cool, non mottled  Incisions: well healed left mastectomy incisoin  Psych: calm and sedated    Data    I reviewed all medications, new labs and imaging results over the last 24 hours.  Arterial Blood Gases   Recent Labs   Lab 11/08/22  1124   PH 7.02*       Complete Blood Count   Recent Labs   Lab 11/08/22  1122   WBC 9.4   HGB 13.3          Basic Metabolic Panel  Recent Labs   Lab 11/08/22  1434 11/08/22  1122   NA  --  139   POTASSIUM  --  4.2   CHLORIDE  --  105   CO2  --  13*   BUN  --  12   CR  --  0.55   * 141*       Liver Function Tests  Recent Labs   Lab 11/08/22  1122   AST 31   ALT 29   ALKPHOS 81   BILITOTAL 0.8   ALBUMIN 3.7       Pancreatic Enzymes  Recent Labs   Lab 11/08/22  1122   LIPASE 138       Coagulation Profile  No lab results found in last 7 days.    IMAGING:  Recent Results (from the past 24 hour(s))   XR Chest Port 1 View    Narrative    CHEST PORTABLE 1 VIEW   11/8/2022 11:29 AM     HISTORY: Post intubation.    COMPARISON: None.      Impression    IMPRESSION: ET tube is 6.4 cm proximal to the stephanie. Nasogastric tube  in place. No focal airspace disease. Normal cardiac silhouette. No  effusions identified.   XR Pelvis Port 1/2 Views    Narrative    XR PELVIS PORT 1/2 VIEWS   11/8/2022 11:29 AM     HISTORY: Found down  COMPARISON: None.       Impression    IMPRESSION: There is diffuse osseous demineralization which decreases  the radiographic sensitivity for the detection of osseous pathology.  Within this limitation, no acute fracture is seen. Hip joint spaces  are preserved. There are degenerative changes in lower lumbar spine.    JAC NASCIMENTO MD         SYSTEM ID:  [id number]   CT Head w/o Contrast    Narrative    CT SCAN OF THE HEAD WITHOUT CONTRAST   11/8/2022 12:24 PM     HISTORY: Seizure.    TECHNIQUE:  Axial images of the head and coronal reformations without  IV contrast material. Radiation dose for this scan was reduced using  automated exposure control, adjustment of the mA and/or kV according  to patient size, or iterative reconstruction  technique.    COMPARISON: None.    FINDINGS:  No evidence of hemorrhage. Volume loss and white matter  hypoattenuation likely representing chronic small vessel ischemic  change. No acute osseous abnormality. Mild paranasal sinus mucosal  thickening. Endotracheal and enteric tubes partially visualized.      Impression    IMPRESSION:   No acute intracranial abnormality.    MELISA PAINTER MD         SYSTEM ID:  [id number]   CT Chest/Abdomen/Pelvis w Contrast    Narrative    CT CHEST/ABDOMEN/PELVIS WITH CONTRAST November 8, 2022 12:34 PM    CLINICAL HISTORY: Found down, seizure, intubated.    TECHNIQUE: CT scan of the chest, abdomen, and pelvis was performed  following injection of IV contrast. Multiplanar reformats were  obtained. Dose reduction techniques were used.   CONTRAST: a total of 75 mL Isovue-370 was used for both CAP and CTA  head and neck.    COMPARISON: CT chest 3/17/2022, CT chest, abdomen and pelvis 3/6/2020.    FINDINGS:   LUNGS AND PLEURA: Right basilar atelectasis. No acute airspace disease  identified. Stable cavitary nodule superior right lower lobe measuring  0.3 cm series 4 image 136. Stable left upper lobe ground-glass nodule  measuring 0.4 cm image 64. Stable ground-glass nodule left upper lobe  measuring 0.5 cm image 72. There is a new solid nodule measuring 0.4  cm anterior right lower lobe abutting the fissure image 171. No  effusion.    MEDIASTINUM/AXILLAE: ET tube identified, its tip above the stephanie.  Nasogastric tube tip within the proximal stomach. No enlarged lymph  nodes. No acute mediastinal abnormality is seen.    CORONARY ARTERY CALCIFICATION: Mild.    HEPATOBILIARY: Multiple hepatic cysts. Some hypodensities are too  small for characterization. Gallbladder unremarkable.    PANCREAS: Normal.    SPLEEN: Normal.    ADRENAL GLANDS: Normal.    KIDNEYS/BLADDER: No hydronephrosis. No urolithiasis. Contrast within  the bilateral renal medulla may be related to recent  contrast  administration. Catheter in the bladder.    BOWEL: No obstruction or acute inflammation. Moderate stool. No free  fluid or free air.    PELVIC ORGANS: Left ovarian/adnexal cyst is 5.9 cm, previously 4 cm on  3/6/2020.    ADDITIONAL FINDINGS: No enlarged lymph nodes.    MUSCULOSKELETAL: No acute displaced fracture is seen. Spine  degenerative changes.      Impression    IMPRESSION:  1.  No acute abnormality is identified.  2.  Larger size of a left ovarian/adnexal cysts as compared to  3/6/2020. Correlate with pelvic ultrasound for further assessment.  3.  A few bilateral indeterminate pulmonary nodules. Previously noted  nodules are stable compared to 3/17/2022. However, there is a new  solid nodule at the right lower lobe. Therefore, recommend follow-up  CT chest in six months for surveillance.  4.  Increased density of the bilateral renal medulla that may relate  to recent contrast administration. Correlate with renal function  testing.   CTA Head Neck with Contrast    Narrative    CT ANGIOGRAM OF THE HEAD AND NECK WITH CONTRAST  11/8/2022 12:35 PM     HISTORY: Seizure.     TECHNIQUE:  CT angiography with an injection of a total of 75 mL  Isovue-370 was used for both CAP and CTA head and neck IV with scans  through the head and neck. Images were transferred to a separate 3-D  workstation where multiplanar reformations and 3-D images were  created. Estimates of carotid stenoses are made relative to the distal  internal carotid artery diameters except as noted. Radiation dose for  this scan was reduced using automated exposure control, adjustment of  the mA and/or kV according to patient size, or iterative  reconstruction technique.    COMPARISON: None.     CT ANGIOGRAM HEAD FINDINGS:    No evidence of large vessel occlusion, high-grade stenosis, aneurysm,  or vascular malformation. The distal vertebral basilar system is small  in the setting of fetal origins of the bilateral posterior cerebral  arteries  with presumed hypoplastic right posterior cerebral artery P1  segment.    CT ANGIOGRAM NECK FINDINGS:   No evidence of large vessel occlusion, high-grade stenosis, or  dissection.     INCIDENTAL FINDINGS: Endotracheal and enteric tubes are present with  secretions pooling within the pharynx. Cervical spine degenerative  changes with multiple stenoses. Scattered nonspecific pulmonary  opacities/nodules including groundglass nodules within the left upper  lung. Aspirated subglottic tracheal secretions are present. Refer to  chest report.      Impression    IMPRESSION:   CTA Head: No evidence of large vessel occlusion or high-grade  stenosis.   CTA Neck: No evidence of large vessel occlusion or high grade  stenosis.

## 2022-11-08 NOTE — ED PROVIDER NOTES
History   Chief Complaint:  Altered Mental Status       The history is provided by the EMS personnel.      Morenita Moore is a 77 year old female with history of SVT and hypertension who presents with altered mental status. Per EMS the patient was found down in the hallway of her living facility. Her fall was unwitnessed and it is unknown how long she was on the floor however, they note finding fresh apple slices on her kitchen counter. Her neighbor stated that she was confused above her baseline. She was incontinent en route and was oriented only to self for EMS.     Review of Systems   Unable to perform ROS: Mental status change     Allergies:  Levalbuterol Hydrochloride  Cats  Dust Mites  Qvar [Beclomethasone]  Amitriptyline  Escitalopram    Medications:  Albuterol inhaler  Fluticasone - salmeterol inhaler    Past Medical History:     Acute internal derangement of left knee  Asthma in adult  Breast cancer in situ  Sprain of posterior cruciate ligament of left knee  Supraventricular tachycardia  Torticollis  GERD without esophagitis  Impaired instrumental activities of daily living  Major depression in remission  Neck pain  Pericarditis  Primary osteoarthritis of both knees  CELESTE  Presbyopia  Senile nuclear sclerosis  Tremor, physiological  Hypertension  Dizziness  Degenerative disease of nervous system  Benzodiazepine dependence  Mild persistent asthma without complication       Past Surgical History:    Mastectomy, DCIS, left   Cataract IOL, bilateral  Odontectomy, teeth 12, 14, 19  Retinal reattachment     Family History:    Sister: Breast cancer x2, asthma  Mother: Heart failure    Social History:  The patient presents to the ED alone. She arrived via EMS.    Physical Exam     Patient Vitals for the past 24 hrs:   BP Temp Temp src Pulse Resp SpO2 Weight   11/08/22 1245 116/77 -- -- 84 17 100 % --   11/08/22 1230 109/78 -- -- 87 17 100 % --   11/08/22 1140 98/66 -- -- 110 17 99 % --   11/08/22 1130 108/63  -- -- (!) 124 17 99 % --   11/08/22 1127 98/56 98.3  F (36.8  C) Temporal 116 17 98 % --   11/08/22 1124 97/56 -- -- 117 17 98 % --   11/08/22 1121 92/54 -- -- 115 17 98 % --   11/08/22 1118 94/58 -- -- 116 18 98 % --   11/08/22 1115 102/80 -- -- 118 18 99 % --   11/08/22 1112 130/65 -- -- (!) 125 16 100 % --   11/08/22 1109 118/62 -- -- 112 11 100 % 59.4 kg (130 lb 15.3 oz)   11/08/22 1106 -- -- -- 93 22 100 % --   11/08/22 1105 -- -- -- 118 30 92 % --   11/08/22 1104 117/60 -- -- 93 -- -- --   11/08/22 1103 -- -- -- -- -- 100 % --   11/08/22 1059 -- -- -- -- -- (!) 82 % --       Physical Exam  General: Laying on the ED bed   HEENT: Normocephalic, atraumatic, dried blood around the lips, PERRL  Cardiac: Radial pulses 2+, regular tachycardia  Pulm: Breathing comfortably, no accessory muscle usage, no conversational dyspnea, and lungs clear bilaterally  GI: Abdomen soft, nontender, no rigidity or guarding  MSK: No deformities  Skin: Warm and dry  Neuro: Moves all extremities x4, no focal deficits initially.  During initial evaluation develops rightward gaze and rightward rotation of the head followed by generalized tonic seizure with tensing of the bilateral upper and lower extremities and foaming at the mouth.  Psych: Normal mood and affect      Emergency Department Course   ECG  ECG taken at 1125, ECG read at 1148  Sinus tachycardia. Low voltage QRS. Septal infarct, age undetermined. Inferior infarct, age undetermined.   V2/V3 JANINA. III/aVF STD as compared to prior, dated 5/1/2020.  Rate 116 bpm. AK interval 126 ms. QRS duration 92 ms. QT/QTc 354/492 ms. P-R-T axes 66 43 -3.     ECG 2  ECG taken at 1306, ECG read at 1311  Normal sinus rhythm. Septal infarct, age undetermined.   Precordial JANINA improving, now <2 mm, III/aVF STD resolved as compared to prior, dated 11/8/22.  Rate 75 bpm. AK interval 130 ms. QRS duration 90 ms. QT/QTc 392/437 ms. P-R-T axes 67 57 48.     Imaging:  CTA Head Neck with Contrast    Preliminary Result   IMPRESSION:    CTA Head: No evidence of large vessel occlusion or high-grade   stenosis.    CTA Neck: No evidence of large vessel occlusion or high grade   stenosis.       CT Chest/Abdomen/Pelvis w Contrast   Preliminary Result   IMPRESSION:   1.  No acute abnormality is identified.   2.  Larger size of a left ovarian/adnexal cysts as compared to   3/6/2020. Correlate with pelvic ultrasound for further assessment.   3.  A few bilateral indeterminate pulmonary nodules. Previously noted   nodules are stable compared to 3/17/2022. However, there is a new   solid nodule at the right lower lobe. Therefore, recommend follow-up   CT chest in six months for surveillance.   4.  Increased density of the bilateral renal medulla that may relate   to recent contrast administration. Correlate with renal function   testing.      CT Head w/o Contrast   Final Result   IMPRESSION:    No acute intracranial abnormality.      MELISA PAINTER MD            SYSTEM ID:  [id number]      XR Chest Port 1 View   Preliminary Result   IMPRESSION: ET tube is 6.4 cm proximal to the stephanie. Nasogastric tube   in place. No focal airspace disease. Normal cardiac silhouette. No   effusions identified.      XR Pelvis Port 1/2 Views   Final Result   IMPRESSION: There is diffuse osseous demineralization which decreases   the radiographic sensitivity for the detection of osseous pathology.   Within this limitation, no acute fracture is seen. Hip joint spaces   are preserved. There are degenerative changes in lower lumbar spine.      JAC NASCIMENTO MD            SYSTEM ID:  [id number]        Report per radiology    Laboratory:  Labs Ordered and Resulted from Time of ED Arrival to Time of ED Departure   COMPREHENSIVE METABOLIC PANEL - Abnormal       Result Value    Sodium 139      Potassium 4.2      Chloride 105      Carbon Dioxide (CO2) 13 (*)     Anion Gap 21 (*)     Urea Nitrogen 12      Creatinine 0.55      Calcium 8.9       Glucose 141 (*)     Alkaline Phosphatase 81      AST 31      ALT 29      Protein Total 7.3      Albumin 3.7      Bilirubin Total 0.8      GFR Estimate >90     TROPONIN I - Abnormal    Troponin I High Sensitivity 183 (*)    MAGNESIUM - Abnormal    Magnesium 2.4 (*)    ISTAT GASES LACTATE VENOUS POCT - Abnormal    Lactic Acid POCT 12.7 (*)     Bicarbonate Venous POCT 15 (*)     O2 Sat, Venous POCT 88 (*)     pCO2V Venous POCT 59 (*)     pH Venous POCT 7.02 (*)     pO2 Venous POCT 82 (*)    ROUTINE UA WITH MICROSCOPIC REFLEX TO CULTURE - Abnormal    Color Urine Light Yellow      Appearance Urine Clear      Glucose Urine Negative      Bilirubin Urine Negative      Ketones Urine 10 (*)     Specific Gravity Urine 1.022      Blood Urine Small (*)     pH Urine 5.5      Protein Albumin Urine 30 (*)     Urobilinogen Urine Normal      Nitrite Urine Negative      Leukocyte Esterase Urine Negative      Mucus Urine Present (*)     RBC Urine 0      WBC Urine 1      Hyaline Casts Urine 5 (*)    LIPASE - Normal    Lipase 138     TSH WITH FREE T4 REFLEX - Normal    TSH 3.50     CK TOTAL - Normal         COVID-19 VIRUS (CORONAVIRUS) BY PCR - Normal    SARS CoV2 PCR Negative     CBC WITH PLATELETS AND DIFFERENTIAL    WBC Count 9.4      RBC Count 4.15      Hemoglobin 13.3      Hematocrit 40.5      MCV 98      MCH 32.0      MCHC 32.8      RDW 13.4      Platelet Count 325      % Neutrophils 74      % Lymphocytes 16      % Monocytes 5      % Eosinophils 3      % Basophils 1      % Immature Granulocytes 1      NRBCs per 100 WBC 0      Absolute Neutrophils 7.0      Absolute Lymphocytes 1.5      Absolute Monocytes 0.5      Absolute Eosinophils 0.3      Absolute Basophils 0.1      Absolute Immature Granulocytes 0.1      Absolute NRBCs 0.0     LACTIC ACID WHOLE BLOOD   CK TOTAL        [facility]    -Intubation    Date/Time: 11/8/2022 11:04 AM  Performed by: Eugenio Sosa MD  Authorized by: Eugenio Sosa MD      Emergent condition/consent implied    ED EVALUATION:      Assessment Time: 2022 11:00 AM      I have performed an Emergency Department Evaluation including taking a history and physical examination, this evaluation will be documented in the electronic medical record for this ED encounter.      PRE-PROCEDURE DETAILS     Patient status:  Altered mental status    Paralytics:  Rocuronium      PROCEDURE DETAILS     Preoxygenation:  Bag valve mask    Tube size (mm):  7.5  PLACEMENT ASSESSMENT     ETT to teeth:  22    Emergency Department Course:        Reviewed:  I reviewed nursing notes, vitals, past medical history and Care Everywhere    Assessments:  1057 I obtained history and examined the patient as noted above.   1330 I rechecked the patient.    Consults:  1249 I spoke with Dr. Benavides, intensivist.    Interventions:  1059 Ativan, 2 mg, IV.  1105 Etomidate, 20 mg, IV.  1106 Rocuronium, 100 mg, IV.  1110 Propofol, infusion, 30 mcg/kg/min, IV.  1113 Keppra, intermittent infusion, 2000 mg, IV.  1114 Fentanyl, 100 mcg, IV.  1234 Fentanyl, 100 mcg, IV.    Disposition:  The patient was admitted to the hospital under the care of Dr. Benavides.     Impression & Plan     CMS Diagnoses: The Lactic acid level is elevated due to seizure, at this time there is no sign of severe sepsis or septic shock. and None    Medical Decision Makin-year-old female presents after being found down with altered mental status.  She is unable to provide history on initial evaluation and promptly has a partial to generalizing seizure as above.  She was treated with Ativan and subsequently intubated via RSI for airway protection.  No external evidence of trauma otherwise aside from blood around the lips that is likely from a seizure prior to arrival.  Work-up here with profound lactic acidosis which I suspect is at this time secondary to seizure.  No fever and no leukocytosis, no infection remains in the differential there is  limited evidence of that at this time.  Unclear etiology at this time.  I do see history of benzodiazepine dependence on the chart and wonder if this could represent benzodiazepine withdrawal.  The patient furthermore has history of breast cancer and this could represent a recurrence with metastasis to the brain.  The plan at this time is for admission to the ICU for further evaluation and care.    Critical Care Time: was 30 minutes for this patient excluding procedures    Diagnosis:    ICD-10-CM    1. Seizure (H)  R56.9       2. Altered mental status, unspecified altered mental status type  R41.82             Scribe Disclosure:  Nichole GARCIA, am serving as a scribe at 10:57 AM on 11/8/2022 to document services personally performed by Eugenio Sosa MD based on my observations and the provider's statements to me.     MD Ian VALENCIA Colin, MD  11/08/22 5879

## 2022-11-08 NOTE — PROGRESS NOTES
[facility] Care Coordination Contact    [facility]  Ambulatory Care Coordination to Inpatient Care Management   Hand-In Communication    Date:  November 8, 2022  Name: Morenita Moore is enrolled in [facility] Care Coordination program and I am the Lead Care Coordinator.  CC Contact Information:.   Payor Source: Payor: [facility] / Plan: [facility] DUAL / Product Type: HMO /   Current services in place:     Please see the CC Snaphot and Care Management Flowsheets for specific  details of this Morenita Moore care plan.   Additional details/specific concerns r/t this admission: NA       I will follow this admission in Epic. Please feel free to contact me with questions or for further collaboration in discharge planning.  ASHLEIGH Villalpando  [facility]  Cell: [phone]

## 2022-11-08 NOTE — PROGRESS NOTES
TRANSITIONS OF CARE (THERESA) LOG   THERESA tasks should be completed by the CC within one (1) business day of notification of each transition. Follow up contact with member is required after return to their usual care setting.  Note:  If CC finds out about the transitions fifteen (15) days or more after the member has returned to their usual care setting, no THERESA log is needed. However, the CC should check in with the member to discuss the transition process, any changes needed to the care plan and document it in a case note.    Member Name:  [facility] [id number]   Product: [facility] Care Coordinator Contact:  ASHLEIGH Villalpando Agency/County/Care System: [facility]   Transition Communication Actions from Care Management Contact   Transition #1   Notification Date: 11/8/22 Transition Date: 11/8/22 Transition From: Home     Is this the member s usual care setting?               yes Transition To: Hospital, [facility]   Transition Type:  Unplanned  Reason for Admission/Comments:  Altered mental status and Seizure  Contact member/responsible party to offer assistance with transition Date completed: 11/8/2022    Notes from conversation with the member/responsible party, provider, discharging and receiving facility (as applicable): CC contacted Hospital /discharge planner via the [employer] Transitional Care Hand-In Process, with community care plan included.  CC reached out to member regarding transition and left a message requesting a return call.  Reviewed and update care plan as needed.  Notified community service providers and placed services ICLS on hold as needed.  Transition log initiated.   PCP notified of hospitalization via EMR.       Shared CC contact info, care plan/services with receiving setting--Date completed: 11/8/2022   Name & Title of receiving setting contact: [facility]   Notified PCP of  transition--Date completed:  11/8/2022     via  EMR  Name of PCP: Otilia Sánchez     Transition #2   Notification Date: 11/15/22 Transition Date:   11/14/22 Transition From: [facility]     Is this the member s usual care setting?               yes Transition To: [facility]   Transition Type:  Planned  Reason for Admission/Comments:  TCU- rehab stay less than 30 days   Contact member/responsible party to offer assistance with transition Date completed: 11/15/22    Notes from conversation with the member/responsible party, provider, discharging and receiving facility (as applicable): OBRA 1 right faxed to shelter admissions office.   [facility] [phone]  and reviewed community POC. CC requested to be notified of concerns, care conference dates and discharge planning.   CC reached out to member regarding transition and offered support as needed.    Transition log updated.   PCP notified of transition to TCU via EMR.       Shared CC contact info, care plan/services with receiving setting--Date completed: 11/15/22      Notified PCP of transition--Date completed:  11/15/22   via  EMR  Name of PCP: Otilia Sánchez                                                                     *RETURN TO USUAL CARE SETTING: *Complete tasks below when the member is discharging TO their usual care setting within one (1) business day of notification..      For situations where the Care Coordinator is notified of the discharge prior to the date of discharge, the Care Coordinator must follow up with the member or designated representative to confirm that discharge actually occurred and discuss required THERESA tasks as outlined in the THERESA Instructions.  (This includes situations where it may be a  new  usual care setting for the member. (i.e., a community member who decides upon permanent nursing home placement following  hospitalization and rehab).    Discuss with Member/Responsible Party:    Check  Yes  - if the member, family member and/or SNF/facility staff manages the following:    If  No  provide explanation in the comments section.          Date completed: 11/15/22 Communicated with member or their designated representative about the following:  care transition process; about changes to the member s health status; plan of care updates; education about transitions and how to prevent unplanned transitions/readmissions    Four Pillars for Optimal Transition:    Check  Yes  - if the member, family member and/or SNF/facility staff manages the following:    If  No  provide explanation in the comments section.          [x]  Yes     []  No Does the member have a follow-up appointment scheduled with primary care or specialist? (Mental health hospitalizations--the appt. should be w/in 7 days)              For mental health hospitalizations:  []  Yes     []  No     Does the member have a follow-up appointment scheduled with a mental health practitioner within 7 days of discharge?  [x]  Yes     []  No     Has a medication review been completed with member? If no, refer to PCP, home care nurse, MTM, pharmacist  [x]  Yes     []  No     Can the member manage their medications or is there a system in place to manage medications (e.g. home care set-up)?         [x]  Yes     []  No     Can the member verbalize warning signs and symptoms to watch for and how to respond?  [x]  Yes     []  No     Does the member have a copy of and understand their discharge instructions?  If no, assist to obtain copy of discharge instructions, review discharge instructions, and assist to contact PCP to discuss questions about their recent hospitalization.  [x]  Yes     []  No     Does the member have adequate food, housing and transportation?  If no, add goal and discuss additional supports available to the member                                                                                                                                                                                  [x]  Yes     []  No     Is the member safe in their home?  If no, document needs and support provided                                                                                                                                                                          []  Yes     [x]  No     Are there any concerns of vulnerability, abuse, or neglect?  If yes, document concerns and actions taken by Care Coordinator as a mandated                                                                                                                                                                              [x]  Yes     []  No     Does the member use a Personal Health Care Record?  Check  Yes  if visit summary, discharge summary, and/or healthcare summary are being used as a PHR.                                                                                                                                                                                  [x]  Yes     []  No     Have you reviewed the discharge summary with the member? If  No  provide explanation in comments.  [x]  Yes     []  No     Have you updated the member s care plan/support plan? Add new diagnosis, medications, treatments, goals & interventions, as applicable. If No, provide explanation in comments.    Comments:    ASHLEIGH Blanc  [facility]  Cell: [phone]

## 2022-11-08 NOTE — ED TRIAGE NOTES
EMS repoert: found down in hallway of apartment building, unwitnessed event. Confused, not following directions. Medics note patient could say name and birthday. Incontinence of urine; blood in mouth. Slightly tachycardic otherwise VSS.  per EMS. PIVx2 in L arm.     Triage Assessment     Row Name 11/08/22 1056       Triage Assessment (Adult)    Airway WDL WDL       Respiratory WDL    Respiratory WDL WDL       Skin Circulation/Temperature WDL    Skin Circulation/Temperature WDL WDL       Cardiac WDL    Cardiac WDL rhythm    Pulse Rate & Regularity tachycardic       Peripheral/Neurovascular WDL    Peripheral Neurovascular WDL WDL       Cognitive/Neuro/Behavioral WDL    Cognitive/Neuro/Behavioral WDL X    Level of Consciousness confused    Orientation disoriented x 4    Speech illogical       [place] Coma Scale    Best Eye Response 4-->(E4) spontaneous    Best Motor Response 5-->(M5) localizes pain    Best Verbal Response 3-->(V3) inappropriate words    Maximo Coma Scale Score 12

## 2022-11-09 ENCOUNTER — APPOINTMENT (OUTPATIENT)
Dept: CARDIOLOGY | Facility: CLINIC | Age: 77
DRG: 100 | End: 2022-11-09
Payer: COMMERCIAL

## 2022-11-09 ENCOUNTER — APPOINTMENT (OUTPATIENT)
Dept: GENERAL RADIOLOGY | Facility: CLINIC | Age: 77
DRG: 100 | End: 2022-11-09
Payer: COMMERCIAL

## 2022-11-09 LAB
ALBUMIN SERPL-MCNC: 3 G/DL (ref 3.4–5)
ALP SERPL-CCNC: 68 U/L (ref 40–150)
ALT SERPL W P-5'-P-CCNC: 24 U/L (ref 0–50)
ANION GAP SERPL CALCULATED.3IONS-SCNC: 7 MMOL/L (ref 3–14)
AST SERPL W P-5'-P-CCNC: 37 U/L (ref 0–45)
BILIRUB SERPL-MCNC: 1.2 MG/DL (ref 0.2–1.3)
BUN SERPL-MCNC: 10 MG/DL (ref 7–30)
CALCIUM SERPL-MCNC: 8.2 MG/DL (ref 8.5–10.1)
CHLORIDE BLD-SCNC: 107 MMOL/L (ref 94–109)
CO2 SERPL-SCNC: 23 MMOL/L (ref 20–32)
CREAT SERPL-MCNC: 0.5 MG/DL (ref 0.52–1.04)
ERYTHROCYTE [DISTWIDTH] IN BLOOD BY AUTOMATED COUNT: 13.3 % (ref 10–15)
ERYTHROCYTE [DISTWIDTH] IN BLOOD BY AUTOMATED COUNT: 13.7 % (ref 10–15)
GFR SERPL CREATININE-BSD FRML MDRD: >90 ML/MIN/1.73M2
GLUCOSE BLD-MCNC: 105 MG/DL (ref 70–99)
GLUCOSE BLDC GLUCOMTR-MCNC: 122 MG/DL (ref 70–99)
GLUCOSE BLDC GLUCOMTR-MCNC: 92 MG/DL (ref 70–99)
GLUCOSE BLDC GLUCOMTR-MCNC: 94 MG/DL (ref 70–99)
HCT VFR BLD AUTO: 32.8 % (ref 35–47)
HCT VFR BLD AUTO: 36.5 % (ref 35–47)
HGB BLD-MCNC: 11.4 G/DL (ref 11.7–15.7)
HGB BLD-MCNC: 12.3 G/DL (ref 11.7–15.7)
LACTATE SERPL-SCNC: 0.9 MMOL/L (ref 0.7–2)
LVEF ECHO: NORMAL
MAGNESIUM SERPL-MCNC: 2.2 MG/DL (ref 1.6–2.3)
MCH RBC QN AUTO: 31.2 PG (ref 26.5–33)
MCH RBC QN AUTO: 31.9 PG (ref 26.5–33)
MCHC RBC AUTO-ENTMCNC: 33.7 G/DL (ref 31.5–36.5)
MCHC RBC AUTO-ENTMCNC: 34.8 G/DL (ref 31.5–36.5)
MCV RBC AUTO: 90 FL (ref 78–100)
MCV RBC AUTO: 95 FL (ref 78–100)
PHOSPHATE SERPL-MCNC: 3 MG/DL (ref 2.5–4.5)
PLATELET # BLD AUTO: 190 10E3/UL (ref 150–450)
PLATELET # BLD AUTO: 216 10E3/UL (ref 150–450)
POTASSIUM BLD-SCNC: 3.4 MMOL/L (ref 3.4–5.3)
POTASSIUM BLD-SCNC: 3.7 MMOL/L (ref 3.4–5.3)
PROT SERPL-MCNC: 5.7 G/DL (ref 6.8–8.8)
RBC # BLD AUTO: 3.65 10E6/UL (ref 3.8–5.2)
RBC # BLD AUTO: 3.85 10E6/UL (ref 3.8–5.2)
SODIUM SERPL-SCNC: 137 MMOL/L (ref 133–144)
TROPONIN I SERPL HS-MCNC: 2880 NG/L
WBC # BLD AUTO: 10.2 10E3/UL (ref 4–11)
WBC # BLD AUTO: 10.5 10E3/UL (ref 4–11)

## 2022-11-09 PROCEDURE — 93005 ELECTROCARDIOGRAM TRACING: CPT

## 2022-11-09 PROCEDURE — 85027 COMPLETE CBC AUTOMATED: CPT | Performed by: INTERNAL MEDICINE

## 2022-11-09 PROCEDURE — 250N000011 HC RX IP 250 OP 636: Performed by: SURGERY

## 2022-11-09 PROCEDURE — 250N000011 HC RX IP 250 OP 636: Performed by: INTERNAL MEDICINE

## 2022-11-09 PROCEDURE — 99233 SBSQ HOSP IP/OBS HIGH 50: CPT | Mod: 25 | Performed by: PSYCHIATRY & NEUROLOGY

## 2022-11-09 PROCEDURE — 999N000253 HC STATISTIC WEANING TRIALS

## 2022-11-09 PROCEDURE — 93306 TTE W/DOPPLER COMPLETE: CPT | Mod: 26 | Performed by: INTERNAL MEDICINE

## 2022-11-09 PROCEDURE — 99222 1ST HOSP IP/OBS MODERATE 55: CPT | Mod: 25 | Performed by: INTERNAL MEDICINE

## 2022-11-09 PROCEDURE — 250N000013 HC RX MED GY IP 250 OP 250 PS 637: Performed by: INTERNAL MEDICINE

## 2022-11-09 PROCEDURE — 84484 ASSAY OF TROPONIN QUANT: CPT | Performed by: INTERNAL MEDICINE

## 2022-11-09 PROCEDURE — 84100 ASSAY OF PHOSPHORUS: CPT | Performed by: INTERNAL MEDICINE

## 2022-11-09 PROCEDURE — 200N000001 HC R&B ICU

## 2022-11-09 PROCEDURE — 85048 AUTOMATED LEUKOCYTE COUNT: CPT | Performed by: INTERNAL MEDICINE

## 2022-11-09 PROCEDURE — 99291 CRITICAL CARE FIRST HOUR: CPT | Mod: GC | Performed by: INTERNAL MEDICINE

## 2022-11-09 PROCEDURE — 250N000009 HC RX 250: Performed by: SURGERY

## 2022-11-09 PROCEDURE — 36415 COLL VENOUS BLD VENIPUNCTURE: CPT | Performed by: INTERNAL MEDICINE

## 2022-11-09 PROCEDURE — 83735 ASSAY OF MAGNESIUM: CPT | Performed by: INTERNAL MEDICINE

## 2022-11-09 PROCEDURE — 999N000009 HC STATISTIC AIRWAY CARE

## 2022-11-09 PROCEDURE — 250N000013 HC RX MED GY IP 250 OP 250 PS 637: Performed by: PSYCHIATRY & NEUROLOGY

## 2022-11-09 PROCEDURE — 999N000157 HC STATISTIC RCP TIME EA 10 MIN

## 2022-11-09 PROCEDURE — 250N000011 HC RX IP 250 OP 636: Performed by: PSYCHIATRY & NEUROLOGY

## 2022-11-09 PROCEDURE — 999N000259 HC STATISTIC EXTUBATION

## 2022-11-09 PROCEDURE — 93010 ELECTROCARDIOGRAM REPORT: CPT | Performed by: INTERNAL MEDICINE

## 2022-11-09 PROCEDURE — 36415 COLL VENOUS BLD VENIPUNCTURE: CPT | Performed by: SURGERY

## 2022-11-09 PROCEDURE — 258N000003 HC RX IP 258 OP 636: Performed by: INTERNAL MEDICINE

## 2022-11-09 PROCEDURE — 83605 ASSAY OF LACTIC ACID: CPT | Performed by: SURGERY

## 2022-11-09 PROCEDURE — 84132 ASSAY OF SERUM POTASSIUM: CPT | Performed by: INTERNAL MEDICINE

## 2022-11-09 PROCEDURE — 250N000013 HC RX MED GY IP 250 OP 250 PS 637: Performed by: SURGERY

## 2022-11-09 PROCEDURE — 94003 VENT MGMT INPAT SUBQ DAY: CPT

## 2022-11-09 PROCEDURE — 80053 COMPREHEN METABOLIC PANEL: CPT | Performed by: INTERNAL MEDICINE

## 2022-11-09 PROCEDURE — 71045 X-RAY EXAM CHEST 1 VIEW: CPT

## 2022-11-09 PROCEDURE — 999N000208 ECHOCARDIOGRAM COMPLETE

## 2022-11-09 PROCEDURE — 258N000003 HC RX IP 258 OP 636: Performed by: SURGERY

## 2022-11-09 PROCEDURE — 99223 1ST HOSP IP/OBS HIGH 75: CPT | Performed by: INTERNAL MEDICINE

## 2022-11-09 PROCEDURE — 255N000002 HC RX 255 OP 636: Performed by: INTERNAL MEDICINE

## 2022-11-09 RX ORDER — DEXMEDETOMIDINE HYDROCHLORIDE 4 UG/ML
.1-1.2 INJECTION, SOLUTION INTRAVENOUS CONTINUOUS
Status: DISCONTINUED | OUTPATIENT
Start: 2022-11-09 | End: 2022-11-09

## 2022-11-09 RX ORDER — LEVETIRACETAM 500 MG/1
1000 TABLET ORAL 2 TIMES DAILY
Status: DISCONTINUED | OUTPATIENT
Start: 2022-11-09 | End: 2022-11-14 | Stop reason: HOSPADM

## 2022-11-09 RX ORDER — POTASSIUM CHLORIDE 7.45 MG/ML
10 INJECTION INTRAVENOUS
Status: DISCONTINUED | OUTPATIENT
Start: 2022-11-09 | End: 2022-11-09

## 2022-11-09 RX ORDER — ASPIRIN 81 MG/1
81 TABLET ORAL DAILY
Status: DISCONTINUED | OUTPATIENT
Start: 2022-11-09 | End: 2022-11-12

## 2022-11-09 RX ORDER — ACETAMINOPHEN 325 MG/1
650 TABLET ORAL EVERY 4 HOURS PRN
Status: DISCONTINUED | OUTPATIENT
Start: 2022-11-09 | End: 2022-11-14 | Stop reason: HOSPADM

## 2022-11-09 RX ORDER — POTASSIUM CHLORIDE 1.5 G/1.58G
20 POWDER, FOR SOLUTION ORAL ONCE
Status: COMPLETED | OUTPATIENT
Start: 2022-11-09 | End: 2022-11-09

## 2022-11-09 RX ORDER — ACETAMINOPHEN 650 MG/1
650 SUPPOSITORY RECTAL EVERY 4 HOURS PRN
Status: DISCONTINUED | OUTPATIENT
Start: 2022-11-09 | End: 2022-11-14 | Stop reason: HOSPADM

## 2022-11-09 RX ADMIN — PROPOFOL 35 MCG/KG/MIN: 10 INJECTION, EMULSION INTRAVENOUS at 05:05

## 2022-11-09 RX ADMIN — CHLORHEXIDINE GLUCONATE 15 ML: 1.2 SOLUTION ORAL at 07:51

## 2022-11-09 RX ADMIN — SODIUM CHLORIDE, POTASSIUM CHLORIDE, SODIUM LACTATE AND CALCIUM CHLORIDE: 600; 310; 30; 20 INJECTION, SOLUTION INTRAVENOUS at 07:51

## 2022-11-09 RX ADMIN — HUMAN ALBUMIN MICROSPHERES AND PERFLUTREN 9 ML: 10; .22 INJECTION, SOLUTION INTRAVENOUS at 08:27

## 2022-11-09 RX ADMIN — ONDANSETRON 4 MG: 2 INJECTION INTRAMUSCULAR; INTRAVENOUS at 10:14

## 2022-11-09 RX ADMIN — ASPIRIN 81 MG: 81 TABLET, COATED ORAL at 20:34

## 2022-11-09 RX ADMIN — POTASSIUM CHLORIDE 20 MEQ: 1.5 POWDER, FOR SOLUTION ORAL at 07:51

## 2022-11-09 RX ADMIN — LEVETIRACETAM 1000 MG: 500 TABLET, FILM COATED ORAL at 20:34

## 2022-11-09 RX ADMIN — ACETAMINOPHEN 650 MG: 325 TABLET, FILM COATED ORAL at 23:27

## 2022-11-09 RX ADMIN — Medication 40 MG: at 07:51

## 2022-11-09 RX ADMIN — DEXMEDETOMIDINE HYDROCHLORIDE 0.5 MCG/KG/HR: 400 INJECTION INTRAVENOUS at 09:55

## 2022-11-09 RX ADMIN — LEVETIRACETAM 1000 MG: 10 INJECTION INTRAVENOUS at 07:51

## 2022-11-09 RX ADMIN — FLUTICASONE PROPIONATE AND SALMETEROL XINAFOATE 2 PUFF: 115; 21 AEROSOL, METERED RESPIRATORY (INHALATION) at 20:35

## 2022-11-09 RX ADMIN — SODIUM CHLORIDE, POTASSIUM CHLORIDE, SODIUM LACTATE AND CALCIUM CHLORIDE 500 ML: 600; 310; 30; 20 INJECTION, SOLUTION INTRAVENOUS at 05:02

## 2022-11-09 RX ADMIN — POTASSIUM CHLORIDE 10 MEQ: 7.46 INJECTION, SOLUTION INTRAVENOUS at 05:58

## 2022-11-09 ASSESSMENT — ACTIVITIES OF DAILY LIVING (ADL)
ADLS_ACUITY_SCORE: 49
HEARING_DIFFICULTY_OR_DEAF: NO
ADLS_ACUITY_SCORE: 26
DRESSING/BATHING_DIFFICULTY: NO
ADLS_ACUITY_SCORE: 41
ADLS_ACUITY_SCORE: 47
ADLS_ACUITY_SCORE: 28
WEAR_GLASSES_OR_BLIND: NO
ADLS_ACUITY_SCORE: 49
DOING_ERRANDS_INDEPENDENTLY_DIFFICULTY: NO
ADLS_ACUITY_SCORE: 28
ADLS_ACUITY_SCORE: 41
ADLS_ACUITY_SCORE: 28
ADLS_ACUITY_SCORE: 49
CONCENTRATING,_REMEMBERING_OR_MAKING_DECISIONS_DIFFICULTY: NO
DIFFICULTY_COMMUNICATING: NO
FALL_HISTORY_WITHIN_LAST_SIX_MONTHS: YES
NUMBER_OF_TIMES_PATIENT_HAS_FALLEN_WITHIN_LAST_SIX_MONTHS: 1
ADLS_ACUITY_SCORE: 41
WALKING_OR_CLIMBING_STAIRS_DIFFICULTY: NO
CHANGE_IN_FUNCTIONAL_STATUS_SINCE_ONSET_OF_CURRENT_ILLNESS/INJURY: YES
DIFFICULTY_EATING/SWALLOWING: NO
TOILETING_ISSUES: NO
ADLS_ACUITY_SCORE: 41

## 2022-11-09 NOTE — PROGRESS NOTES
PRELIMINARY EEG REPORT:    I reviewed the EEG till 9 am today. No well-organized posterior dominant rhythm was appreciated.  Diffuse low amplitude theta delta slowing was present with superimposed faster alpha-beta activities.  Higher amplitude 60 to 85  V generalized delta transients were also seen intermittently.  No epileptiform discharges or electrographic seizures were recorded.  Findings are consistent with moderately severe diffuse nonspecific encephalopathy.      Kay Austin MD

## 2022-11-09 NOTE — PROGRESS NOTES
Pt transported to MRI on a transport vent without any issue. The VS were stable throughout the procedure.  Coco Orellana, RT  11/8/2022

## 2022-11-09 NOTE — PROGRESS NOTES
[facility] ICU RESPIRATORY NOTE        Date of Admission: 11/8/2022    Date of Intubation (most recent): 11/08/2022    Reason for Mechanical Ventilation: Airway Protection.     Number of Days on Mechanical Ventilation: 1    Met Criteria for Spontaneous Breathing Trial: No    Reason for No Spontaneous Breathing Trial: Per MD.     Significant Events Today: patient went to MRI.     ABG Results:   Recent Labs   Lab 11/08/22  1124   PH 7.02*       Current Vent Settings: Vent Mode: CMV/AC  (Continuous Mandatory Ventilation/ Assist Control)  FiO2 (%): 30 %  Resp Rate (Set): 18 breaths/min  Tidal Volume (Set, mL): 400 mL  PEEP (cm H2O): 5 cmH2O  Resp: 18      Skin Assessment: Clean and intact.    Plan: Continue full ventilatory support and wean as tolerated.     Hoang Blackmon, RT on 11/9/2022 at 4:46 AM

## 2022-11-09 NOTE — CONSULTS
Care Management Initial Consult    General Information  Assessment completed with: Children, Mart Flores by the bedside  Type of CM/SW Visit: CM Role Introduction  Patients address, phone number, PCP and insurance Correct as listed in "[employer] [facility]  Cell: [phone]  Patient has ICLS services for help with accomplishing ADL's such as cleaning, shopping, transportation and laundry (on hold for admission)  DME [facility] (working on a shower chair)  Primary Care Provider verified and updated as needed: Yes   Readmission within the last 30 days: no previous admission in last 30 days      Reason for Consult: discharge planning  Advance Care Planning:            Communication Assessment  Patient's communication style: spoken language (English or Bilingual) (patient lethargic consult completed with mart Flores)             Cognitive  Cognitive/Neuro/Behavioral: .WDL except  Level of Consciousness: alert, lethargic  Arousal Level: opens eyes spontaneously  Orientation: disoriented to, time, situation  Mood/Behavior: anxious  Best Language: 0 - No aphasia  Speech: clear, spontaneous    Living Environment:   People in home: alone     Current living Arrangements: apartment      Able to return to prior arrangements: other (see comments) (pending therapies)       Family/Social Support:  Care provided by: self  Provides care for: no one  Marital Status: Single  Children, Other (specify) (brother in Law Zeb listed in EPIC)          Description of Support System: Supportive, Involved    Support Assessment: Adequate family and caregiver support, Adequate social supports    Current Resources:   Patient receiving home care services: No     Community Resources: County Programs, DME, Financial/Insurance, , Other (see comment), Transportation Services (has SW with [employer] ICLS for assistance with ADls DME through [employer] shower chair)  Equipment currently used at home:    Supplies  "currently used at home:      Employment/Financial:  Employment Status: retired        Financial Concerns: No concerns identified   Referral to Financial Worker: No       Lifestyle & Psychosocial Needs:  Social Determinants of Health     Tobacco Use: Medium Risk     Smoking Tobacco Use: Former     Smokeless Tobacco Use: Never     Passive Exposure: Not on file   Alcohol Use: Not on file   Financial Resource Strain: Not on file   Food Insecurity: Not on file   Transportation Needs: Not on file   Physical Activity: Not on file   Stress: Not on file   Social Connections: Not on file   Intimate Partner Violence: Not on file   Depression: Not at risk     PHQ-2 Score: 0   Housing Stability: Not on file       Functional Status:  Prior to admission patient needed assistance: independent with ambulation (unsure if the patient assistive devices). Per [employer] patient has ICLS services for ADL's and [employer] was working with the patient to get a Shower Chair through [employer]             Mental Health Status:          Chemical Dependency Status:                Values/Beliefs:  Spiritual, Cultural Beliefs, Roman [employer] Practices, Values that affect care:                 Additional Information:  Writer attempted to meet with the patient at the bedside. Patient still tired and feeling \"wobbly.\" Patient's Son Mark at the bedside.  He confirmed address and contact numbers for him and patient's brother in law in Epic are correct as listed.  Mark resides in [place] and is aware that we will follow for safe discharge planning.  Patient will most likely transfer off of the ICU and therapies will be ordered for ongoing planning and recommendations.  Mark aware that the patient will most likely need follow up with PCP, Neuro and Cardiology.  Patient has prior to scheduled appointments with Vestibular therapy, will keep on AVS as scheduled until more is known for plan of discharge.  Writer touched " briefly on the possible need for TCU at discharge and that we would work with patient and family to obtain referrals if needed.  Patient is under [employer] and per phone call in 10/22 patient was doing her own medication set up, administration of all medication and they were providing ICLS services to assist the patient with ADLs for cleaning, laundry, transportation etc.  Mark asked that our discharge planning team continue to work with him regarding discharge date as he would like to be present for discharge if needed, he also acknowledged his uncle Zeb as a possible family member that could be available for discharge if needed.  Will continue to follow for further discharge planning needs as identified.      Qian Arreguin RN, BSN, ACM   Care Transitions Specialist  [facility]  Care Transitions Specialist  [id number] [id number]  [email or web address]  Office: [phone] Fax: [phone]  [facility]       Epidermal Autograft Text: The defect edges were debeveled with a #15 scalpel blade.  Given the location of the defect, shape of the defect and the proximity to free margins an epidermal autograft was deemed most appropriate.  Using a sterile surgical marker, the primary defect shape was transferred to the donor site. The epidermal graft was then harvested.  The skin graft was then placed in the primary defect and oriented appropriately.

## 2022-11-09 NOTE — PLAN OF CARE
Shift: 3022-6168      Neuro: Pt is alert, disoriented to time, place and situation; forgetful; moves all extremities equally-generally weak     CV: Heart rhythm is sinus rhythm, SBP's within limits; afebrile     Resp: pt extubated @1000 to 2L NC, currently on room air     GI: Hypoactive bowel sounds, no BM this shift; pt passed bedside swallow-diet advanced per MD    : Mcdowell d/c'd due to leaking, purewick in place; pt up to the commode with an assist of 1 and voiding well     Skin: Bruising on pt's left forehead; generalized bruising but skin grossly intact    Mobility/Activity: Pt up to the commode with assist of 1, unsteady on her feet but able to stand and pivot     Pain: Pt denies pain     Family Updated: Pt's sonMark isidro, visiting during the day and updated by MD

## 2022-11-09 NOTE — PROGRESS NOTES
Interval critical care note      Additional history obtain from patient as her encephalopathy improved throughout the day. She reports no prodromal symptoms prior to fall specifically she denies any chest pain, near syncope, dyspnea. She does not remember events. She has a chronic prodromal history over many years of worsening gait stability, dizziness. She has been evaluated as an outpatient with ENT with no etiology identified. She thinks her preexisting symptoms may have been due to medication side effect but she cannot inform me which medications she thinks are related. When asked if she may have had a mechanical GLF she endorses that this is a possibility. Consideration is being given to mechanical GLF with impact seizure however underlying primary neuro etiology is still possible. So far her workup up has not identified a clear etiology.     Echo report reviewed. Her EF is decreased compared to an echo obtained 6 mo ago (EF previously 60-65% now 40-45%. However, not overly suggestive of primary cardiac etiology as source of fall. Recommend ongoing evaluation with cardiology and faint and fall clinic. Patient to be transferred to hospitalitis service.     Interpretation Summary     1. The left ventricle is normal in size. There is normal left ventricular wall  thickness. Left ventricular systolic function is mild to moderately reduced.  The visual ejection fraction is 40-45%. Left ventricular diastolic function is  abnormal. There is moderate hypokinesis of all the mid to apical segments of  the left ventricle. There is no thrombus seen in the left ventricle.  2. The right ventricle is normal size. The right ventricular systolic function  is normal.  3. Trace mitral and tricuspid regurgitation.  4. Trivial pericardial effusion.  5. In direct comparison to the previous study dated 02/15/2022, there has been  an interval deterioration in left ventricular systolic function and regional  wall motion abnormalities  are now present.    Farheen Clayton MD  Surgical Critical Care fellow.

## 2022-11-09 NOTE — PROGRESS NOTES
Extubation Note    Successful completion of SBT (Yes or No):Yes  Extubation time:1000    Patient assessment:  Lung sounds:Clear  Stridor Present (Yes or No):No  Patient tolerance:Good. Pt was very agitated prior to extubation.    Oxygen device:  3L NC  SpO2:97%    Plan:Will cont to monitor.  11/9/2022  Bailey Malhotra, RT

## 2022-11-09 NOTE — PROGRESS NOTES
PRELIMINARY EEG REPORT:    First couple hours of vEEG was reviewed.  There is no posterior dominant rhythm.  Diffuse delta slowing is present with superimposed faster activities.  No epileptiform discharges or seizures recorded. Findings are consistent with moderately severe diffuse nonspecific encephalopathy.      Kay Austin MD

## 2022-11-09 NOTE — PROGRESS NOTES
[facility]    Stroke Progress Note    Interval Events- NAEON  - Extubated at 1000    HPI Summary  Morenita Moore is a 78 YO F w/PMHx sig for SVT, HTN who presents on 11/8 after being found down at living facility from unwitnessed event. EMS noted encephalopathy, urinary incontinence on arrival. She had noted seizure like activity in ED. She was intubated in ED and given 2 mg IV lorazepam, 2 gm keppra. NCCT, CTA H/N unrevealing for acute pathology.      HPI limited by pt condition.      In ED vitals unremarkable. Labs with trop 183, lactate 12.7, utox negative.     Stroke Evaluation Summarized     MRI/Head CT IMPRESSION:  1.  No acute intracranial process.  2.  Generalized brain atrophy and presumed microvascular ischemic changes as detailed above.    CT head  No acute intracranial abnormality.   Intracranial Vasculature No evidence of large vessel occlusion or high-grade  stenosis.    Cervical Vasculature No evidence of large vessel occlusion or high grade  stenosis.       Echocardiogram Pending   EKG/Telemetry Sinus tachycardia   Low voltage QRS   Septal infarct    Other Testing Not Applicable      LDL  No lab value available in past 30 days   A1C  No lab value available in past 90 days   Troponin 11/8/2022: 183 ng/L          Impression/Plan   # New onset GTC.   # Encephalopathy from presumed seizures. Unclear trigger for new onset seizures given lack of prior hx for epilepsy. NCCT, CTA H/N, MRI brain w/wo, cEEG unrevealing for structural etiology or electrographic background suspicious for underlying epilepsy. Exam post extubation non-focal.   - Extubated 11/9  - Q4H neurochecks  - Rec transfer to floor  - Keppra 1 gm BID - switched to PO  - Stroke Neurology signing off, for further AED questions/management, please consult Gen Neurology    Patient Follow-up    - in 8 weeks with general neurology ([phone])    No further stroke evaluation is recommended, so we will sign off.  "Please contact us with any additional questions.    The Stroke Staff is Dr. Rudd.    Jm Hodge MD  Vascular Neurology Fellow  To page me or covering stroke neurology team member, click here: [facility]   Choose \"On Call\" tab at top, then search dropdown box for \"Neurology Adult\", select location, press Enter, then look for stroke/neuro ICU/telestroke.    ______________________________________________________    Clinically Significant Risk Factors Present on Admission             # Anion Gap Metabolic Acidosis: Highest Anion Gap = 21 mmol/L (Ref range: 7-15) in last 2 days, will monitor and treat as appropriate  # Hypoalbuminemia: Lowest albumin = 3 g/dL (Ref range: 3.5-5.2) at 11/9/2022  4:57 AM, will monitor as appropriate    # Coma: based on GCS score of <8          Medications   Scheduled Meds    fluticasone-salmeterol  2 puff Inhalation BID     levETIRAcetam  1,000 mg Intravenous Q12H       Infusion Meds    dexmedetomidine 0.5 mcg/kg/hr (11/09/22 0955)     fentaNYL 25 mcg/hr (11/09/22 0829)     lactated ringers 50 mL/hr at 11/09/22 1036       PRN Meds  glucose **OR** dextrose **OR** glucagon, naloxone **OR** naloxone **OR** naloxone **OR** naloxone, ondansetron **OR** ondansetron, senna-docusate **OR** senna-docusate       PHYSICAL EXAMINATION  Temp:  [96.6  F (35.9  C)-99.5  F (37.5  C)] 99.3  F (37.4  C)  Pulse:  [] 116  Resp:  [11-24] 24  BP: ()/(53-84) 140/72  FiO2 (%):  [30 %] 30 %  SpO2:  [97 %-100 %] 97 %      Neuro  MS: AO to person, [place], month, year. Confused about situation. Fluent speech, comprehension intact.  CN: PERRL, EOEMI, CN 2-12 intact bilat  Motor: BUE, BLE antigravity with no drift  Sensory: LT intact in all 4 limbs  Coordination: Mild RUE ataxia with FTN    Imaging  I personally reviewed all imaging; relevant findings per HPI.     Lab Results Data   CBC  Recent Labs   Lab 11/09/22  0457 11/08/22  1122   WBC 10.5 9.4   RBC 3.65* 4.15   HGB 11.4* 13.3   HCT 32.8* 40.5   PLT " 216 325     Basic Metabolic Panel    Recent Labs   Lab 11/09/22  0756 11/09/22  0457 11/09/22  0227 11/08/22  1434 11/08/22  1122   NA  --  137  --   --  139   POTASSIUM  --  3.4  --   --  4.2   CHLORIDE  --  107  --   --  105   CO2  --  23  --   --  13*   BUN  --  10  --   --  12   CR  --  0.50*  --   --  0.55   GLC 94 105* 122*   < > 141*   MARIBEL  --  8.2*  --   --  8.9    < > = values in this interval not displayed.     Liver Panel  Recent Labs   Lab 11/09/22  0457 11/08/22  1122   PROTTOTAL 5.7* 7.3   ALBUMIN 3.0* 3.7   BILITOTAL 1.2 0.8   ALKPHOS 68 81   AST 37 31   ALT 24 29     INR    Recent Labs   Lab Test 05/01/20  1810   INR 0.99      Lipid Profile    Recent Labs   Lab Test 03/21/22  0721   CHOL 162   HDL 57   LDL 82   TRIG 113     A1C  No lab results found.  Troponin    Recent Labs   Lab 11/08/22  1122   TROPONINIS 183*

## 2022-11-09 NOTE — PROGRESS NOTES
[facility]    ICU Progress Note       Date of Admission:  11/8/2022    Assessment: Critical Care   Morenita Moore is a 77 year old female admitted on 11/8/2022 to the ICU after she was found down from an unwitnessed event. Per EMS report the patient said a few illogical words and then proceeded to have seizure like activities. She was brought to the ED and intubated. NCC consulted on admission. Brain MRI obtained and ruled out intracranial lesions. EEG does not show seizure activity but she has been on anti seizure medications during this time. Echo has been prefomred to rule out cardiogenic cause. Overall the etiology of the patient's seizure is unclear. Consideration is being given to impact seizure from GLF trauma.         Plan: Critical Care   Neuro/ pain/ sedation:  # AMS, encephalopathy  # r/o seizure  - Monitor neurological status. Notify provider for any acute changes in exam  - discontinue sedation and narcotics after extubation  - Neuro critical care consult, appreciate recommendations  - Loaded with keppra in the ed. Continue 1g BID  - cEEG- no seizure activity identified  - CTA head and neck r/o CVA  - MRI brain w/wo- ruled out intracranial pathology    Pulmonary:  #acute hypoxic, hypercarbic respiratory failure 2/2 to AMS. Required intubation on admission.   # respiratory acidosis, addressing with mechanical ventilation  - extubated to nasal cannula today 11/9/22.   - supplemental oxygen to keep saturation about 92%    Cardiovascular:  #HDS  #HTN, chronic  - Monitor hemodynamic status.  - echo to evaluate for cardiogenic cause of syncope. Report pending    GI/Nutrition:  - Diet:  NPO  - Nutrition consulted. Appreciate recommendations.   - OG to LIS  - PPI for pud ppx while intubated  - bowel regimen  - bedside swallow after extubation, if passes will advance diet to regular    Renal/ Fluid Balance:   # Anion gap Metabolic acidosis 2/2 to lactic acidosis, present on  admission. Admission CT C/A/P negative. Improved with IVF resuscitation. Lactate now wnl.  CK within normal limits, no signs of rhabdo.   - IVF resuscitation with LR  - Will monitor intake and output  -decrease  LR to 50 ml/hr for IV fluid hydration until adequate PO intake  - ICU electrolyte replacement protocol    Endocrine:  # No acute issues  - monitor glucose PRN    ID:  # No acute issues  - monitor for signs of infection    Hematology:   #no acute issues  - monitor    MSK:   - PT and OT to be consulted after extubation. Appreciate recommendations.       Lines/ tubes/ drains:  Mcdowell Catheter: PRESENT, indication: Strict 1-2 Hour I&O  Central Lines: None    Prophylaxis:  - DVT Prophylaxis: Pneumatic Compression Devices  - PUD Prophylaxis: PPI to be discontinued now that she is extubated    Code Status: Full Code      Disposition:  - Continue ICU care. If continues to do well this afternoon will transition to hospitalitis service.     The patient's care was discussed with the Attending Physician, Dr. Benavides, Bedside Nurse and Care Coordinator/.  Critical care time exclusive of procedures: 40 minutes. Time included review of labs, new imaging, review of consultant recommendations. Direct 1:1 evaluation of the patients sedation. Discussion with RT re: extubation and monitoring of patient after extubation.     Farheen Clayton MD    Securely message with the Vocera Web Console (learn more here)  Text page via [employer] Paging/Directory         Clinically Significant Risk Factors Present on Admission             # Anion Gap Metabolic Acidosis: Highest Anion Gap = 21 mmol/L (Ref range: 7-15) in last 2 days, will monitor and treat as appropriate  # Hypoalbuminemia: Lowest albumin = 3 g/dL (Ref range: 3.5-5.2) at 11/9/2022  4:57 AM, will monitor as appropriate     # Acute Respiratory Failure: Documented O2 saturation < 91%.  Continue supplemental oxygen as needed          ______________________________________________________________________    Interval History   - no acute events overnight  - EEG without seizure activity  - Echo performed this am, images reviewed but radiology report pending  - patient with increased agitation on propofol. Switched to dex but still agitated.  - extubated this am to nasal cannula.     Physical Exam   Vital Signs: Temp: 99.5  F (37.5  C) Temp src: Bladder BP: (!) 89/53 Pulse: 66   Resp: 18 SpO2: 97 % O2 Device: Nasal cannula Oxygen Delivery: 3 LPM  Weight: 130 lbs 4.67 oz  General Appearance: Agitated, awake  HEENT: small right forehead bruise. EEG leads in place. Nasal cannula in place. Trachea midline.   Respiratory: on nasal cannula, no increased work of breathing, CTABL  Cardiovascular: warm and well perfused. Normal sinus rhythm on tele  GI: soft, non distended  Skin: non mottled  Neuro: agitated but awake, not following commands well but moving all four extremities.      Data   I reviewed all medications, new labs and imaging results over the last 24 hours.  Arterial Blood Gases   Recent Labs   Lab 11/08/22  1124   PH 7.02*       Complete Blood Count   Recent Labs   Lab 11/09/22 0457 11/08/22  1122   WBC 10.5 9.4   HGB 11.4* 13.3    325       Basic Metabolic Panel  Recent Labs   Lab 11/09/22  0756 11/09/22  0457 11/09/22  0227 11/08/22  2236 11/08/22  1434 11/08/22  1122   NA  --  137  --   --   --  139   POTASSIUM  --  3.4  --   --   --  4.2   CHLORIDE  --  107  --   --   --  105   CO2  --  23  --   --   --  13*   BUN  --  10  --   --   --  12   CR  --  0.50*  --   --   --  0.55   GLC 94 105* 122* 101*   < > 141*    < > = values in this interval not displayed.       Liver Function Tests  Recent Labs   Lab 11/09/22 0457 11/08/22  1122   AST 37 31   ALT 24 29   ALKPHOS 68 81   BILITOTAL 1.2 0.8   ALBUMIN 3.0* 3.7       Pancreatic Enzymes  Recent Labs   Lab 11/08/22  1122   LIPASE 138       Coagulation Profile  No lab results found in  last 7 days.    IMAGING:  Recent Results (from the past 24 hour(s))   XR Chest Port 1 View    Narrative    CHEST PORTABLE 1 VIEW   11/8/2022 11:29 AM     HISTORY: Post intubation.    COMPARISON: None.      Impression    IMPRESSION: ET tube is 6.4 cm proximal to the stephanie. Nasogastric tube  in place. No focal airspace disease. Normal cardiac silhouette. No  effusions identified.    LISESTT GIRON MD         SYSTEM ID:  [id number]   XR Pelvis Port 1/2 Views    Narrative    XR PELVIS PORT 1/2 VIEWS   11/8/2022 11:29 AM     HISTORY: Found down  COMPARISON: None.       Impression    IMPRESSION: There is diffuse osseous demineralization which decreases  the radiographic sensitivity for the detection of osseous pathology.  Within this limitation, no acute fracture is seen. Hip joint spaces  are preserved. There are degenerative changes in lower lumbar spine.    JAC NASCIMENTO MD         SYSTEM ID:  [id number]   CT Head w/o Contrast    Narrative    CT SCAN OF THE HEAD WITHOUT CONTRAST   11/8/2022 12:24 PM     HISTORY: Seizure.    TECHNIQUE:  Axial images of the head and coronal reformations without  IV contrast material. Radiation dose for this scan was reduced using  automated exposure control, adjustment of the mA and/or kV according  to patient size, or iterative reconstruction technique.    COMPARISON: None.    FINDINGS:  No evidence of hemorrhage. Volume loss and white matter  hypoattenuation likely representing chronic small vessel ischemic  change. No acute osseous abnormality. Mild paranasal sinus mucosal  thickening. Endotracheal and enteric tubes partially visualized.      Impression    IMPRESSION:   No acute intracranial abnormality.    MELISA PAINTER MD         SYSTEM ID:  [id number]   CT Chest/Abdomen/Pelvis w Contrast    Narrative    CT CHEST/ABDOMEN/PELVIS WITH CONTRAST November 8, 2022 12:34 PM    CLINICAL HISTORY: Found down, seizure, intubated.    TECHNIQUE: CT scan of the chest, abdomen, and pelvis was  performed  following injection of IV contrast. Multiplanar reformats were  obtained. Dose reduction techniques were used.   CONTRAST: a total of 75 mL Isovue-370 was used for both CAP and CTA  head and neck.    COMPARISON: CT chest 3/17/2022, CT chest, abdomen and pelvis 3/6/2020.    FINDINGS:   LUNGS AND PLEURA: Right basilar atelectasis. No acute airspace disease  identified. Stable cavitary nodule superior right lower lobe measuring  0.3 cm series 4 image 136. Stable left upper lobe ground-glass nodule  measuring 0.4 cm image 64. Stable ground-glass nodule left upper lobe  measuring 0.5 cm image 72. There is a new solid nodule measuring 0.4  cm anterior right lower lobe abutting the fissure image 171. No  effusion.    MEDIASTINUM/AXILLAE: ET tube identified, its tip above the stephanie.  Nasogastric tube tip within the proximal stomach. No enlarged lymph  nodes. No acute mediastinal abnormality is seen.    CORONARY ARTERY CALCIFICATION: Mild.    HEPATOBILIARY: Multiple hepatic cysts. Some hypodensities are too  small for characterization. Gallbladder unremarkable.    PANCREAS: Normal.    SPLEEN: Normal.    ADRENAL GLANDS: Normal.    KIDNEYS/BLADDER: No hydronephrosis. No urolithiasis. Contrast within  the bilateral renal medulla may be related to recent contrast  administration. Catheter in the bladder.    BOWEL: No obstruction or acute inflammation. Moderate stool. No free  fluid or free air.    PELVIC ORGANS: Left ovarian/adnexal cyst is 5.9 cm, previously 4 cm on  3/6/2020.    ADDITIONAL FINDINGS: No enlarged lymph nodes.    MUSCULOSKELETAL: No acute displaced fracture is seen. Spine  degenerative changes.      Impression    IMPRESSION:  1.  No acute abnormality is identified.  2.  Larger size of a left ovarian/adnexal cysts as compared to  3/6/2020. Correlate with pelvic ultrasound for further assessment.  3.  A few bilateral indeterminate pulmonary nodules. Previously noted  nodules are stable compared to  3/17/2022. However, there is a new  solid nodule at the right lower lobe. Therefore, recommend follow-up  CT chest in six months for surveillance.  4.  Increased density of the bilateral renal medulla that may relate  to recent contrast administration. Correlate with renal function  testing.    LISSETT GIRON MD         SYSTEM ID:  [id number]   CTA Head Neck with Contrast    Narrative    CT ANGIOGRAM OF THE HEAD AND NECK WITH CONTRAST  11/8/2022 12:35 PM     HISTORY: Seizure.     TECHNIQUE:  CT angiography with an injection of a total of 75 mL  Isovue-370 was used for both CAP and CTA head and neck IV with scans  through the head and neck. Images were transferred to a separate 3-D  workstation where multiplanar reformations and 3-D images were  created. Estimates of carotid stenoses are made relative to the distal  internal carotid artery diameters except as noted. Radiation dose for  this scan was reduced using automated exposure control, adjustment of  the mA and/or kV according to patient size, or iterative  reconstruction technique.    COMPARISON: None.     CT ANGIOGRAM HEAD FINDINGS:    No evidence of large vessel occlusion, high-grade stenosis, aneurysm,  or vascular malformation. The distal vertebral basilar system is small  in the setting of fetal origins of the bilateral posterior cerebral  arteries with presumed hypoplastic right posterior cerebral artery P1  segment.    CT ANGIOGRAM NECK FINDINGS:   No evidence of large vessel occlusion, high-grade stenosis, or  dissection.     INCIDENTAL FINDINGS: Endotracheal and enteric tubes are present with  secretions pooling within the pharynx. Cervical spine degenerative  changes with multiple stenoses. Scattered nonspecific pulmonary  opacities/nodules including groundglass nodules within the left upper  lung. Aspirated subglottic tracheal secretions are present. Refer to  chest report.      Impression    IMPRESSION:   CTA Head: No evidence of large vessel occlusion  or high-grade  stenosis.   CTA Neck: No evidence of large vessel occlusion or high grade  stenosis.     MELISA PAINTER MD         SYSTEM ID:  [id number]   MR Brain w/o & w Contrast    Narrative    EXAM: MR BRAIN W/O and W CONTRAST  LOCATION: [facility]  DATE/TIME: 11/8/2022 9:55 PM    INDICATION: New onset generalized tonic-clonic seizure.  COMPARISON: CTA head neck dated 11/08/2022.  CONTRAST: 6mL Gadavist  TECHNIQUE: Routine multiplanar multisequence head MRI without and with intravenous contrast.    FINDINGS:  INTRACRANIAL CONTENTS: No acute or subacute infarct. No mass, acute hemorrhage, or extra-axial fluid collections. Small focus of susceptibility artifact within the right cerebellum may reflect chronic microhemorrhage, amyloid angiopathy or even a small   cavernous malformation, among other etiologies. Scattered nonspecific T2/FLAIR hyperintensities within the cerebral white matter most consistent with mild chronic microvascular ischemic change. Mild generalized cerebral atrophy. No hydrocephalus. Normal   position of the cerebellar tonsils. No pathologic contrast enhancement.    SELLA: No abnormality accounting for technique.    OSSEOUS STRUCTURES/SOFT TISSUES: Normal marrow signal. The major intracranial vascular flow voids are maintained. Left posterior scalp soft tissue contusion.    ORBITS: Prior bilateral cataract surgery. Visualized portions of the orbits are otherwise unremarkable.     SINUSES/MASTOIDS: Mild mucosal thickening scattered about the paranasal sinuses. No middle ear or mastoid effusion.       Impression    IMPRESSION:  1.  No acute intracranial process.  2.  Generalized brain atrophy and presumed microvascular ischemic changes as detailed above.       XR Chest Port 1 View    Narrative    EXAM: XR CHEST PORT 1 VIEW  LOCATION: [facility]  DATE/TIME: 11/9/2022 5:32 AM    INDICATION: Endotracheal tube positioning  COMPARISON: 11/08/2022       Impression    IMPRESSION: Endotracheal tube terminates 4.3 cm above the stephanie. Enteric tube terminates below the diaphragm.

## 2022-11-09 NOTE — PROGRESS NOTES
Neuro: sedated with Propofol and Fentanyl. Arouses with stimulation and DINERO. Inconsistently following commands. EEG in place. MRI completed.   CV: SR, normotensive, afebrile.   Resp: remains intubated, 30%. Minimal secretions.   GI/: Minimal UOP, 500mL LR bolus given.   Skin: No new concerns. Turned q2h.   K+ replacement started.     Continue to closely monitor. Notify team of any changes.

## 2022-11-09 NOTE — PROGRESS NOTES
Brief intensivist note  Patient with low UO  AM labs ordered and pending  500 mls LR bolus ordered  CXR pending    nighat jolley  November 9, 2022

## 2022-11-09 NOTE — CONSULTS
[facility]    Neurology Consultation    Morenita Moore MRN# [id number]   YOB: 1945 Age: 77 year old    Code Status:Full Code   Date of Admission: 11/8/2022  Date of Consult:11/09/2022                                                                                       Assessment and Plan:                                         #. New onset seizure, idiopathic etiology.  --continue levetiracetam 1000mg bid.  Would recommend that this be continued on discharge.  She will need follow-up as an outpatient.  No driving until follow-up appointment/reassessment with neurology in 6 to 8 weeks.    #.  Postictal/confused state with generalized weakness.  -- Monitor overnight.  If no improvement may require further evaluation    #. DVT Prophylaxis  --Mechanical/per hospitalist service  #. PT/OT/Speech  -- If patient does not fully recover strength/mobility overnight  #. Nutrition / GI Prophylaxis  --Per recommendations of speech therapy    Will follow-up tomorrow  ----------------------------------------------------------------------------------  ----------------------------------------------------------------------------------  Reason for consult: as above       Chief Complaint:   Unaware  History is obtained from the patient / chart       History of Present Illness:   This patient is a 77 year old female who presents with hx per admission team:  history of SVT and DCIS s/p mastectomy who presented to the ED on 11/8/2022 after being found down in the hallway of her building.  There was question of seizure like activity and patient was confused and incontinent of urine so concern was for seizures.  Given this patient was give IV Ativan and intubated.  She was then admitted to our ICU.  Fortunately they were able to extubate the patient on 11/9 and her mentation has been improving     EEG per [facility]:  No well-organized posterior dominant rhythm was appreciated.  Diffuse low  amplitude theta delta slowing was present with superimposed faster alpha-beta activities.  Higher amplitude 60 to 85  V generalized delta transients were also seen intermittently.  No epileptiform discharges or electrographic seizures were recorded.  Findings are consistent with moderately severe diffuse nonspecific encephalopathy     On admission she did require intubation for airway protection.  Overnight she was extubated.  She remains somewhat weak, mildly confused.  She denies any medical problems previously.  No prior history of seizures or head injuries.  No family history of seizures       Past Medical History:     Past Medical History:   Diagnosis Date     Anxiety      Asthma     adult-onset asthma diagnosed in      Cancer (H)      DCIS (ductal carcinoma in situ)     stage 0 status post left mastectomy in      Depressive disorder      PONV (postoperative nausea and vomiting)      Torticollis      Tremor          Past Surgical History:     Past Surgical History:   Procedure Laterality Date     BREAST SURGERY       CATARACT IOL, RT/LT       MASTECTOMY      DCIS     ODONTECTOMY Left 2022    Procedure: SURGICAL EXTRACTION, TOOTH - Teeth #12, 14, 19;  Surgeon: Arvin Garza DDS;  Location: [facility] OR     RETINAL REATTACHMENT            Social History:     Social History     Socioeconomic History     Marital status: Single   Tobacco Use     Smoking status: Former     Packs/day: 1.00     Years: 6.00     Pack years: 6.00     Types: Cigarettes     Quit date:      Years since quittin.8     Smokeless tobacco: Never     Tobacco comments:     smoked about 10 years   Vaping Use     Vaping Use: Never used   Substance and Sexual Activity     Alcohol use: No     Drug use: Never     Sexual activity: Not Currently     Patient denies smoking, no significant alcohol intake, denies illicit drugs use.  Lives alone in an condominium complex      Family History:     Family History   Problem Relation Age of  Onset     Cancer Sister         breast cancer     Glaucoma No family hx of      Macular Degeneration No family hx of      Asthma Sister      Breast Cancer Sister      Heart Failure Mother          at 97     Reviewed and not felt to be contributory.       Home Medications:     Prior to Admission Medications   Prescriptions Last Dose Informant Patient Reported? Taking?   Cyanocobalamin 50 MCG TABS   Yes No   Sig: Take 50 mcg by mouth once a week    albuterol (PROAIR HFA/PROVENTIL HFA/VENTOLIN HFA) 108 (90 Base) MCG/ACT inhaler   No Yes   Sig: Inhale 2 puffs into the lungs every 6 hours   Patient taking differently: Inhale 2 puffs into the lungs every 6 hours as needed   calcium carbonate-vitamin D 600-125 MG-UNIT TABS   Yes No   Sig: Take 1 tablet by mouth daily   fluticasone-salmeterol (ADVAIR HFA) 115-21 MCG/ACT inhaler   No Yes   Sig: Inhale 2 puffs into the lungs 2 times daily      Facility-Administered Medications: None          Allergy:     Allergies   Allergen Reactions     Levalbuterol Hydrochloride Shortness Of Breath     Cats Other (See Comments)     Itchy eyes     Dust Mites      Other reaction(s): Wheezing  Wheezing/shortness/breath/see (IRP )     Qvar [Beclomethasone]      Per patient- allergic to QVAR     Amitriptyline Palpitations     Escitalopram Anxiety          Inpatient Medications:   Scheduled Meds:    fluticasone-salmeterol  2 puff Inhalation BID     levETIRAcetam  1,000 mg Oral BID     PRN Meds: glucose **OR** dextrose **OR** glucagon, naloxone **OR** naloxone **OR** naloxone **OR** naloxone, ondansetron **OR** ondansetron, senna-docusate **OR** senna-docusate        Review of Systems    Otherwise noncontributory  NEURO: see HPI       Physical Exam:   Physical Exam   Vitals:  Height:Data Unavailable  Weight:130 lbs 4.67 oz   Temp: 98  F (36.7  C) Temp src: Oral BP: 100/52 Pulse: 81   Resp: 27 SpO2: 94 % O2 Device: Nasal cannula Oxygen Delivery: 3 LPM  General Appearance:  No acute  distress  Neuro:       Mental Status Exam: Sleeping, arousable.  Unaware of seizure.  She knows she is in hospital but not the name.  1 month off with the date.  Oriented to year.  Language and speech intact.  Repeat some questions       Cranial Nerves:   2 through 12 intact         Motor:   Antigravity strength is intact x4.  Some giveaway/partially pain related.   Bulk and tone normal x4       Reflexes: Symmetrically intact.  Plantar signs normal.  No clonus       Sensory: Sensation to pin and vibratory sense intact x4                  Coordination: Some ataxia in all 4 extremities       Gait: Unable to be tested in ICU status  Neck: no nuchal rigidity,  No carotid bruits.    Cardiovascular: Regular rate and rhythm, no m/r/g  Extremities: No clubbing, no cyanosis, no edema       Data:   ROUTINE IP LABS   CBC RESULTS:     Recent Labs   Lab 11/09/22  1537 11/09/22 0457 11/08/22  1122   WBC 10.2 10.5 9.4   RBC 3.85 3.65* 4.15   HGB 12.3 11.4* 13.3   HCT 36.5 32.8* 40.5    216 325     Basic Metabolic Panel:   Recent Labs   Lab Test 11/09/22  1536 11/09/22  1233 11/09/22  0756 11/09/22  0457 11/08/22  1434 11/08/22  1122 08/02/22  1158 10/20/21  1517   NA  --   --   --  137  --  139  --  139   POTASSIUM  --  3.7  --  3.4  --  4.2  --  4.3   CHLORIDE  --   --   --  107  --  105  --  106   CO2  --   --   --  23  --  13*  --  24   BUN  --   --   --  10  --  12  --  10   CR  --   --   --  0.50*  --  0.55  --  0.63   GLC 92  --  94 105*   < > 141*   < > 97   MARIBEL  --   --   --  8.2*  --  8.9  --  9.4    < > = values in this interval not displayed.     Liver panel:  Recent Labs   Lab Test 11/09/22  0457 11/08/22  1122 10/20/21  1517 02/01/21  1109 06/15/20  1156   PROTTOTAL 5.7* 7.3 7.2 6.8 6.9   ALBUMIN 3.0* 3.7 4.1 4.3 4.3   BILITOTAL 1.2 0.8 0.5 0.6 0.7   ALKPHOS 68 81 81 82 82   AST 37 31 17 16 13   ALT 24 29 14 12 14     Lipid Profile:  Recent Labs   Lab Test 03/21/22  0721   CHOL 162   HDL 57   LDL 82   TRIG 113      Thyroid Panel:  Recent Labs   Lab Test 11/08/22  1122 06/15/20  1156 11/10/15  0945   TSH 3.50 1.79 2.39      Vitamin B12:   Recent Labs   Lab Test 02/01/21  1109 06/15/20  1156   B12 683 501           IMAGING:   All imaging studies were reviewed personally  MRI brain:   -MR BRAIN W/O and W CONTRAST  LOCATION: [facility]  DATE/TIME: 11/8/2022 9:55 PM     INDICATION: New onset generalized tonic-clonic seizure.  COMPARISON: CTA head neck dated 11/08/2022.  CONTRAST: 6mL Gadavist  TECHNIQUE: Routine multiplanar multisequence head MRI without and with intravenous contrast.     FINDINGS:  INTRACRANIAL CONTENTS: No acute or subacute infarct. No mass, acute hemorrhage, or extra-axial fluid collections. Small focus of susceptibility artifact within the right cerebellum may reflect chronic microhemorrhage, amyloid angiopathy or even a small   cavernous malformation, among other etiologies. Scattered nonspecific T2/FLAIR hyperintensities within the cerebral white matter most consistent with mild chronic microvascular ischemic change. Mild generalized cerebral atrophy. No hydrocephalus. Normal   position of the cerebellar tonsils. No pathologic contrast enhancement.     SELLA: No abnormality accounting for technique.     OSSEOUS STRUCTURES/SOFT TISSUES: Normal marrow signal. The major intracranial vascular flow voids are maintained. Left posterior scalp soft tissue contusion.     ORBITS: Prior bilateral cataract surgery. Visualized portions of the orbits are otherwise unremarkable.      SINUSES/MASTOIDS: Mild mucosal thickening scattered about the paranasal sinuses. No middle ear or mastoid effusion.                                                                       IMPRESSION:  1.  No acute intracranial process.  2.  Generalized brain atrophy and presumed microvascular ischemic changes as detailed above    CTA Head: No evidence of large vessel occlusion or high-grade  stenosis.   CTA Neck: No  evidence of large vessel occlusion or high grade  stenosis    Time: 60minutes evaluation and management.     Raeann Rodriguez M.D.  [facility], [facility].  Office [phone]

## 2022-11-09 NOTE — CONSULTS
[facility]  [place] [facility] [place] Note - Hospitalist Service  Date of Admission:  11/8/2022  [place] of Care Requested by: Dr. Clayton     Assessment & Plan   Morenita Moore is a 77 year old female with a history of SVT and DCIS s/p mastectomy who presented to the ED on 11/8/2022 after being found down in the hallway of her building.  There was question of seizure like activity and patient was confused and incontinent of urine so concern was for seizures.  Given this patient was give IV Ativan and intubated.  She was then admitted to our ICU.  Fortunately they were able to extubate the patient on 11/9 and her mentation has been improving     Loss of consciousness   Possible seizure  Acute encephalopathy, NOS.  Improving   Patient presented to the ED on 11/8 with altered mental status.  Per EMS she was found in the hallway of her building.  Possible fall prior.  EMS reported the patient was confused and incontinent of urine en route.  Given concern for seizure she was intubated in the ED for airway protection.  Patient was started on IV Keppra.  She subsequently extubated on 11/9.   CT head, CTA head/neck and MRI brain all are unremarkable.  Continuous EEG monitor was unrevealing per neurology  Patient is still confused on my evaluation but appears to be improving.  She is oriented to person, place and year but not month or situation.  She is able to name her son who is at bedside.   - Continue seizure precautions   - Q4h neuros  - Keppra 1 gm BID.  Now on PO   - Will consult general neurology as neuro critical care has signed off   - PT/OT evaluation     Newly found cardiomyopathy   H/o SVT  Echocardiogram done in the ICU showed an EF of 40-45% with moderate hypokinesis of the mid to apical segments of LV.  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   She was evaluated for that at [facility] and they discussed doing an ablation but she never proceeded with that.  In Care Everywhere patient has a reported history of SVT.  At this time patient currently appears to be in NSR   - Monitor on telemetry  - Troponin ordered   - Cardiology consulted to assess for new WMA and reduced EF    Incidental left ovarian cyst  Noted on CT scan and when compared to 3/6/20 appears larger.  No symptoms at this time  - Can discuss with PCP for possible pelvic US     Pulmonary nodules   Few bilateral pulmonary nodules noted.  They are stable when compared to CT scan on 3/17/22 but a new solid nodule at the right lower lobe.  - Radiology recommending repeat CT in 6 months.  This needs to be discussed with patient prior to discharge    H/o DCIS s/p mastectomy   Stable          The patient's care was discussed with the Patient, Patient's Family and ICU physician.    Parag Choe [facility]  Securely message with the Vocera Web Console (learn more here)  Text page via [facility] Paging/[facility]       Hospitalist Service    Clinically Significant Risk Factors             # Anion Gap Metabolic Acidosis: Highest Anion Gap = 21 mmol/L (Ref range: 7-15) in last 2 days, will monitor and treat as appropriate  # Hypoalbuminemia: Lowest albumin = 3 g/dL (Ref range: 3.5-5.2) at 11/9/2022  4:57 AM, will monitor as appropriate                  ______________________________________________________________________    Chief Complaint   LOC     History is obtained from the patient.  History is limited as she is unable to remember the event that brought her in     History of Present Illness   Morenita Moore is a 77 year old female with a history of SVT and DCIS s/p mastectomy who presented to the ED on 11/8/2022 after being found down in the hallway of her building.  There was question of seizure like activity and patient was confused and incontinent of urine so  concern was for seizures.  Given this patient was give IV Ativan and intubated.  She was then admitted to our ICU.  Fortunately they were able to extubate the patient on  and her mentation has been improving.     On my evaluation patient does not remember the events surrounding her event.  She currently has no pain including chest pain. No SOB at this time.  Denies any recent fevers, chills, cough, sore throat, abdominal pain, N/V/D or problems with urination    Review of Systems   ROS limited by encephalopathy, otherwise as above     Past Medical History    I have reviewed this patient's medical history and updated it with pertinent information if needed.   Past Medical History:   Diagnosis Date     Anxiety      Asthma     adult-onset asthma diagnosed in      Cancer (H)      DCIS (ductal carcinoma in situ)     stage 0 status post left mastectomy in      Depressive disorder      PONV (postoperative nausea and vomiting)      Torticollis      Tremor        Past Surgical History   I have reviewed this patient's surgical history and updated it with pertinent information if needed.  Past Surgical History:   Procedure Laterality Date     BREAST SURGERY       CATARACT IOL, RT/LT       MASTECTOMY      DCIS     ODONTECTOMY Left 2022    Procedure: SURGICAL EXTRACTION, TOOTH - Teeth #12, 14, 19;  Surgeon: Arvin Garza DDS;  Location: [facility] OR     RETINAL REATTACHMENT         Social History   I have reviewed this patient's social history and updated it with pertinent information if needed.  Social History     Tobacco Use     Smoking status: Former     Packs/day: 1.00     Years: 6.00     Pack years: 6.00     Types: Cigarettes     Quit date: 1980     Years since quittin.8     Smokeless tobacco: Never     Tobacco comments:     smoked about 10 years   Vaping Use     Vaping Use: Never used   Substance Use Topics     Alcohol use: No     Drug use: Never       Family History   I have reviewed this patient's  family history and updated it with pertinent information if needed.  Family History   Problem Relation Age of Onset     Cancer Sister         breast cancer     Glaucoma No family hx of      Macular Degeneration No family hx of      Asthma Sister      Breast Cancer Sister      Heart Failure Mother          at 97       Medications   Medications Prior to Admission   Medication Sig Dispense Refill Last Dose     albuterol (PROAIR HFA/PROVENTIL HFA/VENTOLIN HFA) 108 (90 Base) MCG/ACT inhaler Inhale 2 puffs into the lungs every 6 hours (Patient taking differently: Inhale 2 puffs into the lungs every 6 hours as needed) 18 g 11      fluticasone-salmeterol (ADVAIR HFA) 115-21 MCG/ACT inhaler Inhale 2 puffs into the lungs 2 times daily 12 g 11      calcium carbonate-vitamin D 600-125 MG-UNIT TABS Take 1 tablet by mouth daily        Cyanocobalamin 50 MCG TABS Take 50 mcg by mouth once a week           Allergies   Allergies   Allergen Reactions     Levalbuterol Hydrochloride Shortness Of Breath     Cats Other (See Comments)     Itchy eyes     Dust Mites      Other reaction(s): Wheezing  Wheezing/shortness/breath/see (IRP 6/1)     Qvar [Beclomethasone]      Per patient- allergic to QVAR     Amitriptyline Palpitations     Escitalopram Anxiety       Physical Exam   Vital Signs: Temp: 99.3  F (37.4  C) Temp src: Bladder BP: (!) 140/72 Pulse: 116   Resp: 24 SpO2: 97 % O2 Device: Nasal cannula Oxygen Delivery: 3 LPM  Weight: 130 lbs 4.67 oz    General Appearance: Resting comfortably. NAD   Eyes: EOMI.  Normal conjuctiva  HEENT: NC/AT.  Moist mucous membranes   Respiratory: Clear to auscultation.  No respiratory distress  Cardiovascular: Tachycardiac.  No obvious murmurs  GI: Soft.  Non-distended  Skin: No obvious rashes or cyanosis to exposed skin  Musculoskeletal: No edema noted.  No bony abnormalities  Neurologic: CN appear intact.  Moving all extremities grossly   Psychiatric: Alert. Oriented to person, place and year but not  month or situation    Data   Results for orders placed or performed during the hospital encounter of 11/08/22 (from the past 24 hour(s))   Lactic acid whole blood   Result Value Ref Range    Lactic Acid 3.5 (H) 0.7 - 2.0 mmol/L   Lactic acid whole blood   Result Value Ref Range    Lactic Acid 2.0 0.7 - 2.0 mmol/L   MR Brain w/o & w Contrast    Narrative    EXAM: MR BRAIN W/O and W CONTRAST  LOCATION: [facility]  DATE/TIME: 11/8/2022 9:55 PM    INDICATION: New onset generalized tonic-clonic seizure.  COMPARISON: CTA head neck dated 11/08/2022.  CONTRAST: 6mL Gadavist  TECHNIQUE: Routine multiplanar multisequence head MRI without and with intravenous contrast.    FINDINGS:  INTRACRANIAL CONTENTS: No acute or subacute infarct. No mass, acute hemorrhage, or extra-axial fluid collections. Small focus of susceptibility artifact within the right cerebellum may reflect chronic microhemorrhage, amyloid angiopathy or even a small   cavernous malformation, among other etiologies. Scattered nonspecific T2/FLAIR hyperintensities within the cerebral white matter most consistent with mild chronic microvascular ischemic change. Mild generalized cerebral atrophy. No hydrocephalus. Normal   position of the cerebellar tonsils. No pathologic contrast enhancement.    SELLA: No abnormality accounting for technique.    OSSEOUS STRUCTURES/SOFT TISSUES: Normal marrow signal. The major intracranial vascular flow voids are maintained. Left posterior scalp soft tissue contusion.    ORBITS: Prior bilateral cataract surgery. Visualized portions of the orbits are otherwise unremarkable.     SINUSES/MASTOIDS: Mild mucosal thickening scattered about the paranasal sinuses. No middle ear or mastoid effusion.       Impression    IMPRESSION:  1.  No acute intracranial process.  2.  Generalized brain atrophy and presumed microvascular ischemic changes as detailed above.       Glucose by meter   Result Value Ref Range    GLUCOSE  BY METER POCT 101 (H) 70 - 99 mg/dL   Lactic acid whole blood   Result Value Ref Range    Lactic Acid 1.3 0.7 - 2.0 mmol/L   Glucose by meter   Result Value Ref Range    GLUCOSE BY METER POCT 122 (H) 70 - 99 mg/dL   Lactic acid whole blood   Result Value Ref Range    Lactic Acid 0.9 0.7 - 2.0 mmol/L   CBC with platelets   Result Value Ref Range    WBC Count 10.5 4.0 - 11.0 10e3/uL    RBC Count 3.65 (L) 3.80 - 5.20 10e6/uL    Hemoglobin 11.4 (L) 11.7 - 15.7 g/dL    Hematocrit 32.8 (L) 35.0 - 47.0 %    MCV 90 78 - 100 fL    MCH 31.2 26.5 - 33.0 pg    MCHC 34.8 31.5 - 36.5 g/dL    RDW 13.3 10.0 - 15.0 %    Platelet Count 216 150 - 450 10e3/uL   Comprehensive metabolic panel   Result Value Ref Range    Sodium 137 133 - 144 mmol/L    Potassium 3.4 3.4 - 5.3 mmol/L    Chloride 107 94 - 109 mmol/L    Carbon Dioxide (CO2) 23 20 - 32 mmol/L    Anion Gap 7 3 - 14 mmol/L    Urea Nitrogen 10 7 - 30 mg/dL    Creatinine 0.50 (L) 0.52 - 1.04 mg/dL    Calcium 8.2 (L) 8.5 - 10.1 mg/dL    Glucose 105 (H) 70 - 99 mg/dL    Alkaline Phosphatase 68 40 - 150 U/L    AST 37 0 - 45 U/L    ALT 24 0 - 50 U/L    Protein Total 5.7 (L) 6.8 - 8.8 g/dL    Albumin 3.0 (L) 3.4 - 5.0 g/dL    Bilirubin Total 1.2 0.2 - 1.3 mg/dL    GFR Estimate >90 >60 mL/min/1.73m2   Magnesium   Result Value Ref Range    Magnesium 2.2 1.6 - 2.3 mg/dL   Phosphorus   Result Value Ref Range    Phosphorus 3.0 2.5 - 4.5 mg/dL   XR Chest Port 1 View    Narrative    EXAM: XR CHEST PORT 1 VIEW  LOCATION: [facility]  DATE/TIME: 11/9/2022 5:32 AM    INDICATION: Endotracheal tube positioning  COMPARISON: 11/08/2022      Impression    IMPRESSION: Endotracheal tube terminates 4.3 cm above the stephanie. Enteric tube terminates below the diaphragm.   Glucose by meter   Result Value Ref Range    GLUCOSE BY METER POCT 94 70 - 99 mg/dL   Echocardiogram Complete   Result Value Ref Range    LVEF  40-45%     Narrative     [id number]  [facility]  [id number]  [id number]^CASSIE^NURA     [facility]  Echocardiography Laboratory  [address] [place] [address]     Name: OPHELIA ZEE  MRN: [id number]  : 1945  Study Date: 2022 08:00 AM  Age: 77 yrs  Gender: Female  Patient Location: [facility]  Reason For Study: Syncope and Collapse  Ordering Physician: NURA MATTHEWS  Referring Physician: Otilia Sánchez  Performed By: Katey Ballard     BSA: 1.6 m2  Height: 65 in  Weight: 130 lb  HR: 64  BP: 90/52 mmHg  ______________________________________________________________________________  Procedure  Complete Echo Adult. Optison (NDC #[id number]) given intravenously.  ______________________________________________________________________________  Interpretation Summary     1. The left ventricle is normal in size. There is normal left ventricular wall  thickness. Left ventricular systolic function is mild to moderately reduced.  The visual ejection fraction is 40-45%. Left ventricular diastolic function is  abnormal. There is moderate hypokinesis of all the mid to apical segments of  the left ventricle. There is no thrombus seen in the left ventricle.  2. The right ventricle is normal size. The right ventricular systolic function  is normal.  3. Trace mitral and tricuspid regurgitation.  4. Trivial pericardial effusion.  5. In direct comparison to the previous study dated 02/15/2022, there has been  an interval deterioration in left ventricular systolic function and regional  wall motion abnormalities are now present.  ______________________________________________________________________________  Left Ventricle  The left ventricle is normal in size. There is normal left ventricular wall  thickness. Left ventricular systolic function is mild to moderately reduced.  The visual ejection fraction is 40-45%. Left ventricular diastolic function is  abnormal. There is moderate hypokinesis of all the mid to  apical segments of  the left ventricle. There is no thrombus seen in the left ventricle.     Right Ventricle  The right ventricle is normal size. The right ventricular systolic function is  normal.     Atria  Normal left atrial size. Right atrial size is normal. There is no color  Doppler evidence of an atrial shunt.     Mitral Valve  There is trace mitral regurgitation.     Tricuspid Valve  There is trace tricuspid regurgitation.     Aortic Valve  No aortic regurgitation is present. No aortic stenosis is present.     Pulmonic Valve  There is no pulmonic valvular stenosis.     Vessels  The inferior vena cava is normal.     Pericardium  Trivial pericardial effusion.     Rhythm  Sinus rhythm was noted.  ______________________________________________________________________________  MMode/2D Measurements & Calculations  IVSd: 0.57 cm     LVIDd: 4.8 cm  LVIDs: 3.3 cm  LVPWd: 0.63 cm  FS: 30.0 %  LV mass(C)d: 87.3 grams  LV mass(C)dI: 53.0 grams/m2  Ao root diam: 2.6 cm  LA dimension: 3.5 cm  LA/Ao: 1.4  LVOT diam: 2.1 cm  LVOT area: 3.4 cm2  RWT: 0.27     Doppler Measurements & Calculations  MV E max murali: 84.5 cm/sec  MV A max murali: 120.6 cm/sec  MV E/A: 0.70  MV dec slope: 470.4 cm/sec2  LV V1 max P.5 mmHg  LV V1 max: 116.9 cm/sec  LV V1 VTI: 30.6 cm  SV(LVOT): 103.5 ml  SI(LVOT): 62.8 ml/m2  PA acc time: 0.13 sec  E/E' av.2  Lateral E/e': 12.4  Medial E/e': 16.1     ______________________________________________________________________________  Report approved by: Francisco Ziegler 2022 11:47 AM         Potassium   Result Value Ref Range    Potassium 3.7 3.4 - 5.3 mmol/L

## 2022-11-09 NOTE — PROGRESS NOTES
The patient is sedated with propofol. Infusion was paused upon arrival to ICU. Patient was able to move all 4 limbs spontaneously. Not following commands. Patient has not opened her eyes for me. Flutters eyelids with stimulation. PERRLA when checked. All 4 extremities withdraw to pain when sedated.   Lung sounds clear/dim. 30% PEEP 5 on ventilator. Tolerating vent well. ET-tube repositioned every 2 hours. Oral cares done with this as well.   Mcdowell in place. Adequate output. Cares done.   EEG monitor remains on patient.  Kal Jay RN 7:25 PM 11/08/22

## 2022-11-10 ENCOUNTER — APPOINTMENT (OUTPATIENT)
Dept: OCCUPATIONAL THERAPY | Facility: CLINIC | Age: 77
DRG: 100 | End: 2022-11-10
Attending: INTERNAL MEDICINE
Payer: COMMERCIAL

## 2022-11-10 ENCOUNTER — APPOINTMENT (OUTPATIENT)
Dept: PHYSICAL THERAPY | Facility: CLINIC | Age: 77
DRG: 100 | End: 2022-11-10
Attending: INTERNAL MEDICINE
Payer: COMMERCIAL

## 2022-11-10 ENCOUNTER — APPOINTMENT (OUTPATIENT)
Dept: CARDIOLOGY | Facility: CLINIC | Age: 77
DRG: 100 | End: 2022-11-10
Attending: INTERNAL MEDICINE
Payer: COMMERCIAL

## 2022-11-10 LAB
GLUCOSE BLDC GLUCOMTR-MCNC: 79 MG/DL (ref 70–99)
GLUCOSE BLDC GLUCOMTR-MCNC: 81 MG/DL (ref 70–99)
LVEF ECHO: NORMAL
MAGNESIUM SERPL-MCNC: 2.3 MG/DL (ref 1.6–2.3)
PHOSPHATE SERPL-MCNC: 2.8 MG/DL (ref 2.5–4.5)
POTASSIUM BLD-SCNC: 3.2 MMOL/L (ref 3.4–5.3)
POTASSIUM BLD-SCNC: 3.8 MMOL/L (ref 3.4–5.3)
TROPONIN I SERPL HS-MCNC: 1188 NG/L
TROPONIN I SERPL HS-MCNC: 1925 NG/L
TROPONIN I SERPL HS-MCNC: 2411 NG/L

## 2022-11-10 PROCEDURE — 84132 ASSAY OF SERUM POTASSIUM: CPT | Performed by: INTERNAL MEDICINE

## 2022-11-10 PROCEDURE — 84484 ASSAY OF TROPONIN QUANT: CPT | Performed by: INTERNAL MEDICINE

## 2022-11-10 PROCEDURE — 200N000001 HC R&B ICU

## 2022-11-10 PROCEDURE — 250N000013 HC RX MED GY IP 250 OP 250 PS 637: Performed by: PSYCHIATRY & NEUROLOGY

## 2022-11-10 PROCEDURE — 93308 TTE F-UP OR LMTD: CPT | Mod: 26 | Performed by: INTERNAL MEDICINE

## 2022-11-10 PROCEDURE — 258N000003 HC RX IP 258 OP 636: Performed by: SURGERY

## 2022-11-10 PROCEDURE — 83735 ASSAY OF MAGNESIUM: CPT | Performed by: INTERNAL MEDICINE

## 2022-11-10 PROCEDURE — 250N000013 HC RX MED GY IP 250 OP 250 PS 637: Performed by: INTERNAL MEDICINE

## 2022-11-10 PROCEDURE — 93325 DOPPLER ECHO COLOR FLOW MAPG: CPT

## 2022-11-10 PROCEDURE — 84100 ASSAY OF PHOSPHORUS: CPT | Performed by: INTERNAL MEDICINE

## 2022-11-10 PROCEDURE — 255N000002 HC RX 255 OP 636: Performed by: INTERNAL MEDICINE

## 2022-11-10 PROCEDURE — 93325 DOPPLER ECHO COLOR FLOW MAPG: CPT | Mod: 26 | Performed by: INTERNAL MEDICINE

## 2022-11-10 PROCEDURE — 36415 COLL VENOUS BLD VENIPUNCTURE: CPT | Performed by: INTERNAL MEDICINE

## 2022-11-10 PROCEDURE — C8924 2D TTE W OR W/O FOL W/CON,FU: HCPCS

## 2022-11-10 PROCEDURE — 93321 DOPPLER ECHO F-UP/LMTD STD: CPT | Mod: 26 | Performed by: INTERNAL MEDICINE

## 2022-11-10 PROCEDURE — 97162 PT EVAL MOD COMPLEX 30 MIN: CPT | Mod: GP

## 2022-11-10 PROCEDURE — 250N000011 HC RX IP 250 OP 636: Performed by: SURGERY

## 2022-11-10 PROCEDURE — 99233 SBSQ HOSP IP/OBS HIGH 50: CPT | Performed by: INTERNAL MEDICINE

## 2022-11-10 PROCEDURE — 97530 THERAPEUTIC ACTIVITIES: CPT | Mod: GP

## 2022-11-10 PROCEDURE — 99232 SBSQ HOSP IP/OBS MODERATE 35: CPT | Mod: 25 | Performed by: INTERNAL MEDICINE

## 2022-11-10 RX ORDER — POTASSIUM CHLORIDE 1.5 G/1.58G
40 POWDER, FOR SOLUTION ORAL ONCE
Status: COMPLETED | OUTPATIENT
Start: 2022-11-10 | End: 2022-11-10

## 2022-11-10 RX ADMIN — HUMAN ALBUMIN MICROSPHERES AND PERFLUTREN 9 ML: 10; .22 INJECTION, SOLUTION INTRAVENOUS at 13:15

## 2022-11-10 RX ADMIN — ASPIRIN 81 MG: 81 TABLET, COATED ORAL at 08:13

## 2022-11-10 RX ADMIN — FLUTICASONE PROPIONATE AND SALMETEROL XINAFOATE 2 PUFF: 115; 21 AEROSOL, METERED RESPIRATORY (INHALATION) at 20:12

## 2022-11-10 RX ADMIN — ONDANSETRON 4 MG: 2 INJECTION INTRAMUSCULAR; INTRAVENOUS at 07:49

## 2022-11-10 RX ADMIN — ACETAMINOPHEN 650 MG: 325 TABLET, FILM COATED ORAL at 20:12

## 2022-11-10 RX ADMIN — LEVETIRACETAM 1000 MG: 500 TABLET, FILM COATED ORAL at 20:12

## 2022-11-10 RX ADMIN — FLUTICASONE PROPIONATE AND SALMETEROL XINAFOATE 2 PUFF: 115; 21 AEROSOL, METERED RESPIRATORY (INHALATION) at 08:13

## 2022-11-10 RX ADMIN — SODIUM CHLORIDE, POTASSIUM CHLORIDE, SODIUM LACTATE AND CALCIUM CHLORIDE: 600; 310; 30; 20 INJECTION, SOLUTION INTRAVENOUS at 04:44

## 2022-11-10 RX ADMIN — BENZOCAINE 6 MG-MENTHOL 10 MG LOZENGES 1 LOZENGE: at 15:52

## 2022-11-10 RX ADMIN — POTASSIUM CHLORIDE 40 MEQ: 1.5 POWDER, FOR SOLUTION ORAL at 04:26

## 2022-11-10 RX ADMIN — LEVETIRACETAM 1000 MG: 500 TABLET, FILM COATED ORAL at 08:13

## 2022-11-10 ASSESSMENT — ACTIVITIES OF DAILY LIVING (ADL)
ADLS_ACUITY_SCORE: 25
ADLS_ACUITY_SCORE: 26
ADLS_ACUITY_SCORE: 25
PREVIOUS_RESPONSIBILITIES: MEAL PREP;HOUSEKEEPING;LAUNDRY;SHOPPING;MEDICATION MANAGEMENT;FINANCES

## 2022-11-10 NOTE — PROGRESS NOTES
[facility]  Hospitalist Progress Note    Assessment & Plan      Morenita Moore is a 77 year old female with a history of SVT and DCIS s/p mastectomy who presented to the ED on 11/8/2022 after being found down in the hallway of her building.  There was question of seizure like activity and patient was confused and incontinent of urine so concern was for seizures.  Given this patient was give IV Ativan and intubated.  She was then admitted to our ICU.  Fortunately they were able to extubate the patient on 11/9 and her mentation has been improving      Loss of consciousness   Possible seizure  Acute encephalopathy, NOS.  Improving   Patient presented to the ED on 11/8 with altered mental status.  Per EMS she was found in the hallway of her building.  Possible fall prior.  EMS reported the patient was confused and incontinent of urine en route.  Given concern for seizure she was intubated in the ED for airway protection.  Patient was started on IV Keppra.  She subsequently extubated on 11/9.   CT head, CTA head/neck and MRI brain all are unremarkable.  Continuous EEG monitor was unrevealing per neurology  - patient clinically better today. She is awake and alert  - Cannot recall any of the events near the time of her reported seizure.   - Continue seizure precautions   - Q4h neuros  - Keppra 1 gm BID.  Now on PO   - PT recommending TCU for balance.>> patient refusing TCU  - OT patient has cognitive decline>> needs to be independent prior to discharge. Declining TCU   - she does have a neurologist for her dystonia.   - No driving for 6-8 weeks   - not entirely clear of precipitating factors to probable seizure.      Newly found cardiomyopathy   H/o SVT  Echocardiogram done in the ICU showed an EF of 40-45% with moderate hypokinesis of the mid to apical segments of LV.  Patient had an echocardiogram 2/15/22 available for comparison   Of note, patient states that she has been told she has an  accessory pathway in her heart that can lead to tachycardia.  She was evaluated for that at [facility] and they discussed doing an ablation but she never proceeded with that.  In Care Everywhere patient has a reported history of SVT.  At this time patient currently appears to be in NSR   - Monitor on telemetry  - troponin high 2,880 peak>> 1188   - Cardiology consulted to assess for new WMA and reduced EF  - Per cardiology her EF 45% consistent with stress cardiomyopathy. Evolving EKG changes consistent   - ASA 81 mg po daily   - holding bblocker for decrease BP   Recheck ECHO tomorrow   - if EF not improved will need ischemic evaluation.     SVT  - 14 beats of SVT on monitoring.      Incidental left ovarian cyst  Noted on CT scan and when compared to 3/6/20 appears larger.  No symptoms at this time  - Can discuss with PCP for possible pelvic US      Pulmonary nodules   - CT C/A/P : LUNGS AND PLEURA: Right basilar atelectasis. No acute airspace disease  identified. Stable cavitary nodule superior right lower lobe measuring  0.3 cm series 4 image 136. Stable left upper lobe ground-glass nodule  measuring 0.4 cm image 64. Stable ground-glass nodule left upper lobe  measuring 0.5 cm image 72. There is a new solid nodule measuring 0.4  cm anterior right lower lobe abutting the fissure image 171. No  effusion.  Few bilateral pulmonary nodules noted.  They are stable when compared to CT scan on 3/17/22 but a new solid nodule at the right lower lobe.  - Radiology recommending repeat CT in 6 months.  This needs to be discussed with patient prior to discharge     H/o DCIS s/p mastectomy   Stable       DISPOSITION:   May be challenging at discharge. PAteint does not feel she needs a TCU but rec by therapy     Diet: Regular Diet Adult  Room Service  Mcdowell Catheter: Not present  DVT Prophylaxis: start subcutaneous heparin if ok with neurology 11/11   Code Status: Full Code       Expected Discharge Date: 11/12/2022       Destination: home;home with help/services;other (comment) (lon touched on TCU)         KIMBERLY NIX MD, PA-C  Hospitalist Service  [facility]  ______________________________________________________________________    Interval History   Patient seen in the ICU. She is not sure what happened prior to the event or after.   Could not really recall any details of what she was doing prior to her seizure. Could not recall cutting the apple or going to the kitchen.   Denies drugs or alcohol. No recent change in meds     -Data reviewed today: I reviewed all new labs and imaging results over the last 24 hours. I personally reviewed Sinus rhythm   Low voltage QRS   Septal infarct (cited on or before 01-MAY-2020)   ST & T wave abnormality, consider anterolateral ischemia   Abnormal ECG   When compared with ECG of 08-NOV-2022 13:06,   ST no longer elevated in Anterolateral leads   T wave inversion now evident in Anterolateral leads     Physical Exam   Temp: 98.4  F (36.9  C) Temp src: Oral BP: 120/76 Pulse: 68   Resp: 16 SpO2: 96 % O2 Device: None (Room air)    Vitals:    11/08/22 1109 11/09/22 0600 11/10/22 0400   Weight: 59.4 kg (130 lb 15.3 oz) 59.1 kg (130 lb 4.7 oz) 58.7 kg (129 lb 6.6 oz)   Constitutional: Appears stated age, no acute distress.  Respiratory: Breath sounds CTA. No increased work of breathing.  Cardiovascular: RRR, no rub or murmur. No peripheral edema.  GI: Soft, non-tender, non-distended.  Skin: Warm, dry, no rashes or lesions.  Other: She is awake and alert. Orientated X 4   Extubated.   Has a head tremor.   Moving all extremities.     Medications     lactated ringers 50 mL/hr at 11/10/22 0444       aspirin  81 mg Oral Daily     fluticasone-salmeterol  2 puff Inhalation BID     levETIRAcetam  1,000 mg Oral BID       Data   Recent Labs   Lab 11/10/22  0932 11/10/22  0432 11/10/22  0246 11/10/22  0115 11/09/22  1537 11/09/22  1536 11/09/22  1233 11/09/22  0756  11/09/22  0457 11/08/22  1434 11/08/22  1122   WBC  --   --   --   --  10.2  --   --   --  10.5  --  9.4   HGB  --   --   --   --  12.3  --   --   --  11.4*  --  13.3   MCV  --   --   --   --  95  --   --   --  90  --  98   PLT  --   --   --   --  190  --   --   --  216  --  325   NA  --   --   --   --   --   --   --   --  137  --  139   POTASSIUM 3.8  --  3.2*  --   --   --  3.7  --  3.4  --  4.2   CHLORIDE  --   --   --   --   --   --   --   --  107  --  105   CO2  --   --   --   --   --   --   --   --  23  --  13*   BUN  --   --   --   --   --   --   --   --  10  --  12   CR  --   --   --   --   --   --   --   --  0.50*  --  0.55   ANIONGAP  --   --   --   --   --   --   --   --  7  --  21*   MARIBEL  --   --   --   --   --   --   --   --  8.2*  --  8.9   GLC  --  79  --  81  --  92  --    < > 105*   < > 141*   ALBUMIN  --   --   --   --   --   --   --   --  3.0*  --  3.7   PROTTOTAL  --   --   --   --   --   --   --   --  5.7*  --  7.3   BILITOTAL  --   --   --   --   --   --   --   --  1.2  --  0.8   ALKPHOS  --   --   --   --   --   --   --   --  68  --  81   ALT  --   --   --   --   --   --   --   --  24  --  29   AST  --   --   --   --   --   --   --   --  37  --  31   LIPASE  --   --   --   --   --   --   --   --   --   --  138    < > = values in this interval not displayed.       Imaging:  No results found for this or any previous visit (from the past 24 hour(s)).

## 2022-11-10 NOTE — PLAN OF CARE
November 9, 2022 [id number]:     2253: Trop 2,411 (previous Trop: 2,880)   0245: Trop 1,925. K+ 3.2, Replaced per K+ protocol w/ 40mEq PO.      Neuro- LOC: lethargic, opens eyes spontaneously, oriented to person/situation, confusion present at times, easy to reorient. Follows commands, DINERO- BUE 5/5, BLE 4/5 generalized weakness. Pupils- equal, round, reactive.     Cardiac- SR, HR 70s-100s, SBP 85-100s, MAP 61-65 (MD notified). Pulses: UE +2, LE +1.     Resp- Room Air, SpO2 >90%. Regular/Unlabored breathing w/o SOB. LS: Diminished/Clear.    GI- Regular Diet. Swallows w/o difficulty, sore throat remains from ETT. Abdomen: hypoactive, soft, nontender.    - Voids using bedside commode.    Skin- Bruising- forehead (OOH), Forearm.    Pain- c/o generalized pain- PRN Tylenol given.    Gtts/Infusions- LR @ 50mL/hr.

## 2022-11-10 NOTE — PROGRESS NOTES
Pt neurologically intact. Appreciate Gen Neuro input and agree with keppra as outpt until seen in gen neuro clinic.    Pt neurologically safe to dispo when medically stable and per PT/OT assessment (up ad vu).     Stroke Neuro only peripherally following while in ICU - otherwise page/call with questions/concerns.     Jm Hodge MD PGY5 Stroke Fellow

## 2022-11-10 NOTE — PROGRESS NOTES
11/10/22 1315   Appointment Info   Signing Clinician's Name / Credentials (OT) Maritza Hollins, OTR/L   Living Environment   People in Home alone   Current Living Arrangements apartment  (senior apt)   Home Accessibility no concerns   Living Environment Comments Reports she has transportation through [employer] services   Self-Care   Usual Activity Tolerance good   Current Activity Tolerance fair   Equipment Currently Used at Home grab bar, tub/shower   Fall history within last six months yes   Number of times patient has fallen within last six months 1   Activity/Exercise/Self-Care Comment At baseline is independent in self-cares without gait aid. Has walker and cart but does not use. Has shower chair but it fits poorly in her tub/shower so she does not use. Baseline cervical dystonia affecting posture.   Instrumental Activities of Daily Living (IADL)   Previous Responsibilities meal prep;housekeeping;laundry;shopping;medication management;finances   IADL Comments Does not drive. Reports she has no services for cooking, cleaning, laundry. Gets down on hands and knees to scrub floors   General Information   Onset of Illness/Injury or Date of Surgery 11/08/22   Referring Physician Parag Choe, DO   Patient/Family Therapy Goal Statement (OT) Not go to a facility   Additional Occupational Profile Info/Pertinent History of Current Problem 77 year old female with a history of SVT and DCIS s/p mastectomy who presented to the ED on 11/8/2022 after being found down in the hallway of her building.  There was question of seizure like activity and patient was confused and incontinent of urine so concern was for seizures.  Given this patient was give IV Ativan and intubated.  She was then admitted to our ICU.  Fortunately they were able to extubate the patient on 11/9 and her mentation has been improving. Found to have cardiomyopathy. EF of 45%   Existing Precautions/Restrictions fall;seizures   Cognitive Status  "Examination   Affect/Mental Status (Cognitive) WFL   Follows Commands follows two-step commands;over 90% accuracy   Cognitive Status Comments Pt reports cognition feels off. Pt able to multi-task. Complete divided attention task   Visual Perception   Visual Impairment/Limitations corrective lenses full-time   Impact of Vision Impairment on Function (Vision) Pt denies vision impairment   Sensory   Sensory Comments Pt denies BUE/BLE numbness or tingling   Pain Assessment   Patient Currently in Pain No   Strength Comprehensive (MMT)   Comment, General Manual Muscle Testing (MMT) Assessment 5/5 MMT. Reports overall generalized fatigue- \"I feel like I took a sleeping pill\"   Transfers   Transfers sit-stand transfer;toilet transfer   Sit-Stand Transfer   Sit-Stand Teller (Transfers) contact guard   Toilet Transfer   Type (Toilet Transfer) stand-sit;sit-stand   Teller Level (Toilet Transfer) contact guard   Activities of Daily Living   BADL Assessment/Intervention lower body dressing;grooming;toileting   Lower Body Dressing Assessment/Training   Teller Level (Lower Body Dressing) minimum assist (75% patient effort)   Grooming Assessment/Training   Teller Level (Grooming) contact guard assist   Toileting   Teller Level (Toileting) minimum assist (75% patient effort)   Clinical Impression   Criteria for Skilled Therapeutic Interventions Met (OT) Yes, treatment indicated   OT Diagnosis Decline function   OT Problem List-Impairments impacting ADL activity tolerance impaired;balance;cognition;mobility;postural control   Assessment of Occupational Performance 5 or more Performance Deficits   Identified Performance Deficits dressing, grooming, toileting, bathing, functional mobility, IADLs   Planned Therapy Interventions (OT) ADL retraining;IADL retraining;transfer training;home program guidelines   Clinical Decision Making Complexity (OT) low complexity   Anticipated Equipment Needs Upon Discharge " (OT) reacher;tub bench   Risk & Benefits of therapy have been explained evaluation/treatment results reviewed;care plan/treatment goals reviewed;risks/benefits reviewed;current/potential barriers reviewed;participants voiced agreement with care plan;participants included;patient   OT Total Evaluation Time   OT Jo Annal, Low Complexity Minutes (59307) 8   OT Goals   Therapy Frequency (OT) Daily   OT Predicted Duration/Target Date for Goal Attainment 11/20/22   OT Goals Hygiene/Grooming;Lower Body Dressing;Toilet Transfer/Toileting;Cognition;Transfers   OT: Hygiene/Grooming modified independent;while standing   OT: Lower Body Dressing Modified independent;including set-up/clothing retrieval   OT: Transfer   (Pt will verbalize understanding of recommended tub/shower safety equipment to prevent falls)   OT: Toilet Transfer/Toileting Modified independent;toilet transfer;cleaning and garment management;using adaptive equipment   OT: Cognitive Patient/caregiver will verbalize understanding of cognitive assessment results/recommendations as needed for safe discharge planning   Interventions   Interventions Quick Adds Therapeutic Activity;Therapeutic Procedures/Exercise   Therapeutic Activities   Therapeutic Activity Minutes (82906) 10   Symptoms noted during/after treatment fatigue;dizziness   Treatment Detail/Skilled Intervention OT: Upon arrival pt in ICU and agreeable to therapy. Increased time needed for management of lines and monitoring of vitals. Supine to sit with CGA for safety due to patient dizziness. BP stable and WNL. Sit to stand with CGA. Pt ambulated 3 feet to chair without gait aid with CGA. Pt ambulates slowly. Stand to sit with SBA. BP stable. Unable to progress activities in standing due to dizziness. Pt doffed/donned socks with SBA using figure four method. Pt participated in education regarding how to progress activity tolerance while hospitalized. Pt left seated in recliner with alarm on.   OT Discharge  Planning   OT Plan OT: Monitor for dizziness. SLUMS or med management task. G/H standing as able. LB dressing   OT Discharge Recommendation (DC Rec) home with assist;home with home care occupational therapy;Transitional Care Facility   OT Rationale for DC Rec Pt functioning below baseline and will benefit from continued skilled OT to maximize safety and independence. Pt most limited by dizziness and decreased activity tolerance. Pt reports noticing cognitive decline- will further assess tomorrow when pt is less postictal. Pt lives alone and needs to be independent in self-cares prior to safe DC home. Pt declining TCU. If home, recommend home OT/RN, total assist with IADLs, Ax1 with self-cares, new tub/shower chair   OT Brief overview of current status see above   Total Session Time   Timed Code Treatment Minutes 10   Total Session Time (sum of timed and untimed services) 18

## 2022-11-10 NOTE — PROGRESS NOTES
[facility]  Neurology Daily Note      Admission Date:11/8/2022   Date of service: 11/10/2022   Hospital Day: 3                                                   Assessment and Plan:   #. New onset seizure(initial event unwitnessed/ED witnessed seizure like activity), idiopathic etiology.  --continue levetiracetam 1000mg bid.  Would recommend that this be continued on discharge.  She will need follow-up as an outpatient.  No driving until follow-up appointment/reassessment with neurology in 6 to 8 weeks.  Patient will be able to follow-up with her regular neurologist at [facility], Dr. Hansen     #.  Postictal/confused state with generalized weakness.  -- Monitor overnight.  If no improvement may require further evaluation  Assess with occupational therapy as she continues to improve.  #Previous history of cervical dystonia/vestibular disorder     #. DVT Prophylaxis  --Mechanical/per hospitalist service  #. PT/OT/Speech  -- If patient does not fully recover strength/mobility overnight  #. Nutrition / GI Prophylaxis  --Per recommendations of speech therapy      Interval History:   Notes from ED provider:    During initial evaluation develops rightward gaze and rightward rotation of the head followed by generalized tonic seizure with tensing of the bilateral upper and lower extremities and foaming at the mouth.    More alert today,  Able to get further history.  Patient has been followed by neurologist Dr. Hansen at [facility] for vestibular dysfunction/dizziness.  The symptoms followed a nonspecific viral infection in 2018 in which she also had pericarditis.  She has been receiving vestibular treatments.  The patient also carries a longstanding history for many years of cervical dystonia which she has decided not to treat with Botox although offered.     She does report that she has had some problems with balance but no falls over the last few years    Note work-up has revealed cardiomyopathy with an at  echocardiogram EF of 45%       Medications:   Scheduled Meds:    aspirin  81 mg Oral Daily     fluticasone-salmeterol  2 puff Inhalation BID     levETIRAcetam  1,000 mg Oral BID     PRN Meds: acetaminophen **OR** acetaminophen, glucose **OR** dextrose **OR** glucagon, naloxone **OR** naloxone **OR** naloxone **OR** naloxone, ondansetron **OR** ondansetron, senna-docusate **OR** senna-docusate        Physical Exam:   Vitals: Temp: 98.4  F (36.9  C) Temp src: Oral BP: 110/71 Pulse: 73   Resp: 23 SpO2: 96 % O2 Device: None (Room air)    Vital Signs with Ranges: Temp:  [98  F (36.7  C)-98.6  F (37  C)] 98.4  F (36.9  C)  Pulse:  [63-91] 73  Resp:  [10-30] 23  BP: ()/(48-71) 110/71  SpO2:  [93 %-99 %] 96 %    General Appearance:  No acute distress  Neuro:               Extremities: No clubbing, no cyanosis, no edema       Data:   ROUTINE IP LABS (Last 3results)  CBC RESULTS:     Recent Labs   Lab Test 11/09/22  1537 11/09/22  0457 11/08/22  1122   WBC 10.2 10.5 9.4   RBC 3.85 3.65* 4.15   HGB 12.3 11.4* 13.3   HCT 36.5 32.8* 40.5    216 325     Basic Metabolic Panel:  Recent Labs   Lab Test 11/10/22  0932 11/10/22  0432 11/10/22  0246 11/10/22  0115 11/09/22  1536 11/09/22  1233 11/09/22  0756 11/09/22  0457 11/08/22  1434 11/08/22  1122 08/02/22  1158 10/20/21  1517   NA  --   --   --   --   --   --   --  137  --  139  --  139   POTASSIUM 3.8  --  3.2*  --   --  3.7  --  3.4  --  4.2  --  4.3   CHLORIDE  --   --   --   --   --   --   --  107  --  105  --  106   CO2  --   --   --   --   --   --   --  23  --  13*  --  24   BUN  --   --   --   --   --   --   --  10  --  12  --  10   CR  --   --   --   --   --   --   --  0.50*  --  0.55  --  0.63   GLC  --  79  --  81 92  --    < > 105*   < > 141*   < > 97   MARIBEL  --   --   --   --   --   --   --  8.2*  --  8.9  --  9.4    < > = values in this interval not displayed.     Liver panel:  Recent Labs   Lab Test 11/09/22  0457 11/08/22  1122 10/20/21  8371  02/01/21  1109 06/15/20  1156   PROTTOTAL 5.7* 7.3 7.2 6.8 6.9   ALBUMIN 3.0* 3.7 4.1 4.3 4.3   BILITOTAL 1.2 0.8 0.5 0.6 0.7   ALKPHOS 68 81 81 82 82   AST 37 31 17 16 13   ALT 24 29 14 12 14     Thyroid Panel:  Recent Labs   Lab Test 11/08/22  1122 06/15/20  1156 11/10/15  0945   TSH 3.50 1.79 2.39      Vitamin B12:   Recent Labs   Lab Test 02/01/21  1109 06/15/20  1156   B12 683 501           IMAGING:   MRI reviewed yesterday          TIME     35minutes Evaluation/management time     Raeann Rodriguez M.D.  Neurologist  [facility]  Office [phone]

## 2022-11-10 NOTE — CONSULTS
"CARDIOLOGY CONSULT    REASON FOR CONSULT: cardiomyopathy    PRIMARY CARE PHYSICIAN:  Otilia Sánchez    HISTORY OF PRESENT ILLNESS:  Ms. Moore is a 77 year old woman with PMH significant for anxiety, asthma, DCIS s/p left mastectomy who was admitted yesterday morning after being found down at her living facility.  Reportedly, she was found down in her facility hallway.  She was altered only oriented to self  and was incontinent of urine.  In th ED, she was witnessed to have a recurrent seizure.  She was treated with ativan and subsequently intubated.  She was noted to have blood on her lips consistent with seizure.  Telemetry noted sinus rhythm during her seizure without arrhythmias.  Her ECG initially demonstrated ST elevation in the anterolateral leads with improvement on subsequent ECGs.  Initial troponin was 183.  Anion gap 21 with elevated lactate to 12.7.  A subsequent troponin obtained greater than 24 hours later was elevated at 2880.    She was subsequently extubated.  She remains confused.  She was seen by neurology and started on keppra.    An echocardiogram was obtained which demonstrated mildly reduced LV function with EF 40-45% with apical wall motion abnormality with preservation of basal segments.  I reviewed the images personally and findings are consistent with a stress cardiomyopathy.  ECG demonstrates evolving T wave inversions in the anterolateral leads.  In review of cardiology visits, she has a hx of pericarditis and chronic atypical chest pain.  She has reported palpitations and was noted to have just brief runs of SVT just lasting 14 beats.  I do not see a hx of clinically significant SVT.  She reports a hx of an accessory pathway-I do not find evidence for this by ECG or prior cardiology records.    She notes some mild \"twinge\" chest discomfort that is reproducible on exam.      PAST MEDICAL HISTORY:  1.  Anxiety  2.  Asthma  3.  DCIS s/p left mastectomy in 2001  4.  " Hypertension  5.  Pericarditis  6.  SVT/palpitations:  Brief run of SVT up to 14 beats noted on prior monitoring    MEDICATIONS:  Current Facility-Administered Medications   Medication     glucose gel 15-30 g    Or     dextrose 50 % injection 25-50 mL    Or     glucagon injection 1 mg     fluticasone-salmeterol (ADVAIR HFA) 115-21 MCG/ACT Inhaler 2 puff     lactated ringers infusion     levETIRAcetam (KEPPRA) tablet 1,000 mg     naloxone (NARCAN) injection 0.2 mg    Or     naloxone (NARCAN) injection 0.4 mg    Or     naloxone (NARCAN) injection 0.2 mg    Or     naloxone (NARCAN) injection 0.4 mg     ondansetron (ZOFRAN ODT) ODT tab 4 mg    Or     ondansetron (ZOFRAN) injection 4 mg     senna-docusate (SENOKOT-S/PERICOLACE) 8.6-50 MG per tablet 1 tablet    Or     senna-docusate (SENOKOT-S/PERICOLACE) 8.6-50 MG per tablet 2 tablet       ALLERGIES:  Allergies   Allergen Reactions     Levalbuterol Hydrochloride Shortness Of Breath     Cats Other (See Comments)     Itchy eyes     Dust Mites      Other reaction(s): Wheezing  Wheezing/shortness/breath/see (IRP 6/1)     Qvar [Beclomethasone]      Per patient- allergic to QVAR     Amitriptyline Palpitations     Escitalopram Anxiety       SOCIAL HISTORY:  Remote smoking hx (quit in her 20s).  No significant ETOH    FAMILY HISTORY:  No family hx of early cardiovascular disease    REVIEW OF SYSTEMS:  A complete ROS was obtained and the pertinent positives are outlined in the history of present illness above.  The remainder of systems is negative.      PHYSICAL EXAM:  Temp: 98  F (36.7  C) Temp src: Oral BP: 100/52 Pulse: 81   Resp: 27 SpO2: 94 % O2 Device: Nasal cannula Oxygen Delivery: 3 LPM  Vital Signs with Ranges  Temp:  [96.6  F (35.9  C)-99.5  F (37.5  C)] 98  F (36.7  C)  Pulse:  [] 81  Resp:  [11-27] 27  BP: ()/(50-80) 100/52  FiO2 (%):  [30 %] 30 %  SpO2:  [94 %-100 %] 94 %  130 lbs 4.67 oz    Constitutional: alert, oriented to self  Eyes: PERRL, sclera  nonicteric  ENT: trachea midline  Respiratory: CTAB  Cardiovascular: RRR no m/r/g, no JVD  GI: nondistended, nontender, bowel sounds present  Lymph/Hematologic: no lymphadenopathy  Skin: dry, no rash  Musculoskeletal: good muscle tone, no edema bilaterally  Neurologic: no focal deficits  Neuropsychiatric: appropriate affact    DATA:  Labs:   Troponin [id number] (>24 hours apart)      EKG: Serial ECGs reviewed.  Initial dated 11/8/22 demonstrated sinus tachycardia with ST elevation in the anterolateral leads (improved on subsequent ECG).  Repeat ECG demonstrates sinus rhythm with T wave inversions in the anterolateral leads    Echocardiogram dated 11/9/22 reviewed personally  1. The left ventricle is normal in size. There is normal left ventricular wall  thickness. Left ventricular systolic function is mild to moderately reduced.  The visual ejection fraction is 40-45%. Left ventricular diastolic function is  abnormal. There is moderate hypokinesis of all the mid to apical segments of  the left ventricle. There is no thrombus seen in the left ventricle.  2. The right ventricle is normal size. The right ventricular systolic function  is normal.  3. Trace mitral and tricuspid regurgitation.  4. Trivial pericardial effusion.  5. In direct comparison to the previous study dated 02/15/2022, there has been  an interval deterioration in left ventricular systolic function and regional  wall motion abnormalities are now present.        ASSESSMENT:  1.  New onset seizures  2.  Post-ictal confusion  3.  Cardiomyopathy:  EF 45%.  Reviewed images personally and findings consistent with stress cardiomyopathy (large area of akinesis of the mid and apical segments with preservation of the basal segments).  Evolving ECG changes also suggestive of stress cardiomyopathy     4.  Troponin elevation:  Likely demand ischemia in the setting of hemodynamic stress.   5.  Reported hx of SVT:  In review of records from outside cardiology  evaluations, she was noted to have 14 beats of SVT found on monitoring.  I do not see evidence for clinically significant, documented hx of SVT.  No evidence for accessory pathway on ECG or reported by prior cardiology evaluations  6.  Lactic acidosis; resolved.  Secondary to seizure activity    RECOMMENDATIONS:  1.  Findings very suggestive of stress cardiomyopathy secondary to seizure activity yesterday.  No evidence to suggest primary cardiac etiology for being found down.    2.  Recommend starting aspirin 81 mg daily.  Will hold on BB given soft blood pressures.  3.  Will reassess LV function with limited echocardiogram towards the end of the week to reassess LV function.  Will expect LV function and wall motion to improve.  If not, would then need to consider ischemic evaluation.      Analy Wyatt MD [facility]  November 9, 2022

## 2022-11-10 NOTE — PHARMACY-ADMISSION MEDICATION HISTORY
Pharmacy Medication History  Admission medication history interview status for the 11/8/2022  admission is complete. See EPIC admission navigator for prior to admission medications     Location of Interview: Patient room  Medication history sources: Spoke w/ patient.  Reviewed recent fill history through [employer].     In the past week, patient estimated taking medication this percent of the time: greater than 90%    Medication reconciliation completed by provider prior to medication history? No    Time spent in this activity: 15 minutes    Prior to Admission medications    Medication Sig Last Dose Taking? Auth Provider Long Term End Date   albuterol (PROAIR HFA/PROVENTIL HFA/VENTOLIN HFA) 108 (90 Base) MCG/ACT inhaler Inhale 2 puffs into the lungs every 6 hours  Patient taking differently: Inhale 2 puffs into the lungs every 6 hours as needed  Yes Pacheco Perdomo MD Yes    calcium carbonate-vitamin D 600-125 MG-UNIT TABS Take 1 tablet by mouth daily  Yes Reported, Patient     Cyanocobalamin 50 MCG TABS Take 50 mcg by mouth once a week Sundays  Yes Reported, Patient     fluticasone-salmeterol (ADVAIR HFA) 115-21 MCG/ACT inhaler Inhale 2 puffs into the lungs 2 times daily  Yes Pacheco Perdomo MD Yes      The information provided in this note is only as accurate as the sources available at the time of update(s)     Emily Craig, MihaelaD, BCPS

## 2022-11-10 NOTE — PROGRESS NOTES
[facility]    Cardiology Progress Note    Date of Service (when I saw the patient): 11/10/2022            Assessment and Plan:   ASSESSMENT:  1.  New onset seizures  2.  Post-ictal confusion  3.  Cardiomyopathy:  EF 45%.  Reviewed images personally and findings consistent with stress cardiomyopathy (large area of akinesis of the mid and apical segments with preservation of the basal segments).  Evolving ECG changes also suggestive of stress cardiomyopathy     4.  Troponin elevation:  Likely demand ischemia in the setting of hemodynamic stress.   5.  Reported hx of SVT:  In review of records from outside cardiology evaluations, she was noted to have 14 beats of SVT found on monitoring.  I do not see evidence for clinically significant, documented hx of SVT.  No evidence for accessory pathway on ECG or reported by prior cardiology evaluations  6.  Lactic acidosis; resolved.  Secondary to seizure activity     RECOMMENDATIONS:  1.  Findings very suggestive of stress cardiomyopathy secondary to seizure activity. No evidence to suggest primary cardiac etiology for being found down.    2.  Recommend starting aspirin 81 mg daily.  Will hold on BB given soft blood pressures.  3.  Will reassess LV function with limited echocardiogram tomorrow to reassess LV function.  Will expect LV function and wall motion to improve over the next week or two.  If not, would then need to consider ischemic evaluation.       Analy Wyatt MD [facility]  November 9, 2022                  Interval History:     No new issues overnight.  No chest pain, shortness of breath.             Medications:       aspirin  81 mg Oral Daily     fluticasone-salmeterol  2 puff Inhalation BID     levETIRAcetam  1,000 mg Oral BID            Physical Exam:   Blood pressure 118/62, pulse 81, temperature 98.4  F (36.9  C), temperature source Oral, resp. rate 25, weight 58.7 kg (129 lb 6.6 oz), SpO2 97 %, not currently  breastfeeding.  Vitals:    22 1109 22 0600 11/10/22 0400   Weight: 59.4 kg (130 lb 15.3 oz) 59.1 kg (130 lb 4.7 oz) 58.7 kg (129 lb 6.6 oz)       Intake/Output Summary (Last 24 hours) at 11/10/2022 1041  Last data filed at 11/10/2022 1000  Gross per 24 hour   Intake 1735 ml   Output 400 ml   Net 1335 ml           Vital Sign Ranges  Temperature Temp  Av.3  F (36.8  C)  Min: 98  F (36.7  C)  Max: 98.6  F (37  C)   Blood pressure Systolic (24hrs), Av , Min:84 , Max:118        Diastolic (24hrs), Av, Min:48, Max:66      Pulse Pulse  Av.1  Min: 63  Max: 91   Respirations Resp  Av  Min: 10  Max: 30   Pulse oximetry SpO2  Av.2 %  Min: 93 %  Max: 100 %         EXAM:    Constitutional:    in no apparent distress    Skin:    normal    Head:    Normocephalic, atramatic    Eyes:    pupils equal, round and reactive to light, extra ocular muscles intact, sclera clear, conjunctiva normal    Neck:    No JVD   Lungs:    CTAB   Cardiovascular:    S1, S2 RRR no m/r/g, no JVD   Abdomen:    normal bowel sounds    Extremities and Back:    no cyanosis or clubbing and No edema bilaterally   Neurological:    No gross or focal neurologic abnormalities               Data:     Recent Labs   Lab Test 22  1537 22  0457 06/15/20  1156 20  1810   WBC 10.2 10.5   < >  --    HGB 12.3 11.4*   < >  --    MCV 95 90   < >  --     216   < >  --    INR  --   --   --  0.99    < > = values in this interval not displayed.      Recent Labs   Lab Test 11/10/22  0932 11/10/22  0246 22  1233 22  0457 22  1122   NA  --   --   --  137 139   POTASSIUM 3.8 3.2*   < > 3.4 4.2   CHLORIDE  --   --   --  107 105   BUN  --   --   --  10 12   CR  --   --   --  0.50* 0.55    < > = values in this interval not displayed.     Recent Labs   Lab 11/10/22  0432 11/10/22  0115 22  1536 22  0756   GLC 79 81 92 94     Recent Labs   Lab Test 22  0457 22  1122   ALT 24 29   AST  37 31     Troponin I ES   Date Value Ref Range Status   11/10/2015  0.000 - 0.045 ug/L Final    <0.015  The 99th percentile for upper reference range is 0.045 ug/L.  Troponin values in   the range of 0.045 - 0.120 ug/L may be associated with risks of adverse   clinical events.     08/29/2012 <0.012 0.000 - 0.034 ug/L Final         Recent Labs   Lab Test 11/10/22  0246 11/09/22  0457 11/08/22  1122   MAG 2.3 2.2 2.4*       Lab Results   Component Value Date    CHOL 162 03/21/2022     Lab Results   Component Value Date    HDL 57 03/21/2022     Lab Results   Component Value Date    LDL 82 03/21/2022     Lab Results   Component Value Date    TRIG 113 03/21/2022     No results found for: CHOLHDLRATIO       TSH   Date Value Ref Range Status   11/08/2022 3.50 0.40 - 4.00 mU/L Final   11/10/2015 2.39 0.40 - 4.00 mU/L Final       Analy Wyatt MD, FACC  Cardiology

## 2022-11-10 NOTE — PROGRESS NOTES
11/10/22 6438   Appointment Info   Signing Clinician's Name / Credentials (PT) Elvira Flores, PT, DPT   Living Environment   People in Home alone   Current Living Arrangements apartment   Home Accessibility no concerns   Living Environment Comments Pt reports she has transportation 2x/day through [employer]   Self-Care   Usual Activity Tolerance good   Current Activity Tolerance moderate   Fall history within last six months yes   Number of times patient has fallen within last six months   (pt denies falls other than just prior to admission)   Activity/Exercise/Self-Care Comment At baseline is independent in self-cares without gait aid. Has walker and cart but does not use. Pt reports she will use a walking stick in the hallway. Has shower chair but it fits poorly in her tub/shower so she does not use. Baseline cervical dystonia affecting posture.   General Information   Onset of Illness/Injury or Date of Surgery 11/08/22   Referring Physician Jm Hodge MD   Patient/Family Therapy Goals Statement (PT) to return home, interested in home PT, however declining TCU   Pertinent History of Current Problem (include personal factors and/or comorbidities that impact the POC) 78y/o F admitted with new onset seizure (initial event unwitnessed/ED witnessed seizure like activity), idiopathic etiology. Postictal/confusion state with generalized weakness. Cardiomyopathy:  EF 45%.  Reviewed images personally and findings consistent with stress cardiomyopathy (large area of akinesis of the mid and apical segments with preservation of the basal segments).  Evolving ECG changes also suggestive of stress cardiomyopathy, Troponin elevation:  Likely demand ischemia in the setting of hemodynamic stress. Previous history of cervical dystonia/vestibular disorder. Pt has been followed by neurologist for vestibular dysfunction/dizziness-symptoms followed a nonspecific viral infection in 2018 in which she also had pericarditis.    Existing Precautions/Restrictions fall   General Observations Sitting in chair, sleeping, frequently reporting how tired she is.   Cognition   Orientation Status (Cognition) oriented to;person;place   Cognitive Status Comments reports mild confusion, pt 1 day off on day, needed assist to recall reason for admission, was able to follow commands throughout encounter for mobility without repetition needed   Posture    Posture Comments head tilted to the right   Range of Motion (ROM)   Range of Motion ROM is WFL   Strength (Manual Muscle Testing)   Strength Comments 4/5 B hip flexion, 4+/5 B hamstring/quads, 5/5 B ankle PF/DF, 4-/5 B hip abduction   Bed Mobility   Comment, (Bed Mobility) NT-pt sitting in chair   Transfers   Comment, (Transfers) CGA sit to stand with FWW   Gait/Stairs (Locomotion)   Pocahontas Level (Gait) contact guard   Assistive Device (Gait) walker, front-wheeled   Distance in Feet (Required for LE Total Joints) 4   Comment, (Gait/Stairs) limited by equipment, also with numerous c/o fatigue, no LOB with turning   Balance   Balance Comments CGA with UE support on FWW with static and dynamic standing balance   Sensory Examination   Sensory Perception Comments pt denies any numbness/tingling   Coordination   Coordination Comments intact B: finger precision tapping, HTS and alternating toe tapping.   Muscle Tone   Muscle Tone Comments hx of cervical dystonia, otherwise appear WFL   Clinical Impression   Criteria for Skilled Therapeutic Intervention Yes, treatment indicated   PT Diagnosis (PT) impaired gait   Influenced by the following impairments impaired balance, impaired strength, impaired activity tolerance   Functional limitations due to impairments impaired IND with functional mobility   Clinical Presentation (PT Evaluation Complexity) Evolving/Changing   Clinical Presentation Rationale clinical judgement   Clinical Decision Making (Complexity) moderate complexity   Planned Therapy  Interventions (PT) balance training;bed mobility training;gait training;home exercise program;home program guidelines;risk factor education;progressive activity/exercise;transfer training;strengthening;patient/family education;postural re-education;neuromuscular re-education;motor coordination training   Risk & Benefits of therapy have been explained evaluation/treatment results reviewed;care plan/treatment goals reviewed;risks/benefits reviewed;participants voiced agreement with care plan;participants included;patient;current/potential barriers reviewed   PT Total Evaluation Time   PT Eval, Moderate Complexity Minutes (35964) 14   Physical Therapy Goals   PT Frequency Daily   PT Predicted Duration/Target Date for Goal Attainment 11/17/22   PT Goals Bed Mobility;Transfers;Gait   PT: Bed Mobility Independent;Supine to/from sit   PT: Transfers Modified independent;Sit to/from stand;Assistive device;Bed to/from chair   PT: Gait Modified independent;Greater than 200 feet;Rolling walker   Interventions   Interventions Quick Adds Therapeutic Activity   Therapeutic Activity   Therapeutic Activities: dynamic activities to improve functional performance Minutes (61742) 8   Symptoms Noted During/After Treatment Dizziness;Fatigue   Treatment Detail/Skilled Intervention worked on standing marching with FWW x 10 reps with CGA, reported some mild dizziness, no LOB, VSS throughout, educated pt on rehab options, pt reports would be open to home PT, however refusing TCU option. Pt left in chair at the end of session with needs in reach and alarm on.   PT Discharge Planning   PT Plan progress transfers, ambulation, balance, address vestib as needed   PT Discharge Recommendation (DC Rec) home with home care physical therapy;home with assist;Transitional Care Facility   PT Rationale for DC Rec Pt currently requires CGA for transfers and balance with FWW, currently recommend TCU based on mobility as pt lives alone, however pt currently  refusing TCU, pt is open to home PT services. Will continue to assess daily as pt does demo good mobility potiental to return to independence.   PT Brief overview of current status CGA with mobility with FWW   Total Session Time   Timed Code Treatment Minutes 8   Total Session Time (sum of timed and untimed services) 22

## 2022-11-10 NOTE — PROGRESS NOTES
Verbal activity order per Dr Hodge with Neuro Crit for pt to participate in PT/OT today. Order placed.

## 2022-11-11 ENCOUNTER — APPOINTMENT (OUTPATIENT)
Dept: OCCUPATIONAL THERAPY | Facility: CLINIC | Age: 77
DRG: 100 | End: 2022-11-11
Payer: COMMERCIAL

## 2022-11-11 ENCOUNTER — APPOINTMENT (OUTPATIENT)
Dept: PHYSICAL THERAPY | Facility: CLINIC | Age: 77
DRG: 100 | End: 2022-11-11
Payer: COMMERCIAL

## 2022-11-11 LAB
ATRIAL RATE - MUSE: 73 BPM
DIASTOLIC BLOOD PRESSURE - MUSE: NORMAL MMHG
HOLD SPECIMEN: NORMAL
INTERPRETATION ECG - MUSE: NORMAL
MAGNESIUM SERPL-MCNC: 2.2 MG/DL (ref 1.6–2.3)
P AXIS - MUSE: 61 DEGREES
PHOSPHATE SERPL-MCNC: 3.4 MG/DL (ref 2.5–4.5)
POTASSIUM BLD-SCNC: 3.6 MMOL/L (ref 3.4–5.3)
PR INTERVAL - MUSE: 120 MS
QRS DURATION - MUSE: 82 MS
QT - MUSE: 414 MS
QTC - MUSE: 456 MS
R AXIS - MUSE: 63 DEGREES
SYSTOLIC BLOOD PRESSURE - MUSE: NORMAL MMHG
T AXIS - MUSE: 131 DEGREES
VENTRICULAR RATE- MUSE: 73 BPM

## 2022-11-11 PROCEDURE — 250N000013 HC RX MED GY IP 250 OP 250 PS 637: Performed by: INTERNAL MEDICINE

## 2022-11-11 PROCEDURE — 99233 SBSQ HOSP IP/OBS HIGH 50: CPT | Performed by: INTERNAL MEDICINE

## 2022-11-11 PROCEDURE — 36415 COLL VENOUS BLD VENIPUNCTURE: CPT | Performed by: INTERNAL MEDICINE

## 2022-11-11 PROCEDURE — 99232 SBSQ HOSP IP/OBS MODERATE 35: CPT | Performed by: INTERNAL MEDICINE

## 2022-11-11 PROCEDURE — 97116 GAIT TRAINING THERAPY: CPT | Mod: GP | Performed by: PHYSICAL THERAPIST

## 2022-11-11 PROCEDURE — 250N000013 HC RX MED GY IP 250 OP 250 PS 637: Performed by: PSYCHIATRY & NEUROLOGY

## 2022-11-11 PROCEDURE — 84132 ASSAY OF SERUM POTASSIUM: CPT | Performed by: INTERNAL MEDICINE

## 2022-11-11 PROCEDURE — 97129 THER IVNTJ 1ST 15 MIN: CPT | Mod: GO

## 2022-11-11 PROCEDURE — 200N000001 HC R&B ICU

## 2022-11-11 PROCEDURE — 84100 ASSAY OF PHOSPHORUS: CPT | Performed by: INTERNAL MEDICINE

## 2022-11-11 PROCEDURE — 97530 THERAPEUTIC ACTIVITIES: CPT | Mod: GO

## 2022-11-11 PROCEDURE — 83735 ASSAY OF MAGNESIUM: CPT | Performed by: INTERNAL MEDICINE

## 2022-11-11 PROCEDURE — 250N000013 HC RX MED GY IP 250 OP 250 PS 637: Performed by: SURGERY

## 2022-11-11 RX ORDER — ENOXAPARIN SODIUM 100 MG/ML
40 INJECTION SUBCUTANEOUS DAILY
Status: DISCONTINUED | OUTPATIENT
Start: 2022-11-11 | End: 2022-11-12

## 2022-11-11 RX ADMIN — FLUTICASONE PROPIONATE AND SALMETEROL XINAFOATE 2 PUFF: 115; 21 AEROSOL, METERED RESPIRATORY (INHALATION) at 22:16

## 2022-11-11 RX ADMIN — ACETAMINOPHEN 650 MG: 325 TABLET, FILM COATED ORAL at 19:16

## 2022-11-11 RX ADMIN — ACETAMINOPHEN 650 MG: 325 TABLET, FILM COATED ORAL at 08:23

## 2022-11-11 RX ADMIN — ASPIRIN 81 MG: 81 TABLET, COATED ORAL at 08:23

## 2022-11-11 RX ADMIN — SENNOSIDES AND DOCUSATE SODIUM 1 TABLET: 50; 8.6 TABLET ORAL at 09:00

## 2022-11-11 RX ADMIN — FLUTICASONE PROPIONATE AND SALMETEROL XINAFOATE 2 PUFF: 115; 21 AEROSOL, METERED RESPIRATORY (INHALATION) at 08:30

## 2022-11-11 RX ADMIN — LEVETIRACETAM 1000 MG: 500 TABLET, FILM COATED ORAL at 08:24

## 2022-11-11 RX ADMIN — LEVETIRACETAM 1000 MG: 500 TABLET, FILM COATED ORAL at 21:59

## 2022-11-11 ASSESSMENT — ACTIVITIES OF DAILY LIVING (ADL)
ADLS_ACUITY_SCORE: 25

## 2022-11-11 NOTE — PLAN OF CARE
Pt A&O x3, increasing comprehension of situation but forgetful at times. Up to BSC with A1, gait belt. Worked with PT/OT, tolerated well. Now on PO keppra. Echo done today. C/o sore throat, gave PRN lozenge with minimal relief. Regular diet. Waiting for transfer to [facility].

## 2022-11-11 NOTE — PLAN OF CARE
Neuro: A&Ox4. Neuro checks q4 hours. PERRLA.    Cardiac: SR overnight.     Respiratory: Lung sounds clear on room air.     GI/: Continent of bowel/bladder. Regular diet.     Mobility: Up with assist x1, gait belt and pivot to commode. Calls appropriately.     Skin: Bruising. Skin intact.     Shift Events: Neuros remained stable overnight. Vitals stable. Tylenol given for muscle soreness.     Plan of Care: Transfer to  when bed available. Recheck electrolytes tomorrow AM.

## 2022-11-11 NOTE — PROGRESS NOTES
[facility]  Hospitalist Progress Note    Assessment & Plan   Morenita Moore is a 77 year old female with a history of SVT and DCIS s/p mastectomy who presented to the ED on 11/8/2022 after being found down in the hallway of her building.  There was question of seizure like activity and patient was confused and incontinent of urine so concern was for seizures.  Given this patient was give IV Ativan and intubated.  She was then admitted to our ICU.    Fortunately they were able to extubate the patient on 11/9 and her mentation has been improving      Loss of consciousness   Possible seizure  Acute encephalopathy, NOS.  Improving   Patient presented to the ED on 11/8 with altered mental status.  Per EMS she was found in the hallway of her building.  Possible fall prior.  EMS reported the patient was confused and incontinent of urine en route.  Given concern for seizure she was intubated in the ED for airway protection.     Patient was started on IV Keppra.  She subsequently extubated on 11/9.     11/8 CT head.  No hemorrhage, mild fullness.  Chronic small vessel disease    11/8 CT angiogram of head and neck: No evidence of large vessel occlusion, high-grade stenosis, aneurysm or vascular malformation.  Distal vertebral basilar system small in the setting of fetal origins of the bilateral posterior cerebral arteries.  Hypoplastic right posterior cerebral artery.  No large vessel occlusion.  Cervical spine with multiple stenosis.  Also noted to have scattered nonspecific pulmonary opacities/nodules including groundglass nodules in the left upper lung.     11/8 CT chest abdomen pelvis: Pulmonary nodules.    Continuous EEG monitor was unrevealing per neurology    Continue seizure precautions     Neurochecks    Keppra 1 gm BID.    PT recommending TCU for balance.  Patient in agreement    OT patient has cognitive     she does have a neurologist for her dystonia.     -No driving for 6-8 weeks at  least      not entirely clear of precipitating factors to probable seizure.     -Watch patient ambulate to the bathroom in her room and she is very unsteady.    Patient in agreement for rehab facility     Newly found cardiomyopathy EF 40 to 45%  Stress cardiomyopathy  H/o SVT    Echocardiogram done in the ICU showed an EF of 40-45% with moderate hypokinesis of the mid to apical segments of LV.  Patient had an echocardiogram 2/15/22 available for comparison     Cardiology consult this admission.     troponin high 2,880 peak>> 1188 > per cardiology thought to be consistent with stress/demand.    Per cardiology her EF 45% consistent with stress cardiomyopathy. Evolving EKG changes consistent     11/10 Limited echo showing improved wall motion and LV function reported at 55 to 60%.(Has not completely normalized) still with large symmetric segment of severe hypokinesis to akinesis    CT in March 2022 with moderate coronary artery calcifications.    Plans per cardiology for follow-up in 1 month with an echo prior to the visit    Aspirin 81 mg    Metoprolol 12.5 mg XL daily >> recommended : Patient very reluctant to take this given she reports that she had bad reactions to beta-blockers in the past and felt shaky when she took them      Pericardial effusion:     Reported on echo of 11/10 as larger.  Especially RV free wall.    Will need to discuss with cardiology prior to discharge if follow-up echo needed sooner for any further recommendations    11/12 cardiology follow-up in reference to her pericardial effusion.    No evidence of tamponade.  Recommended stopping aspirin and Lovenox.    Echo in 1 week to reevaluate.    30-day event monitor given her syncope    SVT  - 14 beats of SVT on monitoring.   -Cardiology assessment.  And reviews of records from outside cardiology she was noted to have 14 beats of SVT found on monitoring.  Not felt to have any evidence of clinically significant SVT or evidence of accessory pathway  since admission.  -Recommendations to start metoprolol XL but patient reluctant as she feels she has a reaction to beta-blockers     Incidental left ovarian cyst  11/8/2022 CTA C/A/P: Large left ovarian adnexal cyst 5.9 cm.  Previously 4 cm on 3/2022  Noted on CT scan and when compared to 3/6/20 appears larger.    No symptoms at this time  - Can discuss with PCP for possible pelvic US      Pulmonary nodules   - CT C/A/P : LUNGS AND PLEURA: Right basilar atelectasis. No acute airspace disease  identified. Stable cavitary nodule superior right lower lobe measuring  0.3 cm series 4 image 136. Stable left upper lobe ground-glass nodule  measuring 0.4 cm image 64. Stable ground-glass nodule left upper lobe  measuring 0.5 cm image 72. There is a new solid nodule measuring 0.4  cm anterior right lower lobe abutting the fissure image 171. No  effusion.  Few bilateral pulmonary nodules noted.  They are stable when compared to CT scan on 3/17/22 but a new solid nodule at the right lower lobe.  - Radiology recommending repeat CT in 6 months.  This needs to be discussed with patient prior to discharge     Cervical torticollis:     PTA admission.    Followed by clinic of neurology     She wants to have medications/treatments however adds names of many medications gave her side effects and start includes a beta-blocker    H/o DCIS s/p mastectomy   Stable    Potential UTI:     Urinary frequency and burning overnight    UA checked and positive.  Urine culture sent    Started Omnicef 300 mg p.o. twice daily await cultures      Constipation  Started on MiraLAX daily and senna scheduled 1 p.o. twice daily       DISPOSITION:   Patient will require discharge to a TCU.    Diet: Regular Diet Adult  Room Service  Mcdowell Catheter: Not present  DVT Prophylaxis: Patient was started on subcu Lovenox but she declined to have this.  Patient has refused medication  Code Status: Full Code  Anticipate discharge to TCU        KIMBERLY NIX MD,  "KELLY  Hospitalist Service  [facility]  ______________________________________________________________________    Interval History   Patient more alert today.  She still reports that she is in agreement to go to a TCU.  Frustrated by her dystonia and tremors but then discussion of multiple medication she feels she has had side effects from multiple.  Does not like to take meds.  Discussion regarding potential beta-blocker but she does not think she wants to take metoprolol as she thought she had \"vertigo from this in the past  She is just climbed with Lovenox shots    -Data reviewed today: I reviewed all new labs and imaging results over the last 24 hours. I personally reviewed. ]    11/10 echo  The large symetrical segment of severe hypokinesis to akinesis (takotsubo?)  seen by echo yesterday has improved but not completely recovered today. The  apex remains mild to moderately hypokinetic. EF is now within normal limits  (55-60%).  The pericardial effusion noted yesterday appears larger. especially at RV free  wall. No evidence of tamponade, although full evaluation was not completed.    11/12 completed today and then pending    Physical Exam   Temp: 98  F (36.7  C) Temp src: Oral BP: 134/66 Pulse: 80   Resp: 16 SpO2: 96 % O2 Device: None (Room air)    Vitals:    11/09/22 0600 11/10/22 0400 11/11/22 0600   Weight: 59.1 kg (130 lb 4.7 oz) 58.7 kg (129 lb 6.6 oz) 65.5 kg (144 lb 6.4 oz)   Constitutional: Appears stated age, no acute distress.  Patient is awake and alert.  Respiratory: Breath sounds CTA. No increased work of breathing.  Cardiovascular: RRR, no rub or murmur. No peripheral edema.  GI: Soft, non-tender, non-distended.  Skin: Warm, dry, no rashes or lesions.  Other: She is awake and alert. Orientated X 4   Extubated.   Has a head tremor.   Moving all extremities.   Watched patient ambulate to the bathroom and she is very unsteady grabbing onto the wall near the toilet.  Required " standby assistance with her nurse    Medications     lactated ringers Stopped (11/10/22 1900)       aspirin  81 mg Oral Daily     enoxaparin ANTICOAGULANT  40 mg Subcutaneous Daily     fluticasone-salmeterol  2 puff Inhalation BID     levETIRAcetam  1,000 mg Oral BID       Data   Recent Labs   Lab 11/11/22  0445 11/10/22  0932 11/10/22  0432 11/10/22  0246 11/10/22  0115 11/09/22  1537 11/09/22  1536 11/09/22  0756 11/09/22  0457 11/08/22  1434 11/08/22  1122   WBC  --   --   --   --   --  10.2  --   --  10.5  --  9.4   HGB  --   --   --   --   --  12.3  --   --  11.4*  --  13.3   MCV  --   --   --   --   --  95  --   --  90  --  98   PLT  --   --   --   --   --  190  --   --  216  --  325   NA  --   --   --   --   --   --   --   --  137  --  139   POTASSIUM 3.6 3.8  --  3.2*  --   --   --    < > 3.4  --  4.2   CHLORIDE  --   --   --   --   --   --   --   --  107  --  105   CO2  --   --   --   --   --   --   --   --  23  --  13*   BUN  --   --   --   --   --   --   --   --  10  --  12   CR  --   --   --   --   --   --   --   --  0.50*  --  0.55   ANIONGAP  --   --   --   --   --   --   --   --  7  --  21*   MARIBEL  --   --   --   --   --   --   --   --  8.2*  --  8.9   GLC  --   --  79  --  81  --  92   < > 105*   < > 141*   ALBUMIN  --   --   --   --   --   --   --   --  3.0*  --  3.7   PROTTOTAL  --   --   --   --   --   --   --   --  5.7*  --  7.3   BILITOTAL  --   --   --   --   --   --   --   --  1.2  --  0.8   ALKPHOS  --   --   --   --   --   --   --   --  68  --  81   ALT  --   --   --   --   --   --   --   --  24  --  29   AST  --   --   --   --   --   --   --   --  37  --  31   LIPASE  --   --   --   --   --   --   --   --   --   --  138    < > = values in this interval not displayed.       Imaging:  No results found for this or any previous visit (from the past 24 hour(s)).

## 2022-11-11 NOTE — CONSULTS
"Care Management Follow Up    Length of Stay (days): 3    Expected Discharge Date: 11/12/2022     Concerns to be Addressed: discharge planning     Patient plan of care discussed at interdisciplinary rounds: Yes    Anticipated Discharge Disposition: Home, Home Care, Outpatient Rehab (PT, OT, SLP, Cardiac or Pulmonary), Transitional Care     Anticipated Discharge Services: Transportation Services  Anticipated Discharge DME: Other (see comment) (pending therapy reccs)    Patient/family educated on Medicare website which has current facility and service quality ratings:    Education Provided on the Discharge Plan:    Patient/Family in Agreement with the Plan: unable to assess    Referrals Placed by CM/SW:  CM  Private pay costs discussed: Not applicable    Additional Information:  Writer met with the patient at the bedside. Writer explained role and scope of discharge planning.  Writer informed patient that we discussed initial consult and planning with her son a couple of days ago and she was \"sleepy\" and did not remember.  Patient is stable for transfer out of the ICU awaiting a bed on unit.  Writer went over discharge planning and PT/OT recommendations. Patient stated \"I don't want to go to a nursing home!\"   Writer explained TCU and goal of TCU 7-14 days with PT/OT and eventual discharge back home with additional support such as homecare etc.  Patient stated she is agreeable and would like referrals submitted to [facility] she would like referrals submitted to high star rating facilities only.  Writer informed the patient that we will have the  coordinate referrals, once the referral is received and if approved we will update her and her son.  Patient stated she is COVID vaccinated x4 and has had her Flu shot.  Writer did send for homecare in case of no TCU bed to [facility] ([facility]) however they are unable to accept.  Writer will update  to see If they are able to send referrals out for TCU per " patient preference per review possible discharge over the weekend.  Patient will require Neuro and Cards follow up upon discharge from TCU.  She stated she is scheduled for a dental appt for a [facility] after Thanksgiving, she would still like to attend this appointment.  Discharge planning team will continue to follow for further discharge planning needs as identified.      Qian Arreguin RN, BSN, AC   Care Transitions Specialist  [facility]  Care Transitions Specialist  [id number] [id number]  [email or web address]  Office: [phone] Fax: [phone]  [facility]

## 2022-11-11 NOTE — PROGRESS NOTES
Care Management Follow Up    Length of Stay (days): 3    Expected Discharge Date: 11/12/2022     Concerns to be Addressed: discharge planning     Patient plan of care discussed at interdisciplinary rounds: Yes    Anticipated Discharge Disposition: Transitional Care     Anticipated Discharge Services: Transportation Services  Anticipated Discharge DME: Other (see comment) (pending therapy reccs)    Patient/family educated on Medicare website which has current facility and service quality ratings:    Education Provided on the Discharge Plan:  yes  Patient/Family in Agreement with the Plan: yes    Referrals Placed by CM/SW:  Transitional care  Private pay costs discussed: Not applicable    Additional Information:  Writer received notification from  that patient is wanting to go to a tcu near [place], but wants to go to a facility with higher ratings. Writer sent referral via DOD to [facility], [facility], [facility], and [facility].    Will continue to follow.    ASHLEIGH Cordova

## 2022-11-11 NOTE — PROGRESS NOTES
[facility]    Cardiology Progress Note    Date of Service (when I saw the patient): 11/11/2022            Assessment and Plan:   ASSESSMENT:  1.  New onset seizures  2.  Post-ictal confusion  3.  Stress cardiomyopathy:  EF 45%.  Reviewed images personally and findings consistent with stress cardiomyopathy (large area of akinesis of the mid and apical segments with preservation of the basal segments).  Evolving ECG changes also suggestive of stress cardiomyopathy.  Limited echocardiogram performed yesterday demonstrated improvement in wall motion and LV function (although WMA not entirely normalized yet)      4.  Troponin elevation:  Likely demand ischemia in the setting of hemodynamic stress.   5.  Reported hx of SVT:  In review of records from outside cardiology evaluations, she was noted to have 14 beats of SVT found on monitoring.  I do not see evidence for clinically significant, documented hx of SVT.  No evidence for accessory pathway on ECG or reported by prior cardiology evaluations  6.  Lactic acidosis; resolved.  Secondary to seizure activity  7.  Moderate coronary artery calcifications on CT in March 2022    RECOMMENDATIONS:  1.  Findings very suggestive of stress cardiomyopathy secondary to seizure activity. No evidence to suggest primary cardiac etiology for being found down.    2.  Recommend starting aspirin 81 mg daily.  Start metoprolol XL 12.5 mg daily  3.  Plan for cardiology follow up in one month with an echocardiogram prior to that visit. Cardiology will sign off.       Analy Wyatt MD [facility][1] Heart        Interval History:     No cardiac issues overnight       Medications:       aspirin  81 mg Oral Daily     enoxaparin ANTICOAGULANT  40 mg Subcutaneous Daily     fluticasone-salmeterol  2 puff Inhalation BID     levETIRAcetam  1,000 mg Oral BID            Physical Exam:   Blood pressure 134/66, pulse 80, temperature 98  F (36.7  C), temperature source  Oral, resp. rate 16, weight 65.5 kg (144 lb 6.4 oz), SpO2 96 %, not currently breastfeeding.  Vitals:    22 1109 22 0600 11/10/22 0400 22 0600   Weight: 59.4 kg (130 lb 15.3 oz) 59.1 kg (130 lb 4.7 oz) 58.7 kg (129 lb 6.6 oz) 65.5 kg (144 lb 6.4 oz)       Intake/Output Summary (Last 24 hours) at 2022 1338  Last data filed at 2022 0000  Gross per 24 hour   Intake 660 ml   Output 350 ml   Net 310 ml           Vital Sign Ranges  Temperature Temp  Av.9  F (36.6  C)  Min: 97.8  F (36.6  C)  Max: 98  F (36.7  C)   Blood pressure Systolic (24hrs), Av , Min:110 , Max:140        Diastolic (24hrs), Av, Min:58, Max:90      Pulse Pulse  Av  Min: 76  Max: 93   Respirations Resp  Av  Min: 12  Max: 23   Pulse oximetry SpO2  Av.3 %  Min: 96 %  Max: 100 %         EXAM:    Constitutional:    in no apparent distress    Skin:    normal    Head:    Normocephalic, atramatic    Eyes:    pupils equal, round and reactive to light, extra ocular muscles intact, sclera clear, conjunctiva normal    Neck:    JVP    Lungs:    CTAB   Cardiovascular:    S1, S2 RRR no m/r/g, no JVD   Abdomen:    normal bowel sounds    Extremities and Back:    no cyanosis or clubbing and No edema bilaterally   Neurological:    No gross or focal neurologic abnormalities               Data:     Recent Labs   Lab Test 22  1537 22  0457 06/15/20  1156 20  1810   WBC 10.2 10.5   < >  --    HGB 12.3 11.4*   < >  --    MCV 95 90   < >  --     216   < >  --    INR  --   --   --  0.99    < > = values in this interval not displayed.      Recent Labs   Lab Test 22  0445 11/10/22  0932 22  1233 22  0457 22  1122   NA  --   --   --  137 139   POTASSIUM 3.6 3.8   < > 3.4 4.2   CHLORIDE  --   --   --  107 105   BUN  --   --   --  10 12   CR  --   --   --  0.50* 0.55    < > = values in this interval not displayed.     Recent Labs   Lab 11/10/22  0432 11/10/22  0115  11/09/22  1536 11/09/22  0756   GLC 79 81 92 94     Recent Labs   Lab Test 11/09/22  0457 11/08/22  1122   ALT 24 29   AST 37 31     Troponin I ES   Date Value Ref Range Status   11/10/2015  0.000 - 0.045 ug/L Final    <0.015  The 99th percentile for upper reference range is 0.045 ug/L.  Troponin values in   the range of 0.045 - 0.120 ug/L may be associated with risks of adverse   clinical events.     08/29/2012 <0.012 0.000 - 0.034 ug/L Final         Recent Labs   Lab Test 11/11/22  0445 11/10/22  0246 11/09/22  0457   MAG 2.2 2.3 2.2       Lab Results   Component Value Date    CHOL 162 03/21/2022     Lab Results   Component Value Date    HDL 57 03/21/2022     Lab Results   Component Value Date    LDL 82 03/21/2022     Lab Results   Component Value Date    TRIG 113 03/21/2022     No results found for: CHOLHDLRATIO       TSH   Date Value Ref Range Status   11/08/2022 3.50 0.40 - 4.00 mU/L Final   11/10/2015 2.39 0.40 - 4.00 mU/L Final             Analy Wyatt MD, FACC  Cardiology

## 2022-11-12 LAB
ALBUMIN UR-MCNC: NEGATIVE MG/DL
APPEARANCE UR: ABNORMAL
BACTERIA #/AREA URNS HPF: ABNORMAL /HPF
BILIRUB UR QL STRIP: NEGATIVE
COLOR UR AUTO: ABNORMAL
CREAT SERPL-MCNC: 0.53 MG/DL (ref 0.52–1.04)
ERYTHROCYTE [DISTWIDTH] IN BLOOD BY AUTOMATED COUNT: 13.4 % (ref 10–15)
GFR SERPL CREATININE-BSD FRML MDRD: >90 ML/MIN/1.73M2
GLUCOSE BLDC GLUCOMTR-MCNC: 86 MG/DL (ref 70–99)
GLUCOSE UR STRIP-MCNC: NEGATIVE MG/DL
HCT VFR BLD AUTO: 34.1 % (ref 35–47)
HGB BLD-MCNC: 11.2 G/DL (ref 11.7–15.7)
HGB UR QL STRIP: NEGATIVE
KETONES UR STRIP-MCNC: NEGATIVE MG/DL
LEUKOCYTE ESTERASE UR QL STRIP: ABNORMAL
MAGNESIUM SERPL-MCNC: 2.2 MG/DL (ref 1.6–2.3)
MCH RBC QN AUTO: 30.9 PG (ref 26.5–33)
MCHC RBC AUTO-ENTMCNC: 32.8 G/DL (ref 31.5–36.5)
MCV RBC AUTO: 94 FL (ref 78–100)
MUCOUS THREADS #/AREA URNS LPF: PRESENT /LPF
NITRATE UR QL: POSITIVE
PH UR STRIP: 7 [PH] (ref 5–7)
PHOSPHATE SERPL-MCNC: 3.6 MG/DL (ref 2.5–4.5)
PLATELET # BLD AUTO: 209 10E3/UL (ref 150–450)
PLATELET # BLD AUTO: 209 10E3/UL (ref 150–450)
POTASSIUM BLD-SCNC: 3.5 MMOL/L (ref 3.4–5.3)
RBC # BLD AUTO: 3.63 10E6/UL (ref 3.8–5.2)
RBC URINE: 3 /HPF
SP GR UR STRIP: 1.01 (ref 1–1.03)
SQUAMOUS EPITHELIAL: <1 /HPF
UROBILINOGEN UR STRIP-MCNC: NORMAL MG/DL
WBC # BLD AUTO: 6.6 10E3/UL (ref 4–11)
WBC URINE: 117 /HPF

## 2022-11-12 PROCEDURE — 84100 ASSAY OF PHOSPHORUS: CPT | Performed by: INTERNAL MEDICINE

## 2022-11-12 PROCEDURE — 250N000011 HC RX IP 250 OP 636: Performed by: INTERNAL MEDICINE

## 2022-11-12 PROCEDURE — 250N000013 HC RX MED GY IP 250 OP 250 PS 637: Performed by: INTERNAL MEDICINE

## 2022-11-12 PROCEDURE — 250N000013 HC RX MED GY IP 250 OP 250 PS 637: Performed by: SURGERY

## 2022-11-12 PROCEDURE — 250N000013 HC RX MED GY IP 250 OP 250 PS 637: Performed by: PSYCHIATRY & NEUROLOGY

## 2022-11-12 PROCEDURE — 82565 ASSAY OF CREATININE: CPT | Performed by: INTERNAL MEDICINE

## 2022-11-12 PROCEDURE — 99233 SBSQ HOSP IP/OBS HIGH 50: CPT | Performed by: INTERNAL MEDICINE

## 2022-11-12 PROCEDURE — 85049 AUTOMATED PLATELET COUNT: CPT | Performed by: INTERNAL MEDICINE

## 2022-11-12 PROCEDURE — 36415 COLL VENOUS BLD VENIPUNCTURE: CPT | Performed by: INTERNAL MEDICINE

## 2022-11-12 PROCEDURE — 87086 URINE CULTURE/COLONY COUNT: CPT | Performed by: INTERNAL MEDICINE

## 2022-11-12 PROCEDURE — 84132 ASSAY OF SERUM POTASSIUM: CPT | Performed by: INTERNAL MEDICINE

## 2022-11-12 PROCEDURE — 120N000001 HC R&B MED SURG/OB

## 2022-11-12 PROCEDURE — 81001 URINALYSIS AUTO W/SCOPE: CPT | Performed by: INTERNAL MEDICINE

## 2022-11-12 PROCEDURE — 83735 ASSAY OF MAGNESIUM: CPT | Performed by: INTERNAL MEDICINE

## 2022-11-12 PROCEDURE — 85014 HEMATOCRIT: CPT | Performed by: INTERNAL MEDICINE

## 2022-11-12 RX ORDER — POLYETHYLENE GLYCOL 3350 17 G/17G
17 POWDER, FOR SOLUTION ORAL DAILY
Status: DISCONTINUED | OUTPATIENT
Start: 2022-11-12 | End: 2022-11-14 | Stop reason: HOSPADM

## 2022-11-12 RX ORDER — CEFDINIR 300 MG/1
300 CAPSULE ORAL EVERY 12 HOURS SCHEDULED
Status: DISCONTINUED | OUTPATIENT
Start: 2022-11-12 | End: 2022-11-14 | Stop reason: HOSPADM

## 2022-11-12 RX ADMIN — CEFDINIR 300 MG: 300 CAPSULE ORAL at 12:09

## 2022-11-12 RX ADMIN — ACETAMINOPHEN 650 MG: 325 TABLET, FILM COATED ORAL at 22:47

## 2022-11-12 RX ADMIN — FLUTICASONE PROPIONATE AND SALMETEROL XINAFOATE 2 PUFF: 115; 21 AEROSOL, METERED RESPIRATORY (INHALATION) at 08:12

## 2022-11-12 RX ADMIN — POLYETHYLENE GLYCOL 3350 17 G: 17 POWDER, FOR SOLUTION ORAL at 14:00

## 2022-11-12 RX ADMIN — FLUTICASONE PROPIONATE AND SALMETEROL XINAFOATE 2 PUFF: 115; 21 AEROSOL, METERED RESPIRATORY (INHALATION) at 21:26

## 2022-11-12 RX ADMIN — ACETAMINOPHEN 650 MG: 325 TABLET, FILM COATED ORAL at 08:12

## 2022-11-12 RX ADMIN — ACETAMINOPHEN 650 MG: 325 TABLET, FILM COATED ORAL at 00:43

## 2022-11-12 RX ADMIN — CEFDINIR 300 MG: 300 CAPSULE ORAL at 21:23

## 2022-11-12 RX ADMIN — LEVETIRACETAM 1000 MG: 500 TABLET, FILM COATED ORAL at 21:23

## 2022-11-12 RX ADMIN — ASPIRIN 81 MG: 81 TABLET, COATED ORAL at 08:12

## 2022-11-12 RX ADMIN — LEVETIRACETAM 1000 MG: 500 TABLET, FILM COATED ORAL at 08:12

## 2022-11-12 RX ADMIN — ACETAMINOPHEN 650 MG: 325 TABLET, FILM COATED ORAL at 16:45

## 2022-11-12 RX ADMIN — SENNOSIDES AND DOCUSATE SODIUM 2 TABLET: 50; 8.6 TABLET ORAL at 08:12

## 2022-11-12 ASSESSMENT — ACTIVITIES OF DAILY LIVING (ADL)
ADLS_ACUITY_SCORE: 23
ADLS_ACUITY_SCORE: 23
ADLS_ACUITY_SCORE: 22
ADLS_ACUITY_SCORE: 23
ADLS_ACUITY_SCORE: 26
ADLS_ACUITY_SCORE: 23
ADLS_ACUITY_SCORE: 26
ADLS_ACUITY_SCORE: 26

## 2022-11-12 NOTE — PLAN OF CARE
Neuro: A&O x4, forgetful, all neuros intact, baseline tremor     CV: tele SR    Resp: LS clear on room air    GI: BS hypoactive, pt unable to recall last BM - PRN senna given, tolerating regular diet    : adequate urine output, pt reports frequency - UA to be collected    Skin: bruise to left posterior shoulder    Activity: assist of 1 + gait belt and walker    Additional: PRN tylenol x1 for back pain, afebrile, electrolytes WDL - recheck tmrw, no calls from family this shift, plan to transfer to [address]

## 2022-11-12 NOTE — PROGRESS NOTES
Cardiology Progress Note          Assessment and Plan:   Reconsulted for pericardial effusion.  Known history of pericarditis.  I reviewed the echo images from 11-9-2022 and 11-. There appears to be more obvious pericardial fluid on 11-10, mild to moderate at the most. No evidence of tamponade.  Recommend discontinuation of aspirin and Lovenox. Ordered a repeat echo in 1 week.  Also ordered a 30 day cardiac event monitor.  Ok for transfer/discharge today.    77 year old white female, admitted for mental status changes on 11-8-2022.  Diagnosed to have seizure disorder but syncope cannot be excluded.  Mild troponin elevation. ECG showed abnormal ST-T changes. Echo showed likely stress cardiomyopathy with EF 45%. 1 episode of SVT 14 beats, probably not clinically significant.  Pt is relatively stable now.  History of asthma, anxiety, depression, tremor, and breast cancer.  Sign off  Mehul Rhodes MD  Cardiology   [phone]                  Physical Exam:   Blood pressure (!) 140/86, pulse 70, temperature 98  F (36.7  C), temperature source Oral, resp. rate 8, weight 57.3 kg (126 lb 5.2 oz), SpO2 99 %, not currently breastfeeding.  Wt Readings from Last 4 Encounters:   11/12/22 57.3 kg (126 lb 5.2 oz)   10/06/22 58.1 kg (128 lb 1.6 oz)   08/02/22 56.2 kg (123 lb 14.4 oz)   07/26/22 58 kg (127 lb 12.8 oz)      I/O last 3 completed shifts:  In: 1040 [P.O.:1040]  Out: -     Constitutional: Alert, no apparent distress,      Lungs: No crackles or wheezing,    Cardiovascular: Regular rhythm, normal S1 and S2, and no murmur,    Lymphm node  Neck  ENT  Neurologic  Abdomen: No enlargement  No jugular vein extension or carotid bruit  No apparent abnormality  No focal deficit  Normal bowel sounds, soft, no distension, no tender   Skin: No rashes, no cyanosis   Extremity: No edema          Medications:     Current Facility-Administered Medications:      acetaminophen (TYLENOL) tablet 650 mg, 650 mg, Oral, Q4H PRN, 650 mg at  11/12/22 0812 **OR** acetaminophen (TYLENOL) Suppository 650 mg, 650 mg, Rectal, Q4H PRN, David Devries MD     benzocaine-menthol (CHLORASEPTIC) 6-10 MG lozenge 1 lozenge, 1 lozenge, Buccal, 6x Daily PRN, Analy Diez MD, 1 lozenge at 11/10/22 1552     cefdinir (OMNICEF) capsule 300 mg, 300 mg, Oral, Q12H SHERIE (08/20), Analy Diez MD     glucose gel 15-30 g, 15-30 g, Oral, Q15 Min PRN **OR** dextrose 50 % injection 25-50 mL, 25-50 mL, Intravenous, Q15 Min PRN **OR** glucagon injection 1 mg, 1 mg, Subcutaneous, Q15 Min PRN, Farheen Clayton MD     fluticasone-salmeterol (ADVAIR HFA) 115-21 MCG/ACT Inhaler 2 puff, 2 puff, Inhalation, BID, Farheen Clayton MD, 2 puff at 11/12/22 0812     levETIRAcetam (KEPPRA) tablet 1,000 mg, 1,000 mg, Oral, BID, Jm Hodge MD, 1,000 mg at 11/12/22 0812     naloxone (NARCAN) injection 0.2 mg, 0.2 mg, Intravenous, Q2 Min PRN **OR** naloxone (NARCAN) injection 0.4 mg, 0.4 mg, Intravenous, Q2 Min PRN **OR** naloxone (NARCAN) injection 0.2 mg, 0.2 mg, Intramuscular, Q2 Min PRN **OR** naloxone (NARCAN) injection 0.4 mg, 0.4 mg, Intramuscular, Q2 Min PRN, Abran Benavides MD     ondansetron (ZOFRAN ODT) ODT tab 4 mg, 4 mg, Oral, Q6H PRN **OR** ondansetron (ZOFRAN) injection 4 mg, 4 mg, Intravenous, Q6H PRN, Farheen Clayton MD, 4 mg at 11/10/22 0749     senna-docusate (SENOKOT-S/PERICOLACE) 8.6-50 MG per tablet 1 tablet, 1 tablet, Oral, BID PRN, 1 tablet at 11/11/22 0900 **OR** senna-docusate (SENOKOT-S/PERICOLACE) 8.6-50 MG per tablet 2 tablet, 2 tablet, Oral, BID PRN, Farheen Clayton MD, 2 tablet at 11/12/22 0812           Lab results:        Recent Labs   Lab Test 11/12/22  0809 11/12/22  0634 11/11/22  0445 11/10/22  0932 11/10/22  0432 11/10/22  0246 11/10/22  0115 11/09/22  0756 11/09/22  0457 11/08/22  1434 11/08/22  1122 08/02/22  1158 10/20/21  1517   NA  --   --   --   --   --   --   --   --  137  --  139  --  139   POTASSIUM  --  3.5 3.6 3.8  --    <  >  --    < > 3.4  --  4.2  --  4.3   CHLORIDE  --   --   --   --   --   --   --   --  107  --  105  --  106   MARIBEL  --   --   --   --   --   --   --   --  8.2*  --  8.9  --  9.4   CO2  --   --   --   --   --   --   --   --  23  --  13*  --  24   BUN  --   --   --   --   --   --   --   --  10  --  12  --  10   CR  --  0.53  --   --   --   --   --   --  0.50*  --  0.55  --  0.63   GLC 86  --   --   --  79  --  81   < > 105*   < > 141*   < > 97    < > = values in this interval not displayed.     Recent Labs   Lab Test 11/12/22  0634 11/09/22  1537 11/09/22  0457   HGB 11.2* 12.3 11.4*   WBC 6.6 10.2 10.5     209 190 216     Recent Labs   Lab Test 05/01/20  1810   INR 0.99     Recent Labs   Lab Test 11/10/15  0945   TROPI <0.015  The 99th percentile for upper reference range is 0.045 ug/L.  Troponin values in   the range of 0.045 - 0.120 ug/L may be associated with risks of adverse   clinical events.

## 2022-11-12 NOTE — PROGRESS NOTES
Analy Diez MD  Physician  Medicine  Progress Notes     Incomplete  Date of Service:  11/11/2022  7:30 AM  Creation Time:  11/11/2022  7:30 AM     Incomplete        [facility]  Hospitalist Progress Note        Assessment & Plan        Morenita Moore is a 77 year old female with a history of SVT and DCIS s/p mastectomy who presented to the ED on 11/8/2022 after being found down in the hallway of her building.  There was question of seizure like activity and patient was confused and incontinent of urine so concern was for seizures.  Given this patient was give IV Ativan and intubated.  She was then admitted to our ICU.  Fortunately they were able to extubate the patient on 11/9 and her mentation has been improving      Loss of consciousness   Possible seizure  Acute encephalopathy, NOS.  Improving   Patient presented to the ED on 11/8 with altered mental status.  Per EMS she was found in the hallway of her building.  Possible fall prior.  EMS reported the patient was confused and incontinent of urine en route.  Given concern for seizure she was intubated in the ED for airway protection.  Patient was started on IV Keppra.  She subsequently extubated on 11/9.   CT head, CTA head/neck and MRI brain all are unremarkable.  Continuous EEG monitor was unrevealing per neurology  - patient clinically better today. She is awake and alert  - Cannot recall any of the events near the time of her reported seizure.   - Continue seizure precautions   - Q4h neuros  - Keppra 1 gm BID.  Now on PO   - PT recommending TCU for balance.>> patient refusing TCU  - OT patient has cognitive decline>> needs to be independent prior to discharge. Declining TCU   - she does have a neurologist for her dystonia.   - No driving for 6-8 weeks   - not entirely clear of precipitating factors to probable seizure.      Newly found cardiomyopathy   H/o SVT  Echocardiogram done in the ICU showed an  EF of 40-45% with moderate hypokinesis of the mid to apical segments of LV.  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  She was evaluated for that at [facility] and they discussed doing an ablation but she never proceeded with that.  In Care Everywhere patient has a reported history of SVT.  At this time patient currently appears to be in NSR   - Monitor on telemetry  - troponin high 2,880 peak>> 1188   - Cardiology consulted to assess for new WMA and reduced EF  - Per cardiology her EF 45% consistent with stress cardiomyopathy. Evolving EKG changes consistent   - ASA 81 mg po daily   - holding bblocker for decrease BP   Recheck ECHO tomorrow   - if EF not improved will need ischemic evaluation.      SVT  - 14 beats of SVT on monitoring.      Incidental left ovarian cyst  Noted on CT scan and when compared to 3/6/20 appears larger.  No symptoms at this time  - Can discuss with PCP for possible pelvic US      Pulmonary nodules   - CT C/A/P : LUNGS AND PLEURA: Right basilar atelectasis. No acute airspace disease  identified. Stable cavitary nodule superior right lower lobe measuring  0.3 cm series 4 image 136. Stable left upper lobe ground-glass nodule  measuring 0.4 cm image 64. Stable ground-glass nodule left upper lobe  measuring 0.5 cm image 72. There is a new solid nodule measuring 0.4  cm anterior right lower lobe abutting the fissure image 171. No  effusion.  Few bilateral pulmonary nodules noted.  They are stable when compared to CT scan on 3/17/22 but a new solid nodule at the right lower lobe.  - Radiology recommending repeat CT in 6 months.  This needs to be discussed with patient prior to discharge     H/o DCIS s/p mastectomy   Stable       DISPOSITION:   May be challenging at discharge. PAteint does not feel she needs a TCU but rec by therapy      Diet: Regular  Diet Adult  Room Service  [place] Catheter: Not present  DVT Prophylaxis: start subcutaneous heparin if ok with neurology 11/11   Code Status: Full Code        Expected Discharge Date: 11/12/2022      Destination: home;home with help/services;other (comment) (lon touched on TCU)          KIMBERLY NIX MD, PA-C  Hospitalist Service  [facility]  ______________________________________________________________________           Interval History     Patient seen in the ICU. She is not sure what happened prior to the event or after.   Could not really recall any details of what she was doing prior to her seizure. Could not recall cutting the apple or going to the kitchen.   Denies drugs or alcohol. No recent change in meds      -Data reviewed today: I reviewed all new labs and imaging results over the last 24 hours. I personally reviewed Sinus rhythm   Low voltage QRS   Septal infarct (cited on or before 01-MAY-2020)   ST & T wave abnormality, consider anterolateral ischemia   Abnormal ECG   When compared with ECG of 08-NOV-2022 13:06,   ST no longer elevated in Anterolateral leads   T wave inversion now evident in Anterolateral leads            Physical Exam     Temp: 98  F (36.7  C) Temp src: Oral BP: 134/66 Pulse: 80   Resp: 16 SpO2: 96 % O2 Device: None (Room air)    Vitals:     11/09/22 0600 11/10/22 0400 11/11/22 0600   Weight: 59.1 kg (130 lb 4.7 oz) 58.7 kg (129 lb 6.6 oz) 65.5 kg (144 lb 6.4 oz)   Constitutional: Appears stated age, no acute distress.  Respiratory: Breath sounds CTA. No increased work of breathing.  Cardiovascular: RRR, no rub or murmur. No peripheral edema.  GI: Soft, non-tender, non-distended.  Skin: Warm, dry, no rashes or lesions.  Other:   She is awake and alert. Orientated X 4   Extubated.   Has a head tremor.   Moving all extremities.            Medications        lactated ringers Stopped (11/10/22 1900)        aspirin  81 mg Oral Daily      fluticasone-salmeterol  2 puff Inhalation BID     levETIRAcetam  1,000 mg Oral BID               Data                    Recent Labs   Lab 11/11/22  0445 11/10/22  0932 11/10/22  0432 11/10/22  0246 11/10/22  0115 11/09/22  1537 11/09/22  1536 11/09/22  0756 11/09/22  0457 11/08/22  1434 11/08/22  1122   WBC  --   --   --   --   --  10.2  --   --  10.5  --  9.4   HGB  --   --   --   --   --  12.3  --   --  11.4*  --  13.3   MCV  --   --   --   --   --  95  --   --  90  --  98   PLT  --   --   --   --   --  190  --   --  216  --  325   NA  --   --   --   --   --   --   --   --  137  --  139   POTASSIUM 3.6 3.8  --  3.2*  --   --   --    < > 3.4  --  4.2   CHLORIDE  --   --   --   --   --   --   --   --  107  --  105   CO2  --   --   --   --   --   --   --   --  23  --  13*   BUN  --   --   --   --   --   --   --   --  10  --  12   CR  --   --   --   --   --   --   --   --  0.50*  --  0.55   ANIONGAP  --   --   --   --   --   --   --   --  7  --  21*   MARIBEL  --   --   --   --   --   --   --   --  8.2*  --  8.9   GLC  --   --  79  --  81  --  92   < > 105*   < > 141*   ALBUMIN  --   --   --   --   --   --   --   --  3.0*  --  3.7   PROTTOTAL  --   --   --   --   --   --   --   --  5.7*  --  7.3   BILITOTAL  --   --   --   --   --   --   --   --  1.2  --  0.8   ALKPHOS  --   --   --   --   --   --   --   --  68  --  81   ALT  --   --   --   --   --   --   --   --  24  --  29   AST  --   --   --   --   --   --   --   --  37  --  31   LIPASE  --   --   --   --   --   --   --   --   --   --  138    < > = values in this interval not displayed.         Imaging:       Recent Results (from the past 24 hour(s))   Echocardiogram Limited

## 2022-11-12 NOTE — PROGRESS NOTES
Shift 0000 - 0730    Shift Summary: Pt slept majority of shift without event. Used call light to make needs known.    Neuro: A&O x4, states she does not remember her seizure. Assured pt that it is normal not to remember having the seizure. Moves all extremities.    Cards: SR    Resp: LS clear on room air. No complaints of shortness of breath.    GI/: No BM during shift. Voided x3 up to bathroom with stand by assist. UA sent per pt's request. Pt stated that she feels pressure in bladder and believes she has the start of an UTI. Provider paged and UA ordered. Bladder scanned pt post void with only 30cc in bladder after voiding.    Skin: No new skin issues noted.    Lory Brito RN

## 2022-11-12 NOTE — PROGRESS NOTES
Analy Diez MD  Physician  Medicine  Progress Notes     Signed  Date of Service:  11/11/2022  2:29 PM  Creation Time:  11/12/2022  2:29 PM     Expand All Collapse All  [facility]  Hospitalist Progress Note        Assessment & Plan     Morenita Moore is a 77 year old female with a history of SVT and DCIS s/p mastectomy who presented to the ED on 11/8/2022 after being found down in the hallway of her building.  There was question of seizure like activity and patient was confused and incontinent of urine so concern was for seizures.  Given this patient was give IV Ativan and intubated.  She was then admitted to our ICU.    Fortunately they were able to extubate the patient on 11/9 and her mentation has been improving      Loss of consciousness   Possible seizure  Acute encephalopathy, NOS.  Improving   Patient presented to the ED on 11/8 with altered mental status.  Per EMS she was found in the hallway of her building.  Possible fall prior.  EMS reported the patient was confused and incontinent of urine en route.  Given concern for seizure she was intubated in the ED for airway protection.     Patient was started on IV Keppra.  She subsequently extubated on 11/9.     11/8 CT head.  No hemorrhage, mild fullness.  Chronic small vessel disease    11/8 CT angiogram of head and neck: No evidence of large vessel occlusion, high-grade stenosis, aneurysm or vascular malformation.  Distal vertebral basilar system small in the setting of fetal origins of the bilateral posterior cerebral arteries.  Hypoplastic right posterior cerebral artery.  No large vessel occlusion.  Cervical spine with multiple stenosis.  Also noted to have scattered nonspecific pulmonary opacities/nodules including groundglass nodules in the left upper lung.     11/8 CT chest abdomen pelvis: Pulmonary nodules.    Continuous EEG monitor was unrevealing per neurology    Continue seizure precautions     Neurochecks    Keppra  1 gm BID.    PT recommending TCU for balance.  Patient in agreement    OT patient has cognitive     she does have a neurologist for her dystonia.     -No driving for 6-8 weeks at least      not entirely clear of precipitating factors to probable seizure.     -Watch patient ambulate to the bathroom in her room and she is very unsteady.    Patient in agreement for rehab facility     Newly found cardiomyopathy EF 40 to 45%  Stress cardiomyopathy  H/o SVT    Echocardiogram done in the ICU showed an EF of 40-45% with moderate hypokinesis of the mid to apical segments of LV.  Patient had an echocardiogram 2/15/22 available for comparison     Cardiology consult this admission.     troponin high 2,880 peak>> 1188 > per cardiology thought to be consistent with stress/demand.    Per cardiology her EF 45% consistent with stress cardiomyopathy. Evolving EKG changes consistent     11/10 Limited echo showing improved wall motion and LV function reported at 55 to 60%.(Has not completely normalized) still with large symmetric segment of severe hypokinesis to akinesis    CT in March 2022 with moderate coronary artery calcifications.    Plans per cardiology for follow-up in 1 month with an echo prior to the visit    Aspirin 81 mg    Metoprolol 12.5 mg XL daily >> recommended : Patient very reluctant to take this given she reports that she had bad reactions to beta-blockers in the past and felt shaky when she took them        Pericardial effusion:     Reported on echo of 11/10 as larger.  Especially RV free wall.    Will need to discuss with cardiology prior to discharge if follow-up echo needed sooner for any further recommendations    11/12 cardiology follow-up in reference to her pericardial effusion.    No evidence of tamponade.  Recommended stopping aspirin and Lovenox.    Echo in 1 week to reevaluate.    30-day event monitor given her syncope     SVT  - 14 beats of SVT on monitoring.   -Cardiology assessment.  And reviews of  records from outside cardiology she was noted to have 14 beats of SVT found on monitoring.  Not felt to have any evidence of clinically significant SVT or evidence of accessory pathway since admission.  -Recommendations to start metoprolol XL but patient reluctant as she feels she has a reaction to beta-blockers     Incidental left ovarian cyst  11/8/2022 CTA C/A/P: Large left ovarian adnexal cyst 5.9 cm.  Previously 4 cm on 3/2022  Noted on CT scan and when compared to 3/6/20 appears larger.    No symptoms at this time  - Can discuss with PCP for possible pelvic US      Pulmonary nodules   - CT C/A/P : LUNGS AND PLEURA: Right basilar atelectasis. No acute airspace disease  identified. Stable cavitary nodule superior right lower lobe measuring  0.3 cm series 4 image 136. Stable left upper lobe ground-glass nodule  measuring 0.4 cm image 64. Stable ground-glass nodule left upper lobe  measuring 0.5 cm image 72. There is a new solid nodule measuring 0.4  cm anterior right lower lobe abutting the fissure image 171. No  effusion.  Few bilateral pulmonary nodules noted.  They are stable when compared to CT scan on 3/17/22 but a new solid nodule at the right lower lobe.  - Radiology recommending repeat CT in 6 months.  This needs to be discussed with patient prior to discharge     Cervical torticollis:     PTA admission.    Followed by clinic of neurology     She wants to have medications/treatments however adds names of many medications gave her side effects and start includes a beta-blocker     H/o DCIS s/p mastectomy   Stable     Potential UTI:     Urinary frequency and burning overnight    UA checked and positive.  Urine culture sent    Started Omnicef 300 mg p.o. twice daily await cultures      Constipation  Started on MiraLAX daily and senna scheduled 1 p.o. twice daily         DISPOSITION:   Patient will require discharge to a TCU.     Diet: Regular Diet Adult  Room Service  Mcdowell Catheter: Not present  DVT  "Prophylaxis: Patient was started on subcu Lovenox but she declined to have this.  Patient has refused medication  Code Status: Full Code  Anticipate discharge to TCU           KIMBERLY NIX MD, PA-C  Hospitalist Service  [facility]  ______________________________________________________________________           Interval History     Patient more alert today.  She still reports that she is in agreement to go to a TCU.  Frustrated by her dystonia and tremors but then discussion of multiple medication she feels she has had side effects from multiple.  Does not like to take meds.  Discussion regarding potential beta-blocker but she does not think she wants to take metoprolol as she thought she had \"vertigo from this in the past  She is just climbed with Lovenox shots     -Data reviewed today: I reviewed all new labs and imaging results over the last 24 hours. I personally reviewed. ]     11/10 echo  The large symetrical segment of severe hypokinesis to akinesis (takotsubo?)  seen by echo yesterday has improved but not completely recovered today. The  apex remains mild to moderately hypokinetic. EF is now within normal limits  (55-60%).  The pericardial effusion noted yesterday appears larger. especially at RV free  wall. No evidence of tamponade, although full evaluation was not completed.     11/12 completed today and then pending           Physical Exam     Temp: 98  F (36.7  C) Temp src: Oral BP: 134/66 Pulse: 80   Resp: 16 SpO2: 96 % O2 Device: None (Room air)    Vitals:     11/09/22 0600 11/10/22 0400 11/11/22 0600   Weight: 59.1 kg (130 lb 4.7 oz) 58.7 kg (129 lb 6.6 oz) 65.5 kg (144 lb 6.4 oz)   Constitutional: Appears stated age, no acute distress.  Patient is awake and alert.  Respiratory: Breath sounds CTA. No increased work of breathing.  Cardiovascular: RRR, no rub or murmur. No peripheral edema.  GI: Soft, non-tender, non-distended.  Skin: Warm, dry, no rashes or lesions.  Other:  "  She is awake and alert. Orientated X 4   Extubated.   Has a head tremor.   Moving all extremities.   Watched patient ambulate to the bathroom and she is very unsteady grabbing onto the wall near the toilet.  Required standby assistance with her nurse           Medications        lactated ringers Stopped (11/10/22 1900)        aspirin  81 mg Oral Daily     enoxaparin ANTICOAGULANT  40 mg Subcutaneous Daily     fluticasone-salmeterol  2 puff Inhalation BID     levETIRAcetam  1,000 mg Oral BID               Data                    Recent Labs

## 2022-11-12 NOTE — PROGRESS NOTES
[facility]  Neurology Daily Note      Admission Date:11/8/2022   Date of service: 11/12/2022   Hospital Day: 5                                                   Assessment and Plan:   #. New onset seizure(initial event unwitnessed/ED witnessed seizure like activity), idiopathic etiology.  --continue levetiracetam 1000mg bid.  Would recommend that this be continued on discharge.  She will need follow-up as an outpatient.    Note findings of cardiomyopathy/suspect stress related to seizure per cardiology team.  Follow-up testing with cardiac monitor and follow-up echocardiogram are planned.  No driving until follow-up appointment/reassessment with neurology in 6 to 8 weeks.  Patient will be able to follow-up with her regular neurologist at [facility], Dr. Hansen    It is doubtful that this was related to the lorazepam withdrawal as she reports her last dose was more than 9 months ago.  (No recent prescriptions filled per  review)     #.  Postictal/confused state with generalized weakness.  Ongoing mild cognitive concerns  Improved note and agree with plans for transitional care..  Continued occupational therapy at transitional care-review further with neurology at follow-up    #Previous history of cervical dystonia/vestibular disorder, associated with some vestibular dysfunction.     Son and daughter-in-law are at bedside.  Reviewed recommendation.    Will sign off.  Please contact General Neurology service if questions related to neurologic status or follow up needed during this admission.     Follow-up with Dr. Hansen in 6-8 weeks upon discharge        Interval History:   Notes from ED provider:    During initial evaluation develops rightward gaze and rightward rotation of the head followed by generalized tonic seizure with tensing of the bilateral upper and lower extremities and foaming at the mouth.    Improved cognition.  Note OT/PT recommendations for transitional care.       Medications:   Scheduled  Meds:    cefdinir  300 mg Oral Q12H [facility] (08/20)     fluticasone-salmeterol  2 puff Inhalation BID     levETIRAcetam  1,000 mg Oral BID     polyethylene glycol  17 g Oral Daily     PRN Meds: acetaminophen **OR** acetaminophen, sore throat, glucose **OR** dextrose **OR** glucagon, naloxone **OR** naloxone **OR** naloxone **OR** naloxone, ondansetron **OR** ondansetron, senna-docusate **OR** senna-docusate        Physical Exam:   Vitals: Temp: 97.9  F (36.6  C) Temp src: Oral BP: (!) 145/82 Pulse: 79   Resp: 14 SpO2: 97 % O2 Device: None (Room air)    Vital Signs with Ranges: Temp:  [97.7  F (36.5  C)-98  F (36.7  C)] 97.9  F (36.6  C)  Pulse:  [70-88] 79  Resp:  [8-24] 14  BP: (102-158)/(56-90) 145/82  SpO2:  [96 %-99 %] 97 %    General Appearance:  No acute distress  Neuro: Alert and oriented to place and time.  (Month and floor).  Slight difficulty with exact date  She is very talkative.  Language is intact.  Baseline cervical dystonia.  Motor strength tone and bulk otherwise intact x4.  Gait appeared stable as she had just returned from shower.               Extremities: No clubbing, no cyanosis, no edema       Data:   ROUTINE IP LABS (Last 3results)  CBC RESULTS:     Recent Labs   Lab Test 11/12/22  0634 11/09/22  1537 11/09/22  0457   WBC 6.6 10.2 10.5   RBC 3.63* 3.85 3.65*   HGB 11.2* 12.3 11.4*   HCT 34.1* 36.5 32.8*     209 190 216     Basic Metabolic Panel:  Recent Labs   Lab Test 11/12/22  0809 11/12/22  0634 11/11/22  0445 11/10/22  0932 11/10/22  0432 11/10/22  0246 11/10/22  0115 11/09/22  0756 11/09/22  0457 11/08/22  1434 11/08/22  1122 08/02/22  1158 10/20/21  1517   NA  --   --   --   --   --   --   --   --  137  --  139  --  139   POTASSIUM  --  3.5 3.6 3.8  --    < >  --    < > 3.4  --  4.2  --  4.3   CHLORIDE  --   --   --   --   --   --   --   --  107  --  105  --  106   CO2  --   --   --   --   --   --   --   --  23  --  13*  --  24   BUN  --   --   --   --   --   --   --   --  10  --   12  --  10   CR  --  0.53  --   --   --   --   --   --  0.50*  --  0.55  --  0.63   GLC 86  --   --   --  79  --  81   < > 105*   < > 141*   < > 97   MARIBEL  --   --   --   --   --   --   --   --  8.2*  --  8.9  --  9.4    < > = values in this interval not displayed.     Liver panel:  Recent Labs   Lab Test 11/09/22  0457 11/08/22  1122 10/20/21  1517 02/01/21  1109 06/15/20  1156   PROTTOTAL 5.7* 7.3 7.2 6.8 6.9   ALBUMIN 3.0* 3.7 4.1 4.3 4.3   BILITOTAL 1.2 0.8 0.5 0.6 0.7   ALKPHOS 68 81 81 82 82   AST 37 31 17 16 13   ALT 24 29 14 12 14     Thyroid Panel:  Recent Labs   Lab Test 11/08/22  1122 06/15/20  1156 11/10/15  0945   TSH 3.50 1.79 2.39      Vitamin B12:   Recent Labs   Lab Test 02/01/21  1109 06/15/20  1156   B12 683 501           IMAGING:   MRI reviewed previously-mild atrophy        TIME     25minutes Evaluation/management time     Raenan Rodriguez M.D.  Neurologist  [facility]  Office [phone]

## 2022-11-12 NOTE — PROGRESS NOTES
The patient is alert and oriented. She is forgetful at times and is confused about how she came to end up in the ICU. Up with 1 assistance to the bathroom. Slightly unsteady on feet. Walked the halls with PT/OT twice today. Up in chair for most of the morning.   Vitals stable on room air. Lung sounds clear. SR/ST on monitor.   Generalized body aches. 650mg oral Tylenol given x2 doses with good effect.   Toilet voiding. Patient requested stool softener this morning. No BM.   Tolerating diet. Ate 50-75% of all 3 meals today.   Kal Jay RN 11:27 PM 11/11/22

## 2022-11-13 ENCOUNTER — APPOINTMENT (OUTPATIENT)
Dept: PHYSICAL THERAPY | Facility: CLINIC | Age: 77
DRG: 100 | End: 2022-11-13
Payer: COMMERCIAL

## 2022-11-13 ENCOUNTER — APPOINTMENT (OUTPATIENT)
Dept: GENERAL RADIOLOGY | Facility: CLINIC | Age: 77
DRG: 100 | End: 2022-11-13
Attending: INTERNAL MEDICINE
Payer: COMMERCIAL

## 2022-11-13 LAB
ANION GAP SERPL CALCULATED.3IONS-SCNC: 9 MMOL/L (ref 3–14)
BACTERIA UR CULT: ABNORMAL
BUN SERPL-MCNC: 8 MG/DL (ref 7–30)
CALCIUM SERPL-MCNC: 8.8 MG/DL (ref 8.5–10.1)
CHLORIDE BLD-SCNC: 112 MMOL/L (ref 94–109)
CO2 SERPL-SCNC: 22 MMOL/L (ref 20–32)
CREAT SERPL-MCNC: 0.46 MG/DL (ref 0.52–1.04)
GFR SERPL CREATININE-BSD FRML MDRD: >90 ML/MIN/1.73M2
GLUCOSE BLD-MCNC: 121 MG/DL (ref 70–99)
MAGNESIUM SERPL-MCNC: 2.2 MG/DL (ref 1.6–2.3)
PHOSPHATE SERPL-MCNC: 3.7 MG/DL (ref 2.5–4.5)
POTASSIUM BLD-SCNC: 3.6 MMOL/L (ref 3.4–5.3)
POTASSIUM BLD-SCNC: 3.6 MMOL/L (ref 3.4–5.3)
SODIUM SERPL-SCNC: 143 MMOL/L (ref 133–144)

## 2022-11-13 PROCEDURE — 120N000001 HC R&B MED SURG/OB

## 2022-11-13 PROCEDURE — 84132 ASSAY OF SERUM POTASSIUM: CPT | Performed by: INTERNAL MEDICINE

## 2022-11-13 PROCEDURE — 250N000013 HC RX MED GY IP 250 OP 250 PS 637: Performed by: SURGERY

## 2022-11-13 PROCEDURE — 83735 ASSAY OF MAGNESIUM: CPT | Performed by: INTERNAL MEDICINE

## 2022-11-13 PROCEDURE — 72080 X-RAY EXAM THORACOLMB 2/> VW: CPT

## 2022-11-13 PROCEDURE — 250N000013 HC RX MED GY IP 250 OP 250 PS 637: Performed by: PSYCHIATRY & NEUROLOGY

## 2022-11-13 PROCEDURE — 84100 ASSAY OF PHOSPHORUS: CPT | Performed by: INTERNAL MEDICINE

## 2022-11-13 PROCEDURE — 36415 COLL VENOUS BLD VENIPUNCTURE: CPT | Performed by: INTERNAL MEDICINE

## 2022-11-13 PROCEDURE — 250N000013 HC RX MED GY IP 250 OP 250 PS 637: Performed by: INTERNAL MEDICINE

## 2022-11-13 PROCEDURE — 80048 BASIC METABOLIC PNL TOTAL CA: CPT | Performed by: INTERNAL MEDICINE

## 2022-11-13 PROCEDURE — 97116 GAIT TRAINING THERAPY: CPT | Mod: GP | Performed by: PHYSICAL THERAPIST

## 2022-11-13 PROCEDURE — 99233 SBSQ HOSP IP/OBS HIGH 50: CPT | Performed by: INTERNAL MEDICINE

## 2022-11-13 RX ADMIN — LEVETIRACETAM 1000 MG: 500 TABLET, FILM COATED ORAL at 20:26

## 2022-11-13 RX ADMIN — ACETAMINOPHEN 650 MG: 325 TABLET, FILM COATED ORAL at 09:32

## 2022-11-13 RX ADMIN — LEVETIRACETAM 1000 MG: 500 TABLET, FILM COATED ORAL at 09:32

## 2022-11-13 RX ADMIN — SENNOSIDES AND DOCUSATE SODIUM 2 TABLET: 50; 8.6 TABLET ORAL at 18:32

## 2022-11-13 RX ADMIN — FLUTICASONE PROPIONATE AND SALMETEROL XINAFOATE 2 PUFF: 115; 21 AEROSOL, METERED RESPIRATORY (INHALATION) at 20:27

## 2022-11-13 RX ADMIN — ACETAMINOPHEN 650 MG: 325 TABLET, FILM COATED ORAL at 14:03

## 2022-11-13 RX ADMIN — SENNOSIDES AND DOCUSATE SODIUM 1 TABLET: 50; 8.6 TABLET ORAL at 03:18

## 2022-11-13 RX ADMIN — ACETAMINOPHEN 650 MG: 325 TABLET, FILM COATED ORAL at 03:19

## 2022-11-13 RX ADMIN — CEFDINIR 300 MG: 300 CAPSULE ORAL at 09:32

## 2022-11-13 RX ADMIN — POLYETHYLENE GLYCOL 3350 17 G: 17 POWDER, FOR SOLUTION ORAL at 09:33

## 2022-11-13 RX ADMIN — ACETAMINOPHEN 650 MG: 325 TABLET, FILM COATED ORAL at 18:31

## 2022-11-13 RX ADMIN — FLUTICASONE PROPIONATE AND SALMETEROL XINAFOATE 2 PUFF: 115; 21 AEROSOL, METERED RESPIRATORY (INHALATION) at 09:37

## 2022-11-13 RX ADMIN — CEFDINIR 300 MG: 300 CAPSULE ORAL at 20:26

## 2022-11-13 ASSESSMENT — ACTIVITIES OF DAILY LIVING (ADL)
ADLS_ACUITY_SCORE: 25
ADLS_ACUITY_SCORE: 25
ADLS_ACUITY_SCORE: 23
ADLS_ACUITY_SCORE: 22
ADLS_ACUITY_SCORE: 25
ADLS_ACUITY_SCORE: 22
ADLS_ACUITY_SCORE: 23

## 2022-11-13 NOTE — PLAN OF CARE
A/Ox4, VSS on RA. TELE discontinued. C/o pain in low back/bilateral flank, xrays ordered. Tremors in head, BUE, other neuros intact. Up with SBA, worked with therapy, pt states she does not feel ready to go home and would like to go to TCU. Fair appetite, showered today. PIV SL. Discharge pending.

## 2022-11-13 NOTE — PLAN OF CARE
Pt here with syncopal episode vs seizures. A&O. Neuros intact, forgetful. VSS. . Regular diet, thin liquids. Takes pills whole with water. Up with A1/GB/W. Denies pain. Pt scoring green on the Aggression Stop Light Tool. Plan discharge to TCU when bed available.

## 2022-11-13 NOTE — PLAN OF CARE
1900h-0700h: Pt AOx4. O2Sat 97% on RA. Neuro intact. Clear bilateral breath sound except diminished in LL. Was coughing in the evening while drinking. Re-assessed for swallowing, thin liquid tolerated. Continent B/B. Hypoactive BS x4. No BM since admission. Denies n/v or abdominal pain; abdomen soft. Ambulates assist of 1 w/ FWW.   Flank pain 6/10, tylenol & warm pack provided relief.  No edema on BLE, sensation intact w/ palpable pulses.  On K, Mg, Phos protocol.  Tele: NSR  Plan to discharge to home w/ 30 day cardiac event monitor & repeat echo in 1 week.     Urine culture: >100,000 E. Coli.

## 2022-11-13 NOTE — PROGRESS NOTES
[facility]  Hospitalist Progress Note    Assessment & Plan    Morenita Moore is a 77 year old female with a history of SVT and DCIS s/p mastectomy who presented to the ED on 11/8/2022 after being found down in the hallway of her building.  There was question of seizure like activity and patient was confused and incontinent of urine so concern was for seizures.  Given this patient was give IV Ativan and intubated.  She was then admitted to our ICU.  Intubated for airway protection and extubated 11/9     Loss of consciousness   Possible seizure  Acute encephalopathy, NOS.  Improving   Patient presented to the ED on 11/8 with altered mental status.  Per EMS she was found in the hallway of her building.  Possible fall prior.  EMS reported the patient was confused and incontinent of urine en route.  Given concern for seizure she was intubated 11/8 in the ED for airway protection. Extubated 11/9    Patient was started on IV Keppra>>oral keppra 1000 mg po BID now    11/8 CT head.  No hemorrhage, mild fullness.  Chronic small vessel disease    11/8 CT angiogram of head and neck: No evidence of large vessel occlusion, high-grade stenosis, aneurysm or vascular malformation.  Distal vertebral basilar system small in the setting of fetal origins of the bilateral posterior cerebral arteries.  Hypoplastic right posterior cerebral artery.  No large vessel occlusion.  Cervical spine with multiple stenosis.  Also noted to have scattered nonspecific pulmonary opacities/nodules including groundglass nodules in the left upper lung.     11/8 CT chest abdomen pelvis: multiple findings with adnexal cyst larger, Pulmonary nodules. (see details below)     Continuous EEG monitor was unrevealing per neurology    No seizure activity this admission     PT recommending TCU for balance.  Patient in agreement    OT recommending recommend assistance on last eval     she does have a neurologist for her dystonia.     -No  "driving for 6-8 weeks at least until neurology follow up. (patient reports she does not drive) patient plans to follow-up with her primary neurologist Dr. Thompson     not entirely clear of precipitating factors to probable seizure.     Low 30-day event monitor as per cardiology after discharge    Continue Keppra     Newly found cardiomyopathy EF 40 to 45%  Stress cardiomyopathy  H/o SVT    Echocardiogram done in the ICU this admit showed an EF of 40-45% with moderate hypokinesis of the mid to apical segments of LV.  Patient had an echocardiogram 2/15/22 available for comparison     Cardiology consult this admission.     troponin high 2,880 peak>> 1188 > per cardiology thought to be consistent with stress/demand.    Per cardiology her EF 45% consistent with stress cardiomyopathy. Evolving EKG changes consistent     11/10 Limited echo showing improved wall motion and LV function reported at 55 to 60%.(Has not completely normalized) still with large symmetric segment of severe hypokinesis to akinesis    CT in March 2022 with moderate coronary artery calcifications.    Plans per cardiology for follow-up in 1 month with an echo prior to the visit    Aspirin 81 mg    Metoprolol 12.5 mg XL daily >> recommended by cardiology and patient continues to refuse.  She feels that she had \"vertigo \"after starting a beta-blocker in the past and that her blood pressure is not that bad.  Reviewed her cardiac findings and despite cardiomyopathy recommendations per cardiology she declines to try the medication     Pericardial effusion:     Reported on echo of 11/10 as larger.  Especially RV free wall.    11/12 cardiology follow-up in reference to her pericardial effusion.    No evidence of tamponade.  Recommended stopping aspirin and Lovenox.    Echo in 1 week to reevaluate.    30-day event monitor given her syncope     SVT  - 14 beats of SVT on monitoring.   -Cardiology assessment.  And reviews of records from outside cardiology she was " noted to have 14 beats of SVT found on monitoring.  Not felt to have any evidence of clinically significant SVT or evidence of accessory pathway since admission.  -Recommendations to start metoprolol XL but patient reluctant as she feels she has a reaction to beta-blockers     Incidental left ovarian cyst  11/8/2022 CTA C/A/P: Large left ovarian adnexal cyst 5.9 cm.  Previously 4 cm on 3/2022  Noted on CT scan and when compared to 3/6/20 appears larger.    No symptoms at this time  - Can discuss with PCP for pelvic US and additional follow up       Pulmonary nodules   - CT C/A/P : LUNGS AND PLEURA: Right basilar atelectasis. No acute airspace disease  identified. Stable cavitary nodule superior right lower lobe measuring  0.3 cm series 4 image 136. Stable left upper lobe ground-glass nodule  measuring 0.4 cm image 64. Stable ground-glass nodule left upper lobe  measuring 0.5 cm image 72. There is a new solid nodule measuring 0.4  cm anterior right lower lobe abutting the fissure image 171. No  effusion.  Few bilateral pulmonary nodules noted.  They are stable when compared to CT scan on 3/17/22 but a new solid nodule at the right lower lobe.  - Radiology recommending repeat CT in 6 months.  This needs to be discussed with patient prior to discharge Also with new RLLL nodule      Cervical torticollis:     PTA admission.    Followed by clinic of neurology     She wants to have medications/treatments however adds names of many medications gave her side effects and does not want to start new meds      H/o DCIS s/p mastectomy   Stable     Potential UTI:     UA checked and positive.  Urine culture sent    Started Omnicef 300 mg p.o. twice daily await cultures     Urine culture with E. coli sensitivity pending    Describes some bilateral flank pain with CT imaging on admission no stones or hydronephrosis.    X-ray lumbar spine with her fall     Constipation  Started on MiraLAX daily and senna scheduled 1 p.o. twice  daily       Diet: Regular Diet Adult  Room Service  Mcdowell Catheter: Not present  DVT Prophylaxis: Patient was prescribed Lovenox but refuses to use this.  Code Status: Full Code       Expected Discharge Date: 11/14/2022      Destination: home;home with help/services;other (comment) (lon touched on TCU)  Discharge Comments: TCU placement       KIMBERLY NIX MD, PA-C  Hospitalist Service  [facility]  ______________________________________________________________________    Interval History   Patient was found in the bathroom with her walker.  She had been told by staff that she needed to call to get up.  Today reviewed her hospital stay and the current findings.  She usually has MyChart  Discussion about a TCU given her balance and needs for ambulation.  Complaining of bilateral flank pain.  Does not appear to have midline lumbar tenderness reports with moving.    -Data reviewed today: I reviewed all new labs and imaging results over the last 24 hours. I personally reviewed no images or EKG's today.    Physical Exam   Temp: 97.9  F (36.6  C) Temp src: Oral BP: (!) 144/77 Pulse: 78   Resp: 16 SpO2: 96 % O2 Device: None (Room air)    Vitals:    11/10/22 0400 11/11/22 0600 11/12/22 0642   Weight: 58.7 kg (129 lb 6.6 oz) 65.5 kg (144 lb 6.4 oz) 57.3 kg (126 lb 5.2 oz)     Constitutional: Appears stated age, no acute distress.  Respiratory: Breath sounds CTA. No increased work of breathing.  Cardiovascular: RRR, no rub or murmur. No peripheral edema.  GI: Soft, non-tender, non-distended.  Skin: Warm, dry, no rashes or lesions.  Other: Patient is walking with her walker.  No overt CVA tenderness.    Medications       cefdinir  300 mg Oral Q12H [facility] (08/20)     fluticasone-salmeterol  2 puff Inhalation BID     levETIRAcetam  1,000 mg Oral BID     polyethylene glycol  17 g Oral Daily       Data   Recent Labs   Lab 11/13/22  0821 11/12/22  0809 11/12/22  0634 11/11/22  0445 11/10/22  0932  11/10/22  0432 11/10/22  0246 11/10/22  0115 11/09/22  1537 11/09/22  0756 11/09/22  0457 11/08/22  1434 11/08/22  1122   WBC  --   --  6.6  --   --   --   --   --  10.2  --  10.5  --  9.4   HGB  --   --  11.2*  --   --   --   --   --  12.3  --  11.4*  --  13.3   MCV  --   --  94  --   --   --   --   --  95  --  90  --  98   PLT  --   --  209  209  --   --   --   --   --  190  --  216  --  325   NA  --   --   --   --   --   --   --   --   --   --  137  --  139   POTASSIUM 3.6  --  3.5 3.6   < >  --    < >  --   --    < > 3.4  --  4.2   CHLORIDE  --   --   --   --   --   --   --   --   --   --  107  --  105   CO2  --   --   --   --   --   --   --   --   --   --  23  --  13*   BUN  --   --   --   --   --   --   --   --   --   --  10  --  12   CR  --   --  0.53  --   --   --   --   --   --   --  0.50*  --  0.55   ANIONGAP  --   --   --   --   --   --   --   --   --   --  7  --  21*   MARIBEL  --   --   --   --   --   --   --   --   --   --  8.2*  --  8.9   GLC  --  86  --   --   --  79  --  81  --    < > 105*   < > 141*   ALBUMIN  --   --   --   --   --   --   --   --   --   --  3.0*  --  3.7   PROTTOTAL  --   --   --   --   --   --   --   --   --   --  5.7*  --  7.3   BILITOTAL  --   --   --   --   --   --   --   --   --   --  1.2  --  0.8   ALKPHOS  --   --   --   --   --   --   --   --   --   --  68  --  81   ALT  --   --   --   --   --   --   --   --   --   --  24  --  29   AST  --   --   --   --   --   --   --   --   --   --  37  --  31   LIPASE  --   --   --   --   --   --   --   --   --   --   --   --  138    < > = values in this interval not displayed.       Imaging:  No results found for this or any previous visit (from the past 24 hour(s)).

## 2022-11-14 ENCOUNTER — APPOINTMENT (OUTPATIENT)
Dept: OCCUPATIONAL THERAPY | Facility: CLINIC | Age: 77
DRG: 100 | End: 2022-11-14
Payer: COMMERCIAL

## 2022-11-14 ENCOUNTER — LAB REQUISITION (OUTPATIENT)
Dept: LAB | Facility: CLINIC | Age: 77
End: 2022-11-14

## 2022-11-14 VITALS
RESPIRATION RATE: 18 BRPM | SYSTOLIC BLOOD PRESSURE: 125 MMHG | BODY MASS INDEX: 21.02 KG/M2 | HEART RATE: 84 BPM | WEIGHT: 126.32 LBS | DIASTOLIC BLOOD PRESSURE: 68 MMHG | OXYGEN SATURATION: 95 % | TEMPERATURE: 98 F

## 2022-11-14 VITALS
TEMPERATURE: 98 F | RESPIRATION RATE: 18 BRPM | SYSTOLIC BLOOD PRESSURE: 129 MMHG | OXYGEN SATURATION: 98 % | DIASTOLIC BLOOD PRESSURE: 82 MMHG | HEART RATE: 84 BPM

## 2022-11-14 DIAGNOSIS — Z11.1 ENCOUNTER FOR SCREENING FOR RESPIRATORY TUBERCULOSIS: ICD-10-CM

## 2022-11-14 LAB
MAGNESIUM SERPL-MCNC: 2.3 MG/DL (ref 1.6–2.3)
PHOSPHATE SERPL-MCNC: 3.5 MG/DL (ref 2.5–4.5)
POTASSIUM BLD-SCNC: 3.6 MMOL/L (ref 3.4–5.3)

## 2022-11-14 PROCEDURE — 36415 COLL VENOUS BLD VENIPUNCTURE: CPT | Performed by: INTERNAL MEDICINE

## 2022-11-14 PROCEDURE — 250N000013 HC RX MED GY IP 250 OP 250 PS 637: Performed by: INTERNAL MEDICINE

## 2022-11-14 PROCEDURE — 97530 THERAPEUTIC ACTIVITIES: CPT | Mod: GO

## 2022-11-14 PROCEDURE — 84132 ASSAY OF SERUM POTASSIUM: CPT | Performed by: INTERNAL MEDICINE

## 2022-11-14 PROCEDURE — 250N000013 HC RX MED GY IP 250 OP 250 PS 637: Performed by: SURGERY

## 2022-11-14 PROCEDURE — 250N000011 HC RX IP 250 OP 636: Performed by: SURGERY

## 2022-11-14 PROCEDURE — 99239 HOSP IP/OBS DSCHRG MGMT >30: CPT | Performed by: INTERNAL MEDICINE

## 2022-11-14 PROCEDURE — 84100 ASSAY OF PHOSPHORUS: CPT | Performed by: INTERNAL MEDICINE

## 2022-11-14 PROCEDURE — 250N000013 HC RX MED GY IP 250 OP 250 PS 637: Performed by: PSYCHIATRY & NEUROLOGY

## 2022-11-14 PROCEDURE — 83735 ASSAY OF MAGNESIUM: CPT | Performed by: INTERNAL MEDICINE

## 2022-11-14 PROCEDURE — 97535 SELF CARE MNGMENT TRAINING: CPT | Mod: GO

## 2022-11-14 RX ORDER — ACETAMINOPHEN 325 MG/1
650 TABLET ORAL EVERY 4 HOURS PRN
DISCHARGE
Start: 2022-11-14

## 2022-11-14 RX ORDER — LEVETIRACETAM 1000 MG/1
1000 TABLET ORAL 2 TIMES DAILY
DISCHARGE
Start: 2022-11-14 | End: 2022-11-28

## 2022-11-14 RX ORDER — AMOXICILLIN 250 MG
1 CAPSULE ORAL 2 TIMES DAILY PRN
DISCHARGE
Start: 2022-11-14 | End: 2023-01-06

## 2022-11-14 RX ORDER — CEFDINIR 300 MG/1
300 CAPSULE ORAL EVERY 12 HOURS
Qty: 5 CAPSULE | Refills: 0 | DISCHARGE
Start: 2022-11-14 | End: 2022-11-17

## 2022-11-14 RX ORDER — POLYETHYLENE GLYCOL 3350 17 G/17G
17 POWDER, FOR SOLUTION ORAL DAILY PRN
Qty: 510 G | Refills: 1 | DISCHARGE
Start: 2022-11-14 | End: 2023-01-06

## 2022-11-14 RX ORDER — ALBUTEROL SULFATE 90 UG/1
2 AEROSOL, METERED RESPIRATORY (INHALATION) EVERY 6 HOURS PRN
DISCHARGE
Start: 2022-11-14

## 2022-11-14 RX ADMIN — POLYETHYLENE GLYCOL 3350 17 G: 17 POWDER, FOR SOLUTION ORAL at 09:01

## 2022-11-14 RX ADMIN — FLUTICASONE PROPIONATE AND SALMETEROL XINAFOATE 2 PUFF: 115; 21 AEROSOL, METERED RESPIRATORY (INHALATION) at 09:08

## 2022-11-14 RX ADMIN — LEVETIRACETAM 1000 MG: 500 TABLET, FILM COATED ORAL at 09:03

## 2022-11-14 RX ADMIN — ACETAMINOPHEN 650 MG: 325 TABLET, FILM COATED ORAL at 11:51

## 2022-11-14 RX ADMIN — SENNOSIDES AND DOCUSATE SODIUM 2 TABLET: 50; 8.6 TABLET ORAL at 09:03

## 2022-11-14 RX ADMIN — ONDANSETRON 4 MG: 2 INJECTION INTRAMUSCULAR; INTRAVENOUS at 01:34

## 2022-11-14 RX ADMIN — CEFDINIR 300 MG: 300 CAPSULE ORAL at 09:03

## 2022-11-14 RX ADMIN — ACETAMINOPHEN 650 MG: 325 TABLET, FILM COATED ORAL at 04:47

## 2022-11-14 ASSESSMENT — ACTIVITIES OF DAILY LIVING (ADL)
ADLS_ACUITY_SCORE: 22

## 2022-11-14 NOTE — PLAN OF CARE
Physical Therapy Discharge Summary    Reason for therapy discharge:    Discharged to transitional care facility.    Progress towards therapy goal(s). See goals on Care Plan in [facility] electronic health record for goal details.  Goals partially met.  Barriers to achieving goals:   discharge from facility.    Therapy recommendation(s):    Continued therapy is recommended.  Rationale/Recommendations:  recommending continued therapies at TCU for improvement of strength, safety, and independence.

## 2022-11-14 NOTE — PROGRESS NOTES
Care Management Discharge Note    Discharge Date: 11/14/2022       Discharge Disposition: Transitional Care at [facility]    Discharge Services: Transportation Services    Discharge DME: Other (see comment) (pending therapy reccs)    Discharge Transportation: health plan transportation (may need a w/chair ride at discharge has [employer])    Private pay costs discussed:     PAS Confirmation Code: [id number]  Patient/family educated on Medicare website which has current facility and service quality ratings:      Education Provided on the Discharge Plan: yes   Persons Notified of Discharge Plans: Hospitalist, TONY,RN, HUC,BB pt  Patient/Family in Agreement with the Plan:yes  Handoff Referral Completed:     Additional Information:  CHAKA received call from Med in admissions at [facility]. They are able to admit pt today and pt deemed ready for discharge in rounds this am.  CHAKA updated pt and transport is scheduled at 15:00 pending hospitalist's orders for discharge.    CHAKA just  spoke with hospitalist who indicates she is currently putting discharge orders in Epic.  Discharge orders in [facility] and sent to [facility].    Beata Hilario [facility]  Care Transitions  [phone]

## 2022-11-14 NOTE — PLAN OF CARE
Goal Outcome Evaluation:   Pt here with syncopal episode vs seizures.  DATE & TIME: 11/13/2022    Cognitive Concerns/ Orientation : A&O x 4 forgetful at times    BEHAVIOR & AGGRESSION TOOL COLOR: Green    ABNL VS/O2: VSS on RMA   MOBILITY: SBA steady (tremors to head and upper extremities pt stated it is a lomg term side effect from a medication)   PAIN MANAGMENT: PRN tylenol and ice for back pain   DIET: Reg  BOWEL/BLADDER: Cont of B/B feeling constipated (gave PRN senna) also encouraged fluids may want enema in the AM     ABNL LAB/BG:   DRAIN/DEVICES: R PIV SL  SKIN: no concerns   TESTS/PROCEDURES: PT/OT following  D/C DATE: TBD - pending possible TCU   Discharge Barriers: Pt is anxious about having another seizure. Wondering how long she will need to take Keppra

## 2022-11-14 NOTE — PROGRESS NOTES
Date: November 14, 2022  Shift: [id number]  Name: Morenita Moore  Age: 77 year old  YOB: 1945  Date of Admission: 11/8/22    Reason for Admission: Seizure (H) [R56.9]  Altered mental status, unspecified altered mental status type [R41.82]     Cognitive/Mentation: A/Ox4 - forgetful  Neuros/CMS: Tremors, L lean, and tick @ baseline.     VS: VSS on RA  Cardiac: WDL  GI: Pt feels constipated - she had a small BM yesterday. Today, prn senna and prune juice were given along with scheduled miralax, pt had another small BM today. She is aware that the pain meds and not moving around as much is contributing - states she has regular BM's at home.  : WDL  Pulmonary: LS clear, denies SOB  Pain: PRN tylenol given for lower back pain     Lines/Drains: PIV-SL  Skin: Scattered bruising  Activity: SBA    Diet: Regular, little appetite     Therapies recs: PT/OT  Discharge: Pending - Sammi can take pt today at 1500

## 2022-11-14 NOTE — PLAN OF CARE
Occupational Therapy Discharge Summary    Reason for therapy discharge:    Discharged to transitional care facility.    Progress towards therapy goal(s). See goals on Care Plan in [facility] electronic health record for goal details.  Goals partially met.  Barriers to achieving goals:   discharge from facility.    Therapy recommendation(s):    Continued therapy is recommended.  Rationale/Recommendations:  to increase indep with functional mobility and self cares.

## 2022-11-15 ENCOUNTER — TRANSITIONAL CARE UNIT VISIT (OUTPATIENT)
Dept: GERIATRICS | Facility: CLINIC | Age: 77
End: 2022-11-15
Payer: COMMERCIAL

## 2022-11-15 DIAGNOSIS — I31.39 PERICARDIAL EFFUSION: ICD-10-CM

## 2022-11-15 DIAGNOSIS — B96.20 E. COLI UTI: ICD-10-CM

## 2022-11-15 DIAGNOSIS — R91.8 PULMONARY NODULES: ICD-10-CM

## 2022-11-15 DIAGNOSIS — Z86.79 HISTORY OF SUPRAVENTRICULAR TACHYCARDIA: ICD-10-CM

## 2022-11-15 DIAGNOSIS — R56.9 SEIZURE (H): Primary | ICD-10-CM

## 2022-11-15 DIAGNOSIS — J45.30 MILD PERSISTENT ASTHMA WITHOUT COMPLICATION: ICD-10-CM

## 2022-11-15 DIAGNOSIS — N83.202 OVARIAN CYST, LEFT: ICD-10-CM

## 2022-11-15 DIAGNOSIS — I51.81 STRESS-INDUCED CARDIOMYOPATHY: ICD-10-CM

## 2022-11-15 DIAGNOSIS — N39.0 E. COLI UTI: ICD-10-CM

## 2022-11-15 DIAGNOSIS — R53.81 PHYSICAL DECONDITIONING: ICD-10-CM

## 2022-11-15 DIAGNOSIS — Z71.89 ADVANCED DIRECTIVES, COUNSELING/DISCUSSION: ICD-10-CM

## 2022-11-15 DIAGNOSIS — R41.82 ALTERED MENTAL STATUS, UNSPECIFIED ALTERED MENTAL STATUS TYPE: ICD-10-CM

## 2022-11-15 PROCEDURE — 86481 TB AG RESPONSE T-CELL SUSP: CPT | Performed by: NURSE PRACTITIONER

## 2022-11-15 PROCEDURE — 99310 SBSQ NF CARE HIGH MDM 45: CPT | Performed by: NURSE PRACTITIONER

## 2022-11-15 PROCEDURE — 36415 COLL VENOUS BLD VENIPUNCTURE: CPT | Performed by: NURSE PRACTITIONER

## 2022-11-15 PROCEDURE — P9604 ONE-WAY ALLOW PRORATED TRIP: HCPCS | Performed by: NURSE PRACTITIONER

## 2022-11-15 NOTE — PROGRESS NOTES
[facility]    PRIMARY CARE PROVIDER AND CLINIC:  Otilia Sánchez MD, [address] / [facility] [address]  Chief Complaint   Patient presents with     Hospital F/U      [place] Medical Record Number:  [id number]  Place of Service where encounter took place:  [facility] (TCU) [[id number]]    Morenita Moore  is a 77 year old  (1945), admitted to the above facility from  [facility]. Hospital stay 11/8/22 through 11/14/22.  HPI:    77 year old female hx SVT hospitalized with loss of consciousness concerning for seizure. She was confused and was incontinent of urine.  Seizure-like activity was witnessed in the ED.  She was given IV ativan, loaded with IV Keppra and intubated for airway protection.  She was admitted to the ICU, was able to be extubated 11/9/22. Head CT with chronic small vessel disease, CTA head/neck negative. EEG did not record clear seizure. Neuro recommends continue Keppra. Stress induced cardiomyopathy, pericardial effusion: stopped blood thinners, repeat ECHO one week with cardiology f/u, 30 day event monitor. E coli UTI on antibiotics. Left ovarian/adnexal cyst, pulmonary nodules found on CT - repeat CT 6 months and consider pelvis US as outpatient. To TCU for rehab.      Seen for initial TCU visit. Reviewed code status - Full Code desired. No headaches, dizziness, chest pain, dyspnea, bowel or bladder issues. BP range 119-138/73-82 and sats 95% room air. Very concern about cause of event, impatient for neuro and cardiology follow up visits.     CODE STATUS/ADVANCE DIRECTIVES DISCUSSION:  Full Code  CPR/Full code   ALLERGIES:   Allergies   Allergen Reactions     Levalbuterol Hydrochloride Shortness Of Breath     Cats Other (See Comments)     Itchy eyes     Dust Mites      Other reaction(s): Wheezing  Wheezing/shortness/breath/see (IRP 6/1)     Qvar [Beclomethasone]      Per patient- allergic to QVAR     Amitriptyline Palpitations      Escitalopram Anxiety      PAST MEDICAL HISTORY:   Past Medical History:   Diagnosis Date     Anxiety      Asthma 2012    adult-onset asthma diagnosed in 2012     Cancer (H)      DCIS (ductal carcinoma in situ) 2001    stage 0 status post left mastectomy in 2001     Depressive disorder      PONV (postoperative nausea and vomiting)      Torticollis      Tremor       PAST SURGICAL HISTORY:   has a past surgical history that includes Breast surgery; cataract iol, rt/lt; Retinal reattachment; Mastectomy; and Odontectomy (Left, 8/2/2022).  FAMILY HISTORY: family history includes Asthma in her sister; Breast Cancer in her sister; Cancer in her sister; Heart Failure in her mother.  SOCIAL HISTORY:   reports that she quit smoking about 42 years ago. Her smoking use included cigarettes. She has a 6.00 pack-year smoking history. She has never used smokeless tobacco. She reports that she does not drink alcohol and does not use drugs.  Patient's living condition: lives alone    Post Discharge Medication Reconciliation Status:   MED REC REQUIRED  Post Medication Reconciliation Status:  Discharge medications reconciled, continue medications without change         Current Outpatient Medications   Medication Sig     acetaminophen (TYLENOL) 325 MG tablet Take 2 tablets (650 mg) by mouth every 4 hours as needed for mild pain     albuterol (PROAIR HFA/PROVENTIL HFA/VENTOLIN HFA) 108 (90 Base) MCG/ACT inhaler Inhale 2 puffs into the lungs every 6 hours as needed for shortness of breath / dyspnea     calcium carbonate-vitamin D 600-125 MG-UNIT TABS Take 1 tablet by mouth daily     Cyanocobalamin 50 MCG TABS Take 50 mcg by mouth once a week Sundays     fluticasone-salmeterol (ADVAIR HFA) 115-21 MCG/ACT inhaler Inhale 2 puffs into the lungs 2 times daily     levETIRAcetam (KEPPRA) 1000 MG tablet Take 1 tablet (1,000 mg) by mouth 2 times daily     polyethylene glycol (MIRALAX) 17 GM/Dose powder Take 17 g by mouth daily as needed      senna-docusate (SENOKOT-S/PERICOLACE) 8.6-50 MG tablet Take 1 tablet by mouth 2 times daily as needed for constipation     No current facility-administered medications for this visit.       ROS:  10 point ROS of systems including Constitutional, Eyes, Respiratory, Cardiovascular, Gastroenterology, Genitourinary, Integumentary, Musculoskeletal, Psychiatric were all negative except for pertinent positives noted in my HPI.    Vitals:  /82   Pulse 84   Temp 98  F (36.7  C)   Resp 18   SpO2 98%   Exam:  GENERAL APPEARANCE:  Alert, in no distress, pleasant, cooperative, oriented x self, place and recent events  EYES:  EOM, lids, pupils and irises normal, sclera clear and conjunctiva normal, no discharge or mattering on lids or lashes noted  ENT:  Mouth normal, moist mucous membranes, nose normal without drainage or crusting, external ears without lesions, hearing acuity intact  NECK: supple, leans left  RESP:  respiratory effort normal, no chest wall tenderness, no respiratory distress, Lung sounds clear, patient is on room air  CV:  Auscultation of heart done, rate and rhythm controlled and regular, no murmur, no rub or gallop. Edema none bilateral lower extremities  ABDOMEN:  normal bowel sounds, soft, nontender, no palpable masses.  M/S:   Gait and station unsafe without assistance, no tenderness or swelling of the joints; able to move all extremities, digits normal  SKIN:  Inspection and palpation of skin and subcutaneous tissue: skin on extremities warm, dry and intact without rashes  NEURO: cranial nerves 2-12 grossly intact, no facial asymmetry, no speech deficits and able to follow directions, moves all extremities symmetrically  PSYCH:  insight and judgement and memory appear impaired, affect and mood anxious    Lab/Diagnostic data:  Most Recent 3 CBC's:  Recent Labs   Lab Test 11/12/22  0634 11/09/22  1537 11/09/22  0457   WBC 6.6 10.2 10.5   HGB 11.2* 12.3 11.4*   MCV 94 95 90     209 190 216      Most Recent 3 BMP's:  Recent Labs   Lab Test 11/14/22  0849 11/13/22  0821 11/12/22  0809 11/12/22  0634 11/10/22  0932 11/10/22  0432 11/09/22  0756 11/09/22  0457 11/08/22  1434 11/08/22  1122   NA  --  143  --   --   --   --   --  137  --  139   POTASSIUM 3.6 3.6  3.6  --  3.5   < >  --    < > 3.4  --  4.2   CHLORIDE  --  112*  --   --   --   --   --  107  --  105   CO2  --  22  --   --   --   --   --  23  --  13*   BUN  --  8  --   --   --   --   --  10  --  12   CR  --  0.46*  --  0.53  --   --   --  0.50*  --  0.55   ANIONGAP  --  9  --   --   --   --   --  7  --  21*   MARIBEL  --  8.8  --   --   --   --   --  8.2*  --  8.9   GLC  --  121* 86  --   --  79   < > 105*   < > 141*    < > = values in this interval not displayed.     Most Recent TSH and T4:  Recent Labs   Lab Test 11/08/22  1122   TSH 3.50       ASSESSMENT/PLAN:  Seizure (H)  Altered mental status, unspecified altered mental status type  Acute onset, no further events. Continue Keppra 1000 mg BID, monitor for safety and f/u cognitive test results. Neurology f/u as planned.     Stress-induced cardiomyopathy  History of supraventricular tachycardia  Pericardial effusion  Acute on chronic. Monitor vs, wt. Cardiology monitoring and f/u as recommended with repeat imaging.     Asthma  No symptoms, continue PTA inhalers.     E. coli UTI  Denies symptoms. Completed antibiotics, recheck UA/UC PRN.     Constipation  Managed with PRN laxatives. Monitor.     Pulmonary nodules  Ovarian cyst, left  Noted on imaging - f/u as outpatient.     Physical deconditioning  Acute, ongoing, continue tylenol PRN pain, therapies as ordered and f/u progress next visit.     Advanced directives, counseling/discussion  Reviewed, Full Code    Orders:  1. Full Code  2. CBC and BMP 11/18 diagnosis weakness    Total unit/floor time of 36 minutes spent consisted of the following: examination of patient, reviewing the record including pertinent labs and imaging. More than 50% of this  time was spent in coordination of care with the patient, nursing staff, and other healthcare providers. This time was spent on discussing the care plan including labs, discharge/safe placement, and specialty follow up. Additional specific discussion included review of code status, needed follow up with specialists, monitoring of bowel and bladder symptoms, expected TCU course/routine/discharge planning and clarification of meds/orders with nursing for a total of over 19 min.    Electronically signed by:  JANELLE Tellez CNP

## 2022-11-16 ENCOUNTER — TELEPHONE (OUTPATIENT)
Dept: CARDIOLOGY | Facility: CLINIC | Age: 77
End: 2022-11-16

## 2022-11-16 NOTE — TELEPHONE ENCOUNTER
Writer notes echo has now been rescheduled to 11/18/22 at 1245, and follow up OV scheduled on 11/25/22 at 1225 with HILARY Kristyn Owens. Will close this encounter. SHELBIE Smith RN.

## 2022-11-16 NOTE — TELEPHONE ENCOUNTER
Patient was admitted to [facility] on 11/8/22Ms. Oscar is a 76 yo female who presents to the ED after being found down in the hallway of her building. She had confusion (?post-ictal state) and was incontinent of urine. Seizure-like activity was witnessed in the ED. She was given IV Ativan, loaded with IV Keppra and intubated for airway protection. She was admitted to the ICU, was able to be extubated 11/9/22 and transferred out of the ICU.     Head CT on admission with evidence of chronic small vessel disease. CTA of head/neck without evidence of large vessel occlusion, high-grade stenosis or aneurysm. MRI also without evidence of acute intracranial process.      Neuro consulted and she was placed on continuous EEG monitoring with no clear seizure recorded. Keppra continued at time of discharge. No driving until follow up neurology appt.     PMH: anxiety, asthma, ductal carcinoma in situ-s/p left mastectomy 2021, depression, tremor.    11/9/22: Echo showed EF of 40-45% with apical WMA with trivial pericardial effusion. Cardiology consulted and findings were consistent with stress cardiomyopathy felt d/t seizure activity.    11/10/22: Repeat limited echo showed improvement of EF to 55-60%. The pericardial effusion appeared larger, especially at RV free wall. No evidence of tamponade. Blood thinners were discontinued. Repeat echo ordered by cardiology in one week with close output clinic f/up.     Pt was started on Keppra at time of discharge. Discharged wearing a 30 day cardiac event monitor for 14 beat run of SVT during hospitalization.    Pt was discharged to [facility]. Writer notes pt is not scheduled for repeat echo until 12/14/22 at 1500, and follow up OV scheduled on 12/15/22 at 0755 with HILARY Kristyn Owens at our [facility]. Will message scheduling to reschedule these appts for one week post discharge as ordered by Dr. Meagan mackenzie. SHELBIE Smith RN.

## 2022-11-17 ENCOUNTER — LAB REQUISITION (OUTPATIENT)
Dept: LAB | Facility: CLINIC | Age: 77
End: 2022-11-17

## 2022-11-17 DIAGNOSIS — R53.1 WEAKNESS: ICD-10-CM

## 2022-11-17 LAB
GAMMA INTERFERON BACKGROUND BLD IA-ACNC: 0 IU/ML
M TB IFN-G BLD-IMP: NEGATIVE
M TB IFN-G CD4+ BCKGRND COR BLD-ACNC: 2.7 IU/ML
MITOGEN IGNF BCKGRD COR BLD-ACNC: 0.02 IU/ML
MITOGEN IGNF BCKGRD COR BLD-ACNC: 0.02 IU/ML
QUANTIFERON MITOGEN: 2.7 IU/ML
QUANTIFERON NIL TUBE: 0 IU/ML
QUANTIFERON TB1 TUBE: 0.02 IU/ML
QUANTIFERON TB2 TUBE: 0.02

## 2022-11-18 ENCOUNTER — HOSPITAL ENCOUNTER (OUTPATIENT)
Dept: CARDIOLOGY | Facility: CLINIC | Age: 77
Discharge: HOME | End: 2022-11-18
Attending: INTERNAL MEDICINE | Admitting: INTERNAL MEDICINE
Payer: COMMERCIAL

## 2022-11-18 DIAGNOSIS — I30.0 IDIOPATHIC PERICARDITIS, UNSPECIFIED CHRONICITY: ICD-10-CM

## 2022-11-18 LAB
ANION GAP SERPL CALCULATED.3IONS-SCNC: 9 MMOL/L (ref 3–14)
BUN SERPL-MCNC: 13 MG/DL (ref 7–30)
CALCIUM SERPL-MCNC: 9.6 MG/DL (ref 8.5–10.1)
CHLORIDE BLD-SCNC: 107 MMOL/L (ref 94–109)
CO2 SERPL-SCNC: 24 MMOL/L (ref 20–32)
CREAT SERPL-MCNC: 0.55 MG/DL (ref 0.52–1.04)
ERYTHROCYTE [DISTWIDTH] IN BLOOD BY AUTOMATED COUNT: 13.9 % (ref 10–15)
GFR SERPL CREATININE-BSD FRML MDRD: >90 ML/MIN/1.73M2
GLUCOSE BLD-MCNC: 119 MG/DL (ref 70–99)
HCT VFR BLD AUTO: 41.1 % (ref 35–47)
HGB BLD-MCNC: 13.5 G/DL (ref 11.7–15.7)
LVEF ECHO: NORMAL
MCH RBC QN AUTO: 31.6 PG (ref 26.5–33)
MCHC RBC AUTO-ENTMCNC: 32.8 G/DL (ref 31.5–36.5)
MCV RBC AUTO: 96 FL (ref 78–100)
PLATELET # BLD AUTO: 321 10E3/UL (ref 150–450)
POTASSIUM BLD-SCNC: 3.6 MMOL/L (ref 3.4–5.3)
RBC # BLD AUTO: 4.27 10E6/UL (ref 3.8–5.2)
SODIUM SERPL-SCNC: 140 MMOL/L (ref 133–144)
WBC # BLD AUTO: 4.7 10E3/UL (ref 4–11)

## 2022-11-18 PROCEDURE — 80048 BASIC METABOLIC PNL TOTAL CA: CPT | Performed by: NURSE PRACTITIONER

## 2022-11-18 PROCEDURE — 85027 COMPLETE CBC AUTOMATED: CPT | Performed by: NURSE PRACTITIONER

## 2022-11-18 PROCEDURE — 36415 COLL VENOUS BLD VENIPUNCTURE: CPT | Performed by: NURSE PRACTITIONER

## 2022-11-18 PROCEDURE — 93306 TTE W/DOPPLER COMPLETE: CPT | Mod: 26 | Performed by: INTERNAL MEDICINE

## 2022-11-18 PROCEDURE — 93306 TTE W/DOPPLER COMPLETE: CPT

## 2022-11-18 PROCEDURE — P9604 ONE-WAY ALLOW PRORATED TRIP: HCPCS | Performed by: NURSE PRACTITIONER

## 2022-11-19 ENCOUNTER — TELEPHONE (OUTPATIENT)
Dept: GERIATRICS | Facility: CLINIC | Age: 77
End: 2022-11-19

## 2022-11-19 ENCOUNTER — LAB REQUISITION (OUTPATIENT)
Dept: LAB | Facility: CLINIC | Age: 77
End: 2022-11-19

## 2022-11-19 DIAGNOSIS — N39.0 URINARY TRACT INFECTION, SITE NOT SPECIFIED: ICD-10-CM

## 2022-11-19 LAB
ALBUMIN UR-MCNC: NEGATIVE MG/DL
APPEARANCE UR: ABNORMAL
BILIRUB UR QL STRIP: NEGATIVE
COLOR UR AUTO: ABNORMAL
GLUCOSE UR STRIP-MCNC: NEGATIVE MG/DL
HGB UR QL STRIP: NEGATIVE
KETONES UR STRIP-MCNC: NEGATIVE MG/DL
LEUKOCYTE ESTERASE UR QL STRIP: NEGATIVE
NITRATE UR QL: NEGATIVE
PH UR STRIP: 7.5 [PH] (ref 5–7)
RBC URINE: 1 /HPF
SP GR UR STRIP: 1.01 (ref 1–1.03)
SQUAMOUS EPITHELIAL: <1 /HPF
UROBILINOGEN UR STRIP-MCNC: NORMAL MG/DL
WBC URINE: 2 /HPF

## 2022-11-19 PROCEDURE — 81001 URINALYSIS AUTO W/SCOPE: CPT | Performed by: NURSE PRACTITIONER

## 2022-11-19 PROCEDURE — 87086 URINE CULTURE/COLONY COUNT: CPT | Performed by: NURSE PRACTITIONER

## 2022-11-19 NOTE — TELEPHONE ENCOUNTER
Page RE: back pain; unable to sleep, points to flank area, oriented, urine OK, afebrile, wants UA/UC  -OK UA/UC

## 2022-11-21 ENCOUNTER — TELEPHONE (OUTPATIENT)
Dept: INTERNAL MEDICINE | Facility: CLINIC | Age: 77
End: 2022-11-21

## 2022-11-21 LAB — BACTERIA UR CULT: NORMAL

## 2022-11-21 NOTE — PROGRESS NOTES
[facility]  INITIAL VISIT NOTE  November 22, 2022    PRIMARY CARE PROVIDER AND CLINIC:  Otilia Sánchez Fanny [facility] [id number]    CHIEF COMPLAINT:  Hospital follow-up/Initial visit    HPI:    Morenita Moore is a 77 year old  (1945) female who was seen at [facility] on November 22, 2022 for an initial visit.     Medical history is notable for asthma, breast cancer, SVT, anxiety, depression, and torticollis.    Summary of hospital course:  Patient was hospitalized at [facility] from November 8 through November 14, 2022 after presenting with altered mental status..  Seizure-like activity was witnessed in the ED.  She was given IV Keppra and intubated for airway protection.  EKG showed sinus tachycardia.  High sensitivity troponin I was 183 with subsequent increase to 2880.  Urine drugs of abuse screen was negative.  UA was slightly abnormal and urine culture grew more than 100,000 colonies per mL E. coli.  CT head was negative for acute intracranial pathology.  CT angio head and neck was unremarkable.  Brain MRI demonstrated generalized brain atrophy but no acute interatrial process.  CT chest/abdomen/pelvis showed no acute abnormality.  It showed a few bilateral pulmonary nodules and larger size of left ovarian/adnexal cyst.  Patient was evaluated by neurology service and underwent video EEG that showed abnormal result due to presence of diffuse low amplitude theta delta slowing and intermittent generalized attenuation of the background but no seizures or epileptiform discharges. Notably, echocardiogram was remarkable for mild to moderately reduced LV systolic function with EF of 40-45% and abnormal LV diastolic dysfunction.  She was admitted by cardiology service and they suspected stress cardiomyopathy.  Repeat echocardiogram on November 18 showed hyperdynamic LV systolic function with EF of 65 to 70%, indeterminate LV  diastolic function, and a small pericardial effusion.  While inpatient, she had 14 beat run of SVT on telemetry.  Cardiology did not feel it was clinically significant.  She was treated with 5 days of antibiotics for UTI.  She was discharged on oral Keppra.  TCU was recommended per therapies.    Patient is admitted to this facility for medical management, nursing care, and rehab.     Of note, history was obtained from patient, facility RN, and extensive review of the chart.    Today's visit:  Patient was seen in her room, while sitting in a wheelchair.  She appears frail but comfortable and in no acute distress.  She states that she woke up this morning feeling short of breath which has now resolved.  She denies fever, chills, chest pain, palpitation, nausea, vomiting, abdominal pain, or urinary symptoms.  She had a bowel movement yesterday.      CODE STATUS:   CPR/Full code     PAST MEDICAL HISTORY:   Seizure in November 2022  Stress-induced cardiomyopathy (LVEF 40-45%) and abnormal LV diastolic dysfunction, per echo on November 9, 2022; repeat echocardiogram on November 18, 2022 demonstrated hyperdynamic LV systolic function with EF of 65-70% and indeterminate LV diastolic dysfunction  Small pericardial effusion, noted on echocardiogram on November 18, 2022  PSVT  Moderate coronary artery calcification, per CT in March 2022  E. coli UTI in November 2022  Asthma   Bilateral pulmonary nodules  Left ovarian/adnexal cyst, measuring 5.9 cm, per CT abdomen/pelvis on November 8, 2022  Left breast DCIS, s/p left mastectomy in 2001  Anxiety  Depression  Torticollis  Tremor    Past Medical History:   Diagnosis Date     Anxiety      Asthma 2012    adult-onset asthma diagnosed in 2012     Cancer (H)      DCIS (ductal carcinoma in situ) 2001    stage 0 status post left mastectomy in 2001     Depressive disorder      PONV (postoperative nausea and vomiting)      Torticollis      Tremor        PAST SURGICAL HISTORY:   Past  Surgical History:   Procedure Laterality Date     BREAST SURGERY       CATARACT IOL, RT/LT       MASTECTOMY      DCIS     ODONTECTOMY Left 2022    Procedure: SURGICAL EXTRACTION, TOOTH - Teeth #12, 14, 19;  Surgeon: Arvin Garza DDS;  Location: [facility] OR     RETINAL REATTACHMENT         FAMILY HISTORY:   Family History   Problem Relation Age of Onset     Cancer Sister         breast cancer     Glaucoma No family hx of      Macular Degeneration No family hx of      Asthma Sister      Breast Cancer Sister      Heart Failure Mother          at 97       SOCIAL HISTORY:  Social History     Tobacco Use     Smoking status: Former     Packs/day: 1.00     Years: 6.00     Pack years: 6.00     Types: Cigarettes     Quit date:      Years since quittin.9     Smokeless tobacco: Never     Tobacco comments:     smoked about 10 years   Substance Use Topics     Alcohol use: No       MEDICATIONS:  Current Outpatient Medications   Medication Sig Dispense Refill     acetaminophen (TYLENOL) 325 MG tablet Take 2 tablets (650 mg) by mouth every 4 hours as needed for mild pain       albuterol (PROAIR HFA/PROVENTIL HFA/VENTOLIN HFA) 108 (90 Base) MCG/ACT inhaler Inhale 2 puffs into the lungs every 6 hours as needed for shortness of breath / dyspnea       calcium carbonate-vitamin D 600-125 MG-UNIT TABS Take 1 tablet by mouth daily       Cyanocobalamin 50 MCG TABS Take 50 mcg by mouth once a week Sundays       fluticasone-salmeterol (ADVAIR HFA) 115-21 MCG/ACT inhaler Inhale 2 puffs into the lungs 2 times daily 12 g 11     levETIRAcetam (KEPPRA) 1000 MG tablet Take 1 tablet (1,000 mg) by mouth 2 times daily       polyethylene glycol (MIRALAX) 17 GM/Dose powder Take 17 g by mouth daily as needed 510 g 1     senna-docusate (SENOKOT-S/PERICOLACE) 8.6-50 MG tablet Take 1 tablet by mouth 2 times daily as needed for constipation         Post Medication Reconciliation Status: medication reconcilation previously completed during  "another office visit         ALLERGIES:  Allergies   Allergen Reactions     Levalbuterol Hydrochloride Shortness Of Breath     Cats Other (See Comments)     Itchy eyes     Dust Mites      Other reaction(s): Wheezing  Wheezing/shortness/breath/see (IRP 6/1)     Qvar [Beclomethasone]      Per patient- allergic to QVAR     Amitriptyline Palpitations     Escitalopram Anxiety       ROS:  10 point ROS were negative other than the symptoms noted above in the HPI.    PHYSICAL EXAM:  Vital signs were reviewed in the chart.  Vital Signs: /80   Pulse 88   Temp 98.2  F (36.8  C)   Resp 18   Ht 1.651 m (5' 5\")   Wt 56.1 kg (123 lb 9.6 oz)   SpO2 99%   BMI 20.57 kg/m    General: Frail appearing but comfortable and in no acute distress  HEENT: No conjunctival pallor, no scleral icterus or injection, moist oral mucosa  Cardiovascular: Normal S1, S2, RRR  Respiratory: Lungs clear to auscultation bilaterally  GI: Abdomen soft, non-tender, non-distended, +BS  Extremities: No LE edema  Neuro: CX II-XII grossly intact; ROM in all four extremities grossly intact  Psych: Alert and oriented x3; normal affect  Skin: No acute rash    LABORATORY/IMAGING DATA:  All relevant labs and imaging data in [facility] and/or Care Everywhere were personally reviewed today.    Hematology profile (November 18, 2022): White count 4.7, hemoglobin 13.5, platelet count 321,000, MCV 96    Chemistry profile (November 18, 2022): Sodium 140, potassium 3.6, BUN 13, creatinine 0.55, glucose 119, calcium 9.6      Most Recent 3 CBC's:Recent Labs   Lab Test 11/12/22  0634 11/09/22  1537 11/09/22  0457   WBC 6.6 10.2 10.5   HGB 11.2* 12.3 11.4*   MCV 94 95 90     209 190 216     Most Recent 3 BMP's:Recent Labs   Lab Test 11/14/22  0849 11/13/22  0821 11/12/22  0809 11/12/22  0634 11/10/22  0932 11/10/22  0432 11/09/22  0756 11/09/22  0457 11/08/22  1434 11/08/22  1122   NA  --  143  --   --   --   --   --  137  --  139   POTASSIUM 3.6 3.6  3.6  --  3.5  "  < >  --    < > 3.4  --  4.2   CHLORIDE  --  112*  --   --   --   --   --  107  --  105   CO2  --  22  --   --   --   --   --  23  --  13*   BUN  --  8  --   --   --   --   --  10  --  12   CR  --  0.46*  --  0.53  --   --   --  0.50*  --  0.55   ANIONGAP  --  9  --   --   --   --   --  7  --  21*   MARIBEL  --  8.8  --   --   --   --   --  8.2*  --  8.9   GLC  --  121* 86  --   --  79   < > 105*   < > 141*    < > = values in this interval not displayed.     Most Recent 2 LFT's:Recent Labs   Lab Test 11/09/22  0457 11/08/22  1122   AST 37 31   ALT 24 29   ALKPHOS 68 81   BILITOTAL 1.2 0.8         ASSESSMENT/PLAN:  Seizure disorder,  Postictal status, resolved.  Patient required intubation for airway protection.  Started on Keppra.  No recurrence of seizure since admission to the ICU.  Plan:  Seizure precautions  Continue Keppra 1000 mg twice daily  Follow-up with neurology as directed    Stress-induced cardiomyopathy, recovered,  Moderate coronary artery calcification, per CT March 2022.  Repeat echocardiogram on November 18, 2022 demonstrated hyperdynamic LV systolic function with EF of 65-70% and indeterminate LV diastolic dysfunction.  Patient currently weighs 125.8 LBS and appears euvolemic.  Plan:  Monitor weight and volume status  Follow-up with cardiology with repeat echo in 1 month as directed    Small pericardial effusion.  Noted on echocardiogram on November 18, 2022.  Not clinically significant.  Plan for  Repeat echo in 1 month as outpatient    PSVT.  Noted inpatient.  Plan:  Cardiac event monitor to be placed on Friday, November 25    E. coli UTI.  Completed 5-day course of antibiotic therapy with cefdinir.  Recheck urine culture on November 19 yielded less than 10,000 colonies per mL mixed urogenital cornell.  Plan:  Monitor for recurrence of UTI    Asthma.  Uncomplicated.  Plan:  Continue Advair 115-21 mcg, 2 puffs twice daily  Continue albuterol MDI, 2 puffs twice daily as well as PRN  Monitor respiratory  status    History of left breast DCIS, s/p left mastectomy in 2001.  Plan:  Follow-up as outpatient    Physical deconditioning.  Plan:  Continue PT/OT evaluation and therapy      INCIDENTAL FINDINGS:  Bilateral pulmonary nodules,  Left ovarian/adnexal cyst, measuring 5.9 cm,   Plan:  Follow-up as outpatient (it was discussed with the patient)          Orders written by provider at facility:  None.    Recommendation by provider at facility:  Follow-up as outpatient for pulmonary nodules and left ovarian cyst          Disclaimer: This note may contain text created using speech-recognition software and may contain unintended word substitutions.      Electronically signed by:  Zora Dominique MD

## 2022-11-21 NOTE — TELEPHONE ENCOUNTER
Left detailed voicemail relaying provider message.   Patient does not need referral to see any [facility] provider, she can call [facility] and make an appointment with IM provider, please inform patient.    Left # for [facility].

## 2022-11-21 NOTE — TELEPHONE ENCOUNTER
Patient does not need referral to see any [facility] provider, she can call [facility] clinic and make an appointment with IM provider, please inform patient

## 2022-11-21 NOTE — TELEPHONE ENCOUNTER
Pt called and is currently [facility] after being hospitalized 11/08-11/14 for a cardiac event and seizure activity. She is wondering if Dr. Sánchez would be able to refer her to a  provider in [place] so that care is more easily coordinated as she does follow up appointments? Pt's understanding is that she is going to have to have cardiac surgery.     Can we leave a detailed message on this number? YES  Phone number patient can be reached at: Cell number on file:    Telephone Information:   Mobile [phone]     Gayle Gregory RN  [facility] Triage

## 2022-11-22 ENCOUNTER — TRANSITIONAL CARE UNIT VISIT (OUTPATIENT)
Dept: GERIATRICS | Facility: CLINIC | Age: 77
End: 2022-11-22
Payer: COMMERCIAL

## 2022-11-22 ENCOUNTER — LAB REQUISITION (OUTPATIENT)
Dept: LAB | Facility: CLINIC | Age: 77
End: 2022-11-22

## 2022-11-22 VITALS
RESPIRATION RATE: 18 BRPM | SYSTOLIC BLOOD PRESSURE: 134 MMHG | HEART RATE: 88 BPM | OXYGEN SATURATION: 99 % | TEMPERATURE: 98.2 F | DIASTOLIC BLOOD PRESSURE: 80 MMHG | WEIGHT: 123.6 LBS | HEIGHT: 65 IN | BODY MASS INDEX: 20.59 KG/M2

## 2022-11-22 DIAGNOSIS — R53.81 PHYSICAL DECONDITIONING: ICD-10-CM

## 2022-11-22 DIAGNOSIS — N83.202 CYST OF LEFT OVARY: ICD-10-CM

## 2022-11-22 DIAGNOSIS — N39.0 E. COLI UTI: ICD-10-CM

## 2022-11-22 DIAGNOSIS — J45.909 UNCOMPLICATED ASTHMA, UNSPECIFIED ASTHMA SEVERITY, UNSPECIFIED WHETHER PERSISTENT: ICD-10-CM

## 2022-11-22 DIAGNOSIS — I51.81 STRESS-INDUCED CARDIOMYOPATHY: ICD-10-CM

## 2022-11-22 DIAGNOSIS — J09.X2 INFLUENZA DUE TO IDENTIFIED NOVEL INFLUENZA A VIRUS WITH OTHER RESPIRATORY MANIFESTATIONS: ICD-10-CM

## 2022-11-22 DIAGNOSIS — R91.8 PULMONARY NODULES: ICD-10-CM

## 2022-11-22 DIAGNOSIS — I25.10 CORONARY ARTERY CALCIFICATION: ICD-10-CM

## 2022-11-22 DIAGNOSIS — I31.39 PERICARDIAL EFFUSION: ICD-10-CM

## 2022-11-22 DIAGNOSIS — B96.20 E. COLI UTI: ICD-10-CM

## 2022-11-22 DIAGNOSIS — R56.9 POSTICTAL STATE (H): ICD-10-CM

## 2022-11-22 DIAGNOSIS — I47.10 PAROXYSMAL SUPRAVENTRICULAR TACHYCARDIA (H): ICD-10-CM

## 2022-11-22 DIAGNOSIS — Z85.3 HISTORY OF BREAST CANCER: ICD-10-CM

## 2022-11-22 DIAGNOSIS — G40.909 SEIZURE DISORDER (H): Primary | ICD-10-CM

## 2022-11-22 PROCEDURE — 99305 1ST NF CARE MODERATE MDM 35: CPT | Performed by: INTERNAL MEDICINE

## 2022-11-22 NOTE — PROGRESS NOTES
~Cardiology Clinic Visit~    Primary Cardiologist: Dr. Wyatt  Last office visit: 02/09/2022  Reason for visit: Hospital follow-up    I had the pleasure of meeting Morenita Moore in Cardiology clinic today.    History of Present Illness    Ms. Moore is a 77 year old woman with PMH significant for history of pericarditis in 2019, atypical chest pain, palpitations, anxiety, asthma, DCIS s/p left mastectomy.  She was recently admitted to [facility] beds at the [facility] from 11/8-11/2014 for AMS, and witnessed having seizure activity. Her initial ECG initially demonstrated ST elevation in the anterolateral leads with improvement on subsequent ECGs;  Initial troponin was 183, and peaked at 2880.  She was seen by neurology and started on keppra.      The echocardiogram demonstrated mildly reduced LV function with EF 40-45% with apical wall motion abnormality with preservation of basal segments; findings consistent with stress cardiomyopathy.      She has previously reported palpitations and was noted to have just brief runs of SVT just lasting 14 beats, not clinically significant.  She reports a hx of an accessory pathway, but this is not found in any prior cardiology records.  Her last event monitor was from March 2020, which showed predominant only sinus rhythm with 1 run of SVT lasting 6 beats.    Interval 11/25/22    This is a very pleasant, talkative patient to I am meeting for the first time today.  She is quite tangential in her conversation with me, so there is some element of difficulty in obtaining an accurate history, and truly assessing current symptoms.  She has many questions regarding her seizure, how it happened, why she cannot remember the incident, and follow-up testing.      Her only complaint today is shortness of breath that is brought on with activity when she is working with rehab at [facility].  It lasts for about 10 minutes, this is not the same shortness of breath she has with asthma.  She mentions over the last  year, she has had chest heaviness that occurs a few times a week.  It is not brought on by exertion, and is not associated with N/V, lightheadedness, dizziness, diaphoresis, swelling, or syncope.  She says the chest heaviness is gradually improving; it lasts for approximately 30 minutes and resolves on its own.    We reviewed the results of her most recent echocardiogram together.  Unfortunately she has not yet completed the event monitor at the time of our visit today.    Her EKG today in clinic demonstrated sinus rhythm, with T wave changes consistent with prior EKG tracings, and Q waves in V1 and V2 that were also present on most recent image.    Impression & Plan  New Cardiomyopathy EF 40-45%, recovered  Stress Cardiomyopathy  Reported hx of SVT  - Initial echo showed reduced EF 40-45%, and findings c/w stress cardiomyopathy.  - Repeat echocardiogram 11/18/22 showed hyperdynamic EF 65-70%; small pericardial effusion with increased fluid measuring max diameter 1.5 cm without indications of tamponade.  - Last event monitor completed 03/2020: SR with one 6-beat run of SVT.  - EF recovery reassuring of stress cardiomyopathy vs ischemic cause.  - CT in March 2022 with mild coronary artery calcifications.  No hx ischemic workup.  - Pt not on beta-blocker d/t adverse symptoms (vertigo).    Plan: Complete event monitor    Complete Regadenoson stress test    Repeat echocardiogram in ~2-weeks    Follow-up in clinic after testing completed    New onset seizures  -Following with Neurology, Dr. Thompson  -Currently on Keppra    Pulmonary Nodules  - CT C/A/P: There is a new solid nodule measuring 0.4cm anterior right lower lobe abutting the fissure image 171. No effusion.  New RLL nodule.    Plan: Radiology recommending repeat CT in 6-months; will follow.    H/o DCIS s/p mastectomy   Stable    Thank you for the opportunity to participate in this pleasant patient's care.  Patient will see Dr. Wyatt in follow up in  approximately 6-8 weeks after event monitor is completed and results are processed for review.       We would be happy to see this patient sooner if needed for any concerns in the meantime.    JANELLE May, CNP   Nurse Practitioner  [facility]    Today's clinic visit entailed:  Review of prior external note(s) from - Outside records from care everywhere  Review of the result(s) of each unique test - EEG, EKG, echo x2, CT, labs  The following tests were independently interpreted by me as noted in my documentation: EKG  Ordering of each unique test    The level of medical decision making during this visit was of high complexity.    Orders this Visit:  Orders Placed This Encounter   Procedures     Follow-Up with Cardiology     EKG 12-lead complete w/read - Clinics (performed today)     Echocardiogram Complete  NM Lexiscan stress test - Regadenoson     Orders Placed This Encounter   Medications     cefdinir (OMNICEF) 300 MG capsule     Sig: Take 300 mg by mouth 2 times daily     oseltamivir (TAMIFLU) 75 MG capsule     Sig: Take 75 mg by mouth 2 times daily     There are no discontinued medications.  Encounter Diagnoses   Name Primary?     Stress-induced cardiomyopathy Yes     SVT (supraventricular tachycardia) (H)      Essential hypertension      Seizure (H)      Benzodiazepine dependence (H)      Atypical chest pain      Dyspnea on exertion      CURRENT MEDICATIONS:  Current Outpatient Medications   Medication Sig Dispense Refill     acetaminophen (TYLENOL) 325 MG tablet Take 2 tablets (650 mg) by mouth every 4 hours as needed for mild pain       albuterol (PROAIR HFA/PROVENTIL HFA/VENTOLIN HFA) 108 (90 Base) MCG/ACT inhaler Inhale 2 puffs into the lungs every 6 hours as needed for shortness of breath / dyspnea       calcium carbonate-vitamin D 600-125 MG-UNIT TABS Take 1 tablet by mouth daily       cefdinir (OMNICEF) 300 MG capsule Take 300 mg by mouth 2 times daily       Cyanocobalamin  "50 MCG TABS Take 50 mcg by mouth once a week Sundays       fluticasone-salmeterol (ADVAIR HFA) 115-21 MCG/ACT inhaler Inhale 2 puffs into the lungs 2 times daily 12 g 11     levETIRAcetam (KEPPRA) 1000 MG tablet Take 1 tablet (1,000 mg) by mouth 2 times daily       oseltamivir (TAMIFLU) 75 MG capsule Take 75 mg by mouth 2 times daily       polyethylene glycol (MIRALAX) 17 GM/Dose powder Take 17 g by mouth daily as needed 510 g 1     senna-docusate (SENOKOT-S/PERICOLACE) 8.6-50 MG tablet Take 1 tablet by mouth 2 times daily as needed for constipation       ALLERGIES     Allergies   Allergen Reactions     Levalbuterol Hydrochloride Shortness Of Breath     Cats Other (See Comments)     Itchy eyes     Dust Mites      Other reaction(s): Wheezing  Wheezing/shortness/breath/see (IRP 6/1)     Qvar [Beclomethasone]      Per patient- allergic to QVAR     Amitriptyline Palpitations     Escitalopram Anxiety     PAST MEDICAL, SURGICAL, FAMILY, SOCIAL HISTORY:  History was reviewed and updated as needed, see medical record.    Review of Systems:  Review Of Systems  Skin: negative  Eyes: negative  Ears/Nose/Throat: positive for hoarseness  Respiratory: Shortness of breath- with activity, no SOB at rest.  Cardiovascular: positive for occasional chest heaviness, negative for, palpitations, tachycardia, irregular heart beat, chest pain, exertional chest pain or pressure, lower extremity edema and syncope or near-syncope  Gastrointestinal: negative  Genitourinary: negative  Musculoskeletal: negative  Neurologic: positive for seizures and memory problems  Psychiatric: negative  Hematologic/Lymphatic/Immunologic: negative  Endocrine: negative     Physical Exam:    Vitals: /62 (BP Location: Left arm, Cuff Size: Adult Regular)   Pulse 100   Ht 1.651 m (5' 5\")   Wt 56.2 kg (124 lb)   BMI 20.63 kg/m    Constitutional: Appears stated age, well nourished, NAD.  Eyes: Pupils equal, round. Sclerae anicteric.   HEENT: Normocephalic, " atraumatic.   Neck: Supple. Carotid pulses full and equal. No carotid bruit.  Respiratory: Non-labored. Lungs CTAB.  Cardiovascular: RRR, normal S1 and S2. No M/G/R.  no edema.  GI: Soft, non-distended, non-tender.  Skin: Warm and dry. No rashes, cyanosis, edema.  Musculoskeletal/Extremities: Symmetrical movement to all extremities. .  Neurologic: No gross focal deficits. Alert, awake, and oriented to all spheres.  Psychiatric: Affect appropriate. Mentation normal.    Recent Lab Results:  LIPID RESULTS:  Lab Results   Component Value Date    CHOL 162 03/21/2022    HDL 57 03/21/2022    LDL 82 03/21/2022    TRIG 113 03/21/2022     LIVER ENZYME RESULTS:  Lab Results   Component Value Date    AST 37 11/09/2022    ALT 24 11/09/2022     CBC RESULTS:  Lab Results   Component Value Date    WBC 6.6 11/12/2022    WBC 6.5 12/14/2020    RBC 3.63 (L) 11/12/2022    RBC 4.15 12/14/2020    HGB 11.2 (L) 11/12/2022    HGB 13.1 12/14/2020    HCT 34.1 (L) 11/12/2022    HCT 39.4 12/14/2020    MCV 94 11/12/2022    MCV 95 12/14/2020    MCH 30.9 11/12/2022    MCH 31.6 12/14/2020    MCHC 32.8 11/12/2022    MCHC 33.2 12/14/2020    RDW 13.4 11/12/2022    RDW 13.3 12/14/2020     11/12/2022     11/12/2022     12/14/2020     BMP RESULTS:  Lab Results   Component Value Date     11/13/2022     12/14/2020    POTASSIUM 3.6 11/14/2022    POTASSIUM 4.4 12/14/2020    CHLORIDE 112 (H) 11/13/2022    CHLORIDE 111 (H) 12/14/2020    CO2 22 11/13/2022    CO2 30 12/14/2020    ANIONGAP 9 11/13/2022    ANIONGAP <1 (L) 12/14/2020     (H) 11/13/2022     (H) 12/14/2020    BUN 8 11/13/2022    BUN 12 12/14/2020    CR 0.46 (L) 11/13/2022    CR 0.70 12/14/2020    GFRESTIMATED >90 11/13/2022    GFRESTIMATED >60 02/01/2021    GFRESTIMATED 84 12/14/2020    GFRESTBLACK >60 02/01/2021    GFRESTBLACK 98 12/14/2020    MARIBEL 8.8 11/13/2022    MARIBEL 9.7 12/14/2020      A1C RESULTS:  No results found for: A1C  INR RESULTS:  Lab Results    Component Value Date    INR 0.99 05/01/2020

## 2022-11-22 NOTE — LETTER
11/22/2022        RE: Morenita Moore  [address]   [address]  [facility] [address]        [place] [facility]  INITIAL VISIT NOTE  November 22, 2022    PRIMARY CARE PROVIDER AND CLINIC:  Pallegar, Krishnakumari G [address] [facility] [address]    CHIEF COMPLAINT:  Hospital follow-up/Initial visit    HPI:    Morenita Moore is a 77 year old  (1945) female who was seen at [facility] on November 22, 2022 for an initial visit.     Medical history is notable for asthma, breast cancer, SVT, anxiety, depression, and torticollis.    Summary of hospital course:  Patient was hospitalized at [facility] from November 8 through November 14, 2022 after presenting with altered mental status..  Seizure-like activity was witnessed in the ED.  She was given IV Keppra and intubated for airway protection.  EKG showed sinus tachycardia.  High sensitivity troponin I was 183 with subsequent increase to 2880.  Urine drugs of abuse screen was negative.  UA was slightly abnormal and urine culture grew more than 100,000 colonies per mL E. coli.  CT head was negative for acute intracranial pathology.  CT angio head and neck was unremarkable.  Brain MRI demonstrated generalized brain atrophy but no acute interatrial process.  CT chest/abdomen/pelvis showed no acute abnormality.  It showed a few bilateral pulmonary nodules and larger size of left ovarian/adnexal cyst.  Patient was evaluated by neurology service and underwent video EEG that showed abnormal result due to presence of diffuse low amplitude theta delta slowing and intermittent generalized attenuation of the background but no seizures or epileptiform discharges. Notably, echocardiogram was remarkable for mild to moderately reduced LV systolic function with EF of 40-45% and abnormal LV diastolic dysfunction.  She was admitted by cardiology service and they suspected stress cardiomyopathy.  Repeat  echocardiogram on November 18 showed hyperdynamic LV systolic function with EF of 65 to 70%, indeterminate LV diastolic function, and a small pericardial effusion.  While inpatient, she had 14 beat run of SVT on telemetry.  Cardiology did not feel it was clinically significant.  She was treated with 5 days of antibiotics for UTI.  She was discharged on oral Keppra.  TCU was recommended per therapies.    Patient is admitted to this facility for medical management, nursing care, and rehab.     Of note, history was obtained from patient, facility RN, and extensive review of the chart.    Today's visit:  Patient was seen in her room, while sitting in a wheelchair.  She appears frail but comfortable and in no acute distress.  She states that she woke up this morning feeling short of breath which has now resolved.  She denies fever, chills, chest pain, palpitation, nausea, vomiting, abdominal pain, or urinary symptoms.  She had a bowel movement yesterday.      CODE STATUS:   CPR/Full code     PAST MEDICAL HISTORY:   Seizure in November 2022  Stress-induced cardiomyopathy (LVEF 40-45%) and abnormal LV diastolic dysfunction, per echo on November 9, 2022; repeat echocardiogram on November 18, 2022 demonstrated hyperdynamic LV systolic function with EF of 65-70% and indeterminate LV diastolic dysfunction  Small pericardial effusion, noted on echocardiogram on November 18, 2022  PSVT  Moderate coronary artery calcification, per CT in March 2022  E. coli UTI in November 2022  Asthma   Bilateral pulmonary nodules  Left ovarian/adnexal cyst, measuring 5.9 cm, per CT abdomen/pelvis on November 8, 2022  Left breast DCIS, s/p left mastectomy in 2001  Anxiety  Depression  Torticollis  Tremor    Past Medical History:   Diagnosis Date     Anxiety      Asthma 2012    adult-onset asthma diagnosed in 2012     Cancer (H)      DCIS (ductal carcinoma in situ) 2001    stage 0 status post left mastectomy in 2001     Depressive disorder       PONV (postoperative nausea and vomiting)      Torticollis      Tremor        PAST SURGICAL HISTORY:   Past Surgical History:   Procedure Laterality Date     BREAST SURGERY       CATARACT IOL, RT/LT       MASTECTOMY      DCIS     ODONTECTOMY Left 2022    Procedure: SURGICAL EXTRACTION, TOOTH - Teeth #12, 14, 19;  Surgeon: Arvin Garza DDS;  Location: [facility] OR     RETINAL REATTACHMENT         FAMILY HISTORY:   Family History   Problem Relation Age of Onset     Cancer Sister         breast cancer     Glaucoma No family hx of      Macular Degeneration No family hx of      Asthma Sister      Breast Cancer Sister      Heart Failure Mother          at 97       SOCIAL HISTORY:  Social History     Tobacco Use     Smoking status: Former     Packs/day: 1.00     Years: 6.00     Pack years: 6.00     Types: Cigarettes     Quit date:      Years since quittin.9     Smokeless tobacco: Never     Tobacco comments:     smoked about 10 years   Substance Use Topics     Alcohol use: No       MEDICATIONS:  Current Outpatient Medications   Medication Sig Dispense Refill     acetaminophen (TYLENOL) 325 MG tablet Take 2 tablets (650 mg) by mouth every 4 hours as needed for mild pain       albuterol (PROAIR HFA/PROVENTIL HFA/VENTOLIN HFA) 108 (90 Base) MCG/ACT inhaler Inhale 2 puffs into the lungs every 6 hours as needed for shortness of breath / dyspnea       calcium carbonate-vitamin D 600-125 MG-UNIT TABS Take 1 tablet by mouth daily       Cyanocobalamin 50 MCG TABS Take 50 mcg by mouth once a week Sundays       fluticasone-salmeterol (ADVAIR HFA) 115-21 MCG/ACT inhaler Inhale 2 puffs into the lungs 2 times daily 12 g 11     levETIRAcetam (KEPPRA) 1000 MG tablet Take 1 tablet (1,000 mg) by mouth 2 times daily       polyethylene glycol (MIRALAX) 17 GM/Dose powder Take 17 g by mouth daily as needed 510 g 1     senna-docusate (SENOKOT-S/PERICOLACE) 8.6-50 MG tablet Take 1 tablet by mouth 2 times daily as needed for  "constipation         Post Medication Reconciliation Status: medication reconcilation previously completed during another office visit         ALLERGIES:  Allergies   Allergen Reactions     Levalbuterol Hydrochloride Shortness Of Breath     Cats Other (See Comments)     Itchy eyes     Dust Mites      Other reaction(s): Wheezing  Wheezing/shortness/breath/see (IRP 6/1)     Qvar [Beclomethasone]      Per patient- allergic to QVAR     Amitriptyline Palpitations     Escitalopram Anxiety       ROS:  10 point ROS were negative other than the symptoms noted above in the HPI.    PHYSICAL EXAM:  Vital signs were reviewed in the chart.  Vital Signs: /80   Pulse 88   Temp 98.2  F (36.8  C)   Resp 18   Ht 1.651 m (5' 5\")   Wt 56.1 kg (123 lb 9.6 oz)   SpO2 99%   BMI 20.57 kg/m    General: Frail appearing but comfortable and in no acute distress  HEENT: No conjunctival pallor, no scleral icterus or injection, moist oral mucosa  Cardiovascular: Normal S1, S2, RRR  Respiratory: Lungs clear to auscultation bilaterally  GI: Abdomen soft, non-tender, non-distended, +BS  Extremities: No LE edema  Neuro: CX II-XII grossly intact; ROM in all four extremities grossly intact  Psych: Alert and oriented x3; normal affect  Skin: No acute rash    LABORATORY/IMAGING DATA:  All relevant labs and imaging data in [facility] and/or Care Everywhere were personally reviewed today.    Hematology profile (November 18, 2022): White count 4.7, hemoglobin 13.5, platelet count 321,000, MCV 96    Chemistry profile (November 18, 2022): Sodium 140, potassium 3.6, BUN 13, creatinine 0.55, glucose 119, calcium 9.6      Most Recent 3 CBC's:Recent Labs   Lab Test 11/12/22  0634 11/09/22  1537 11/09/22  0457   WBC 6.6 10.2 10.5   HGB 11.2* 12.3 11.4*   MCV 94 95 90     209 190 216     Most Recent 3 BMP's:Recent Labs   Lab Test 11/14/22  0849 11/13/22  0821 11/12/22  0809 11/12/22  0634 11/10/22  0932 11/10/22  0432 11/09/22  0756 11/09/22  0457 " 11/08/22  1434 11/08/22  1122   NA  --  143  --   --   --   --   --  137  --  139   POTASSIUM 3.6 3.6  3.6  --  3.5   < >  --    < > 3.4  --  4.2   CHLORIDE  --  112*  --   --   --   --   --  107  --  105   CO2  --  22  --   --   --   --   --  23  --  13*   BUN  --  8  --   --   --   --   --  10  --  12   CR  --  0.46*  --  0.53  --   --   --  0.50*  --  0.55   ANIONGAP  --  9  --   --   --   --   --  7  --  21*   MARIBEL  --  8.8  --   --   --   --   --  8.2*  --  8.9   GLC  --  121* 86  --   --  79   < > 105*   < > 141*    < > = values in this interval not displayed.     Most Recent 2 LFT's:Recent Labs   Lab Test 11/09/22  0457 11/08/22  1122   AST 37 31   ALT 24 29   ALKPHOS 68 81   BILITOTAL 1.2 0.8         ASSESSMENT/PLAN:  Seizure disorder,  Postictal status, resolved.  Patient required intubation for airway protection.  Started on Keppra.  No recurrence of seizure since admission to the ICU.  Plan:  Seizure precautions  Continue Keppra 1000 mg twice daily  Follow-up with neurology as directed    Stress-induced cardiomyopathy, recovered,  Moderate coronary artery calcification, per CT March 2022.  Repeat echocardiogram on November 18, 2022 demonstrated hyperdynamic LV systolic function with EF of 65-70% and indeterminate LV diastolic dysfunction.  Patient currently weighs 125.8 LBS and appears euvolemic.  Plan:  Monitor weight and volume status  Follow-up with cardiology with repeat echo in 1 month as directed    Small pericardial effusion.  Noted on echocardiogram on November 18, 2022.  Not clinically significant.  Plan for  Repeat echo in 1 month as outpatient    PSVT.  Noted inpatient.  Plan:  Cardiac event monitor to be placed on Friday, November 25    E. coli UTI.  Completed 5-day course of antibiotic therapy with cefdinir.  Recheck urine culture on November 19 yielded less than 10,000 colonies per mL mixed urogenital cornell.  Plan:  Monitor for recurrence of UTI    Asthma.  Uncomplicated.  Plan:  Continue  Advair 115-21 mcg, 2 puffs twice daily  Continue albuterol MDI, 2 puffs twice daily as well as PRN  Monitor respiratory status    History of left breast DCIS, s/p left mastectomy in 2001.  Plan:  Follow-up as outpatient    Physical deconditioning.  Plan:  Continue PT/OT evaluation and therapy      INCIDENTAL FINDINGS:  Bilateral pulmonary nodules,  Left ovarian/adnexal cyst, measuring 5.9 cm,   Plan:  Follow-up as outpatient (it was discussed with the patient)          Orders written by provider at facility:  None.    Recommendation by provider at facility:  Follow-up as outpatient for pulmonary nodules and left ovarian cyst          Disclaimer: This note may contain text created using speech-recognition software and may contain unintended word substitutions.      Electronically signed by:  Zora Dominique MD                          Sincerely,        Zora Dominique MD

## 2022-11-23 LAB
CREAT SERPL-MCNC: 0.56 MG/DL (ref 0.52–1.04)
GFR SERPL CREATININE-BSD FRML MDRD: >90 ML/MIN/1.73M2

## 2022-11-23 PROCEDURE — 36415 COLL VENOUS BLD VENIPUNCTURE: CPT | Performed by: NURSE PRACTITIONER

## 2022-11-23 PROCEDURE — 82565 ASSAY OF CREATININE: CPT | Performed by: NURSE PRACTITIONER

## 2022-11-25 ENCOUNTER — OFFICE VISIT (OUTPATIENT)
Dept: CARDIOLOGY | Facility: CLINIC | Age: 77
End: 2022-11-25
Payer: COMMERCIAL

## 2022-11-25 ENCOUNTER — TRANSITIONAL CARE UNIT VISIT (OUTPATIENT)
Dept: GERIATRICS | Facility: CLINIC | Age: 77
End: 2022-11-25
Payer: COMMERCIAL

## 2022-11-25 VITALS
DIASTOLIC BLOOD PRESSURE: 80 MMHG | RESPIRATION RATE: 18 BRPM | BODY MASS INDEX: 20.77 KG/M2 | WEIGHT: 124.8 LBS | SYSTOLIC BLOOD PRESSURE: 128 MMHG | OXYGEN SATURATION: 97 % | HEART RATE: 82 BPM | TEMPERATURE: 98.5 F

## 2022-11-25 VITALS
WEIGHT: 124 LBS | HEART RATE: 100 BPM | BODY MASS INDEX: 20.66 KG/M2 | HEIGHT: 65 IN | SYSTOLIC BLOOD PRESSURE: 126 MMHG | DIASTOLIC BLOOD PRESSURE: 62 MMHG

## 2022-11-25 DIAGNOSIS — I10 ESSENTIAL HYPERTENSION: ICD-10-CM

## 2022-11-25 DIAGNOSIS — R06.09 DYSPNEA ON EXERTION: ICD-10-CM

## 2022-11-25 DIAGNOSIS — N39.0 E. COLI UTI: ICD-10-CM

## 2022-11-25 DIAGNOSIS — J45.909 UNCOMPLICATED ASTHMA, UNSPECIFIED ASTHMA SEVERITY, UNSPECIFIED WHETHER PERSISTENT: ICD-10-CM

## 2022-11-25 DIAGNOSIS — G40.909 SEIZURE DISORDER (H): Primary | ICD-10-CM

## 2022-11-25 DIAGNOSIS — I31.39 PERICARDIAL EFFUSION: ICD-10-CM

## 2022-11-25 DIAGNOSIS — R07.89 ATYPICAL CHEST PAIN: ICD-10-CM

## 2022-11-25 DIAGNOSIS — R91.8 PULMONARY NODULES: ICD-10-CM

## 2022-11-25 DIAGNOSIS — I25.10 CORONARY ARTERY CALCIFICATION: ICD-10-CM

## 2022-11-25 DIAGNOSIS — R56.9 SEIZURE (H): ICD-10-CM

## 2022-11-25 DIAGNOSIS — I47.10 SVT (SUPRAVENTRICULAR TACHYCARDIA) (H): ICD-10-CM

## 2022-11-25 DIAGNOSIS — Z85.3 HISTORY OF BREAST CANCER: ICD-10-CM

## 2022-11-25 DIAGNOSIS — R56.9 POSTICTAL STATE (H): ICD-10-CM

## 2022-11-25 DIAGNOSIS — R53.81 PHYSICAL DECONDITIONING: ICD-10-CM

## 2022-11-25 DIAGNOSIS — N83.202 CYST OF LEFT OVARY: ICD-10-CM

## 2022-11-25 DIAGNOSIS — I51.81 STRESS-INDUCED CARDIOMYOPATHY: Primary | ICD-10-CM

## 2022-11-25 DIAGNOSIS — B96.20 E. COLI UTI: ICD-10-CM

## 2022-11-25 DIAGNOSIS — I47.10 PAROXYSMAL SUPRAVENTRICULAR TACHYCARDIA (H): ICD-10-CM

## 2022-11-25 DIAGNOSIS — I51.81 STRESS-INDUCED CARDIOMYOPATHY: ICD-10-CM

## 2022-11-25 DIAGNOSIS — F13.20 BENZODIAZEPINE DEPENDENCE (H): ICD-10-CM

## 2022-11-25 PROCEDURE — 99215 OFFICE O/P EST HI 40 MIN: CPT | Performed by: NURSE PRACTITIONER

## 2022-11-25 PROCEDURE — 93000 ELECTROCARDIOGRAM COMPLETE: CPT | Performed by: NURSE PRACTITIONER

## 2022-11-25 PROCEDURE — 99309 SBSQ NF CARE MODERATE MDM 30: CPT | Performed by: NURSE PRACTITIONER

## 2022-11-25 RX ORDER — CEFDINIR 300 MG/1
300 CAPSULE ORAL 2 TIMES DAILY
COMMUNITY
End: 2022-11-28

## 2022-11-25 RX ORDER — OSELTAMIVIR PHOSPHATE 75 MG/1
75 CAPSULE ORAL 2 TIMES DAILY
COMMUNITY
Start: 2022-11-22 | End: 2022-11-28

## 2022-11-25 NOTE — LETTER
11/25/2022    Otilia Sánchez MD  [address] [facility] [address]    RE: Morenita Moore       Dear Colleague,     I had the pleasure of seeing Morenita Moore in the [facility].      ~Cardiology Clinic Visit~    Primary Cardiologist: Dr. Wyatt  Last office visit: 02/09/2022  Reason for visit: Hospital follow-up    I had the pleasure of meeting Morenita Moore in Cardiology clinic today.    History of Present Illness    Ms. Moore is a 77 year old woman with PMH significant for history of pericarditis in 2019, atypical chest pain, palpitations, anxiety, asthma, DCIS s/p left mastectomy.  She was recently admitted to [facility] from 11/8-11/2014 for AMS, and witnessed having seizure activity. Her initial ECG initially demonstrated ST elevation in the anterolateral leads with improvement on subsequent ECGs;  Initial troponin was 183, and peaked at 2880.  She was seen by neurology and started on keppra.      The echocardiogram demonstrated mildly reduced LV function with EF 40-45% with apical wall motion abnormality with preservation of basal segments; findings consistent with stress cardiomyopathy.      She has previously reported palpitations and was noted to have just brief runs of SVT just lasting 14 beats, not clinically significant.  She reports a hx of an accessory pathway, but this is not found in any prior cardiology records.  Her last event monitor was from March 2020, which showed predominant only sinus rhythm with 1 run of SVT lasting 6 beats.    Interval 11/25/22    This is a very pleasant, talkative patient to I am meeting for the first time today.  She is quite tangential in her conversation with me, so there is some element of difficulty in obtaining an accurate history, and truly assessing current symptoms.  She has many questions regarding her seizure, how it happened, why she cannot remember the incident, and follow-up testing.      Her only complaint today  is shortness of breath that is brought on with activity when she is working with rehab at [facility].  It lasts for about 10 minutes, this is not the same shortness of breath she has with asthma.  She mentions over the last year, she has had chest heaviness that occurs a few times a week.  It is not brought on by exertion, and is not associated with N/V, lightheadedness, dizziness, diaphoresis, swelling, or syncope.  She says the chest heaviness is gradually improving; it lasts for approximately 30 minutes and resolves on its own.    We reviewed the results of her most recent echocardiogram together.  Unfortunately she has not yet completed the event monitor at the time of our visit today.    Her EKG today in clinic demonstrated sinus rhythm, with T wave changes consistent with prior EKG tracings, and Q waves in V1 and V2 that were also present on most recent image.    Impression & Plan  New Cardiomyopathy EF 40-45%, recovered  Stress Cardiomyopathy  Reported hx of SVT  - Initial echo showed reduced EF 40-45%, and findings c/w stress cardiomyopathy.  - Repeat echocardiogram 11/18/22 showed hyperdynamic EF 65-70%; small pericardial effusion with increased fluid measuring max diameter 1.5 cm without indications of tamponade.  - Last event monitor completed 03/2020: SR with one 6-beat run of SVT.  - EF recovery reassuring of stress cardiomyopathy vs ischemic cause.  - CT in March 2022 with mild coronary artery calcifications.  No hx ischemic workup.  - Pt not on beta-blocker d/t adverse symptoms (vertigo).    Plan: Complete event monitor    Complete Regadenoson stress test    Repeat echocardiogram in ~2-weeks    Follow-up in clinic after testing completed    New onset seizures  -Following with Neurology, Dr. Thompson  -Currently on Keppra    Pulmonary Nodules  - CT C/A/P: There is a new solid nodule measuring 0.4cm anterior right lower lobe abutting the fissure image 171. No effusion.  New RLL nodule.    Plan: Radiology  recommending repeat CT in 6-months; will follow.    H/o DCIS s/p mastectomy   Stable    Thank you for the opportunity to participate in this pleasant patient's care.  Patient will see Dr. Wyatt in follow up in approximately 6-8 weeks after event monitor is completed and results are processed for review.       We would be happy to see this patient sooner if needed for any concerns in the meantime.    JANELLE May, CNP   Nurse Practitioner  [facility]    Today's clinic visit entailed:  Review of prior external note(s) from - Outside records from care everywhere  Review of the result(s) of each unique test - EEG, EKG, echo x2, CT, labs  The following tests were independently interpreted by me as noted in my documentation: EKG  Ordering of each unique test    The level of medical decision making during this visit was of high complexity.    Orders this Visit:  Orders Placed This Encounter   Procedures     Follow-Up with Cardiology     EKG 12-lead complete w/read - Clinics (performed today)     Echocardiogram Complete  NM Lexiscan stress test - Regadenoson     Orders Placed This Encounter   Medications     cefdinir (OMNICEF) 300 MG capsule     Sig: Take 300 mg by mouth 2 times daily     oseltamivir (TAMIFLU) 75 MG capsule     Sig: Take 75 mg by mouth 2 times daily     There are no discontinued medications.  Encounter Diagnoses   Name Primary?     Stress-induced cardiomyopathy Yes     SVT (supraventricular tachycardia) (H)      Essential hypertension      Seizure (H)      Benzodiazepine dependence (H)      Atypical chest pain      Dyspnea on exertion      CURRENT MEDICATIONS:  Current Outpatient Medications   Medication Sig Dispense Refill     acetaminophen (TYLENOL) 325 MG tablet Take 2 tablets (650 mg) by mouth every 4 hours as needed for mild pain       albuterol (PROAIR HFA/PROVENTIL HFA/VENTOLIN HFA) 108 (90 Base) MCG/ACT inhaler Inhale 2 puffs into the lungs every 6 hours as needed  for shortness of breath / dyspnea       calcium carbonate-vitamin D 600-125 MG-UNIT TABS Take 1 tablet by mouth daily       cefdinir (OMNICEF) 300 MG capsule Take 300 mg by mouth 2 times daily       Cyanocobalamin 50 MCG TABS Take 50 mcg by mouth once a week Sundays       fluticasone-salmeterol (ADVAIR HFA) 115-21 MCG/ACT inhaler Inhale 2 puffs into the lungs 2 times daily 12 g 11     levETIRAcetam (KEPPRA) 1000 MG tablet Take 1 tablet (1,000 mg) by mouth 2 times daily       oseltamivir (TAMIFLU) 75 MG capsule Take 75 mg by mouth 2 times daily       polyethylene glycol (MIRALAX) 17 GM/Dose powder Take 17 g by mouth daily as needed 510 g 1     senna-docusate (SENOKOT-S/PERICOLACE) 8.6-50 MG tablet Take 1 tablet by mouth 2 times daily as needed for constipation       ALLERGIES     Allergies   Allergen Reactions     Levalbuterol Hydrochloride Shortness Of Breath     Cats Other (See Comments)     Itchy eyes     Dust Mites      Other reaction(s): Wheezing  Wheezing/shortness/breath/see (IRP 6/1)     Qvar [Beclomethasone]      Per patient- allergic to QVAR     Amitriptyline Palpitations     Escitalopram Anxiety     PAST MEDICAL, SURGICAL, FAMILY, SOCIAL HISTORY:  History was reviewed and updated as needed, see medical record.    Review of Systems:  Review Of Systems  Skin: negative  Eyes: negative  Ears/Nose/Throat: positive for hoarseness  Respiratory: Shortness of breath- with activity, no SOB at rest.  Cardiovascular: positive for occasional chest heaviness, negative for, palpitations, tachycardia, irregular heart beat, chest pain, exertional chest pain or pressure, lower extremity edema and syncope or near-syncope  Gastrointestinal: negative  Genitourinary: negative  Musculoskeletal: negative  Neurologic: positive for seizures and memory problems  Psychiatric: negative  Hematologic/Lymphatic/Immunologic: negative  Endocrine: negative     Physical Exam:    Vitals: /62 (BP Location: Left arm, Cuff Size: Adult  "Regular)   Pulse 100   Ht 1.651 m (5' 5\")   Wt 56.2 kg (124 lb)   BMI 20.63 kg/m    Constitutional: Appears stated age, well nourished, NAD.  Eyes: Pupils equal, round. Sclerae anicteric.   HEENT: Normocephalic, atraumatic.   Neck: Supple. Carotid pulses full and equal. No carotid bruit.  Respiratory: Non-labored. Lungs CTAB.  Cardiovascular: RRR, normal S1 and S2. No M/G/R.  no edema.  GI: Soft, non-distended, non-tender.  Skin: Warm and dry. No rashes, cyanosis, edema.  Musculoskeletal/Extremities: Symmetrical movement to all extremities. .  Neurologic: No gross focal deficits. Alert, awake, and oriented to all spheres.  Psychiatric: Affect appropriate. Mentation normal.    Recent Lab Results:  LIPID RESULTS:  Lab Results   Component Value Date    CHOL 162 03/21/2022    HDL 57 03/21/2022    LDL 82 03/21/2022    TRIG 113 03/21/2022     LIVER ENZYME RESULTS:  Lab Results   Component Value Date    AST 37 11/09/2022    ALT 24 11/09/2022     CBC RESULTS:  Lab Results   Component Value Date    WBC 6.6 11/12/2022    WBC 6.5 12/14/2020    RBC 3.63 (L) 11/12/2022    RBC 4.15 12/14/2020    HGB 11.2 (L) 11/12/2022    HGB 13.1 12/14/2020    HCT 34.1 (L) 11/12/2022    HCT 39.4 12/14/2020    MCV 94 11/12/2022    MCV 95 12/14/2020    MCH 30.9 11/12/2022    MCH 31.6 12/14/2020    MCHC 32.8 11/12/2022    MCHC 33.2 12/14/2020    RDW 13.4 11/12/2022    RDW 13.3 12/14/2020     11/12/2022     11/12/2022     12/14/2020     BMP RESULTS:  Lab Results   Component Value Date     11/13/2022     12/14/2020    POTASSIUM 3.6 11/14/2022    POTASSIUM 4.4 12/14/2020    CHLORIDE 112 (H) 11/13/2022    CHLORIDE 111 (H) 12/14/2020    CO2 22 11/13/2022    CO2 30 12/14/2020    ANIONGAP 9 11/13/2022    ANIONGAP <1 (L) 12/14/2020     (H) 11/13/2022     (H) 12/14/2020    BUN 8 11/13/2022    BUN 12 12/14/2020    CR 0.46 (L) 11/13/2022    CR 0.70 12/14/2020    GFRESTIMATED >90 11/13/2022    GFRESTIMATED >60 " 02/01/2021    GFRESTIMATED 84 12/14/2020    GFRESTBLACK >60 02/01/2021    GFRESTBLACK 98 12/14/2020    MARIBEL 8.8 11/13/2022    MARIBEL 9.7 12/14/2020      A1C RESULTS:  No results found for: A1C  INR RESULTS:  Lab Results   Component Value Date    INR 0.99 05/01/2020     Thank you for allowing me to participate in the care of your patient.      Sincerely,     JANELLE May [facility]  cc:   Analy Wyatt MD  [address] [address]

## 2022-11-25 NOTE — PROGRESS NOTES
[facility] GERIATRICS    Chief Complaint   Patient presents with     RECHECK     HPI:  Morenita Moore is a 77 year old  (1945), who is being seen today for an episodic care visit at: [facility] (TCU) [[id number]].     Per recent TCU provider progress notes:   77 year old female hx SVT hospitalized with loss of consciousness concerning for seizure. She was confused and was incontinent of urine.  Seizure-like activity was witnessed in the ED.  She was given IV ativan, loaded with IV Keppra and intubated for airway protection.  She was admitted to the ICU, was able to be extubated 11/9/22. Head CT with chronic small vessel disease, CTA head/neck negative. EEG did not record clear seizure. Neuro recommends continue Keppra. Stress induced cardiomyopathy, pericardial effusion: stopped blood thinners, repeat ECHO one week with cardiology f/u, 30 day event monitor. E coli UTI on antibiotics. Left ovarian/adnexal cyst, pulmonary nodules found on CT - repeat CT 6 months and consider pelvis US as outpatient. To TCU for rehab.     Today's concern is: patient seen for follow up mobility, overall status, concern over voice changes. Reports she is quite upset her voice has changed since hospitalization, discussed ENT consult for work up. No headaches, dizziness, chest pain, dyspnea, bowel or bladder issues. Walks 150 ft with 2WW and CPT 5.5/5.6. BP range 112-145/74-80 and sats 97% room air.     Allergies, and PMH/PSH reviewed in EPIC today.    REVIEW OF SYSTEMS:  4 point ROS including Respiratory, CV, GI and , other than that noted in the HPI,  is negative    Objective:   /80   Pulse 82   Temp 98.5  F (36.9  C)   Resp 18   Wt 56.6 kg (124 lb 12.8 oz)   SpO2 97%   BMI 20.77 kg/m    GENERAL APPEARANCE:  Alert, in no distress, pleasant, cooperative, oriented x self, place and recent events  EYES:  EOM, lids, pupils and irises normal, sclera clear and conjunctiva normal, no discharge or mattering on lids  or lashes noted  ENT:  Mouth normal, moist mucous membranes, nose normal without drainage or crusting, external ears without lesions, hearing acuity intact  NECK: supple, leans left  RESP:  respiratory effort normal, no chest wall tenderness, no respiratory distress, Lung sounds clear, patient is on room air  CV:  Auscultation of heart done, rate and rhythm controlled and regular, no murmur, no rub or gallop. Edema none bilateral lower extremities  ABDOMEN:  normal bowel sounds, soft, nontender, no palpable masses.  M/S:   Gait and station walks with walker, no tenderness or swelling of the joints; able to move all extremities, digits normal  SKIN:  Inspection and palpation of skin and subcutaneous tissue: skin on extremities warm, dry and intact without rashes  NEURO: cranial nerves 2-12 grossly intact, no facial asymmetry, no speech deficits and able to follow directions, moves all extremities symmetrically  PSYCH:  insight and judgement and memory appear impaired, affect and mood anxious      Most Recent 3 CBC's:Recent Labs   Lab Test 11/12/22  0634 11/09/22  1537 11/09/22  0457   WBC 6.6 10.2 10.5   HGB 11.2* 12.3 11.4*   MCV 94 95 90     209 190 216     Most Recent 3 BMP's:Recent Labs   Lab Test 11/14/22  0849 11/13/22  0821 11/12/22  0809 11/12/22  0634 11/10/22  0932 11/10/22  0432 11/09/22  0756 11/09/22  0457 11/08/22  1434 11/08/22  1122   NA  --  143  --   --   --   --   --  137  --  139   POTASSIUM 3.6 3.6  3.6  --  3.5   < >  --    < > 3.4  --  4.2   CHLORIDE  --  112*  --   --   --   --   --  107  --  105   CO2  --  22  --   --   --   --   --  23  --  13*   BUN  --  8  --   --   --   --   --  10  --  12   CR  --  0.46*  --  0.53  --   --   --  0.50*  --  0.55   ANIONGAP  --  9  --   --   --   --   --  7  --  21*   MARIBEL  --  8.8  --   --   --   --   --  8.2*  --  8.9   GLC  --  121* 86  --   --  79   < > 105*   < > 141*    < > = values in this interval not displayed.        Assessment/Plan:  Seizure disorder  Postictal status, resolved  Acute onset, stable on Keppra 1000 mg BID. Follow-up with neurology as directed    Stress-induced cardiomyopathy, recovered  Moderate coronary artery calcification  Small pericardial effusion  PSVT  Not currently on medication. Cardiac event monitor to be placed today at cardiology f/u. Monitor vs, review ranges next visit.     E. coli UTI  Resolved, no symptoms. Monitor.     Asthma  No symptoms. Continue Advair 115-21 mcg, 2 puffs twice daily and albuterol MDI, 2 puffs twice daily as well as PRN. Monitor respiratory status.     History of left breast DCIS, s/p left mastectomy   Follow-up as outpatient    Physical deconditioning  Continue PT/OT evaluation and therapy    Bilateral pulmonary nodules  Left ovarian/adnexal cyst  Follow-up as outpatient (it was discussed with the patient)    MED REC REQUIRED  Post Medication Reconciliation Status:  Discharge medications reconciled, continue medications without change    Orders:  1. ENT consult due to changes in voice  2. [facility] 11/28 diagnosis weakness    Electronically signed by: JANELLE Tellez CNP

## 2022-11-27 ENCOUNTER — LAB REQUISITION (OUTPATIENT)
Dept: LAB | Facility: CLINIC | Age: 77
End: 2022-11-27

## 2022-11-27 VITALS
RESPIRATION RATE: 18 BRPM | WEIGHT: 124.8 LBS | HEART RATE: 89 BPM | DIASTOLIC BLOOD PRESSURE: 72 MMHG | OXYGEN SATURATION: 97 % | SYSTOLIC BLOOD PRESSURE: 138 MMHG | BODY MASS INDEX: 20.77 KG/M2 | TEMPERATURE: 97.4 F

## 2022-11-27 DIAGNOSIS — N18.9 CHRONIC KIDNEY DISEASE, UNSPECIFIED: ICD-10-CM

## 2022-11-28 ENCOUNTER — MYC MEDICAL ADVICE (OUTPATIENT)
Dept: INTERNAL MEDICINE | Facility: CLINIC | Age: 77
End: 2022-11-28

## 2022-11-28 ENCOUNTER — TELEPHONE (OUTPATIENT)
Dept: CARDIOLOGY | Facility: CLINIC | Age: 77
End: 2022-11-28

## 2022-11-28 ENCOUNTER — DISCHARGE SUMMARY NURSING HOME (OUTPATIENT)
Dept: GERIATRICS | Facility: CLINIC | Age: 77
End: 2022-11-28
Payer: COMMERCIAL

## 2022-11-28 DIAGNOSIS — Z20.828 EXPOSURE TO INFLUENZA: Primary | ICD-10-CM

## 2022-11-28 DIAGNOSIS — I47.10 PAROXYSMAL SUPRAVENTRICULAR TACHYCARDIA (H): ICD-10-CM

## 2022-11-28 DIAGNOSIS — I25.10 CORONARY ARTERY CALCIFICATION: ICD-10-CM

## 2022-11-28 DIAGNOSIS — J45.909 UNCOMPLICATED ASTHMA, UNSPECIFIED ASTHMA SEVERITY, UNSPECIFIED WHETHER PERSISTENT: ICD-10-CM

## 2022-11-28 DIAGNOSIS — N83.202 CYST OF LEFT OVARY: ICD-10-CM

## 2022-11-28 DIAGNOSIS — I51.81 STRESS-INDUCED CARDIOMYOPATHY: ICD-10-CM

## 2022-11-28 DIAGNOSIS — R53.81 PHYSICAL DECONDITIONING: ICD-10-CM

## 2022-11-28 DIAGNOSIS — G40.909 SEIZURE DISORDER (H): Primary | ICD-10-CM

## 2022-11-28 DIAGNOSIS — R91.8 PULMONARY NODULES: ICD-10-CM

## 2022-11-28 DIAGNOSIS — N39.0 E. COLI UTI: ICD-10-CM

## 2022-11-28 DIAGNOSIS — R56.9 SEIZURE (H): ICD-10-CM

## 2022-11-28 DIAGNOSIS — R56.9 POSTICTAL STATE (H): ICD-10-CM

## 2022-11-28 DIAGNOSIS — B96.20 E. COLI UTI: ICD-10-CM

## 2022-11-28 DIAGNOSIS — I31.39 PERICARDIAL EFFUSION: ICD-10-CM

## 2022-11-28 DIAGNOSIS — Z85.3 HISTORY OF BREAST CANCER: ICD-10-CM

## 2022-11-28 LAB
ANION GAP SERPL CALCULATED.3IONS-SCNC: 6 MMOL/L (ref 3–14)
BUN SERPL-MCNC: 13 MG/DL (ref 7–30)
CALCIUM SERPL-MCNC: 9.3 MG/DL (ref 8.5–10.1)
CHLORIDE BLD-SCNC: 108 MMOL/L (ref 94–109)
CO2 SERPL-SCNC: 27 MMOL/L (ref 20–32)
CREAT SERPL-MCNC: 0.55 MG/DL (ref 0.52–1.04)
GFR SERPL CREATININE-BSD FRML MDRD: >90 ML/MIN/1.73M2
GLUCOSE BLD-MCNC: 88 MG/DL (ref 70–99)
POTASSIUM BLD-SCNC: 3.9 MMOL/L (ref 3.4–5.3)
SODIUM SERPL-SCNC: 141 MMOL/L (ref 133–144)

## 2022-11-28 PROCEDURE — 36415 COLL VENOUS BLD VENIPUNCTURE: CPT | Performed by: NURSE PRACTITIONER

## 2022-11-28 PROCEDURE — 99316 NF DSCHRG MGMT 30 MIN+: CPT | Performed by: NURSE PRACTITIONER

## 2022-11-28 PROCEDURE — P9604 ONE-WAY ALLOW PRORATED TRIP: HCPCS | Performed by: NURSE PRACTITIONER

## 2022-11-28 PROCEDURE — 80048 BASIC METABOLIC PNL TOTAL CA: CPT | Performed by: NURSE PRACTITIONER

## 2022-11-28 RX ORDER — OSELTAMIVIR PHOSPHATE 75 MG/1
75 CAPSULE ORAL DAILY
Qty: 5 CAPSULE | Refills: 0 | Status: SHIPPED | OUTPATIENT
Start: 2022-12-01 | End: 2022-12-06

## 2022-11-28 RX ORDER — LEVETIRACETAM 1000 MG/1
1000 TABLET ORAL 2 TIMES DAILY
Qty: 60 TABLET | Refills: 0 | Status: SHIPPED | OUTPATIENT
Start: 2022-11-28 | End: 2022-12-15

## 2022-11-28 NOTE — PROGRESS NOTES
[facility] GERIATRICS DISCHARGE SUMMARY  PATIENT'S NAME: Morenita Moore  YOB: 1945  MEDICAL RECORD NUMBER:  [id number]  Place of Service where encounter took place:  [facility] ([facility]) [[id number]]    PRIMARY CARE PROVIDER AND CLINIC RESPONSIBLE AFTER TRANSFER:   [facility]til[facility] S[facility]chez [facility] [address] [facility] [address]    [facility]     Transferring providers: JANELLE Tellez CNP, Dr. Catina MD  Recent Hospitalization/ED:  [facility] stay 11/8/22 to 11/14/22.  Date of SNF Admission: 11/14/22  Date of SNF (anticipated) Discharge: 11/30/22  Discharged to: previous independent home  Cognitive Scores: CPT 5.5/5.6  Physical Function: Ambulating 1000 ft with 2ww  DME: No new DME needed    CODE STATUS/ADVANCE DIRECTIVES DISCUSSION:  Full Code   ALLERGIES: Levalbuterol hydrochloride, Cats, Dust mites, Qvar [beclomethasone], Amitriptyline, and Escitalopram    NURSING FACILITY COURSE   Medication Changes/Rationale:     None    Per recent TCU provider progress notes:   77 year old female hx SVT hospitalized with loss of consciousness concerning for seizure. She was confused and was incontinent of urine.  Seizure-like activity was witnessed in the ED.  She was given IV ativan, loaded with IV Keppra and intubated for airway protection.  She was admitted to the ICU, was able to be extubated 11/9/22. Head CT with chronic small vessel disease, CTA head/neck negative. EEG did not record clear seizure. Neuro recommends continue Keppra. Stress induced cardiomyopathy, pericardial effusion: stopped blood thinners, repeat ECHO one week with cardiology f/u, 30 day event monitor. E coli UTI on antibiotics. Left ovarian/adnexal cyst, pulmonary nodules found on CT - repeat CT 6 months and consider pelvis US as outpatient. To TCU for rehab.     Seen for discharge visit. No new concerns. BP range 119-138/66-78 and sats 96% room air. Home with home care as  ordered below.     Summary of nursing facility stay:   Seizure disorder  Postictal status, resolved  Acute onset, stable on Keppra 1000 mg BID. Follow-up with neurology as directed    Stress-induced cardiomyopathy, recovered  Moderate coronary artery calcification  Small pericardial effusion  PSVT  Not currently on medication. Cardiac event monitor placed today at cardiology f/u. Monitor vs, review ranges next PCP visit.     E. coli UTI  Resolved, no symptoms. Monitor.     Asthma  No symptoms. Continue Advair 115-21 mcg, 2 puffs twice daily and albuterol MDI, 2 puffs twice daily as well as PRN. Monitor respiratory status.     History of left breast DCIS, s/p left mastectomy   Follow-up as outpatient    Physical deconditioning  Improved, home with home care    Bilateral pulmonary nodules  Left ovarian/adnexal cyst  Follow-up as outpatient (it was discussed with the patient)    Changes in voice  Consult ENT as outpatient    Discharge Medications:  MED REC REQUIRED  Post Medication Reconciliation Status:  Discharge medications reconciled, continue medications without change    Current Outpatient Medications   Medication Sig Dispense Refill     acetaminophen (TYLENOL) 325 MG tablet Take 2 tablets (650 mg) by mouth every 4 hours as needed for mild pain       albuterol (PROAIR HFA/PROVENTIL HFA/VENTOLIN HFA) 108 (90 Base) MCG/ACT inhaler Inhale 2 puffs into the lungs every 6 hours as needed for shortness of breath / dyspnea       calcium carbonate-vitamin D 600-125 MG-UNIT TABS Take 1 tablet by mouth daily       Cyanocobalamin 50 MCG TABS Take 50 mcg by mouth once a week Sundays       fluticasone-salmeterol (ADVAIR HFA) 115-21 MCG/ACT inhaler Inhale 2 puffs into the lungs 2 times daily 12 g 11     polyethylene glycol (MIRALAX) 17 GM/Dose powder Take 17 g by mouth daily as needed 510 g 1     senna-docusate (SENOKOT-S/PERICOLACE) 8.6-50 MG tablet Take 1 tablet by mouth 2 times daily as needed for constipation        levETIRAcetam (KEPPRA) 1000 MG tablet Take 1 tablet (1,000 mg) by mouth 2 times daily 60 tablet 0      Controlled medications:   none     Past Medical History:   Past Medical History:   Diagnosis Date     Anxiety      Asthma 2012    adult-onset asthma diagnosed in 2012     Cancer (H)      DCIS (ductal carcinoma in situ) 2001    stage 0 status post left mastectomy in 2001     Depressive disorder      PONV (postoperative nausea and vomiting)      Torticollis      Tremor      Physical Exam:   Vitals: /72   Pulse 89   Temp 97.4  F (36.3  C)   Resp 18   Wt 56.6 kg (124 lb 12.8 oz)   SpO2 97%   BMI 20.77 kg/m    BMI: Body mass index is 20.77 kg/m .  GENERAL APPEARANCE:  Alert, in no distress, pleasant, cooperative, oriented x self, place and recent events  EYES:  EOM, lids, pupils and irises normal, sclera clear and conjunctiva normal, no discharge or mattering on lids or lashes noted  ENT:  Mouth normal, moist mucous membranes, nose normal without drainage or crusting, external ears without lesions, hearing acuity intact  NECK: supple, leans left  RESP:  respiratory effort normal, no chest wall tenderness, no respiratory distress, Lung sounds clear, patient is on room air  CV:  Auscultation of heart done, rate and rhythm controlled and regular, no murmur, no rub or gallop. Edema none bilateral lower extremities  ABDOMEN:  normal bowel sounds, soft, nontender, no palpable masses.  M/S:   Gait and station walks with walker, no tenderness or swelling of the joints; able to move all extremities, digits normal  SKIN:  Inspection and palpation of skin and subcutaneous tissue: skin on extremities warm, dry and intact without rashes  NEURO: cranial nerves 2-12 grossly intact, no facial asymmetry, no speech deficits and able to follow directions, moves all extremities symmetrically  PSYCH:  insight and judgement and memory appear impaired, affect and mood normal    SNF labs:   Most Recent 3 CBC's:Recent Labs   Lab  Test 11/12/22  0634 11/09/22  1537 11/09/22  0457   WBC 6.6 10.2 10.5   HGB 11.2* 12.3 11.4*   MCV 94 95 90     209 190 216     Most Recent 3 BMP's:Recent Labs   Lab Test 11/14/22  0849 11/13/22  0821 11/12/22  0809 11/12/22  0634 11/10/22  0932 11/10/22  0432 11/09/22  0756 11/09/22  0457 11/08/22  1434 11/08/22  1122   NA  --  143  --   --   --   --   --  137  --  139   POTASSIUM 3.6 3.6  3.6  --  3.5   < >  --    < > 3.4  --  4.2   CHLORIDE  --  112*  --   --   --   --   --  107  --  105   CO2  --  22  --   --   --   --   --  23  --  13*   BUN  --  8  --   --   --   --   --  10  --  12   CR  --  0.46*  --  0.53  --   --   --  0.50*  --  0.55   ANIONGAP  --  9  --   --   --   --   --  7  --  21*   MARIBEL  --  8.8  --   --   --   --   --  8.2*  --  8.9   GLC  --  121* 86  --   --  79   < > 105*   < > 141*    < > = values in this interval not displayed.       DISCHARGE PLAN:    Follow up labs: No labs orders/due    Medical Follow Up:      Follow up with primary care provider in 2-3 weeks  Follow up with specialist neurology, cardiology as recommended. ENT per patient request     [facility] scheduled appointments:     Future Appointments   Date Time Provider Department Center   12/8/2022 11:00 AM [id number] SHCVCV CVIMG   12/8/2022  1:00 PM SCINMINJ SHCVNM CVIMG   12/8/2022  2:15 PM SHCVNMTM SHCVNM CVIMG   12/9/2022  8:30 AM Alexis De Leon PA-C CSFPIM CS   12/13/2022 11:15 AM Analy Wyatt MD [facility]          Discharge Services: Home Care:  Occupational Therapy, Physical Therapy, Registered Nurse, Home Health Aide and From:  [facility]    Discharge Instructions Verbalized to Patient at Discharge:     None    TOTAL DISCHARGE TIME:   Greater than 30 minutes  Electronically signed by:  JANELLE Tellez CNP     Documentation of Face to Face and Certification for Home Health Services    I certify that services are/were furnished while this patient was under the care  of a physician and that a physician or an allowed non-physician practitioner (NPP), had a face-to-face encounter that meets the physician face-to-face encounter requirements. The encounter was in whole, or in part, related to the primary reason for home health. The patient is confined to his/her home and needs intermittent skilled nursing, physical therapy, speech-language pathology, or the continued need for occupational therapy. A plan of care has been established by a physician and is periodically reviewed by a physician.  Date of Face-to-Face Encounter: 11/28/2022.    I certify that, based on my findings, the following services are medically necessary home health services: Nursing, Occupational Therapy, Physical Therapy and HHA.    My clinical findings support the need for the above skilled services because: Requires assistance of another person or specialized equipment to access medical services because patient: Is unable to exit home safely on own due to: weakness..    Patient to re-establish plan of care with their PCP within 7-10 days after leaving the facility to reestablish care.  Medicare certified PECOS provider: JANELLE Tellez CNP  Date: 11/28/22

## 2022-11-28 NOTE — TELEPHONE ENCOUNTER
Patient is calling to ask if Dr Sánchez can help her figure out who she can ask if they can lower her keppra dose because it is affecting her voice and making her feel weak. She has been calling all around to try and reach the prescribing doctor but has not been able to reach anyone.  She is not going to switch PCP's at this time because she has too much going on with her medically right now to get a new doctor involved.  She will be released from the TCU on 11/30/22 and going back to her own home.

## 2022-11-29 NOTE — TELEPHONE ENCOUNTER
Pt was just discharged today from TCU , please check if she had mentioned these symptoms to her NP . Keppra was just refilled today by Keyonna Phoenix APRN CNP .  May need keppra level.   Please advise pt to call  her neurologist for Keppra dosage-  Hong Hansen MD  Needs evaluation  UC /ER visit if symptoms persists.

## 2022-11-29 NOTE — TELEPHONE ENCOUNTER
Keppra was just refilled today by Keyonna Phoenix APRN CNP . I am not sure if she is with neurology.  Patient should contact her neurologist for Eddie delgado-    Hong Hansen MD  Neurology

## 2022-12-01 NOTE — DISCHARGE SUMMARY
[facility]    Discharge Summary  Hospitalist    Date of Admission:  11/8/2022  Date of Discharge:  11/14/2022  3:14 PM  Provider:  Bianca Nichols DO    Discharge Diagnoses   1.  Loss of consciousness - concerning for SEIZURE  2.  Stress induced cardiomyopathy, new diagnosis  3.  Pericardial effusion  4.  SVT, non-sustained  5.  E. Coli UTI  6.  Pulmonary nodules on CT imaging  7.  Left ovarian adnexal cyst    Other medical issues:  Past Medical History:   Diagnosis Date     Anxiety      Asthma 2012    adult-onset asthma diagnosed in 2012     Cancer (H)      DCIS (ductal carcinoma in situ) 2001    stage 0 status post left mastectomy in 2001     Depressive disorder      PONV (postoperative nausea and vomiting)      Torticollis      Tremor      H/o tobacco use    History of Present Illness   Morenita Moore is an 77 year old female who presented to the ED with episode of loss of consciousness concerning for seizure.  Please see the admission history and physical for full details.    Hospital Course   Morenita Moore was admitted on 11/8/2022.  The following problems were addressed during her hospitalization:    1.  Loss of consciousness episode concerning for SEIZURE    Ms. Moore is a 76 yo female who presents to the ED after being found down in the hallway of her building.  She had confusion (?post-ictal state) and was incontinent of urine.  Seizure-like activity was witnessed in the ED.  She was given IV ativan, loaded with IV keppra and intubated for airway protection.  She was admitted to the ICU, was able to be extubated 11/9/22 and transferred out of the ICU.    Head CT on admission with evidence of chronic small vessel disease.  CTA of head/neck withut evidence of large vessel occlusion, high-grade stenosis or aneurysim.  MRI also without evidence of acute intracranial process.     She was placed on continuous EEG monitoring with no clear seizure recorded.     Neuro was  consulted and continued to follow her throughout her hospital stay.  It is recommended that she continue on bid keppra until clinic f/u with her primary neurologist in 6-8 wks.  No driving until that f/up appointment.    2.  Stress-induced Cardiomyopathy        Pericardial effusion    Echo obtained revealed a reduced EF of 40-45% with apical wall  Motion abnormality. ECG with t wave inversions in the lateral leads. Cardiology was consulted and felt that these findings were consistent with stress cardiomyopathy felt d/t seizure activity.    There also was evidence of pericardial fluid on repeat ECHO 11/10 without tamponade.  Blood thinners were discontinued.  Repeat ECHO ordered by cardiology in one week with close outpt clinic f/up.  No other cardiac medications recommended by cardiology prior to her hospital discharge and will be re-addressed after repeat ECHO in one week.     She also will have a 30 day cardiac event monitor placed that was ordered at discharge.      3.  H/o SVT    She has a PMH of SVT and was observed to have one 14 beat run of SVT on tele during her hospital stay.  Cardiology did not feel clinically significant.  30 day event monitor placed, as above, at time of discharge and close outpt cardiology f/up ordered.    4.  E coli UTI    She was found to have abnormal UA and was started on po cefdinir with clinical improvement.  UC revealed pan-sensitive E. Coli.  She will continue with 5 days total of abx for UTI    5.  Left ovarian/adnexal cyst       Pulmonary nodules    Ms.. Moore was noted to have left ovarina cyst that had enlarged slightly from previously CT imaging on 3/2022 (5.9cm from 4cm previous).  She will need to discuss pelvis US and additional f/up with PCP at hospital f/up appointment within 2 wks    She was also noted to have several pulmonary nodules on CT imaging.  There are a few that are stable since 3/17/22 imaging but also was a new solid nodule in the RLL.  Radiology  recommends repeat CT scan in 6 months.  This will need to be ordered by and discussed with PCP in hospital f/up.  She is noted to have a past tobacco use history.     Significant Results and Procedures   Continuous EEG monitoring    Pending Results   Unresulted Labs Ordered in the Past 30 Days of this Admission     No orders found from 10/9/2022 to 11/9/2022.        Code Status   Full Code       Primary Care Physician   Otilia Sánchez    Physical Exam   Temp: 98  F (36.7  C) Temp src: Oral BP: 125/68 Pulse: 84   Resp: 18 SpO2: 95 % O2 Device: None (Room air)    Vitals:    11/10/22 0400 11/11/22 0600 11/12/22 0642   Weight: 58.7 kg (129 lb 6.6 oz) 65.5 kg (144 lb 6.4 oz) 57.3 kg (126 lb 5.2 oz)     Vital Signs with Ranges  Temp:  [97.8  F (36.6  C)-98  F (36.7  C)] 98  F (36.7  C)  Pulse:  [80-84] 84  Resp:  [16-18] 18  BP: (125-138)/(68-73) 125/68  SpO2:  [95 %-97 %] 95 %  I/O last 3 completed shifts:  In: 480 [P.O.:480]  Out: -       PT SEEN AND EXAMINED ON DAY OF D/C  GEN:  Alert, oriented x 3, comfortable, NAD at rest  HEENT:  Normocephalic/atraumatic, PERRL, no scleral icterus, no nasal discharge, mouth and membranes appear fairly moist  NECK:  No clear thyromegaly of clear JVD  CV:  Regular rate and rhythm, no loud murmur to ausc.  S1 + S2 noted, no S3 or S4.  LUNGS:  Clear to auscultation ant/lat bilaterally.  No rales/rhonchi/wheezing auscultated bilaterally.  No costal retractions bilaterally.  Symmetric chest rise on inhalation noted.  ABD:  Active bowel sounds, soft, non-tender, not really distended.  No rebound/guarding/rigidity.  No masses palpated.  No obvious HSM to exam.  EXT:  No significant pretibial edema or cyanosis bilaterally. No joint synovitis noted.  No calf-tenderness or asymmetry noted.  SKIN:  Dry to touch, no rashes or jaundice noted.  PSYCH:  Mood appropriate, Not tearful or depressed.  Maintains direct eye contact.  Cooperative and not agitated  NEURO:  Slight intermittent  tremor involving head movement noted, speech is clear but somewhat tangential at times.  Torticollis noted.  CN 2-12 appear to be grossly intact to testing bilaterally    Discharge Disposition   Discharged to rehabilitation facility    Consultations This Hospital Stay   NEUROLOGY CRITICAL CARE ADULT IP CONSULT  CARE MANAGEMENT / SOCIAL WORK IP CONSULT  NEUROLOGY IP CONSULT  OCCUPATIONAL THERAPY ADULT IP CONSULT  PHYSICAL THERAPY ADULT IP CONSULT  CARDIOLOGY IP CONSULT  PHYSICAL THERAPY ADULT IP CONSULT  OCCUPATIONAL THERAPY ADULT IP CONSULT  CARE MANAGEMENT / SOCIAL WORK IP CONSULT  CARDIOLOGY IP CONSULT  CARE MANAGEMENT / SOCIAL WORK IP CONSULT  PHYSICAL THERAPY ADULT IP CONSULT  OCCUPATIONAL THERAPY ADULT IP CONSULT  PHYSICAL THERAPY ADULT IP CONSULT  OCCUPATIONAL THERAPY ADULT IP CONSULT    Time Spent on this Encounter   IBianca DO, personally saw the patient today and spent greater than 30 minutes discharging this patient.    Discharge Orders      CT CHEST W/O CONTRAST     US Pelvic Complete with Transvaginal     Follow-Up with Cardiology HILARY      Primary Care - Care Coordination Referral      General info for SNF    Length of Stay Estimate: Short Term Care: Estimated # of Days <30  Condition at Discharge: Improving  Level of care:skilled   Rehabilitation Potential: Good  Admission H&P remains valid and up-to-date: Yes  Recent Chemotherapy: N/A  Use Nursing Home Standing Orders: Yes     Mantoux instructions    Give two-step Mantoux (PPD) Per Facility Policy Yes     Follow Up and recommended labs and tests    F/up with PCP within 2 wks for hospital f/up after TCU discharge (needs to discuss f/up of left adnexal cyst and pulmonary nodules)  F/up with Neurology ( Dr. Thompson) in 6-8 wks for hospital f/up of seizure  F/up with cardiology in one month with preclinical ECHO     Reason for your hospital stay    You were admitted for an unresponsive episode/seizure     Activity - Up ad vu      Additional Discharge Instructions    NO DRIVING UNTIL F/UP with NEUROLOGY     Full Code     Physical Therapy Adult Consult    Evaluate and treat as clinically indicated.    Reason:  seizure, syncope, weakness     Occupational Therapy Adult Consult    Evaluate and treat as clinically indicated.    Reason:  seizure, syncope, weakness     Echocardiogram Complete    Administration of IV contrast will be tailored to this examination per the appropriate written protocol listed in the Echocardiography department Protocol Book, or by the supervising Cardiologist. This may result in an order change.    Use of contrast is at the discretion of the supervising Cardiologist.     Echocardiogram Complete    Administration of IV contrast will be tailored to this examination per the appropriate written protocol listed in the Echocardiography department Protocol Book, or by the supervising Cardiologist. This may result in an order change.    Use of contrast is at the discretion of the supervising Cardiologist.     Fall precautions     Diet    Follow this diet upon discharge: regular diet     Discharge Medications   Discharge Medication List as of 11/14/2022  2:55 PM      START taking these medications    Details   acetaminophen (TYLENOL) 325 MG tablet Take 2 tablets (650 mg) by mouth every 4 hours as needed for mild pain, Transitional      cefdinir (OMNICEF) 300 MG capsule Take 1 capsule (300 mg) by mouth every 12 hours for 5 doses, Disp-5 capsule, R-0, Transitional      levETIRAcetam (KEPPRA) 1000 MG tablet Take 1 tablet (1,000 mg) by mouth 2 times daily, Transitional      polyethylene glycol (MIRALAX) 17 GM/Dose powder Take 17 g by mouth daily as needed, Disp-510 g, R-1, Transitional      senna-docusate (SENOKOT-S/PERICOLACE) 8.6-50 MG tablet Take 1 tablet by mouth 2 times daily as needed for constipation, Transitional         CONTINUE these medications which have CHANGED    Details   albuterol (PROAIR HFA/PROVENTIL  HFA/VENTOLIN HFA) 108 (90 Base) MCG/ACT inhaler Inhale 2 puffs into the lungs every 6 hours as needed for shortness of breath / dyspnea, TransitionalPharmacy may dispense brand covered by insurance (Proair, or proventil or ventolin or generic albuterol inhaler)         CONTINUE these medications which have NOT CHANGED    Details   calcium carbonate-vitamin D 600-125 MG-UNIT TABS Take 1 tablet by mouth daily, Historical      Cyanocobalamin 50 MCG TABS Take 50 mcg by mouth once a week Sundays, Historical      fluticasone-salmeterol (ADVAIR HFA) 115-21 MCG/ACT inhaler Inhale 2 puffs into the lungs 2 times daily, Disp-12 g, R-11, E-Prescribe           Allergies   Allergies   Allergen Reactions     Levalbuterol Hydrochloride Shortness Of Breath     Cats Other (See Comments)     Itchy eyes     Dust Mites      Other reaction(s): Wheezing  Wheezing/shortness/breath/see (IRP 6/1)     Qvar [Beclomethasone]      Per patient- allergic to QVAR     Amitriptyline Palpitations     Escitalopram Anxiety     Data   Recent Labs   Lab 11/12/22  0634 11/09/22  1537 11/09/22  0457   WBC 6.6 10.2 10.5   HGB 11.2* 12.3 11.4*   HCT 34.1* 36.5 32.8*   MCV 94 95 90     209 190 216     Recent Labs   Lab 11/14/22  0849 11/13/22  0821 11/12/22  0809 11/12/22  0634 11/10/22  0932 11/10/22  0432 11/09/22  0756 11/09/22  0457 11/08/22  1434 11/08/22  1122   NA  --  143  --   --   --   --   --  137  --  139   POTASSIUM 3.6 3.6  3.6  --  3.5   < >  --    < > 3.4  --  4.2   CHLORIDE  --  112*  --   --   --   --   --  107  --  105   CO2  --  22  --   --   --   --   --  23  --  13*   ANIONGAP  --  9  --   --   --   --   --  7  --  21*   GLC  --  121* 86  --   --  79   < > 105*   < > 141*   BUN  --  8  --   --   --   --   --  10  --  12   CR  --  0.46*  --  0.53  --   --   --  0.50*  --  0.55   GFRESTIMATED  --  >90  --  >90  --   --   --  >90  --  >90   MARIBEL  --  8.8  --   --   --   --   --  8.2*  --  8.9    < > = values in this interval not  Include Location In Plan?: No displayed.     7-Day Micro Results     Collected Updated Procedure Result Status      11/12/2022 0045 11/13/2022 2041 Urine Culture [[id number]]    (Abnormal)   Urine, Clean Catch    Final result Component Value   Culture >100,000 CFU/mL Escherichia coli        Susceptibility      Escherichia coli      IVANIA      Ampicillin 8 ug/mL Susceptible      Ampicillin/ Sulbactam 4 ug/mL Susceptible      Cefazolin <=4 ug/mL Susceptible  [1]       Cefepime <=1 ug/mL Susceptible      Cefoxitin <=4 ug/mL Susceptible      Ceftazidime <=1 ug/mL Susceptible      Ceftriaxone <=1 ug/mL Susceptible      Ciprofloxacin <=0.25 ug/mL Susceptible      Gentamicin <=1 ug/mL Susceptible      Levofloxacin <=0.12 ug/mL Susceptible      Nitrofurantoin <=16 ug/mL Susceptible      Piperacillin/Tazobactam <=4 ug/mL Susceptible      Tobramycin <=1 ug/mL Susceptible      Trimethoprim/Sulfamethoxazole <=1/19 ug/mL Susceptible                   [1]  Cefazolin IVANIA breakpoints are for the treatment of uncomplicated urinary tract infections. For the treatment of systemic infections, please contact the laboratory for additional testing.                 11/08/2022 1241 11/08/2022 1323 Asymptomatic COVID-19 Virus (Coronavirus) by PCR Nasopharyngeal [[id number]]    Swab from Nasopharyngeal    Final result Component Value   SARS CoV2 PCR Negative   NEGATIVE: SARS-CoV-2 (COVID-19) RNA not detected, presumed negative.                Recent Labs   Lab 11/14/22  0849 11/13/22  0821 11/12/22  0809 11/12/22  0634 11/10/22  0932 11/10/22  0432 11/09/22  0756 11/09/22  0457 11/08/22  1434 11/08/22  1122   NA  --  143  --   --   --   --   --  137  --  139   POTASSIUM 3.6 3.6  3.6  --  3.5   < >  --    < > 3.4  --  4.2   CHLORIDE  --  112*  --   --   --   --   --  107  --  105   CO2  --  22  --   --   --   --   --  23  --  13*   ANIONGAP  --  9  --   --   --   --   --  7  --  21*   GLC  --  121* 86  --   --  79   < > 105*   < > 141*   BUN  --  8  --   --   --   --    --  10  --  12   CR  --  0.46*  --  0.53  --   --   --  0.50*  --  0.55   GFRESTIMATED  --  >90  --  >90  --   --   --  >90  --  >90   MARIBEL  --  8.8  --   --   --   --   --  8.2*  --  8.9   MAG 2.3 2.2  --  2.2   < >  --    < > 2.2  --  2.4*   PHOS 3.5 3.7  --  3.6   < >  --    < > 3.0  --   --    PROTTOTAL  --   --   --   --   --   --   --  5.7*  --  7.3   ALBUMIN  --   --   --   --   --   --   --  3.0*  --  3.7   BILITOTAL  --   --   --   --   --   --   --  1.2  --  0.8   ALKPHOS  --   --   --   --   --   --   --  68  --  81   AST  --   --   --   --   --   --   --  37  --  31   ALT  --   --   --   --   --   --   --  24  --  29    < > = values in this interval not displayed.     Recent Labs   Lab 11/09/22  0457 11/08/22  2259 11/08/22  1941   LACT 0.9 1.3 2.0     Recent Labs   Lab 11/12/22  0045   COLOR Straw   APPEARANCE Slightly Cloudy*   URINEGLC Negative   URINEBILI Negative   URINEKETONE Negative   SG 1.009   UBLD Negative   URINEPH 7.0   PROTEIN Negative   NITRITE Positive*   LEUKEST Large*   RBCU 3*   WBCU 117*     Results for orders placed or performed during the hospital encounter of 11/08/22   XR Chest Port 1 View    Narrative    CHEST PORTABLE 1 VIEW   11/8/2022 11:29 AM     HISTORY: Post intubation.    COMPARISON: None.      Impression    IMPRESSION: ET tube is 6.4 cm proximal to the stephanie. Nasogastric tube  in place. No focal airspace disease. Normal cardiac silhouette. No  effusions identified.    LISSETT GIRON MD         SYSTEM ID:  [id number]   XR Pelvis Port 1/2 Views    Narrative    XR PELVIS PORT 1/2 VIEWS   11/8/2022 11:29 AM     HISTORY: Found down  COMPARISON: None.       Impression    IMPRESSION: There is diffuse osseous demineralization which decreases  the radiographic sensitivity for the detection of osseous pathology.  Within this limitation, no acute fracture is seen. Hip joint spaces  are preserved. There are degenerative changes in lower lumbar spine.    JAC NASCIMENTO MD         SYSTEM  Detail Level: Zone ID:  [id number]   CTA Head Neck with Contrast    Narrative    CT ANGIOGRAM OF THE HEAD AND NECK WITH CONTRAST  11/8/2022 12:35 PM     HISTORY: Seizure.     TECHNIQUE:  CT angiography with an injection of a total of 75 mL  Isovue-370 was used for both CAP and CTA head and neck IV with scans  through the head and neck. Images were transferred to a separate 3-D  workstation where multiplanar reformations and 3-D images were  created. Estimates of carotid stenoses are made relative to the distal  internal carotid artery diameters except as noted. Radiation dose for  this scan was reduced using automated exposure control, adjustment of  the mA and/or kV according to patient size, or iterative  reconstruction technique.    COMPARISON: None.     CT ANGIOGRAM HEAD FINDINGS:    No evidence of large vessel occlusion, high-grade stenosis, aneurysm,  or vascular malformation. The distal vertebral basilar system is small  in the setting of fetal origins of the bilateral posterior cerebral  arteries with presumed hypoplastic right posterior cerebral artery P1  segment.    CT ANGIOGRAM NECK FINDINGS:   No evidence of large vessel occlusion, high-grade stenosis, or  dissection.     INCIDENTAL FINDINGS: Endotracheal and enteric tubes are present with  secretions pooling within the pharynx. Cervical spine degenerative  changes with multiple stenoses. Scattered nonspecific pulmonary  opacities/nodules including groundglass nodules within the left upper  lung. Aspirated subglottic tracheal secretions are present. Refer to  chest report.      Impression    IMPRESSION:   CTA Head: No evidence of large vessel occlusion or high-grade  stenosis.   CTA Neck: No evidence of large vessel occlusion or high grade  stenosis.     MELISA PAINTER MD         SYSTEM ID:  [id number]   CT Chest/Abdomen/Pelvis w Contrast    Narrative    CT CHEST/ABDOMEN/PELVIS WITH CONTRAST November 8, 2022 12:34 PM    CLINICAL HISTORY: Found down, seizure,  intubated.    TECHNIQUE: CT scan of the chest, abdomen, and pelvis was performed  following injection of IV contrast. Multiplanar reformats were  obtained. Dose reduction techniques were used.   CONTRAST: a total of 75 mL Isovue-370 was used for both CAP and CTA  head and neck.    COMPARISON: CT chest 3/17/2022, CT chest, abdomen and pelvis 3/6/2020.    FINDINGS:   LUNGS AND PLEURA: Right basilar atelectasis. No acute airspace disease  identified. Stable cavitary nodule superior right lower lobe measuring  0.3 cm series 4 image 136. Stable left upper lobe ground-glass nodule  measuring 0.4 cm image 64. Stable ground-glass nodule left upper lobe  measuring 0.5 cm image 72. There is a new solid nodule measuring 0.4  cm anterior right lower lobe abutting the fissure image 171. No  effusion.    MEDIASTINUM/AXILLAE: ET tube identified, its tip above the stephanie.  Nasogastric tube tip within the proximal stomach. No enlarged lymph  nodes. No acute mediastinal abnormality is seen.    CORONARY ARTERY CALCIFICATION: Mild.    HEPATOBILIARY: Multiple hepatic cysts. Some hypodensities are too  small for characterization. Gallbladder unremarkable.    PANCREAS: Normal.    SPLEEN: Normal.    ADRENAL GLANDS: Normal.    KIDNEYS/BLADDER: No hydronephrosis. No urolithiasis. Contrast within  the bilateral renal medulla may be related to recent contrast  administration. Catheter in the bladder.    BOWEL: No obstruction or acute inflammation. Moderate stool. No free  fluid or free air.    PELVIC ORGANS: Left ovarian/adnexal cyst is 5.9 cm, previously 4 cm on  3/6/2020.    ADDITIONAL FINDINGS: No enlarged lymph nodes.    MUSCULOSKELETAL: No acute displaced fracture is seen. Spine  degenerative changes.      Impression    IMPRESSION:  1.  No acute abnormality is identified.  2.  Larger size of a left ovarian/adnexal cysts as compared to  3/6/2020. Correlate with pelvic ultrasound for further assessment.  3.  A few bilateral indeterminate  pulmonary nodules. Previously noted  nodules are stable compared to 3/17/2022. However, there is a new  solid nodule at the right lower lobe. Therefore, recommend follow-up  CT chest in six months for surveillance.  4.  Increased density of the bilateral renal medulla that may relate  to recent contrast administration. Correlate with renal function  testing.    LISSETT GIRON MD         SYSTEM ID:  [id number]   CT Head w/o Contrast    Narrative    CT SCAN OF THE HEAD WITHOUT CONTRAST   11/8/2022 12:24 PM     HISTORY: Seizure.    TECHNIQUE:  Axial images of the head and coronal reformations without  IV contrast material. Radiation dose for this scan was reduced using  automated exposure control, adjustment of the mA and/or kV according  to patient size, or iterative reconstruction technique.    COMPARISON: None.    FINDINGS:  No evidence of hemorrhage. Volume loss and white matter  hypoattenuation likely representing chronic small vessel ischemic  change. No acute osseous abnormality. Mild paranasal sinus mucosal  thickening. Endotracheal and enteric tubes partially visualized.      Impression    IMPRESSION:   No acute intracranial abnormality.    MELISA PAINTER MD         SYSTEM ID:  [id number]   MR Brain w/o & w Contrast    Narrative    EXAM: MR BRAIN W/O and W CONTRAST  LOCATION: [facility]  DATE/TIME: 11/8/2022 9:55 PM    INDICATION: New onset generalized tonic-clonic seizure.  COMPARISON: CTA head neck dated 11/08/2022.  CONTRAST: 6mL Gadavist  TECHNIQUE: Routine multiplanar multisequence head MRI without and with intravenous contrast.    FINDINGS:  INTRACRANIAL CONTENTS: No acute or subacute infarct. No mass, acute hemorrhage, or extra-axial fluid collections. Small focus of susceptibility artifact within the right cerebellum may reflect chronic microhemorrhage, amyloid angiopathy or even a small   cavernous malformation, among other etiologies. Scattered nonspecific T2/FLAIR  hyperintensities within the cerebral white matter most consistent with mild chronic microvascular ischemic change. Mild generalized cerebral atrophy. No hydrocephalus. Normal   position of the cerebellar tonsils. No pathologic contrast enhancement.    SELLA: No abnormality accounting for technique.    OSSEOUS STRUCTURES/SOFT TISSUES: Normal marrow signal. The major intracranial vascular flow voids are maintained. Left posterior scalp soft tissue contusion.    ORBITS: Prior bilateral cataract surgery. Visualized portions of the orbits are otherwise unremarkable.     SINUSES/MASTOIDS: Mild mucosal thickening scattered about the paranasal sinuses. No middle ear or mastoid effusion.       Impression    IMPRESSION:  1.  No acute intracranial process.  2.  Generalized brain atrophy and presumed microvascular ischemic changes as detailed above.       XR Chest Port 1 View    Narrative    EXAM: XR CHEST PORT 1 VIEW  LOCATION: [facility]  DATE/TIME: 11/9/2022 5:32 AM    INDICATION: Endotracheal tube positioning  COMPARISON: 11/08/2022      Impression    IMPRESSION: Endotracheal tube terminates 4.3 cm above the stephanie. Enteric tube terminates below the diaphragm.   XR Thoracic Lumbar Standing 2 Views    Narrative    [facility]  XR THORACIC LUMBAR STANDING 2 VIEWS  11/13/2022 3:56 PM    INDICATION: Pain in the costophrenic area  COMPARISON: 11/8/2022      Impression    IMPRESSION: 5 lumbar type vertebra. Mild chronic compression deformity at the superior endplate of L2. Mild levoscoliosis at the thoracolumbar junction. Moderate to advanced loss of disc height at L4-5 and L5-S1. No radiographic evidence for acute   fracture or subluxation.   Echocardiogram Complete     Value    LVEF  40-45%    Narrative    [id number]  [id number]  [id number]  [id number]^CASSIE^NURA     [facility]  Echocardiography Laboratory  [address] [place] [address]     Name: SAADIA  OPHELIA JONES  MRN: [id number]  : 1945  Study Date: 2022 08:00 AM  Age: 77 yrs  Gender: Female  Patient Location: [facility]  Reason For Study: Syncope and Collapse  Ordering Physician: NURA MATTHEWS  Referring Physician: Otilia Sánchez  Performed By: Katey Ballard     BSA: 1.6 m2  Height: 65 in  Weight: 130 lb  HR: 64  BP: 90/52 mmHg  ______________________________________________________________________________  Procedure  Complete Echo Adult. Optison (NDC #3578-4525) given intravenously.  ______________________________________________________________________________  Interpretation Summary     1. The left ventricle is normal in size. There is normal left ventricular wall  thickness. Left ventricular systolic function is mild to moderately reduced.  The visual ejection fraction is 40-45%. Left ventricular diastolic function is  abnormal. There is moderate hypokinesis of all the mid to apical segments of  the left ventricle. There is no thrombus seen in the left ventricle.  2. The right ventricle is normal size. The right ventricular systolic function  is normal.  3. Trace mitral and tricuspid regurgitation.  4. Trivial pericardial effusion.  5. In direct comparison to the previous study dated 02/15/2022, there has been  an interval deterioration in left ventricular systolic function and regional  wall motion abnormalities are now present.  ______________________________________________________________________________  Left Ventricle  The left ventricle is normal in size. There is normal left ventricular wall  thickness. Left ventricular systolic function is mild to moderately reduced.  The visual ejection fraction is 40-45%. Left ventricular diastolic function is  abnormal. There is moderate hypokinesis of all the mid to apical segments of  the left ventricle. There is no thrombus seen in the left ventricle.     Right Ventricle  The right ventricle is normal size. The right ventricular systolic  function is  normal.     Atria  Normal left atrial size. Right atrial size is normal. There is no color  Doppler evidence of an atrial shunt.     Mitral Valve  There is trace mitral regurgitation.     Tricuspid Valve  There is trace tricuspid regurgitation.     Aortic Valve  No aortic regurgitation is present. No aortic stenosis is present.     Pulmonic Valve  There is no pulmonic valvular stenosis.     Vessels  The inferior vena cava is normal.     Pericardium  Trivial pericardial effusion.     Rhythm  Sinus rhythm was noted.  ______________________________________________________________________________  MMode/2D Measurements & Calculations  IVSd: 0.57 cm     LVIDd: 4.8 cm  LVIDs: 3.3 cm  LVPWd: 0.63 cm  FS: 30.0 %  LV mass(C)d: 87.3 grams  LV mass(C)dI: 53.0 grams/m2  Ao root diam: 2.6 cm  LA dimension: 3.5 cm  LA/Ao: 1.4  LVOT diam: 2.1 cm  LVOT area: 3.4 cm2  RWT: 0.27     Doppler Measurements & Calculations  MV E max murali: 84.5 cm/sec  MV A max murali: 120.6 cm/sec  MV E/A: 0.70  MV dec slope: 470.4 cm/sec2  LV V1 max P.5 mmHg  LV V1 max: 116.9 cm/sec  LV V1 VTI: 30.6 cm  SV(LVOT): 103.5 ml  SI(LVOT): 62.8 ml/m2  PA acc time: 0.13 sec  E/E' av.2  Lateral E/e': 12.4  Medial E/e': 16.1     ______________________________________________________________________________  Report approved by: Francisco Ziegler 2022 11:47 AM         Echocardiogram Limited     Value    LVEF  55-60%    Narrative    [id number]  [id number]  [id number]  [id number]^MILDRED^KIMBERLY^PANKAJ     [facility]  Echocardiography Laboratory  [address] [place] [id number]     Name: OPHELIA ZEE  MRN: [id number]  : 1945  Study Date: 11/10/2022 12:51 PM  Age: 77 yrs  Gender: Female  Patient Location: [facility]  Reason For Study: Cardiomyopathy  Ordering Physician: KIMBERLY LEVY  Performed By: Anabell Burt RDCS     BSA: 1.7 m2  Height: 66 in  Weight: 129 lb  HR: 78  BP: 118/62  mmHg  ______________________________________________________________________________  Procedure  Limited Portable Echo Adult. Optison (NDC #2097-2042) given intravenously.  ______________________________________________________________________________  Interpretation Summary     The large symetrical segment of severe hypokinesis to akinesis (takotsubo?)  seen by echo yesterday has improved but not completely recovered today. The  apex remains mild to moderately hypokinetic. EF is now within normal limits  (55-60%).  The pericardial effusion noted yesterday appears larger. especially at RV free  wall. No evidence of tamponade, although full evaluation was not completed.  ______________________________________________________________________________  Left Ventricle  The left ventricle is normal in size. The visual ejection fraction is 55-60%.  There is mild to moderate apical wall hypokinesis.     Mitral Valve  There is trace mitral regurgitation.     Tricuspid Valve  There is trace to mild tricuspid regurgitation. The right ventricular systolic  pressure is approximated at 33mmHg plus the right atrial pressure.     Pericardium  Small PCE along the left heart, moderate PCE along the RV. Small pericardial  effusion.     ______________________________________________________________________________  MMode/2D Measurements & Calculations  IVSd: 0.96 cm  LVIDd: 3.9 cm  LVIDs: 1.9 cm  LVPWd: 0.91 cm     FS: 51.7 %  LV mass(C)d: 113.6 grams  LV mass(C)dI: 68.5 grams/m2  RWT: 0.46     Doppler Measurements & Calculations  TR max murali: 285.0 cm/sec  TR max P.5 mmHg     ______________________________________________________________________________  Report approved by: Francisco Mcgee 11/10/2022 04:46 PM

## 2022-12-01 NOTE — PROGRESS NOTES
TRANSITIONS OF CARE (THERESA) LOG   THERESA tasks should be completed by the CC within one (1) business day of notification of each transition. Follow up contact with member is required after return to their usual care setting.  Note:  If CC finds out about the transitions fifteen (15) days or more after the member has returned to their usual care setting, no THERESA log is needed. However, the CC should check in with the member to discuss the transition process, any changes needed to the care plan and document it in a case note.    Member Name:  Morenita Moore [facility] Member [facility] [id number]   Product: [facility] Care Coordinator Contact:  ASHLEIGH Villalpando Agency/County/Care System: [facility]   Transition Communication Actions from Care Management Contact   Transition #1   Notification Date: 12/1/22 Transition Date[facility]  [facility]      Is this the member s usual care setting?               no Transition To: Home   Transition Type:  Planned  Reason for Admission/Comments:  Discharge back home   Contact member/responsible party to offer assistance with transition Date completed: 12/1/22    Notes from conversation with the member/responsible party, provider, discharging and receiving facility (as applicable): CC contacted member and reviewed discharge summary.  Member has a follow-up appointment with PCP in 7 days: Yes: scheduled on 12/8/22   Member has had a change in condition: No  Home visit needed: No  Care plan reviewed and updated.  The following home based services ICLS were resumed.  New referrals placed: Yes: Home Health Aide  Transition log completed.   PCP notified of transition back to home via EMR.       Shared CC contact info, care plan/services with receiving setting--Date completed: 12/1/22      Notified PCP of transition--Date completed:  12/1/22   via  EMR  Name of PCP: Otilia Sánchez                                                                                          *RETURN TO USUAL CARE SETTING: *Complete tasks below when the member is discharging TO their usual care setting within one (1) business day of notification..      For situations where the Care Coordinator is notified of the discharge prior to the date of discharge, the Care Coordinator must follow up with the member or designated representative to confirm that discharge actually occurred and discuss required THERESA tasks as outlined in the THERESA Instructions.  (This includes situations where it may be a  new  usual care setting for the member. (i.e., a community member who decides upon permanent nursing home placement following hospitalization and rehab).    Discuss with Member/Responsible Party:    Check  Yes  - if the member, family member and/or SNF/facility staff manages the following:    If  No  provide explanation in the comments section.          Date completed: 12/1/22 Communicated with member or their designated representative about the following:  care transition process; about changes to the member s health status; plan of care updates; education about transitions and how to prevent unplanned transitions/readmissions    Four Pillars for Optimal Transition:    Check  Yes  - if the member, family member and/or SNF/facility staff manages the following:    If  No  provide explanation in the comments section.          [x]  Yes     []  No Does the member have a follow-up appointment scheduled with primary care or specialist? (Mental health hospitalizations--the appt. should be w/in 7 days)              For mental health hospitalizations:  []  Yes     []  No     Does the member have a follow-up appointment scheduled with a mental health practitioner within 7 days of discharge?  [x]  Yes     []  No     Has a medication review been completed with member? If no, refer to PCP, home care nurse, MTM, pharmacist  [x]  Yes     []  No     Can the member manage their medications  or is there a system in place to manage medications (e.g. home care set-up)?         [x]  Yes     []  No     Can the member verbalize warning signs and symptoms to watch for and how to respond?  [x]  Yes     []  No     Does the member have a copy of and understand their discharge instructions?  If no, assist to obtain copy of discharge instructions, review discharge instructions, and assist to contact PCP to discuss questions about their recent hospitalization.  [x]  Yes     []  No     Does the member have adequate food, housing and transportation?  If no, add goal and discuss additional supports available to the member                                                                                                                                                                                 [x]  Yes     []  No     Is the member safe in their home?  If no, document needs and support provided                                                                                                                                                                          []  Yes     [x]  No     Are there any concerns of vulnerability, abuse, or neglect?  If yes, document concerns and actions taken by Care Coordinator as a mandated                                                                                                                                                                              [x]  Yes     []  No     Does the member use a Personal Health Care Record?  Check  Yes  if visit summary, discharge summary, and/or healthcare summary are being used as a PHR.                                                                                                                                                                                  [x]  Yes     []  No     Have you reviewed the discharge summary with the member? If  No  provide explanation in comments.  [x]  Yes     []  No     Have you updated the member s  care plan/support plan? Add new diagnosis, medications, treatments, goals & interventions, as applicable. If No, provide explanation in comments.    Comments:   ASHLEIGH Blanc  [facility]  Cell: [phone]

## 2022-12-02 ENCOUNTER — NURSE TRIAGE (OUTPATIENT)
Dept: NURSING | Facility: CLINIC | Age: 77
End: 2022-12-02

## 2022-12-02 NOTE — TELEPHONE ENCOUNTER
Calling because she needs a new primary provider. States that she's become complicated over the past 5 years. Wants a primary in [place] who can treat her with a cardiologist and a neurologist as a team. Directed by previous PCP to go online and choose a primary, but decided it would be better to call and ask. Advised patient to start by having an appointment and see if she feels compatible with the provider and go from there. Patient given the phone number for [facility] per her request/    Care advice given for home care. Patient is in agreement and states she will try making an appointment.    Mallika Cao, RN on 12/2/2022 at 3:56 PM    Reason for Disposition    Information only question and nurse able to answer    Additional Information    Negative: Nursing judgment    Negative: Nursing judgment    Negative: Nursing judgment    Negative: Nursing judgment    Protocols used: INFORMATION ONLY CALL - NO TRIAGE-A-OH

## 2022-12-09 ENCOUNTER — TELEPHONE (OUTPATIENT)
Dept: FAMILY MEDICINE | Facility: CLINIC | Age: 77
End: 2022-12-09

## 2022-12-09 NOTE — TELEPHONE ENCOUNTER
MTM referral from: Transitions of Care (recent hospital discharge or ED visit)    MTM referral outreach attempt #2 on December 9, 2022 at 2:26 PM      Outcome: Patient is not interested at this time because she doesn't think she needs it, will route to MTM Pharmacist/Provider as an FYI. Thank you for the referral.     Yancy Moyer, [facility]

## 2022-12-12 ENCOUNTER — MYC MEDICAL ADVICE (OUTPATIENT)
Dept: CARDIOLOGY | Facility: CLINIC | Age: 77
End: 2022-12-12

## 2022-12-12 NOTE — TELEPHONE ENCOUNTER
Lets try to see if there is an earlier opening with cardiologist.  She doesn't necessarily need to see EP for this discussion at this point.

## 2022-12-13 ENCOUNTER — MYC MEDICAL ADVICE (OUTPATIENT)
Dept: FAMILY MEDICINE | Facility: CLINIC | Age: 77
End: 2022-12-13

## 2022-12-13 DIAGNOSIS — R56.9 SEIZURE (H): ICD-10-CM

## 2022-12-15 RX ORDER — LEVETIRACETAM 750 MG/1
750 TABLET ORAL 2 TIMES DAILY
Qty: 60 TABLET | Refills: 2 | Status: SHIPPED | OUTPATIENT
Start: 2022-12-15 | End: 2023-01-16

## 2022-12-15 NOTE — TELEPHONE ENCOUNTER
Spoke with Pt she is trying to get in to see Neurology and deal wit her keppra  Medication. Discussed changing appointment tomorrow will move appointment to first week of January and she may need to see another cardiologist. Pt has agreed to set up echo and holter monitor also. She wants to see provider and do everything same day if possible. Pt has concerns regarding doing stress test and wants to discuss with provider before doing. KIRA Khan RN      [Dear  ___] : Dear  [unfilled], [Consult Letter:] : I had the pleasure of evaluating your patient, [unfilled]. [Please see my note below.] : Please see my note below. [Consult Closing:] : Thank you very much for allowing me to participate in the care of this patient.  If you have any questions, please do not hesitate to contact me.

## 2022-12-15 NOTE — TELEPHONE ENCOUNTER
[facility] Call Center    Phone Message    May a detailed message be left on voicemail: yes     Reason for Call: Other: The patient asked for Nurse Deena to call her back to discuss her appt tomorrow as she said she is not well enough to come to clinic tomorrow     Action Taken: Other: Cardiology    Travel Screening: Not Applicable     Thank you!  [facility]

## 2022-12-21 ENCOUNTER — PATIENT OUTREACH (OUTPATIENT)
Dept: GERIATRIC MEDICINE | Facility: CLINIC | Age: 77
End: 2022-12-21

## 2022-12-21 NOTE — PROGRESS NOTES
[facility] Care Coordination Contact    Per CC, sent in are auth request for Jeannie HUFFMAN.    So Hollins  Care Management Specialist  [facility]  [phone]

## 2022-12-29 ENCOUNTER — TELEPHONE (OUTPATIENT)
Dept: NEUROLOGY | Facility: CLINIC | Age: 77
End: 2022-12-29

## 2022-12-29 NOTE — TELEPHONE ENCOUNTER
Patient is wanting to see Dr. Gooden specifically. I schedule next available for 7/6/23 and put on wait list. Patient does have number of neurological issues she is hoping to address and hoping appointment might be able to be moved up.

## 2022-12-30 ENCOUNTER — HOSPITAL ENCOUNTER (OUTPATIENT)
Dept: CARDIOLOGY | Facility: CLINIC | Age: 77
Discharge: HOME | End: 2022-12-30
Attending: NURSE PRACTITIONER
Payer: COMMERCIAL

## 2022-12-30 ENCOUNTER — HOSPITAL ENCOUNTER (OUTPATIENT)
Dept: CARDIOLOGY | Facility: CLINIC | Age: 77
Discharge: HOME | End: 2022-12-30
Attending: INTERNAL MEDICINE
Payer: COMMERCIAL

## 2022-12-30 DIAGNOSIS — I51.81 STRESS-INDUCED CARDIOMYOPATHY: ICD-10-CM

## 2022-12-30 LAB — LVEF ECHO: NORMAL

## 2022-12-30 PROCEDURE — 93325 DOPPLER ECHO COLOR FLOW MAPG: CPT

## 2022-12-30 PROCEDURE — 93272 ECG/REVIEW INTERPRET ONLY: CPT | Performed by: INTERNAL MEDICINE

## 2022-12-30 PROCEDURE — 93308 TTE F-UP OR LMTD: CPT | Mod: 26 | Performed by: INTERNAL MEDICINE

## 2022-12-30 PROCEDURE — 93321 DOPPLER ECHO F-UP/LMTD STD: CPT | Mod: 26 | Performed by: INTERNAL MEDICINE

## 2022-12-30 PROCEDURE — 93325 DOPPLER ECHO COLOR FLOW MAPG: CPT | Mod: 26 | Performed by: INTERNAL MEDICINE

## 2022-12-30 PROCEDURE — 93321 DOPPLER ECHO F-UP/LMTD STD: CPT

## 2022-12-30 PROCEDURE — 93270 REMOTE 30 DAY ECG REV/REPORT: CPT

## 2022-12-30 NOTE — TELEPHONE ENCOUNTER
Dr. Gooden please advise if you are ok seeing this patient, she has seen Dr. Hansen at the [facility] in the past.     She has several no show appointments, I plan to keep her appt as scheduled 7/2023 if you are agreeable to see her.     Emil Fernandes RN, BSN  [facility]

## 2022-12-31 ENCOUNTER — MYC MEDICAL ADVICE (OUTPATIENT)
Dept: FAMILY MEDICINE | Facility: CLINIC | Age: 77
End: 2022-12-31

## 2022-12-31 DIAGNOSIS — R56.9 SEIZURE (H): ICD-10-CM

## 2022-12-31 DIAGNOSIS — I51.81 STRESS-INDUCED CARDIOMYOPATHY: Primary | ICD-10-CM

## 2022-12-31 DIAGNOSIS — Z78.9 IMPAIRED INSTRUMENTAL ACTIVITIES OF DAILY LIVING (IADL): ICD-10-CM

## 2023-01-02 NOTE — TELEPHONE ENCOUNTER
Keke Campos MD Barth, Devyn, RN  Caller: Unspecified (4 days ago,  4:04 PM)  I guess that is fine. She is in a 40 minute slot correct?     Thanks   [facility]     RN verified that pt is in 40 min slot. Will keep appt as scheduled for July. Pt can return to see Dr. Hansen as needed prior to appt.    Emil Fernandes RN, BSN  [facility]

## 2023-01-03 ENCOUNTER — TELEPHONE (OUTPATIENT)
Dept: CARDIOLOGY | Facility: CLINIC | Age: 78
End: 2023-01-03

## 2023-01-03 NOTE — TELEPHONE ENCOUNTER
Return Pt call she has tested and no covid. Per Pt cold is slowly improving. She is also concerned aBout weather. Informed her good hand washing and wear mask, and if weather bad will reschedule. KIRA Khan RN

## 2023-01-03 NOTE — TELEPHONE ENCOUNTER
[facility] Call Center    Phone Message    May a detailed message be left on voicemail: yes     Reason for Call: Other: Patient calling she states that she has some cold symptoms and wondering if she should come in tomorrow or re schedule. Please call patient back to discuss.      Action Taken: Other: caridology    Travel Screening: Not Applicable   Thank you!  [facility]

## 2023-01-11 RX ORDER — LEVETIRACETAM 500 MG/1
500 TABLET ORAL 2 TIMES DAILY
Qty: 180 TABLET | Refills: 0 | Status: SHIPPED | OUTPATIENT
Start: 2023-01-11 | End: 2023-04-19

## 2023-01-16 ENCOUNTER — OFFICE VISIT (OUTPATIENT)
Dept: CARDIOLOGY | Facility: CLINIC | Age: 78
End: 2023-01-16
Attending: NURSE PRACTITIONER
Payer: COMMERCIAL

## 2023-01-16 VITALS
HEIGHT: 65 IN | BODY MASS INDEX: 20.93 KG/M2 | DIASTOLIC BLOOD PRESSURE: 78 MMHG | HEART RATE: 84 BPM | SYSTOLIC BLOOD PRESSURE: 142 MMHG | WEIGHT: 125.6 LBS

## 2023-01-16 DIAGNOSIS — R56.9 SEIZURE (H): ICD-10-CM

## 2023-01-16 DIAGNOSIS — I51.81 STRESS-INDUCED CARDIOMYOPATHY: ICD-10-CM

## 2023-01-16 DIAGNOSIS — R25.1 TREMOR, PHYSIOLOGICAL: ICD-10-CM

## 2023-01-16 DIAGNOSIS — D05.12 DUCTAL CARCINOMA IN SITU (DCIS) OF LEFT BREAST: ICD-10-CM

## 2023-01-16 DIAGNOSIS — F32.5 MAJOR DEPRESSION IN REMISSION (H): ICD-10-CM

## 2023-01-16 DIAGNOSIS — I47.10 SVT (SUPRAVENTRICULAR TACHYCARDIA) (H): Primary | ICD-10-CM

## 2023-01-16 PROCEDURE — 99214 OFFICE O/P EST MOD 30 MIN: CPT | Performed by: INTERNAL MEDICINE

## 2023-01-16 NOTE — PROGRESS NOTES
HPI and Plan:     I had the pleasure of seeing Morenita Moore for the first time today in cardiology clinic for follow-up of her recent hospitalization with seizure.    She is a 77-year-old female.  She has longstanding history of what she describes as episodic palpitations or SVT.  She has seen electrophysiologist at [facility] in the past.  She said that this started in 2010 but she is never been able to document in arrhythmia on her EKG as they have occurred when she was not being monitored.    In November, she describes sudden onset episodic palpitations that lasted 2 to 3 minutes and then next thing she remembers she was in the ICU at [facility].  Apparently, somebody witnessed a seizure and brought her to the emergency room.  She also had stress-induced cardiomyopathy with ejection fraction of 45% and global hypokinesis with some EKG abnormalities and peak troponin of 2880.  Trivial to small pericardial effusion was also noted.  Subsequent echocardiograms have shown normalization of ejection fraction with trivial to small pericardial effusion unchanged.    She has history of pericarditis in 2019.  She took NSAIDs for that and apparently had some kidney issues.    She also has depression and anxiety and has tremors.  Presently, she is on Keppra for the seizures.    She has seen my partner Dr. Wyatt in the past as well as our midlevel provider.    She denies any chest pain suggestive angina.  She is limited in her activity and uses a walker.    On exam regular rate and rhythm without murmurs.  Chest was clear to auscultation.  No carotid bruit.  Tremors noted.    I reviewed the recent hospital notes in terms of cardiology notes as well as discharge summary.  I also reviewed CT chest done in November focused on the cardiac structure.    Impression  1.  Recent hospitalization in November with a presumed seizure although the patient tells me that it was precipitated by episodic palpitations and  what she describes as SVT    I wonder if this was a syncopal episode witnessed but cannot be sure.  She is wearing an event monitor which will be completed in end of this month.  She is requesting and insisting on seeing an electrophysiologist and prefers to see Dr Parekh as she has heard good things about him.    I suspect she might need a long-term monitoring and may be a good candidate for reveal device if the event monitor is negative.  I will place in the referral for electrophysiologist.    2.  Stress-induced cardiomyopathy    During her recent hospitalization with presumed seizure, she had EF of 45% and some EKG abnormalities and elevated troponin.  Subsequent echocardiograms have revealed normal ejection fraction.  I reviewed the CT chest and there is some evidence of atherosclerosis in the aorta and coronary arteries and therefore would recommend stress test.  Patient is not willing to proceed with a stress test at this time until further discussion with electrophysiologist.    3.  Coronary artery atherosclerosis, incidental on CT chest  Recommended baby aspirin.  LDL is already less than 100.    4.  Elevated blood pressure without formal diagnosis of hypertension  She has mildly elevated blood pressure and we talked about lowering salt intake, managing stress and decreasing intake of caffeine.  She will monitor blood pressures closely.  If elevated, she might need antihypertensive but she appears to be reluctant in trying them as she has had some side effects in the past    5. Seizure disorder, should follow-up with neurology    She will follow-up with electrophysiologist per her request.  If general cardiology as needed, she can continue following with Dr. Donnelly.    Sincerely,    Abram Paul MD      Today's clinic visit entailed:    33 minutes spent on the date of the encounter doing chart review, review of test results, interpretation of tests, patient visit and documentation   Provider  Link to [facility]  Help Grid     The level of medical decision making during this visit was of moderate complexity.      Orders Placed This Encounter   Procedures     Follow-Up with Cardiology EP       No orders of the defined types were placed in this encounter.      There are no discontinued medications.      Encounter Diagnoses   Name Primary?     Stress-induced cardiomyopathy      SVT (supraventricular tachycardia) (H) Yes     Tremor, physiological      Major depression in remission (H)      Seizure (H)      Ductal carcinoma in situ (DCIS) of left breast        CURRENT MEDICATIONS:  Current Outpatient Medications   Medication Sig Dispense Refill     acetaminophen (TYLENOL) 325 MG tablet Take 2 tablets (650 mg) by mouth every 4 hours as needed for mild pain       albuterol (PROAIR HFA/PROVENTIL HFA/VENTOLIN HFA) 108 (90 Base) MCG/ACT inhaler Inhale 2 puffs into the lungs every 6 hours as needed for shortness of breath / dyspnea       calcium carbonate-vitamin D 600-125 MG-UNIT TABS Take 1 tablet by mouth daily       Cyanocobalamin 50 MCG TABS Take 50 mcg by mouth once a week Sundays       fluticasone-salmeterol (ADVAIR HFA) 115-21 MCG/ACT inhaler Inhale 2 puffs into the lungs 2 times daily 12 g 11     levETIRAcetam (KEPPRA) 500 MG tablet Take 1 tablet (500 mg) by mouth 2 times daily 180 tablet 0     levETIRAcetam (KEPPRA) 750 MG tablet Take 1 tablet (750 mg) by mouth 2 times daily 60 tablet 2       ALLERGIES     Allergies   Allergen Reactions     Levalbuterol Hydrochloride Shortness Of Breath     Cats Other (See Comments)     Itchy eyes     Dust Mites      Other reaction(s): Wheezing  Wheezing/shortness/breath/see (IRP 6/1)     Qvar [Beclomethasone]      Per patient- allergic to QVAR     Amitriptyline Palpitations     Escitalopram Anxiety       PAST MEDICAL HISTORY:  Past Medical History:   Diagnosis Date     Anxiety      Asthma 2012    adult-onset asthma diagnosed in 2012     Cancer (H)      DCIS (ductal carcinoma in situ) 2001     stage 0 status post left mastectomy in      Depressive disorder      PONV (postoperative nausea and vomiting)      Torticollis      Tremor        PAST SURGICAL HISTORY:  Past Surgical History:   Procedure Laterality Date     BREAST SURGERY       CATARACT IOL, RT/LT       MASTECTOMY      DCIS     ODONTECTOMY Left 2022    Procedure: SURGICAL EXTRACTION, TOOTH - Teeth #12, 14, 19;  Surgeon: Arvin Garza DDS;  Location: [facility] OR     RETINAL REATTACHMENT         FAMILY HISTORY:  Family History   Problem Relation Age of Onset     Cancer Sister         breast cancer     Glaucoma No family hx of      Macular Degeneration No family hx of      Asthma Sister      Breast Cancer Sister      Heart Failure Mother          at 97       SOCIAL HISTORY:  Social History     Socioeconomic History     Marital status: Single     Spouse name: None     Number of children: None     Years of education: None     Highest education level: None   Tobacco Use     Smoking status: Former     Packs/day: 1.00     Years: 10.00     Pack years: 10.00     Types: Cigarettes     Quit date:      Years since quittin.0     Smokeless tobacco: Never     Tobacco comments:     Ages [age] then ages [age].    Vaping Use     Vaping Use: Never used   Substance and Sexual Activity     Alcohol use: No     Drug use: Never     Sexual activity: Not Currently       Review of Systems:  Skin:  not assessed       Eyes:  not assessed      ENT:  not assessed      Respiratory:  Positive for shortness of breath;dyspnea on exertion     Cardiovascular:    palpitations;Positive for;dizziness 1-2 episodes of palpitations that were small/short, and no seizures - waiting to get into neurology. Little dizziness that could be due to the keppra. she's also dealing with anxiety right now.  Gastroenterology: not assessed      Genitourinary:  not assessed      Musculoskeletal:  not assessed      Neurologic:  not assessed      Psychiatric:  not assessed     "  Heme/Lymph/Imm:  not assessed      Endocrine:  Negative        Physical Exam:  Vitals: BP (!) 142/78 (BP Location: Left arm, Patient Position: Sitting)   Pulse 84   Ht 1.651 m (5' 5\")   Wt 57 kg (125 lb 9.6 oz)   BMI 20.90 kg/m          CC  Kristyn Owens, APRN CNP  [address]  [place] [address]              "

## 2023-01-16 NOTE — LETTER
1/16/2023    Alexis De Leon PA-C  [address]  [facility] [id number]    RE: Morenita Moore       Dear Colleague,     I had the pleasure of seeing Morenita Moore in the [facility].  HPI and Plan:     I had the pleasure of seeing Morenita Moore for the first time today in cardiology clinic for follow-up of her recent hospitalization with seizure.    She is a 77-year-old female.  She has longstanding history of what she describes as episodic palpitations or SVT.  She has seen electrophysiologist at [facility] in the past.  She said that this started in 2010 but she is never been able to document in arrhythmia on her EKG as they have occurred when she was not being monitored.    In November, she describes sudden onset episodic palpitations that lasted 2 to 3 minutes and then next thing she remembers she was in the ICU at [facility].  Apparently, somebody witnessed a seizure and brought her to the emergency room.  She also had stress-induced cardiomyopathy with ejection fraction of 45% and global hypokinesis with some EKG abnormalities and peak troponin of 2880.  Trivial to small pericardial effusion was also noted.  Subsequent echocardiograms have shown normalization of ejection fraction with trivial to small pericardial effusion unchanged.    She has history of pericarditis in 2019.  She took NSAIDs for that and apparently had some kidney issues.    She also has depression and anxiety and has tremors.  Presently, she is on Keppra for the seizures.    She has seen my partner Dr. Wyatt in the past as well as our midlevel provider.    She denies any chest pain suggestive angina.  She is limited in her activity and uses a walker.    On exam regular rate and rhythm without murmurs.  Chest was clear to auscultation.  No carotid bruit.  Tremors noted.    I reviewed the recent hospital notes in terms of cardiology notes as well as discharge summary.  I also reviewed CT  chest done in November focused on the cardiac structure.    Impression  1.  Recent hospitalization in November with a presumed seizure although the patient tells me that it was precipitated by episodic palpitations and what she describes as SVT    I wonder if this was a syncopal episode witnessed but cannot be sure.  She is wearing an event monitor which will be completed in end of this month.  She is requesting and insisting on seeing an electrophysiologist and prefers to see Dr Parekh as she has heard good things about him.    I suspect she might need a long-term monitoring and may be a good candidate for reveal device if the event monitor is negative.  I will place in the referral for electrophysiologist.    2.  Stress-induced cardiomyopathy    During her recent hospitalization with presumed seizure, she had EF of 45% and some EKG abnormalities and elevated troponin.  Subsequent echocardiograms have revealed normal ejection fraction.  I reviewed the CT chest and there is some evidence of atherosclerosis in the aorta and coronary arteries and therefore would recommend stress test.  Patient is not willing to proceed with a stress test at this time until further discussion with electrophysiologist.    3.  Coronary artery atherosclerosis, incidental on CT chest  Recommended baby aspirin.  LDL is already less than 100.    4.  Elevated blood pressure without formal diagnosis of hypertension  She has mildly elevated blood pressure and we talked about lowering salt intake, managing stress and decreasing intake of caffeine.  She will monitor blood pressures closely.  If elevated, she might need antihypertensive but she appears to be reluctant in trying them as she has had some side effects in the past    5. Seizure disorder, should follow-up with neurology    She will follow-up with electrophysiologist per her request.  If general cardiology as needed, she can continue following with Dr. Donnelly.    Sincerely,    Abram  MD [place]      Today's clinic visit entailed:    33 minutes spent on the date of the encounter doing chart review, review of test results, interpretation of tests, patient visit and documentation   Provider  Link to [facility] Help Grid     The level of medical decision making during this visit was of moderate complexity.      Orders Placed This Encounter   Procedures     Follow-Up with Cardiology EP       No orders of the defined types were placed in this encounter.      There are no discontinued medications.      Encounter Diagnoses   Name Primary?     Stress-induced cardiomyopathy      SVT (supraventricular tachycardia) (H) Yes     Tremor, physiological      Major depression in remission (H)      Seizure (H)      Ductal carcinoma in situ (DCIS) of left breast        CURRENT MEDICATIONS:  Current Outpatient Medications   Medication Sig Dispense Refill     acetaminophen (TYLENOL) 325 MG tablet Take 2 tablets (650 mg) by mouth every 4 hours as needed for mild pain       albuterol (PROAIR HFA/PROVENTIL HFA/VENTOLIN HFA) 108 (90 Base) MCG/ACT inhaler Inhale 2 puffs into the lungs every 6 hours as needed for shortness of breath / dyspnea       calcium carbonate-vitamin D 600-125 MG-UNIT TABS Take 1 tablet by mouth daily       Cyanocobalamin 50 MCG TABS Take 50 mcg by mouth once a week Sundays       fluticasone-salmeterol (ADVAIR HFA) 115-21 MCG/ACT inhaler Inhale 2 puffs into the lungs 2 times daily 12 g 11     levETIRAcetam (KEPPRA) 500 MG tablet Take 1 tablet (500 mg) by mouth 2 times daily 180 tablet 0     levETIRAcetam (KEPPRA) 750 MG tablet Take 1 tablet (750 mg) by mouth 2 times daily 60 tablet 2       ALLERGIES     Allergies   Allergen Reactions     Levalbuterol Hydrochloride Shortness Of Breath     Cats Other (See Comments)     Itchy eyes     Dust Mites      Other reaction(s): Wheezing  Wheezing/shortness/breath/see (IRP 6/1)     Qvar [Beclomethasone]      Per patient- allergic to QVAR     Amitriptyline Palpitations      Escitalopram Anxiety       PAST MEDICAL HISTORY:  Past Medical History:   Diagnosis Date     Anxiety      Asthma     adult-onset asthma diagnosed in 2012     Cancer (H)      DCIS (ductal carcinoma in situ)     stage 0 status post left mastectomy in      Depressive disorder      PONV (postoperative nausea and vomiting)      Torticollis      Tremor        PAST SURGICAL HISTORY:  Past Surgical History:   Procedure Laterality Date     BREAST SURGERY       CATARACT IOL, RT/LT       MASTECTOMY      DCIS     ODONTECTOMY Left 2022    Procedure: SURGICAL EXTRACTION, TOOTH - Teeth #12, 14, 19;  Surgeon: Arvin Garza DDS;  Location: [facility] OR     RETINAL REATTACHMENT         FAMILY HISTORY:  Family History   Problem Relation Age of Onset     Cancer Sister         breast cancer     Glaucoma No family hx of      Macular Degeneration No family hx of      Asthma Sister      Breast Cancer Sister      Heart Failure Mother          at 97       SOCIAL HISTORY:  Social History     Socioeconomic History     Marital status: Single     Spouse name: None     Number of children: None     Years of education: None     Highest education level: None   Tobacco Use     Smoking status: Former     Packs/day: 1.00     Years: 10.00     Pack years: 10.00     Types: Cigarettes     Quit date:      Years since quittin.0     Smokeless tobacco: Never     Tobacco comments:     Ages [age] then ages [age].    Vaping Use     Vaping Use: Never used   Substance and Sexual Activity     Alcohol use: No     Drug use: Never     Sexual activity: Not Currently       Review of Systems:  Skin:  not assessed       Eyes:  not assessed      ENT:  not assessed      Respiratory:  Positive for shortness of breath;dyspnea on exertion     Cardiovascular:    palpitations;Positive for;dizziness 1-2 episodes of palpitations that were small/short, and no seizures - waiting to get into neurology. Little dizziness that could be due to the keppra. she's  "also dealing with anxiety right now.  Gastroenterology: not assessed      Genitourinary:  not assessed      Musculoskeletal:  not assessed      Neurologic:  not assessed      Psychiatric:  not assessed      Heme/Lymph/Imm:  not assessed      Endocrine:  Negative        Physical Exam:  Vitals: BP (!) 142/78 (BP Location: Left arm, Patient Position: Sitting)   Pulse 84   Ht 1.651 m (5' 5\")   Wt 57 kg (125 lb 9.6 oz)   BMI 20.90 kg/m      CC  Kristyn Owens, APRN CNP  [address]  [place] [address]    Thank you for allowing me to participate in the care of your patient.      Sincerely,   Abram Paul MD   [facility]  "

## 2023-01-19 ENCOUNTER — TELEPHONE (OUTPATIENT)
Dept: FAMILY MEDICINE | Facility: CLINIC | Age: 78
End: 2023-01-19
Payer: COMMERCIAL

## 2023-01-25 ENCOUNTER — PATIENT OUTREACH (OUTPATIENT)
Dept: GERIATRIC MEDICINE | Facility: CLINIC | Age: 78
End: 2023-01-25

## 2023-01-25 ENCOUNTER — HOSPITAL ENCOUNTER (OUTPATIENT)
Dept: PHYSICAL THERAPY | Facility: CLINIC | Age: 78
Discharge: HOME | End: 2023-01-25
Attending: PHYSICIAN ASSISTANT
Payer: COMMERCIAL

## 2023-01-25 DIAGNOSIS — I51.81 STRESS-INDUCED CARDIOMYOPATHY: ICD-10-CM

## 2023-01-25 DIAGNOSIS — Z78.9 IMPAIRED INSTRUMENTAL ACTIVITIES OF DAILY LIVING (IADL): ICD-10-CM

## 2023-01-25 PROCEDURE — 97162 PT EVAL MOD COMPLEX 30 MIN: CPT | Mod: GP | Performed by: PHYSICAL THERAPIST

## 2023-01-25 PROCEDURE — 97112 NEUROMUSCULAR REEDUCATION: CPT | Mod: GP | Performed by: PHYSICAL THERAPIST

## 2023-01-25 NOTE — PROGRESS NOTES
[facility] Care Coordination Contact    Order placed with [facility] (p: [phone]; f: [phone]) for bath chair.  Order placed on 1/25/2023. Database updated.  As required, authorization submitted to health plan.    So Hollins  Care Management Specialist  [facility]  [phone]

## 2023-01-25 NOTE — PROGRESS NOTES
01/25/23 1200   Signing Clinician's Name / Credentials   Signing clinician's name / credentials Pamela Thrasher MPT   Dynamic Gait Index ([employer] [place], 1995)   Gait Level Surface 2   Change in Gait Speed 3   Gait and Horizontal Head Turns 1   Gait with Vertical Head Turns 2   Gait and Pivot Turns 2   Step Over Obstacle 3   Step Around Obstacles 3   Steps 3   Total Dynamic Gait Index Score  (A score of 19 or less has been correlated to an increased risk of falls in community dwelling older adults, patients with vestibular disorders, and patients with MS.)   Total Score (out of 24) 19

## 2023-01-25 NOTE — PROGRESS NOTES
01/25/23 1100   Quick Adds   Type of Visit Initial OP PT Evaluation   General Information   Start of Care Date 01/25/23   Referring Physician Obed De Leon PAC   Orders Evaluate and Treat as Indicated   Order Date 01/06/23   Medical Diagnosis Stress induced cardiomyopathy   Onset of illness/injury or Date of Surgery 11/08/22   Surgical/Medical history reviewed Yes   Pertinent history of current problem (include personal factors and/or comorbidities that impact the POC) Seen in Oct. 2022 for dizziness. Demonstrated high fall risk with 10/24 on DGI and suspected PPPD  On 11/8/22 found down on hallway of bldg suspected seizure. (client thinks it was more due to rapid HR, SVT), Intubated and admitted to ICU. Extubated 11/9/22. Echo showed reduced EF 40-45% and newly dx stress induced cardiomyopathy. Discharged to TCU on 11/14/22 and then was there for 2-3 weeks before discharged to home. Reports feeling better now than compared to when she was hospitalized.   Reports fear of falling and fear she will have another episode. Reports a little more tired now and may be a medication side effect. Reports that with PPPD large open spaces/stores like [employer] or [employer] would bother her but actually is a little better right now and is wondering if the medication is also helping her PPPD. Still feels a bit weak and is fearful of having another episode.   Pertinent Visual History  Wears glasses   Prior level of function comment Independent with mobility using AD.   Living environment Apartment/condo   Home/Community Accessibility Comments Sr. apt building. Independent with cooking but does get a few meals ordered up each week.   Assistive Devices Comments uses cart or rail.   Patient/Family Goals Statement more strength   Fall Risk Screen   Have you fallen 2 or more times in the past year? No   Have you fallen and had an injury in the past year? No   Timed Up and Go score (seconds) 11.38   Is patient a fall risk? Yes   Fall  screen comments still borderline at risk for falls.   Functional Scales   Functional Scales and Outcomes 5xSTS: 14.95 sec.   Pain   Pain comments low back pain chronic 3/ 10   Cognitive Status Examination   Level of Consciousness alert   Cognitive Comment some memory and hearing issues.   Observation   Observation cervical dystonia and tremor noted.   Range of Motion (ROM)   ROM Comment CROM decreased. Client asking to address neck pain.   Strength   Strength Comments B hip flexion 4+/5, B hip abduction 4+/5, B knee flexion 4+/5, B knee extension 4+/5, B dorsilfexion 5/5   Bed Mobility   Bed Mobility Comments Independent   Transfer Skills   Transfer Comments Independent   Gait   Gait Comments Arrived with cart. Able to walk without an AD but is slow and cautious.  Horizontal head turns result in LOB.  Decreased speed and B step length noted.   Gait Special Tests 25 Foot Timed Walk   Seconds 10.98   Comments Gait speed signficantly faster than when tested in Oct. 2022   Gait Special Tests Dynamic Gait Index   Score out of 24 19   Comments 9 point improvement from when she was tested in Oct. 2022.   Balance Special Tests Modified CTSIB Conditions   Condition 1, seconds 30 Seconds   Condition 2, seconds 30 Seconds   Condition 4, seconds 30 Seconds   Condition 5, seconds 30 Seconds   Planned Therapy Interventions   Planned Therapy Interventions balance training;gait training;neuromuscular re-education   Clinical Impression   Criteria for Skilled Therapeutic Interventions Met yes, treatment indicated   PT Diagnosis Balance problems. Generalized weakness   Influenced by the following impairments Decreased standing balance, LBP,   Functional limitations due to impairments difficulty with big stores and wide open spaces, fear of falling   Clinical Presentation Evolving/Changing   Clinical Presentation Rationale PPPD, co-morbidities, DGI, 5x STS, gait speed   Clinical Decision Making (Complexity) Moderate complexity    Therapy Frequency 1 time/week   Predicted Duration of Therapy Intervention (days/wks) 90 days   Risk & Benefits of therapy have been explained Yes   Patient, Family & other staff in agreement with plan of care Yes   Clinical Impression Comments Client with a hx of dizziness and PPPD. Demonstrates improved balance since last seen in October. Would like to continue to improve balance and confidence with all activities while addressing PPPD.   Goal 1   Goal Identifier Gait speed   Goal Description Client will be able to ambulate 25 ft in 8 seconds or less demonstrating increased confidence with short distance walking   Goal Progress baseline: 10.98 sec   Target Date 04/24/23   Goal 2   Goal Identifier DGI   Goal Description Client will demonstrate improvements in dynamic standing balance activities scoring a 22/24 to be at decreased risk for falls   Goal Progress baseline: 19/24   Target Date 04/24/23   Goal 3   Goal Identifier DHI   Goal Description Pt will score </=25 on the Dizziness Handicap Inventory in order to demonstrate improved QOL.   Target Date 04/24/23   Total Evaluation Time   PT Eval, Moderate Complexity Minutes (40703) 35

## 2023-01-25 NOTE — PROGRESS NOTES
[facility]                                                                                   OUTPATIENT PHYSICAL THERAPY FUNCTIONAL EVALUATION  PLAN OF TREATMENT FOR OUTPATIENT REHABILITATION  (COMPLETE FOR INITIAL CLAIMS ONLY)  Patient's Last Name, First Name, M.I.  YOB: 1945  Morenita Moore     Provider's Name   [facility]   Medical Record No.  [id number]     Start of Care Date:  01/25/23   Onset Date:  11/08/22   Type:     _X__PT   ____OT  ____SLP Medical Diagnosis:  Stress induced cardiomyopathy       PT Diagnosis:  Balance problems. Generalized weakness Visits from SOC:  1                              __________________________________________________________________________________  Plan of Treatment/Functional Goals:  balance training, gait training, neuromuscular re-education           GOALS  Gait speed  Client will be able to ambulate 25 ft in 8 seconds or less demonstrating increased confidence with short distance walking  04/24/23    DGI  Client will demonstrate improvements in dynamic standing balance activities scoring a 22/24 to be at decreased risk for falls  04/24/23    DHI  Pt will score </=25 on the Dizziness Handicap Inventory in order to demonstrate improved QOL.  04/24/23                          Therapy Frequency:  1 time/week   Predicted Duration of Therapy Intervention:  90 days    Pamela Terrell, PT                                    I CERTIFY THE NEED FOR THESE SERVICES FURNISHED UNDER        THIS PLAN OF TREATMENT AND WHILE UNDER MY CARE     (Physician co-signature of this document indicates review and certification of the therapy plan).                Certification Date From:    1/25/23  Certification Date To:   4/24/23    Referring Provider:  Obed GOLDMAN    Initial Assessment  See Epic Evaluation- Start of Care Date:  01/25/23

## 2023-02-08 ENCOUNTER — HOSPITAL ENCOUNTER (OUTPATIENT)
Dept: PHYSICAL THERAPY | Facility: CLINIC | Age: 78
Discharge: HOME | End: 2023-02-08
Payer: COMMERCIAL

## 2023-02-08 PROCEDURE — 97110 THERAPEUTIC EXERCISES: CPT | Mod: GP

## 2023-02-08 PROCEDURE — 97112 NEUROMUSCULAR REEDUCATION: CPT | Mod: GP

## 2023-02-08 PROCEDURE — 97116 GAIT TRAINING THERAPY: CPT | Mod: GP

## 2023-02-09 ENCOUNTER — OFFICE VISIT (OUTPATIENT)
Dept: FAMILY MEDICINE | Facility: CLINIC | Age: 78
End: 2023-02-09
Payer: COMMERCIAL

## 2023-02-09 VITALS
OXYGEN SATURATION: 98 % | DIASTOLIC BLOOD PRESSURE: 70 MMHG | HEART RATE: 85 BPM | TEMPERATURE: 98.8 F | WEIGHT: 130.8 LBS | HEIGHT: 65 IN | BODY MASS INDEX: 21.79 KG/M2 | SYSTOLIC BLOOD PRESSURE: 140 MMHG | RESPIRATION RATE: 16 BRPM

## 2023-02-09 DIAGNOSIS — I51.81 STRESS-INDUCED CARDIOMYOPATHY: Primary | ICD-10-CM

## 2023-02-09 DIAGNOSIS — R74.8 ELEVATED ALKALINE PHOSPHATASE LEVEL: ICD-10-CM

## 2023-02-09 LAB
ALBUMIN SERPL BCG-MCNC: 4.6 G/DL (ref 3.5–5.2)
ALP SERPL-CCNC: 84 U/L (ref 35–104)
ALT SERPL W P-5'-P-CCNC: 17 U/L (ref 10–35)
ANION GAP SERPL CALCULATED.3IONS-SCNC: 13 MMOL/L (ref 7–15)
AST SERPL W P-5'-P-CCNC: 24 U/L (ref 10–35)
BILIRUB SERPL-MCNC: 0.4 MG/DL
BUN SERPL-MCNC: 17 MG/DL (ref 8–23)
CALCIUM SERPL-MCNC: 9.5 MG/DL (ref 8.8–10.2)
CHLORIDE SERPL-SCNC: 105 MMOL/L (ref 98–107)
CREAT SERPL-MCNC: 0.64 MG/DL (ref 0.51–0.95)
DEPRECATED HCO3 PLAS-SCNC: 22 MMOL/L (ref 22–29)
GFR SERPL CREATININE-BSD FRML MDRD: >90 ML/MIN/1.73M2
GLUCOSE SERPL-MCNC: 92 MG/DL (ref 70–99)
POTASSIUM SERPL-SCNC: 4.2 MMOL/L (ref 3.4–5.3)
PROT SERPL-MCNC: 7.1 G/DL (ref 6.4–8.3)
SODIUM SERPL-SCNC: 140 MMOL/L (ref 136–145)

## 2023-02-09 PROCEDURE — 99214 OFFICE O/P EST MOD 30 MIN: CPT | Performed by: PHYSICIAN ASSISTANT

## 2023-02-09 PROCEDURE — 80053 COMPREHEN METABOLIC PANEL: CPT | Performed by: PHYSICIAN ASSISTANT

## 2023-02-09 PROCEDURE — 36415 COLL VENOUS BLD VENIPUNCTURE: CPT | Performed by: PHYSICIAN ASSISTANT

## 2023-02-09 ASSESSMENT — PATIENT HEALTH QUESTIONNAIRE - PHQ9
SUM OF ALL RESPONSES TO PHQ QUESTIONS 1-9: 5
SUM OF ALL RESPONSES TO PHQ QUESTIONS 1-9: 5
10. IF YOU CHECKED OFF ANY PROBLEMS, HOW DIFFICULT HAVE THESE PROBLEMS MADE IT FOR YOU TO DO YOUR WORK, TAKE CARE OF THINGS AT HOME, OR GET ALONG WITH OTHER PEOPLE: SOMEWHAT DIFFICULT

## 2023-02-09 ASSESSMENT — PAIN SCALES - GENERAL: PAINLEVEL: NO PAIN (0)

## 2023-02-09 NOTE — PROGRESS NOTES
"Assessment & Plan     Stress-induced cardiomyopathy  Happy she has upcoming follow-up with cardiology.  Doing very well since hospitalization.  - Comprehensive metabolic panel (BMP + Alb, Alk Phos, ALT, AST, Total. Bili, TP)    Elevated alkaline phosphatase level  Repeat CMP.  - Comprehensive metabolic panel (BMP + Alb, Alk Phos, ALT, AST, Total. Bili, TP)      30 minutes spent on the date of the encounter doing chart review, review of test results, interpretation of tests, patient visit and documentation          Return in about 3 months (around 5/9/2023).    The likelihood of other entities in the differential is insufficient to justify any further testing for them at this time. This was explained to the patient. The patient was advised that persistent or worsening symptoms would require further evaluation. Patient advised to call the office and if unable to reach to go to the emergency room if they develop any new or worsening symptoms. Expressed understanding and agreement with above stated plan.     KELLY Yanes [facility]   Morenita Moore is a 77 year old female presenting for the following health issues:  Patient presents with:  RECHECK    -Upcoming appt with electrophysiology  -Talking with a counselor.  Mood improving.   -Calming effect to Keppra. Helping with dystonia,tremor.   -Improving with vestibular rehab. Balance is improving.   - History of contrast induced/NSAID induced ROSS - now resolved.       Review of Systems   Constitutional, HEENT, cardiovascular, pulmonary, GI, , musculoskeletal, neuro, skin, endocrine and psych systems are negative, except as otherwise noted.      Objective    BP (!) 140/70   Pulse 85   Temp 98.8  F (37.1  C) (Tympanic)   Resp 16   Ht 1.651 m (5' 5\")   Wt 59.3 kg (130 lb 12.8 oz)   SpO2 98%   BMI 21.77 kg/m    5' 5\"  130 lbs 12.8 oz    Allergies   Allergen Reactions     Levalbuterol Hydrochloride Shortness Of Breath "     Cats Other (See Comments)     Itchy eyes     Dust Mites      Other reaction(s): Wheezing  Wheezing/shortness/breath/see (IRP 6/1)     Qvar [Beclomethasone]      Per patient- allergic to QVAR     Amitriptyline Palpitations     Escitalopram Anxiety     Current Outpatient Medications   Medication Sig Dispense Refill     acetaminophen (TYLENOL) 325 MG tablet Take 2 tablets (650 mg) by mouth every 4 hours as needed for mild pain       albuterol (PROAIR HFA/PROVENTIL HFA/VENTOLIN HFA) 108 (90 Base) MCG/ACT inhaler Inhale 2 puffs into the lungs every 6 hours as needed for shortness of breath / dyspnea       calcium carbonate-vitamin D 600-125 MG-UNIT TABS Take 1 tablet by mouth daily       Cyanocobalamin 50 MCG TABS Take 50 mcg by mouth once a week Sundays       fluticasone-salmeterol (ADVAIR HFA) 115-21 MCG/ACT inhaler Inhale 2 puffs into the lungs 2 times daily 12 g 11     levETIRAcetam (KEPPRA) 500 MG tablet Take 1 tablet (500 mg) by mouth 2 times daily 180 tablet 0     Past Medical History:   Diagnosis Date     Anxiety      Asthma 2012    adult-onset asthma diagnosed in 2012     Cancer (H)      DCIS (ductal carcinoma in situ) 2001    stage 0 status post left mastectomy in 2001     Depressive disorder      PONV (postoperative nausea and vomiting)      Torticollis      Tremor      Past Surgical History:   Procedure Laterality Date     BREAST SURGERY       CATARACT IOL, RT/LT       MASTECTOMY      DCIS     ODONTECTOMY Left 8/2/2022    Procedure: SURGICAL EXTRACTION, TOOTH - Teeth #12, 14, 19;  Surgeon: Arvin Garza DDS;  Location:  OR     RETINAL REATTACHMENT         Physical Exam   GENERAL: healthy, alert and no distress  EYES: Eyes grossly normal to inspection, PERRL and conjunctivae and sclerae normal  NECK: no adenopathy, no asymmetry, masses, or scars and thyroid normal to palpation  RESP: lungs clear to auscultation - no rales, rhonchi or wheezes  CV: regular rate and rhythm, normal S1 S2, no S3 or S4, no  murmur, click or rub, no peripheral edema  ABDOMEN: soft, nontender, no hepatosplenomegaly, no masses  MS: no gross musculoskeletal defects noted, no edema  SKIN: no suspicious lesions or rashes  NEURO: Normal strength and tone, mentation intact and speech normal.  Steady gait.  PSYCH: mentation appears normal, affect normal/bright

## 2023-02-10 PROBLEM — G31.9 DEGENERATIVE DISEASE OF NERVOUS SYSTEM, UNSPECIFIED (H): Status: RESOLVED | Noted: 2022-06-15 | Resolved: 2023-02-10

## 2023-02-10 PROBLEM — S83.522A SPRAIN OF POSTERIOR CRUCIATE LIGAMENT OF LEFT KNEE: Status: RESOLVED | Noted: 2018-01-15 | Resolved: 2023-02-10

## 2023-02-10 PROBLEM — Z78.9 IMPAIRED INSTRUMENTAL ACTIVITIES OF DAILY LIVING (IADL): Status: RESOLVED | Noted: 2019-07-09 | Resolved: 2023-02-10

## 2023-02-10 PROBLEM — R41.82 ALTERED MENTAL STATUS, UNSPECIFIED ALTERED MENTAL STATUS TYPE: Status: RESOLVED | Noted: 2022-11-08 | Resolved: 2023-02-10

## 2023-02-10 PROBLEM — M54.2 NECK PAIN: Status: RESOLVED | Noted: 2019-07-09 | Resolved: 2023-02-10

## 2023-02-13 ENCOUNTER — ANCILLARY PROCEDURE (OUTPATIENT)
Dept: ULTRASOUND IMAGING | Facility: CLINIC | Age: 78
End: 2023-02-13
Attending: PHYSICIAN ASSISTANT
Payer: COMMERCIAL

## 2023-02-13 DIAGNOSIS — N83.202 LEFT OVARIAN CYST: ICD-10-CM

## 2023-02-13 PROCEDURE — 76830 TRANSVAGINAL US NON-OB: CPT

## 2023-02-13 NOTE — RESULT ENCOUNTER NOTE
Rashel Xavier,     Cyst appears stable which is good. We should likely follow this up in 6 months or so as the radiologist can necessarily get a clear picture at this time.     Let me know if you have any questions or concerns,     Alexis De Leon PA-C  [facility]

## 2023-02-15 RX ORDER — ASPIRIN 81 MG/1
81 TABLET, CHEWABLE ORAL
COMMUNITY
End: 2024-06-18

## 2023-02-16 ENCOUNTER — MYC MEDICAL ADVICE (OUTPATIENT)
Dept: CARDIOLOGY | Facility: CLINIC | Age: 78
End: 2023-02-16

## 2023-02-16 ENCOUNTER — HOSPITAL ENCOUNTER (OUTPATIENT)
Facility: CLINIC | Age: 78
End: 2023-02-16
Payer: COMMERCIAL

## 2023-02-16 ENCOUNTER — OFFICE VISIT (OUTPATIENT)
Dept: CARDIOLOGY | Facility: CLINIC | Age: 78
End: 2023-02-16
Attending: INTERNAL MEDICINE
Payer: COMMERCIAL

## 2023-02-16 VITALS
HEIGHT: 65 IN | HEART RATE: 88 BPM | WEIGHT: 131 LBS | BODY MASS INDEX: 21.83 KG/M2 | SYSTOLIC BLOOD PRESSURE: 118 MMHG | OXYGEN SATURATION: 98 % | DIASTOLIC BLOOD PRESSURE: 74 MMHG

## 2023-02-16 DIAGNOSIS — I47.10 SVT (SUPRAVENTRICULAR TACHYCARDIA) (H): ICD-10-CM

## 2023-02-16 DIAGNOSIS — I51.81 STRESS-INDUCED CARDIOMYOPATHY: Primary | ICD-10-CM

## 2023-02-16 DIAGNOSIS — R00.0 TACHYCARDIA: Primary | ICD-10-CM

## 2023-02-16 PROCEDURE — 99204 OFFICE O/P NEW MOD 45 MIN: CPT | Performed by: INTERNAL MEDICINE

## 2023-02-16 NOTE — LETTER
"2/16/2023    Alexis De Leon PA-C  [address]  [facility] [address]    RE: Morenita Moore       Dear Colleague,     I had the pleasure of seeing Morenita Moore in the [facility].  PHYSICIAN NOTE:  This visit was completed in person at the [facility].      I had the pleasure of seeing Ms. Morenita Moore for evaluation of paroxysmal tachycardia.      This very pleasant 77-year-old female suffered her first episode of \"severe tachycardia\" in 2012.  Her heart was \"beating out of the chest\" and she felt unwell.  A bystander was a paramedic and confirmed that her heart rate was 188.  The patient recalls unsuccessful attempts to terminate the tachycardia with Valsalva maneuvers.  By the time she went to the ER she was in sinus rhythm.    She believes she had 2 more events in 2015.  Both times the events terminated before arriving at the ER.    In early November 2022 she was found unresponsive at her living facility.  When the paramedics arrived she was mentally altered and incontinent of urine.  She was brought to the ER where she had a witnessed seizure.  She was given IV Ativan and was intubated.  Before intubation, she already had blood in her lips.  Her ECG demonstrated ST elevation anterolaterally with troponin of 183.  Troponin peaked at 2380.  She was seen by neurology and was started on Keppra for seizure disorder.  Echocardiogram showed EF 40-45% with apical wall motion abnormality, consistent with stress cardiomyopathy.  Her ECG evolved with inverted anterolateral T-waves, also consistent with stress cardiomyopathy.    Today the patient recalls that her heart went into tachycardia few minutes before she \"passed out\" and she was texting a friend about it, presumably moments before she \"passed out\".  The next thing she remembers is waking up in the ICU.    Ever since Morenita has felt concerned and insecure about her health and the possibility of having another " tachycardia episode.  She mentioned that she developed vertigo after taking a beta-blocker in the past.    Past medical history significant for anxiety, mild COPD, DCIS with previous left mastectomy, osteoporosis/kyphoscoliosis.    Social history: Ex-smoker, no significant use of alcohol.  She has a son who lives in [place].      PHYSICAL EXAMINATION:  Vital signs: 118/74, 88  General:  in no apparent distress  ENT/Mouth: Head tremor, no nasal discharge.  Eyes:  normal conjunctivae.   Neck:  no thyromegaly or lymphadenopathy.  Chest/Lungs: Kyphoscoliosis, patient is not dyspneic.  Lungs CTA, without rales or wheezing.   Cardiovascular: Normal JVP, rhythm is regular.  No gallop, murmur or rub.    Abdomen:  no abdominal distention.  Soft, negative HJR.    Extremities:  no edema  Skin:  no xanthelasma.    Neurologic:  alert & oriented x 3.  No tremor.    Vascular:  2+ carotids without bruits.  2+ radials.        DIAGNOSTIC STUDIES:  Laboratory studies: Sodium 140, potassium 4.2, creatinine 0.64, hematocrit 42%  ECG (November 2022): Sinus rhythm, no ventricular preexcitation, anterolateral T wave inversion  Echocardiogram (12/2022: This is her most recent study which shows EF 60-65%, normal RV      IMPRESSION:  1. Paroxysmal tachycardia since 2012.  Most likely she has paroxysmal SVT, however no documentation thus far.  2. Stress cardiomyopathy in 11/2022.  EF later normalized.  The patient expressed concern about her aortic valve, however most recent echocardiogram showed a normal aortic valve.  She was reassured.  3. Seizure disorder.  On Keppra.  4. Anxiety.  She is very emotional about her medical issues.    RECOMMENDATIONS:  1. Given the presumptive diagnosis of SVT we discussed 2 options:  empiric trial of diltiazem versus EP study/catheter ablation.  Of note, she did not tolerate beta-blockers in the past.  2. I explained how we perform EP studies, what are the risks/benefits and expectations.  Her clinical  tachycardia may or may not be inducible in the EP lab.  If inducible, we may be able to successfully ablate and cure.  If not inducible, I would then recommend pharmacologic therapy.  The risk of serious complication related to the invasive procedure is 1-2%  3. After good discussion the patient expressed her preference to undergo EP study.  She lives alone.  She will ask her son to stay with her on the day of the procedure.    It was my pleasure seeing this delightful patient.  Please feel free to call with any questions.      Germain Parekh MD, [facility]        Encounter Diagnosis   Name Primary?     SVT (supraventricular tachycardia) (H)        CURRENT MEDICATIONS:  Current Outpatient Medications   Medication Sig Dispense Refill     acetaminophen (TYLENOL) 325 MG tablet Take 2 tablets (650 mg) by mouth every 4 hours as needed for mild pain       albuterol (PROAIR HFA/PROVENTIL HFA/VENTOLIN HFA) 108 (90 Base) MCG/ACT inhaler Inhale 2 puffs into the lungs every 6 hours as needed for shortness of breath / dyspnea       aspirin (ASA) 81 MG chewable tablet Take 81 mg by mouth daily       calcium carbonate-vitamin D 600-125 MG-UNIT TABS Take 1 tablet by mouth daily       Cyanocobalamin 50 MCG TABS Take 50 mcg by mouth once a week Sundays       fluticasone-salmeterol (ADVAIR HFA) 115-21 MCG/ACT inhaler Inhale 2 puffs into the lungs 2 times daily 12 g 11     levETIRAcetam (KEPPRA) 500 MG tablet Take 1 tablet (500 mg) by mouth 2 times daily 180 tablet 0       ALLERGIES     Allergies   Allergen Reactions     Levalbuterol Hydrochloride Shortness Of Breath     Cats Other (See Comments)     Itchy eyes     Dust Mites      Other reaction(s): Wheezing  Wheezing/shortness/breath/see (IRP 6/1)     Qvar [Beclomethasone]      Per patient- allergic to QVAR     Amitriptyline Palpitations     Escitalopram Anxiety       PAST MEDICAL HISTORY:  Past Medical History:   Diagnosis Date     Anxiety      Asthma 2012    adult-onset asthma  diagnosed in 2012     Cancer (H)      DCIS (ductal carcinoma in situ)     stage 0 status post left mastectomy in      Depressive disorder      PONV (postoperative nausea and vomiting)      Torticollis      Tremor        PAST SURGICAL HISTORY:  Past Surgical History:   Procedure Laterality Date     BREAST SURGERY       CATARACT IOL, RT/LT       MASTECTOMY      DCIS     ODONTECTOMY Left 2022    Procedure: SURGICAL EXTRACTION, TOOTH - Teeth #12, 14, 19;  Surgeon: Arvin Garza DDS;  Location: [facility] OR     RETINAL REATTACHMENT         FAMILY HISTORY:  Family History   Problem Relation Age of Onset     Cancer Sister         breast cancer     Glaucoma No family hx of      Macular Degeneration No family hx of      Asthma Sister      Breast Cancer Sister      Heart Failure Mother          at 97       SOCIAL HISTORY:  Social History     Socioeconomic History     Marital status: Single     Spouse name: None     Number of children: None     Years of education: None     Highest education level: None   Tobacco Use     Smoking status: Former     Packs/day: 1.00     Years: 10.00     Pack years: 10.00     Types: Cigarettes     Quit date:      Years since quittin.1     Smokeless tobacco: Never     Tobacco comments:     Ages [age] then ages [age].    Vaping Use     Vaping Use: Never used   Substance and Sexual Activity     Alcohol use: No     Drug use: Never     Sexual activity: Not Currently         CC  Abram Paul MD  [address]  [place] [address]    Thank you for allowing me to participate in the care of your patient.      Sincerely,     Germain Parekh MD     [facility]

## 2023-02-16 NOTE — PROGRESS NOTES
"PHYSICIAN NOTE:  This visit was completed in person at the [facility].      I had the pleasure of seeing Ms. Morenita Moore for evaluation of paroxysmal tachycardia.      This very pleasant 77-year-old female suffered her first episode of \"severe tachycardia\" in 2012.  Her heart was \"beating out of the chest\" and she felt unwell.  A bystander was a paramedic and confirmed that her heart rate was 188.  The patient recalls unsuccessful attempts to terminate the tachycardia with Valsalva maneuvers.  By the time she went to the ER she was in sinus rhythm.    She believes she had 2 more events in 2015.  Both times the events terminated before arriving at the ER.    In early November 2022 she was found unresponsive at her living facility.  When the paramedics arrived she was mentally altered and incontinent of urine.  She was brought to the ER where she had a witnessed seizure.  She was given IV Ativan and was intubated.  Before intubation, she already had blood in her lips.  Her ECG demonstrated ST elevation anterolaterally with troponin of 183.  Troponin peaked at 2380.  She was seen by neurology and was started on Keppra for seizure disorder.  Echocardiogram showed EF 40-45% with apical wall motion abnormality, consistent with stress cardiomyopathy.  Her ECG evolved with inverted anterolateral T-waves, also consistent with stress cardiomyopathy.    Today the patient recalls that her heart went into tachycardia few minutes before she \"passed out\" and she was texting a friend about it, presumably moments before she \"passed out\".  The next thing she remembers is waking up in the ICU.    Ever since Morenita has felt concerned and insecure about her health and the possibility of having another tachycardia episode.  She mentioned that she developed vertigo after taking a beta-blocker in the past.    Past medical history significant for anxiety, mild COPD, DCIS with previous left mastectomy, " osteoporosis/kyphoscoliosis.    Social history: Ex-smoker, no significant use of alcohol.  She has a son who lives in [place].      PHYSICAL EXAMINATION:  Vital signs: 118/74, 88  General:  in no apparent distress  ENT/Mouth: Head tremor, no nasal discharge.  Eyes:  normal conjunctivae.   Neck:  no thyromegaly or lymphadenopathy.  Chest/Lungs: Kyphoscoliosis, patient is not dyspneic.  Lungs CTA, without rales or wheezing.   Cardiovascular: Normal JVP, rhythm is regular.  No gallop, murmur or rub.    Abdomen:  no abdominal distention.  Soft, negative HJR.    Extremities:  no edema  Skin:  no xanthelasma.    Neurologic:  alert & oriented x 3.  No tremor.    Vascular:  2+ carotids without bruits.  2+ radials.        DIAGNOSTIC STUDIES:  Laboratory studies: Sodium 140, potassium 4.2, creatinine 0.64, hematocrit 42%  ECG (November 2022): Sinus rhythm, no ventricular preexcitation, anterolateral T wave inversion  Echocardiogram (12/2022: This is her most recent study which shows EF 60-65%, normal RV      IMPRESSION:  1. Paroxysmal tachycardia since 2012.  Most likely she has paroxysmal SVT, however no documentation thus far.  2. Stress cardiomyopathy in 11/2022.  EF later normalized.  The patient expressed concern about her aortic valve, however most recent echocardiogram showed a normal aortic valve.  She was reassured.  3. Seizure disorder.  On Keppra.  4. Anxiety.  She is very emotional about her medical issues.    RECOMMENDATIONS:  1. Given the presumptive diagnosis of SVT we discussed 2 options:  empiric trial of diltiazem versus EP study/catheter ablation.  Of note, she did not tolerate beta-blockers in the past.  2. I explained how we perform EP studies, what are the risks/benefits and expectations.  Her clinical tachycardia may or may not be inducible in the EP lab.  If inducible, we may be able to successfully ablate and cure.  If not inducible, I would then recommend pharmacologic therapy.  The risk of serious  complication related to the invasive procedure is 1-2%  3. After good discussion the patient expressed her preference to undergo EP study.  She lives alone.  She will ask her son to stay with her on the day of the procedure.    It was my pleasure seeing this delightful patient.  Please feel free to call with any questions.      Germain Parekh MD, [facility]        Encounter Diagnosis   Name Primary?     SVT (supraventricular tachycardia) (H)        CURRENT MEDICATIONS:  Current Outpatient Medications   Medication Sig Dispense Refill     acetaminophen (TYLENOL) 325 MG tablet Take 2 tablets (650 mg) by mouth every 4 hours as needed for mild pain       albuterol (PROAIR HFA/PROVENTIL HFA/VENTOLIN HFA) 108 (90 Base) MCG/ACT inhaler Inhale 2 puffs into the lungs every 6 hours as needed for shortness of breath / dyspnea       aspirin (ASA) 81 MG chewable tablet Take 81 mg by mouth daily       calcium carbonate-vitamin D 600-125 MG-UNIT TABS Take 1 tablet by mouth daily       Cyanocobalamin 50 MCG TABS Take 50 mcg by mouth once a week Sundays       fluticasone-salmeterol (ADVAIR HFA) 115-21 MCG/ACT inhaler Inhale 2 puffs into the lungs 2 times daily 12 g 11     levETIRAcetam (KEPPRA) 500 MG tablet Take 1 tablet (500 mg) by mouth 2 times daily 180 tablet 0       ALLERGIES     Allergies   Allergen Reactions     Levalbuterol Hydrochloride Shortness Of Breath     Cats Other (See Comments)     Itchy eyes     Dust Mites      Other reaction(s): Wheezing  Wheezing/shortness/breath/see (IRP 6/1)     Qvar [Beclomethasone]      Per patient- allergic to QVAR     Amitriptyline Palpitations     Escitalopram Anxiety       PAST MEDICAL HISTORY:  Past Medical History:   Diagnosis Date     Anxiety      Asthma 2012    adult-onset asthma diagnosed in 2012     Cancer (H)      DCIS (ductal carcinoma in situ) 2001    stage 0 status post left mastectomy in 2001     Depressive disorder      PONV (postoperative nausea and vomiting)      Torticollis       Tremor        PAST SURGICAL HISTORY:  Past Surgical History:   Procedure Laterality Date     BREAST SURGERY       CATARACT IOL, RT/LT       MASTECTOMY      DCIS     ODONTECTOMY Left 2022    Procedure: SURGICAL EXTRACTION, TOOTH - Teeth #12, 14, 19;  Surgeon: Arvin Garza DDS;  Location: [facility] OR     RETINAL REATTACHMENT         FAMILY HISTORY:  Family History   Problem Relation Age of Onset     Cancer Sister         breast cancer     Glaucoma No family hx of      Macular Degeneration No family hx of      Asthma Sister      Breast Cancer Sister      Heart Failure Mother          at 97       SOCIAL HISTORY:  Social History     Socioeconomic History     Marital status: Single     Spouse name: None     Number of children: None     Years of education: None     Highest education level: None   Tobacco Use     Smoking status: Former     Packs/day: 1.00     Years: 10.00     Pack years: 10.00     Types: Cigarettes     Quit date:      Years since quittin.1     Smokeless tobacco: Never     Tobacco comments:     Ages [age] then ages [age].    Vaping Use     Vaping Use: Never used   Substance and Sexual Activity     Alcohol use: No     Drug use: Never     Sexual activity: Not Currently         CC  Abram Paul MD  [address]  [address]  [place] [address]

## 2023-02-27 NOTE — PROGRESS NOTES
Per [facility], shower chair has been delivered on  2/17/2023    So Hollins  Care Management Specialist  [facility]  [phone]

## 2023-03-02 ENCOUNTER — HOSPITAL ENCOUNTER (OUTPATIENT)
Dept: PHYSICAL THERAPY | Facility: CLINIC | Age: 78
Discharge: HOME | End: 2023-03-02
Payer: COMMERCIAL

## 2023-03-02 DIAGNOSIS — I51.81 STRESS-INDUCED CARDIOMYOPATHY: Primary | ICD-10-CM

## 2023-03-02 DIAGNOSIS — R42 DIZZINESS: ICD-10-CM

## 2023-03-02 DIAGNOSIS — R26.89 BALANCE PROBLEMS: ICD-10-CM

## 2023-03-02 PROCEDURE — 97110 THERAPEUTIC EXERCISES: CPT | Mod: GP | Performed by: PHYSICAL THERAPIST

## 2023-03-06 ENCOUNTER — TELEPHONE (OUTPATIENT)
Dept: CARDIOLOGY | Facility: CLINIC | Age: 78
End: 2023-03-06
Payer: COMMERCIAL

## 2023-03-06 ENCOUNTER — TRANSFERRED RECORDS (OUTPATIENT)
Dept: HEALTH INFORMATION MANAGEMENT | Facility: CLINIC | Age: 78
End: 2023-03-06

## 2023-03-06 DIAGNOSIS — I48.0 PAROXYSMAL ATRIAL FIBRILLATION (H): Primary | ICD-10-CM

## 2023-03-06 NOTE — TELEPHONE ENCOUNTER
f03/06/23 Pt returned call and LVM on Afib RN line. Attempted to call pt back, reached  and requested callback  KHerroRN 1033 am

## 2023-03-06 NOTE — TELEPHONE ENCOUNTER
03/06/23 Pt called and LVM  to report she was taken to [facility] last night after calling 911. She woke up with severe palpitations and tachycardia and found to be in Afib w RVR. She has been admitted and is currently on rate control with Diltiazem .   Pt w hx of SVT and has EP study/ poss SVT Ablation planned with Dr Parekh on 3/24   She wanted an update sent to Dr Parekh  Attempted to call pt back, reached VM, LM and requested callback.  Sending update to Dr Izabella Huggins 930 am

## 2023-03-07 ENCOUNTER — TELEPHONE (OUTPATIENT)
Dept: CARDIOLOGY | Facility: CLINIC | Age: 78
End: 2023-03-07

## 2023-03-07 NOTE — TELEPHONE ENCOUNTER
Received EKG tracings from ED visit 3/6.  Copy for OV on 3/9 with Dr Parekh.  Sent to [facility] to scan.  LC Obrien

## 2023-03-07 NOTE — TELEPHONE ENCOUNTER
Spoke to patient who was in ED on 3/6 with AF w/RVR (new diagnoss for patient).  Patient converted on IV diltiazem and was discharged on oral diltiazem 120mg po every day and started it today.  Discussed and updated Dr Parekh orally regarding above patient as patient was scheduled for SVT ablation on 3/24.  With above this will change the plan for her care.  Dr Parekh will be seeing patient in clinic on 3/9 to discuss new plan.  Cancelled SVT ablation for 3/24.  LC Obrien

## 2023-03-09 ENCOUNTER — TELEPHONE (OUTPATIENT)
Dept: CARDIOLOGY | Facility: CLINIC | Age: 78
End: 2023-03-09

## 2023-03-09 ENCOUNTER — MYC MEDICAL ADVICE (OUTPATIENT)
Dept: CARDIOLOGY | Facility: CLINIC | Age: 78
End: 2023-03-09

## 2023-03-09 DIAGNOSIS — I48.0 PAROXYSMAL ATRIAL FIBRILLATION (H): Primary | ICD-10-CM

## 2023-03-09 RX ORDER — VERAPAMIL HYDROCHLORIDE 180 MG/1
180 TABLET, EXTENDED RELEASE ORAL AT BEDTIME
Qty: 30 TABLET | Refills: 0 | Status: SHIPPED | OUTPATIENT
Start: 2023-03-09 | End: 2023-03-10 | Stop reason: ALTCHOICE

## 2023-03-09 NOTE — TELEPHONE ENCOUNTER
03/09/23 Spoke w pt and explained recommendations from Dr Parekh  She believes the Diltiazem is making her sick and is in agreement to stopping it.  She does not have Verapamil at home, she was only given in by EMS when she called 911 the other night . It did help slow her heart rate a bit but did not convert her.  Will watch Dr Parekh schedule for any cancellations next week, otherwise will work with him on fitting her in somewhere.  Verified she has Afib RN and after hours cardiology phone #  Will follow up w pt tomorrow afternoon.  Pt voiced understanding and agreement with plan.   Bhavna 2 pm

## 2023-03-09 NOTE — TELEPHONE ENCOUNTER
3/9/23 Spoke w pt who stated her dizziness is getting better but still feels a bit shakey . She continues to have frequent loose stools. Her BP was checked by a friend and was 144/78.  Pt does not have anyone to come and bring her to appt this afternoon and she does not have virtual capability on her phone but could do a phone visit instead of coming to clinic today to see Dr Parekh as previously planned.  Pt is asking about Diltiazem and wondering if she could go back to Verapamil instead.  Will update Dr Parekh and madhav pt back w recommendations.  Pt voiced understanding and agreement with plan.   Bhavna 1010 am

## 2023-03-09 NOTE — TELEPHONE ENCOUNTER
03/09/23 Pt called [facility] this am, she took her Keppra, Diltiazem  mg and her Baby Asa approx one hour ago. She then became very shaky and dizzy. She had a bowl of cereal and about 1/2 cup of tea prior before meds . She does not have a BP cuff but her oximeter is showing pulse rate 79-81 and O2 sat is running 97% . She also is experiencing frequent soft stools ( no diarrhea) .  In the past few days she has tolerated meds without problems but had spread out the Keppra and Diltiazem.   Encouraged pt to lay down and rest to see if sx subside. She will call a neighbor who may have a BP cuff.  Plan was made to call pt back in 1 hr to re-assess. Encouraged her to call me or 911 if sx worsen prior  KHerroRN 853 am

## 2023-03-09 NOTE — TELEPHONE ENCOUNTER
Lets try verapamil  mg daily.  That should replace diltiazem.  Not sure she will tolerate it, but reasonable to try.  DI

## 2023-03-09 NOTE — TELEPHONE ENCOUNTER
If she believes that diltiazem makes her sick, she should not take it anymore.    It sounds like she took verapamil in the past without problems, that would be an acceptable alternative, that she recall what dose she took?    Since she cannot make the appointment in the EP clinic today, let's try to fit her in sometime next week.    DI

## 2023-03-10 RX ORDER — VERAPAMIL HYDROCHLORIDE 40 MG/1
TABLET ORAL
Qty: 30 TABLET | Refills: 1 | Status: SHIPPED | OUTPATIENT
Start: 2023-03-10 | End: 2023-09-20

## 2023-03-10 NOTE — TELEPHONE ENCOUNTER
3/10/23 Spoke w pt and explained recommendations. She stated she is feeling much better today and has been active, doing lots of things . Diarrhea and dizziness has subsides.  Rx sent to [facility] in [place]  Appt on hold for pt on 3/16 at 1245. Message sent to scheduling to place pt in that time spot.  Pt voiced understanding and agreement with plan.   Bhavna 153 pm

## 2023-03-10 NOTE — TELEPHONE ENCOUNTER
3/10/23 Attempted to call pt but reached [facility] and requested callback.  OV on hold for pt on 3/16 at 1245 pm, pt unaware   KHerroRN 1140 am

## 2023-03-10 NOTE — TELEPHONE ENCOUNTER
This is getting increasingly challenging...  Let's try (short-acting) verapamil 60 mg as needed for tachycardia, #30, 1 refill.    I am concerned it will not work all that well because it will take at least 1-2 hours for the medication to have any effect after taking orally.  But it is reasonable to try.  DI

## 2023-03-14 ENCOUNTER — PATIENT OUTREACH (OUTPATIENT)
Dept: GERIATRIC MEDICINE | Facility: CLINIC | Age: 78
End: 2023-03-14
Payer: COMMERCIAL

## 2023-03-14 NOTE — PROGRESS NOTES
TRANSITIONS OF CARE (THERESA) LOG   THERESA tasks should be completed by the CC within one (1) business day of notification of each transition. Follow up contact with member is required after return to their usual care setting.  Note:  If CC finds out about the transitions fifteen (15) days or more after the member has returned to their usual care setting, no THERESA log is needed. However, the CC should check in with the member to discuss the transition process, any changes needed to the care plan and document it in a case note.    Member Name:  [facility] [id number]   Product: [facility] Care Coordinator Contact:  ASHLEIGH Villalpando Agency/County/Care System: [place]   Transition Communication Actions from Care Management Contact   Transition #1   Notification Date: 3/14/23 Transition Date:   3/6/23 Transition From: Home     Is this the member s usual care setting?               yes Transition To: Hospital, [facility]    Transition Type:  Unplanned  Reason for Admission/Comments:  Morenita Moore admitted on 3/6 to [facility] due to palpitations and tachycardia.    Contact member/responsible party to offer assistance with transition Date completed: Care coordinator was not notified of transition until member was back home. Care coordinator was notified today 3/14/2023    Notes from conversation with the member/responsible party, provider, discharging and receiving facility (as applicable):   Date 3/14/23 NA        Notified PCP of transition--Date completed:  3/14/23     via  EMR  Name of PCP: Alexis De Leon     Transition #2   Notification Date: 3/14/23 Transition Date:  3/6/23 Transition From: Hospital, [facility]      Is this the member s usual care setting?               no Transition To: Home   Transition Type:  Planned  Reason for Admission/Comments:   She discharged on 3/6 to home.    Contact member/responsible party to offer assistance with  transition Date completed: care coordinator was not notified on transition until today 3/14/23.     Notes from conversation with the member/responsible party, provider, discharging and receiving facility (as applicable):   Date 3/14/23 :CC contacted member and reviewed discharge summary.  Member has a follow-up appointment with PCP in 7 days: No: Offered Assistance with setting up a follow up appointment   Member has had a change in condition: No  Home visit needed: No  Care plan reviewed and updated.  The following home based services [facility] were resumed.  New referrals placed: No  Transition log completed.   PCP, Alexis De Leon, notified of transition back to home via EMR.          Notified PCP of transition--Date completed:  3/14/2023     via  EMR  Name of PCP: Alexis De Leon                                 *RETURN TO USUAL CARE SETTING: *Complete tasks below when the member is discharging TO their usual care setting within one (1) business day of notification..      For situations where the Care Coordinator is notified of the discharge prior to the date of discharge, the Care Coordinator must follow up with the member or designated representative to confirm that discharge actually occurred and discuss required THERESA tasks as outlined in the THERESA Instructions.  (This includes situations where it may be a  new  usual care setting for the member. (i.e., a community member who decides upon permanent nursing home placement following hospitalization and rehab).    Discuss with Member/Responsible Party:    Check  Yes  - if the member, family member and/or SNF/facility staff manages the following:    If  No  provide explanation in the comments section.          Date completed: 3/14/23 Communicated with member or their designated representative about the following:  care transition process; about changes to the member s health status; plan of care updates; education about transitions and how to prevent unplanned  transitions/readmissions    Four Pillars for Optimal Transition:    Check  Yes  - if the member, family member and/or SNF/facility staff manages the following:    If  No  provide explanation in the comments section.          []  Yes     [x]  No Does the member have a follow-up appointment scheduled with primary care or specialist? (Mental health hospitalizations--the appt. should be w/in 7 days)              For mental health hospitalizations:  []  Yes     []  No     Does the member have a follow-up appointment scheduled with a mental health practitioner within 7 days of discharge?  [x]  Yes     []  No     Has a medication review been completed with member? If no, refer to PCP, home care nurse, MTM, pharmacist  [x]  Yes     []  No     Can the member manage their medications or is there a system in place to manage medications (e.g. home care set-up)?         [x]  Yes     []  No     Can the member verbalize warning signs and symptoms to watch for and how to respond?  [x]  Yes     []  No     Does the member have a copy of and understand their discharge instructions?  If no, assist to obtain copy of discharge instructions, review discharge instructions, and assist to contact PCP to discuss questions about their recent hospitalization.  [x]  Yes     []  No     Does the member have adequate food, housing and transportation?  If no, add goal and discuss additional supports available to the member                                                                                                                                                                                 [x]  Yes     []  No     Is the member safe in their home?  If no, document needs and support provided                                                                                                                                                                          []  Yes     [x]  No     Are there any concerns of vulnerability, abuse, or neglect?  If  yes, document concerns and actions taken by Care Coordinator as a mandated                                                                                                                                                                              [x]  Yes     []  No     Does the member use a Personal Health Care Record?  Check  Yes  if visit summary, discharge summary, and/or healthcare summary are being used as a PHR.                                                                                                                                                                                  [x]  Yes     []  No     Have you reviewed the discharge summary with the member? If  No  provide explanation in comments.  [x]  Yes     []  No     Have you updated the member s care plan/support plan? Add new diagnosis, medications, treatments, goals & interventions, as applicable. If No, provide explanation in comments.    Comments:    NA

## 2023-03-15 ENCOUNTER — OFFICE VISIT (OUTPATIENT)
Dept: NEUROLOGY | Facility: CLINIC | Age: 78
End: 2023-03-15
Payer: COMMERCIAL

## 2023-03-15 VITALS — BODY MASS INDEX: 21.63 KG/M2 | WEIGHT: 130 LBS

## 2023-03-15 DIAGNOSIS — R56.9 SEIZURES (H): Primary | ICD-10-CM

## 2023-03-15 PROCEDURE — 99215 OFFICE O/P EST HI 40 MIN: CPT | Performed by: INTERNAL MEDICINE

## 2023-03-15 NOTE — NURSING NOTE
Morenita Moore's goals for this visit include:   Chief Complaint   Patient presents with     Follow Up     Seizure recently      She requests these members of her care team be copied on today's visit information:     PCP: Alexis De Leon    Referring Provider:  No referring provider defined for this encounter.    Wt 59 kg (130 lb)   BMI 21.63 kg/m      Do you need any medication refills at today's visit? Possibly more Eddie NUGENT EMT

## 2023-03-15 NOTE — PROGRESS NOTES
"[facility] Neurology Follow Up Visit    Morenita Moore MRN# [id number]   Age: 77 year old YOB: 1945     Brief history of symptoms: The patient was initially seen in neurologic consultation on 4/16/2021 for evaluation of dizziness. Please see the comprehensive neurologic consultation note from that date in the Epic records for details.     Interval history:   Patient had witnessed seizure episode in November 2022. She remembers having a sensation of \"SVT\" in her chest. She went into the hallway, but she doesn't remember doing this. She was found in the hallway crying/yelling, not making any sense, which she also doesn't remember. She started jerking and she lost her urine. 911 was called. She doesn't remember the ambulance ride. In the ER she had \"seizure-like\" activity and she was given ativan and Keppra. She had to be intubated for airway protection. She was able to be extubated the next day. She has no memory until the next day. She was discharged on Keppra 1 g BID. She has had no events concerning for seizures since discharge. Keppra was decreased to 500 mg BID, which has helped with dizziness side effects.     She was up a little later the night before the seizure. She didn't have any alcohol the night before the seizure.     She was diagnosed with E Coli UTI. She also had stress-induced cardiomyopathy.     She was recently diagnosed with atrial fibrillation earlier this month. She was seen at [facility]. She has a visit with her cardiologist tomorrow.       Past Medical History:     Patient Active Problem List   Diagnosis     Acute internal derangement of left knee     Asthma in adult     Breast cancer in situ     SVT (supraventricular tachycardia) (H)     Torticollis     Gastroesophageal reflux disease without esophagitis     Major depression in remission (H)     Pericarditis     Primary osteoarthritis of both knees     CELESTE (generalized anxiety disorder)     Presbyopia     Senile nuclear sclerosis     " Tremor, physiological     Essential hypertension     Dizziness     Benzodiazepine dependence (H)     Mild persistent asthma without complication     Seizure (H)     Stress-induced cardiomyopathy     Past Medical History:   Diagnosis Date     Anxiety      Asthma     adult-onset asthma diagnosed in      Cancer (H)      DCIS (ductal carcinoma in situ)     stage 0 status post left mastectomy in      Depressive disorder      PONV (postoperative nausea and vomiting)      Torticollis      Tremor         Past Surgical History:     Past Surgical History:   Procedure Laterality Date     BREAST SURGERY       CATARACT IOL, RT/LT       MASTECTOMY      DCIS     ODONTECTOMY Left 2022    Procedure: SURGICAL EXTRACTION, TOOTH - Teeth #12, 14, 19;  Surgeon: Arvin Garza DDS;  Location: [facility] OR     RETINAL REATTACHMENT          Social History:     Social History     Tobacco Use     Smoking status: Former     Packs/day: 1.00     Years: 10.00     Pack years: 10.00     Types: Cigarettes     Quit date:      Years since quittin.2     Smokeless tobacco: Never     Tobacco comments:     Ages [age] then ages [age].    Vaping Use     Vaping Use: Never used   Substance Use Topics     Alcohol use: No     Drug use: Never        Family History:     Family History   Problem Relation Age of Onset     Cancer Sister         breast cancer     Glaucoma No family hx of      Macular Degeneration No family hx of      Asthma Sister      Breast Cancer Sister      Heart Failure Mother          at 97        Medications:     Current Outpatient Medications   Medication Sig     levETIRAcetam (KEPPRA) 500 MG tablet Take 1 tablet (500 mg) by mouth 2 times daily     acetaminophen (TYLENOL) 325 MG tablet Take 2 tablets (650 mg) by mouth every 4 hours as needed for mild pain     albuterol (PROAIR HFA/PROVENTIL HFA/VENTOLIN HFA) 108 (90 Base) MCG/ACT inhaler Inhale 2 puffs into the lungs every 6 hours as needed for shortness of breath /  dyspnea     aspirin (ASA) 81 MG chewable tablet Take 81 mg by mouth daily     Blood Pressure Monitor KIT 1 Device as needed (for home BP moniotoring)     calcium carbonate-vitamin D 600-125 MG-UNIT TABS Take 1 tablet by mouth daily     Cyanocobalamin 50 MCG TABS Take 50 mcg by mouth once a week Sundays     fluticasone-salmeterol (ADVAIR HFA) 115-21 MCG/ACT inhaler Inhale 2 puffs into the lungs 2 times daily     verapamil (CALAN) 40 MG tablet Take 1.5 tablets ( 60 mg) x 1 as needed per day for tachycardia     No current facility-administered medications for this visit.        Allergies:     Allergies   Allergen Reactions     Levalbuterol Hydrochloride Shortness Of Breath     Cats Other (See Comments)     Itchy eyes     Dust Mites      Other reaction(s): Wheezing  Wheezing/shortness/breath/see (IRP 6/1)     Qvar [Beclomethasone]      Per patient- allergic to QVAR     Amitriptyline Palpitations     Escitalopram Anxiety        Review of Systems:   As above     Physical Exam:   Vitals: Wt 59 kg (130 lb)   BMI 21.63 kg/m   Patient refused blood pressure and heart rate readings today.   General: Seated comfortably in no acute distress.  Lungs: breathing comfortably  Neurologic:     Mental Status: Fully alert, attentive. Grossly normal memory and fund of knowledge. Language normal, speech clear and fluent, no paraphasic errors.      Cranial Nerves: Visual fields intact. PERRL. EOMI with normal smooth pursuit. No dysarthria. Shoulder shrug strong bilaterally.      Motor: Head turn to the left with constant head tremor through encounter. Muscle tone normal throughout extremities. Strength 5/5 throughout upper and lower extremities.     Sensory: Negative Romberg.      Coordination: Finger-nose-finger and heel-shin intact without dysmetria.      Gait: Steady casual gait, slight variability in speed. Able to slowly take a few steps in tandem with occasional out step.      Data reviewed on previous visits    Imaging:  MRI brain  without contrast 7/2020  1.  No acute intracranial process.  2.  Generalized brain atrophy and presumed microvascular ischemic changes similar to findings on the 2018 MRI.    Laboratory:  TSH normal 1/2020    Pertinent Investigations since last visit:   MRI brain 8/2022  IMPRESSION:  1.  No acute intracranial process.  2.  Generalized brain atrophy and presumed microvascular ischemic changes as detailed above.    EEG 8/2022  IMPRESSION OF VIDEO EEG DAY # 1: This video electroencephalogram is abnormal due to the presence of diffuse low amplitude theta delta slowing and intermittent generalized attenuation of the background, consistent with moderately severe diffuse nonspecific encephalopathy, which could in part be due to sedative medications.  No electrographic seizures or epileptiform discharges were recorded. Clinical correlation is advised.          Assessment and Plan:   Assessment:  Morenita Moore is a 77 year old female who presents for follow-up of seizures. Patient had one time seizure episode in November. Etiology of seizures was not found. MRI brain was normal. EEG was unremarkable, but was performed in the setting of sedating medications. She was treated for UTI. She continues on Keppra 500 mg BID without clear side effects. She thinks it helps her dystonia a little. We discussed repeating EEG off of sedation to evaluate for underlying epileptiform discharges. Given one time seizure episode she does not meet criteria for epilepsy at this time. We can discuss plans for continuation of Keppra long term after the EEG.     Patient previously was seen for cervical dystonia and dizziness. These symptoms are stable to mildly improved. See prior documentation for discussion of these issues.       Plan:  - Continue Keppra 500 mg BID  - 3 hour routine EEG    Follow up in Neurology clinic pending above    Hong Hansen MD   of Neurology  [facility]    The total time of this  encounter today amounted to 44 minutes. This time included time spent with the patient on the telephone, prep work, ordering tests, and performing post visit documentation.

## 2023-03-15 NOTE — LETTER
"    3/15/2023         RE: Morenita Moore  [address]  [place] [place] [id number]        Dear Colleague,    Thank you for referring your patient, Morenita Moore, to the [facility] [place]. Please see a copy of my visit note below.    [facility] Neurology Follow Up Visit    Morenita Moore MRN# [id number]   Age: 77 year old YOB: 1945     Brief history of symptoms: The patient was initially seen in neurologic consultation on 4/16/2021 for evaluation of dizziness. Please see the comprehensive neurologic consultation note from that date in the Epic records for details.     Interval history:   Patient had witnessed seizure episode in November 2022. She remembers having a sensation of \"SVT\" in her chest. She went into the hallway, but she doesn't remember doing this. She was found in the hallway crying/yelling, not making any sense, which she also doesn't remember. She started jerking and she lost her urine. 911 was called. She doesn't remember the ambulance ride. In the ER she had \"seizure-like\" activity and she was given ativan and Keppra. She had to be intubated for airway protection. She was able to be extubated the next day. She has no memory until the next day. She was discharged on Keppra 1 g BID. She has had no events concerning for seizures since discharge. Keppra was decreased to 500 mg BID, which has helped with dizziness side effects.     She was up a little later the night before the seizure. She didn't have any alcohol the night before the seizure.     She was diagnosed with E Coli UTI. She also had stress-induced cardiomyopathy.     She was recently diagnosed with atrial fibrillation earlier this month. She was seen at [facility]. She has a visit with her cardiologist tomorrow.       Past Medical History:     Patient Active Problem List   Diagnosis     Acute internal derangement of left knee     Asthma in adult     Breast cancer in situ     SVT " (supraventricular tachycardia) (H)     Torticollis     Gastroesophageal reflux disease without esophagitis     Major depression in remission (H)     Pericarditis     Primary osteoarthritis of both knees     CELESTE (generalized anxiety disorder)     Presbyopia     Senile nuclear sclerosis     Tremor, physiological     Essential hypertension     Dizziness     Benzodiazepine dependence (H)     Mild persistent asthma without complication     Seizure (H)     Stress-induced cardiomyopathy     Past Medical History:   Diagnosis Date     Anxiety      Asthma     adult-onset asthma diagnosed in      Cancer (H)      DCIS (ductal carcinoma in situ)     stage 0 status post left mastectomy in      Depressive disorder      PONV (postoperative nausea and vomiting)      Torticollis      Tremor         Past Surgical History:     Past Surgical History:   Procedure Laterality Date     BREAST SURGERY       CATARACT IOL, RT/LT       MASTECTOMY      DCIS     ODONTECTOMY Left 2022    Procedure: SURGICAL EXTRACTION, TOOTH - Teeth #12, 14, 19;  Surgeon: Arvin Garza DDS;  Location: [facility] OR     RETINAL REATTACHMENT          Social History:     Social History     Tobacco Use     Smoking status: Former     Packs/day: 1.00     Years: 10.00     Pack years: 10.00     Types: Cigarettes     Quit date:      Years since quittin.2     Smokeless tobacco: Never     Tobacco comments:     Ages [age] then ages [age].    Vaping Use     Vaping Use: Never used   Substance Use Topics     Alcohol use: No     Drug use: Never        Family History:     Family History   Problem Relation Age of Onset     Cancer Sister         breast cancer     Glaucoma No family hx of      Macular Degeneration No family hx of      Asthma Sister      Breast Cancer Sister      Heart Failure Mother          at 97        Medications:     Current Outpatient Medications   Medication Sig     levETIRAcetam (KEPPRA) 500 MG tablet Take 1 tablet (500 mg) by mouth  2 times daily     acetaminophen (TYLENOL) 325 MG tablet Take 2 tablets (650 mg) by mouth every 4 hours as needed for mild pain     albuterol (PROAIR HFA/PROVENTIL HFA/VENTOLIN HFA) 108 (90 Base) MCG/ACT inhaler Inhale 2 puffs into the lungs every 6 hours as needed for shortness of breath / dyspnea     aspirin (ASA) 81 MG chewable tablet Take 81 mg by mouth daily     Blood Pressure Monitor KIT 1 Device as needed (for home BP moniotoring)     calcium carbonate-vitamin D 600-125 MG-UNIT TABS Take 1 tablet by mouth daily     Cyanocobalamin 50 MCG TABS Take 50 mcg by mouth once a week Sundays     fluticasone-salmeterol (ADVAIR HFA) 115-21 MCG/ACT inhaler Inhale 2 puffs into the lungs 2 times daily     verapamil (CALAN) 40 MG tablet Take 1.5 tablets ( 60 mg) x 1 as needed per day for tachycardia     No current facility-administered medications for this visit.        Allergies:     Allergies   Allergen Reactions     Levalbuterol Hydrochloride Shortness Of Breath     Cats Other (See Comments)     Itchy eyes     Dust Mites      Other reaction(s): Wheezing  Wheezing/shortness/breath/see (IRP 6/1)     Qvar [Beclomethasone]      Per patient- allergic to QVAR     Amitriptyline Palpitations     Escitalopram Anxiety        Review of Systems:   As above     Physical Exam:   Vitals: Wt 59 kg (130 lb)   BMI 21.63 kg/m   Patient refused blood pressure and heart rate readings today.   General: Seated comfortably in no acute distress.  Lungs: breathing comfortably  Neurologic:     Mental Status: Fully alert, attentive. Grossly normal memory and fund of knowledge. Language normal, speech clear and fluent, no paraphasic errors.      Cranial Nerves: Visual fields intact. PERRL. EOMI with normal smooth pursuit. No dysarthria. Shoulder shrug strong bilaterally.      Motor: Head turn to the left with constant head tremor through encounter. Muscle tone normal throughout extremities. Strength 5/5 throughout upper and lower extremities.      Sensory: Negative Romberg.      Coordination: Finger-nose-finger and heel-shin intact without dysmetria.      Gait: Steady casual gait, slight variability in speed. Able to slowly take a few steps in tandem with occasional out step.      Data reviewed on previous visits    Imaging:  MRI brain without contrast 7/2020  1.  No acute intracranial process.  2.  Generalized brain atrophy and presumed microvascular ischemic changes similar to findings on the 2018 MRI.    Laboratory:  TSH normal 1/2020    Pertinent Investigations since last visit:   MRI brain 8/2022  IMPRESSION:  1.  No acute intracranial process.  2.  Generalized brain atrophy and presumed microvascular ischemic changes as detailed above.    EEG 8/2022  IMPRESSION OF VIDEO EEG DAY # 1: This video electroencephalogram is abnormal due to the presence of diffuse low amplitude theta delta slowing and intermittent generalized attenuation of the background, consistent with moderately severe diffuse nonspecific encephalopathy, which could in part be due to sedative medications.  No electrographic seizures or epileptiform discharges were recorded. Clinical correlation is advised.          Assessment and Plan:   Assessment:  Morenita Moore is a 77 year old female who presents for follow-up of seizures. Patient had one time seizure episode in November. Etiology of seizures was not found. MRI brain was normal. EEG was unremarkable, but was performed in the setting of sedating medications. She was treated for UTI. She continues on Keppra 500 mg BID without clear side effects. She thinks it helps her dystonia a little. We discussed repeating EEG off of sedation to evaluate for underlying epileptiform discharges. Given one time seizure episode she does not meet criteria for epilepsy at this time. We can discuss plans for continuation of Keppra long term after the EEG.     Patient previously was seen for cervical dystonia and dizziness. These symptoms are stable  to mildly improved. See prior documentation for discussion of these issues.       Plan:  - Continue Keppra 500 mg BID  - 3 hour routine EEG    Follow up in Neurology clinic pending above    Hong Hansen MD   of Neurology  [facility]    The total time of this encounter today amounted to 44 minutes. This time included time spent with the patient on the telephone, prep work, ordering tests, and performing post visit documentation.        Again, thank you for allowing me to participate in the care of your patient.        Sincerely,        Hong Hansen MD

## 2023-03-16 ENCOUNTER — OFFICE VISIT (OUTPATIENT)
Dept: CARDIOLOGY | Facility: CLINIC | Age: 78
End: 2023-03-16
Payer: COMMERCIAL

## 2023-03-16 VITALS
OXYGEN SATURATION: 91 % | HEIGHT: 65 IN | DIASTOLIC BLOOD PRESSURE: 78 MMHG | WEIGHT: 130 LBS | SYSTOLIC BLOOD PRESSURE: 150 MMHG | HEART RATE: 89 BPM | BODY MASS INDEX: 21.66 KG/M2

## 2023-03-16 DIAGNOSIS — I48.0 PAROXYSMAL ATRIAL FIBRILLATION (H): ICD-10-CM

## 2023-03-16 PROCEDURE — 93000 ELECTROCARDIOGRAM COMPLETE: CPT | Performed by: INTERNAL MEDICINE

## 2023-03-16 PROCEDURE — 99215 OFFICE O/P EST HI 40 MIN: CPT | Performed by: INTERNAL MEDICINE

## 2023-03-16 NOTE — PROGRESS NOTES
PHYSICIAN NOTE:  This visit was completed in person at the [facility].      I had the pleasure of seeing Ms. Morenita Moore in follow-up of recent hospitalization with AF/RVR.      The patient is a very pleasant and anxious 77-year-old female with the following medical issues:  1. Episodes of tachycardia since 2012.  Suspected SVT, but no ECG documentation.  2. Recent hospitalization at [facility] with tachycardia symptoms.  First documentation of AF with RVR.  Conversion after IV diltiazem.  3. Pericarditis in 2019, treated with colchicine.  4. Episode of LOC in 11/2022, found unresponsive at her living facility.  Suspected seizure.  On Keppra.  5. Stress cardiomyopathy (11/2022).  EF later normalized.  6. Anxiety.  7. Intolerance of multiple medications, including diltiazem.    Morenita was hospitalized at [facility] last week.  In the middle of the night she developed irregular heart racing, a novel sensation for her.  This did not feel like her previous tachycardia episodes.  She called 911 and AF with RVR was recorded by the paramedics.  In the ER at [facility] she was given intravenous diltiazem with spontaneous conversion later.  Because the episode of AF lasted several hours she was kept overnight.    She was not started on anticoagulation.  When I saw her in February, I prescribed diltiazem which she did not tolerate.  She tells me she is extremely sensitive to many medications.  We later prescribed verapamil 40 mg to only use as needed for tachycardia.    Morenita had been scheduled for outpatient SVT ablation next week.  I will point out that her tachycardia was never documented electrocardiographically, but there was clinical suspicion for SVT given her description of events.  After AF was documented last week, I felt we needed to revise the plan of what to do next.      PHYSICAL EXAMINATION:  Vital signs: 150/78, 89, 59 kg, BMI 21.6  General: Severely anxious  ENT/Mouth:  no nasal  "discharge.  Eyes:  normal conjunctivae.   Neck:  no thyromegaly or lymphadenopathy.  Chest/Lungs: Kyphoscoliosis, patient is not dyspneic.    Cardiovascular: Normal JVP, rhythm is regular.  No gallop, murmur or rub.    Abdomen:  no abdominal distention.  Soft, negative HJR.    Extremities:  no edema  Skin:  no xanthelasma.    Neurologic:  alert & oriented x 3.  No tremor.    Vascular:  2+ carotids without bruits.  2+ radials.        DIAGNOSTIC STUDIES:  ECG: Sinus rhythm, consider septal infarct  Echocardiogram ([facility] 3/6/2023): EF 67%, normal RV, mild biatrial enlargement, mild MR, small pericardial effusion.  CT abdomen/pelvis with contrast ([facility] 3/6/2023).  No aneurysm.  Stable liver cyst.  6.1 cm mesenteric cyst      IMPRESSION:  1. History of regular tachycardia, presumed SVT.  No previous ECG documentation.  2. AF with RVR last week, first documented episode.  She is not on anticoagulation.  She is worried about starting anticoagulation.  She is concerned about starting any new medication.  3. Severe anxiety.    RECOMMENDATIONS:  1. I discussed with Morenita that the previous plan for SVT ablation requires revision.  Given the new diagnosis of AF, there is a strong possibility that previous tachycardia episodes may have represented AF or aflutter.  Furthermore, if we proceeded with SVT ablation, there would be a high likelihood of inducing AF in the EP lab which would hinder the procedure.  When AF occurs in the EP lab in the setting of SVT ablation, it often makes ablation of other tachycardias nearly impossible.  2. We focused on 3 options: The first is a trial of flecainide (low-dose amiodarone 100 mg daily would be a second pharmacologic option).  The second is combined \"SVT\"/AF ablation.  This would have to be rescheduled with anesthesia.  Likelihood of success about 70%, risk of serious complication 2%.  A pamphlet regarding AF ablation was provided.  We also reviewed a third option, \"do " "nothing further for now\" and wait for further arrhythmia events to occur.  3. The patient is concerned about further trials of drug therapy as she is sensitive to medications.  At the same time, she is unsure whether she wants to pursue combined SVT/AF ablation.  4. In the end, we agreed that she will review/think about these options and get back to us at a later time.  5. I advised anticoagulation, but she remains very reluctant.    It was my pleasure seeing this extremely nice but anxious patient.  She was severely anxious today, to the point of tears.    Please feel free to call with any questions about Ms. Moore.   Total time spent today, including time for chart review and documentation, was 50 minutes.  Medical decision-making was of high complexity.     Germain Parekh MD, [facility]        Orders Placed This Encounter   Procedures     EKG 12-lead complete w/read - Clinics (performed today)       No orders of the defined types were placed in this encounter.      There are no discontinued medications.      Encounter Diagnosis   Name Primary?     Paroxysmal atrial fibrillation (H)        CURRENT MEDICATIONS:  Current Outpatient Medications   Medication Sig Dispense Refill     acetaminophen (TYLENOL) 325 MG tablet Take 2 tablets (650 mg) by mouth every 4 hours as needed for mild pain       albuterol (PROAIR HFA/PROVENTIL HFA/VENTOLIN HFA) 108 (90 Base) MCG/ACT inhaler Inhale 2 puffs into the lungs every 6 hours as needed for shortness of breath / dyspnea       aspirin (ASA) 81 MG chewable tablet Take 81 mg by mouth daily       Blood Pressure Monitor KIT 1 Device as needed (for home BP moniotoring) 1 kit 0     calcium carbonate-vitamin D 600-125 MG-UNIT TABS Take 1 tablet by mouth daily       Cyanocobalamin 50 MCG TABS Take 50 mcg by mouth once a week Sundays       fluticasone-salmeterol (ADVAIR HFA) 115-21 MCG/ACT inhaler Inhale 2 puffs into the lungs 2 times daily 12 g 11     levETIRAcetam (KEPPRA) 500 MG " tablet Take 1 tablet (500 mg) by mouth 2 times daily 180 tablet 0     verapamil (CALAN) 40 MG tablet Take 1.5 tablets ( 60 mg) x 1 as needed per day for tachycardia 30 tablet 1       ALLERGIES     Allergies   Allergen Reactions     Levalbuterol Hydrochloride Shortness Of Breath     Diltiazem Dizziness     Cats Other (See Comments)     Itchy eyes     Dust Mites      Other reaction(s): Wheezing  Wheezing/shortness/breath/see (IRP )     Qvar [Beclomethasone]      Per patient- allergic to QVAR     Amitriptyline Palpitations     Escitalopram Anxiety       PAST MEDICAL HISTORY:  Past Medical History:   Diagnosis Date     Anxiety      Asthma     adult-onset asthma diagnosed in      Cancer (H)      DCIS (ductal carcinoma in situ)     stage 0 status post left mastectomy in      Depressive disorder      PONV (postoperative nausea and vomiting)      Torticollis      Tremor        PAST SURGICAL HISTORY:  Past Surgical History:   Procedure Laterality Date     BREAST SURGERY       CATARACT IOL, RT/LT       MASTECTOMY      DCIS     ODONTECTOMY Left 2022    Procedure: SURGICAL EXTRACTION, TOOTH - Teeth #12, 14, 19;  Surgeon: Arvin Garza DDS;  Location: [facility] OR     RETINAL REATTACHMENT         FAMILY HISTORY:  Family History   Problem Relation Age of Onset     Cancer Sister         breast cancer     Glaucoma No family hx of      Macular Degeneration No family hx of      Asthma Sister      Breast Cancer Sister      Heart Failure Mother          at 97       SOCIAL HISTORY:  Social History     Socioeconomic History     Marital status: Single     Spouse name: None     Number of children: None     Years of education: None     Highest education level: None   Tobacco Use     Smoking status: Former     Packs/day: 1.00     Years: 10.00     Pack years: 10.00     Types: Cigarettes     Quit date:      Years since quittin.2     Smokeless tobacco: Never     Tobacco comments:     Ages [age] then ages [age].   "  Vaping Use     Vaping Use: Never used   Substance and Sexual Activity     Alcohol use: No     Drug use: Never     Sexual activity: Not Currently       Review of Systems:  Skin:          Eyes:         ENT:         Respiratory:          Cardiovascular:         Gastroenterology:        Genitourinary:         Musculoskeletal:         Neurologic:         Psychiatric:         Heme/Lymph/Imm:         Endocrine:           Physical Exam:  Vitals: BP (!) 150/78   Pulse 89   Ht 1.651 m (5' 5\")   Wt 59 kg (130 lb)   SpO2 91%   BMI 21.63 kg/m      Constitutional:           Skin:             Head:           Eyes:           Lymph:      ENT:           Neck:           Respiratory:            Cardiac:                                                           GI:           Extremities and Muscular Skeletal:                 Neurological:           Psych:           CC  Germain Parekh MD  [address]  [place] [id number]              "

## 2023-03-16 NOTE — LETTER
"Date of Service: 02/06/2020    CHIEF COMPLAINT:  ""I put a hole in my right hand. \""    HISTORY OF PRESENT ILLNESS:  The patient is an 53-year-old male who states he was shoveling on 01/17/2020. He states that his shovel hit a lip in concrete. He looked down and noticed what he describes as a hole in his right hand. He states that he was seen at the urgent care where the wound was irrigated and sutured. The sutures have since been removed and he feels an area of swelling along the suture line that he is somewhat concerned about. He states his motion and function to his hand is restored to normal.  He is a little bit concerned about the swelling overlying where the hole was. The patient's past medical, surgical, social and family history have been reviewed today and have been updated in the patient's medical record. PHYSICAL EXAMINATION:  GENERAL:  The patient is alert and oriented x3. The patient is well appearing. The patient is in no acute distress. SKIN:  The skin of bilateral upper extremities is pink, warm, and well perfused, without rash or striae. VASCULAR:  Bilateral upper extremities reveal radial pulses of normal quality. All of the fingertips are pink, warm, and well perfused. EXTREMITIES:  The right hand is evaluated. There is a healed laceration over the palmar digital crease and first webspace of the right hand. Range of motion with thumb abduction is full. There is a palpable scar at the suture line, which is expected from his laceration, which is now fully healed. There is no excessive swelling, no warmth, no drainage. Sensation throughout the thumb and all of his fingers is normal.  Range of motion of the thumb and fingers in all planes tested is normal.    IMPRESSION:    Scar, right hand. PLAN:  I discussed the fact that his scar is palpated and is quite typical for what his described injury. As time passes, the scar should soften and minimize.   He was encouraged by this " 3/16/2023    Alexis De Leon PA-C  [address]  [facility] [address]    RE: Morenita Moore       Dear Colleague,     I had the pleasure of seeing Morenita Moore in the [facility].  PHYSICIAN NOTE:  This visit was completed in person at the [facility].      I had the pleasure of seeing Ms. Morenita Moore in follow-up of recent hospitalization with AF/RVR.      The patient is a very pleasant but anxious 77-year-old female with the following medical issues:  1. Episodes of tachycardia since 2012.  Suspected SVT, but no ECG documentation.  2. Recent hospitalization at [facility] with tachycardia.  First documentation of AF with RVR.  Conversion after IV diltiazem given.  3. Pericarditis in 2019, treated with colchicine.  4. Episode of LOC in 11/2022, found unresponsive at her living facility.  Suspected seizure.  On Keppra.  5. Stress cardiomyopathy (11/2022).  EF later normalized.  6. Anxiety.  7. Intolerance of multiple medications, including diltiazem.    Morenita was hospitalized at [facility] last week.  In the middle of the night she developed irregular heart racing, a novel sensation for her.  This did not feel like her previous tachycardia episodes.  She called 911 and AF with RVR was recorded by the paramedics.  In the ER at [facility] she was given intravenous diltiazem with spontaneous conversion later.  Because the episode of AF lasted several hours she was kept overnight.    She was not started on anticoagulation.  When I last saw her in February, I prescribed diltiazem which she did not tolerate.  She tells me she is extremely sensitive to many medications.  We later prescribed verapamil 40 mg to only use as needed for tachycardia.    Morenita had been scheduled for outpatient SVT ablation next week.  I will point out that her tachycardia was never documented electrocardiographically, but there was high clinical suspicion for SVT given her description of  "events.  That is why EP study was recommended.  However, after AF was documented last week, I felt we needed to revisit the plan of what to do next.      PHYSICAL EXAMINATION:  Vital signs: 150/78, 89, 59 kg, BMI 21.6  General: Severely anxious  ENT/Mouth:  no nasal discharge.  Eyes:  normal conjunctivae.   Neck:  no thyromegaly or lymphadenopathy.  Chest/Lungs: Kyphoscoliosis, patient is not dyspneic.    Cardiovascular: Normal JVP, rhythm is regular.  No gallop, murmur or rub.    Abdomen:  no abdominal distention.  Soft, negative HJR.    Extremities:  no edema  Skin:  no xanthelasma.    Neurologic:  alert & oriented x 3.  No tremor.    Vascular:  2+ carotids without bruits.  2+ radials.        DIAGNOSTIC STUDIES:  ECG: Sinus rhythm, consider septal infarct  Echocardiogram ([facility] 3/6/2023): EF 67%, normal RV, mild biatrial enlargement, mild MR, small pericardial effusion.  CT abdomen/pelvis with contrast ([facility] 3/6/2023).  No aneurysm.  Stable liver cyst.  6.1 cm mesenteric cyst      IMPRESSION:  1. History of regular tachycardia, presumed SVT.  No previous ECG documentation.  2. AF with RVR last week, first documented episode.  She is not on anticoagulation.  She is very worried about starting anticoagulation.  3. Severe anxiety.    RECOMMENDATIONS:  1. I discussed with Morenita that the previous plan for SVT ablation needs to be modified.  I explained that given the new diagnosis of AF, there is a strong possibility that her previous episodes may have represented AF or aflutter.  Furthermore, if we proceeded with SVT ablation, there would be a high likelihood of inducing AF in the EP lab which would greatly hinder the procedure.  When AF occurs in this setting it often makes ablation of any other tachycardia nearly impossible.  2. We focused on 3 options: The first is a trial of flecainide.  The second is combined \"SVT\"/AF ablation.  This would have to be rescheduled with anesthesia.  Likelihood of " and he will see me back p.r.n.       Dictated By: India Crowley DO  Signing Provider: DO MASON [facility] ([id number])  DD: 02/06/2020 14:39:57 TD: 02/07/2020 07:02:23    Copy Sent To: "success about 70%, risk of serious complication 2%.  A pamphlet regarding AF ablation was provided.  We also reviewed a third option, \"do nothing further for now\" and wait for further arrhythmia to occur.  3. The patient is concerned about further trials of drug therapy because she is exquisitely sensitive to medications.  At the same time, she is unsure whether she wants to pursue combined SVT/AF ablation.  4. In the end, we agreed that she will review/think about these options and get back to us at a later time.  5. I advised anticoagulation, but she is reluctant.    It was my pleasure seeing this patient.  She was severely anxious today.  Please feel free to call with any questions.   Total time spent today, including time for chart review and documentation, was 50 minutes.  Medical decision-making was of high complexity.     Germain Parekh MD, [facility]        Orders Placed This Encounter   Procedures     EKG 12-lead complete w/read - Clinics (performed today)       No orders of the defined types were placed in this encounter.      There are no discontinued medications.      Encounter Diagnosis   Name Primary?     Paroxysmal atrial fibrillation (H)        CURRENT MEDICATIONS:  Current Outpatient Medications   Medication Sig Dispense Refill     acetaminophen (TYLENOL) 325 MG tablet Take 2 tablets (650 mg) by mouth every 4 hours as needed for mild pain       albuterol (PROAIR HFA/PROVENTIL HFA/VENTOLIN HFA) 108 (90 Base) MCG/ACT inhaler Inhale 2 puffs into the lungs every 6 hours as needed for shortness of breath / dyspnea       aspirin (ASA) 81 MG chewable tablet Take 81 mg by mouth daily       Blood Pressure Monitor KIT 1 Device as needed (for home BP moniotoring) 1 kit 0     calcium carbonate-vitamin D 600-125 MG-UNIT TABS Take 1 tablet by mouth daily       Cyanocobalamin 50 MCG TABS Take 50 mcg by mouth once a week Sundays       fluticasone-salmeterol (ADVAIR HFA) 115-21 MCG/ACT inhaler Inhale 2 puffs into the " lungs 2 times daily 12 g 11     levETIRAcetam (KEPPRA) 500 MG tablet Take 1 tablet (500 mg) by mouth 2 times daily 180 tablet 0     verapamil (CALAN) 40 MG tablet Take 1.5 tablets ( 60 mg) x 1 as needed per day for tachycardia 30 tablet 1       ALLERGIES     Allergies   Allergen Reactions     Levalbuterol Hydrochloride Shortness Of Breath     Diltiazem Dizziness     Cats Other (See Comments)     Itchy eyes     Dust Mites      Other reaction(s): Wheezing  Wheezing/shortness/breath/see (IRP )     Qvar [Beclomethasone]      Per patient- allergic to QVAR     Amitriptyline Palpitations     Escitalopram Anxiety       PAST MEDICAL HISTORY:  Past Medical History:   Diagnosis Date     Anxiety      Asthma     adult-onset asthma diagnosed in      Cancer (H)      DCIS (ductal carcinoma in situ)     stage 0 status post left mastectomy in      Depressive disorder      PONV (postoperative nausea and vomiting)      Torticollis      Tremor        PAST SURGICAL HISTORY:  Past Surgical History:   Procedure Laterality Date     BREAST SURGERY       CATARACT IOL, RT/LT       MASTECTOMY      DCIS     ODONTECTOMY Left 2022    Procedure: SURGICAL EXTRACTION, TOOTH - Teeth #12, 14, 19;  Surgeon: Arvin Garza DDS;  Location: [facility] OR     RETINAL REATTACHMENT         FAMILY HISTORY:  Family History   Problem Relation Age of Onset     Cancer Sister         breast cancer     Glaucoma No family hx of      Macular Degeneration No family hx of      Asthma Sister      Breast Cancer Sister      Heart Failure Mother          at 97       SOCIAL HISTORY:  Social History     Socioeconomic History     Marital status: Single     Spouse name: None     Number of children: None     Years of education: None     Highest education level: None   Tobacco Use     Smoking status: Former     Packs/day: 1.00     Years: 10.00     Pack years: 10.00     Types: Cigarettes     Quit date:      Years since quittin.2     Smokeless tobacco:  "Never     Tobacco comments:     Ages [age] then ages [age].    Vaping Use     Vaping Use: Never used   Substance and Sexual Activity     Alcohol use: No     Drug use: Never     Sexual activity: Not Currently       Review of Systems:  Skin:          Eyes:         ENT:         Respiratory:          Cardiovascular:         Gastroenterology:        Genitourinary:         Musculoskeletal:         Neurologic:         Psychiatric:         Heme/Lymph/Imm:         Endocrine:           Physical Exam:  Vitals: BP (!) 150/78   Pulse 89   Ht 1.651 m (5' 5\")   Wt 59 kg (130 lb)   SpO2 91%   BMI 21.63 kg/m      Constitutional:           Skin:             Head:           Eyes:           Lymph:      ENT:           Neck:           Respiratory:            Cardiac:                                                           GI:           Extremities and Muscular Skeletal:                 Neurological:           Psych:           CC  Germain Parekh MD  [address]  [place] [address]    Thank you for allowing me to participate in the care of your patient.      Sincerely,     Germain Parekh MD     [facility]  "

## 2023-03-17 ENCOUNTER — MYC MEDICAL ADVICE (OUTPATIENT)
Dept: CARDIOLOGY | Facility: CLINIC | Age: 78
End: 2023-03-17
Payer: COMMERCIAL

## 2023-03-24 ENCOUNTER — LAB (OUTPATIENT)
Dept: FAMILY MEDICINE | Facility: CLINIC | Age: 78
End: 2023-03-24

## 2023-03-24 ENCOUNTER — VIRTUAL VISIT (OUTPATIENT)
Dept: FAMILY MEDICINE | Facility: CLINIC | Age: 78
End: 2023-03-24
Payer: COMMERCIAL

## 2023-03-24 DIAGNOSIS — I48.0 PAROXYSMAL ATRIAL FIBRILLATION (H): ICD-10-CM

## 2023-03-24 DIAGNOSIS — Z12.11 SCREEN FOR COLON CANCER: ICD-10-CM

## 2023-03-24 DIAGNOSIS — R56.9 SEIZURE (H): Primary | ICD-10-CM

## 2023-03-24 PROCEDURE — 99443 PR PHYSICIAN TELEPHONE EVALUATION 21-30 MIN: CPT | Mod: 95 | Performed by: PHYSICIAN ASSISTANT

## 2023-03-24 NOTE — PROGRESS NOTES
Morenita is a 77 year old who is being evaluated via a billable telephone visit.      What phone number would you like to be contacted at? [phone]  How would you like to obtain your AVS? [place]    Distant Location (provider location):  On-site    Assessment & Plan     Seizure (H)  Continue follow-up with neurology.    Paroxysmal atrial fibrillation (H)/SVT?  Continue follow-up with cardiology.  Pursuing second opinion at this time.  Follow-up 2-3 months with me.    Screen for colon cancer  - [employer])      30 minutes spent on the date of the encounter doing chart review, review of test results, interpretation of tests, patient visit and documentation        The likelihood of other entities in the differential is insufficient to justify any further testing for them at this time. This was explained to the patient. The patient was advised that persistent or worsening symptoms would require further evaluation. Patient advised to call the office and if unable to reach to go to the emergency room if they develop any new or worsening symptoms. Expressed understanding and agreement with above stated plan.     Alexis De Leon PA-C  [facility]    Joan Xavier is a 77 year old, presenting for the following health issues:  Follow Up (Follow up visit on cardiology.  Would like to know why she he has drastic blood pressure changes and thyroid test.)    Reviewed recent medical history with the patient.  Overall she is doing well.  Discussed need for follow-up in regards to pelvic cyst and mesenteric cyst.  We will continue to monitor pelvic cyst with repeat ultrasound.  Plan to reevaluate mesenteric cyst as well as history of hiatal hernia with a general surgeon upcoming.    Ongoing evaluation with neurology.  Stable on Keppra.  Will undergo EEG upcoming.    Ongoing evaluation with cardiology.  Deciphering SVT versus A-fib potential ablation.  Monitoring blood pressures.  Here in  the office have always been 140s/170s.  We will continue to monitor.  Sensitive to medications.  Tolerated metoprolol well in the past, option to potentially start this again as it has a anxiolytic effect.  Propanolol also possible.    Ongoing physical therapy for balance.    Discussed colon cancer screening.  Will pursue Cologuard.    Review of Systems   Constitutional, HEENT, cardiovascular, pulmonary, GI, , musculoskeletal, neuro, skin, endocrine and psych systems are negative, except as otherwise noted.      Objective           Vitals:  No vitals were obtained today due to virtual visit.    Physical Exam   healthy, alert and no distress  PSYCH: Alert and oriented times 3; coherent speech, normal   rate and volume, able to articulate logical thoughts, able   to abstract reason, no tangential thoughts, no hallucinations   or delusions  Her affect is normal  RESP: No cough, no audible wheezing, able to talk in full sentences  Remainder of exam unable to be completed due to telephone visits      Phone call duration: 29 minutes    Answers for HPI/ROS submitted by the patient on 3/24/2023  What is the reason for your visit today? : Cardiology  work up.  How many servings of fruits and vegetables do you eat daily?: 4 or more  On average, how many sweetened beverages do you drink each day (Examples: soda, juice, sweet tea, etc.  Do NOT count diet or artificially sweetened beverages)?: 1  How many minutes a day do you exercise enough to make your heart beat faster?: 20 to 29  How many days a week do you exercise enough to make your heart beat faster?: 5  How many days per week do you miss taking your medication?: 0

## 2023-03-30 ENCOUNTER — HOSPITAL ENCOUNTER (OUTPATIENT)
Dept: PHYSICAL THERAPY | Facility: CLINIC | Age: 78
Discharge: HOME | End: 2023-03-30
Payer: COMMERCIAL

## 2023-03-30 DIAGNOSIS — I51.81 STRESS-INDUCED CARDIOMYOPATHY: Primary | ICD-10-CM

## 2023-03-30 DIAGNOSIS — R26.89 BALANCE PROBLEMS: ICD-10-CM

## 2023-03-30 PROCEDURE — 97110 THERAPEUTIC EXERCISES: CPT | Mod: GP

## 2023-03-30 PROCEDURE — 97112 NEUROMUSCULAR REEDUCATION: CPT | Mod: GP

## 2023-04-04 ENCOUNTER — ANCILLARY PROCEDURE (OUTPATIENT)
Dept: NEUROLOGY | Facility: CLINIC | Age: 78
End: 2023-04-04
Attending: INTERNAL MEDICINE
Payer: COMMERCIAL

## 2023-04-04 DIAGNOSIS — R56.9 SEIZURES (H): ICD-10-CM

## 2023-04-04 PROCEDURE — 95713 VEEG 2-12 HR CONT MNTR: CPT | Performed by: PSYCHIATRY & NEUROLOGY

## 2023-04-04 PROCEDURE — 95718 EEG PHYS/QHP 2-12 HR W/VEEG: CPT | Performed by: PSYCHIATRY & NEUROLOGY

## 2023-04-04 PROCEDURE — 95700 EEG CONT REC W/VID EEG TECH: CPT | Performed by: PSYCHIATRY & NEUROLOGY

## 2023-04-06 ENCOUNTER — TELEPHONE (OUTPATIENT)
Dept: NEUROLOGY | Facility: CLINIC | Age: 78
End: 2023-04-06
Payer: COMMERCIAL

## 2023-04-06 NOTE — TELEPHONE ENCOUNTER
Called and left voicemail for patient to call back. Dr. Hansen wants to schedule a follow-up with patient to discuss the results of the EEG and what to do with the Keppra long term? A virtual visit would be fine if patient would prefer (phone or video).     We currently have a hold on April 19th at 4pm if patient is interested.

## 2023-04-07 NOTE — PROGRESS NOTES
[facility] Neurology Follow Up Visit    Morenita Moore MRN# [id number]   Age: 77 year old YOB: 1945     Brief history of symptoms: The patient was initially seen in neurologic consultation on 4/16/2021 for evaluation of dizziness. Please see the comprehensive neurologic consultation note from that date in the Epic records for details.     Interval history:   Patient is here to discuss results of EEG. She denies any concerning episodes since last visit.       Past Medical History:     Patient Active Problem List   Diagnosis     Acute internal derangement of left knee     Asthma in adult     Breast cancer in situ     SVT (supraventricular tachycardia) (H)     Torticollis     Gastroesophageal reflux disease without esophagitis     Major depression in remission (H)     Pericarditis     Primary osteoarthritis of both knees     CELESTE (generalized anxiety disorder)     Presbyopia     Senile nuclear sclerosis     Tremor, physiological     Essential hypertension     Dizziness     Benzodiazepine dependence (H)     Mild persistent asthma without complication     Seizure (H)     Stress-induced cardiomyopathy     Past Medical History:   Diagnosis Date     Anxiety      Asthma 2012    adult-onset asthma diagnosed in 2012     Cancer (H)      DCIS (ductal carcinoma in situ) 2001    stage 0 status post left mastectomy in 2001     Depressive disorder      PONV (postoperative nausea and vomiting)      Torticollis      Tremor         Past Surgical History:     Past Surgical History:   Procedure Laterality Date     BREAST SURGERY       CATARACT IOL, RT/LT       MASTECTOMY      DCIS     ODONTECTOMY Left 8/2/2022    Procedure: SURGICAL EXTRACTION, TOOTH - Teeth #12, 14, 19;  Surgeon: Arvin Garza DDS;  Location: [facility] OR     RETINAL REATTACHMENT          Social History:     Social History     Tobacco Use     Smoking status: Former     Packs/day: 1.00     Years: 10.00     Pack years: 10.00     Types: Cigarettes     Quit date: 1980      Years since quittin.2     Smokeless tobacco: Never     Tobacco comments:     Ages [age] then ages [age].    Vaping Use     Vaping status: Never Used     Passive vaping exposure: Yes   Substance Use Topics     Alcohol use: No     Drug use: Never        Family History:     Family History   Problem Relation Age of Onset     Cancer Sister         breast cancer     Glaucoma No family hx of      Macular Degeneration No family hx of      Asthma Sister      Breast Cancer Sister      Heart Failure Mother          at 97        Medications:     Current Outpatient Medications   Medication Sig     acetaminophen (TYLENOL) 325 MG tablet Take 2 tablets (650 mg) by mouth every 4 hours as needed for mild pain     albuterol (PROAIR HFA/PROVENTIL HFA/VENTOLIN HFA) 108 (90 Base) MCG/ACT inhaler Inhale 2 puffs into the lungs every 6 hours as needed for shortness of breath / dyspnea     aspirin (ASA) 81 MG chewable tablet Take 81 mg by mouth daily     Blood Pressure Monitor KIT 1 Device as needed (for home BP moniotoring)     calcium carbonate-vitamin D 600-125 MG-UNIT TABS Take 1 tablet by mouth daily     Cyanocobalamin 50 MCG TABS Take 50 mcg by mouth once a week Sundays     fluticasone-salmeterol (ADVAIR HFA) 115-21 MCG/ACT inhaler Inhale 2 puffs into the lungs 2 times daily     levETIRAcetam (KEPPRA) 500 MG tablet Take 1 tablet (500 mg) by mouth 2 times daily     verapamil (CALAN) 40 MG tablet Take 1.5 tablets ( 60 mg) x 1 as needed per day for tachycardia     No current facility-administered medications for this visit.        Allergies:     Allergies   Allergen Reactions     Levalbuterol Hydrochloride Shortness Of Breath     Diltiazem Dizziness     Cats Other (See Comments)     Itchy eyes     Dust Mites      Other reaction(s): Wheezing  Wheezing/shortness/breath/see (IRP )     Qvar [Beclomethasone]      Per patient- allergic to QVAR     Amitriptyline Palpitations     Escitalopram Anxiety        Review of Systems:   As  above     Physical Exam:   Vitals: There were no vitals taken for this visit.   No examination given nature of telephone visit     Data reviewed on previous visits    MRI brain 11/2022  IMPRESSION:  1.  No acute intracranial process.  2.  Generalized brain atrophy and presumed microvascular ischemic changes as detailed above.    MRI brain without contrast 7/2020  1.  No acute intracranial process.  2.  Generalized brain atrophy and presumed microvascular ischemic changes similar to findings on the 2018 MRI.    TSH normal 1/2020    MRI brain 8/2022  IMPRESSION:  1.  No acute intracranial process.  2.  Generalized brain atrophy and presumed microvascular ischemic changes as detailed above.    EEG 8/2022  IMPRESSION OF VIDEO EEG DAY # 1: This video electroencephalogram is abnormal due to the presence of diffuse low amplitude theta delta slowing and intermittent generalized attenuation of the background, consistent with moderately severe diffuse nonspecific encephalopathy, which could in part be due to sedative medications.  No electrographic seizures or epileptiform discharges were recorded. Clinical correlation is advised.     Pertinent Investigations since last visit:   EEG 4/4/2023  IMPRESSION: Normal in wakefulness and drowsiness. No epileptiform discharges or seizures.         Assessment and Plan:   Assessment:  Morenita Moore is a 77 year old female who presents for follow-up of possible seizure. In November 2022 patient had a seizure versus convulsive syncope in the setting of SVT. Etiology of seizures was not found. MRI brain was normal. EEG was unremarkable during hospitalization and was recently repeated and normal. It is not definite that episode in November was a seizure and given unremarkable work-up we discussed benefit/risk of weaning Keppra. After discussion she elected to wean off of Keppra. We will decreased dosage by 250 mg weekly until medication is discontinued.     Patient is bothered by  back pain and muscle tightness. PT referral was placed for pool therapy at [facility] per patient request.    Plan:  - Decrease Keppra to 750 mg daily and decrease by 250 mg every 2 weeks until medication is discontinued   - PT referral    Follow up in Neurology clinic in 6 months or sooner if new issues arise    Hong Hansen MD   of Neurology  [facility]

## 2023-04-10 ENCOUNTER — HOSPITAL ENCOUNTER (OUTPATIENT)
Dept: PHYSICAL THERAPY | Facility: CLINIC | Age: 78
Discharge: HOME | End: 2023-04-10
Payer: COMMERCIAL

## 2023-04-10 DIAGNOSIS — R42 DIZZINESS: ICD-10-CM

## 2023-04-10 DIAGNOSIS — I51.81 STRESS-INDUCED CARDIOMYOPATHY: Primary | ICD-10-CM

## 2023-04-10 DIAGNOSIS — R26.89 BALANCE PROBLEMS: ICD-10-CM

## 2023-04-10 PROCEDURE — 97110 THERAPEUTIC EXERCISES: CPT | Mod: GP | Performed by: PHYSICAL THERAPIST

## 2023-04-19 ENCOUNTER — VIRTUAL VISIT (OUTPATIENT)
Dept: NEUROLOGY | Facility: CLINIC | Age: 78
End: 2023-04-19
Payer: COMMERCIAL

## 2023-04-19 DIAGNOSIS — M54.9 BACK PAIN, UNSPECIFIED BACK LOCATION, UNSPECIFIED BACK PAIN LATERALITY, UNSPECIFIED CHRONICITY: ICD-10-CM

## 2023-04-19 DIAGNOSIS — M62.89 MUSCLE TIGHTNESS: ICD-10-CM

## 2023-04-19 DIAGNOSIS — R56.9 SEIZURE (H): Primary | ICD-10-CM

## 2023-04-19 PROCEDURE — 99442 PR PHYSICIAN TELEPHONE EVALUATION 11-20 MIN: CPT | Mod: 93 | Performed by: INTERNAL MEDICINE

## 2023-04-19 RX ORDER — LEVETIRACETAM 500 MG/1
500 TABLET ORAL 2 TIMES DAILY
Qty: 180 TABLET | Refills: 0 | Status: SHIPPED | OUTPATIENT
Start: 2023-04-19 | End: 2023-04-24

## 2023-04-19 NOTE — PROGRESS NOTES
Morenita is a 77 year old who is being evaluated via a billable telephone visit.      What phone number would you like to be contacted at? [phone]  How would you like to obtain your AVS? [place]  Distant Location (provider location):  On-site  Phone call duration: 20 minutes

## 2023-04-19 NOTE — LETTER
4/19/2023         RE: Morenita Moore  [address]  [place] [address]        Dear Colleague,    Thank you for referring your patient, Morenita Moore, to the [facility] [place]. Please see a copy of my visit note below.    [facility] Neurology Follow Up Visit    Morenita Moore MRN# [id number]   Age: 77 year old YOB: 1945     Brief history of symptoms: The patient was initially seen in neurologic consultation on 4/16/2021 for evaluation of dizziness. Please see the comprehensive neurologic consultation note from that date in the Epic records for details.     Interval history:   Patient is here to discuss results of EEG. She denies any concerning episodes since last visit.       Past Medical History:     Patient Active Problem List   Diagnosis     Acute internal derangement of left knee     Asthma in adult     Breast cancer in situ     SVT (supraventricular tachycardia) (H)     Torticollis     Gastroesophageal reflux disease without esophagitis     Major depression in remission (H)     Pericarditis     Primary osteoarthritis of both knees     CELESTE (generalized anxiety disorder)     Presbyopia     Senile nuclear sclerosis     Tremor, physiological     Essential hypertension     Dizziness     Benzodiazepine dependence (H)     Mild persistent asthma without complication     Seizure (H)     Stress-induced cardiomyopathy     Past Medical History:   Diagnosis Date     Anxiety      Asthma 2012    adult-onset asthma diagnosed in 2012     Cancer (H)      DCIS (ductal carcinoma in situ) 2001    stage 0 status post left mastectomy in 2001     Depressive disorder      PONV (postoperative nausea and vomiting)      Torticollis      Tremor         Past Surgical History:     Past Surgical History:   Procedure Laterality Date     BREAST SURGERY       CATARACT IOL, RT/LT       MASTECTOMY      DCIS     ODONTECTOMY Left 8/2/2022    Procedure: SURGICAL EXTRACTION, TOOTH  - Teeth #12, 14, 19;  Surgeon: Arvin Garza DDS;  Location: [facility] OR     RETINAL REATTACHMENT          Social History:     Social History     Tobacco Use     Smoking status: Former     Packs/day: 1.00     Years: 10.00     Pack years: 10.00     Types: Cigarettes     Quit date:      Years since quittin.2     Smokeless tobacco: Never     Tobacco comments:     Ages [age] then ages [age].    Vaping Use     Vaping status: Never Used     Passive vaping exposure: Yes   Substance Use Topics     Alcohol use: No     Drug use: Never        Family History:     Family History   Problem Relation Age of Onset     Cancer Sister         breast cancer     Glaucoma No family hx of      Macular Degeneration No family hx of      Asthma Sister      Breast Cancer Sister      Heart Failure Mother          at 97        Medications:     Current Outpatient Medications   Medication Sig     acetaminophen (TYLENOL) 325 MG tablet Take 2 tablets (650 mg) by mouth every 4 hours as needed for mild pain     albuterol (PROAIR HFA/PROVENTIL HFA/VENTOLIN HFA) 108 (90 Base) MCG/ACT inhaler Inhale 2 puffs into the lungs every 6 hours as needed for shortness of breath / dyspnea     aspirin (ASA) 81 MG chewable tablet Take 81 mg by mouth daily     Blood Pressure Monitor KIT 1 Device as needed (for home BP moniotoring)     calcium carbonate-vitamin D 600-125 MG-UNIT TABS Take 1 tablet by mouth daily     Cyanocobalamin 50 MCG TABS Take 50 mcg by mouth once a week Sundays     fluticasone-salmeterol (ADVAIR HFA) 115-21 MCG/ACT inhaler Inhale 2 puffs into the lungs 2 times daily     levETIRAcetam (KEPPRA) 500 MG tablet Take 1 tablet (500 mg) by mouth 2 times daily     verapamil (CALAN) 40 MG tablet Take 1.5 tablets ( 60 mg) x 1 as needed per day for tachycardia     No current facility-administered medications for this visit.        Allergies:     Allergies   Allergen Reactions     Levalbuterol Hydrochloride Shortness Of Breath     Diltiazem Dizziness      Cats Other (See Comments)     Itchy eyes     Dust Mites      Other reaction(s): Wheezing  Wheezing/shortness/breath/see (IRP 6/1)     Qvar [Beclomethasone]      Per patient- allergic to QVAR     Amitriptyline Palpitations     Escitalopram Anxiety        Review of Systems:   As above     Physical Exam:   Vitals: There were no vitals taken for this visit.   No examination given nature of telephone visit     Data reviewed on previous visits    MRI brain 11/2022  IMPRESSION:  1.  No acute intracranial process.  2.  Generalized brain atrophy and presumed microvascular ischemic changes as detailed above.    MRI brain without contrast 7/2020  1.  No acute intracranial process.  2.  Generalized brain atrophy and presumed microvascular ischemic changes similar to findings on the 2018 MRI.    TSH normal 1/2020    MRI brain 8/2022  IMPRESSION:  1.  No acute intracranial process.  2.  Generalized brain atrophy and presumed microvascular ischemic changes as detailed above.    EEG 8/2022  IMPRESSION OF VIDEO EEG DAY # 1: This video electroencephalogram is abnormal due to the presence of diffuse low amplitude theta delta slowing and intermittent generalized attenuation of the background, consistent with moderately severe diffuse nonspecific encephalopathy, which could in part be due to sedative medications.  No electrographic seizures or epileptiform discharges were recorded. Clinical correlation is advised.     Pertinent Investigations since last visit:   EEG 4/4/2023  IMPRESSION: Normal in wakefulness and drowsiness. No epileptiform discharges or seizures.         Assessment and Plan:   Assessment:  Morenita Moore is a 77 year old female who presents for follow-up of possible seizure. In November 2022 patient had a seizure versus convulsive syncope in the setting of SVT. Etiology of seizures was not found. MRI brain was normal. EEG was unremarkable during hospitalization and was recently repeated and normal. It is  not definite that episode in November was a seizure and given unremarkable work-up we discussed benefit/risk of weaning Keppra. After discussion she elected to wean off of Keppra. We will decreased dosage by 250 mg weekly until medication is discontinued.     Patient is bothered by back pain and muscle tightness. PT referral was placed for pool therapy at [facility] per patient request.    Plan:  - Decrease Keppra to 750 mg daily and decrease by 250 mg every 2 weeks until medication is discontinued   - PT referral    Follow up in Neurology clinic in 6 months or sooner if new issues arise    Hong Hansen MD   of Neurology  [facility]      Morenita is a 77 year old who is being evaluated via a billable telephone visit.      What phone number would you like to be contacted at? [phone]  How would you like to obtain your AVS? MyChart  Distant Location (provider location):  On-site  Phone call duration: 20 minutes        Again, thank you for allowing me to participate in the care of your patient.        Sincerely,        Hong Hansen MD

## 2023-04-20 ENCOUNTER — PATIENT OUTREACH (OUTPATIENT)
Dept: GERIATRIC MEDICINE | Facility: CLINIC | Age: 78
End: 2023-04-20
Payer: COMMERCIAL

## 2023-04-20 ENCOUNTER — TELEPHONE (OUTPATIENT)
Dept: NEUROLOGY | Facility: CLINIC | Age: 78
End: 2023-04-20
Payer: COMMERCIAL

## 2023-04-20 NOTE — PROGRESS NOTES
[facility] Care Coordination Contact      [facility] Six-Month Telephone Assessment    6 month telephone assessment completed on 4/20/2023.    ER visits: Yes -  [facility]   Hospitalizations: [facility]   TCU stays: No  Significant health status changes: None to report.   Falls/Injuries: No  ADL/IADL changes: No  Changes in services: No    Caregiver Assessment follow up:  NA    Goals: See POC in chart for goal progress documentation.     Will see member in 6 months for an annual health risk assessment.   Encouraged member to call CC with any questions or concerns in the meantime.   ASHLEIGH Villalpando  [facility]  Cell: [phone]

## 2023-04-20 NOTE — TELEPHONE ENCOUNTER
Writer faxed pool therapy referral to [facility]. Fax number [phone].    Odette R/Procedure    [facility]   Neurology, NeuroSurgery, NeuroPsychology and Pain Management Specialties  Medical/Surgical Adult Specialties

## 2023-04-22 ENCOUNTER — MYC MEDICAL ADVICE (OUTPATIENT)
Dept: NEUROLOGY | Facility: CLINIC | Age: 78
End: 2023-04-22
Payer: COMMERCIAL

## 2023-04-22 DIAGNOSIS — R56.9 SEIZURE (H): ICD-10-CM

## 2023-04-24 RX ORDER — LEVETIRACETAM 500 MG/1
TABLET ORAL
Qty: 42 TABLET | Refills: 0 | Status: SHIPPED | OUTPATIENT
Start: 2023-04-24 | End: 2023-09-20

## 2023-05-04 ENCOUNTER — HOSPITAL ENCOUNTER (OUTPATIENT)
Dept: PHYSICAL THERAPY | Facility: CLINIC | Age: 78
Discharge: HOME | End: 2023-05-04
Payer: COMMERCIAL

## 2023-05-04 DIAGNOSIS — R42 DIZZINESS: ICD-10-CM

## 2023-05-04 DIAGNOSIS — I51.81 STRESS-INDUCED CARDIOMYOPATHY: Primary | ICD-10-CM

## 2023-05-04 DIAGNOSIS — R26.89 BALANCE PROBLEMS: ICD-10-CM

## 2023-05-04 PROCEDURE — 97750 PHYSICAL PERFORMANCE TEST: CPT | Mod: GP | Performed by: PHYSICAL THERAPIST

## 2023-05-04 NOTE — PROGRESS NOTES
05/04/23 1000   Signing Clinician's Name / Credentials   Signing clinician's name / credentials Virgil Guthrie, PT, DPT, NCS   Dynamic Gait Index (Joesph and Jensen Haslett, 1995)   Gait Level Surface 2   Change in Gait Speed 3   Gait and Horizontal Head Turns 2   Gait with Vertical Head Turns 3   Gait and Pivot Turns 3   Step Over Obstacle 3   Step Around Obstacles 3   Steps 3   Total Dynamic Gait Index Score  (A score of 19 or less has been correlated to an increased risk of falls in community dwelling older adults, patients with vestibular disorders, and patients with MS.)   Total Score (out of 24) 22       Dynamic Gait Index (DGI):The DGI is a measure of balance during gait that is reliable and valid for the elderly and individuals with Parkinson's disease, MS, vestibular disorders, or s/p stroke. Gait assistive device used: None    Scores ?19 /24 indicate an increased risk for falls according to Maggie et al 2000  Minimal Detectable Change = 2.9 in community dwelling elderly according to Rey et al 2011    Assessment (rationale for performing, application to patient s function & care plan): In order to assess balance progress, DGI completed.  Pt improved score from 19/24 to 22/24.  Despite this improvement, she is still hesitant in her balance confidence.    GAIT SPEED  Gait speed was 1.04 meters per second, indicating decreased speed and pt at risk for falls (Reference scale: > 1.2 m/sec: independent in all activities and crossing street, 0.8-1.0 m/sec: community ambulator, household activities.  May need intervention to reduce falls risk, 0.6-0.8 m/sec: performs self care, limited community ambulator. May need intervention to reduce falls risk, <0.6 m/sec: dependent in ADLs, household ambulator.  Needs intervention to reduce falls risk)    Norm 1.2 to 1.4 meters/sec for 20-70 year-old  Minimal Detectable Change for preferred gait (PD) = 0.18 meters/sec &   Minimal Detectable Change for fast gait  (PD) = 0.25 meters/sec according to Chao & Val 2008    Assessment (rationale for performing, application to patient s function & care plan): Patient has improved gait speed from 0.7 m/s to 1.04 m/s. Despite this improvement, she becomes cautious in busy and unpredictable environments.       DIZZINESS HANDICAP INVENTORY:  Pt reduced score by 4 points. Her dizziness is still provoked by movements and requires additional guidance for increasing community participation.       (Minutes billed as physical performance test): 25

## 2023-05-04 NOTE — PROGRESS NOTES
[facility]    OUTPATIENT PHYSICAL THERAPY  PLAN OF TREATMENT FOR OUTPATIENT REHABILITATION AND PROGRESS NOTE           Patient's Last Name, First Name, Morenita Barragan Date of Birth  1945   Provider's Name  [facility] Medical Record No.  [id number]    Onset Date  11/8/22 Start of Care Date  1/25/23   Type:     _X_PT   ___OT   ___SLP Medical Diagnosis  Stress induced cardiomyopathy   PT Diagnosis  Balance problems, generalized weakness Plan of Treatment  Frequency/Duration: 1x/wk every other week  Certification date from 4/25/23 to 6/3/23     Goals:  Goal Identifier Gait speed   Goal Description Client will be able to ambulate 25 ft in 8 seconds or less demonstrating increased confidence with short distance walking   Target Date 04/24/23   Date Met  05/04/23   Progress (detail required for progress note): MET: pt reduced time from 10.98 seconds to 7.34 seconds     Goal Identifier DGI   Goal Description Client will demonstrate improvements in dynamic standing balance activities scoring a 22/24 to be at decreased risk for falls   Target Date 04/24/23   Date Met  05/04/23   Progress (detail required for progress note): MET: Pt progressed from 19/24 to 22/24.  She is still hesitant about her balance and will push a small cart for added stability     Goal Identifier DHI   Goal Description Pt will score </=25 on the Dizziness Handicap Inventory in order to demonstrate improved QOL.   Target Date 06/03/23   Date Met      Progress (detail required for progress note): Pt progressed from 40 to 36/100.  She notes improved dizziness sypmtoms overall, but is still not confident in her balance.       Beginning/End Dates of Progress Note Reporting Period:  1/25/23 to 5/4/2023     Progress Toward Goals:   Progress this reporting period: Patient has been progressing  towards goals. See above for details. She continues to be hesitant in regards to her balance and unpredictable environments.    Client Self (Subjective) Report for Progress Note Reporting Period: Pt notes that she will be having a chemical stress yesterday before cardiac surgery.   She will begin pool therapy for her back pain. Pt continues to see her psychologist and will be reducing frequency to 1x/wk.  Overall she feels talk therapy has been helping with symptom management.               I CERTIFY THE NEED FOR THESE SERVICES FURNISHED UNDER        THIS PLAN OF TREATMENT AND WHILE UNDER MY CARE     (Physician co-signature of this document indicates review and certification of the therapy plan).                Referring Provider: Obed Guthrie, PT

## 2023-05-16 ENCOUNTER — PATIENT OUTREACH (OUTPATIENT)
Dept: CARE COORDINATION | Facility: CLINIC | Age: 78
End: 2023-05-16
Payer: COMMERCIAL

## 2023-05-17 ASSESSMENT — ASTHMA QUESTIONNAIRES
ACT_TOTALSCORE: 23
QUESTION_4 LAST FOUR WEEKS HOW OFTEN HAVE YOU USED YOUR RESCUE INHALER OR NEBULIZER MEDICATION (SUCH AS ALBUTEROL): NOT AT ALL
ACT_TOTALSCORE: 23
QUESTION_1 LAST FOUR WEEKS HOW MUCH OF THE TIME DID YOUR ASTHMA KEEP YOU FROM GETTING AS MUCH DONE AT WORK, SCHOOL OR AT HOME: NONE OF THE TIME
QUESTION_5 LAST FOUR WEEKS HOW WOULD YOU RATE YOUR ASTHMA CONTROL: WELL CONTROLLED
QUESTION_2 LAST FOUR WEEKS HOW OFTEN HAVE YOU HAD SHORTNESS OF BREATH: ONCE OR TWICE A WEEK
QUESTION_3 LAST FOUR WEEKS HOW OFTEN DID YOUR ASTHMA SYMPTOMS (WHEEZING, COUGHING, SHORTNESS OF BREATH, CHEST TIGHTNESS OR PAIN) WAKE YOU UP AT NIGHT OR EARLIER THAN USUAL IN THE MORNING: NOT AT ALL

## 2023-05-17 NOTE — PROGRESS NOTES
[facility]  New Patient Visit  2021     Chief Complaint:  Chronic fatigue and dizziness following pericarditis    HPI:  Morenita Moore is a 75 year old female with a hx of paracarditis thought to be 2/2 virus in 2019 who presents as she has had symptoms since.     She developed chest pain and lightheadedness in 2019. Since then she has had work-ups in the ER, cardiology clinic, and rheumatology clinic. She did have a TTE at one point which showed trivial pericardial effusion. Clinically, she was given the diagnosis of pericarditis. She has been on various treatments including prednisone (self prescribed), colchicine, and NSAIDs. She reports some improvement with all of these therapies. She did have a elevated WANDA and saw a rheumatologist. No other findings were seen on exam or labs, an elevated WANDA was thought to be a nonspecific finding.    She reports that although she was treated with cochicine and her chest pain has improved she has had trouble with fatigue and dizziness. She says these have both improved but continue. She had a brief period of vertigo but this was related to metoprolol and resolved with the discontinuation of that drug. During the last year she has lost significant weight. She reports about 30 lbs of unintentional weight loss.     She says prior to the pericarditis she had had a lot of stress with moving,  her son took off, her son's dad went to a nursing home, and her mother .     She reports she has lived in [place], [place] and [place]. She visited [place] but few other places.     She comes as she would like to know what virus caused her pericarditis.    ROS: Complete 12-point ROS is negative except as noted above.    Past Medical History:  Past Medical History:   Diagnosis Date     Anxiety      Asthma     adult-onset asthma diagnosed in      Cancer (H)      DCIS (ductal carcinoma in situ)     stage 0 status post left mastectomy in  2001     Depressive disorder      Uncomplicated asthma        Past Surgical History:  Past Surgical History:   Procedure Laterality Date     BREAST SURGERY         Social History:  Social History     Socioeconomic History     Marital status: Single     Spouse name: Not on file     Number of children: Not on file     Years of education: Not on file     Highest education level: Not on file   Occupational History     Not on file   Social Needs     Financial resource strain: Not on file     Food insecurity     Worry: Not on file     Inability: Not on file     Transportation needs     Medical: Not on file     Non-medical: Not on file   Tobacco Use     Smoking status: Former Smoker     Smokeless tobacco: Never Used   Substance and Sexual Activity     Alcohol use: No     Drug use: Never     Sexual activity: Not on file   Lifestyle     Physical activity     Days per week: Not on file     Minutes per session: Not on file     Stress: Not on file   Relationships     Social connections     Talks on phone: Not on file     Gets together: Not on file     Attends Shinto service: Not on file     Active member of club or organization: Not on file     Attends meetings of clubs or organizations: Not on file     Relationship status: Not on file     Intimate partner violence     Fear of current or ex partner: Not on file     Emotionally abused: Not on file     Physically abused: Not on file     Forced sexual activity: Not on file   Other Topics Concern     Parent/sibling w/ CABG, MI or angioplasty before 65F 55M? Not Asked   Social History Narrative     Not on file       Family Medical History:  Family History   Problem Relation Age of Onset     Cancer Sister         breast cancer       Allergies:     Allergies   Allergen Reactions     Levalbuterol Hydrochloride Shortness Of Breath     Cats Other (See Comments)     Itchy eyes     Dust Mites      Other reaction(s): Wheezing  Wheezing/shortness/breath/see (IRP 6/1)      Fluticasone-Salmeterol Swelling     Proair Hfa [Albuterol] Other (See Comments)     DRY THROAT, SWALLOWING ISSUES     Amitriptyline Palpitations     Escitalopram Anxiety       Medications:  Current Outpatient Medications   Medication Sig Dispense Refill     acetaminophen (TYLENOL) 500 MG tablet Take 500 mg by mouth as needed       albuterol (PROVENTIL HFA: VENTOLIN HFA) 108 (90 BASE) MCG/ACT inhaler Inhale 2 puffs into the lungs every 6 hours.         aspirin 81 MG EC tablet Take 81 mg by mouth daily       calcium carbonate 750 MG CHEW Take 1-2 tablets by mouth as needed       fluticasone-salmeterol (ADVAIR) 100-50 MCG/DOSE diskus inhaler Inhale 1 puff into the lungs every 12 hours       LORazepam (ATIVAN) 0.5 MG tablet daily        promethazine (PHENERGAN) 12.5 MG tablet Take 12.5 mg by mouth as needed       CIPROFLOXACIN PO Take 500 mg by mouth daily       ipratropium - albuterol 0.5 mg/2.5 mg/3 mL (DUONEB) 0.5-2.5 (3) MG/3ML nebulization Take 3 mLs by nebulization every 4 hours as needed for shortness of breath / dyspnea. (Patient not taking: Reported on 8/29/2018) 1 Box 1     metoprolol tartrate (LOPRESSOR) 25 MG tablet Take 12.5 mg by mouth        OMEPRAZOLE PO Take 20 mg by mouth as needed        ondansetron (ZOFRAN) 4 MG tablet Take 4 mg by mouth as needed         Immunizations:  Immunization History   Administered Date(s) Administered     COVID-19,,Pfizer 03/09/2021, 03/30/2021     Influenza Vaccine IM > 6 months Valent IIV4 10/18/2019, 10/30/2020     Pneumo Conj 13-V (2010&after) 02/15/2016     Pneumococcal 23 valent 10/19/2009, 09/27/2017     Pneumococcal, Unspecified 10/19/2009     TDAP Vaccine (Boostrix) 06/23/2009, 09/09/2015     Zoster vaccine, live 07/21/2009       Exam:  B/P: 164/80, T: 98.2, P: 92, R: Data Unavailable, Weight: 128 lbs 9.6 oz  Gen: Alert and in no distress. Appears stated age.  Psych: Normal affect. Alert and oriented.   HEENT: PERRL. No icterus. Oropharynx pink and moist without  lesions.   Neck: No lymphadenopathy.   CV: Regular rate and rhythm without m/r/g.   Chest: Clear to auscultation bilaterally without wheezes or crackles.   Abdomen: Soft, non-distended. Non-tender. Normal bowel sounds.   Extremities: Warm and well perfused.   Skin: No rashes or lesions noted.     Labs:  WBC   Date Value Ref Range Status   12/14/2020 6.5 4.0 - 11.0 10e9/L Final       No results found for: CRP    Creatinine   Date Value Ref Range Status   12/14/2020 0.70 0.52 - 1.04 mg/dL Final   11/10/2015 0.66 0.52 - 1.04 mg/dL Final   08/29/2012 0.64 0.52 - 1.04 mg/dL Final       1/6/2020 CRP and ESR wnl  1/8/2020 HIV negative, WANDA positive 1:320   1/20/2020 Anti DNA negative, C3 and C4 wnl, Ro, SSB  1/23/2020 rapid strep negative  5/1/2020 negative aerobic and anaerobic blood cultures  5/1/2020 Rapid flu A and B negative  6/15/2020 heavy metals negative  Assessment and Plan:  Morenita Moore is a 75 year old female with a hx of pericarditis who would like help knowing what caused her pericarditis.     Pericarditis. It appears the patient has improved her symptoms. She saw my colleague Dr. Rory Grullon at [facility] on 5/18/2020 who told her that pericarditis is usually caused by short term viruses and it is basically impossible to determine which months later. I feel similarly and told her so. Most studies have never found a cause of presumed viral pericarditis. Most viruses such as coxsackievirus, echovirus, adenoviruses, there is no way to find them 18 months later and even for things we might test like mumps we have no way of knowing if her having titers would be related to her pericarditis. I offered to test for coccidioides, syphilis, Hepatitis B, Histoplasma. She is eager for this testing. I told her I highly doubt these will be revealing. She would still like the testing.  -coccidioides  -RPR  -Hepatitis B panel  -histoplasma antigen    Return to clinic if infection found or new issue arises.     Roxana  Elizabeth Payne MD    The entire visit including talking with and examining the patient as well as reviewing records here and at other institutions took 90 minutes     Answers for HPI/ROS submitted by the patient on 4/5/2021   General Symptoms: Yes  Skin Symptoms: No  HENT Symptoms: Yes  EYE SYMPTOMS: No  HEART SYMPTOMS: Yes  LUNG SYMPTOMS: No  INTESTINAL SYMPTOMS: No  URINARY SYMPTOMS: No  GYNECOLOGIC SYMPTOMS: No  BREAST SYMPTOMS: No  SKELETAL SYMPTOMS: Yes  BLOOD SYMPTOMS: No  NERVOUS SYSTEM SYMPTOMS: Yes  MENTAL HEALTH SYMPTOMS: No  Fever: No  Loss of appetite: Yes  Weight loss: No  Weight gain: No  Fatigue: Yes  Night sweats: No  Chills: No  Increased stress: No  Excessive hunger: No  Excessive thirst: No  Feeling hot or cold when others believe the temperature is normal: No  Loss of height: No  Post-operative complications: No  Surgical site pain: No  Hallucinations: No  Change in or Loss of Energy: Yes  Hyperactivity: No  Confusion: No  Ear pain: Yes  Ear discharge: No  Hearing loss: No  Tinnitus: Yes  Nosebleeds: No  Congestion: No  Sinus pain: No  Trouble swallowing: No   Voice hoarseness: No  Mouth sores: No  Sore throat: No  Tooth pain: No  Gum tenderness: No  Bleeding gums: No  Change in taste: No  Change in sense of smell: No  Dry mouth: No  Hearing aid used: No  Neck lump: No  Chest pain or pressure: Yes  Fast or irregular heartbeat: No  Pain in legs with walking: No  Trouble breathing while lying down: No  Fingers or toes appear blue: No  High blood pressure: No  Low blood pressure: No  Fainting: No  Murmurs: No  Pacemaker: No  Varicose veins: Yes  Edema or swelling: No  Wake up at night with shortness of breath: No  Light-headedness: Yes  Exercise intolerance: Yes  Back pain: Yes  Muscle aches: Yes  Neck pain: Yes  Swollen joints: Yes  Joint pain: Yes  Bone pain: No  Muscle cramps: No  Muscle weakness: No  Joint stiffness: Yes  Bone fracture: No  Trouble with coordination: No  Dizziness or trouble  with balance: Yes  Fainting or black-out spells: No  Memory loss: No  Headache: No  Seizures: No  Speech problems: No  Tingling: No  Tremor: Yes  Weakness: No  Difficulty walking: Yes  Paralysis: No  Numbness: No     Cheek Interpolation Flap Text: A decision was made to reconstruct the defect utilizing an interpolation axial flap and a staged reconstruction.  A telfa template was made of the defect.  This telfa template was then used to outline the Cheek Interpolation flap.  The donor area for the pedicle flap was then injected with anesthesia.  The flap was excised through the skin and subcutaneous tissue down to the layer of the underlying musculature.  The interpolation flap was carefully excised within this deep plane to maintain its blood supply.  The edges of the donor site were undermined.   The donor site was closed in a primary fashion.  The pedicle was then rotated into position and sutured.  Once the tube was sutured into place, adequate blood supply was confirmed with blanching and refill.  The pedicle was then wrapped with xeroform gauze and dressed appropriately with a telfa and gauze bandage to ensure continued blood supply and protect the attached pedicle.

## 2023-05-18 ENCOUNTER — OFFICE VISIT (OUTPATIENT)
Dept: FAMILY MEDICINE | Facility: CLINIC | Age: 78
End: 2023-05-18
Payer: COMMERCIAL

## 2023-05-18 VITALS
HEART RATE: 75 BPM | RESPIRATION RATE: 12 BRPM | WEIGHT: 133.6 LBS | TEMPERATURE: 98.9 F | HEIGHT: 65 IN | OXYGEN SATURATION: 98 % | DIASTOLIC BLOOD PRESSURE: 78 MMHG | BODY MASS INDEX: 22.26 KG/M2 | SYSTOLIC BLOOD PRESSURE: 142 MMHG

## 2023-05-18 DIAGNOSIS — Z12.11 SCREENING FOR COLON CANCER: ICD-10-CM

## 2023-05-18 DIAGNOSIS — F40.232 PHOBIA OF DENTAL PROCEDURE: ICD-10-CM

## 2023-05-18 DIAGNOSIS — K66.8 MESENTERIC CYST: ICD-10-CM

## 2023-05-18 DIAGNOSIS — R25.1 TREMOR, PHYSIOLOGICAL: ICD-10-CM

## 2023-05-18 DIAGNOSIS — F32.5 MAJOR DEPRESSION IN REMISSION (H): ICD-10-CM

## 2023-05-18 DIAGNOSIS — I48.0 PAROXYSMAL ATRIAL FIBRILLATION (H): Primary | ICD-10-CM

## 2023-05-18 PROCEDURE — 99214 OFFICE O/P EST MOD 30 MIN: CPT | Performed by: PHYSICIAN ASSISTANT

## 2023-05-18 RX ORDER — LORAZEPAM 0.5 MG/1
TABLET ORAL
Qty: 10 TABLET | Refills: 0 | Status: SHIPPED | OUTPATIENT
Start: 2023-05-18 | End: 2024-05-08

## 2023-05-18 ASSESSMENT — PAIN SCALES - GENERAL: PAINLEVEL: NO PAIN (0)

## 2023-05-18 NOTE — PROGRESS NOTES
Assessment & Plan     Paroxysmal atrial fibrillation (H)  Continue close follow-up with cardiology.  Feeling well.  Asymptomatic.  Underwent 24-hour blood pressure monitoring.    Phobia of dental procedure  Tremor, physiological    Will send Ativan to be used preoperatively for dental procedures.  Comfortable with plan.  Reviewed safety.    - LORazepam (ATIVAN) 0.5 MG tablet  Dispense: 10 tablet; Refill: 0    Major depression in remission (H)  Referral placed to therapy for further evaluation.  Denies SI/HI.  - Adult Mental Health  Referral    Due for annual wellness visit.  Plan for 2 to 3 months.  Blood work at that time.  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  Recommended that time to follow-up with general surgery.  Continue to follow-up on ovarian cyst.  Last ultrasound in February.  Wishes to discuss about these further at upcoming visit.  She will complete her Cologuard.    30 minutes spent by me on the date of the encounter doing chart review, review of test results, interpretation of tests, patient visit and documentation      MED REC REQUIRED  Post Medication Reconciliation Status: discharge medications reconciled and changed, per note/orders    Return in about 3 months (around 8/18/2023).    The likelihood of other entities in the differential is insufficient to justify any further testing for them at this time. This was explained to the patient. The patient was advised that persistent or worsening symptoms would require further evaluation. Patient advised to call the office and if unable to reach to go to the emergency room if they develop any new or worsening symptoms. Expressed understanding and agreement with above stated plan.     Alexis De Leon PA-C  [facility] WILL    Joan JONES Flint is a 77 year old female presenting for the following health issues:  Patient presents with:  Surgical Followup  Medication  "Request: Ativan for dental procedure     Here today for follow-up.    -Recently saw Neurology.  Underwent EEG that was unremarkable.  Slowly tapering off Keppra.  See neurology note for specific instructions.  Gradually decreasing by 250 mg as tolerated.  Doing well at this at this moment.  No significant side effects as she is decreasing.  Does note some dizziness on occasion when decreasing.  Neurology also set her up for physical therapy and acupuncture which she is looking forward to beginning.    -Underwent evaluation with cardiology at the EP clinic.  See note below from [facility].  Declined A-fib ablation which was discovered during the procedure.    -Tearful today when discussing past trauma.  History of depression.  Does see a therapist off and on.    -Upcoming root canal.  Requesting Ativan which she has used in the past to help keep her tremor at bay during her procedure.  Tolerated medication well in the past.        Dear Alexis De Leon PA-C,     I had the pleasure of seeing your patient, Morenita Moore, today at the [facility] ([facility] cardiac electrophysiology clinic.    History of Present Illness  Morenita Moore is a pleasant 77 y.o. year old female with history of SVT and in November found to have syncope and subsequent LV dysfunction that was attributed to stress cardiomyopathy (at [facility]). Of note she did have a significant troponin release. She states she felt palpitations immediately before she passed out and then there is a period of time she does not remember followed by awaking in the hospital \"with a Mcdowell catheter\". Recently she had a presentation with atrial fibrillation with RVR. This has been her only awareness of afib and this was \"different\" than her SVT.    Past Medical History  Patient Active Problem List   Diagnosis Date Noted     Primary osteoarthritis of both knees 09/21/2020     CELESTE (generalized anxiety disorder) 12/26/2019 "     PTSD (post-traumatic stress disorder) 12/26/2019     Pericarditis 12/18/2019     Tremor, physiological 01/01/2019   eval Dr. India Deras, [facility]. rec clonazepam 0.5 mg nightly and botox for torticollis. FU 3-4 mos      Sprain of posterior cruciate ligament of left knee 01/15/2018     Acute internal derangement of left knee 01/03/2018   Class: Temporary     Torticollis, unspecified   12/2016 - Dr. Kathe Olson, PM&R trial TENS/PT. botox in past      Major depression in remission (HC) 01/09/2017     SVT (supraventricular tachycardia) (HC) 12/15/2015     Breast cancer in situ 12/15/2015   S/p mastectomy      Asthma in adult 12/15/2015     Past Surgical History:   . Laterality Date     cataract     gangilon cyst excision Left 2010   3rd finger     LAPAROSCOPY ABDOMEN DIAGNOSTIC   infertility work up     MASTECTOMY Left   Stage 0     Medications  Outpatient Medications       aspirin chewable 81 mg chewable tablet Chew 81 mg by mouth once daily with a meal.   Breast Prosthesis Fit for mastectomy bra, size 4. Dispense four bras.   cyanocobalamin (VITAMIN B12) 50 mcg tablet Take 50 mcg by mouth.   durable medical equipment (DME) Shower chair with back inside tub chair   fluticasone propion-salmeterol (ADVAIR HFA) 230-21 mcg/actuation inhaler Inhale 1 Puff by mouth 2 times daily.   levETIRAcetam (KEPPRA) 500 mg tablet Take 250-500 mg by mouth once daily.   Post Mastectomy Bra Fit for mastectomy bra  Dispense 9 mastectomy bras   Post Mastectomy Bra 3 mastectomy bras   Post Mastectomy Bra For personal use. Had left mastectomy   VENTOLIN HFA 90 mcg/actuation inhaler INHALE 2 PUFFS BY MOUTH FOUR TIMES DAILY AS NEEDED       Allergies  Allergies   Allergen Reactions     Levalbuterol Dyspnea     Other [Unlisted Allergen (Include Detail In Comments)] Shortness Of Breath   Wood smoke and chemical      Amitriptyline Palpitations     Cats (Fur, Dander, Saliva) Runny Nose, Itching and Other - Describe In Comment  "Field   Itchy eyes     Dust Mites Wheezing   Other reaction(s): Wheezing  Wheezing/shortness/breath/see (IRP )     Qvar [Beclomethasone] Bronchospasm     Family History  Family History   Problem Relation Age of Onset     Cancer-breast Sister 42     Osteoporosis Sister     Stroke Paternal Aunt 75     Cancer-breast Maternal Aunt     Colon polyps Sister     Irritable bowel syndrome Mother     Cancer-breast Mother     Atrial fibrillation Mother 85     Testicular cancer Son 24     Premature CHD (under age 60) Neg.   none     Cancer-ovarian No Family History     Social History  Social History     Tobacco Use     Smoking status: Former   Current packs/day: 0.00   Average packs/day: 1 pack/day for 25.0 years (25.0 pk-yrs)   Types: Cigarettes   Start date:    Quit date: 1989   Years since quittin.3     Smokeless tobacco: Never   Vaping Use     Vaping status: Never Used   Substance Use Topics     Alcohol use: Not Currently   Alcohol/week: 0.0 standard drinks of alcohol   Comment: occasional     Drug use: No     Review of Systems  Please refer to History of Present Illness    Physical Exam  Ht 1.651 m (5' 5\")  Wt 59.9 kg (132 lb)  BMI 21.97 kg/m    Patient Vitals for the past 72 hrs:  Weight   23 0907 59.9 kg (132 lb)     Physical Exam  HEENT: Neck supple, no JVD, no thyromegaly, no lymphadenopathy  Cardiac: S1 S2 RRR, slight systolic murmur, no rubs or gallops  Respiratory: CTAB  GI: Bowel sounds positive, soft, NT/ND  Extremity: No c/c/e  Neuro: Grossly non-focal  Skin: No rash  Vascular: Good pulses bilateral extremities, symmetrical    Diagnostic Data:  Labs (personally interpreted)   ALT (SGPT) (IU/L)   Date Value   2020 15     AST (SGOT) (IU/L)   Date Value   2020 13     TSH (uIU/mL)   Date Value   2020 2.44     BUN (mg/dL)   Date Value   2020 12     CREATININE (mg/dL)   Date Value   2020 0.66     POTASSIUM (mmol/L)   Date Value   2020 3.2 (L)     EKG " (personally interpreted)   NSR    Echocardiogram 2022 [facility]:  Left Ventricle   The left ventricle is normal in size. There is normal left ventricular wall   thickness. Left ventricular systolic function is normal. The visual ejection   fraction is 60-65%. No regional wall motion abnormalities noted.     Right Ventricle   The right ventricle is normal in size and function.     Atria   Normal left atrial size. Right atrial size is normal. There is no color   Doppler evidence of an atrial shunt.     Mitral Valve   The mitral valve leaflets appear normal. There is no evidence of stenosis,   fluttering, or prolapse. There is trace mitral regurgitation. There is no   mitral valve stenosis.     Tricuspid Valve   Normal tricuspid valve. There is trace tricuspid regurgitation. The right   ventricular systolic pressure is approximated at 26.2 mmHg plus the right   atrial pressure.     Aortic Valve   The aortic valve is trileaflet. No aortic regurgitation is present. No aortic   stenosis is present.     Pulmonic Valve   Normal pulmonic valve. This degree of valvular regurgitation is within normal   limits.     Vessels   The aortic root is normal size. The inferior vena cava is normal.     Pericardium   There is no pericardial effusion.     Rhythm   Sinus rhythm was noted.     Sumi Xavier is a delightful 77 year old female with history of pericarditis and SVT dating back several years but more recently possible stress cardiomyopathy vs MI and atrial fibrillation. We discussed the risks, benefits, alternatives, complications, personnel, procedure details, rationale, and logistics of catheter ablation for arrhythmias including but not limited to the risk of CVA, MI, cardiac arrest, perforation, tamponade, AV block requiring pacemaker, esophageal or nerve injury, need for surgical intervention, vascular injury, bleeding, complications from anesthesia, respiratory failure, infections, and death. She is agreeable to  "proceed.    We discussed the option of atrial fibrillation ablation today in addition to SVT or even if SVT is non-inducible. After discussion, she declined atrial fibrillation ablation and would wait until she is more symptomatic related to recurrent afib.    Plan:  EP study +/- SVT ablation but declines targeting atrial fibrillation with pulmonary vein isolation at this time.    Sincerely,    Adelso Cody MD  [facility]   Cardiac Electrophysiology       Review of Systems   Constitutional, HEENT, cardiovascular, pulmonary, GI, , musculoskeletal, neuro, skin, endocrine and psych systems are negative, except as otherwise noted.      Objective    BP (!) 142/78 (BP Location: Right arm, Patient Position: Sitting, Cuff Size: Adult Regular)   Pulse 75   Temp 98.9  F (37.2  C) (Temporal)   Resp 12   Ht 1.651 m (5' 5\")   Wt 60.6 kg (133 lb 9.6 oz)   SpO2 98%   BMI 22.23 kg/m    5' 5\"  133 lbs 9.6 oz    Allergies   Allergen Reactions     Levalbuterol Shortness Of Breath     Diltiazem Dizziness     Cats Other (See Comments)     Itchy eyes     Dust Mites      Other reaction(s): Wheezing  Wheezing/shortness/breath/see (IRP 6/1)     Qvar [Beclomethasone]      Per patient- allergic to QVAR     Amitriptyline Palpitations     Escitalopram Anxiety     Current Outpatient Medications   Medication Sig Dispense Refill     acetaminophen (TYLENOL) 325 MG tablet Take 2 tablets (650 mg) by mouth every 4 hours as needed for mild pain       albuterol (PROAIR HFA/PROVENTIL HFA/VENTOLIN HFA) 108 (90 Base) MCG/ACT inhaler Inhale 2 puffs into the lungs every 6 hours as needed for shortness of breath / dyspnea       aspirin (ASA) 81 MG chewable tablet Take 81 mg by mouth daily       Blood Pressure Monitor KIT 1 Device as needed (for home BP moniotoring) 1 kit 0     calcium carbonate-vitamin D 600-125 MG-UNIT TABS Take 1 tablet by mouth daily       Cyanocobalamin 50 MCG TABS Take 50 mcg by mouth once a week Sundays   "     fluticasone-salmeterol (ADVAIR HFA) 115-21 MCG/ACT inhaler Inhale 2 puffs into the lungs 2 times daily 12 g 11     levETIRAcetam (KEPPRA) 500 MG tablet Take 1.5 tabs (750 mg) for 14 days, then 1 tab (500mg) for 14 days, then a half tab (250mg) for the final 14 days then stop medication 42 tablet 0     LORazepam (ATIVAN) 0.5 MG tablet 30 mins prior to dental procedure as needed 10 tablet 0     verapamil (CALAN) 40 MG tablet Take 1.5 tablets ( 60 mg) x 1 as needed per day for tachycardia 30 tablet 1     Past Medical History:   Diagnosis Date     Anxiety      Asthma 2012    adult-onset asthma diagnosed in 2012     Cancer (H)      DCIS (ductal carcinoma in situ) 2001    stage 0 status post left mastectomy in 2001     Depressive disorder      PONV (postoperative nausea and vomiting)      Torticollis      Tremor      Past Surgical History:   Procedure Laterality Date     BREAST SURGERY       CATARACT IOL, RT/LT       MASTECTOMY      DCIS     ODONTECTOMY Left 8/2/2022    Procedure: SURGICAL EXTRACTION, TOOTH - Teeth #12, 14, 19;  Surgeon: Arvin Garza DDS;  Location: [facility] OR     RETINAL REATTACHMENT         Physical Exam   GENERAL: healthy, alert and no distress  EYES: Eyes grossly normal to inspection, PERRL and conjunctivae and sclerae normal  NECK: no asymmetry, masses, or scars and thyroid normal  RESP: lungs clear to auscultation - no rales, rhonchi or wheezes  CV: regular rate and rhythm, normal S1 S2, no S3 or S4, no murmur, click or rub, no peripheral edema  MS: no gross musculoskeletal defects noted, no edema  SKIN: no suspicious lesions or rashes.  Well-healing surgical site right groin area.  NEURO: Normal strength and tone, mentation intact and speech normal resting physiological tremor.  PSYCH: mentation appears normal, affect normal/bright

## 2023-05-19 ENCOUNTER — PATIENT OUTREACH (OUTPATIENT)
Dept: GERIATRIC MEDICINE | Facility: CLINIC | Age: 78
End: 2023-05-19
Payer: COMMERCIAL

## 2023-05-19 NOTE — PROGRESS NOTES
[facility] Care Coordination Contact    Internal CC change effective 6/1/2023.  Mailed member CC Change letter.  Additional tasks to be completed by CMS include: update database & EPIC, enter CC Change in MMIS, and move member files on Q drive.    Isi Barbosa  Case Management Specialist  [facility]  [phone]

## 2023-05-19 NOTE — LETTER
May 19, 2023    MORENITA ZEE  [address],   [place] [address]      Dear Morenita:    As a member of [facility] ([facility] ([facility]), you are provided a care coordinator. I will be your new care coordinator as of 6/1/2023. I will be calling you soon to see how you are doing and determine your needs.    If you have any questions, please feel free to call me at [phone]. If you reach my voice mail, please leave a message and your phone number. If you are hearing impaired, please call the [place] Relay at 478 or [phone] ([email or web address] relay service).    I look forward to speaking with you soon.    Sincerely,    Jasmyne Wylie, ASHLEIGH  [phone]  [email or web address] is a health plan that contracts with both Medicare and the [place] Medical Assistance (Medicaid) program to provide benefits of both programs to enrollees. Enrollment in [facility] depends on contract renewal.      [facility]  [id number] DHS Approved ([id number])  [id number] (11/18)                  Quadrants Reporting?: 0

## 2023-05-26 ENCOUNTER — PATIENT OUTREACH (OUTPATIENT)
Dept: GERIATRIC MEDICINE | Facility: CLINIC | Age: 78
End: 2023-05-26
Payer: COMMERCIAL

## 2023-05-26 NOTE — PROGRESS NOTES
[facility] Care Coordination Contact    Care Coordinator spoke to Morenita and introduced self as new Care Coordinator.   Morenita is asking for EW to approve Masectomy Suit for water therapy.   Care Coordinator will complete S.A. request and task CMS.        ASHLEIGH Ramsay  [facility]  Phone: [phone]

## 2023-05-31 ENCOUNTER — TELEPHONE (OUTPATIENT)
Dept: GERIATRIC MEDICINE | Facility: CLINIC | Age: 78
End: 2023-05-31
Payer: COMMERCIAL

## 2023-05-31 DIAGNOSIS — Z90.10 STATUS POST MASTECTOMY, UNSPECIFIED LATERALITY: Primary | ICD-10-CM

## 2023-05-31 NOTE — TELEPHONE ENCOUNTER
Darnell Medley,     Just signed the DME order for you! Let me know if I can help with anything else or any issues with it.     Jared

## 2023-06-01 ENCOUNTER — THERAPY VISIT (OUTPATIENT)
Dept: PHYSICAL THERAPY | Facility: CLINIC | Age: 78
End: 2023-06-01
Payer: COMMERCIAL

## 2023-06-01 DIAGNOSIS — I51.81 STRESS-INDUCED CARDIOMYOPATHY: Primary | ICD-10-CM

## 2023-06-01 DIAGNOSIS — R26.89 BALANCE PROBLEMS: ICD-10-CM

## 2023-06-01 PROCEDURE — 97110 THERAPEUTIC EXERCISES: CPT | Mod: GP | Performed by: PHYSICAL THERAPIST

## 2023-06-01 NOTE — PROGRESS NOTES
06/01/23 0500   Appointment Info   Signing clinician's name / credentials Virgil Guthrie, PT, DPT, NCS   Visits Used 7   Medical Diagnosis Stress induced cardiomyopathy   PT Tx Diagnosis Balance problems. Generalized weakness   Progress Note/Certification   Start of Care Date 01/25/23   Onset of illness/injury or Date of Surgery 11/08/22   Predicted Duration 90 days   PT Goal 1   Goal Identifier Gait speed   Goal Description Client will be able to ambulate 25 ft in 8 seconds or less demonstrating increased confidence with short distance walking   Goal Progress MET: pt reduced time from 10.98 seconds to 7.34 seconds   Target Date 04/24/23   Date Met 05/04/23   PT Goal 2   Goal Identifier DGI   Goal Description Client will demonstrate improvements in dynamic standing balance activities scoring a 22/24 to be at decreased risk for falls   Goal Progress MET: Pt progressed from 19/24 to 22/24.  She is still hesitant about her balance and will push a small cart for added stability   Target Date 04/24/23   Date Met 05/04/23   PT Goal 3   Goal Identifier DHI   Goal Description Pt will score </=25 on the Dizziness Handicap Inventory in order to demonstrate improved QOL.   Goal Progress Pt progressed from 40 to 24/100.  She notes improved dizziness sypmtoms overall.   Target Date 06/03/23   Date Met 06/01/23   Subjective Report   Subjective Report Pt reports that her stress test went ok and she then had an ablastion. She will be starting pool therapy soon.  She will be starting acupuncture at the end of June. Looking for a new therapist.   Objective Measure 1   Objective Measure [facility]   Details 24/100   Treatment Interventions (PT)   Interventions Therapeutic Procedure/Exercise;Neuromuscular Re-education   Therapeutic Procedure/Exercise   Therapeutic Procedures: strength, endurance, ROM, flexibillity minutes (45086) 40   Skilled Intervention In order to address posture for carryover to balance recovery.   Ther Proc 1 -  Details Seated upper trap stretch on L x 20 seconds x 4 reps - minor VC for proper form and technique. Seated levator scap stretch x 20 seconds x 4 reps - VC for technique and form.   Scapular retraction- Placed arms in W position and completed scap sets with arm ER x 10 reps - good form. Hook lying: TA contraction x 10 reps with 8 sec holds - good form. TA with LE marching x 10 reps - minor VC for TA set.  Bridging x 10 reps - VC for TA set. Hooklying LTR x 10 reps. Hook lying hip abduction with green band x 10 reps - VC for slower controlled movements. Reviewed standing hip abduction and extension with band.  Reprinted HEP and pt shown how to pull up vidoes of exercises in order to assist with proper form and technique   Education   Learner/Method Patient;Listening;Demonstration;Pictures/Video;No Barriers to Learning   Education Comments Final HEP   Plan   Plan for next session discharge from PT   Total Session Time   Timed Code Treatment Minutes 40   Total Treatment Time (sum of timed and untimed services) 40         DISCHARGE  Reason for Discharge: Patient has met all goals.    Equipment Issued: none    Discharge Plan: Patient to continue home program.    Referring Provider:  Alexis De Leon

## 2023-06-09 ENCOUNTER — MYC MEDICAL ADVICE (OUTPATIENT)
Dept: FAMILY MEDICINE | Facility: CLINIC | Age: 78
End: 2023-06-09
Payer: COMMERCIAL

## 2023-06-09 DIAGNOSIS — R11.0 NAUSEA: Primary | ICD-10-CM

## 2023-06-09 NOTE — TELEPHONE ENCOUNTER
See FYI from patient.     Maggie NOBLE, RN   [facility] RN Triage Team

## 2023-06-12 RX ORDER — ONDANSETRON 4 MG/1
4 TABLET, ORALLY DISINTEGRATING ORAL EVERY 8 HOURS PRN
Qty: 20 TABLET | Refills: 0 | Status: SHIPPED | OUTPATIENT
Start: 2023-06-12 | End: 2024-06-18

## 2023-06-15 ENCOUNTER — TELEPHONE (OUTPATIENT)
Dept: ONCOLOGY | Facility: CLINIC | Age: 78
End: 2023-06-15
Payer: COMMERCIAL

## 2023-06-15 NOTE — TELEPHONE ENCOUNTER
lvmtcb   [Contact was Attempted] : contact was attempted [None on Record] : none on record [Reached regarding Plan] : not reached regarding plan [TextBox_9] : urgent referral for outpatient therapy [TextBox_11] : none [TextBox_13] : has no history of suicidal or NSSIB and denies SIIP

## 2023-06-15 NOTE — TELEPHONE ENCOUNTER
I left a message for the patient to return my call please transfer call [phone]   Parvin NUGENT  At [place]

## 2023-06-16 ENCOUNTER — MYC MEDICAL ADVICE (OUTPATIENT)
Dept: FAMILY MEDICINE | Facility: CLINIC | Age: 78
End: 2023-06-16
Payer: COMMERCIAL

## 2023-06-16 NOTE — TELEPHONE ENCOUNTER
Patient calling clinic seeking recommendations if steri strips with silverlon dressing can be removed in clinic by recommended 6/22/2023. RN stated team was trying to determine if dressing could be removed in clinic. Patient stated she would feel more comfortable returning to the clinic she had them placed as clinic advised she could return there for removal. RN advised patient to call back or send [employer] message if she would like to be seen in clinic instead, patient agreed with plan of care.

## 2023-06-23 NOTE — TELEPHONE ENCOUNTER
Patient notified and she verbalized understanding.        Pt ran out or naprosyn  Pt was given 30 pills about 4 months ago  Gets this headache on one side, on the neck to the side of the head.  Pt believes her  used these meds also  She would like toradol shot  No vision changes noted  No photophobia/phonophobia noted  + nausea  Pt has had this pain x 1 week  Pain when sleeping and now it seems to be worse.  We will give shot of toradol but refer patient over to see neurology/headache specialist  Refill naprosyn/famotidine  Refill flexeril    Do not take naprosyn/famotidine today as you are getting toradol 60mg x 1

## 2023-07-20 NOTE — PROGRESS NOTES
[facility] Care Coordination Contact    Faxed auth for masectomy suit to [facility].     Virginie Ha  [facility]  Case Management Specialist  [phone]

## 2023-07-25 ENCOUNTER — PATIENT OUTREACH (OUTPATIENT)
Dept: GERIATRIC MEDICINE | Facility: CLINIC | Age: 78
End: 2023-07-25
Payer: COMMERCIAL

## 2023-07-25 NOTE — PROGRESS NOTES
[facility] Care Coordination Contact    Received call from member about EW transportation. Updated her that I requested copy of auth yesterday from [facility] which I forwarded to [employer] today to give me the okay to have member call in to schedule transportation rides, still waiting to hear back from them.     Will update member when I hear back.     Virginie Ha  [facility]  Case Management Specialist  [phone]

## 2023-07-27 NOTE — PROGRESS NOTES
[facility] Care Coordination Contact    No response from [employer] to my emails. Reached out to Tanja at [employer], no direct phone #, was forwarded to her but she was not available to answer, no vm option. Was told to try again.     Called member to update her on above and that will update her as I find out.    Virginie Ha  [facility]  Case Management Specialist  [phone]

## 2023-07-28 NOTE — PROGRESS NOTES
This CMS spoke with Kelsi at [facility] [phone].  Also faxed a copy of the [facility] authorization letter member received to [email or web address].  She emailed back confirmation member is good to go.  Called member and discussed.  Member does not have specific times for her ride on the 12th yet, but will be in touch with this CMS if she runs into issues when she attempts to schedule the ride with Kelsi.   CC notified.    Isi Barbosa  Case Management Specialist  [facility]  [phone]

## 2023-08-16 NOTE — PROGRESS NOTES
Per [employer], mastectomy suit delivered 08/07/23. CC notified.    Elke Dean  Care Management Specialist Manager  [facility]  [phone]

## 2023-08-24 ENCOUNTER — HOSPITAL ENCOUNTER (OUTPATIENT)
Dept: ULTRASOUND IMAGING | Facility: CLINIC | Age: 78
Discharge: HOME | End: 2023-08-24
Attending: OBSTETRICS & GYNECOLOGY
Payer: COMMERCIAL

## 2023-08-24 ENCOUNTER — HOSPITAL ENCOUNTER (OUTPATIENT)
Dept: MAMMOGRAPHY | Facility: CLINIC | Age: 78
Discharge: HOME | End: 2023-08-24
Attending: OBSTETRICS & GYNECOLOGY
Payer: COMMERCIAL

## 2023-08-24 DIAGNOSIS — Z85.3 PERSONAL HISTORY OF MALIGNANT NEOPLASM OF BREAST: ICD-10-CM

## 2023-08-24 PROCEDURE — 77061 BREAST TOMOSYNTHESIS UNI: CPT | Mod: RT

## 2023-08-24 PROCEDURE — 76642 ULTRASOUND BREAST LIMITED: CPT | Mod: RT

## 2023-08-29 ENCOUNTER — PATIENT OUTREACH (OUTPATIENT)
Dept: GERIATRIC MEDICINE | Facility: CLINIC | Age: 78
End: 2023-08-29
Payer: COMMERCIAL

## 2023-08-29 NOTE — PROGRESS NOTES
[facility] Care Coordination Contact    Member inquired is she would be able to take a EW ride to [place], [place].  CC reached out to [facility] Clinical Liaison and was informed, EW rides could not cross state lines.  Member notified.    Isi Barbosa  Case Management Specialist  [facility]  [phone]

## 2023-08-30 ENCOUNTER — TELEPHONE (OUTPATIENT)
Dept: SURGERY | Facility: CLINIC | Age: 78
End: 2023-08-30
Payer: COMMERCIAL

## 2023-08-30 NOTE — TELEPHONE ENCOUNTER
Called patient after receiving a referral from Dr. Shelley for her to see Dr. Cheney regarding breast reconstruction.  Called patient for clarification of what she is needing.  Patient has a hx of breast cancer and left breast mastectomy. She is now interested in possibly an implant on the left and a reduction/lift on the right.  She is not interested in prophylactic mastectomy on the right.  She is also interested in support groups for breast cancer survivors.  Told her I will send her the list of plastic surgeons Dr. Cheney uses as well as a list of support groups in the [facility].  Told her to check with her insurance before seeing a new plastic surgeon.  She was appreciative of the information.  Invited further calls.

## 2023-09-01 ENCOUNTER — PATIENT OUTREACH (OUTPATIENT)
Dept: GERIATRIC MEDICINE | Facility: CLINIC | Age: 78
End: 2023-09-01
Payer: COMMERCIAL

## 2023-09-01 NOTE — PROGRESS NOTES
[facility] Care Coordination Contact    Called member to schedule annual HRA home visit. HRA has been scheduled for 09/05/23 11 am.    ASHLEIGH Ramsay  [facility]  Phone: [phone]

## 2023-09-01 NOTE — PROGRESS NOTES
[facility] Care Coordination Contact    Rec'd call from member stating [employer] questioning her eligibility for Sept.  This CMS checked MNITS and [employer], member showing active with no lapse in service.  Member notified, informed member she can call this CMS if she runs into any future issues.  CC notified.    Isi Barbosa  Case Management Specialist  [facility]  [phone]

## 2023-09-02 ENCOUNTER — HEALTH MAINTENANCE LETTER (OUTPATIENT)
Age: 78
End: 2023-09-02

## 2023-09-05 ENCOUNTER — PATIENT OUTREACH (OUTPATIENT)
Dept: GERIATRIC MEDICINE | Facility: CLINIC | Age: 78
End: 2023-09-05
Payer: COMMERCIAL

## 2023-09-13 ENCOUNTER — MYC MEDICAL ADVICE (OUTPATIENT)
Dept: FAMILY MEDICINE | Facility: CLINIC | Age: 78
End: 2023-09-13
Payer: COMMERCIAL

## 2023-09-13 ASSESSMENT — ENCOUNTER SYMPTOMS
NERVOUS/ANXIOUS: 0
DYSURIA: 0
CONSTIPATION: 0
HEADACHES: 0
EYE PAIN: 0
PARESTHESIAS: 0
DIZZINESS: 1
JOINT SWELLING: 0
FREQUENCY: 0
DIARRHEA: 0
HEMATOCHEZIA: 0
BREAST MASS: 0
CHILLS: 0
ARTHRALGIAS: 0
MYALGIAS: 0
ABDOMINAL PAIN: 0
NAUSEA: 0
HEARTBURN: 1
SORE THROAT: 0
COUGH: 1
FEVER: 0
PALPITATIONS: 0
WEAKNESS: 0
SHORTNESS OF BREATH: 0
HEMATURIA: 0

## 2023-09-13 ASSESSMENT — ACTIVITIES OF DAILY LIVING (ADL): CURRENT_FUNCTION: TRANSPORTATION REQUIRES ASSISTANCE

## 2023-09-14 NOTE — TELEPHONE ENCOUNTER
James Espinoza,       Please review patient's MyChart message and advise.       India QUIROGA MA on 9/14/2023 at 9:43 AM

## 2023-09-15 ENCOUNTER — MEDICAL CORRESPONDENCE (OUTPATIENT)
Dept: HEALTH INFORMATION MANAGEMENT | Facility: CLINIC | Age: 78
End: 2023-09-15

## 2023-09-18 ENCOUNTER — TELEPHONE (OUTPATIENT)
Dept: NEUROLOGY | Facility: CLINIC | Age: 78
End: 2023-09-18
Payer: COMMERCIAL

## 2023-09-18 NOTE — TELEPHONE ENCOUNTER
Writer faxed orders to Christen at Dr Hansen's request  Faxed pool therapy orders, face sheet and current OV note to Christen at [phone] .  Verified delivered by right fax  SAUNDRA Moody, [place] ([facility])

## 2023-09-20 ENCOUNTER — OFFICE VISIT (OUTPATIENT)
Dept: FAMILY MEDICINE | Facility: CLINIC | Age: 78
End: 2023-09-20
Payer: COMMERCIAL

## 2023-09-20 VITALS
BODY MASS INDEX: 22.78 KG/M2 | OXYGEN SATURATION: 98 % | HEIGHT: 65 IN | HEART RATE: 76 BPM | DIASTOLIC BLOOD PRESSURE: 75 MMHG | RESPIRATION RATE: 22 BRPM | SYSTOLIC BLOOD PRESSURE: 135 MMHG | TEMPERATURE: 99.6 F | WEIGHT: 136.7 LBS

## 2023-09-20 DIAGNOSIS — F32.5 MAJOR DEPRESSION IN REMISSION (H): ICD-10-CM

## 2023-09-20 DIAGNOSIS — I48.0 PAROXYSMAL ATRIAL FIBRILLATION (H): ICD-10-CM

## 2023-09-20 DIAGNOSIS — Z13.6 CARDIOVASCULAR SCREENING; LDL GOAL LESS THAN 130: ICD-10-CM

## 2023-09-20 DIAGNOSIS — Z23 NEEDS FLU SHOT: ICD-10-CM

## 2023-09-20 DIAGNOSIS — Z12.11 SCREEN FOR COLON CANCER: ICD-10-CM

## 2023-09-20 DIAGNOSIS — R03.0 ELEVATED BP WITHOUT DIAGNOSIS OF HYPERTENSION: ICD-10-CM

## 2023-09-20 DIAGNOSIS — Z00.00 ENCOUNTER FOR ANNUAL WELLNESS EXAM IN MEDICARE PATIENT: Primary | ICD-10-CM

## 2023-09-20 LAB
ALBUMIN SERPL BCG-MCNC: 4.7 G/DL (ref 3.5–5.2)
ALP SERPL-CCNC: 83 U/L (ref 35–104)
ALT SERPL W P-5'-P-CCNC: 17 U/L (ref 0–50)
ANION GAP SERPL CALCULATED.3IONS-SCNC: 11 MMOL/L (ref 7–15)
AST SERPL W P-5'-P-CCNC: 27 U/L (ref 0–45)
BASOPHILS # BLD AUTO: 0.1 10E3/UL (ref 0–0.2)
BASOPHILS NFR BLD AUTO: 1 %
BILIRUB SERPL-MCNC: 0.5 MG/DL
BUN SERPL-MCNC: 12.1 MG/DL (ref 8–23)
CALCIUM SERPL-MCNC: 10.3 MG/DL (ref 8.8–10.2)
CHLORIDE SERPL-SCNC: 103 MMOL/L (ref 98–107)
CHOLEST SERPL-MCNC: 178 MG/DL
CREAT SERPL-MCNC: 0.61 MG/DL (ref 0.51–0.95)
DEPRECATED HCO3 PLAS-SCNC: 26 MMOL/L (ref 22–29)
EGFRCR SERPLBLD CKD-EPI 2021: >90 ML/MIN/1.73M2
EOSINOPHIL # BLD AUTO: 0.2 10E3/UL (ref 0–0.7)
EOSINOPHIL NFR BLD AUTO: 3 %
ERYTHROCYTE [DISTWIDTH] IN BLOOD BY AUTOMATED COUNT: 13.5 % (ref 10–15)
GLUCOSE SERPL-MCNC: 95 MG/DL (ref 70–99)
HCT VFR BLD AUTO: 43.6 % (ref 35–47)
HDLC SERPL-MCNC: 60 MG/DL
HGB BLD-MCNC: 14 G/DL (ref 11.7–15.7)
IMM GRANULOCYTES # BLD: 0 10E3/UL
IMM GRANULOCYTES NFR BLD: 0 %
LDLC SERPL CALC-MCNC: 96 MG/DL
LYMPHOCYTES # BLD AUTO: 1 10E3/UL (ref 0.8–5.3)
LYMPHOCYTES NFR BLD AUTO: 16 %
MCH RBC QN AUTO: 30 PG (ref 26.5–33)
MCHC RBC AUTO-ENTMCNC: 32.1 G/DL (ref 31.5–36.5)
MCV RBC AUTO: 93 FL (ref 78–100)
MONOCYTES # BLD AUTO: 0.4 10E3/UL (ref 0–1.3)
MONOCYTES NFR BLD AUTO: 6 %
NEUTROPHILS # BLD AUTO: 4.5 10E3/UL (ref 1.6–8.3)
NEUTROPHILS NFR BLD AUTO: 74 %
NONHDLC SERPL-MCNC: 118 MG/DL
PLATELET # BLD AUTO: 272 10E3/UL (ref 150–450)
POTASSIUM SERPL-SCNC: 4.3 MMOL/L (ref 3.4–5.3)
PROT SERPL-MCNC: 7.6 G/DL (ref 6.4–8.3)
RBC # BLD AUTO: 4.67 10E6/UL (ref 3.8–5.2)
SODIUM SERPL-SCNC: 140 MMOL/L (ref 136–145)
TRIGL SERPL-MCNC: 109 MG/DL
TSH SERPL DL<=0.005 MIU/L-ACNC: 2.33 UIU/ML (ref 0.3–4.2)
WBC # BLD AUTO: 6.1 10E3/UL (ref 4–11)

## 2023-09-20 PROCEDURE — G0008 ADMIN INFLUENZA VIRUS VAC: HCPCS | Performed by: PHYSICIAN ASSISTANT

## 2023-09-20 PROCEDURE — 36415 COLL VENOUS BLD VENIPUNCTURE: CPT | Performed by: PHYSICIAN ASSISTANT

## 2023-09-20 PROCEDURE — G0439 PPPS, SUBSEQ VISIT: HCPCS | Performed by: PHYSICIAN ASSISTANT

## 2023-09-20 PROCEDURE — 80061 LIPID PANEL: CPT | Performed by: PHYSICIAN ASSISTANT

## 2023-09-20 PROCEDURE — 90686 IIV4 VACC NO PRSV 0.5 ML IM: CPT | Performed by: PHYSICIAN ASSISTANT

## 2023-09-20 PROCEDURE — 84443 ASSAY THYROID STIM HORMONE: CPT | Performed by: PHYSICIAN ASSISTANT

## 2023-09-20 PROCEDURE — 80053 COMPREHEN METABOLIC PANEL: CPT | Performed by: PHYSICIAN ASSISTANT

## 2023-09-20 PROCEDURE — 85025 COMPLETE CBC W/AUTO DIFF WBC: CPT | Performed by: PHYSICIAN ASSISTANT

## 2023-09-20 ASSESSMENT — ENCOUNTER SYMPTOMS
DIARRHEA: 0
SHORTNESS OF BREATH: 0
WEAKNESS: 0
HEMATOCHEZIA: 0
HEARTBURN: 1
ABDOMINAL PAIN: 0
DIZZINESS: 1
DYSURIA: 0
MYALGIAS: 0
SORE THROAT: 0
ARTHRALGIAS: 0
HEMATURIA: 0
FREQUENCY: 0
NERVOUS/ANXIOUS: 0
PARESTHESIAS: 0
CONSTIPATION: 0
EYE PAIN: 0
JOINT SWELLING: 0
PALPITATIONS: 0
COUGH: 1
BREAST MASS: 0
FEVER: 0
HEADACHES: 0
NAUSEA: 0
CHILLS: 0

## 2023-09-20 ASSESSMENT — ACTIVITIES OF DAILY LIVING (ADL): CURRENT_FUNCTION: TRANSPORTATION REQUIRES ASSISTANCE

## 2023-09-20 ASSESSMENT — PAIN SCALES - GENERAL: PAINLEVEL: NO PAIN (0)

## 2023-09-20 ASSESSMENT — PATIENT HEALTH QUESTIONNAIRE - PHQ9: SUM OF ALL RESPONSES TO PHQ QUESTIONS 1-9: 3

## 2023-09-20 NOTE — PROGRESS NOTES
"SUBJECTIVE:   Morenita is a 78 year old who presents for Preventive Visit.    Here today for her annual wellness visit.  Overall she is doing very well.  Working closely with a care coordinator which is helping her organize rides to different appointments, get a walker, and helping set up physical therapy.    Due for flu shot which we will do today.  Wishes to avoid high-dose flu shot as she had adverse reaction in the past.  Not anaphylaxis.    States that her last colonoscopy was approximately 5 years.  Now over 77.  We did discuss Cologuard at her last visit.  Wishes to pursue FIT testing as the Cologuard is little bit more complex.  Does have a history of hemorrhoids.    Continues to work with her pulmonologist, cardiologist, as well as her neurologist.  Now off of ppra.  Monitoring blood pressures at home.  Requesting 24-hour blood pressure monitor.  Typically gets readings in the 130s/70s.      Are you in the first 12 months of your Medicare coverage?  No    Healthy Habits:     In general, how would you rate your overall health?  Fair    Frequency of exercise:  2-3 days/week    Duration of exercise:  30-45 minutes    Do you usually eat at least 4 servings of fruit and vegetables a day, include whole grains    & fiber and avoid regularly eating high fat or \"junk\" foods?  Yes    Taking medications regularly:  Yes    Medication side effects:  Other    Ability to successfully perform activities of daily living:  Transportation requires assistance    Home Safety:  No safety concerns identified    Hearing Impairment:  Feel that people are mumbling or not speaking clearly and need to ask people to speak up or repeat themselves    In the past 6 months, have you been bothered by leaking of urine? Yes    In general, how would you rate your overall mental or emotional health?  Good    Additional concerns today:  Yes    Have you ever done Advance Care Planning? (For example, a Health Directive, POLST, or a discussion with " a medical provider or your loved ones about your wishes): Yes, patient states has an Advance Care Planning document and will bring a copy to the clinic.     Fall risk  Fallen 2 or more times in the past year?: No  Any fall with injury in the past year?: No    Cognitive Screening   1) Repeat 3 items (Leader, Season, Table)    2) Clock draw: 3 items recalled: COGNITIVE IMPAIRMENT LESS LIKELY  3) 3 item recall: Recalls 3 objects  Results: 3 items recalled: COGNITIVE IMPAIRMENT LESS LIKELY    Mini-CogTM Copyright [employer] [place]. Licensed by the author for use in [facility]; reprinted with permission (kimberlee@[facility]). All rights reserved.      Do you have sleep apnea, excessive snoring or daytime drowsiness? : yes    Reviewed and updated as needed this visit by clinical staff   Tobacco  Allergies  Meds              Reviewed and updated as needed this visit by Provider     Meds             Social History     Tobacco Use    Smoking status: Former     Packs/day: 1.00     Years: 9.00     Pack years: 9.00     Types: Cigarettes     Start date: 1966     Quit date: 1975     Years since quittin.4    Smokeless tobacco: Never    Tobacco comments:     Quit in ..smoked again in  to   1/2 pk or less   Substance Use Topics    Alcohol use: Not Currently     Comment: Holiday glass of wine. summer glass of beer  once in awhile             2023     5:00 PM   Alcohol Use   Prescreen: >3 drinks/day or >7 drinks/week? No     Do you have a current opioid prescription? No  Do you use any other controlled substances or medications that are not prescribed by a provider? None        Current providers sharing in care for this patient include:   Patient Care Team:  Alexis De Leon PA-C as PCP - General (Physician Assistant - Medical)  Clinic, [facility] as Karl Richadr DO as MD (Neurology)  Hong Hansen MD as Assigned Neuroscience Provider  Hong Hansen MD as Referring Physician  (Neurology)  Janeth Cavanaugh AuD as Audiologist (Audiology)  Ludy Liriano NP as Nurse Practitioner  June Espana MD as MD (Ophthalmology)  Yulia Bruno MD as MD (Neurology)  Chelsea Naranjo APRN CNS as Clinical Nurse Specialist (Anesthesiology)  Pacheco Perdomo MD as Assigned Pulmonology Provider  Kristyn Owens APRN CNP as Nurse Practitioner (Cardiovascular Disease)  Alexis De Leon PA-C as Assigned PCP  Germain Parekh MD as Assigned Heart and Vascular Provider  Jasmyne Wylie as Lead Care Coordinator (Primary Care - CC)    The following health maintenance items are reviewed in Epic and correct as of today:  Health Maintenance   Topic Date Due    ASTHMA ACTION PLAN  Never done    HEPATITIS A IMMUNIZATION (1 of 2 - Risk 2-dose series) Never done    ZOSTER IMMUNIZATION (2 of 3) 09/15/2009    COVID-19 Vaccine (5 - Pfizer series) 02/25/2023    MEDICARE ANNUAL WELLNESS VISIT  06/15/2023    ASTHMA CONTROL TEST  11/18/2023    ANNUAL REVIEW OF HM ORDERS  02/09/2024    PHQ-9  03/20/2024    FALL RISK ASSESSMENT  09/20/2024    DTAP/TDAP/TD IMMUNIZATION (3 - Td or Tdap) 09/09/2025    LIPID  03/21/2027    ADVANCE CARE PLANNING  09/20/2028    DEXA  07/17/2033    HEPATITIS C SCREENING  Completed    DEPRESSION ACTION PLAN  Completed    INFLUENZA VACCINE  Completed    Pneumococcal Vaccine: 65+ Years  Completed    IPV IMMUNIZATION  Aged Out    HPV IMMUNIZATION  Aged Out    MENINGITIS IMMUNIZATION  Aged Out    MAMMO SCREENING  Discontinued    COLORECTAL CANCER SCREENING  Discontinued    LUNG CANCER SCREENING  Discontinued     Lab work is in process  Labs reviewed in EPIC  BP Readings from Last 3 Encounters:   09/20/23 135/75   05/18/23 (!) 142/78   03/16/23 (!) 150/78    Wt Readings from Last 3 Encounters:   09/20/23 62 kg (136 lb 11.2 oz)   05/18/23 60.6 kg (133 lb 9.6 oz)   03/16/23 59 kg (130 lb)                 Patient Active Problem List   Diagnosis    Acute internal  derangement of left knee    Asthma in adult    Breast cancer in situ    SVT (supraventricular tachycardia) (H)    Torticollis    Gastroesophageal reflux disease without esophagitis    Major depression in remission (H)    Pericarditis    Primary osteoarthritis of both knees    CELESTE (generalized anxiety disorder)    Presbyopia    Senile nuclear sclerosis    Tremor, physiological    Essential hypertension    Dizziness    Benzodiazepine dependence (H)    Mild persistent asthma without complication    Seizure (H)    Stress-induced cardiomyopathy     Past Surgical History:   Procedure Laterality Date    BIOPSY  breasr right    BREAST SURGERY      CARDIAC SURGERY  May 2023    Ablation surgery at [facility] looking for faulty electrical nodes' not found by Dr. FARHANA Brooks    CATARACT IOL, RT/LT      COLONOSCOPY  2018    MASTECTOMY      DCIS    ODONTECTOMY Left 2022    Procedure: SURGICAL EXTRACTION, TOOTH - Teeth #12, 14, 19;  Surgeon: Arvin Garza DDS;  Location: [facility] OR    RETINAL REATTACHMENT         Social History     Tobacco Use    Smoking status: Former     Packs/day: 1.00     Years: 9.00     Pack years: 9.00     Types: Cigarettes     Start date: 1966     Quit date: 1975     Years since quittin.4    Smokeless tobacco: Never    Tobacco comments:     Quit in ..smoked again in  to   1/2 pk or less   Substance Use Topics    Alcohol use: Not Currently     Comment: Holiday glass of wine. summer glass of beer  once in awhile     Family History   Problem Relation Age of Onset    Cancer Sister         breast cancer    Breast Cancer Sister         estrogen use    Asthma Sister     Breast Cancer Sister     Asthma Sister     Heart Failure Mother          at 97    Breast Cancer Mother         diagnosed at 94    Colon Cancer Sister     Anesthesia Reaction Sister     Asthma Sister     Other Cancer Son         Testicular    Anxiety Disorder Sister     Asthma Sister     Glaucoma No family hx  of     Macular Degeneration No family hx of          Current Outpatient Medications   Medication Sig Dispense Refill    acetaminophen (TYLENOL) 325 MG tablet Take 2 tablets (650 mg) by mouth every 4 hours as needed for mild pain      albuterol (PROAIR HFA/PROVENTIL HFA/VENTOLIN HFA) 108 (90 Base) MCG/ACT inhaler Inhale 2 puffs into the lungs every 6 hours as needed for shortness of breath / dyspnea      aspirin (ASA) 81 MG chewable tablet Take 81 mg by mouth daily      Blood Pressure Monitor KIT 1 Device as needed (for home BP moniotoring) 1 kit 0    calcium carbonate-vitamin D 600-125 MG-UNIT TABS Take 1 tablet by mouth daily      Cyanocobalamin 50 MCG TABS Take 50 mcg by mouth once a week Sundays      fluticasone-salmeterol (ADVAIR HFA) 115-21 MCG/ACT inhaler Inhale 2 puffs into the lungs 2 times daily 12 g 11    LORazepam (ATIVAN) 0.5 MG tablet 30 mins prior to dental procedure as needed 10 tablet 0    ondansetron (ZOFRAN ODT) 4 MG ODT tab Take 1 tablet (4 mg) by mouth every 8 hours as needed for nausea 20 tablet 0     Allergies   Allergen Reactions    Levalbuterol Shortness Of Breath    Diltiazem Dizziness    Cats Other (See Comments)     Itchy eyes    Dust Mites      Other reaction(s): Wheezing  Wheezing/shortness/breath/see (IRP 6/1)    Qvar [Beclomethasone]      Per patient- allergic to QVAR    Amitriptyline Palpitations    Escitalopram Anxiety     Recent Labs   Lab Test 02/09/23  1336 12/09/22  0901 11/09/22  1233 11/09/22  0457 11/08/22  1122 03/21/22  0721 10/20/21  1517 02/01/21  1109 12/14/20  1308 12/14/20  0000 06/15/20  1156   LDL  --   --   --   --   --  82  --   --   --   --   --    HDL  --   --   --   --   --  57  --   --   --   --   --    TRIG  --   --   --   --   --  113  --   --   --   --   --    ALT 17 16  --  24 29  --    < > 12  --   --  14   CR 0.64 0.56   < > 0.50* 0.55  --    < > 0.67 0.70   < > 0.65   GFRESTIMATED >90 >90   < > >90 >90  --    < > >60 84   < > >60   GFRESTBLACK  --   --    "--   --   --   --   --  >60 98   < > >60   POTASSIUM 4.2 4.1   < > 3.4 4.2  --    < > 4.0 4.4  --  4.4   TSH  --   --   --   --  3.50  --   --   --   --   --  1.79    < > = values in this interval not displayed.        Pertinent mammograms are reviewed under the imaging tab.    Review of Systems   Constitutional:  Negative for chills and fever.   HENT:  Positive for hearing loss. Negative for congestion, ear pain and sore throat.    Eyes:  Positive for visual disturbance. Negative for pain.   Respiratory:  Positive for cough. Negative for shortness of breath.    Cardiovascular:  Negative for chest pain, palpitations and peripheral edema.   Gastrointestinal:  Positive for heartburn. Negative for abdominal pain, constipation, diarrhea, hematochezia and nausea.   Breasts:  Negative for tenderness, breast mass and discharge.   Genitourinary:  Negative for dysuria, frequency, genital sores, hematuria, pelvic pain, urgency, vaginal bleeding and vaginal discharge.   Musculoskeletal:  Negative for arthralgias, joint swelling and myalgias.   Skin:  Negative for rash.   Neurological:  Positive for dizziness. Negative for weakness, headaches and paresthesias.   Psychiatric/Behavioral:  Negative for mood changes. The patient is not nervous/anxious.        OBJECTIVE:   /75   Pulse 76   Temp 99.6  F (37.6  C) (Temporal)   Resp 22   Ht 1.651 m (5' 5\")   Wt 62 kg (136 lb 11.2 oz)   SpO2 98%   BMI 22.75 kg/m   Estimated body mass index is 22.75 kg/m  as calculated from the following:    Height as of this encounter: 1.651 m (5' 5\").    Weight as of this encounter: 62 kg (136 lb 11.2 oz).  Physical Exam  GENERAL: healthy, alert and no distress  EYES: Eyes grossly normal to inspection, PERRL and conjunctivae and sclerae normal  RESP: lungs clear to auscultation - no rales, rhonchi or wheezes  CV: regular rate and rhythm, normal S1 S2, no S3 or S4, no murmur, click or rub, no peripheral edema and peripheral pulses " strong  ABDOMEN: soft, nontender, no hepatosplenomegaly, no masses  MS: no gross musculoskeletal defects noted, no edema  SKIN: no suspicious lesions or rashes  NEURO: Baseline tremor.  Otherwise normal strength and tone, mentation intact and speech normal  PSYCH: mentation appears normal, affect normal/bright      ASSESSMENT / PLAN:   Morenita was seen today for physical.    Diagnoses and all orders for this visit:    Encounter for annual wellness exam in Medicare patient    Annual physical labs today.  Update flu shot.  Recommended COVID shot at the pharmacy.  Plan for annual wellness visit in 1 year.  Regular follow-up with specialist.  Happy she has found a great care coordinator who is helping with her affairs.  -     CBC with platelets and differential; Future  -     Comprehensive metabolic panel (BMP + Alb, Alk Phos, ALT, AST, Total. Bili, TP); Future  -     TSH with free T4 reflex; Future  -     Lipid panel reflex to direct LDL Fasting; Future    Paroxysmal atrial fibrillation (H)  Continue close follow-up with cardiology.  -     CBC with platelets and differential; Future  -     Comprehensive metabolic panel (BMP + Alb, Alk Phos, ALT, AST, Total. Bili, TP); Future  -     TSH with free T4 reflex; Future    Major depression in remission (H)  Seeing [facility] upcoming.  Mood improving.    CARDIOVASCULAR SCREENING; LDL GOAL LESS THAN 130    -     Lipid panel reflex to direct LDL Fasting    Screen for colon cancer  -     Fecal colorectal cancer screen (FIT); Future  -     Fecal colorectal cancer screen (FIT)    Elevated BP without diagnosis of hypertension  -     24 Hour Blood Pressure Monitor - Adult; Future    Needs flu shot  Other orders  -     INFLUENZA VACCINE >6 MONTHS (AFLURIA/FLUZONE)        Patient has been advised of split billing requirements and indicates understanding: Yes      COUNSELING:  Reviewed preventive health counseling, as reflected in patient instructions       Regular exercise        Healthy diet/nutrition       Vision screening       Hearing screening       Dental care       Fall risk prevention        She reports that she quit smoking about 48 years ago. Her smoking use included cigarettes. She started smoking about 57 years ago. She has a 9.00 pack-year smoking history. She has never used smokeless tobacco.      Appropriate preventive services were discussed with this patient, including applicable screening as appropriate for cardiovascular disease, diabetes, osteopenia/osteoporosis, and glaucoma.  As appropriate for age/gender, discussed screening for colorectal cancer, prostate cancer, breast cancer, and cervical cancer. Checklist reviewing preventive services available has been given to the patient.    Reviewed patients plan of care and provided an AVS. The Basic Care Plan (routine screening as documented in Health Maintenance) for Morenita meets the Care Plan requirement. This Care Plan has been established and reviewed with the Patient.          The likelihood of other entities in the differential is insufficient to justify any further testing for them at this time. This was explained to the patient. The patient was advised that persistent or worsening symptoms would require further evaluation. Patient advised to call the office and if unable to reach to go to the emergency room if they develop any new or worsening symptoms. Expressed understanding and agreement with above stated plan.     KELLY Yanes [facility]

## 2023-09-22 NOTE — RESULT ENCOUNTER NOTE
Darnell Xavier,     It was nice seeing you this week for your annual wellness visit!     Blood counts, kidney function, and liver function are all stable.  Slight increase in your calcium function which I would not worry about at this point.  Be mindful of your calcium intake and we can repeat this in the future.    Stable thyroid.  Cholesterol looks perfect.    Let me know if you have any questions or concerns,     KELLY Yanes [facility]

## 2023-10-02 ENCOUNTER — MYC MEDICAL ADVICE (OUTPATIENT)
Dept: FAMILY MEDICINE | Facility: CLINIC | Age: 78
End: 2023-10-02
Payer: COMMERCIAL

## 2023-10-03 ENCOUNTER — TELEPHONE (OUTPATIENT)
Dept: GERIATRIC MEDICINE | Facility: CLINIC | Age: 78
End: 2023-10-03
Payer: COMMERCIAL

## 2023-10-03 DIAGNOSIS — R25.1 TREMOR, PHYSIOLOGICAL: Primary | ICD-10-CM

## 2023-10-03 DIAGNOSIS — R26.81 GAIT INSTABILITY: ICD-10-CM

## 2023-10-03 ASSESSMENT — ACTIVITIES OF DAILY LIVING (ADL): DEPENDENT_IADLS:: CLEANING;COOKING;LAUNDRY;SHOPPING;TRANSPORTATION

## 2023-10-03 ASSESSMENT — PATIENT HEALTH QUESTIONNAIRE - PHQ9: SUM OF ALL RESPONSES TO PHQ QUESTIONS 1-9: 0

## 2023-10-03 NOTE — TELEPHONE ENCOUNTER
Darnell Medley! Thanks for helping Marlen! Signed cane and walker order for you! - do I need to the home counseling order? Through mental health or homecare?     Best,     Alexis De Leon PA-C  [facility]

## 2023-10-03 NOTE — PROGRESS NOTES
[facility] Care Coordination Contact    [facility] Home Visit Assessment     Home visit for Health Risk Assessment with Morenita Moore completed on 09/05/2023    Type of residence:: Apartment - handicap accessible  Current living arrangement:: I live alone     Assessment completed with:: Patient    Current Care Plan  Member currently receiving the following home care services:     Member currently receiving the following community resources: Transportation Services, Other (see comment) (ICLS)      Medication Review  Medication reconciliation completed in Epic: Yes  Medication set-up & administration: Independent and sets up on own daily.  Self-administers medications.  Medication Risk Assessment Medication (1 or more, place referral to MTM): N/A: No risk factors identified  MTM Referral Placed: No: No risk factors idenified    Mental/Behavioral Health   Depression Screening:   PHQ-2 Total Score (Adult) - Positive if 3 or more points; Administer PHQ-9 if positive: 0  PHQ-9 Total Score: 0    Mental health DX:: No        Falls Assessment:   Fallen 2 or more times in the past year?: No   Any fall with injury in the past year?: No    ADL/IADL Dependencies:   Dependent ADLs:: Independent  Dependent IADLs:: Cleaning, Cooking, Laundry, Shopping, Transportation    [facility] Plan sponsored benefits: Shared information re: Silver Sneakers/gym memberships, ASA, Calcium +D.    PCA Assessment completed at visit: Not Applicable     Elderly Waiver Eligibility: Yes-will continue on EW    Care Plan & Recommendations: She continues to be eligible for Elderly Waiver.    She receives 5 round trip rides a week along with ICLS hours to help with shopping.   She is also wanting a new rollator walker, single cane with ice pick, and a referral for in home counseling with Abigail.   Care Coordinator will send PCP a separate request for the rollator walker and cane with ice pick attachment.  Care Coordinator will complete  Abigail referral for counseling.  Morenita currently has a necklace pers.    She should not have that since she has a pacemaker and is being monitored.   She needs a wrist pers instead.   Care Coordinator will send referral to [facility] for this.     See [facility] for detailed assessment information.    Follow-Up Plan: Member informed of future contact, plan to f/u with member with a 6 month telephone assessment.  Contact information shared with member and family, encouraged member to call with any questions or concerns at any time.    [facility] care continuum providers: Please see Snapshot and Care Management Flowsheets for Specific details of care plan.    This CC note routed to PCP, Alexis De Leon.    ASHLEIGH Ramsay  [facility]  Phone: [phone]

## 2023-10-05 ENCOUNTER — PATIENT OUTREACH (OUTPATIENT)
Dept: GERIATRIC MEDICINE | Facility: CLINIC | Age: 78
End: 2023-10-05
Payer: COMMERCIAL

## 2023-10-05 NOTE — PROGRESS NOTES
[facility] Care Coordination Contact    Received after visit chart from care coordinator.  Completed following tasks: Mailed copy of care plan to client, Mailed Safe Medication Disposal , Mailed [employer] Leave Behind Letter, Submitted referrals/auths for icls, pers, and EW transportation, Uploaded consent to communicate form(s) to Epic, and Updated services in Database   and Provider Signature - No POC Shared:  Member indicates that they do not want their POC shared with any EW providers.    Mailed ICLS form to provider to sign and return.    Virginie Ha  [facility]  Case Management Specialist  [phone]

## 2023-10-05 NOTE — LETTER
October 5, 2023      MORENITA ZEE  [address]  [place] [address]      Dear Morenita:    At [facility], we re dedicated to improving your health and wellness. Enclosed is the Care Plan developed with you on 9/5/23. Please review the Care Plan carefully.    As a reminder, during your visit we talked about:  Ways to manage your physical and mental health  Using health care to maintain and improve your health   Your preventive care needs     Remember to contact your care coordinator if you:  Are hospitalized, or plan to be hospitalized   Have a fall    Have a change in your physical or mental health  Need help finding support or services    If you have questions, or don t agree with your Care Plan, call me at [phone]. You can also call me if your needs change. TTY users, call the [place] [employer] at (390) or 1-794.551.7544 (vfpowf-pe-kllize relay service).    Sincerely,    ASHLEIGH Ramsay  [phone]  [email or web address] ([facility]) is a health plan that contracts with both Medicare and the [place] Medical Assistance (Medicaid) program to provide benefits of both programs to enrollees. Enrollment in [facility] depends on contract renewal.    [id number] accepted    [id number] (07/2022)

## 2023-10-06 ENCOUNTER — MYC MEDICAL ADVICE (OUTPATIENT)
Dept: FAMILY MEDICINE | Facility: CLINIC | Age: 78
End: 2023-10-06
Payer: COMMERCIAL

## 2023-10-06 DIAGNOSIS — K21.9 GASTROESOPHAGEAL REFLUX DISEASE WITHOUT ESOPHAGITIS: Primary | ICD-10-CM

## 2023-10-09 ENCOUNTER — TELEPHONE (OUTPATIENT)
Dept: PULMONOLOGY | Facility: CLINIC | Age: 78
End: 2023-10-09
Payer: COMMERCIAL

## 2023-10-09 PROCEDURE — 82274 ASSAY TEST FOR BLOOD FECAL: CPT | Performed by: PHYSICIAN ASSISTANT

## 2023-10-09 NOTE — TELEPHONE ENCOUNTER
[facility] Call Center    Phone Message    May a detailed message be left on voicemail: yes     Reason for Call: Other: Please call Morenita back asap please she has questions about the lung vaccines. Thank you     Action Taken: Other: Pulm    Travel Screening: Not Applicable

## 2023-10-09 NOTE — TELEPHONE ENCOUNTER
See MyChart from patient needing provider review.   Please write back directly to pt or advise if clinic follow up needed.     Maggie NOBLE, RN  [facility] RN Triage Team

## 2023-10-09 NOTE — PROGRESS NOTES
[facility] Neurology Follow Up Visit    Morenita Moore MRN# [id number]   Age: 78 year old YOB: 1945     Brief history of symptoms: The patient was initially seen in neurologic consultation on 4/16/2021 for evaluation of dizziness. Please see the comprehensive neurologic consultation note from that date in the Epic records for details.     Interval history:   She is now off of Keppra. She hasn't had any seizure like episodes.     She sent me a message a few weeks ago saying she had worsening head pressure sensation and off balance. This is now better.     She still has some dizziness, but overall it is much better than before.     She has started pool therapy for back pain.       Past Medical History:     Patient Active Problem List   Diagnosis    Acute internal derangement of left knee    Asthma in adult    Breast cancer in situ    SVT (supraventricular tachycardia)    Torticollis    Gastroesophageal reflux disease without esophagitis    Major depression in remission (H24)    Pericarditis    Primary osteoarthritis of both knees    CELESTE (generalized anxiety disorder)    Presbyopia    Senile nuclear sclerosis    Tremor, physiological    Essential hypertension    Dizziness    Benzodiazepine dependence (H)    Mild persistent asthma without complication    Seizure (H)    Stress-induced cardiomyopathy     Past Medical History:   Diagnosis Date    Anxiety     Arthritis 2016    in knees    Asthma 2012    adult-onset asthma diagnosed in 2012    Cancer (H)     DCIS (ductal carcinoma in situ) 2001    stage 0 status post left mastectomy in 2001    Depressive disorder     Heart disease 2018 -2021    still feel pain w pericarditis.    PONV (postoperative nausea and vomiting)     Torticollis     Tremor         Past Surgical History:     Past Surgical History:   Procedure Laterality Date    BIOPSY  breasr right    BREAST SURGERY      CARDIAC SURGERY  May 2023    Ablation surgery at [facility] looking for faulty  electrical nodes' not found by Dr. FARHANA Brooks    CATARACT IOL, RT/LT      COLONOSCOPY  2018    MASTECTOMY      DCIS    ODONTECTOMY Left 2022    Procedure: SURGICAL EXTRACTION, TOOTH - Teeth #12, 14, 19;  Surgeon: Arvin Garza DDS;  Location: [facility] OR    RETINAL REATTACHMENT          Social History:     Social History     Tobacco Use    Smoking status: Former     Packs/day: 1.00     Years: 9.00     Pack years: 9.00     Types: Cigarettes     Start date: 1966     Quit date: 1975     Years since quittin.5    Smokeless tobacco: Never    Tobacco comments:     Quit in ..smoked again in  to   1/2 pk or less   Vaping Use    Vaping Use: Never used   Substance Use Topics    Alcohol use: Not Currently     Comment: Holiday glass of wine. summer glass of beer  once in awhile    Drug use: Never        Family History:     Family History   Problem Relation Age of Onset    Cancer Sister         breast cancer    Breast Cancer Sister         estrogen use    Asthma Sister     Breast Cancer Sister     Asthma Sister     Heart Failure Mother          at 97    Breast Cancer Mother         diagnosed at 94    Colon Cancer Sister     Anesthesia Reaction Sister     Asthma Sister     Other Cancer Son         Testicular    Anxiety Disorder Sister     Asthma Sister     Glaucoma No family hx of     Macular Degeneration No family hx of         Medications:     Current Outpatient Medications   Medication Sig    acetaminophen (TYLENOL) 325 MG tablet Take 2 tablets (650 mg) by mouth every 4 hours as needed for mild pain    albuterol (PROAIR HFA/PROVENTIL HFA/VENTOLIN HFA) 108 (90 Base) MCG/ACT inhaler Inhale 2 puffs into the lungs every 6 hours as needed for shortness of breath / dyspnea    aspirin (ASA) 81 MG chewable tablet Take 81 mg by mouth daily    Blood Pressure Monitor KIT 1 Device as needed (for home BP moniotoring)    calcium carbonate-vitamin D 600-125 MG-UNIT TABS Take 1 tablet by mouth daily     Cyanocobalamin 50 MCG TABS Take 50 mcg by mouth once a week Sundays    fluticasone-salmeterol (ADVAIR HFA) 115-21 MCG/ACT inhaler Inhale 2 puffs into the lungs 2 times daily    LORazepam (ATIVAN) 0.5 MG tablet 30 mins prior to dental procedure as needed    ondansetron (ZOFRAN ODT) 4 MG ODT tab Take 1 tablet (4 mg) by mouth every 8 hours as needed for nausea     No current facility-administered medications for this visit.        Allergies:     Allergies   Allergen Reactions    Levalbuterol Shortness Of Breath    Diltiazem Dizziness    Cats Other (See Comments)     Itchy eyes    Dust Mites      Other reaction(s): Wheezing  Wheezing/shortness/breath/see (IRP 6/1)    Qvar [Beclomethasone]      Per patient- allergic to QVAR    Amitriptyline Palpitations    Escitalopram Anxiety        Review of Systems:   As above     Physical Exam:   Vitals: /80 (BP Location: Right arm, Patient Position: Sitting, Cuff Size: Adult Regular)   Pulse 77   Resp 16   SpO2 100%    General: Seated comfortably in no acute distress.  Lungs: breathing comfortably  Neurologic:     Mental Status: Fully alert, attentive. Normal memory and fund of knowledge. Language normal, speech clear and fluent, no paraphasic errors.      Cranial Nerves: EOMI with normal smooth pursuit. Facial movements symmetric. Hearing not formally tested but intact to conversation. Palate elevation symmetric. No dysarthria. Shoulder shrug strong bilaterally. Tongue protrusion midline.     Motor: continuous head/neck tremor. Mild bilateral upper extremity intention tremor. Muscle tone normal throughout. Normal/symmetric rapid finger tapping. Strength 5/5 throughout upper and lower extremities.     Deep Tendon Reflexes: 1+/symmetric throughout upper and lower extremities. No clonus. Toes downgoing bilaterally.     Sensory: Intact/symmetric to light touch throughout upper and lower extremities. Negative Romberg.      Coordination: Finger-nose-finger and heel-shin intact  without dysmetria.      Gait: Mildly cautious, but steady casual gait. Able to walk on toes with mild difficulties. Moderate difficulties with heel and toe gait.     Data reviewed on previous visits    MRI brain 11/2022  IMPRESSION:  1.  No acute intracranial process.  2.  Generalized brain atrophy and presumed microvascular ischemic changes as detailed above.    MRI brain without contrast 7/2020  1.  No acute intracranial process.  2.  Generalized brain atrophy and presumed microvascular ischemic changes similar to findings on the 2018 MRI.    TSH normal 1/2020    MRI brain 8/2022  IMPRESSION:  1.  No acute intracranial process.  2.  Generalized brain atrophy and presumed microvascular ischemic changes as detailed above.    EEG 8/2022  IMPRESSION OF VIDEO EEG DAY # 1: This video electroencephalogram is abnormal due to the presence of diffuse low amplitude theta delta slowing and intermittent generalized attenuation of the background, consistent with moderately severe diffuse nonspecific encephalopathy, which could in part be due to sedative medications.  No electrographic seizures or epileptiform discharges were recorded. Clinical correlation is advised.     EEG 4/4/2023  IMPRESSION: Normal in wakefulness and drowsiness. No epileptiform discharges or seizures.    Pertinent Investigations since last visit:   None         Assessment and Plan:   Assessment:  Morenita Moore is a 78 year old female who presents for follow-up of dizziness, thought to most likely be related to PPPD and possible prior vestibular neuronitis. Symptoms are mild compared to previous and overall she is doing well. Additional vestibular therapy could be considered in the future if needed.     In November 2022 patient had a seizure versus convulsive syncope in the setting of SVT. I suspect this is more likely convulsive syncope. She is now off of Keppra and doing well.     Plan:  - Call with worsening of new symptoms    Follow up in  Neurology clinic if new issues arise    Hong Hansen MD   of Neurology  [facility]

## 2023-10-09 NOTE — TELEPHONE ENCOUNTER
Spoke with Marlen.  She has upcoming appt with dr. Perdomo and will discuss with him at that time.

## 2023-10-09 NOTE — TELEPHONE ENCOUNTER
See MyChart from patient needing provider review.   Please write back directly to pt or advise if clinic follow up needed.     Maggie NOBLE, RN  [facility] RN Triage Team

## 2023-10-12 LAB — HEMOCCULT STL QL IA: NEGATIVE

## 2023-10-12 NOTE — RESULT ENCOUNTER NOTE
Darnell Gee,    FIT test negative! Which is good news. Repeat 1 year.     Let me know if you have any questions or concerns, ;    Alexis De Leon PA-C  [facility]

## 2023-10-13 ENCOUNTER — OFFICE VISIT (OUTPATIENT)
Dept: NEUROLOGY | Facility: CLINIC | Age: 78
End: 2023-10-13
Payer: COMMERCIAL

## 2023-10-13 VITALS
OXYGEN SATURATION: 100 % | RESPIRATION RATE: 16 BRPM | HEART RATE: 77 BPM | DIASTOLIC BLOOD PRESSURE: 80 MMHG | SYSTOLIC BLOOD PRESSURE: 134 MMHG

## 2023-10-13 DIAGNOSIS — R55 SYNCOPE, UNSPECIFIED SYNCOPE TYPE: ICD-10-CM

## 2023-10-13 DIAGNOSIS — R42 DIZZINESS: Primary | ICD-10-CM

## 2023-10-13 PROCEDURE — 99214 OFFICE O/P EST MOD 30 MIN: CPT | Performed by: INTERNAL MEDICINE

## 2023-10-13 ASSESSMENT — PAIN SCALES - GENERAL: PAINLEVEL: NO PAIN (0)

## 2023-10-13 NOTE — NURSING NOTE
Chief Complaint   Patient presents with    RECHECK     /80 (BP Location: Right arm, Patient Position: Sitting, Cuff Size: Adult Regular)   Pulse 77   Resp 16   SpO2 100%     GIGI EVELIN

## 2023-10-13 NOTE — LETTER
10/13/2023       RE: Morenita Moore  [address]  [place] [address]       Dear Colleague,    Thank you for referring your patient, Morenita Moore, to the [facility]. Please see a copy of my visit note below.    [facility] Neurology Follow Up Visit    Morenita Moore MRN# [id number]   Age: 78 year old YOB: 1945     Brief history of symptoms: The patient was initially seen in neurologic consultation on 4/16/2021 for evaluation of dizziness. Please see the comprehensive neurologic consultation note from that date in the Epic records for details.     Interval history:   She is now off of Keppra. She hasn't had any seizure like episodes.     She sent me a message a few weeks ago saying she had worsening head pressure sensation and off balance. This is now better.     She still has some dizziness, but overall it is much better than before.     She has started pool therapy for back pain.       Past Medical History:     Patient Active Problem List   Diagnosis    Acute internal derangement of left knee    Asthma in adult    Breast cancer in situ    SVT (supraventricular tachycardia)    Torticollis    Gastroesophageal reflux disease without esophagitis    Major depression in remission (H24)    Pericarditis    Primary osteoarthritis of both knees    CELESTE (generalized anxiety disorder)    Presbyopia    Senile nuclear sclerosis    Tremor, physiological    Essential hypertension    Dizziness    Benzodiazepine dependence (H)    Mild persistent asthma without complication    Seizure (H)    Stress-induced cardiomyopathy     Past Medical History:   Diagnosis Date    Anxiety     Arthritis 2016    in knees    Asthma 2012    adult-onset asthma diagnosed in 2012    Cancer (H)     DCIS (ductal carcinoma in situ) 2001    stage 0 status post left mastectomy in 2001    Depressive disorder     Heart disease   -    still feel pain w pericarditis.    PONV (postoperative nausea and vomiting)     Torticollis     Tremor         Past Surgical History:     Past Surgical History:   Procedure Laterality Date    BIOPSY  breasr right    BREAST SURGERY      CARDIAC SURGERY  May 2023    Ablation surgery at [facility] looking for faulty electrical nodes' not found by Dr. FARHANA Brooks    CATARACT IOL, RT/LT      COLONOSCOPY  2018    MASTECTOMY      DCIS    ODONTECTOMY Left 2022    Procedure: SURGICAL EXTRACTION, TOOTH - Teeth #12, 14, 19;  Surgeon: Arvin Garza DDS;  Location: [facility] OR    RETINAL REATTACHMENT          Social History:     Social History     Tobacco Use    Smoking status: Former     Packs/day: 1.00     Years: 9.00     Pack years: 9.00     Types: Cigarettes     Start date: 1966     Quit date: 1975     Years since quittin.5    Smokeless tobacco: Never    Tobacco comments:     Quit in ..smoked again in  to   1/2 pk or less   Vaping Use    Vaping Use: Never used   Substance Use Topics    Alcohol use: Not Currently     Comment: Holiday glass of wine. summer glass of beer  once in awhile    Drug use: Never        Family History:     Family History   Problem Relation Age of Onset    Cancer Sister         breast cancer    Breast Cancer Sister         estrogen use    Asthma Sister     Breast Cancer Sister     Asthma Sister     Heart Failure Mother          at 97    Breast Cancer Mother         diagnosed at 94    Colon Cancer Sister     Anesthesia Reaction Sister     Asthma Sister     Other Cancer Son         Testicular    Anxiety Disorder Sister     Asthma Sister     Glaucoma No family hx of     Macular Degeneration No family hx of         Medications:     Current Outpatient Medications   Medication Sig    acetaminophen (TYLENOL) 325 MG tablet Take 2 tablets (650 mg) by mouth every 4 hours as needed for mild pain    albuterol (PROAIR HFA/PROVENTIL HFA/VENTOLIN HFA) 108 (90 Base)  MCG/ACT inhaler Inhale 2 puffs into the lungs every 6 hours as needed for shortness of breath / dyspnea    aspirin (ASA) 81 MG chewable tablet Take 81 mg by mouth daily    Blood Pressure Monitor KIT 1 Device as needed (for home BP moniotoring)    calcium carbonate-vitamin D 600-125 MG-UNIT TABS Take 1 tablet by mouth daily    Cyanocobalamin 50 MCG TABS Take 50 mcg by mouth once a week Sundays    fluticasone-salmeterol (ADVAIR HFA) 115-21 MCG/ACT inhaler Inhale 2 puffs into the lungs 2 times daily    LORazepam (ATIVAN) 0.5 MG tablet 30 mins prior to dental procedure as needed    ondansetron (ZOFRAN ODT) 4 MG ODT tab Take 1 tablet (4 mg) by mouth every 8 hours as needed for nausea     No current facility-administered medications for this visit.        Allergies:     Allergies   Allergen Reactions    Levalbuterol Shortness Of Breath    Diltiazem Dizziness    Cats Other (See Comments)     Itchy eyes    Dust Mites      Other reaction(s): Wheezing  Wheezing/shortness/breath/see (IRP 6/1)    Qvar [Beclomethasone]      Per patient- allergic to QVAR    Amitriptyline Palpitations    Escitalopram Anxiety        Review of Systems:   As above     Physical Exam:   Vitals: /80 (BP Location: Right arm, Patient Position: Sitting, Cuff Size: Adult Regular)   Pulse 77   Resp 16   SpO2 100%    General: Seated comfortably in no acute distress.  Lungs: breathing comfortably  Neurologic:     Mental Status: Fully alert, attentive. Normal memory and fund of knowledge. Language normal, speech clear and fluent, no paraphasic errors.      Cranial Nerves: EOMI with normal smooth pursuit. Facial movements symmetric. Hearing not formally tested but intact to conversation. Palate elevation symmetric. No dysarthria. Shoulder shrug strong bilaterally. Tongue protrusion midline.     Motor: continuous head/neck tremor. Mild bilateral upper extremity intention tremor. Muscle tone normal throughout. Normal/symmetric rapid finger tapping.  Strength 5/5 throughout upper and lower extremities.     Deep Tendon Reflexes: 1+/symmetric throughout upper and lower extremities. No clonus. Toes downgoing bilaterally.     Sensory: Intact/symmetric to light touch throughout upper and lower extremities. Negative Romberg.      Coordination: Finger-nose-finger and heel-shin intact without dysmetria.      Gait: Mildly cautious, but steady casual gait. Able to walk on toes with mild difficulties. Moderate difficulties with heel and toe gait.     Data reviewed on previous visits    MRI brain 11/2022  IMPRESSION:  1.  No acute intracranial process.  2.  Generalized brain atrophy and presumed microvascular ischemic changes as detailed above.    MRI brain without contrast 7/2020  1.  No acute intracranial process.  2.  Generalized brain atrophy and presumed microvascular ischemic changes similar to findings on the 2018 MRI.    TSH normal 1/2020    MRI brain 8/2022  IMPRESSION:  1.  No acute intracranial process.  2.  Generalized brain atrophy and presumed microvascular ischemic changes as detailed above.    EEG 8/2022  IMPRESSION OF VIDEO EEG DAY # 1: This video electroencephalogram is abnormal due to the presence of diffuse low amplitude theta delta slowing and intermittent generalized attenuation of the background, consistent with moderately severe diffuse nonspecific encephalopathy, which could in part be due to sedative medications.  No electrographic seizures or epileptiform discharges were recorded. Clinical correlation is advised.     EEG 4/4/2023  IMPRESSION: Normal in wakefulness and drowsiness. No epileptiform discharges or seizures.    Pertinent Investigations since last visit:   None         Assessment and Plan:   Assessment:  Morenita Moore is a 78 year old female who presents for follow-up of dizziness, thought to most likely be related to PPPD and possible prior vestibular neuronitis. Symptoms are mild compared to previous and overall she is doing  well. Additional vestibular therapy could be considered in the future if needed.     In November 2022 patient had a seizure versus convulsive syncope in the setting of SVT. I suspect this is more likely convulsive syncope. She is now off of Keppra and doing well.     Plan:  - Call with worsening of new symptoms    Follow up in Neurology clinic if new issues arise          Again, thank you for allowing me to participate in the care of your patient.      Sincerely,    Hong Hansen MD

## 2023-10-21 ENCOUNTER — MEDICAL CORRESPONDENCE (OUTPATIENT)
Dept: HEALTH INFORMATION MANAGEMENT | Facility: CLINIC | Age: 78
End: 2023-10-21

## 2023-10-22 NOTE — TELEPHONE ENCOUNTER
I appreciate Morenita calling us back so promptly.  It sounds like she wishes to wait and not pursue any invasive procedures right away.  This is reasonable.    Please speak to her regarding a Active-Semi mobile device.  She is convinced she has SVT and may very well be right, except it has never been documented.  The only arrhythmia that has been documented so far is AF.      This type of cardiac monitor is a good way to document any future tachycardia recurrence.    GLORIA   Previously Declined (within the last year)

## 2023-10-26 ASSESSMENT — ASTHMA QUESTIONNAIRES: ACT_TOTALSCORE: 20

## 2023-10-27 ENCOUNTER — OFFICE VISIT (OUTPATIENT)
Dept: PULMONOLOGY | Facility: CLINIC | Age: 78
End: 2023-10-27
Attending: INTERNAL MEDICINE
Payer: COMMERCIAL

## 2023-10-27 VITALS
DIASTOLIC BLOOD PRESSURE: 80 MMHG | BODY MASS INDEX: 22.57 KG/M2 | OXYGEN SATURATION: 98 % | WEIGHT: 135.6 LBS | SYSTOLIC BLOOD PRESSURE: 126 MMHG | HEART RATE: 80 BPM

## 2023-10-27 DIAGNOSIS — J45.909 UNCOMPLICATED ASTHMA, UNSPECIFIED ASTHMA SEVERITY, UNSPECIFIED WHETHER PERSISTENT: ICD-10-CM

## 2023-10-27 PROCEDURE — 99214 OFFICE O/P EST MOD 30 MIN: CPT | Performed by: INTERNAL MEDICINE

## 2023-10-27 RX ORDER — FLUTICASONE PROPIONATE AND SALMETEROL XINAFOATE 115; 21 UG/1; UG/1
2 AEROSOL, METERED RESPIRATORY (INHALATION) 2 TIMES DAILY
Qty: 12 G | Refills: 11 | Status: SHIPPED | OUTPATIENT
Start: 2023-10-27

## 2023-10-27 NOTE — LETTER
10/27/2023         RE: Morenita Moore  [address]   [address]  [place] [address]        Dear Colleague,    Thank you for referring your patient, Morenita Moore, to the [facility]. Please see a copy of my visit note below.    Pulmonary Clinic Follow-up Visit    Impression: 78F w/ history of percarditis, previously diagnosed asthma (followed at [place] and [facility]), minimal smoking history, presents for follow up of asthma and pulmonary nodules. She is doing well on medium dose ICS/LABA. She had a rough year with multiple cardiac issues. As noted previously, PFTs showed very mild reduction in FEV1 with normal post-BD FEV/FVC ratio. She does have reduction in mid flow rates which could be consistent with small airways disease. Lung exam and SpO2 are both normal today.     Recommendations:  #Asthma, hx elevated Alverto, improved control since adding Spacer. Doing well after dose reduction last year.   - Continue the advair HFA to dose, 115/21 1-2 puffs bid with spacer. Rinse/gargle/spit after use.  - continue albuterol rescue inhaler prn  - allergen avoidance discussed  - encouraged her to exercise and remain active as able  - declined pulmonary rehab referal previously    Marlen's action plan: prednisone 40mg x5 days and azithromycin (z-pack)     #Multiple pulmonary nodules, mostly stable; 0.6cm largest in the CEDRIC. Stable on CT scan from March 2022. New 5mm CEDRIC subsolid nodule does not require follow up per Fleischner 2017 guidelines. She had a scan Nov 2022 when she was hospitalized, new 0.4cm RLL nodule was found. 6 month follow up CT was recommended; she never got this done. Discussed risk/benefit of CT follow up; she feels she's had too many CT scans over the last year and declines repeat CT scan at this time. She will let me know if she wants to revisit the issue.     #Protein-calorie malnutrition. Has improved with her intake and gained some weight.  - continue  follow up with her PMD to discuss strategies to increase weight. Consider protein supplement shakes.     #RHM:  - UTD with flu, pneumococcal vaccines. Due for PCV20 - she will discuss with her PMD.   - UTD with covid-19 vaccine + booster. She should get the new booster this Fall.   - UTD with flu vaccine.     Follow up in 1 year or sooner if needed.    Pacheco Perdomo MD (Avi)  [facility] Pulmonary & Critical Care  Clinic [phone]  Fax [phone]      CCx: asthma, pulmonary nodules follow up    HPI: Interim history: I last saw Marlen on 10/6/2022. Since that time, she reports she's doing well. She did have syncope last year. She was diagnosed with SVT and seizures. Tolerating the Advair. Rare use of rescue inhaler.   She has been able to gain some weight.    ROS:  A 12-system review was obtained and was negative with the exception of the symptoms endorsed in the history of present illness.    PMH:  Past Medical History:   Diagnosis Date     Anxiety      Arthritis 2016    in knees     Asthma 2012    adult-onset asthma diagnosed in 2012     Cancer (H)      DCIS (ductal carcinoma in situ) 2001    stage 0 status post left mastectomy in 2001     Depressive disorder      Heart disease 2018 -2021    still feel pain w pericarditis.     PONV (postoperative nausea and vomiting)      Torticollis      Tremor        PSH:  Past Surgical History:   Procedure Laterality Date     BIOPSY  breasr right     BREAST SURGERY       CARDIAC SURGERY  May 2023    Ablation surgery at [facility] looking for faulty electrical nodes' not found by Dr. FARHANA Brooks     CATARACT IOL, RT/LT       COLONOSCOPY  2018     MASTECTOMY      DCIS     ODONTECTOMY Left 08/02/2022    Procedure: SURGICAL EXTRACTION, TOOTH - Teeth #12, 14, 19;  Surgeon: Arvin Garza DDS;  Location: [facility] OR     RETINAL REATTACHMENT         Allergies:  Allergies   Allergen Reactions     Levalbuterol Shortness Of Breath     Diltiazem  Dizziness     Cats Other (See Comments)     Itchy eyes     Dust Mites      Other reaction(s): Wheezing  Wheezing/shortness/breath/see (IRP )     Qvar [Beclomethasone]      Per patient- allergic to QVAR     Amitriptyline Palpitations     Escitalopram Anxiety       Family HX:  Family History   Problem Relation Age of Onset     Cancer Sister         breast cancer     Breast Cancer Sister         estrogen use     Asthma Sister      Breast Cancer Sister      Asthma Sister      Heart Failure Mother          at 97     Breast Cancer Mother         diagnosed at 94     Colon Cancer Sister      Anesthesia Reaction Sister      Asthma Sister      Other Cancer Son         Testicular     Anxiety Disorder Sister      Asthma Sister      Glaucoma No family hx of      Macular Degeneration No family hx of        Social Hx:  Social History     Socioeconomic History     Marital status: Single     Spouse name: Not on file     Number of children: Not on file     Years of education: Not on file     Highest education level: Not on file   Occupational History     Not on file   Tobacco Use     Smoking status: Former     Packs/day: 1.00     Years: 9.00     Additional pack years: 0.00     Total pack years: 9.00     Types: Cigarettes     Start date: 1966     Quit date: 1975     Years since quittin.5     Smokeless tobacco: Never     Tobacco comments:     Quit in ..smoked again in  to   1/2 pk or less   Vaping Use     Vaping Use: Never used   Substance and Sexual Activity     Alcohol use: Not Currently     Comment: Holiday glass of wine. summer glass of beer  once in awhile     Drug use: Never     Sexual activity: Not Currently     Partners: Male     Birth control/protection: None     Comment: menopause   Other Topics Concern     Parent/sibling w/ CABG, MI or angioplasty before 65F 55M? No   Social History Narrative     Not on file     Social Determinants of Health     Financial Resource Strain: Not on file   Food  Insecurity: Not on file   Transportation Needs: Not on file   Physical Activity: Not on file   Stress: Not on file   Social Connections: Not on file   Interpersonal Safety: Not on file   Housing Stability: Not on file       Current Meds:  Current Outpatient Medications   Medication Sig Dispense Refill     acetaminophen (TYLENOL) 325 MG tablet Take 2 tablets (650 mg) by mouth every 4 hours as needed for mild pain       albuterol (PROAIR HFA/PROVENTIL HFA/VENTOLIN HFA) 108 (90 Base) MCG/ACT inhaler Inhale 2 puffs into the lungs every 6 hours as needed for shortness of breath / dyspnea       aspirin (ASA) 81 MG chewable tablet Take 81 mg by mouth twice a week       Blood Pressure Monitor KIT 1 Device as needed (for home BP moniotoring) 1 kit 0     calcium carbonate-vitamin D 600-125 MG-UNIT TABS Take 1 tablet by mouth once a week       Cyanocobalamin 50 MCG TABS Take 50 mcg by mouth once a week Sundays       fluticasone-salmeterol (ADVAIR HFA) 115-21 MCG/ACT inhaler Inhale 2 puffs into the lungs 2 times daily 12 g 11     LORazepam (ATIVAN) 0.5 MG tablet 30 mins prior to dental procedure as needed 10 tablet 0     omeprazole (PRILOSEC) 20 MG DR capsule Take 1 capsule (20 mg) by mouth daily (Patient taking differently: Take 20 mg by mouth as needed) 90 capsule 1     ondansetron (ZOFRAN ODT) 4 MG ODT tab Take 1 tablet (4 mg) by mouth every 8 hours as needed for nausea 20 tablet 0       Physical Exam:  Wt 61.5 kg (135 lb 9.6 oz)   BMI 22.57 kg/m    Gen: awake, alert, oriented, no distress  HEENT: nasal turbinates are unremarkable, no oropharyngeal lesions, no cervical or supraclavicular lymphadenopathy  CV: RRR, no M/G/R  Resp: CTAB, no focal crackles or wheezes  Skin: no apparent rashes  Ext: no cyanosis, clubbing or edema  Neuro: alert, nonfocal    Labs:  Reviewed  CBC nrmal, no eos in 2020  ESR 11 in 2020  Chem panel wnl 10/20/21    Imaging studies:  EXAMINATION: CT CHEST W/O CONTRAST, 3/17/2022 1:46 PM     CLINICAL  HISTORY: Lung nodule, 6-8mm; Pulmonary nodules     COMPARISON: CT 3/6/2020.     TECHNIQUE: CT imaging obtained through the chest without contrast.  Coronal and axial MIP reformatted images obtained.      CONTRAST:  none.     FINDINGS:     Mediastinum: No cardiomegaly. No pericardial effusion. Normal caliber  thoracic aorta midpoint trunk. Moderate coronary artery calcifications  in the left anterior descending. Calcified mediastinal lymph nodes.  Left breast mastectomy.     Lungs/pleura: The central airway is patent. No pneumothorax, pleural  effusions, or focal infectious consolidations.     Centrally lucent/cavitary nodule in the right lower lobe on series 4  image 161 measuring 4 mm, likely present on previous exam.     3 mm solid right lower lobe nodule on series 4 image 189 that  definitely seen on previous exam. Unchanged 5 mm left upper lobe  groundglass nodule on series 4 image 68.     New 5 mm ground glass nodule in the left upper lobe on series 4 image  81. Scattered calcified granulomas.     Simple cystic structures in the liver unchanged from previous exam.  Kyphotic curvature of the midthoracic spine. No acute or suspicious  osseous or soft tissue abnormalities.                                                                      IMPRESSION:  1. A few new or more conspicuous nodules are present as detailed  above, less than 6 mm.  Given history of left breast cancer, these are  technically indeterminant and short-term follow-up could be  considered.  2. Other nodules including a 5 mm left upper lobe groundglass nodule  and centrally lucent/cavitary nodule in the right lower lobe are  unchanged from previous exam.  3. Sequelae of prior granulomas disease.    TTE 2/15/2022  Interpretation Summary     Left ventricular size, wall motion and function are normal. The ejection  fraction is 60-65%.  Normal right ventricle size and systolic function.  Small pericardial effusion  There are no echocardiographic  indications of cardiac tamponade.  No hemodynamically significant valvular abnormalities on 2D or color flow  imaging.    Pulmonary Function Testing  3/18/2022  FEV1 1,53L, 70%  FVC 84%  Ratio 0.64 (LLN 0.64)  No BD response.  TLC 85%  RV 89%  DLco 93% aung for hgb  Mid flow rates reduced with BD response.  Flow volume loop suggests small airways disease      Again, thank you for allowing me to participate in the care of your patient.        Sincerely,        Pacheco Perdomo MD

## 2023-10-27 NOTE — PROGRESS NOTES
Pulmonary Clinic Follow-up Visit    Impression: 78F w/ history of percarditis, previously diagnosed asthma (followed at [place] and [facility]), minimal smoking history, presents for follow up of asthma and pulmonary nodules. She is doing well on medium dose ICS/LABA. She had a rough year with multiple cardiac issues. As noted previously, PFTs showed very mild reduction in FEV1 with normal post-BD FEV/FVC ratio. She does have reduction in mid flow rates which could be consistent with small airways disease. Lung exam and SpO2 are both normal today.     Recommendations:  #Asthma, hx elevated Alverto, improved control since adding Spacer. Doing well after dose reduction last year.   - Continue the advair HFA to dose, 115/21 1-2 puffs bid with spacer. Rinse/gargle/spit after use.  - continue albuterol rescue inhaler prn  - allergen avoidance discussed  - encouraged her to exercise and remain active as able  - declined pulmonary rehab referal previously    Marlen's action plan: prednisone 40mg x5 days and azithromycin (z-pack)     #Multiple pulmonary nodules, mostly stable; 0.6cm largest in the CEDRIC. Stable on CT scan from March 2022. New 5mm CEDRIC subsolid nodule does not require follow up per Fleischner 2017 guidelines. She had a scan Nov 2022 when she was hospitalized, new 0.4cm RLL nodule was found. 6 month follow up CT was recommended; she never got this done. Discussed risk/benefit of CT follow up; she feels she's had too many CT scans over the last year and declines repeat CT scan at this time. She will let me know if she wants to revisit the issue.     #Protein-calorie malnutrition. Has improved with her intake and gained some weight.  - continue follow up with her PMD to discuss strategies to increase weight. Consider protein supplement shakes.     #RHM:  - UTD with flu, pneumococcal vaccines. Due for PCV20 - she will discuss with her PMD.   - UTD with covid-19 vaccine + booster. She should get the new booster this  Fall.   - UTD with flu vaccine.     Follow up in 1 year or sooner if needed.    Pacheco Perdomo MD (Avi)  [facility] Pulmonary & Critical Care  Clinic [phone]  Fax [phone]      CCx: asthma, pulmonary nodules follow up    HPI: Interim history: I last saw Marlen on 10/6/2022. Since that time, she reports she's doing well. She did have syncope last year. She was diagnosed with SVT and seizures. Tolerating the Advair. Rare use of rescue inhaler.   She has been able to gain some weight.    ROS:  A 12-system review was obtained and was negative with the exception of the symptoms endorsed in the history of present illness.    PMH:  Past Medical History:   Diagnosis Date    Anxiety     Arthritis 2016    in knees    Asthma 2012    adult-onset asthma diagnosed in 2012    Cancer (H)     DCIS (ductal carcinoma in situ) 2001    stage 0 status post left mastectomy in 2001    Depressive disorder     Heart disease 2018 -2021    still feel pain w pericarditis.    PONV (postoperative nausea and vomiting)     Torticollis     Tremor        PSH:  Past Surgical History:   Procedure Laterality Date    BIOPSY  breasr right    BREAST SURGERY      CARDIAC SURGERY  May 2023    Ablation surgery at [facility] looking for faulty electrical nodes' not found by Dr. FARHANA Brooks    CATARACT IOL, RT/LT      COLONOSCOPY  2018    MASTECTOMY      DCIS    ODONTECTOMY Left 08/02/2022    Procedure: SURGICAL EXTRACTION, TOOTH - Teeth #12, 14, 19;  Surgeon: Arvin Garza DDS;  Location: [facility] OR    RETINAL REATTACHMENT         Allergies:  Allergies   Allergen Reactions    Levalbuterol Shortness Of Breath    Diltiazem Dizziness    Cats Other (See Comments)     Itchy eyes    Dust Mites      Other reaction(s): Wheezing  Wheezing/shortness/breath/see (IRP 6/1)    Qvar [Beclomethasone]      Per patient- allergic to QVAR    Amitriptyline Palpitations    Escitalopram Anxiety       Family HX:  Family History   Problem  Relation Age of Onset    Cancer Sister         breast cancer    Breast Cancer Sister         estrogen use    Asthma Sister     Breast Cancer Sister     Asthma Sister     Heart Failure Mother          at 97    Breast Cancer Mother         diagnosed at 94    Colon Cancer Sister     Anesthesia Reaction Sister     Asthma Sister     Other Cancer Son         Testicular    Anxiety Disorder Sister     Asthma Sister     Glaucoma No family hx of     Macular Degeneration No family hx of        Social Hx:  Social History     Socioeconomic History    Marital status: Single     Spouse name: Not on file    Number of children: Not on file    Years of education: Not on file    Highest education level: Not on file   Occupational History    Not on file   Tobacco Use    Smoking status: Former     Packs/day: 1.00     Years: 9.00     Additional pack years: 0.00     Total pack years: 9.00     Types: Cigarettes     Start date: 1966     Quit date: 1975     Years since quittin.5    Smokeless tobacco: Never    Tobacco comments:     Quit in ..smoked again in  to   1/2 pk or less   Vaping Use    Vaping Use: Never used   Substance and Sexual Activity    Alcohol use: Not Currently     Comment: Holiday glass of wine. summer glass of beer  once in awhile    Drug use: Never    Sexual activity: Not Currently     Partners: Male     Birth control/protection: None     Comment: menopause   Other Topics Concern    Parent/sibling w/ CABG, MI or angioplasty before 65F 55M? No   Social History Narrative    Not on file     Social Determinants of Health     Financial Resource Strain: Not on file   Food Insecurity: Not on file   Transportation Needs: Not on file   Physical Activity: Not on file   Stress: Not on file   Social Connections: Not on file   Interpersonal Safety: Not on file   Housing Stability: Not on file       Current Meds:  Current Outpatient Medications   Medication Sig Dispense Refill    acetaminophen (TYLENOL) 325  MG tablet Take 2 tablets (650 mg) by mouth every 4 hours as needed for mild pain      albuterol (PROAIR HFA/PROVENTIL HFA/VENTOLIN HFA) 108 (90 Base) MCG/ACT inhaler Inhale 2 puffs into the lungs every 6 hours as needed for shortness of breath / dyspnea      aspirin (ASA) 81 MG chewable tablet Take 81 mg by mouth twice a week      Blood Pressure Monitor KIT 1 Device as needed (for home BP moniotoring) 1 kit 0    calcium carbonate-vitamin D 600-125 MG-UNIT TABS Take 1 tablet by mouth once a week      Cyanocobalamin 50 MCG TABS Take 50 mcg by mouth once a week Sundays      fluticasone-salmeterol (ADVAIR HFA) 115-21 MCG/ACT inhaler Inhale 2 puffs into the lungs 2 times daily 12 g 11    LORazepam (ATIVAN) 0.5 MG tablet 30 mins prior to dental procedure as needed 10 tablet 0    omeprazole (PRILOSEC) 20 MG DR capsule Take 1 capsule (20 mg) by mouth daily (Patient taking differently: Take 20 mg by mouth as needed) 90 capsule 1    ondansetron (ZOFRAN ODT) 4 MG ODT tab Take 1 tablet (4 mg) by mouth every 8 hours as needed for nausea 20 tablet 0       Physical Exam:  Wt 61.5 kg (135 lb 9.6 oz)   BMI 22.57 kg/m    Gen: awake, alert, oriented, no distress  HEENT: nasal turbinates are unremarkable, no oropharyngeal lesions, no cervical or supraclavicular lymphadenopathy  CV: RRR, no M/G/R  Resp: CTAB, no focal crackles or wheezes  Skin: no apparent rashes  Ext: no cyanosis, clubbing or edema  Neuro: alert, nonfocal    Labs:  Reviewed  CBC nrmal, no eos in 2020  ESR 11 in 2020  Chem panel wnl 10/20/21    Imaging studies:  EXAMINATION: CT CHEST W/O CONTRAST, 3/17/2022 1:46 PM     CLINICAL HISTORY: Lung nodule, 6-8mm; Pulmonary nodules     COMPARISON: CT 3/6/2020.     TECHNIQUE: CT imaging obtained through the chest without contrast.  Coronal and axial MIP reformatted images obtained.      CONTRAST:  none.     FINDINGS:     Mediastinum: No cardiomegaly. No pericardial effusion. Normal caliber  thoracic aorta midpoint trunk.  Moderate coronary artery calcifications  in the left anterior descending. Calcified mediastinal lymph nodes.  Left breast mastectomy.     Lungs/pleura: The central airway is patent. No pneumothorax, pleural  effusions, or focal infectious consolidations.     Centrally lucent/cavitary nodule in the right lower lobe on series 4  image 161 measuring 4 mm, likely present on previous exam.     3 mm solid right lower lobe nodule on series 4 image 189 that  definitely seen on previous exam. Unchanged 5 mm left upper lobe  groundglass nodule on series 4 image 68.     New 5 mm ground glass nodule in the left upper lobe on series 4 image  81. Scattered calcified granulomas.     Simple cystic structures in the liver unchanged from previous exam.  Kyphotic curvature of the midthoracic spine. No acute or suspicious  osseous or soft tissue abnormalities.                                                                      IMPRESSION:  1. A few new or more conspicuous nodules are present as detailed  above, less than 6 mm.  Given history of left breast cancer, these are  technically indeterminant and short-term follow-up could be  considered.  2. Other nodules including a 5 mm left upper lobe groundglass nodule  and centrally lucent/cavitary nodule in the right lower lobe are  unchanged from previous exam.  3. Sequelae of prior granulomas disease.    TTE 2/15/2022  Interpretation Summary     Left ventricular size, wall motion and function are normal. The ejection  fraction is 60-65%.  Normal right ventricle size and systolic function.  Small pericardial effusion  There are no echocardiographic indications of cardiac tamponade.  No hemodynamically significant valvular abnormalities on 2D or color flow  imaging.    Pulmonary Function Testing  3/18/2022  FEV1 1,53L, 70%  FVC 84%  Ratio 0.64 (LLN 0.64)  No BD response.  TLC 85%  RV 89%  DLco 93% aung for hgb  Mid flow rates reduced with BD response.  Flow volume loop suggests small  airways disease

## 2023-11-03 ENCOUNTER — PATIENT OUTREACH (OUTPATIENT)
Dept: GERIATRIC MEDICINE | Facility: CLINIC | Age: 78
End: 2023-11-03
Payer: COMMERCIAL

## 2023-11-03 NOTE — PROGRESS NOTES
[facility] Care Coordination Contact    Faxed auth to [facility] for willy cabrera.     Virginie Ha  [facility]  Case Management Specialist  [phone]

## 2023-11-10 ENCOUNTER — IMMUNIZATION (OUTPATIENT)
Dept: FAMILY MEDICINE | Facility: CLINIC | Age: 78
End: 2023-11-10
Payer: COMMERCIAL

## 2023-11-10 DIAGNOSIS — Z23 HIGH PRIORITY FOR 2019-NCOV VACCINE: Primary | ICD-10-CM

## 2023-11-10 PROCEDURE — 91320 SARSCV2 VAC 30MCG TRS-SUC IM: CPT

## 2023-11-10 PROCEDURE — 90480 ADMN SARSCOV2 VAC 1/ONLY CMP: CPT

## 2023-11-22 ENCOUNTER — VIRTUAL VISIT (OUTPATIENT)
Dept: FAMILY MEDICINE | Facility: CLINIC | Age: 78
End: 2023-11-22
Payer: COMMERCIAL

## 2023-11-22 DIAGNOSIS — F43.21 GRIEF REACTION: Primary | ICD-10-CM

## 2023-11-22 PROCEDURE — 99442 PR PHYSICIAN TELEPHONE EVALUATION 11-20 MIN: CPT | Mod: 95 | Performed by: PHYSICIAN ASSISTANT

## 2023-11-22 RX ORDER — LORAZEPAM 0.5 MG/1
0.5 TABLET ORAL 2 TIMES DAILY PRN
Qty: 20 TABLET | Refills: 0 | Status: SHIPPED | OUTPATIENT
Start: 2023-11-22 | End: 2024-06-18

## 2023-11-22 RX ORDER — RESPIRATORY SYNCYTIAL VIRUS VACCINE 120MCG/0.5
0.5 KIT INTRAMUSCULAR ONCE
Qty: 1 EACH | Refills: 0 | Status: CANCELLED | OUTPATIENT
Start: 2023-11-22 | End: 2023-11-22

## 2023-11-22 ASSESSMENT — PATIENT HEALTH QUESTIONNAIRE - PHQ9: SUM OF ALL RESPONSES TO PHQ QUESTIONS 1-9: 3

## 2023-11-22 NOTE — PROGRESS NOTES
Morenita is a 78 year old who is being evaluated via a billable telephone visit.      What phone number would you like to be contacted at? [phone]   How would you like to obtain your AVS? [place]    Distant Location (provider location):  On-site    Assessment & Plan     Grief reaction  Ok to use Ativan PRN, will send additional script for her. Reviewed side effects/expectations. Commended her on connecting with grief counseling. Short term use only of Ativan. Encouraged to keep me posted how she is feeling and if I can help further.   - LORazepam (ATIVAN) 0.5 MG tablet  Dispense: 20 tablet; Refill: 0    Recommended Prevar 20 + RSV immunizations.     15 minutes spent by me on the date of the encounter doing chart review, review of test results, interpretation of tests, patient visit, and documentation        The likelihood of other entities in the differential is insufficient to justify any further testing for them at this time. This was explained to the patient. The patient was advised that persistent or worsening symptoms would require further evaluation. Patient advised to call the office and if unable to reach to go to the emergency room if they develop any new or worsening symptoms. Expressed understanding and agreement with above stated plan.     KELLY Yanes [facility]    Joan Xavier is a 78 year old, presenting for the following health issues:  Follow Up and Recheck Medication    Here today for follow-up. Lost a close friend of 28 years on 11/14/23. Low mood, anxiety since then. Crying often. Reached out to her insurance who provided her numbers for grief counseling which is going to puruse. Had a dental appt this week, takes Ativan PRN for this due to anxiety as well as for tremor. Helped with easier her grief. Aware of medication side effects but wondering if short term PRN use would be ok.      Wondering about Prevar 20, RSV immunizations.     History of Present  Illness       Reason for visit:  Confirm upcoming Pneumonia Vaccine, cope w Grief    She eats 2-3 servings of fruits and vegetables daily.She consumes 1 sweetened beverage(s) daily.She exercises with enough effort to increase her heart rate 20 to 29 minutes per day.  She exercises with enough effort to increase her heart rate 4 days per week.   She is taking medications regularly.       Review of Systems   Constitutional, HEENT, cardiovascular, pulmonary, GI, , musculoskeletal, neuro, skin, endocrine and psych systems are negative, except as otherwise noted.      Objective           Vitals:  No vitals were obtained today due to virtual visit.    Physical Exam   healthy, alert, and no distress  PSYCH: Alert and oriented times 3; coherent speech, normal   rate and volume, able to articulate logical thoughts, able   to abstract reason, no tangential thoughts, no hallucinations   or delusions  Her affect is normal  RESP: No cough, no audible wheezing, able to talk in full sentences  Remainder of exam unable to be completed due to telephone visits          Phone call duration: 8 minutes

## 2024-01-23 NOTE — TELEPHONE ENCOUNTER
FUTURE VISIT INFORMATION      FUTURE VISIT INFORMATION:  Date: 5/15/24  Time: 9:30am  [place] [facility] [place]FORMATION:    Reason for visit/diagnosis  Breast Augmentation/Implants     RECORDS REQUESTED FROM:       Clinic name Comments Records Status Imaging Status   Imaging MA done 8/24/23  US Breast done 8/24/23    Patient bringing MRI EPIC

## 2024-02-20 ENCOUNTER — PATIENT OUTREACH (OUTPATIENT)
Dept: GERIATRIC MEDICINE | Facility: CLINIC | Age: 79
End: 2024-02-20
Payer: COMMERCIAL

## 2024-02-20 NOTE — PROGRESS NOTES
[facility] Care Coordination Contact    Submitted referrals/auths for increase in EW transportation and Updated services in Database    Sent referral for REEMO watch. Note: Member is currently receiving PERS, CC is aware and states member would like to try the watch.     Virginie Ha  [facility]  Case Management Specialist  [phone]

## 2024-02-22 NOTE — TELEPHONE ENCOUNTER
Medication List            Accurate as of February 22, 2024  8:34 AM. Always use your most recent med list.                apixaban 5 mg tablet  Commonly known as: Eliquis  Notes to patient: LAST DOSE 2/24/2024 830AM PER DR MCKEON, CARDIOLOGIST     calcium citrate-vitamin D3 315 mg-5 mcg (200 unit) tablet  Commonly known as: Citracal+D  Notes to patient: LAST DOSE 2/24/2024 830AM     GARLIQUE ORAL  Medication Adjustments for Surgery: Stop 7 days before surgery     glucosamine-chondroitin 500-400 mg tablet  Medication Adjustments for Surgery: Stop 7 days before surgery     multivitamin with minerals iron-free  Commonly known as: Centrum Silver  Medication Adjustments for Surgery: Stop 7 days before surgery                          PAT DISCHARGE INSTRUCTIONS    Please call the Same Day Surgery (SDS) Department of the hospital where your procedure will be performed after 2:00 PM the day before your surgery. If you are scheduled on a Monday, or a Tuesday following a Monday holiday, you will need to call on the last business day prior to your surgery.    [facility]  [address] [place] [address], [id number] [phone]    [address] [address] [phone]    [address] [place] [address]  [phone]    Please let your surgeon know if:      You develop any open sores, shingles, burning or painful urination as these may increase your risk of an infection.   You no longer wish to have the surgery.   Any other personal circumstances change that may lead to the need to cancel or defer this surgery-such as being sick or getting admitted to any hospital within one week of your planned procedure.    Your contact details change, such as a change of address or phone number.    Starting now:     Please DO NOT drink alcohol or smoke for 24 hours before surgery. It is well  Received a call from the patient stating she received a notification from the pharmacy regarding her Keppra. Patient's dose was changed from 750 mg tablets down to 500 mg tablets. Patient states the pharmacy keeps trying to file the 750 mg tablets. Upon chart review, script for the 750 mg tablets was discontinued on 1/16/23.     Called and spoke to the pharmacy staff. Pharmacy confirmed they can see the 750 mg tablets were discontinued and they only have orders to file the 500 mg tablets. Pharmacy states sometimes the automated messages to not register that a certain medication was discontinued and they will still send messages to the patient.     Called and informed the patient of response from the pharmacy.    Jasmina Francois RN         known that quitting smoking can make a huge difference to your health and recovery from surgery. The longer you abstain from smoking, the better your chances of a healthy recovery. If you need help with quitting, call 1-800-QUIT-NOW to be connected to a trained counselor who will discuss the best methods to help you quit.     Before your surgery:    Please stop all supplements 7 days prior to surgery. Or as directed by your surgeon.   Please stop taking NSAID pain medicine such as Advil and Motrin 7 days before surgery.    If you develop any fever, cough, cold, rashes, cuts, scratches, scrapes, urinary symptoms or infection anywhere on your body (including teeth and gums) prior to surgery, please call your surgeon’s office as soon as possible. This may require treatment to reduce the chance of cancellation on the day of surgery.    The day before your surgery:   DIET- Do not eat or drink anything after midnight the night before surgery, including mints, candy and gum.   Get a good night’s rest.  Use the special soap for bathing if you have been instructed to use one.    Scheduled surgery times may change and you will be notified if this occurs - please check your personal voicemail for any updates.     On the morning of surgery:   Wear comfortable, loose fitting clothes which open in the front. Please do not wear moisturizers, creams, lotions, makeup or perfume.    Please bring with you to surgery:   Photo ID and insurance card   Current list of medicines and allergies   Pacemaker/ Defibrillator/Heart stent cards   CPAP machine and mask    Slings/ splints/ crutches   A copy of your complete advanced directive/DHPOA.    Please do NOT bring with you to surgery:   All jewelry and valuables should be left at home.   Prosthetic devices such as contact lenses, hearing aids, dentures, eyelash extensions, hairpins and body piercings must be removed prior to going in to the surgical suite.    After outpatient surgery:   A  responsible adult MUST accompany you at the time of discharge and stay with you for 24 hours after your surgery. You may NOT drive yourself home after surgery.    Do not drive, operate machinery, make critical decisions or do activities that require co-ordination or balance until after a night’s sleep.   Do not drink alcoholic beverages for 24 hours.   Instructions for resuming your medications will be provided by your surgeon.    CALL YOUR DOCTOR AFTER SURGERY IF YOU HAVE:     Chills and/or a fever of 101° F or higher.    Redness, swelling, pus or drainage from your surgical wound or a bad smell from the wound.    Lightheadedness, fainting or confusion.    Persistent vomiting (throwing up) and are not able to eat or drink for 12 hours.    Three or more loose, watery bowel movements in 24 hours (diarrhea).   Difficulty or pain while urinating( after non-urological surgery)    Pain and swelling in your legs, especially if it is only on one side.    Difficulty breathing or are breathing faster than normal.    Any new concerning symptoms.

## 2024-03-01 ENCOUNTER — TELEPHONE (OUTPATIENT)
Dept: PLASTIC SURGERY | Facility: CLINIC | Age: 79
End: 2024-03-01
Payer: COMMERCIAL

## 2024-03-01 NOTE — TELEPHONE ENCOUNTER
Left Voicemail (1st Attempt), sent myc for the patient to call back and reschedule the following:    Appointment type: New Patient  Provider: Dr. Wilson  Return date: Reschedule 5/15 Appt due to the provider being out  Specialty phone number: [phone]  Additional appointment(s) needed: n/a  Additonal Notes: Ok to [facility], use any time per Judi DE LUNA RN    Left direct #

## 2024-03-04 NOTE — TELEPHONE ENCOUNTER
FUTURE VISIT INFORMATION      FUTURE VISIT INFORMATION:  Date: 5/14/24  Time: 10:00am  [place] [facility]  REFERRAL INFORMATION:     Reason for visit/diagnosis  Breast Augmentation/Implants      RECORDS REQUESTED FROM:         Clinic name Comments Records Status Imaging Status   Imaging MA done 8/24/23  US Breast done 8/24/23     Patient bringing MRI EPIC

## 2024-03-07 ENCOUNTER — VIRTUAL VISIT (OUTPATIENT)
Dept: NEUROLOGY | Facility: CLINIC | Age: 79
End: 2024-03-07
Payer: COMMERCIAL

## 2024-03-07 DIAGNOSIS — M25.512 CHRONIC LEFT SHOULDER PAIN: ICD-10-CM

## 2024-03-07 DIAGNOSIS — G89.29 CHRONIC LEFT SHOULDER PAIN: ICD-10-CM

## 2024-03-07 DIAGNOSIS — G24.3 CERVICAL DYSTONIA: Primary | ICD-10-CM

## 2024-03-07 DIAGNOSIS — G47.00 INSOMNIA, UNSPECIFIED TYPE: ICD-10-CM

## 2024-03-07 PROCEDURE — 99214 OFFICE O/P EST MOD 30 MIN: CPT | Mod: 95 | Performed by: INTERNAL MEDICINE

## 2024-03-07 NOTE — PROGRESS NOTES
"[facility] Neurology Follow Up Visit    Morenita Moore MRN# [id number]   Age: 78 year old YOB: 1945     Brief history of symptoms: The patient was initially seen in neurologic consultation on 4/16/2021 for evaluation of dizziness. Please see the comprehensive neurologic consultation note from that date in the Epic records for details.     Interval history:   She started pool therapy. It has been going well. She has 2 more sessions.     Her \"PPPD\" symptoms have overall been better.     She is not sleeping well at night.     She started going to a \"dystonia\" group. She is interested in seeing movement disorder to discuss treatment option. She would consider Botox, but isn't sure she wants to do it.       Past Medical History:     Patient Active Problem List   Diagnosis    Acute internal derangement of left knee    Asthma in adult    Breast cancer in situ    SVT (supraventricular tachycardia)    Torticollis    Gastroesophageal reflux disease without esophagitis    Major depression in remission (H24)    Pericarditis    Primary osteoarthritis of both knees    CELESTE (generalized anxiety disorder)    Presbyopia    Senile nuclear sclerosis    Tremor, physiological    Essential hypertension    Dizziness    Benzodiazepine dependence (H)    Mild persistent asthma without complication    Seizure (H)    Stress-induced cardiomyopathy     Past Medical History:   Diagnosis Date    Anxiety     Arthritis 2016    in knees    Asthma 2012    adult-onset asthma diagnosed in 2012    Cancer (H)     DCIS (ductal carcinoma in situ) 2001    stage 0 status post left mastectomy in 2001    Depressive disorder     Heart disease 2018 -2021    still feel pain w pericarditis.    PONV (postoperative nausea and vomiting)     Torticollis     Tremor         Past Surgical History:     Past Surgical History:   Procedure Laterality Date    BIOPSY  breasr right    BREAST SURGERY      CARDIAC SURGERY  May 2023    Ablation surgery at [facility] looking for faulty electrical nodes' not found by Dr. FARHANA Brooks    CATARACT IOL, RT/LT      COLONOSCOPY  2018    MASTECTOMY      DCIS    ODONTECTOMY Left 2022    Procedure: SURGICAL EXTRACTION, TOOTH - Teeth #12, 14, 19;  Surgeon: Arvin Garza DDS;  Location: [facility] OR    RETINAL REATTACHMENT          Social History:     Social History     Tobacco Use    Smoking status: Former     Packs/day: 1.00     Years: 9.00     Additional pack years: 0.00     Total pack years: 9.00     Types: Cigarettes     Start date: 1966     Quit date: 1975     Years since quittin.9    Smokeless tobacco: Never    Tobacco comments:     Quit in ..smoked again in  to   1/2 pk or less   Vaping Use    Vaping Use: Never used   Substance Use Topics    Alcohol use: Not Currently     Comment: Holiday glass of wine. summer glass of beer  once in awhile    Drug use: Never        Family History:     Family History   Problem Relation Age of Onset    Cancer Sister         breast cancer    Breast Cancer Sister         estrogen use    Asthma Sister     Breast Cancer Sister     Asthma Sister     Heart Failure Mother          at 97    Breast Cancer Mother         diagnosed at 94    Colon Cancer Sister     Anesthesia Reaction Sister     Asthma Sister     Other Cancer Son         Testicular    Anxiety Disorder Sister     Asthma Sister     Glaucoma No family hx of     Macular Degeneration No family hx of         Medications:     Current Outpatient Medications   Medication Sig    acetaminophen (TYLENOL) 325 MG tablet Take 2 tablets (650 mg) by mouth every 4 hours as needed for mild pain    albuterol (PROAIR HFA/PROVENTIL HFA/VENTOLIN HFA) 108 (90 Base) MCG/ACT inhaler Inhale 2 puffs into the lungs every 6 hours as needed for shortness of breath / dyspnea    aspirin (ASA) 81 MG chewable tablet Take 81 mg by mouth twice a week    calcium carbonate-vitamin D 600-125 MG-UNIT TABS Take 1 tablet by mouth once a week     Cyanocobalamin 50 MCG TABS Take 50 mcg by mouth once a week Sundays    fluticasone-salmeterol (ADVAIR HFA) 115-21 MCG/ACT inhaler Inhale 2 puffs into the lungs 2 times daily    LORazepam (ATIVAN) 0.5 MG tablet 30 mins prior to dental procedure as needed    omeprazole (PRILOSEC) 20 MG DR capsule Take 1 capsule (20 mg) by mouth daily (Patient taking differently: Take 20 mg by mouth as needed)    ondansetron (ZOFRAN ODT) 4 MG ODT tab Take 1 tablet (4 mg) by mouth every 8 hours as needed for nausea    Blood Pressure Monitor KIT 1 Device as needed (for home BP moniotoring)    LORazepam (ATIVAN) 0.5 MG tablet Take 1 tablet (0.5 mg) by mouth 2 times daily as needed for anxiety     No current facility-administered medications for this visit.        Allergies:     Allergies   Allergen Reactions    Levalbuterol Shortness Of Breath    Diltiazem Dizziness    Cats Other (See Comments)     Itchy eyes    Dust Mites      Other reaction(s): Wheezing  Wheezing/shortness/breath/see (IRP 6/1)    Qvar [Beclomethasone]      Per patient- allergic to QVAR    Amitriptyline Palpitations    Escitalopram Anxiety        Review of Systems:   As above     Physical Exam:   Vitals: There were no vitals taken for this visit.   No examination given nature of virtual visit     Data reviewed on previous visits    MRI brain 11/2022  IMPRESSION:  1.  No acute intracranial process.  2.  Generalized brain atrophy and presumed microvascular ischemic changes as detailed above.    MRI brain without contrast 7/2020  1.  No acute intracranial process.  2.  Generalized brain atrophy and presumed microvascular ischemic changes similar to findings on the 2018 MRI.    TSH normal 1/2020    MRI brain 8/2022  IMPRESSION:  1.  No acute intracranial process.  2.  Generalized brain atrophy and presumed microvascular ischemic changes as detailed above.    EEG 8/2022  IMPRESSION OF VIDEO EEG DAY # 1: This video electroencephalogram is abnormal due to the presence of  diffuse low amplitude theta delta slowing and intermittent generalized attenuation of the background, consistent with moderately severe diffuse nonspecific encephalopathy, which could in part be due to sedative medications.  No electrographic seizures or epileptiform discharges were recorded. Clinical correlation is advised.     EEG 4/4/2023  IMPRESSION: Normal in wakefulness and drowsiness. No epileptiform discharges or seizures.    Pertinent Investigations since last visit:   None         Assessment and Plan:   Assessment:  Morenita Moore is a 78 year old female who presents for follow-up of dizziness, thought to most likely be related to PPPD and possible prior vestibular neuronitis. Symptoms are not as severe as previous. Additional vestibular therapy could be considered in the future if needed.     Patient has history of cervical dystonia and is interested in re-establishing with movement disorders. Referral was placed today. She would also like referral to [facility] for possible dry needling.     Patient is bothered by insomnia and daytime sleepiness. Sleep referral was placed.     Patient also reports chronic left shoulder pain and would like to see orthopedics. Referral was placed.    Plan:  - Movement disorders referral  - Sleep referral  - PT referral to [facility] for dry needling  - Orthopedics referral    Follow up in Neurology clinic if new issues arise    Hong Hansen MD   of Neurology  [facility]

## 2024-03-07 NOTE — LETTER
"    3/7/2024         RE: Morenita Moore  [address]  [place] [address]        Dear Colleague,    Thank you for referring your patient, Morenita Moore, to the [facility] [place]. Please see a copy of my visit note below.    Morenita is a 78 year old who is being evaluated via a billable video visit.      How would you like to obtain your AVS? MyChart  If the video visit is dropped, the invitation should be resent by: Text to cell phone: [phone]  Will anyone else be joining your video visit? No        Video-Visit Details    Type of service:  Video Visit   Video duration: 20 minutes    Originating Location (pt. Location): Home    Distant Location (provider location):  On-site  Platform used for Video Visit: [facility] Neurology Follow Up Visit    Morenita Moore MRN# [id number]   Age: 78 year old YOB: 1945     Brief history of symptoms: The patient was initially seen in neurologic consultation on 4/16/2021 for evaluation of dizziness. Please see the comprehensive neurologic consultation note from that date in the Epic records for details.     Interval history:   She started pool therapy. It has been going well. She has 2 more sessions.     Her \"PPPD\" symptoms have overall been better.     She is not sleeping well at night.     She started going to a \"dystonia\" group. She is interested in seeing movement disorder to discuss treatment option. She would consider Botox, but isn't sure she wants to do it.       Past Medical History:     Patient Active Problem List   Diagnosis     Acute internal derangement of left knee     Asthma in adult     Breast cancer in situ     SVT (supraventricular tachycardia)     Torticollis     Gastroesophageal reflux disease without esophagitis     Major depression in remission (H24)     Pericarditis     Primary osteoarthritis of both knees     CELESTE (generalized anxiety disorder)     Presbyopia     Senile nuclear " sclerosis     Tremor, physiological     Essential hypertension     Dizziness     Benzodiazepine dependence (H)     Mild persistent asthma without complication     Seizure (H)     Stress-induced cardiomyopathy     Past Medical History:   Diagnosis Date     Anxiety      Arthritis 2016    in knees     Asthma 2012    adult-onset asthma diagnosed in      Cancer (H)      DCIS (ductal carcinoma in situ)     stage 0 status post left mastectomy in      Depressive disorder      Heart disease  -    still feel pain w pericarditis.     PONV (postoperative nausea and vomiting)      Torticollis      Tremor         Past Surgical History:     Past Surgical History:   Procedure Laterality Date     BIOPSY  breasr right     BREAST SURGERY       CARDIAC SURGERY  May 2023    Ablation surgery at [facility] looking for faulty electrical nodes' not found by Dr. FARHANA Brooks     CATARACT IOL, RT/LT       COLONOSCOPY       MASTECTOMY      DCIS     ODONTECTOMY Left 2022    Procedure: SURGICAL EXTRACTION, TOOTH - Teeth #12, 14, 19;  Surgeon: Arvin Garza DDS;  Location: [facility] OR     RETINAL REATTACHMENT          Social History:     Social History     Tobacco Use     Smoking status: Former     Packs/day: 1.00     Years: 9.00     Additional pack years: 0.00     Total pack years: 9.00     Types: Cigarettes     Start date: 1966     Quit date: 1975     Years since quittin.9     Smokeless tobacco: Never     Tobacco comments:     Quit in ..smoked again in  to   1/2 pk or less   Vaping Use     Vaping Use: Never used   Substance Use Topics     Alcohol use: Not Currently     Comment: Holiday glass of wine. summer glass of beer  once in awhile     Drug use: Never        Family History:     Family History   Problem Relation Age of Onset     Cancer Sister         breast cancer     Breast Cancer Sister         estrogen use     Asthma Sister      Breast Cancer Sister      Asthma Sister      Heart  Failure Mother          at 97     Breast Cancer Mother         diagnosed at 94     Colon Cancer Sister      Anesthesia Reaction Sister      Asthma Sister      Other Cancer Son         Testicular     Anxiety Disorder Sister      Asthma Sister      Glaucoma No family hx of      Macular Degeneration No family hx of         Medications:     Current Outpatient Medications   Medication Sig     acetaminophen (TYLENOL) 325 MG tablet Take 2 tablets (650 mg) by mouth every 4 hours as needed for mild pain     albuterol (PROAIR HFA/PROVENTIL HFA/VENTOLIN HFA) 108 (90 Base) MCG/ACT inhaler Inhale 2 puffs into the lungs every 6 hours as needed for shortness of breath / dyspnea     aspirin (ASA) 81 MG chewable tablet Take 81 mg by mouth twice a week     calcium carbonate-vitamin D 600-125 MG-UNIT TABS Take 1 tablet by mouth once a week     Cyanocobalamin 50 MCG TABS Take 50 mcg by mouth once a week Sundays     fluticasone-salmeterol (ADVAIR HFA) 115-21 MCG/ACT inhaler Inhale 2 puffs into the lungs 2 times daily     LORazepam (ATIVAN) 0.5 MG tablet 30 mins prior to dental procedure as needed     omeprazole (PRILOSEC) 20 MG DR capsule Take 1 capsule (20 mg) by mouth daily (Patient taking differently: Take 20 mg by mouth as needed)     ondansetron (ZOFRAN ODT) 4 MG ODT tab Take 1 tablet (4 mg) by mouth every 8 hours as needed for nausea     Blood Pressure Monitor KIT 1 Device as needed (for home BP moniotoring)     LORazepam (ATIVAN) 0.5 MG tablet Take 1 tablet (0.5 mg) by mouth 2 times daily as needed for anxiety     No current facility-administered medications for this visit.        Allergies:     Allergies   Allergen Reactions     Levalbuterol Shortness Of Breath     Diltiazem Dizziness     Cats Other (See Comments)     Itchy eyes     Dust Mites      Other reaction(s): Wheezing  Wheezing/shortness/breath/see (IRP )     Qvar [Beclomethasone]      Per patient- allergic to QVAR     Amitriptyline Palpitations     Escitalopram  Anxiety        Review of Systems:   As above     Physical Exam:   Vitals: There were no vitals taken for this visit.   No examination given nature of virtual visit     Data reviewed on previous visits    MRI brain 11/2022  IMPRESSION:  1.  No acute intracranial process.  2.  Generalized brain atrophy and presumed microvascular ischemic changes as detailed above.    MRI brain without contrast 7/2020  1.  No acute intracranial process.  2.  Generalized brain atrophy and presumed microvascular ischemic changes similar to findings on the 2018 MRI.    TSH normal 1/2020    MRI brain 8/2022  IMPRESSION:  1.  No acute intracranial process.  2.  Generalized brain atrophy and presumed microvascular ischemic changes as detailed above.    EEG 8/2022  IMPRESSION OF VIDEO EEG DAY # 1: This video electroencephalogram is abnormal due to the presence of diffuse low amplitude theta delta slowing and intermittent generalized attenuation of the background, consistent with moderately severe diffuse nonspecific encephalopathy, which could in part be due to sedative medications.  No electrographic seizures or epileptiform discharges were recorded. Clinical correlation is advised.     EEG 4/4/2023  IMPRESSION: Normal in wakefulness and drowsiness. No epileptiform discharges or seizures.    Pertinent Investigations since last visit:   None         Assessment and Plan:   Assessment:  Morenita Moore is a 78 year old female who presents for follow-up of dizziness, thought to most likely be related to PPPD and possible prior vestibular neuronitis. Symptoms are not as severe as previous. Additional vestibular therapy could be considered in the future if needed.     Patient has history of cervical dystonia and is interested in re-establishing with movement disorders. Referral was placed today. She would also like referral to [facility] for possible dry needling.     Patient is bothered by insomnia and daytime sleepiness. Sleep referral was  placed.     Patient also reports chronic left shoulder pain and would like to see orthopedics. Referral was placed.    Plan:  - Movement disorders referral  - Sleep referral  - PT referral to [facility] for dry needling  - Orthopedics referral    Follow up in Neurology clinic if new issues arise    Hong Hansen MD   of Neurology  [facility]      Again, thank you for allowing me to participate in the care of your patient.        Sincerely,        Hong Hansen MD

## 2024-03-07 NOTE — PROGRESS NOTES
Morenita is a 78 year old who is being evaluated via a billable video visit.      How would you like to obtain your AVS? MyChart  If the video visit is dropped, the invitation should be resent by: Text to cell phone: [phone]  Will anyone else be joining your video visit? No        Video-Visit Details    Type of service:  Video Visit   Video duration: 20 minutes    Originating Location (pt. Location): Home    Distant Location (provider location):  On-site  Platform used for Video Visit: Jared

## 2024-03-08 ENCOUNTER — PATIENT OUTREACH (OUTPATIENT)
Dept: GERIATRIC MEDICINE | Facility: CLINIC | Age: 79
End: 2024-03-08
Payer: COMMERCIAL

## 2024-03-08 NOTE — PROGRESS NOTES
[facility] Care Coordination Contact      [facility] Six-Month Telephone Assessment    6 month telephone assessment completed on 03/08/2024.    ER visits: No  Hospitalizations: No  TCU stays: No  Significant health status changes: na  Falls/Injuries: No  ADL/IADL changes: No  Changes in services: Yes: increased cab rides from 4 rides to 7 rides weekly.    Caregiver Assessment follow up:  na    Goals: See POC in chart for goal progress documentation.  Morenita reported that she had difficulty with her health e pass card at [facility]. She did called [facility] and informed them.  She is better now.   She is still looking for a grocery cart that she would like.    She no longer wants the cane with ice pick.    She did receive her walker.   Melia did request for her cab rides to be increased to 7 rides weekly.   She is contemplating on canceling [employer].   Morenita has no other needs at this time.    Will see member in 6 months for an annual health risk assessment.   Encouraged member to call CC with any questions or concerns in the meantime.     ASHLEIGH Ramsay  [facility]  Phone: [phone]

## 2024-03-14 NOTE — TELEPHONE ENCOUNTER
DIAGNOSIS: Chronic left shoulder pain [M25.512, G89.29]     APPOINTMENT DATE: 04/23/24   NOTES STATUS DETAILS   OFFICE NOTE from referring provider Internal 03/07/24: Dr. Hong Hansen   OFFICE NOTE from other specialist Care Everywhere 10/11/23: Goyo Guzman, PT   MEDICATION LIST Internal    LABS     CBC/DIFF Internal 09/20/23

## 2024-03-25 ENCOUNTER — TELEPHONE (OUTPATIENT)
Dept: FAMILY MEDICINE | Facility: CLINIC | Age: 79
End: 2024-03-25
Payer: COMMERCIAL

## 2024-03-25 NOTE — TELEPHONE ENCOUNTER
Patient has had eye pain and was seen by an eye doctor, Dr. Quique GUTIÉRREZ ophthalmologist. Pt was told that she has shingles in her eye.    Patient was valacyclovir 10mg tablet three times daily and erythromycin opthalmic ointment apply to right eye up to four times daily PRN.     Patient is wondering if she can take both of these medications with Advair. Patient was told at some point that she could not take an antiviral for Covid with Advair. Patient states she cannot go off Advair.    PCP, please advise if patient can take valacyclovir 10mg tab and erythromycin opthalmic ointment with Advair.    Callback: [phone] ok to leave a detailed vm    Sammi Landers RN  -[facility]

## 2024-03-27 ENCOUNTER — TRANSFERRED RECORDS (OUTPATIENT)
Dept: HEALTH INFORMATION MANAGEMENT | Facility: CLINIC | Age: 79
End: 2024-03-27
Payer: COMMERCIAL

## 2024-04-04 ENCOUNTER — MYC MEDICAL ADVICE (OUTPATIENT)
Dept: FAMILY MEDICINE | Facility: CLINIC | Age: 79
End: 2024-04-04
Payer: COMMERCIAL

## 2024-04-04 NOTE — TELEPHONE ENCOUNTER
Triage Patient Outreach    Attempt # 1    Was call answered?  No.  Left voicemail to return call to Triage at Primary Clinic    Abbi Mitchell RN

## 2024-04-05 NOTE — TELEPHONE ENCOUNTER
Patient called back and states that the yellow hue to her skin has been the same for the past 2 years nothing new     She is just wanting labs ordered if possible(see mychart below)     She states her shingles is doing much better she was seen by eye doc who gave her antivirals and saw her derm who confirmed the diagnosis     Patient declined to schedule VV

## 2024-04-09 NOTE — TELEPHONE ENCOUNTER
Pt is scheduled for a telephone visit on 4/12, YOKOM asking if she would like an in clinic visit with Alexis childs

## 2024-04-12 ENCOUNTER — MYC MEDICAL ADVICE (OUTPATIENT)
Dept: FAMILY MEDICINE | Facility: CLINIC | Age: 79
End: 2024-04-12

## 2024-04-12 ENCOUNTER — VIRTUAL VISIT (OUTPATIENT)
Dept: FAMILY MEDICINE | Facility: CLINIC | Age: 79
End: 2024-04-12
Payer: COMMERCIAL

## 2024-04-12 DIAGNOSIS — N94.9 ADNEXAL CYST: ICD-10-CM

## 2024-04-12 DIAGNOSIS — M79.672 FOOT PAIN, BILATERAL: Primary | ICD-10-CM

## 2024-04-12 DIAGNOSIS — M79.671 FOOT PAIN, BILATERAL: Primary | ICD-10-CM

## 2024-04-12 DIAGNOSIS — K66.8 MESENTERIC CYST: Primary | ICD-10-CM

## 2024-04-12 DIAGNOSIS — E55.9 VITAMIN D DEFICIENCY: ICD-10-CM

## 2024-04-12 DIAGNOSIS — Z86.39 HISTORY OF NON ANEMIC VITAMIN B12 DEFICIENCY: ICD-10-CM

## 2024-04-12 DIAGNOSIS — Z86.19 HISTORY OF SHINGLES: ICD-10-CM

## 2024-04-12 DIAGNOSIS — I48.0 PAROXYSMAL ATRIAL FIBRILLATION (H): ICD-10-CM

## 2024-04-12 PROCEDURE — 99443 PR PHYSICIAN TELEPHONE EVALUATION 21-30 MIN: CPT | Mod: 93 | Performed by: PHYSICIAN ASSISTANT

## 2024-04-12 ASSESSMENT — ASTHMA QUESTIONNAIRES: ACT_TOTALSCORE: 24

## 2024-04-12 NOTE — PROGRESS NOTES
Morenita is a 78 year old who is being evaluated via a billable telephone visit.    What phone number would you like to be contacted at? [phone]  How would you like to obtain your AVS? Susanhart  Originating Location (pt. Location): Home    Distant Location (provider location):  On-site    Assessment & Plan     History of shingles  Reviewed recommends on shingles immunization follow-up acute case. Will pursue this at the pharmacy.     Mesenteric cyst  Adnexal cyst  Declines referral to surgeon. Repeat CT ABD pelvis - can do specific pelvic US if needed.   - CT Abdomen Pelvis w Contrast    Paroxysmal atrial fibrillation (H)  Labs ordered. Loop recorder in place.   - CBC with platelets and differential  - Comprehensive metabolic panel (BMP + Alb, Alk Phos, ALT, AST, Total. Bili, TP)    History of non anemic vitamin B12 deficiency  - Vitamin B12    Vitamin D deficiency  - Vitamin D Deficiency      25 minutes spent by me on the date of the encounter doing chart review, review of test results, interpretation of tests, patient visit, and documentation       Subjective   Morenita is a 78 year old, presenting for the following health issues:  Follow Up (Shingles)    -Got shingles end of march. Near eye followed with eye/derm. Looking for advice on immunization for shingles.     -Needs follow-up on pelvic cyst + menesteric cyst found on CT ABD/Pelvis last year.     -Requesting routine labs including Vitamin B12/Vitamin D.    -Looking into surgerons for her bunions/hammer toe.       History of Present Illness       Reason for visit:  Follow up on having had Shingles recently and also to inquire about getting lab work done to test immunity and  Symptom onset:  3-4 weeks ago  Symptoms include:  They are now improved of rash and eye involvement  Symptom intensity:  Moderate  Symptom progression:  Improving  Had these symptoms before:  No  What makes it worse:  N/A  What makes it better:  Took my anti viral and used eye ointment  prescribed by opthamology    She eats 2-3 servings of fruits and vegetables daily.She consumes 1 sweetened beverage(s) daily.She exercises with enough effort to increase her heart rate 10 to 19 minutes per day.  She exercises with enough effort to increase her heart rate 3 or less days per week.   She is taking medications regularly.         Review of Systems  Constitutional, neuro, ENT, endocrine, pulmonary, cardiac, gastrointestinal, genitourinary, musculoskeletal, integument and psychiatric systems are negative, except as otherwise noted.      Objective           Vitals:  No vitals were obtained today due to virtual visit.    Physical Exam   General: Alert and no distress //Respiratory: No audible wheeze, cough, or shortness of breath // Psychiatric:  Appropriate affect, tone, and pace of words        Phone call duration: 20 minutes    The likelihood of other entities in the differential is insufficient to justify any further testing for them at this time. This was explained to the patient. The patient was advised that persistent or worsening symptoms would require further evaluation. Patient advised to call the office and if unable to reach to go to the emergency room if they develop any new or worsening symptoms. Expressed understanding and agreement with above stated plan.     Signed Electronically by: Alexis De Leon PA-C

## 2024-04-15 DIAGNOSIS — R91.8 PULMONARY NODULES: Primary | ICD-10-CM

## 2024-04-19 ENCOUNTER — LAB (OUTPATIENT)
Dept: LAB | Facility: CLINIC | Age: 79
End: 2024-04-19
Payer: COMMERCIAL

## 2024-04-19 DIAGNOSIS — I48.0 PAROXYSMAL ATRIAL FIBRILLATION (H): ICD-10-CM

## 2024-04-19 DIAGNOSIS — Z86.39 HISTORY OF NON ANEMIC VITAMIN B12 DEFICIENCY: ICD-10-CM

## 2024-04-19 DIAGNOSIS — E55.9 VITAMIN D DEFICIENCY: ICD-10-CM

## 2024-04-19 LAB
BASOPHILS # BLD AUTO: 0.1 10E3/UL (ref 0–0.2)
BASOPHILS NFR BLD AUTO: 1 %
EOSINOPHIL # BLD AUTO: 0.2 10E3/UL (ref 0–0.7)
EOSINOPHIL NFR BLD AUTO: 3 %
ERYTHROCYTE [DISTWIDTH] IN BLOOD BY AUTOMATED COUNT: 14.6 % (ref 10–15)
HCT VFR BLD AUTO: 40.9 % (ref 35–47)
HGB BLD-MCNC: 13.3 G/DL (ref 11.7–15.7)
IMM GRANULOCYTES # BLD: 0 10E3/UL
IMM GRANULOCYTES NFR BLD: 0 %
LYMPHOCYTES # BLD AUTO: 1.1 10E3/UL (ref 0.8–5.3)
LYMPHOCYTES NFR BLD AUTO: 22 %
MCH RBC QN AUTO: 30.7 PG (ref 26.5–33)
MCHC RBC AUTO-ENTMCNC: 32.5 G/DL (ref 31.5–36.5)
MCV RBC AUTO: 95 FL (ref 78–100)
MONOCYTES # BLD AUTO: 0.4 10E3/UL (ref 0–1.3)
MONOCYTES NFR BLD AUTO: 7 %
NEUTROPHILS # BLD AUTO: 3.4 10E3/UL (ref 1.6–8.3)
NEUTROPHILS NFR BLD AUTO: 67 %
PLATELET # BLD AUTO: 269 10E3/UL (ref 150–450)
RBC # BLD AUTO: 4.33 10E6/UL (ref 3.8–5.2)
WBC # BLD AUTO: 5 10E3/UL (ref 4–11)

## 2024-04-19 PROCEDURE — 82607 VITAMIN B-12: CPT

## 2024-04-19 PROCEDURE — 82306 VITAMIN D 25 HYDROXY: CPT

## 2024-04-19 PROCEDURE — 36415 COLL VENOUS BLD VENIPUNCTURE: CPT

## 2024-04-19 PROCEDURE — 80053 COMPREHEN METABOLIC PANEL: CPT

## 2024-04-19 PROCEDURE — 85025 COMPLETE CBC W/AUTO DIFF WBC: CPT

## 2024-04-21 LAB
ALBUMIN SERPL BCG-MCNC: 4.6 G/DL (ref 3.5–5.2)
ALP SERPL-CCNC: 84 U/L (ref 40–150)
ALT SERPL W P-5'-P-CCNC: 17 U/L (ref 0–50)
ANION GAP SERPL CALCULATED.3IONS-SCNC: 8 MMOL/L (ref 7–15)
AST SERPL W P-5'-P-CCNC: 23 U/L (ref 0–45)
BILIRUB SERPL-MCNC: 0.6 MG/DL
BUN SERPL-MCNC: 12.4 MG/DL (ref 8–23)
CALCIUM SERPL-MCNC: 9.4 MG/DL (ref 8.8–10.2)
CHLORIDE SERPL-SCNC: 103 MMOL/L (ref 98–107)
CREAT SERPL-MCNC: 0.65 MG/DL (ref 0.51–0.95)
DEPRECATED HCO3 PLAS-SCNC: 27 MMOL/L (ref 22–29)
EGFRCR SERPLBLD CKD-EPI 2021: >90 ML/MIN/1.73M2
GLUCOSE SERPL-MCNC: 91 MG/DL (ref 70–99)
POTASSIUM SERPL-SCNC: 4.4 MMOL/L (ref 3.4–5.3)
PROT SERPL-MCNC: 7.1 G/DL (ref 6.4–8.3)
SODIUM SERPL-SCNC: 138 MMOL/L (ref 135–145)
VIT B12 SERPL-MCNC: 555 PG/ML (ref 232–1245)
VIT D+METAB SERPL-MCNC: 26 NG/ML (ref 20–50)

## 2024-04-23 ENCOUNTER — PRE VISIT (OUTPATIENT)
Dept: ORTHOPEDICS | Facility: CLINIC | Age: 79
End: 2024-04-23

## 2024-04-26 ENCOUNTER — ANCILLARY PROCEDURE (OUTPATIENT)
Dept: CT IMAGING | Facility: CLINIC | Age: 79
End: 2024-04-26
Attending: PHYSICIAN ASSISTANT
Payer: COMMERCIAL

## 2024-04-26 ENCOUNTER — ANCILLARY PROCEDURE (OUTPATIENT)
Dept: CT IMAGING | Facility: CLINIC | Age: 79
End: 2024-04-26
Attending: INTERNAL MEDICINE
Payer: COMMERCIAL

## 2024-04-26 DIAGNOSIS — K66.8 MESENTERIC CYST: ICD-10-CM

## 2024-04-26 DIAGNOSIS — N94.9 ADNEXAL CYST: ICD-10-CM

## 2024-04-26 DIAGNOSIS — R91.8 PULMONARY NODULES: ICD-10-CM

## 2024-04-26 PROCEDURE — 74177 CT ABD & PELVIS W/CONTRAST: CPT

## 2024-04-26 PROCEDURE — 250N000009 HC RX 250: Performed by: PHYSICIAN ASSISTANT

## 2024-04-26 PROCEDURE — 71250 CT THORAX DX C-: CPT

## 2024-04-26 PROCEDURE — 250N000011 HC RX IP 250 OP 636: Performed by: PHYSICIAN ASSISTANT

## 2024-04-26 RX ORDER — IOPAMIDOL 755 MG/ML
90 INJECTION, SOLUTION INTRAVASCULAR ONCE
Status: COMPLETED | OUTPATIENT
Start: 2024-04-26 | End: 2024-04-26

## 2024-04-26 RX ADMIN — SODIUM CHLORIDE 40 ML: 9 INJECTION, SOLUTION INTRAVENOUS at 10:18

## 2024-04-26 RX ADMIN — IOPAMIDOL 90 ML: 755 INJECTION, SOLUTION INTRAVENOUS at 10:18

## 2024-04-30 ENCOUNTER — MYC MEDICAL ADVICE (OUTPATIENT)
Dept: FAMILY MEDICINE | Facility: CLINIC | Age: 79
End: 2024-04-30

## 2024-04-30 ENCOUNTER — TELEPHONE (OUTPATIENT)
Dept: FAMILY MEDICINE | Facility: CLINIC | Age: 79
End: 2024-04-30

## 2024-04-30 DIAGNOSIS — M25.512 LEFT SHOULDER PAIN: Primary | ICD-10-CM

## 2024-04-30 NOTE — RESULT ENCOUNTER NOTE
Hi Marlen,    Looks like both the mesenteric cyst and the pelvic cyst have grown. You still have no pain from either correct?     I would recommend setting up with general surgery to evaluate the mesenteric cyst further. 6 cm is pretty big + it is growing.     Additionally, for a better a look at the pelvic cyst let's pursue a ultrasound.    [facility]: [phone]  Pelvic US: [phone]    Let me know if you have any questions or concerns,     Alexis De Leon PA-C  [facility]

## 2024-05-01 ENCOUNTER — TELEPHONE (OUTPATIENT)
Dept: FAMILY MEDICINE | Facility: CLINIC | Age: 79
End: 2024-05-01
Payer: COMMERCIAL

## 2024-05-01 DIAGNOSIS — N94.9 ADNEXAL CYST: Primary | ICD-10-CM

## 2024-05-01 NOTE — TELEPHONE ENCOUNTER
Pt requesting referral sent to pt OB/GYN clinic ([facility]) with in julia tavares or Dany who will perform the surgery. Pt spoke with that clinic this morning.    Referral may need to be faxed by team.      Dagmar Apple RN

## 2024-05-02 NOTE — TELEPHONE ENCOUNTER
LVM to set up Appt with pcp to further discuss MYC questions    Left call back number   Attempt #1    Nava Alvarez CMA on 5/2/2024 at 8:01 AM

## 2024-05-03 ENCOUNTER — MYC MEDICAL ADVICE (OUTPATIENT)
Dept: FAMILY MEDICINE | Facility: CLINIC | Age: 79
End: 2024-05-03
Payer: COMMERCIAL

## 2024-05-03 DIAGNOSIS — K66.8 MESENTERIC CYST: Primary | ICD-10-CM

## 2024-05-03 NOTE — TELEPHONE ENCOUNTER
RECORDS RECEIVED FROM: internal   REASON FOR VISIT: Cervical dystonia    PROVIDER: Dr. Saadia Cabral   DATE OF APPT: 6/6/24   NOTES (FOR ALL VISITS) STATUS DETAILS   OFFICE NOTE from referring provider Internal Dr Hong Hansen @ [facility] Neurology:  3/7/24  10/13/23  4/19/23  (Additional encounters)   OFFICE NOTE from other specialist Internal Dr Gladis Oliva @ [facility]:  8/29/18   DISCHARGE SUMMARY from hospital Internal [facility] [place]:  11/8/22-11/14/22   MEDICATION LIST Internal    IMAGING  (FOR ALL VISITS)     MRI (HEAD, NECK, SPINE) Internal [facility]:  MRI Brain 11/8/22   CT (HEAD, NECK, SPINE) Internal [facility]:  CTA Head Neck 11/8/22  T Head 11/8/22

## 2024-05-07 ENCOUNTER — HOSPITAL ENCOUNTER (OUTPATIENT)
Dept: ULTRASOUND IMAGING | Facility: CLINIC | Age: 79
Discharge: HOME | End: 2024-05-07
Attending: PHYSICIAN ASSISTANT | Admitting: PHYSICIAN ASSISTANT
Payer: COMMERCIAL

## 2024-05-07 ENCOUNTER — MYC MEDICAL ADVICE (OUTPATIENT)
Dept: FAMILY MEDICINE | Facility: CLINIC | Age: 79
End: 2024-05-07

## 2024-05-07 DIAGNOSIS — N94.9 ADNEXAL CYST: ICD-10-CM

## 2024-05-07 DIAGNOSIS — N83.8 OTHER NONINFLAMMATORY DISORDERS OF OVARY, FALLOPIAN TUBE AND BROAD LIGAMENT: ICD-10-CM

## 2024-05-07 PROCEDURE — 93976 VASCULAR STUDY: CPT | Mod: XU

## 2024-05-07 PROCEDURE — 76830 TRANSVAGINAL US NON-OB: CPT

## 2024-05-07 ASSESSMENT — PATIENT HEALTH QUESTIONNAIRE - PHQ9
10. IF YOU CHECKED OFF ANY PROBLEMS, HOW DIFFICULT HAVE THESE PROBLEMS MADE IT FOR YOU TO DO YOUR WORK, TAKE CARE OF THINGS AT HOME, OR GET ALONG WITH OTHER PEOPLE: SOMEWHAT DIFFICULT
SUM OF ALL RESPONSES TO PHQ QUESTIONS 1-9: 3
SUM OF ALL RESPONSES TO PHQ QUESTIONS 1-9: 3

## 2024-05-08 ENCOUNTER — VIRTUAL VISIT (OUTPATIENT)
Dept: FAMILY MEDICINE | Facility: CLINIC | Age: 79
End: 2024-05-08
Payer: COMMERCIAL

## 2024-05-08 DIAGNOSIS — K66.8 MESENTERIC CYST: ICD-10-CM

## 2024-05-08 DIAGNOSIS — N94.9 ADNEXAL CYST: Primary | ICD-10-CM

## 2024-05-08 PROCEDURE — 99442 PR PHYSICIAN TELEPHONE EVALUATION 11-20 MIN: CPT | Mod: 93 | Performed by: PHYSICIAN ASSISTANT

## 2024-05-08 NOTE — PROGRESS NOTES
Morenita is a 78 year old who is being evaluated via a billable telephone visit.    What phone number would you like to be contacted at? [phone]  How would you like to obtain your AVS? MyChart  Originating Location (pt. Location): Home    Distant Location (provider location):  On-site    Assessment & Plan     Adnexal cyst  Provided reassurance.  Following up with OB/GYN tomorrow which is the correct step.  Reviewed adnexal cysts.    Mesenteric cyst  Plans to make general surgery appointment.  Reviewed mesenteric cyst.    15 minutes spent by me on the date of the encounter doing chart review, review of test results, interpretation of tests, patient visit, and documentation       Subjective   Morenita is a 78 year old, presenting for the following health issues:  Results    Here today for follow-up on recent CT abdomen pelvis + ultrasound pelvis.    -Ultrasound confirms thin-walled simple left adnexal cyst.  Was 4 cm in 2020.  Now 6.5 x 6.5 x 4.9 cm.  Occasionally she notes pain?  Unsure if related however seeing OB/GYN tomorrow.    -Additionally, increased size of cystic lesion along the right central mesentery region.6.5 x 5.2 cm. Plans to follow-up with gen surgery.     -Planning for surgery with [facility].       History of Present Illness       Reason for visit:  Cysts and what is the plan in dealing with themStino consumes 1 sweetened beverage(s) daily.She exercises with enough effort to increase her heart rate 20 to 29 minutes per day.  She exercises with enough effort to increase her heart rate 3 or less days per week.   She is taking medications regularly.      Review of Systems  Constitutional, neuro, ENT, endocrine, pulmonary, cardiac, gastrointestinal, genitourinary, musculoskeletal, integument and psychiatric systems are negative, except as otherwise noted.      Objective           Vitals:  No vitals were obtained today due to virtual visit.    Physical Exam   General: Alert and no distress //Respiratory: No  audible wheeze, cough, or shortness of breath // Psychiatric:  Appropriate affect, tone, and pace of words    Phone call duration: 12 minutes    The likelihood of other entities in the differential is insufficient to justify any further testing for them at this time. This was explained to the patient. The patient was advised that persistent or worsening symptoms would require further evaluation. Patient advised to call the office and if unable to reach to go to the emergency room if they develop any new or worsening symptoms. Expressed understanding and agreement with above stated plan.     Signed Electronically by: Alexis De Leon PA-C

## 2024-05-09 ENCOUNTER — PATIENT OUTREACH (OUTPATIENT)
Dept: GERIATRIC MEDICINE | Facility: CLINIC | Age: 79
End: 2024-05-09
Payer: COMMERCIAL

## 2024-05-09 ENCOUNTER — LAB REQUISITION (OUTPATIENT)
Dept: LAB | Facility: CLINIC | Age: 79
End: 2024-05-09
Payer: COMMERCIAL

## 2024-05-09 DIAGNOSIS — N83.209 UNSPECIFIED OVARIAN CYST, UNSPECIFIED SIDE: ICD-10-CM

## 2024-05-09 PROCEDURE — 86304 IMMUNOASSAY TUMOR CA 125: CPT | Mod: ORL | Performed by: OBSTETRICS & GYNECOLOGY

## 2024-05-09 NOTE — PROGRESS NOTES
[facility] Care Coordination Contact    Submitted referrals/auths for hurrycane and Updated services in Database    Virginie Ha  [facility]  Case Management Specialist  [phone]

## 2024-05-10 LAB — CANCER AG125 SERPL-ACNC: 6 U/ML

## 2024-05-14 ENCOUNTER — PRE VISIT (OUTPATIENT)
Dept: PLASTIC SURGERY | Facility: CLINIC | Age: 79
End: 2024-05-14

## 2024-05-14 ENCOUNTER — OFFICE VISIT (OUTPATIENT)
Dept: PLASTIC SURGERY | Facility: CLINIC | Age: 79
End: 2024-05-14
Attending: PLASTIC SURGERY
Payer: COMMERCIAL

## 2024-05-14 ENCOUNTER — MYC MEDICAL ADVICE (OUTPATIENT)
Dept: FAMILY MEDICINE | Facility: CLINIC | Age: 79
End: 2024-05-14

## 2024-05-14 VITALS
BODY MASS INDEX: 24.04 KG/M2 | DIASTOLIC BLOOD PRESSURE: 76 MMHG | WEIGHT: 135.7 LBS | SYSTOLIC BLOOD PRESSURE: 142 MMHG | TEMPERATURE: 98 F | OXYGEN SATURATION: 97 % | HEIGHT: 63 IN | HEART RATE: 80 BPM

## 2024-05-14 DIAGNOSIS — Z90.12 S/P LEFT MASTECTOMY: Primary | ICD-10-CM

## 2024-05-14 PROCEDURE — G0463 HOSPITAL OUTPT CLINIC VISIT: HCPCS | Performed by: PLASTIC SURGERY

## 2024-05-14 PROCEDURE — 99205 OFFICE O/P NEW HI 60 MIN: CPT | Performed by: PLASTIC SURGERY

## 2024-05-14 ASSESSMENT — PAIN SCALES - GENERAL: PAINLEVEL: NO PAIN (0)

## 2024-05-14 NOTE — NURSING NOTE
"Oncology Rooming Note    May 14, 2024 10:00 AM   Morenita Moore is a 78 year old female who presents for:    Chief Complaint   Patient presents with    Oncology Clinic Visit     Breast augmentation      Initial Vitals: BP (!) 142/76 (BP Location: Right arm, Patient Position: Sitting, Cuff Size: Adult Regular)   Pulse 80   Temp 98  F (36.7  C) (Oral)   Ht 1.595 m (5' 2.8\")   Wt 61.6 kg (135 lb 11.2 oz)   SpO2 97%   BMI 24.20 kg/m   Estimated body mass index is 24.2 kg/m  as calculated from the following:    Height as of this encounter: 1.595 m (5' 2.8\").    Weight as of this encounter: 61.6 kg (135 lb 11.2 oz). Body surface area is 1.65 meters squared.  No Pain (0) Comment: Data Unavailable   No LMP recorded. Patient is postmenopausal.  Allergies reviewed: Yes  Medications reviewed: Yes    Medications: Medication refills not needed today.  Pharmacy name entered into [email or web address]: [id number] - [place], [place] - [facility] [address] AT [facility]    Frailty Screening:   Is the patient here for a new oncology consult visit in cancer care? 1. Yes. Over the past month, have you experienced difficulty or required a caregiver to assist with:   1. Balance, walking or general mobility (including any falls)? YES -balance has been off in the last 4 years- after infection from virus   2. Completion of self-care tasks such as bathing, dressing, toileting, grooming/hygiene?  NO  3. Concentration or memory that affects your daily life?  NO       Clinical concerns: would like reduction on right and more on the left      Nelia Sexton              "

## 2024-05-14 NOTE — LETTER
2024         RE: Morenita Moore  [address]  [place] [address]        Dear Colleague,    Thank you for referring your patient, Morenita Moore, to the [facility] BREAST [facility]. Please see a copy of my visit note below.    Referring Provider:  Referred Self, MD  No address on file     Primary Care Provider:  Alexis De Leon      RE: Morenitakiran Moore.  : 1945.  SUSANNE: 2024.    Reason for visit: Breast reconstruction    HPI: The patient has a history of left-sided mastectomy for DCIS in .  She did not get chemo or radiation.  Over the years has had no reconstruction to the left side.  Her right breast has never had surgery.  She wears about a B cup on the right.  The patient is interested in reconstruction of her left breast and symmetry enhancement to the right breast.  Her last mammogram on her right side was in 2023 and was read as BI-RADS 1.    Medical history:  Past Medical History:   Diagnosis Date    Anxiety     Arthritis 2016    in knees    Asthma     adult-onset asthma diagnosed in     Cancer (H)     DCIS (ductal carcinoma in situ)     stage 0 status post left mastectomy in     Depressive disorder     Heart disease  -    still feel pain w pericarditis.    PONV (postoperative nausea and vomiting)     Torticollis     Tremor        Surgical history:  Past Surgical History:   Procedure Laterality Date    BIOPSY  breasr right    BREAST SURGERY      CARDIAC SURGERY  May 2023    Ablation surgery at [facility] looking for faulty electrical nodes' not found by Dr. FARHANA Brooks    CATARACT IOL, RT/LT      COLONOSCOPY  2018    MASTECTOMY      DCIS    ODONTECTOMY Left 2022    Procedure: SURGICAL EXTRACTION, TOOTH - Teeth #12, 14, 19;  Surgeon: Arvin Garza DDS;  Location: [facility] OR    RETINAL REATTACHMENT         Family history:  Family History   Problem Relation Age of Onset    Cancer Sister          breast cancer    Breast Cancer Sister         estrogen use    Asthma Sister     Breast Cancer Sister     Asthma Sister     Heart Failure Mother          at 97    Breast Cancer Mother         diagnosed at 94    Colon Cancer Sister     Anesthesia Reaction Sister     Asthma Sister     Other Cancer Son         Testicular    Anxiety Disorder Sister     Asthma Sister     Glaucoma No family hx of     Macular Degeneration No family hx of        Medications:  Current Outpatient Medications   Medication Sig Dispense Refill    acetaminophen (TYLENOL) 325 MG tablet Take 2 tablets (650 mg) by mouth every 4 hours as needed for mild pain      albuterol (PROAIR HFA/PROVENTIL HFA/VENTOLIN HFA) 108 (90 Base) MCG/ACT inhaler Inhale 2 puffs into the lungs every 6 hours as needed for shortness of breath / dyspnea      Blood Pressure Monitor KIT 1 Device as needed (for home BP moniotoring) 1 kit 0    calcium carbonate-vitamin D 600-125 MG-UNIT TABS Take 1 tablet by mouth once a week      Cyanocobalamin 50 MCG TABS Take 50 mcg by mouth once a week Sundays      fluticasone-salmeterol (ADVAIR HFA) 115-21 MCG/ACT inhaler Inhale 2 puffs into the lungs 2 times daily 12 g 11    LORazepam (ATIVAN) 0.5 MG tablet Take 1 tablet (0.5 mg) by mouth 2 times daily as needed for anxiety 20 tablet 0    omeprazole (PRILOSEC) 20 MG DR capsule Take 1 capsule (20 mg) by mouth daily (Patient taking differently: Take 20 mg by mouth as needed) 90 capsule 1    ondansetron (ZOFRAN ODT) 4 MG ODT tab Take 1 tablet (4 mg) by mouth every 8 hours as needed for nausea 20 tablet 0    aspirin (ASA) 81 MG chewable tablet Take 81 mg by mouth twice a week         Allergies:  Allergies   Allergen Reactions    Levalbuterol Shortness Of Breath    Diltiazem Dizziness    Cats Other (See Comments)     Itchy eyes    Dust Mites      Other reaction(s): Wheezing  Wheezing/shortness/breath/see (IRP )    Qvar [Beclomethasone]      Per patient- allergic to QVAR    Amitriptyline  "Palpitations    Escitalopram Anxiety       Social history:   Social History     Tobacco Use    Smoking status: Former     Current packs/day: 0.00     Average packs/day: 1 pack/day for 9.0 years (9.0 ttl pk-yrs)     Types: Cigarettes     Start date: 1966     Quit date: 1975     Years since quittin.1    Smokeless tobacco: Never    Tobacco comments:     Quit in ..smoked again in  to   1/2 pk or less   Substance Use Topics    Alcohol use: Yes     Comment: Holiday glass of wine. summer glass of beer  once in awhile         Physical Examination:  BP (!) 142/76 (BP Location: Right arm, Patient Position: Sitting, Cuff Size: Adult Regular)   Pulse 80   Temp 98  F (36.7  C) (Oral)   Ht 1.595 m (5' 2.8\")   Wt 61.6 kg (135 lb 11.2 oz)   SpO2 97%   BMI 24.20 kg/m    Body mass index is 24.2 kg/m .    General: No acute distress.    Chest: Right breast has grade 2 ptosis.  Left breast has a mastectomy scar and flat chest.  Very mobile redundant skin.    Abdomen: Enough tissue to give her the size she wants and no hernias.        ASSESMENT and PLAN:     Based upon the above findings, a diagnosis of s/p left mastectomy here for reconstruction was made.  I had a marjorie, detailed discussion with the patient, in the presence of my nurse, who was present from beginning to end.  I discussed with the patient the pathophysiology behind the problem, the concept behind the procedure/treatment proposed, expectations of the planned procedure(s), and all gio-operative steps.     My biggest concern in this patient's history is her significant cardiac history and her age.  From a reconstructive standpoint the patient just wants to have a small breast mound that is symmetric.  I think technically the best course of action would be a 1 stage left-sided implant placement and right-sided reduction.  However she needs to be cleared from a cardiac and medical standpoint.  If cleared I am happy to proceed with this " procedure.  Overview of the surgery was given to her.  She understood that this would be a 1 stage in an effort to give her some form of symmetry.  Ideally it would take more than 1 stage but given her age and medical comorbidities I think less is more.  She understood.  Major risks were discussed and plan made.    All risks, benefits and alternatives, including but not limited to (what applies), pain, infection, bleeding, scarring, asymmetry, seromas, hematomas, wound breakdown, wound dehiscence, loss of the implants/flaps, abdominal wall-healing issues, abdominal wall weakness, bulges, hernias, sensation loss, requirement of further staged procedures, Implant specific issues and complications as discussed above, removal of infected or exposed implants, pneumothoraces, contour abnormalities, cannula injuries to deeper structures, hernias, fat necrosis, lumps and bumps, loss of grafted material, DVT, PE, MI, CVA, pneumonia, renal failure and death, were explained. They were understood and agreed upon by the patient, they were acknowledged by the patient, all the patient's questions were answered in detail to the patient's fullest understanding that they acknowledged, the team approach for treatment in the operating room was agreed upon by the patient, and proceeding with surgery was agreed upon by the patient.    I will see her back once she has gotten approval from her medical doctors.    All questions were answered. The patient was happy with the visit. I look forward to helping the patient out in the near future as indicated.       Total time spent in the encounter today including chart review, visit itself, and post-visit paperwork was 60 minutes.       Martín Wilson MD    Chief, Division of Plastic Surgery  Department of Surgery  [facility]      CC: Alexis De Leon

## 2024-05-14 NOTE — PROGRESS NOTES
Referring Provider:  Referred Self, MD  No address on file     Primary Care Provider:  Alexis De Leon      RE: Morenita Moore.  : 1945.  SUSANNE: 2024.    Reason for visit: Breast reconstruction    HPI: The patient has a history of left-sided mastectomy for DCIS in .  She did not get chemo or radiation.  Over the years has had no reconstruction to the left side.  Her right breast has never had surgery.  She wears about a B cup on the right.  The patient is interested in reconstruction of her left breast and symmetry enhancement to the right breast.  Her last mammogram on her right side was in 2023 and was read as BI-RADS 1.    Medical history:  Past Medical History:   Diagnosis Date    Anxiety     Arthritis 2016    in knees    Asthma     adult-onset asthma diagnosed in     Cancer (H)     DCIS (ductal carcinoma in situ)     stage 0 status post left mastectomy in     Depressive disorder     Heart disease  -    still feel pain w pericarditis.    PONV (postoperative nausea and vomiting)     Torticollis     Tremor        Surgical history:  Past Surgical History:   Procedure Laterality Date    BIOPSY  breasr right    BREAST SURGERY      CARDIAC SURGERY  May 2023    Ablation surgery at [facility] looking for faulty electrical nodes' not found by Dr. FARHANA Brooks    CATARACT IOL, RT/LT      COLONOSCOPY  2018    MASTECTOMY      DCIS    ODONTECTOMY Left 2022    Procedure: SURGICAL EXTRACTION, TOOTH - Teeth #12, 14, 19;  Surgeon: Arvin Garza DDS;  Location: [facility] OR    RETINAL REATTACHMENT         Family history:  Family History   Problem Relation Age of Onset    Cancer Sister         breast cancer    Breast Cancer Sister         estrogen use    Asthma Sister     Breast Cancer Sister     Asthma Sister     Heart Failure Mother          at 97    Breast Cancer Mother         diagnosed at 94    Colon Cancer Sister     Anesthesia Reaction Sister     Asthma Sister      Other Cancer Son         Testicular    Anxiety Disorder Sister     Asthma Sister     Glaucoma No family hx of     Macular Degeneration No family hx of        Medications:  Current Outpatient Medications   Medication Sig Dispense Refill    acetaminophen (TYLENOL) 325 MG tablet Take 2 tablets (650 mg) by mouth every 4 hours as needed for mild pain      albuterol (PROAIR HFA/PROVENTIL HFA/VENTOLIN HFA) 108 (90 Base) MCG/ACT inhaler Inhale 2 puffs into the lungs every 6 hours as needed for shortness of breath / dyspnea      Blood Pressure Monitor KIT 1 Device as needed (for home BP moniotoring) 1 kit 0    calcium carbonate-vitamin D 600-125 MG-UNIT TABS Take 1 tablet by mouth once a week      Cyanocobalamin 50 MCG TABS Take 50 mcg by mouth once a week Sundays      fluticasone-salmeterol (ADVAIR HFA) 115-21 MCG/ACT inhaler Inhale 2 puffs into the lungs 2 times daily 12 g 11    LORazepam (ATIVAN) 0.5 MG tablet Take 1 tablet (0.5 mg) by mouth 2 times daily as needed for anxiety 20 tablet 0    omeprazole (PRILOSEC) 20 MG DR capsule Take 1 capsule (20 mg) by mouth daily (Patient taking differently: Take 20 mg by mouth as needed) 90 capsule 1    ondansetron (ZOFRAN ODT) 4 MG ODT tab Take 1 tablet (4 mg) by mouth every 8 hours as needed for nausea 20 tablet 0    aspirin (ASA) 81 MG chewable tablet Take 81 mg by mouth twice a week         Allergies:  Allergies   Allergen Reactions    Levalbuterol Shortness Of Breath    Diltiazem Dizziness    Cats Other (See Comments)     Itchy eyes    Dust Mites      Other reaction(s): Wheezing  Wheezing/shortness/breath/see (IRP 6/1)    Qvar [Beclomethasone]      Per patient- allergic to QVAR    Amitriptyline Palpitations    Escitalopram Anxiety       Social history:   Social History     Tobacco Use    Smoking status: Former     Current packs/day: 0.00     Average packs/day: 1 pack/day for 9.0 years (9.0 ttl pk-yrs)     Types: Cigarettes     Start date: 4/5/1966     Quit date: 4/5/1975      "Years since quittin.1    Smokeless tobacco: Never    Tobacco comments:     Quit in ..smoked again in  to   1/2 pk or less   Substance Use Topics    Alcohol use: Yes     Comment: Holiday glass of wine. summer glass of beer  once in awhile         Physical Examination:  BP (!) 142/76 (BP Location: Right arm, Patient Position: Sitting, Cuff Size: Adult Regular)   Pulse 80   Temp 98  F (36.7  C) (Oral)   Ht 1.595 m (5' 2.8\")   Wt 61.6 kg (135 lb 11.2 oz)   SpO2 97%   BMI 24.20 kg/m    Body mass index is 24.2 kg/m .    General: No acute distress.    Chest: Right breast has grade 2 ptosis.  Left breast has a mastectomy scar and flat chest.  Very mobile redundant skin.    Abdomen: Enough tissue to give her the size she wants and no hernias.        ASSESMENT and PLAN:     Based upon the above findings, a diagnosis of s/p left mastectomy here for reconstruction was made.  I had a marjorie, detailed discussion with the patient, in the presence of my nurse, who was present from beginning to end.  I discussed with the patient the pathophysiology behind the problem, the concept behind the procedure/treatment proposed, expectations of the planned procedure(s), and all gio-operative steps.     My biggest concern in this patient's history is her significant cardiac history and her age.  From a reconstructive standpoint the patient just wants to have a small breast mound that is symmetric.  I think technically the best course of action would be a 1 stage left-sided implant placement and right-sided reduction.  However she needs to be cleared from a cardiac and medical standpoint.  If cleared I am happy to proceed with this procedure.  Overview of the surgery was given to her.  She understood that this would be a 1 stage in an effort to give her some form of symmetry.  Ideally it would take more than 1 stage but given her age and medical comorbidities I think less is more.  She understood.  Major risks were " discussed and plan made.    All risks, benefits and alternatives, including but not limited to (what applies), pain, infection, bleeding, scarring, asymmetry, seromas, hematomas, wound breakdown, wound dehiscence, loss of the implants/flaps, abdominal wall-healing issues, abdominal wall weakness, bulges, hernias, sensation loss, requirement of further staged procedures, Implant specific issues and complications as discussed above, removal of infected or exposed implants, pneumothoraces, contour abnormalities, cannula injuries to deeper structures, hernias, fat necrosis, lumps and bumps, loss of grafted material, DVT, PE, MI, CVA, pneumonia, renal failure and death, were explained. They were understood and agreed upon by the patient, they were acknowledged by the patient, all the patient's questions were answered in detail to the patient's fullest understanding that they acknowledged, the team approach for treatment in the operating room was agreed upon by the patient, and proceeding with surgery was agreed upon by the patient.    I will see her back once she has gotten approval from her medical doctors.    All questions were answered. The patient was happy with the visit. I look forward to helping the patient out in the near future as indicated.       Total time spent in the encounter today including chart review, visit itself, and post-visit paperwork was 60 minutes.       Martín Wilson MD    Chief, Division of Plastic Surgery  Department of Surgery  [facility]      CC: Referred Self, MD  No address on file  CC: Alexis De Leon

## 2024-05-15 ENCOUNTER — PRE VISIT (OUTPATIENT)
Dept: PLASTIC SURGERY | Facility: CLINIC | Age: 79
End: 2024-05-15

## 2024-05-15 NOTE — TELEPHONE ENCOUNTER
Spoke to patient  Per patient, at this time do not send any images/reports to [facility].  Doctor here will be doing surgery/care.  Patient will not be seeking care at [facility] at this time.    No action needed.    Loni Guardado RT (R)

## 2024-05-28 NOTE — PROGRESS NOTES
[facility] Care Coordination Contact    Order delivered from [facility] on 5/9/24 for linda. DME spreadsheet updated and CC notified.    Virginie Ha  [facility]  Case Management Specialist  [phone]

## 2024-06-04 ENCOUNTER — TELEPHONE (OUTPATIENT)
Dept: OBGYN | Facility: CLINIC | Age: 79
End: 2024-06-04

## 2024-06-04 ENCOUNTER — TELEPHONE (OUTPATIENT)
Dept: FAMILY MEDICINE | Facility: CLINIC | Age: 79
End: 2024-06-04

## 2024-06-04 DIAGNOSIS — N94.9 ADNEXAL CYST: Primary | ICD-10-CM

## 2024-06-04 DIAGNOSIS — K66.8 MESENTERIC CYST: ICD-10-CM

## 2024-06-04 NOTE — TELEPHONE ENCOUNTER
[facility] Call Center    Phone Message    May a detailed message be left on voicemail: yes     Reason for Call: Other: Pt is scheduled to see Dr. Berrios on 6/24 for an Adnexal cyst. She is wondering if this provider also would perform surgery or if she needs to schedule with someone else. Please advise and reach back out to pt.     Action Taken: Other: ump whs    Travel Screening: Not Applicable     Date of Service:

## 2024-06-04 NOTE — TELEPHONE ENCOUNTER
Writer called and spoke with the patient to try and help to answer her question. Patient stated she is just wondering if Dr. Berrios will be able to remove her cyst and if the cyst is attached to her mesentery would that require another surgeon. Morenita stated that she has been told to call many different places to only be told that she needs to see an OB first.     Writer went over with Morenita that our doctors do remove cyst on ovaries, but if it is attached to something else then our doctor would determine if she would be able to do the surgery.     Writer suggested to keep her appointment and to write down any questions she might have to ask.      All questions answered.

## 2024-06-04 NOTE — TELEPHONE ENCOUNTER
Order/Referral Request    Who is requesting: Pt    Orders being requested: Looking for the referral to the Gastro clinic    Reason service is needed/diagnosis: to schedule an appointment    When are orders needed by: ASAP    Has this been discussed with Provider: Yes    Does patient have a preference on a Group/Provider/Facility? [facility]    Does patient have an appointment scheduled?: No    Where to send orders: Place orders within Epic    Could we send this information to you in [facility] or would you prefer to receive a phone call?:   Patient would prefer a phone call   Okay to leave a detailed message?: Yes at Cell number on file:  Just let her know via [facility]  Telephone Information:   Mobile [phone]

## 2024-06-06 ENCOUNTER — PRE VISIT (OUTPATIENT)
Dept: NEUROLOGY | Facility: CLINIC | Age: 79
End: 2024-06-06

## 2024-06-06 NOTE — TELEPHONE ENCOUNTER
REFERRAL INFORMATION:  Referring Provider:  Alexis De Leon PA-C   Referring Clinic:  [facility]   Reason for Visit/Diagnosis: N94.9 (ICD-10-CM) - Adnexal cyst K66.8 (ICD-10-CM) - Mesenteric cyst       FUTURE VISIT INFORMATION:  Appointment Date: 6/26/24  Appointment Time: 8:30 AM      NOTES RECORD STATUS  DETAILS   OFFICE NOTE from Referring Provider Internal 5/8/24, 4/26/24, 4/12/24, 5/18/23 - [facility] OV with Alexis De Leon PA-C      OFFICE NOTE from Other Specialists Internal 3/16/23 - Cardio OV with Germain Parekh MD at [facility]      HOSPITAL DISCHARGE SUMMARY/ ED VISITS  Care Everywhere 3/6/24- [facility] ED visit with Mike Ivy MD    11/8/22-11/14/22 - [facility] ED to Hospital Admission with Bianca Nichols DO        PERTINENT LABS Internal 4/19/24   IMAGING (CT, MRI, US, XR)  PACS [facility]:  US Pelvis - 5/7/24    CT Abdomen Pelvis - 4/26/24    CT Chest - 4/26/24, 3/17/22    CT CAP - 11/8/22, 3/6/20    [facility]:  MR Cardiac Stress - 5/8/23    [facility]:   CT Abdomen Pelvis - 3/6/23     Records Requested    Facility  [facility]  Fax: [phone]    [facility]   Fax: [phone]   Outcome Urgent requests faxed to  and James to push images to PACS.     Update 6/7/24 1:23 PM - Images resolved in PACS - [facility]

## 2024-06-18 ENCOUNTER — VIRTUAL VISIT (OUTPATIENT)
Dept: FAMILY MEDICINE | Facility: CLINIC | Age: 79
End: 2024-06-18
Payer: COMMERCIAL

## 2024-06-18 DIAGNOSIS — K66.8 MESENTERIC CYST: ICD-10-CM

## 2024-06-18 DIAGNOSIS — F32.5 MAJOR DEPRESSION IN REMISSION (H): ICD-10-CM

## 2024-06-18 DIAGNOSIS — I48.0 PAROXYSMAL ATRIAL FIBRILLATION (H): ICD-10-CM

## 2024-06-18 DIAGNOSIS — N94.9 ADNEXAL CYST: Primary | ICD-10-CM

## 2024-06-18 PROBLEM — F13.20 BENZODIAZEPINE DEPENDENCE (H): Status: RESOLVED | Noted: 2022-06-15 | Resolved: 2024-06-18

## 2024-06-18 PROBLEM — M43.6 TORTICOLLIS: Status: RESOLVED | Noted: 2018-08-29 | Resolved: 2024-06-18

## 2024-06-18 PROBLEM — G24.3 CERVICAL DYSTONIA: Status: ACTIVE | Noted: 2024-06-18

## 2024-06-18 PROCEDURE — 99442 PR PHYSICIAN TELEPHONE EVALUATION 11-20 MIN: CPT | Mod: 93 | Performed by: PHYSICIAN ASSISTANT

## 2024-06-18 NOTE — PROGRESS NOTES
"Morenita is a 78 year old who is being evaluated via a billable telephone visit.    What phone number would you like to be contacted at?  See MyChart  How would you like to obtain your AVS? MyChart  Originating Location (pt. Location): Home    Distant Location (provider location):  On-site    Assessment & Plan     Adnexal cyst  Mesenteric cyst  Not causing issue at this time.  Upcoming surgical evaluation    Paroxysmal atrial fibrillation (H)  Well-managed.  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      15 minutes spent by me on the date of the encounter doing chart review, review of test results, interpretation of tests, patient visit, and documentation       Subjective   Morenita is a 78 year old, presenting for the following health issues:  Cyst (Questions regarding surgery )    Here today for follow-up.  Overall doing well.  Evaluation upcoming for adnexal/mesenteric cyst evaluation scheduled for 7/11 at [facility] with Dr. Wero Ferreira.     Additionally, plans undergo foot surgery at some point with [facility]    Continues to follow with neurology/cardiology.      History of Present Illness       Reason for visit:  Discuss seeing specialists for surgical advice on 2 internal Cysts    She eats 2-3 servings of fruits and vegetables daily.She consumes 1 sweetened beverage(s) daily.She exercises with enough effort to increase her heart rate 10 to 19 minutes per day.  She exercises with enough effort to increase her heart rate 3 or less days per week.   She is taking medications regularly.         Review of Systems  Constitutional, neuro, ENT, endocrine, pulmonary, cardiac, gastrointestinal, genitourinary, musculoskeletal, integument and psychiatric systems are negative, except as otherwise noted.      Objective    Vitals - Patient Reported  Weight (Patient Reported): 62.6 kg (138 lb)  Height (Patient Reported): 160 cm (5' 3\")  BMI (Based on Pt " Reported Ht/Wt): 24.45  Pain Score: Mild Pain (3)  Pain Loc: Abdomen    Physical Exam   General: Alert and no distress //Respiratory: No audible wheeze, cough, or shortness of breath // Psychiatric:  Appropriate affect, tone, and pace of words        Phone call duration: 15 minutes    The likelihood of other entities in the differential is insufficient to justify any further testing for them at this time. This was explained to the patient. The patient was advised that persistent or worsening symptoms would require further evaluation. Patient advised to call the office and if unable to reach to go to the emergency room if they develop any new or worsening symptoms. Expressed understanding and agreement with above stated plan.     Signed Electronically by: Alexis De Leon PA-C

## 2024-06-26 ENCOUNTER — PRE VISIT (OUTPATIENT)
Dept: SURGERY | Facility: CLINIC | Age: 79
End: 2024-06-26

## 2024-07-02 ENCOUNTER — PATIENT OUTREACH (OUTPATIENT)
Dept: PLASTIC SURGERY | Facility: CLINIC | Age: 79
End: 2024-07-02
Payer: COMMERCIAL

## 2024-07-02 NOTE — TELEPHONE ENCOUNTER
Pt called, she wants Dr Wilson to know that she is still very interested in pursuing breast surgery but she has 2 other surgeries that are needed first (a cyst, and hammer toes). She will be in touch with me as needed for future planning.

## 2024-07-10 ASSESSMENT — SLEEP AND FATIGUE QUESTIONNAIRES
HOW LIKELY ARE YOU TO NOD OFF OR FALL ASLEEP IN A CAR, WHILE STOPPED FOR A FEW MINUTES IN TRAFFIC: WOULD NEVER DOZE
HOW LIKELY ARE YOU TO NOD OFF OR FALL ASLEEP WHILE WATCHING TV: MODERATE CHANCE OF DOZING
HOW LIKELY ARE YOU TO NOD OFF OR FALL ASLEEP WHEN YOU ARE A PASSENGER IN A CAR FOR AN HOUR WITHOUT A BREAK: SLIGHT CHANCE OF DOZING
HOW LIKELY ARE YOU TO NOD OFF OR FALL ASLEEP WHILE SITTING AND READING: MODERATE CHANCE OF DOZING
HOW LIKELY ARE YOU TO NOD OFF OR FALL ASLEEP WHILE SITTING AND TALKING TO SOMEONE: WOULD NEVER DOZE
HOW LIKELY ARE YOU TO NOD OFF OR FALL ASLEEP WHILE SITTING QUIETLY AFTER LUNCH WITHOUT ALCOHOL: WOULD NEVER DOZE
HOW LIKELY ARE YOU TO NOD OFF OR FALL ASLEEP WHILE SITTING INACTIVE IN A PUBLIC PLACE: SLIGHT CHANCE OF DOZING
HOW LIKELY ARE YOU TO NOD OFF OR FALL ASLEEP WHILE LYING DOWN TO REST IN THE AFTERNOON WHEN CIRCUMSTANCES PERMIT: HIGH CHANCE OF DOZING

## 2024-07-16 ENCOUNTER — OFFICE VISIT (OUTPATIENT)
Dept: SLEEP MEDICINE | Facility: CLINIC | Age: 79
End: 2024-07-16
Payer: COMMERCIAL

## 2024-07-16 VITALS — HEIGHT: 63 IN | BODY MASS INDEX: 24.63 KG/M2 | WEIGHT: 139 LBS | HEART RATE: 81 BPM | OXYGEN SATURATION: 96 %

## 2024-07-16 DIAGNOSIS — Z72.821 INADEQUATE SLEEP HYGIENE: ICD-10-CM

## 2024-07-16 DIAGNOSIS — F41.9 ANXIETY AND DEPRESSION: ICD-10-CM

## 2024-07-16 DIAGNOSIS — R06.83 SNORING: ICD-10-CM

## 2024-07-16 DIAGNOSIS — G47.00 INSOMNIA, UNSPECIFIED TYPE: ICD-10-CM

## 2024-07-16 DIAGNOSIS — F51.04 CHRONIC INSOMNIA: ICD-10-CM

## 2024-07-16 DIAGNOSIS — R53.83 FATIGUE, UNSPECIFIED TYPE: ICD-10-CM

## 2024-07-16 DIAGNOSIS — G47.19 EXCESSIVE DAYTIME SLEEPINESS: ICD-10-CM

## 2024-07-16 DIAGNOSIS — F32.A ANXIETY AND DEPRESSION: ICD-10-CM

## 2024-07-16 DIAGNOSIS — G47.9 SLEEP DISTURBANCE: Primary | ICD-10-CM

## 2024-07-16 DIAGNOSIS — R35.1 NOCTURIA: ICD-10-CM

## 2024-07-16 DIAGNOSIS — G47.8 UNREFRESHED BY SLEEP: ICD-10-CM

## 2024-07-16 PROCEDURE — 99205 OFFICE O/P NEW HI 60 MIN: CPT | Performed by: INTERNAL MEDICINE

## 2024-07-16 RX ORDER — THIAMINE HCL 100 MG
1 TABLET ORAL
COMMUNITY

## 2024-07-16 RX ORDER — LORAZEPAM 0.5 MG/1
0.5 TABLET ORAL
COMMUNITY
End: 2024-08-02

## 2024-07-16 NOTE — PATIENT INSTRUCTIONS
"          MY TREATMENT INFORMATION FOR SLEEP APNEA-  Morenita Moore    DOCTOR : Angela Chamorro MD    Am I having a sleep study at a sleep center?  --->Due to normal delays, you will be contacted within 2-4 weeks to schedule    Frequently asked questions:  1. What is Obstructive Sleep Apnea (KARMEN)? KARMEN is the most common type of sleep apnea. Apnea means, \"without breath.\"  Apnea is most often caused by narrowing or collapse of the upper airway as muscles relax during sleep.   Almost everyone has occasional apneas. Most people with sleep apnea have had brief interruptions at night frequently for many years.  The severity of sleep apnea is related to how frequent and severe the events are.   2. What are the consequences of KARMEN? Symptoms include: feeling sleepy during the day, snoring loudly, gasping or stopping of breathing, trouble sleeping, and occasionally morning headaches or heartburn at night.  Sleepiness can be serious and even increase the risk of falling asleep while driving. Other health consequences may include development of high blood pressure and other cardiovascular disease in persons who are susceptible. Untreated KARMEN  can contribute to heart disease, stroke and diabetes.   3. What are the treatment options? In most situations, sleep apnea is a lifelong disease that must be managed with daily therapy. Medications are not effective for sleep apnea and surgery is generally not considered until other therapies have been tried. Your treatment is your choice . Continuous Positive Airway (CPAP) works right away and is the therapy that is effective in nearly everyone. An oral device to hold your jaw forward is usually the next most reliable option. Other options include postioning devices (to keep you off your back), weight loss, and surgery including a tongue pacing device. There is more detail about some of these options below.  4. Are my sleep studies covered by insurance? Although " we will request verification of coverage, we advise you also check in advance of the study to ensure there is coverage.    Important tips for those choosing CPAP and similar devices  REMEMBER-IF YOU RECEIVE A CALL FROM  [phone]-->IT IS TO SETUP A DEVICE  For new devices, sign up for device DARÍO to monitor your device for your followup visits  We encourage you to utilize the [employer] darío or website ( https://tastytrade.[employer] [place] [employer]/ ) to monitor your therapy progress and share the data with your healthcare team when you discuss your sleep apnea.                                                    Know your equipment:  CPAP is continuous positive airway pressure that prevents obstructive sleep apnea by keeping the throat from collapsing while you are sleeping. In most cases, the device is  smart  and can slowly self-adjusts if your throat collapses and keeps a record every day of how well you are treated-this information is available to you and your care team.  BPAP is bilevel positive airway pressure that keeps your throat open and also assists each breath with a pressure boost to maintain adequate breathing.  Special kinds of BPAP are used in patients who have inadequate breathing from lung or heart disease. In most cases, the device is  smart  and can slowly self-adjusts to assist breathing. Like CPAP, the device keeps a record of how well you are treated.  Your mask is your connection to the device. You get to choose what feels most comfortable and the staff will help to make sure if fits. Here: are some examples of the different masks that are available: Magnetic mask aids may assist with use but there are safety issues that should be addressed when considering with magnets* ( see end of discussion).       Key points to remember on your journey with sleep apnea:  Sleep study.  PAP devices often need to be adjusted during a sleep study to show that they are effective and adjusted right.  Good tips to remember:  Try wearing just the mask during a quiet time during the day so your body adapts to wearing it. A humidifier is recommended for comfort in most cases to prevent drying of your nose and throat. Allergy medication from your provider may help you if you are having nasal congestion.  Getting settled-in. It takes more than one night for most of us to get used to wearing a mask. Try wearing just the mask during a quiet time during the day so your body adapts to wearing it. A humidifier is recommended for comfort in most cases. Our team will work with you carefully on the first day and will be in contact within 4 days and again at 2 and 4 weeks for advice and remote device adjustments. Your therapy is evaluated by the device each day.   Use it every night. The more you are able to sleep naturally for 7-8 hours, the more likely you will have good sleep and to prevent health risks or symptoms from sleep apnea. Even if you use it 4 hours it helps. Occasionally all of us are unable to use a medical therapy, in sleep apnea, it is not dangerous to miss one night.   Communicate. Call our skilled team on the number provided on the first day if your visit for problems that make it difficult to wear the device. Over 2 out of 3 patients can learn to wear the device long-term with help from our team. Remember to call our team or your sleep providers if you are unable to wear the device as we may have other solutions for those who cannot adapt to mask CPAP therapy. It is recommended that you sleep your sleep provider within the first 3 months and yearly after that if you are not having problems.   Use it for your health. We encourage use of CPAP masks during daytime quiet periods to allow your face and brain to adapt to the sensation of CPAP so that it will be a more natural sensation to awaken to at night or during naps. This can be very useful during the first few weeks or months of adapting to CPAP though it does not help medically  to wear CPAP during wakefulness and  should not be used as a strategy just to meet guidelines.  Take care of your equipment. Make sure you clean your mask and tubing using directions every day and that your filter and mask are replaced as recommended or if they are not working.     *Masks with magnets:  Updated Contraindications  Masks with magnetic components are contraindicated for use by patients where they, or anyone in close physical contact while using the mask, have the following:   Active medical implants that interact with magnets (i.e., pacemakers, implantable cardioverter defibrillators (ICD), neurostimulators, cerebrospinal fluid (CSF) shunts, insulin/infusion pumps)   Metallic implants/objects containing ferromagnetic material (i.e., aneurysm clips/flow disruption devices, embolic coils, stents, valves, electrodes, implants to restore hearing or balance with implanted magnets, ocular implants, metallic splinters in the eye)  Updated Warning  Keep the mask magnets at a safe distance of at least 6 inches (150 mm) away from implants or medical devices that may be adversely affected by magnetic interference. This warning applies to you or anyone in close physical contact with your mask. The magnets are in the frame and lower headgear clips, with a magnetic field strength of up to 400mT. When worn, they connect to secure the mask but may inadvertently detach while asleep.  Implants/medical devices, including those listed within contraindications, may be adversely affected if they change function under external magnetic fields or contain ferromagnetic materials that attract/repel to magnetic fields (some metallic implants, e.g., contact lenses with metal, dental implants, metallic cranial plates, screws, nellie hole covers, and bone substitute devices). Consult your physician and  of your implant / other medical device for information on the potential adverse effects of magnetic fields.    BESIDES  CPAP, WHAT OTHER THERAPIES ARE THERE?    Positioning Device  Positioning devices are generally used when sleep apnea is mild and only occurs on your back.This example shows a pillow that straps around the waist. It may be appropriate for those whose sleep study shows milder sleep apnea that occurs primarily when lying flat on one's back. Preliminary studies have shown benefit but effectiveness at home may need to be verified by a home sleep test. These devices are generally not covered by medical insurance.  Examples of devices that maintain sleeping on the back to prevent snoring and mild sleep apnea.    Belt type body positioner  http://[employer]/    Electronic reminder  http://nightshifttherapy.com/            Oral Appliance  What is oral appliance therapy?  An oral appliance device fits on your teeth at night like a retainer used after having braces. The device is made by a specialized dentist and requires several visits over 1-2 months before a manufactured device is made to fit your teeth and is adjusted to prevent your sleep apnea. Once an oral device is working properly, snoring should be improved. A home sleep test may be recommended at that time if to determine whether the sleep apnea is adequately treated.       Some things to remember:  -Oral devices are often, but not always, covered by your medical insurance. Be sure to check with your insurance provider.   -If you are referred for oral therapy, you will be given a list of specialized dentists to consider or you may choose to visit the Web site of the American Academy of Dental Sleep Medicine  -Oral devices are less likely to work if you have severe sleep apnea or are extremely overweight.     More detailed information  An oral appliance is a small acrylic device that fits over the upper and lower teeth  (similar to a retainer or a mouth guard). This device slightly moves jaw forward, which moves the base of the tongue forward, opens the airway,  improves breathing for effective treat snoring and obstructive sleep apnea in perhaps 7 out of 10 people .  The best working devices are custom-made by a dental device  after a mold is made of the teeth 1, 2, 3.  When is an oral appliance indicated?  Oral appliance therapy is recommended as a first-line treatment for patients with primary snoring, mild sleep apnea, and for patients with moderate sleep apnea who prefer appliance therapy to use of CPAP4, 5. Severity of sleep apnea is determined by sleep testing and is based on the number of respiratory events per hour of sleep.   How successful is oral appliance therapy?  The success rate of oral appliance therapy in patients with mild sleep apnea is 75-80% while in patients with moderate sleep apnea it is 50-70%. The chance of success in patients with severe sleep apnea is 40-50%. The research also shows that oral appliances have a beneficial effect on the cardiovascular health of KARMEN patients at the same magnitude as CPAP therapy7.  Oral appliances should be a second-line treatment in cases of severe sleep apnea, but if not completely successful then a combination therapy utilizing CPAP plus oral appliance therapy may be effective. Oral appliances tend to be effective in a broad range of patients although studies show that the patients who have the highest success are females, younger patients, those with milder disease, and less severe obesity. 3, 6.   Finding a dentist that practices dental sleep medicine  Specific training is available through the American Academy of Dental Sleep Medicine for dentists interested in working in the field of sleep. To find a dentist who is educated in the field of sleep and the use of oral appliances, near you, visit the Web site of the American Academy of Dental Sleep Medicine.    References  1. roxane Larios al. Objectively measured vs self-reported compliance during oral appliance therapy for sleep-disordered  breathing. Chest 2013; 144(5): 2462-6453.  2. Cherry, et al. Objective measurement of compliance during oral appliance therapy for sleep-disordered breathing. Thorax 2013; 68(1): 91-96.  3. Mariola et al. Mandibular advancement devices in 620 men and women with KARMEN and snoring: tolerability and predictors of treatment success. Chest 2004; 125: 1267-9292.  4. Dariel, et al. Oral appliances for snoring and KARMEN: a review. Sleep 2006; 29: 244-262.  5. Isaac et al. Oral appliance treatment for KARMEN: an update. J Clin Sleep Med 2014; 10(2): 215-227.  6. Ruthann et al. Predictors of OSAH treatment outcome. J Dent Res 2007; 86: 6911-5036.      Weight Loss:   Your Body mass index is 24.78 kg/m .    Being overweight does not necessarily mean you will have health consequences.  Those who have BMI over 35 or over 27 with existing medical conditions carries greater risk.   Weight loss decreases severity of sleep apnea in most people with obesity. For those with mild obesity who have developed snoring with weight gain, even 15-30 pound weight loss can improve and occasionally milder eliminate sleep apnea.  Structured and life-long dietary and health habits are necessary to lose weight and keep healthier weight levels.     The Comprehensive Weight loss program offers all aspects of weight loss strategies including two Non-Surgical Weight Loss Programs: Medical Weight Management and our 24 Week Healthy Lifestyle Program:    Medical Weight Management: You will meet with a Medical Weight Management Provider, as well as a Registered Dietician. The program may include medication therapy, dietary education, recommended exercise and physical therapy programs, monthly support group meetings, and possible psychological counseling. Follow up visits with the provider or dietician are scheduled based on your progress and needs.    24 Week Healthy Lifestyle Program: This unique program is designed to give you the support of  weekly appointments and activities thru a 24-week period. It may include all of the components of the basic program (above), with the addition of 11 individual Health  Visits, 24-week access to the [employer] website for over 700 online classes, and monthly support group meetings. This program has an out-of-pocket expense of $499 to cover the items that can not be billed to insurance (health coaches and [employer] access), and is non-refundable/non-transferable (you may be able to use a Health Savings Account; ask your HSA provider). There may be an optional meal replacement plan prescribed as well.   Surgical management achieves meaningful long-term weight loss and improvement in health risks in most patients with more severe obesity.      Sleep Apnea Surgery:    Surgery for obstructive sleep apnea is considered generally only when other therapies fail to work. Surgery may be discussed with you if you are having a difficult time tolerating CPAP and or when there is an abnormal structure that requires surgical correction.  Nose and throat surgeries often enlarge the airway to prevent collapse.  Most of these surgeries create pain for 1-2 weeks and up to half of the most common surgeries are not effective throughout life.  You should carefully discuss the benefits and drawbacks to surgery with your sleep provider and surgeon to determine if it is the best solution for you.   More information  Surgery for KARMEN is directed at areas that are responsible for narrowing or complete obstruction of the airway during sleep.  There are a wide range of procedures available to enlarge and/or stabilize the airway to prevent blockage of breathing in the three major areas where it can occur: the palate, tongue, and nasal regions.  Successful surgical treatment depends on the accurate identification of the factors responsible for obstructive sleep apnea in each person.  A personalized approach is required because there is no  single treatment that works well for everyone.  Because of anatomic variation, consultation with an examination by a sleep surgeon is a critical first step in determining what surgical options are best for each patient.  In some cases, examination during sedation may be recommended in order to guide the selection of procedures.  Patients will be counseled about risks and benefits as well as the typical recovery course after surgery. Surgery is typically not a cure for a person s KARMEN.  However, surgery will often significantly improve one s KARMEN severity (termed  success rate ).  Even in the absence of a cure, surgery will decrease the cardiovascular risk associated with OSA7; improve overall quality of life8 (sleepiness, functionality, sleep quality, etc).      Palate Procedures:  Patients with KARMEN often have narrowing of their airway in the region of their tonsils and uvula.  The goals of palate procedures are to widen the airway in this region as well as to help the tissues resist collapse.  Modern palate procedure techniques focus on tissue conservation and soft tissue rearrangement, rather than tissue removal.  Often the uvula is preserved in this procedure. Residual sleep apnea is common in patient after pharyngoplasty with an average reduction in sleep apnea events of 33%2.      Tongue Procedures:  ExamWhile patients are awake, the muscles that surround the throat are active and keep this region open for breathing. These muscles relax during sleep, allowing the tongue and other structures to collapse and block breathing.  There are several different tongue procedures available.  Selection of a tongue base procedure depends on characteristics seen on physical exam.  Generally, procedures are aimed at removing bulky tissues in this area or preventing the back of the tongue from falling back during sleep.  Success rates for tongue surgery range from 50-62%3.    Hypoglossal Nerve Stimulation:  Hypoglossal nerve  stimulation has recently received approval from the [place] Food and Drug Administration for the treatment of obstructive sleep apnea.  This is based on research showing that the system was safe and effective in treating sleep apnea6.  Results showed that the median AHI score decreased 68%, from 29.3 to 9.0. This therapy uses an implant system that senses breathing patterns and delivers mild stimulation to airway muscles, which keeps the airway open during sleep.  The system consists of three fully implanted components: a small generator (similar in size to a pacemaker), a breathing sensor, and a stimulation lead.  Using a small handheld remote, a patient turns the therapy on before bed and off upon awakening.    Candidates for this device must be greater than 18 years of age, have moderate to severe obstructive sleep apnea with less than 25% central events  (AHI between 15-65), BMI less than 35, have tried CPAP/oral appliance for at least 8 weeks without success, and have appropriate upper airway anatomy (determined by a sleep endoscopy performed by Dr. Shine Baker or Dr. Naeem Raya).    Nasal Procedures:  Nasal obstruction can interfere with nasal breathing during the day and night.  Studies have shown that relief of nasal obstruction can improve the ability of some patients to tolerate positive airway pressure therapy for obstructive sleep apnea1.  Treatment options include medications such as nasal saline, topical corticosteroid and antihistamine sprays, and oral medications such as antihistamines or decongestants. Non-surgical treatments can include external nasal dilators for selected patients. If these are not successful by themselves, surgery can improve the nasal airway either alone or in combination with these other options.        Combination Procedures:  Combination of surgical procedures and other treatments may be recommended, particularly if patients have more than one area of narrowing or  persistent positional disease.  The success rate of combination surgery ranges from 66-80%2,3.    References  Garima ROE. The Role of the Nose in Snoring and Obstructive Sleep Apnoea: An Update.  Eur Arch Otorhinolaryngol. 2011; 268: 1365-73.   Rae SM; Claudia JA; Tej JR; Pallanch JF; Matt MB; Alicia SG; Nurys GUSTAFSON. Surgical modifications of the upper airway for obstructive sleep apnea in adults: a systematic review and meta-analysis. SLEEP 2010;33(10):1304-8565. Yolis PRESCOTT. Hypopharyngeal surgery in obstructive sleep apnea: an evidence-based medicine review.  Arch Otolaryngol Head Neck Surg. 2006 Feb;132(2):206-13.  Mario YH1, Alec Y, Ernie SHRUTI. The efficacy of anatomically based multilevel surgery for obstructive sleep apnea. Otolaryngol Head Neck Surg. 2003 Oct;129(4):327-35.  Yolis PRESCOTT, Goldberg A. Hypopharyngeal Surgery in Obstructive Sleep Apnea: An Evidence-Based Medicine Review. Arch Otolaryngol Head Neck Surg. 2006 Feb;132(2):206-13.  Renato PJ et al. Upper-Airway Stimulation for Obstructive Sleep Apnea.  N Engl J Med. 2014 Jan 9;370(2):139-49.  Demetrius Y et al. Increased Incidence of Cardiovascular Disease in Middle-aged Men with Obstructive Sleep Apnea. Am J Respir Crit Care Med; 2002 166: 159-165  Dwyer EM et al. Studying Life Effects and Effectiveness of Palatopharyngoplasty (SLEEP) study: Subjective Outcomes of Isolated Uvulopalatopharyngoplasty. Otolaryngol Head Neck Surg. 2011; 144: 623-631.        WHAT IF I ONLY HAVE SNORING?    Mandibular advancement devices, lateral sleep positioning, long-term weight loss and treatment of nasal allergies have been shown to improve snoring.  Exercising tongue muscles with a game (https://apps.[employer]/us/darío/soundly-reduce-snoring/ny6936521803) or stimulating the tongue during the day with a device (https://doi.org/10.3390/iff21314088) have improved snoring in some  individuals.  https://www.[employer]/  https://www.sleepfoundation.org/best-anti-snoring-mouthpieces-and-mouthguards    Remember to Drive Safe... Drive Alive     Sleep health profoundly affects your health, mood, and your safety.  Thirty three percent of the population (one in three of us) is not getting enough sleep and many have a sleep disorder. Not getting enough sleep or having an untreated / undertreated sleep condition may make us sleepy without even knowing it. In fact, our driving could be dramatically impaired due to our sleep health. As your provider, here are some things I would like you to know about driving:     Here are some warning signs for impairment and dangerous drowsy driving:              -Having been awake more than 16 hours               -Looking tired               -Eyelid drooping              -Head nodding (it could be too late at this point)              -Driving for more than 30 minutes     Some things you could do to make the driving safer if you are experiencing some drowsiness:              -Stop driving and rest              -Call for transportation              -Make sure your sleep disorder is adequately treated     Some things that have been shown NOT to work when experiencing drowsiness while driving:              -Turning on the radio              -Opening windows              -Eating any  distracting  /  entertaining  foods (e.g., sunflower seeds, candy, or any other)              -Talking on the phone      Your decision may not only impact your life, but also the life of others. Please, remember to drive safe for yourself and all of us.    Insomnia - Stimulus Control  When someone lays awake in bed over many nights, your body can actually learn to be awake in bed, mainly because that is what has happened so many times.  Your body has actually been  conditioned  or trained to be awake during the night because it has happened so often.  To break this habit you should try to  follow these steps to improve your insomnia:  Set a strict bedtime and rise time to keep every day of the week, including weekends  Go to bed at the set time, but only if you are sleepy (not tired or fatigued but drowsy)  Don t lay in bed for more than 15-30 minutes if you can t sleep.  Get up and go do something relaxing like reading or watching TV until you get drowsy again   Get up at the same time every day regardless of how much sleep you get  Use the bedroom only for sleep and sex.  Do NOT watch TV, read, use the computer, play on your cell phone or do work while in bed    Do not take naps during the daytime and avoid any situations where [id number] might get drowsy or fall asleep unintentionally especially in [email or web address] evening.

## 2024-07-16 NOTE — PROGRESS NOTES
Name: Morenita Moore MRN# [id number]   Age: 78 year old YOB: 1945     Date of Consultation: July 16, 2024  Consultation is requested by:    [address] [place], [place] [address]KENYON NOBLE [address] [address]    Primary care provider: Alexis De Leon       Reason for Sleep Consult:     Morenita Moore is sent by Hong Hansen MD    for a sleep consultation regarding obtaining evaluation for possible sleep disordered breathing.    Patient s Reason for visit  Morenita Moore main reason for visit: I have Nocturia; wake 2 to 3 timess per night to urinate. Sometimes I have trouble falling asleep.  Patient states problem(s) started: thinking back, probably 10 years ago. I've been told that I snore.  Morenita Moore's goals for this visit: See If there is any treatment for my condition.           Assessment and Plan:     Summary Sleep Diagnoses:  Snoring, Non-restorative sleep, fatigue and excessive daytime sleepiness.  STOP-BANG score 4 out of 8. Possible obstructive sleep apnea  Chronic insomnia-multifactorial -psychophysiological, anxiety, depression, possible untreated sleep disordered breathing  Inadequate sleep hygiene  Nocturia    Comorbid Diagnoses:  Depressive disorder,Cervical dystonia, history of stress-induced cardiomyopathy, essential hypertension, gastroesophageal reflux disease without esophagitis, mild persistent asthma without complication, generalized anxiety disorder, physiological tremor, DCIS status post left mastectomy, history of pericarditis    Summary Recommendations:  Recommended obtaining in-lab split-night sleep study (study will be split if the AHI is greater than 30 events per hour ) to evaluate for possible sleep disordered breathing.  Discussed pathophysiology and risks of untreated KARMEN.  We discussed the association of nocturia with KARMEN.  Information provided regarding treatment options for KARMEN.  Patient was amenable to treatment using CPAP device  Patient will follow up 3  "months after the sleep study. We will review results and initiate treatment (if indicated) over [facility] prior to the follow up.   With regards to the insomnia, we discussed stimulus control measures.  Information provided a summary.  Patient was not interested in cognitive behavioral therapy for insomnia.  We discussed the association of insomnia with anxiety and depression and recommended to follow-up with her primary care provider for optimizing the management of anxiety and depression.  We discussed optimizing sleep hygiene measures including avoiding activities such as  reading, watching television, using phone/computer/tablet or other electronics in bed including avoiding napping during the day.  Encouraged to follow healthy diet and regular exercise.  Patient was strongly advised to avoid driving, operating any heavy machinery or other hazardous situations while drowsy or sleepy.  Patient was counseled on the importance of driving while alert, to pull over if drowsy, or nap before getting into the vehicle if sleepy.       Orders Placed This Encounter   Procedures    Comprehensive Sleep Study       Summary Counseling:    Sleep Testing Reviewed  Obstructive Sleep Apnea Reviewed  Complications of Untreated Sleep Apnea Reviewed      Medical Decision-making:   Educational materials provided in instructions      CC: Hong Hansen MD     The above note was dictated using voice recognition software. Although reviewed after completion, some word and grammatical error may remain . Please contact the author for any clarifications.     \" Total time spent was 60 minutes for this appointment on this date of service which include time spent before, during and after the visit for chart review, patient care, counseling and coordination of care including documentation.\"      Angela Chamorro MD  [facility]  [address], [address], [place] [address]            History of " Present Illness:     Past Sleep Evaluations: No    SLEEP-WAKE SCHEDULE:     Work/School Days: Patient goes to school/work: No   Usually gets into bed at 10pm  Takes patient about 5-10 minutes  to fall asleep  Has trouble falling asleep about twice per month (Active mind, anxiety and asthma affects sleep)  Wakes up in the middle of the night 2 to 3 times per night   Wakes up due to Use the bathroom;Uncertain  She has trouble falling back asleep occasionally   It usually takes usually less than 5 mins to get back to sleep  Patient is usually up at about 630am to 7am  Uses alarm: No    Weekends/Non-work Days/All Other Days:  Usually gets into bed at 10pm   Takes patient about anywhere from 15 minutes to 2 hours to fall asleep  Patient is usually up at 7am  Uses alarm: No    She reports non restorative sleep. Reports fatigue and excessive daytime sleepiness sometimes needing a nap  towards afternoon.  Sleep Need  Patient gets  6 to 7 hours per night sleep on average   Patient thinks she needs about 8 hours sleep    Morenita Moore prefers to sleep in this position(s): Side   Patient states they do the following activities in bed: Read;Watch TV;Use phone, computer, or tablet    Naps  Patient takes a purposeful nap 5 days per week times a week and naps are usually half hour to 1 hour in duration  She feels better after a nap: Yes  She dozes off unintentionally once in awhile if watching something on tv or laptop days per week  Patient has had a driving accident or near-miss due to sleepiness/drowsiness: No      SLEEP DISRUPTIONS:    Breathing/Snoring  Patient snores:Yes  Other people complain about her snoring: Yes  Patient has been told she stops breathing in her sleep:No (sleeps alone)  Denies awakenings due to gasping for air or choking.  She has issues with the following: Morning mouth dryness;Getting up to urinate more than once    Movement:  Denies RLS, occasional leg cramps     Behaviors in Sleep:  There are no  reports of sleepwalking, sleep eating, sleep talking or dream enactment behavior.  She has experienced sudden muscle weakness during the day: No      Is there anything else you would like your sleep provider to know: I sleep alone now, but in past, snoring has been reported by others.        CAFFEINE AND OTHER SUBSTANCES:    Patient consumes caffeinated beverages per day:  only 1 cup of tea in am; sometimes a little more. NO caffeinated coffee.  Last caffeine use is usually: usually 11am; if later, that affects my being able to fall asleep.  List of any prescribed or over the counter stimulants that patient takes: none  List of any prescribed or over the counter sleep medication patient takes: none  List of previous sleep medications that patient has tried: klonpin  bad reaction; Im very sensitive to medications  Patient drinks alcohol to help them sleep: No  Patient drinks alcohol near bedtime: No    Family History:  Patient has a family member been diagnosed with a sleep disorder: Yes  sister, Amrita Cade  has trouble getting to sleep         SCALES:    EPWORTH SLEEPINESS SCALE         7/10/2024     2:45 PM    [place] Sleepiness Scale ( HATTIE Edwards  [id number]<br>ESS - [place]/English - Final version - 21 Nov 07 - [facility] Research Kathleen.)   Sitting and reading Moderate chance of dozing   Watching TV Moderate chance of dozing   Sitting, inactive in a public place (e.g. a theatre or a meeting) Slight chance of dozing   As a passenger in a car for an hour without a break Slight chance of dozing   Lying down to rest in the afternoon when circumstances permit High chance of dozing   Sitting and talking to someone Would never doze   Sitting quietly after a lunch without alcohol Would never doze   In a car, while stopped for a few minutes in traffic Would never doze   [place] Score (MC) 9   [place] Score (Sleep) 9         INSOMNIA SEVERITY INDEX (MARY)          7/10/2024     2:49 PM   Insomnia Severity Index (MARY)    Difficulty falling asleep 2   Difficulty staying asleep 2   Problems waking up too early 2   How SATISFIED/DISSATISFIED are you with your CURRENT sleep pattern? 3   How NOTICEABLE to others do you think your sleep problem is in terms of impairing the quality of your life? 1   How WORRIED/DISTRESSED are you about your current sleep problem? 2   To what extent do you consider your sleep problem to INTERFERE with your daily functioning (e.g. daytime fatigue, mood, ability to function at work/daily chores, concentration, memory, mood, etc.) CURRENTLY? 3   MARY Total Score 15       Guidelines for Scoring/Interpretation:  Total score categories:  0-7 = No clinically significant insomnia   8-14 = Subthreshold insomnia   15-21 = Clinical insomnia (moderate severity)  22-28 = Clinical insomnia (severe)  Used via courtesy of www.QuickSolarth.va.gov with permission from Devin Etienne PhD., [facility]      STOP BANG 4/8 7/16/2024     9:00 AM   STOP BANG Questionnaire (  2008, the American Society of Anesthesiologists, Inc. [employer].)   BMI Clinic: 24.78         GAD7        6/7/2022    11:32 AM   CELESTE-7    1. Feeling nervous, anxious, or on edge 1   2. Not being able to stop or control worrying 0   3. Worrying too much about different things 0   4. Trouble relaxing 0   5. Being so restless that it is hard to sit still 0   6. Becoming easily annoyed or irritable 1   7. Feeling afraid, as if something awful might happen 0   CELESTE-7 Total Score 2    2         CAGE-AID        9/28/2021     2:57 PM   CAGE-AID Flowsheet   Have you ever felt you should Cut down on your drinking or drug use?    Have people Annoyed you by criticizing your drinking or drug use?    Have you ever felt bad or Guilty about your drinking or drug use?    Have you ever had a drink or used drugs first thing in the morning to steady your nerves or to get rid of a hangover? (Eye opener)    CAGE-AID SCORE    Have you ever felt you  "should Cut down on your drinking or drug use?    Have people Annoyed you by criticizing your drinking or drug use?    Have you ever felt bad or Guilty about your drinking or drug use?    Have you ever had a drink or used drugs first thing in the morning to steady your nerves or to get rid of a hangover? (Eye opener)    CAGE-AID SCORE        Information is confidential and restricted. Go to Review Flowsheets to unlock data.       CAGE-AID reprinted with permission from the [place] Medical Journal, PHIL Delatorre. and DIANE Edge, \"Conjoint screening questionnaires for alcohol and drug abuse\" [place] Medical Journal 94: 135-140, 1995.      PATIENT HEALTH QUESTIONNAIRE-9 (PHQ - 9)        5/7/2024    12:14 PM   PHQ-9 ([employer])   1.  Little interest or pleasure in doing things 0   2.  Feeling down, depressed, or hopeless 0   3.  Trouble falling or staying asleep, or sleeping too much 2   4.  Feeling tired or having little energy 1   5.  Poor appetite or overeating 0   6.  Feeling bad about yourself - or that you are a failure or have let yourself or your family down 0   7.  Trouble concentrating on things, such as reading the newspaper or watching television 0   8.  Moving or speaking so slowly that other people could have noticed. Or the opposite - being so fidgety or restless that you have been moving around a lot more than usual 0   9.  Thoughts that you would be better off dead, or of hurting yourself in some way 0   PHQ-9 Total Score 3   6.  Feeling bad about yourself 0   7.  Trouble concentrating 0   8.  Moving slowly or restless 0   9.  Suicidal or self-harm thoughts 0   1.  Little interest or pleasure in doing things Not at all   2.  Feeling down, depressed, or hopeless Not at all   3.  Trouble falling or staying asleep, or sleeping too much More than half the days   4.  Feeling tired or having little energy Several days   5.  Poor appetite or overeating Not at all   6.  Feeling bad about yourself Not at all   7.  " Trouble concentrating Not at all   8.  Moving slowly or restless Not at all   9.  Suicidal or self-harm thoughts Not at all   PHQ-9 via [facility] TOTAL SCORE-----> 3 (Minimal depression)   Difficulty at work, home, or with people Somewhat difficult       Developed by Alexey Arias, Carissa Pink, Matthew Samaniego and colleagues, with an educational rodriguez from [employer]. No permission required to reproduce, translate, display or distribute.        Allergies:    Allergies   Allergen Reactions    Levalbuterol Shortness Of Breath    Diltiazem Dizziness    Cats Other (See Comments)     Itchy eyes    Dust Mites      Other reaction(s): Wheezing  Wheezing/shortness/breath/see (IRP 6/1)    Qvar [Beclomethasone]      Per patient- allergic to QVAR    Amitriptyline Palpitations    Escitalopram Anxiety       Medications:    Current Outpatient Medications   Medication Sig Dispense Refill    acetaminophen (TYLENOL) 325 MG tablet Take 2 tablets (650 mg) by mouth every 4 hours as needed for mild pain      albuterol (PROAIR HFA/PROVENTIL HFA/VENTOLIN HFA) 108 (90 Base) MCG/ACT inhaler Inhale 2 puffs into the lungs every 6 hours as needed for shortness of breath / dyspnea      Blood Pressure Monitor KIT 1 Device as needed (for home BP moniotoring) 1 kit 0    calcium carbonate-vitamin D 600-125 MG-UNIT TABS Take 1 tablet by mouth once a week      calcium citrate-vitamin D (CALCIUM CITRATE-VITAMIN D3) 315-6.25 MG-MCG TABS per tablet Take 1 tablet by mouth      Cyanocobalamin 50 MCG TABS Take 50 mcg by mouth once a week Sundays      fluticasone-salmeterol (ADVAIR HFA) 115-21 MCG/ACT inhaler Inhale 2 puffs into the lungs 2 times daily 12 g 11    LORazepam (ATIVAN) 0.5 MG tablet Take 0.5 mg by mouth         Problem List:  Patient Active Problem List    Diagnosis Date Noted    Cervical dystonia 06/18/2024     Priority: Medium     1985      Stress-induced cardiomyopathy 01/16/2023     Priority: Medium    Seizure (H) 11/08/2022      Priority: Medium    Mild persistent asthma without complication 06/15/2022     Priority: Medium    Essential hypertension 08/10/2021     Priority: Medium    Dizziness 08/10/2021     Priority: Medium    Primary osteoarthritis of both knees 09/21/2020     Priority: Medium    Gastroesophageal reflux disease without esophagitis 08/18/2020     Priority: Medium    CELESTE (generalized anxiety disorder) 12/26/2019     Priority: Medium    Pericarditis 12/18/2019     Priority: Medium    Tremor, physiological 01/01/2019     Priority: Medium     Formatting of this note might be different from the original.  [facility]. rec clonazepam 0.5 mg nightly and botox for torticollis. FU 3-4 mos  Formatting of this note might be different from the original.  ingrid Deras [facility]. rec clonazepam 0.5 mg nightly and botox for torticollis. FU 3-4 mos      Acute internal derangement of left knee 01/03/2018     Priority: Medium    Major depression in remission (H24) 01/09/2017     Priority: Medium    Asthma in adult 12/15/2015     Priority: Medium     Jan 2003      Breast cancer in situ 12/15/2015     Priority: Medium     Overview:   S/p mastectomy      SVT (supraventricular tachycardia) (H24) 12/15/2015     Priority: Medium    Presbyopia 07/16/2009     Priority: Medium    Senile nuclear sclerosis 07/16/2009     Priority: Medium     Formatting of this note might be different from the original.  Senile Nuclear Sclerosis L>R       Past Medical/Surgical History:  Past Medical History:   Diagnosis Date    Anxiety     Arthritis 2016    in knees    Asthma 2012    adult-onset asthma diagnosed in 2012    Cancer (H)     DCIS (ductal carcinoma in situ) 2001    stage 0 status post left mastectomy in 2001    Depressive disorder     Heart disease 2018 -2021    still feel pain w pericarditis.    PONV (postoperative nausea and vomiting)     Torticollis     Tremor      Past Surgical History:   Procedure Laterality Date     BIOPSY  breasr right    BREAST SURGERY      CARDIAC SURGERY  May 2023    Ablation surgery at [facility] looking for faulty electrical nodes' not found by Dr. FARHANA Brooks    CATARACT IOL, RT/LT      COLONOSCOPY  2018    MASTECTOMY      DCIS    ODONTECTOMY Left 2022    Procedure: SURGICAL EXTRACTION, TOOTH - Teeth #12, 14, 19;  Surgeon: Arvin Garza DDS;  Location: [facility] OR    RETINAL REATTACHMENT         Social History:  Social History     Socioeconomic History    Marital status: Single     Spouse name: Not on file    Number of children: Not on file    Years of education: Not on file    Highest education level: Not on file   Occupational History    Not on file   Tobacco Use    Smoking status: Former     Current packs/day: 0.00     Average packs/day: 1 pack/day for 9.0 years (9.0 ttl pk-yrs)     Types: Cigarettes     Start date: 1966     Quit date: 1975     Years since quittin.3    Smokeless tobacco: Never    Tobacco comments:     Quit in ..smoked again in  to   1/2 pk or less   Vaping Use    Vaping status: Never Used   Substance and Sexual Activity    Alcohol use: Yes     Comment: Holiday glass of wine. summer glass of beer  once in awhile    Drug use: Never    Sexual activity: Not Currently     Partners: Male     Birth control/protection: None     Comment: menopause   Other Topics Concern    Parent/sibling w/ CABG, MI or angioplasty before 65F 55M? No   Social History Narrative    Not on file     Social Determinants of Health     Financial Resource Strain: Low Risk  (2023)    Financial Resource Strain     Within the past 12 months, have you or your family members you live with been unable to get utilities (heat, electricity) when it was really needed?: No   Food Insecurity: Low Risk  (2023)    Food Insecurity     Within the past 12 months, did you worry that your food would run out before you got money to buy more?: No     Within the past 12 months, did the food  you bought just not last and you didn t have money to get more?: No   Transportation Needs: Low Risk  (2023)    Transportation Needs     Within the past 12 months, has lack of transportation kept you from medical appointments, getting your medicines, non-medical meetings or appointments, work, or from getting things that you need?: No   Physical Activity: Not on file   Stress: Not on file   Social Connections: Unknown (3/30/2023)    Received from [facility] [facility]    Social Connections     Frequency of Communication with Friends and Family: Not on file   Interpersonal Safety: Low Risk  (2024)    Interpersonal Safety     Do you feel physically and emotionally safe where you currently live?: Yes     Within the past 12 months, have you been hit, slapped, kicked or otherwise physically hurt by someone?: No     Within the past 12 months, have you been humiliated or emotionally abused in other ways by your partner or ex-partner?: No   Housing Stability: Low Risk  (2023)    Housing Stability     Do you have housing? : Yes     Are you worried about losing your housing?: No       Family History:  Family History   Problem Relation Age of Onset    Cancer Sister         breast cancer    Breast Cancer Sister         estrogen use    Asthma Sister     Breast Cancer Sister     Asthma Sister     Heart Failure Mother          at 97    Breast Cancer Mother         diagnosed at 94    Colon Cancer Sister     Anesthesia Reaction Sister     Asthma Sister     Other Cancer Son         Testicular    Anxiety Disorder Sister     Asthma Sister     Glaucoma No family hx of     Macular Degeneration No family hx of        Review of Systems:  A complete review of systems reviewed by me is negative with the exeption of what has been mentioned in the history of present illness.  In the last TWO WEEKS have you experienced any of the following  "symptoms?  Fevers: No  Night Sweats: No  Weight Gain: No  Pain at Night: No  Double Vision: No  Changes in Vision: No  Difficulty Breathing through Nose: No  Sore Throat in Morning: No  Dry Mouth in the Morning: Yes  Shortness of Breath Lying Flat: No  Shortness of Breath With Activity: Yes  Awakening with Shortness of Breath: No  Increased Cough: No  Heart Racing at Night: No  Swelling in Feet or Legs: No  Diarrhea at Night: No  Heartburn at Night: No  Urinating More than Once at Night: Yes  Losing Control of Urine at Night: No  Joint Pains at Night: Yes  Headaches in Morning: No  Weakness in Arms or Legs: No  Depressed Mood: Yes; denies suicidal ideations or thoughts of harming others  Anxiety: Yes     Physical Examination:  Vitals: Pulse 81   Ht 1.595 m (5' 2.8\")   Wt 63 kg (139 lb)   SpO2 96%   BMI 24.78 kg/m    BMI= Body mass index is 24.78 kg/m .     General: No apparent distress, appropriately groomed  Head: Normocephalic, atraumatic  Nose, mouth and throat: Patent airflow through both the nares  Oropharynx: Mallampati Class: IV.  Low draping soft palate  Tonsillar Stage: Not visualized  Neck:Circumference: <16  inches  Chest: Lungs clear to auscultation - no rales, rhonchi or wheezes  CV: regular rates and rhythm, normal S1 S2, no S3 or S4, and no murmurs  Psych: coherent speech, normal rate and volume, able to articulate logical thoughts, able   to abstract reason, no tangential thoughts, no hallucinations   or delusions.   Her affect is normal  Neuro:  Mental status: Alert and  Oriented X 3  Speech: normal          Data: All pertinent previous laboratory data reviewed     Recent Labs   Lab Test 04/19/24  1015 09/20/23  1055    140   POTASSIUM 4.4 4.3   CHLORIDE 103 103   CO2 27 26   ANIONGAP 8 11   GLC 91 95   BUN 12.4 12.1   CR 0.65 0.61   MARIBEL 9.4 10.3*       Recent Labs   Lab Test 04/19/24  1015   WBC 5.0   RBC 4.33   HGB 13.3   HCT 40.9   MCV 95   MCH 30.7   MCHC 32.5   RDW 14.6    " "      Recent Labs   Lab Test 04/19/24  1015   PROTTOTAL 7.1   ALBUMIN 4.6   BILITOTAL 0.6   ALKPHOS 84   AST 23   ALT 17       TSH   Date Value   09/20/2023 2.33 uIU/mL   11/08/2022 3.50 mU/L   06/15/2020 1.79 uIU/mL   11/10/2015 2.39 mU/L       Amphetamines Urine (no units)   Date Value   11/08/2022 Screen Negative     Barbiturates Urine (no units)   Date Value   11/08/2022 Screen Negative     Cannabinoids Urine (no units)   Date Value   11/08/2022 Screen Negative     Cocaine Urine (no units)   Date Value   11/08/2022 Screen Negative     Opiates Urine (no units)   Date Value   11/08/2022 Screen Negative     PCP Urine (no units)   Date Value   11/08/2022 Screen Negative       No results found for: \"IRONSAT\", \"[id number]\", \"MIRIAM\"    pH Venous POCT (no units)   Date Value   11/08/2022 7.02 (LL)       Echocardiography:   Study Date: 12/30/2022   Interpretation Summary  Left ventricular systolic function is normal.  The visual ejection fraction is 60-65%.  No regional wall motion abnormalities noted.  The right ventricle is normal in size and function.  No significant valve disease.  Trivial to small anterior pericardial effusion.  No echocardiographic evidence of ventricular interdependence or tamponade.  Normal inferior vena cava.  Hepatic cysts noted.    Chest x-ray:   No results found for this or any previous visit from the past 365 days.      Chest CT:   CT Chest w/o Contrast 04/26/2024    Narrative  EXAM: CT CHEST W/O CONTRAST  LOCATION: [facility]  DATE: 4/26/2024    INDICATION: follow up of pulmonary nodules.  COMPARISON: CT chest, abdomen and pelvis performed on 11/08/2022  TECHNIQUE: CT chest without IV contrast. Multiplanar reformats were obtained. Dose reduction techniques were used.  CONTRAST: None.    FINDINGS:  LUNGS AND PLEURA: No pleural effusion or pneumothorax is seen. Subsegmental atelectasis is seen in the lower lobes. 2 mm nodule in the left lower lobe was not seen previously " (series 4, image 238). Stable cavitary nodule in the right lower lobe measuring  3 mm (series 4, image 172). Other pulmonary nodules are also unchanged measuring up to 5 mm in the left upper lobe (series 4, image 89).    MEDIASTINUM/AXILLAE: No lymphadenopathy. No thoracic aortic aneurysm. Scattered vascular calcifications are seen in the thoracic aorta.    CORONARY ARTERY CALCIFICATION: Mild.    UPPER ABDOMEN: Small hiatal hernia is present. Partially seen hepatic cysts.    MUSCULOSKELETAL: Multilevel degenerative changes are seen in the spine. Age-indeterminate mild compression deformity seen at the T12 vertebral level.    Impression  IMPRESSION:  1.  2 mm pulmonary nodule in the left lower lobe was not seen previously. Other nodules including a 4 mm cavitary nodule in the right lower lobe appear unchanged. Suggest follow-up per guidelines below.  2.  Age indeterminate compression deformity seen at the T12 vertebral level.    REFERENCE:  Guidelines for Management of Incidental Pulmonary Nodules Detected on CT Images: From the Fleischner Society 2017.  Guidelines apply to incidental nodules in patients who are 35 years or older.  Guidelines do not apply to lung cancer screening, patients with immunosuppression, or patients with known primary cancer.    MULTIPLE NODULES  Nodule size <6 mm  Low-risk patients: No follow-up needed.  High-risk patients: Optional follow-up at 12 months.    Nodule size 6 mm or larger  Low-risk patients: Follow-up CT at 3-6 months, then consider CT at 18-24 months.  High-risk patients: Follow-up CT at 3-6 months, then at 18-24 months if no change.  -Use most suspicious nodule as guide to management.      PFT: Most Recent [place] Pulmonary Function Testing    FVC-Pred   Date Value Ref Range Status   03/18/2022 2.82 L      FVC-Pre   Date Value Ref Range Status   03/18/2022 2.40 L      FVC-%Pred-Pre   Date Value Ref Range Status   03/18/2022 84 %      FEV1-Pre   Date Value Ref Range Status  "  03/18/2022 1.53 L      FEV1-%Pred-Pre   Date Value Ref Range Status   03/18/2022 70 %      FEV1FVC-Pred   Date Value Ref Range Status   03/18/2022 78 %      FEV1FVC-Pre   Date Value Ref Range Status   03/18/2022 64 %      No results found for: \"20029\"  FEFMax-Pred   Date Value Ref Range Status   03/18/2022 5.48 L/sec      FEFMax-Pre   Date Value Ref Range Status   03/18/2022 5.47 L/sec      FEFMax-%Pred-Pre   Date Value Ref Range Status   03/18/2022 99 %      ExpTime-Pre   Date Value Ref Range Status   03/18/2022 13.76 sec      FIFMax-Pre   Date Value Ref Range Status   03/18/2022 4.77 L/sec      FEV1FEV6-Pred   Date Value Ref Range Status   03/18/2022 79 %      FEV1FEV6-Pre   Date Value Ref Range Status   03/18/2022 67 %        Angela Chamorro MD 7/16/2024           "

## 2024-07-19 ENCOUNTER — TELEPHONE (OUTPATIENT)
Dept: SLEEP MEDICINE | Facility: CLINIC | Age: 79
End: 2024-07-19
Payer: COMMERCIAL

## 2024-07-19 NOTE — TELEPHONE ENCOUNTER
General Call      Reason for Call:  Patient states her after visit summary from office visit 7/16 didn't print up do to a printer issue. She is requesting for us to reprint the summary and mail her a copy.    Date of last appointment with provider: 07/16/24 Dr. Villarreal    Could we send this information to you in [email or web address] or would you prefer to receive a phone call?:   No preference   Okay to leave a detailed message?: Yes at Cell number on file:    Telephone Information:   Mobile [phone]     Brandy Tuttle   [facility]  Central Scheduler

## 2024-07-23 ENCOUNTER — PATIENT OUTREACH (OUTPATIENT)
Dept: GERIATRIC MEDICINE | Facility: CLINIC | Age: 79
End: 2024-07-23
Payer: COMMERCIAL

## 2024-07-23 NOTE — PROGRESS NOTES
[facility] Care Coordination Contact    Submitted referrals/auths for grocery cart and Updated services in Database    Virginie Ha  [facility]  Case Management Specialist  [phone]

## 2024-07-30 ENCOUNTER — TELEPHONE (OUTPATIENT)
Dept: SLEEP MEDICINE | Facility: CLINIC | Age: 79
End: 2024-07-30

## 2024-07-30 ENCOUNTER — TELEPHONE (OUTPATIENT)
Dept: FAMILY MEDICINE | Facility: CLINIC | Age: 79
End: 2024-07-30

## 2024-07-30 ENCOUNTER — NURSE TRIAGE (OUTPATIENT)
Dept: FAMILY MEDICINE | Facility: CLINIC | Age: 79
End: 2024-07-30

## 2024-07-30 NOTE — TELEPHONE ENCOUNTER
"Alexis/Louann, please review.    Pt had to end call due to other plans (pt's birthday today), but assisted with scheduling the following:    Jul 30, 2024 4:00 PM  (Arrive by 3:40 PM)  Provider Visit with Louann Hodge PA-C  [facility] ([facility] ) [phone]     ~~~~~~~~~~~~~~~~~~~~~~~~~~~~~~~~~~~~~~~    1. CONCERN: \"What happened that made you call today?\"      Feeling down, not sleeping, questions and concerns about upcoming procedures  2. DEPRESSION SYMPTOM SCREENING: \"How are you feeling overall?\" (e.g., decreased energy, increased sleeping or difficulty sleeping, difficulty concentrating, feelings of sadness, guilt, hopelessness, or worthlessness)      'blue and overwhelmed', frustrated by roadblocks, medical appts stress  3. RISK OF HARM - SUICIDAL IDEATION:  \"Do you ever have thoughts of hurting or killing yourself?\"  (e.g., yes, no, no but preoccupation with thoughts about death)    - INTENT:  \"Do you have thoughts of hurting or killing yourself right NOW?\" (e.g., yes, no, N/A)    - PLAN: \"Do you have a specific plan for how you would do this?\" (e.g., gun, knife, overdose, no plan, N/A)      No  4. RISK OF HARM - HOMICIDAL IDEATION:  \"Do you ever have thoughts of hurting or killing someone else?\"  (e.g., yes, no, no but preoccupation with thoughts about death)    - INTENT:  \"Do you have thoughts of hurting or killing someone right NOW?\" (e.g., yes, no, N/A)    - PLAN: \"Do you have a specific plan for how you would do this?\" (e.g., gun, knife, no plan, N/A)       No  5. FUNCTIONAL IMPAIRMENT: \"How have things been going for you overall? Have you had more difficulty than usual doing your normal daily activities?\"  (e.g., better, same, worse; self-care, school, work, interactions)      Haven't been sleeping well, still social and completing tasks  6. SUPPORT: \"Who is with you now?\" \"Who do you live with?\" \"Do you have family or friends who you can " "talk to?\"       No  7. THERAPIST: \"Do you have a counselor or therapist? Name?\"      Signed up for counseling in mid-August  8. STRESSORS: \"Has there been any new stress or recent changes in your life?\"      Medical appointments  9. ALCOHOL USE OR SUBSTANCE USE (DRUG USE): \"Do you drink alcohol or use any illegal drugs?\"      Denies  10. OTHER: \"Do you have any other physical symptoms right now?\" (e.g., fever)        Limited sleep      Reason for Disposition   Depression is worsening (e.g.,sleeping poorly, less able to do activities of daily living)    Additional Information   Negative: Patient attempted suicide   Negative: Patient is threatening suicide now   Negative: Violent behavior, or threatening to physically hurt or kill someone   Negative: Patient is very confused (disoriented, slurred speech) and no other adult (e.g., friend or family member) available   Negative: Difficult to awaken or acting very confused (disoriented, slurred speech) and new-onset   Negative: Sounds like a life-threatening emergency to the triager   Negative: Suicide thoughts, threats, attempts, or questions   Negative: Questions or concerns about alcohol use, unhealthy alcohol use, binge drinking, intoxication, or withdrawal   Negative: Questions or concerns about substance use (drug use), unhealthy drug use, intoxication, or withdrawal   Negative: Anxiety is main problem or symptom   Negative: Depression and unable to do any of normal activities (e.g., self care, school, work; in comparison to baseline).   Negative: Very strange or confused behavior   Negative: Patient sounds very sick or weak to the triager   Negative: Fever > 101 F  (38.3 C)   Negative: Sometimes has thoughts of suicide   Negative: Symptoms interfere with work or school    Protocols used: Depression-A-[place]    Yoli Seals RN    "

## 2024-07-30 NOTE — TELEPHONE ENCOUNTER
Reason for Call:  Appointment Request    Patient requesting this type of appt:  office visit for stress/depression     Requested provider: Alexis De Leon    Reason patient unable to be scheduled: Not within requested timeframe    When does patient want to be seen/preferred time: 1-2 days    Comments: patient had an appt scheduled for today and had to cancel, she wants to reschedule for sometime this week, Friday preferably, nothing available until September     Could we send this information to you in [employer] or would you prefer to receive a phone call?:   Patient would like to be contacted via [employer]    Call taken on 7/30/2024 at 9:29 AM by Laura Gray

## 2024-07-30 NOTE — TELEPHONE ENCOUNTER
General Call      Reason for Call:  Pt advised that was given the details on how to prepare for her upcoming sleep study and the print is so bad that she is not able to read it. Pt advised that she has asked for this new paperwork 2 times now and still has not received. Pt declined having the details sent thru [facility] or email & wanting it only thru the mail    What are your questions or concerns:  see above    Date of last appointment with provider: na    Could we send this information to you in [employer] or would you prefer to receive a phone call?:

## 2024-08-01 ASSESSMENT — ANXIETY QUESTIONNAIRES
7. FEELING AFRAID AS IF SOMETHING AWFUL MIGHT HAPPEN: NOT AT ALL
6. BECOMING EASILY ANNOYED OR IRRITABLE: SEVERAL DAYS
1. FEELING NERVOUS, ANXIOUS, OR ON EDGE: SEVERAL DAYS
GAD7 TOTAL SCORE: 3
8. IF YOU CHECKED OFF ANY PROBLEMS, HOW DIFFICULT HAVE THESE MADE IT FOR YOU TO DO YOUR WORK, TAKE CARE OF THINGS AT HOME, OR GET ALONG WITH OTHER PEOPLE?: NOT DIFFICULT AT ALL
5. BEING SO RESTLESS THAT IT IS HARD TO SIT STILL: NOT AT ALL
3. WORRYING TOO MUCH ABOUT DIFFERENT THINGS: SEVERAL DAYS
7. FEELING AFRAID AS IF SOMETHING AWFUL MIGHT HAPPEN: NOT AT ALL
IF YOU CHECKED OFF ANY PROBLEMS ON THIS QUESTIONNAIRE, HOW DIFFICULT HAVE THESE PROBLEMS MADE IT FOR YOU TO DO YOUR WORK, TAKE CARE OF THINGS AT HOME, OR GET ALONG WITH OTHER PEOPLE: NOT DIFFICULT AT ALL
4. TROUBLE RELAXING: NOT AT ALL
2. NOT BEING ABLE TO STOP OR CONTROL WORRYING: NOT AT ALL
GAD7 TOTAL SCORE: 3

## 2024-08-02 ENCOUNTER — OFFICE VISIT (OUTPATIENT)
Dept: FAMILY MEDICINE | Facility: CLINIC | Age: 79
End: 2024-08-02
Payer: COMMERCIAL

## 2024-08-02 VITALS
RESPIRATION RATE: 16 BRPM | DIASTOLIC BLOOD PRESSURE: 83 MMHG | HEART RATE: 78 BPM | SYSTOLIC BLOOD PRESSURE: 135 MMHG | WEIGHT: 136 LBS | OXYGEN SATURATION: 97 % | TEMPERATURE: 98.1 F | BODY MASS INDEX: 24.1 KG/M2 | HEIGHT: 63 IN

## 2024-08-02 DIAGNOSIS — N94.9 ADNEXAL CYST: ICD-10-CM

## 2024-08-02 DIAGNOSIS — J45.20 MILD INTERMITTENT ASTHMA IN ADULT WITHOUT COMPLICATION: ICD-10-CM

## 2024-08-02 DIAGNOSIS — F32.5 MAJOR DEPRESSION IN REMISSION (H): Primary | ICD-10-CM

## 2024-08-02 DIAGNOSIS — I42.8 OTHER CARDIOMYOPATHIES (H): ICD-10-CM

## 2024-08-02 DIAGNOSIS — I48.0 PAROXYSMAL ATRIAL FIBRILLATION (H): ICD-10-CM

## 2024-08-02 DIAGNOSIS — I47.10 SVT (SUPRAVENTRICULAR TACHYCARDIA) (H): ICD-10-CM

## 2024-08-02 DIAGNOSIS — F41.1 GAD (GENERALIZED ANXIETY DISORDER): ICD-10-CM

## 2024-08-02 DIAGNOSIS — R56.9 SEIZURE (H): ICD-10-CM

## 2024-08-02 DIAGNOSIS — K66.8 MESENTERIC CYST: ICD-10-CM

## 2024-08-02 DIAGNOSIS — G24.3 CERVICAL DYSTONIA: ICD-10-CM

## 2024-08-02 DIAGNOSIS — C50.919 PRIMARY MALIGNANT NEOPLASM OF FEMALE BREAST (H): ICD-10-CM

## 2024-08-02 PROCEDURE — 99214 OFFICE O/P EST MOD 30 MIN: CPT | Performed by: PHYSICIAN ASSISTANT

## 2024-08-02 PROCEDURE — G2211 COMPLEX E/M VISIT ADD ON: HCPCS | Performed by: PHYSICIAN ASSISTANT

## 2024-08-02 RX ORDER — SERTRALINE HYDROCHLORIDE 25 MG/1
25 TABLET, FILM COATED ORAL DAILY
Qty: 30 TABLET | Refills: 2 | Status: SHIPPED | OUTPATIENT
Start: 2024-08-02 | End: 2024-09-26

## 2024-08-02 RX ORDER — LORAZEPAM 0.5 MG/1
0.5 TABLET ORAL EVERY 8 HOURS PRN
Qty: 20 TABLET | Refills: 0 | Status: SHIPPED | OUTPATIENT
Start: 2024-08-02

## 2024-08-02 ASSESSMENT — PAIN SCALES - GENERAL: PAINLEVEL: NO PAIN (0)

## 2024-08-02 NOTE — PROGRESS NOTES
Assessment & Plan     Major depression in remission (H24)  CELESTE (generalized anxiety disorder)  Start sertraline 25 mg daily.  Ativan as needed.  Safety/side effects reviewed of both medications.  She will keep posted over the next 1 to 2 months without she is tolerating the medication.  Follow-up sooner as needed.  - sertraline (ZOLOFT) 25 MG tablet  Dispense: 30 tablet; Refill: 2  - LORazepam (ATIVAN) 0.5 MG tablet  Dispense: 20 tablet; Refill: 0    SVT (supraventricular tachycardia) (H24)  Paroxysmal atrial fibrillation (H)  Hx of stress induced cardiomyopathy  Close follow-up with cardiology team.  Asymptomatic.    Seizure (H)  Cervical dystonia  Due for follow-up with neurology.  Stable.    Mild intermittent asthma in adult without complication  Albuterol as needed    Adnexal cyst  Hepatic cyst  Mesenteric cyst  Monitor adnexal cyst in 6 to 12 months      30 minutes spent by me on the date of the encounter doing chart review, review of test results, interpretation of tests, patient visit, and documentation    The longitudinal plan of care for the diagnosis(es)/condition(s) as documented were addressed during this visit. Due to the added complexity in care, I will continue to support Morenita in the subsequent management and with ongoing continuity of care.     Subjective   Morenita is a very pleasant 79 year old, presenting for the following health issues:  Follow Up (depression)    Here today for routine follow-up.  Was seen by [facility] for evaluation of ovarian, hepatic, and mesenteric cyst.  Due for follow-up in 6 to 12 months in regards to ovarian cyst.  Hepatic/mesenteric cyst are nonoperable.  No further intervention needed currently.  No concerning features.    She has been dealing with a lot of stress as well as anxiety lately.  Does use lorazepam however uses this very sparingly.  Mood down.  Interested in pursuing medication options for this.    Upcoming sleep study.  Upcoming follow-up with  "pulmonology as well as cardiology.    History of Present Illness       Mental Health Follow-up:  Patient presents to follow-up on Depression & Anxiety.Patient's depression since last visit has been:  Medium  The patient is not having other symptoms associated with depression.  Patient's anxiety since last visit has been:  Medium  The patient is having other symptoms associated with anxiety.  Any significant life events: health concerns  Patient is feeling anxious or having panic attacks.  Patient has no concerns about alcohol or drug use.    Reason for visit:  Follow up regarding Cysts Treatments/Surgery Inquiries/ also Anxiety and Sleep Issues    She eats 2-3 servings of fruits and vegetables daily.She consumes 1 sweetened beverage(s) daily.She exercises with enough effort to increase her heart rate 10 to 19 minutes per day.  She exercises with enough effort to increase her heart rate 3 or less days per week.   She is taking medications regularly.           Review of Systems  Constitutional, neuro, ENT, endocrine, pulmonary, cardiac, gastrointestinal, genitourinary, musculoskeletal, integument and psychiatric systems are negative, except as otherwise noted.      Objective    /83 (BP Location: Left arm, Patient Position: Sitting, Cuff Size: Adult Regular)   Pulse 78   Temp 98.1  F (36.7  C) (Tympanic)   Resp 16   Ht 1.588 m (5' 2.5\")   Wt 61.7 kg (136 lb)   SpO2 97%   BMI 24.48 kg/m    Body mass index is 24.48 kg/m .  Physical Exam   GENERAL: alert and no distress  EYES: Eyes grossly normal to inspection, PERRL and conjunctivae and sclerae normal  NECK: no adenopathy, no asymmetry, masses, or scars  RESP: lungs clear to auscultation - no rales, rhonchi or wheezes  CV: regular rate and rhythm, normal S1 S2, no S3 or S4, no murmur, click or rub, no peripheral edema  MS: no gross musculoskeletal defects noted, no edema  SKIN: no suspicious lesions or rashes  NEURO: Normal strength and tone, mentation " intact and speech normal  PSYCH: mentation appears normal, affect normal/bright      The likelihood of other entities in the differential is insufficient to justify any further testing for them at this time. This was explained to the patient. The patient was advised that persistent or worsening symptoms would require further evaluation. Patient advised to call the office and if unable to reach to go to the emergency room if they develop any new or worsening symptoms. Expressed understanding and agreement with above stated plan.           Signed Electronically by: Alexis De Leon PA-C

## 2024-08-05 ENCOUNTER — PATIENT OUTREACH (OUTPATIENT)
Dept: CARE COORDINATION | Facility: CLINIC | Age: 79
End: 2024-08-05
Payer: COMMERCIAL

## 2024-08-08 NOTE — TELEPHONE ENCOUNTER
Spoke with patient to confirm her address. Patient stated that her PCP printed off the information from [employer] message and handed to patient. Patient states she has all the information she needs. No further actions needed.

## 2024-08-14 PROBLEM — H81.8X9 PERSISTENT POSTURAL-PERCEPTUAL DIZZINESS: Status: ACTIVE | Noted: 2024-08-14

## 2024-08-15 ENCOUNTER — TRANSFERRED RECORDS (OUTPATIENT)
Dept: HEALTH INFORMATION MANAGEMENT | Facility: CLINIC | Age: 79
End: 2024-08-15
Payer: COMMERCIAL

## 2024-08-20 ENCOUNTER — PATIENT OUTREACH (OUTPATIENT)
Dept: GERIATRIC MEDICINE | Facility: CLINIC | Age: 79
End: 2024-08-20
Payer: COMMERCIAL

## 2024-08-20 NOTE — PROGRESS NOTES
[facility] Care Coordination Contact    Order delivered from [facility] on 8/10/24 for grocery cart. DME spreadsheet updated and CC notified.    Virginie Ha  [facility]  Case Management Specialist  [phone]

## 2024-08-21 ENCOUNTER — PATIENT OUTREACH (OUTPATIENT)
Dept: CARE COORDINATION | Facility: CLINIC | Age: 79
End: 2024-08-21
Payer: COMMERCIAL

## 2024-08-26 ENCOUNTER — PATIENT OUTREACH (OUTPATIENT)
Dept: GERIATRIC MEDICINE | Facility: CLINIC | Age: 79
End: 2024-08-26
Payer: COMMERCIAL

## 2024-08-26 NOTE — PROGRESS NOTES
"[facility] Care Coordination Contact    Per CC, member getting denials for her EW transportation. Confirmed that there is a current auth in their system.     Reached out to [employer] billing dept who confirmed that: \"If it's billed through [facility], she would never owe anything to us.\"    Updated CC.     Virginie Ha  [facility]  Case Management Specialist  [phone]    "

## 2024-08-29 ENCOUNTER — MYC MEDICAL ADVICE (OUTPATIENT)
Dept: FAMILY MEDICINE | Facility: CLINIC | Age: 79
End: 2024-08-29
Payer: COMMERCIAL

## 2024-08-29 ENCOUNTER — TELEPHONE (OUTPATIENT)
Dept: OBGYN | Facility: CLINIC | Age: 79
End: 2024-08-29
Payer: COMMERCIAL

## 2024-08-29 NOTE — TELEPHONE ENCOUNTER
Pt was called to schedule appt for AWV/pre-op. Left VM requesting a call back. On call back, please help schedule appt Ok to use provider approved appt slot.

## 2024-08-30 ENCOUNTER — PATIENT OUTREACH (OUTPATIENT)
Dept: GERIATRIC MEDICINE | Facility: CLINIC | Age: 79
End: 2024-08-30
Payer: COMMERCIAL

## 2024-08-30 NOTE — PROGRESS NOTES
[facility] Care Coordination Contact    Called member to schedule annual HRA home visit. HRA has been scheduled for 08/20/2024.    ASHLEIGH Ramsay  [facility]  Phone: [phone]

## 2024-09-04 ENCOUNTER — PATIENT OUTREACH (OUTPATIENT)
Dept: CARE COORDINATION | Facility: CLINIC | Age: 79
End: 2024-09-04
Payer: COMMERCIAL

## 2024-09-08 ENCOUNTER — THERAPY VISIT (OUTPATIENT)
Dept: SLEEP MEDICINE | Facility: CLINIC | Age: 79
End: 2024-09-08
Payer: COMMERCIAL

## 2024-09-08 DIAGNOSIS — G47.9 SLEEP DISTURBANCE: ICD-10-CM

## 2024-09-08 PROCEDURE — 95810 POLYSOM 6/> YRS 4/> PARAM: CPT | Performed by: INTERNAL MEDICINE

## 2024-09-11 ASSESSMENT — PATIENT HEALTH QUESTIONNAIRE - PHQ9: SUM OF ALL RESPONSES TO PHQ QUESTIONS 1-9: 2

## 2024-09-11 ASSESSMENT — ACTIVITIES OF DAILY LIVING (ADL): DEPENDENT_IADLS:: CLEANING;TRANSPORTATION;SHOPPING;LAUNDRY

## 2024-09-11 NOTE — PROGRESS NOTES
[facility] Care Coordination Contact    [facility] Home Visit Assessment     Home visit for Health Risk Assessment with Morenita Moore completed on 08/20/24  Type of residence:: Apartment  Current living arrangement:: I live alone     Assessment completed with:: Patient    Current Care Plan  Member currently receiving the following home care services:     Member currently receiving the following community resources: Other (see comment), DME, Transportation Services (ICLS hours)    Medication Review  Medication reconciliation completed in Epic: Yes  Medication set-up & administration: Independent and sets up on own daily.  Self-administers medications.  Medication Risk Assessment Medication (1 or more, place referral to MTM): N/A: No risk factors identified  MTM Referral Placed: No: No risk factors idenified    Mental/Behavioral Health   Depression Screening:      PHQ-9 Total Score: 2    Mental health DX:: Yes   Mental health DX how managed:: None    Falls Assessment:   Fallen 2 or more times in the past year?: No   Any fall with injury in the past year?: No    ADL/IADL Dependencies:   Dependent ADLs:: Independent  Dependent IADLs:: Cleaning, Transportation, Shopping, Laundry    [facility]: Shared information regarding [employer]. Reviewed preventative health screening and health plan supplemental benefits/incentives. Reviewed medication disposal form.    PCA Assessment completed at visit: Not Applicable     Elderly Waiver Eligibility: Yes-will continue on EW    Care Plan & Recommendations:  She continues to be eligible for Elderly Waiver Services under her MA plan with [facility].  She continues to be approved for 6 hours weekly of [employer], continues to get her safety pendant, and has 5 round trip cab rides weekly.     [place].    Follow-Up Plan: Member informed of future contact, plan to f/u with member with  a 6 month telephone assessment.  Contact information shared with member and family, encouraged member to call with any questions or concerns at any time.    [place] care continuum providers: Please see Snapshot and Care Management Flowsheets for Specific details of care plan.    This CC note routed to PCP, Alexis De Leon.    ASHLEIGH Ramsay  [facility]  Phone: [phone]

## 2024-09-18 ENCOUNTER — TELEPHONE (OUTPATIENT)
Dept: FAMILY MEDICINE | Facility: CLINIC | Age: 79
End: 2024-09-18
Payer: COMMERCIAL

## 2024-09-18 NOTE — TELEPHONE ENCOUNTER
Forms/Letter Request    Type of form/letter: OTHER: Dis[place]  [facility] [place] [facility], [address], [place] [address]       Do we have the form/letter: Yes:     Who is the form from? Patient    Where did/will the form come from? form was mailed in    When is form/letter needed by: ASAP    How would you like the form/letter returned: Mail  Is this the correct address? yes    [place] [employer]  [facility], [address] [address], [place] [address]       Patient Notified form requests are processed in 5-7 business days:No    Could we send this information to you in [email or web address] or would you prefer to receive a phone call?:   Patient would prefer a phone call   Okay to leave a detailed message?: Yes at Cell number on file:    Telephone Information:   Mobile [phone]

## 2024-09-19 ENCOUNTER — PATIENT OUTREACH (OUTPATIENT)
Dept: GERIATRIC MEDICINE | Facility: CLINIC | Age: 79
End: 2024-09-19
Payer: COMMERCIAL

## 2024-09-19 NOTE — LETTER
September 19, 2024       Morenita Moore  [address]  [place] [address]      Dear Morenita,    At [facility], we re dedicated to improving your health and wellness. Enclosed is the Support Plan developed with you on 8/20/24. Please review the Support Plan carefully.    As a reminder, during your visit we talked about:   Ways to manage your physical and mental health   Using health care to maintain and improve your health    Your preventive care needs      Remember to contact your care coordinator if you:   Are hospitalized or plan to be hospitalized    Have a fall     Have a change in your physical or mental health   Need help finding support or services    If you have questions or don t agree with your Support Plan, call me at [phone]. You can also call me if your needs change. TTY users call the [place] Relay at 653 or 1-181.641.8176 ([employer]-ep-hvafnh relay service).    Sincerely,       ASHLEIGH Ramsay  [phone]  [email or web address]                [id number] accepted     (06/2024)                [address], [place] [address]  [phone]  fax [phone]  [facility]

## 2024-09-19 NOTE — PROGRESS NOTES
[facility] Care Coordination Contact    Received after visit chart from care coordinator.  Completed following tasks: Mailed copy of support plan to member, Mailed [place] Cho[place] signature sheet pages 3-4, Mailed Safe Medication Disposal , Submitted referrals/auths for pers, EW transportation, and icls, and Updated services in Database    Mailed copy of ICLS Service plan to member and submitted authorization to health plan. Care Coordinator obtained member signature - no tracking required. Saved to [employer]. Faxed a copy of [employer]CLS Service plan to provider ([employer]) to sign and fax back.    , and Provider Signature - No Support Plan Shared:  Member indicates that they do not want their support plan shared with any EW providers.    Virginie Ha  [facility]  Case Management Specialist  [phone]

## 2024-09-24 ASSESSMENT — ANXIETY QUESTIONNAIRES
GAD7 TOTAL SCORE: 2
GAD7 TOTAL SCORE: 2
8. IF YOU CHECKED OFF ANY PROBLEMS, HOW DIFFICULT HAVE THESE MADE IT FOR YOU TO DO YOUR WORK, TAKE CARE OF THINGS AT HOME, OR GET ALONG WITH OTHER PEOPLE?: NOT DIFFICULT AT ALL
GAD7 TOTAL SCORE: 2
7. FEELING AFRAID AS IF SOMETHING AWFUL MIGHT HAPPEN: NOT AT ALL

## 2024-09-26 ENCOUNTER — OFFICE VISIT (OUTPATIENT)
Dept: OBGYN | Facility: CLINIC | Age: 79
End: 2024-09-26
Attending: PHYSICIAN ASSISTANT
Payer: COMMERCIAL

## 2024-09-26 VITALS — BODY MASS INDEX: 24.43 KG/M2 | WEIGHT: 137.9 LBS | HEIGHT: 63 IN

## 2024-09-26 DIAGNOSIS — N94.9 ADNEXAL CYST: Primary | ICD-10-CM

## 2024-09-26 DIAGNOSIS — K66.8 MESENTERIC CYST: ICD-10-CM

## 2024-09-26 PROCEDURE — 99203 OFFICE O/P NEW LOW 30 MIN: CPT | Performed by: OBSTETRICS & GYNECOLOGY

## 2024-09-26 PROCEDURE — G0463 HOSPITAL OUTPT CLINIC VISIT: HCPCS | Performed by: OBSTETRICS & GYNECOLOGY

## 2024-09-26 NOTE — PATIENT INSTRUCTIONS
Thank you for trusting us with your care!   Please be aware, if you are on Mychart, you may see your results prior to your providers review. If labs are abnormal, we will call or message you on [employer] with a follow up plan.    If you need to contact us for questions about:  Symptoms, Scheduling & Medical Questions; Non-urgent (2-3 day response) [employer] message, Urgent (needing response today) [phone] (if after 3:30pm next day response)   Prescriptions: Please call your Pharmacy   Billing: Alisha [phone] or [facility]:[phone]

## 2024-09-26 NOTE — LETTER
9/26/2024       RE: Morenita Moore  [address]  [place] [address]     Dear Colleague,    Thank you for referring your patient, Morenita Moore, to the [facility] [place] at [facility]. Please see a copy of my visit note below.    CC/HPI:   Morenita Moore is a 79 year old  who presents today to discuss her history of ovarian and mesenteric cysts.  She has been known to have the ovarian cyst since at least 2020-was 5.9 cm on CT in 2022, 6.3 cm on CT in 2023, 6.0 cm in 2024.  She also has multiple liver cysts and a mesenteric cyst that most recently measured 6.5 cm.  She was seen at [facility] in 7/24 specifically for these cysts and saw an OB/Gyn and a general surgeon-both said the cysts were benign and no intervention needed.  She was told that the mesenteric cyst could cause a bowel obstruction-she is very concerned about this as she would not want an emergent surgery.  She has bilateral foot surgery scheduled in January and knows that the recovery from that is going to be difficult.      HISTORIES:  Patient Active Problem List   Diagnosis     Acute internal derangement of left knee     Breast cancer in situ     SVT (supraventricular tachycardia) (H24)     Gastroesophageal reflux disease without esophagitis     Major depression in remission (H24)     Pericarditis     Primary osteoarthritis of both knees     CELESTE (generalized anxiety disorder)     Presbyopia     Senile nuclear sclerosis     Tremor, physiological     Essential hypertension     Dizziness     Mild persistent asthma without complication     Seizure (H)     Other cardiomyopathies (H)     Cervical dystonia     Primary malignant neoplasm of female breast (H)     Persistent postural-perceptual dizziness     Past Medical History:   Diagnosis Date     Anxiety      Arthritis 2016    in knees     Asthma 2012    adult-onset asthma diagnosed in 2012      Cancer (H)      DCIS (ductal carcinoma in situ) 2001    stage 0 status post left mastectomy in 2001     Depressive disorder      Heart disease 2018 -2021    still feel pain w pericarditis.     PONV (postoperative nausea and vomiting)      Torticollis      Tremor      Past Surgical History:   Procedure Laterality Date     BIOPSY  breasr right     BREAST SURGERY       CARDIAC SURGERY  May 2023    Ablation surgery at [facility] looking for faulty electrical nodes' not found by Dr. FARHANA Brooks     CATARACT IOL, RT/LT       COLONOSCOPY  2018     MASTECTOMY      DCIS     ODONTECTOMY Left 08/02/2022    Procedure: SURGICAL EXTRACTION, TOOTH - Teeth #12, 14, 19;  Surgeon: Arvin Garza DDS;  Location: [facility] OR     RETINAL REATTACHMENT       Current Outpatient Medications   Medication Sig Dispense Refill     acetaminophen (TYLENOL) 325 MG tablet Take 2 tablets (650 mg) by mouth every 4 hours as needed for mild pain       albuterol (PROAIR HFA/PROVENTIL HFA/VENTOLIN HFA) 108 (90 Base) MCG/ACT inhaler Inhale 2 puffs into the lungs every 6 hours as needed for shortness of breath / dyspnea       Blood Pressure Monitor KIT 1 Device as needed (for home BP moniotoring) 1 kit 0     calcium carbonate-vitamin D 600-125 MG-UNIT TABS Take 1 tablet by mouth once a week       calcium citrate-vitamin D (CALCIUM CITRATE-VITAMIN D3) 315-6.25 MG-MCG TABS per tablet Take 1 tablet by mouth       Cyanocobalamin 50 MCG TABS Take 50 mcg by mouth once a week Sundays       fluticasone-salmeterol (ADVAIR HFA) 115-21 MCG/ACT inhaler Inhale 2 puffs into the lungs 2 times daily 12 g 11     LORazepam (ATIVAN) 0.5 MG tablet Take 1 tablet (0.5 mg) by mouth every 8 hours as needed for anxiety 20 tablet 0     Allergies   Allergen Reactions     Levalbuterol Shortness Of Breath     Diltiazem Dizziness     Cats Other (See Comments)     Itchy eyes     Dust Mites      Other reaction(s): Wheezing  Wheezing/shortness/breath/see (IRP 6/1)     Qvar  [Beclomethasone]      Per patient- allergic to QVAR     Amitriptyline Palpitations     Escitalopram Anxiety     Social History     Socioeconomic History     Marital status: Single     Spouse name: Not on file     Number of children: Not on file     Years of education: Not on file     Highest education level: Not on file   Occupational History     Not on file   Tobacco Use     Smoking status: Former     Current packs/day: 0.00     Average packs/day: 1 pack/day for 9.0 years (9.0 ttl pk-yrs)     Types: Cigarettes     Start date: 1966     Quit date: 1975     Years since quittin.5     Smokeless tobacco: Never     Tobacco comments:     Quit in ..smoked again in  to   1/2 pk or less   Vaping Use     Vaping status: Never Used   Substance and Sexual Activity     Alcohol use: Yes     Comment: Holiday glass of wine. summer glass of beer  once in awhile     Drug use: Never     Sexual activity: Not Currently     Partners: Male     Birth control/protection: None     Comment: menopause   Other Topics Concern     Parent/sibling w/ CABG, MI or angioplasty before 65F 55M? No   Social History Narrative     Not on file     Social Determinants of Health     Financial Resource Strain: Low Risk  (2023)    Financial Resource Strain      Within the past 12 months, have you or your family members you live with been unable to get utilities (heat, electricity) when it was really needed?: No   Food Insecurity: Low Risk  (2023)    Food Insecurity      Within the past 12 months, did you worry that your food would run out before you got money to buy more?: No      Within the past 12 months, did the food you bought just not last and you didn t have money to get more?: No   Transportation Needs: Low Risk  (2023)    Transportation Needs      Within the past 12 months, has lack of transportation kept you from medical appointments, getting your medicines, non-medical meetings or appointments, work, or from  "getting things that you need?: No   Physical Activity: Not on file   Stress: Not on file   Social Connections: Unknown (3/30/2023)    Received [facility], [facility]    Social Connections      Frequency of Communication with Friends and Family: Not on file   Interpersonal Safety: Low Risk  (2024)    Interpersonal Safety      Do you feel physically and emotionally safe where you currently live?: Yes      Within the past 12 months, have you been hit, slapped, kicked or otherwise physically hurt by someone?: No      Within the past 12 months, have you been humiliated or emotionally abused in other ways by your partner or ex-partner?: No   Housing Stability: Low Risk  (2023)    Housing Stability      Do you have housing? : Yes      Are you worried about losing your housing?: No     Family History   Problem Relation Age of Onset     Cancer Sister         breast cancer     Breast Cancer Sister         estrogen use     Asthma Sister      Breast Cancer Sister      Asthma Sister      Heart Failure Mother          at 97     Breast Cancer Mother         diagnosed at 94     Colon Cancer Sister      Anesthesia Reaction Sister      Asthma Sister      Other Cancer Son         Testicular     Anxiety Disorder Sister      Asthma Sister      Glaucoma No family hx of      Macular Degeneration No family hx of           Gyn Hx:   No LMP recorded. Patient is postmenopausal.  Menses:   NA    EXAM:  Ht 1.588 m (5' 2.5\")   Wt 62.6 kg (137 lb 14.4 oz)   BMI 24.82 kg/m    Body mass index is 24.82 kg/m .    General - pleasant female in no acute distress.  Abdomen - soft, nontender, nondistended, no masses or organomegaly noted.  Musculoskeletal - no gross deformities.  Neurological - normal strength, sensation, and mental status.  Pelvic - deferred    EXAM: US PELVIS COMPLETE W TRANSVAGINAL AND DOPPLER LIMITED  LOCATION: [facility]  DATE: 5/7/2024     INDICATION: Follow-up left adnexal cyst.  COMPARISON: CT 4/26/2024 and and 11/8/2022, ultrasound 2/13/2023  TECHNIQUE: Transabdominal scans were performed. Endovaginal ultrasound was performed to better visualize the adnexa. Color flow with spectral Doppler and waveform analysis performed.     FINDINGS:     UTERUS: 5.5 x 3.6 x 2.1 cm. Normal in size and position with no masses.     ENDOMETRIUM: 2 mm. A small amount of anechoic fluid separates thin endometrial layer.     RIGHT OVARY: 1.7 x 1.0 x 1.0 cm. Normal with arterial and venous duplex flow identified.     LEFT OVARY: 7.2 x 6.9 x 5.9 cm. 6.5 x 6.5 x 4.9 cm anechoic thin-walled left adnexal cyst (previously 6.5 x 6.5 x 5.5 cm on 2/13/2023 ultrasound and 6.5 x 5.7 x 5.1 cm on CT 11/8/2022 when measured similarly). Normal arterial and venous duplex flow   identified.     No significant free fluid.                                                                      IMPRESSION:    1.  Thin-walled simple left adnexal cyst is similar in size to previous ultrasound from 2/13/2023, and slightly increased in size compared with 11/8/2022 CT. This could be followed up with ultrasound in one year, if no surgical intervention.    EXAM: CT ABDOMEN PELVIS W CONTRAST  LOCATION: [facility]  DATE: 4/26/2024     INDICATION:  Mesenteric cyst, Adnexal cyst  COMPARISON: CT chest, abdomen and pelvis performed on 11/08/2022  TECHNIQUE: CT scan of the abdomen and pelvis was performed following injection of IV contrast. Multiplanar reformats were obtained. Dose reduction techniques were used.  CONTRAST: 90mL Isovue 370     FINDINGS:   LOWER CHEST: Subsegmental atelectasis is seen in the lung bases.     HEPATOBILIARY: Stable appearance of multiple hepatic cysts measuring up to 6.4 cm in the right hepatic lobe. Other subcentimeter hypoattenuating lesions are too small to characterize but appear unchanged and likely reflect smaller  cysts. Gallbladder is   unremarkable.     PANCREAS: No significant mass, duct dilatation, or inflammatory change.     SPLEEN: Normal size.     ADRENAL GLANDS: No significant nodules.     KIDNEYS/BLADDER: No hydronephrosis is present. Stable subcentimeter hypoattenuating lesion along the upper pole of the left kidney which is too small to characterize and likely reflects a small cyst.     BOWEL: Small hiatal hernia is present. Small and large bowel loops are nondilated. Increased size of cystic lesion along the right central mesentery measuring 6.5 x 5.2 cm compared with the prior measurement of 5.7 x 5.4 cm (series 2, image 33).     LYMPH NODES: No lymphadenopathy.     VASCULATURE: Moderate vascular calcifications are seen in the abdominal aorta.     PELVIC ORGANS: Left adnexal cyst measures 6.0 cm, slightly increased from the prior measurement of 5.6 cm (series 2, image 50).     MUSCULOSKELETAL: Multilevel degenerative changes are seen in the spine. Age-indeterminate, mild compression deformity is seen at the T12 vertebral level.                                                                      IMPRESSION:   1.  Enlarging size of mesenteric and left adnexal cysts as described above.  2.  Age indeterminate, mild compression deformity at the T12 vertebral level.       Ca 125 6 5/9/24    ASSESSMENT    80 y/o postmenopausal  with known h/o left ovarian and mesenteric cysts that are benign appearing.      Plan    Recommended consultation with Dr. Ferguson in general surgery for the mesenteric cyst.  If no intervention is recommended, would just monitor the ovarian cyst with ultrasound in 1 year.  If surgery is recommended, would advise a co-procedure with our group for BSO.  Reviewed the recommendation for bilateral oophorectomy in her age group if surgery for removal of one ovary is indicated.      Osiris Cartre MD, FACOG            Again, thank you for allowing me to participate in the care of your patient.       Sincerely,    Osiris Carter MD

## 2024-09-26 NOTE — PROGRESS NOTES
CC/HPI:   Morenita Moore is a 79 year old  who presents today to discuss her history of ovarian and mesenteric cysts.  She has been known to have the ovarian cyst since at least 2020-was 5.9 cm on CT in 2022, 6.3 cm on CT in 2023, 6.0 cm in 2024.  She also has multiple liver cysts and a mesenteric cyst that most recently measured 6.5 cm.  She was seen at [facility] in 7/24 specifically for these cysts and saw an OB/Gyn and a general surgeon-both said the cysts were benign and no intervention needed.  She was told that the mesenteric cyst could cause a bowel obstruction-she is very concerned about this as she would not want an emergent surgery.  She has bilateral foot surgery scheduled in January and knows that the recovery from that is going to be difficult.      HISTORIES:  Patient Active Problem List   Diagnosis    Acute internal derangement of left knee    Breast cancer in situ    SVT (supraventricular tachycardia) (H24)    Gastroesophageal reflux disease without esophagitis    Major depression in remission (H24)    Pericarditis    Primary osteoarthritis of both knees    CELESTE (generalized anxiety disorder)    Presbyopia    Senile nuclear sclerosis    Tremor, physiological    Essential hypertension    Dizziness    Mild persistent asthma without complication    Seizure (H)    Other cardiomyopathies (H)    Cervical dystonia    Primary malignant neoplasm of female breast (H)    Persistent postural-perceptual dizziness     Past Medical History:   Diagnosis Date    Anxiety     Arthritis 2016    in knees    Asthma 2012    adult-onset asthma diagnosed in 2012    Cancer (H)     DCIS (ductal carcinoma in situ) 2001    stage 0 status post left mastectomy in 2001    Depressive disorder     Heart disease 2018 -2021    still feel pain w pericarditis.    PONV (postoperative nausea and vomiting)     Torticollis     Tremor      Past Surgical History:   Procedure Laterality Date    BIOPSY  breasr right    BREAST SURGERY       CARDIAC SURGERY  May 2023    Ablation surgery at [facility] looking for faulty electrical nodes' not found by Dr. FARHANA Brooks    CATARACT IOL, RT/LT      COLONOSCOPY  2018    MASTECTOMY      DCIS    ODONTECTOMY Left 08/02/2022    Procedure: SURGICAL EXTRACTION, TOOTH - Teeth #12, 14, 19;  Surgeon: Arvin Garza DDS;  Location: [facility] OR    RETINAL REATTACHMENT       Current Outpatient Medications   Medication Sig Dispense Refill    acetaminophen (TYLENOL) 325 MG tablet Take 2 tablets (650 mg) by mouth every 4 hours as needed for mild pain      albuterol (PROAIR HFA/PROVENTIL HFA/VENTOLIN HFA) 108 (90 Base) MCG/ACT inhaler Inhale 2 puffs into the lungs every 6 hours as needed for shortness of breath / dyspnea      Blood Pressure Monitor KIT 1 Device as needed (for home BP moniotoring) 1 kit 0    calcium carbonate-vitamin D 600-125 MG-UNIT TABS Take 1 tablet by mouth once a week      calcium citrate-vitamin D (CALCIUM CITRATE-VITAMIN D3) 315-6.25 MG-MCG TABS per tablet Take 1 tablet by mouth      Cyanocobalamin 50 MCG TABS Take 50 mcg by mouth once a week Sundays      fluticasone-salmeterol (ADVAIR HFA) 115-21 MCG/ACT inhaler Inhale 2 puffs into the lungs 2 times daily 12 g 11    LORazepam (ATIVAN) 0.5 MG tablet Take 1 tablet (0.5 mg) by mouth every 8 hours as needed for anxiety 20 tablet 0     Allergies   Allergen Reactions    Levalbuterol Shortness Of Breath    Diltiazem Dizziness    Cats Other (See Comments)     Itchy eyes    Dust Mites      Other reaction(s): Wheezing  Wheezing/shortness/breath/see (IRP 6/1)    Qvar [Beclomethasone]      Per patient- allergic to QVAR    Amitriptyline Palpitations    Escitalopram Anxiety     Social History     Socioeconomic History    Marital status: Single     Spouse name: Not on file    Number of children: Not on file    Years of education: Not on file    Highest education level: Not on file   Occupational History    Not on file   Tobacco Use    Smoking status: Former      Current packs/day: 0.00     Average packs/day: 1 pack/day for 9.0 years (9.0 ttl pk-yrs)     Types: Cigarettes     Start date: 1966     Quit date: 1975     Years since quittin.5    Smokeless tobacco: Never    Tobacco comments:     Quit in ..smoked again in  to   1/2 pk or less   Vaping Use    Vaping status: Never Used   Substance and Sexual Activity    Alcohol use: Yes     Comment: Holiday glass of wine. summer glass of beer  once in awhile    Drug use: Never    Sexual activity: Not Currently     Partners: Male     Birth control/protection: None     Comment: menopause   Other Topics Concern    Parent/sibling w/ CABG, MI or angioplasty before 65F 55M? No   Social History Narrative    Not on file     Social Determinants of Health     Financial Resource Strain: Low Risk  (2023)    Financial Resource Strain     Within the past 12 months, have you or your family members you live with been unable to get utilities (heat, electricity) when it was really needed?: No   Food Insecurity: Low Risk  (2023)    Food Insecurity     Within the past 12 months, did you worry that your food would run out before you got money to buy more?: No     Within the past 12 months, did the food you bought just not last and you didn t have money to get more?: No   Transportation Needs: Low Risk  (2023)    Transportation Needs     Within the past 12 months, has lack of transportation kept you from medical appointments, getting your medicines, non-medical meetings or appointments, work, or from getting things that you need?: No   Physical Activity: Not on file   Stress: Not on file   Social Connections: Unknown (3/30/2023)    Received from [facility], [facility]    Social Connections     Frequency of Communication with Friends and Family: Not on file   Interpersonal Safety: Low Risk  (2024)    Interpersonal Safety     Do you feel  "physically and emotionally safe where you currently live?: Yes     Within the past 12 months, have you been hit, slapped, kicked or otherwise physically hurt by someone?: No     Within the past 12 months, have you been humiliated or emotionally abused in other ways by your partner or ex-partner?: No   Housing Stability: Low Risk  (2023)    Housing Stability     Do you have housing? : Yes     Are you worried about losing your housing?: No     Family History   Problem Relation Age of Onset    Cancer Sister         breast cancer    Breast Cancer Sister         estrogen use    Asthma Sister     Breast Cancer Sister     Asthma Sister     Heart Failure Mother          at 97    Breast Cancer Mother         diagnosed at 94    Colon Cancer Sister     Anesthesia Reaction Sister     Asthma Sister     Other Cancer Son         Testicular    Anxiety Disorder Sister     Asthma Sister     Glaucoma No family hx of     Macular Degeneration No family hx of           Gyn Hx:   No LMP recorded. Patient is postmenopausal.  Menses:   NA    EXAM:  Ht 1.588 m (5' 2.5\")   Wt 62.6 kg (137 lb 14.4 oz)   BMI 24.82 kg/m    Body mass index is 24.82 kg/m .    General - pleasant female in no acute distress.  Abdomen - soft, nontender, nondistended, no masses or organomegaly noted.  Musculoskeletal - no gross deformities.  Neurological - normal strength, sensation, and mental status.  Pelvic - deferred    EXAM: US PELVIS COMPLETE W TRANSVAGINAL AND DOPPLER LIMITED  LOCATION: [facility]  DATE: 2024     INDICATION: Follow-up left adnexal cyst.  COMPARISON: CT 2024 and and 2022, ultrasound 2023  TECHNIQUE: Transabdominal scans were performed. Endovaginal ultrasound was performed to better visualize the adnexa. Color flow with spectral Doppler and waveform analysis performed.     FINDINGS:     UTERUS: 5.5 x 3.6 x 2.1 cm. Normal in size and position with no masses.     ENDOMETRIUM: 2 mm. A small " amount of anechoic fluid separates thin endometrial layer.     RIGHT OVARY: 1.7 x 1.0 x 1.0 cm. Normal with arterial and venous duplex flow identified.     LEFT OVARY: 7.2 x 6.9 x 5.9 cm. 6.5 x 6.5 x 4.9 cm anechoic thin-walled left adnexal cyst (previously 6.5 x 6.5 x 5.5 cm on 2/13/2023 ultrasound and 6.5 x 5.7 x 5.1 cm on CT 11/8/2022 when measured similarly). Normal arterial and venous duplex flow   identified.     No significant free fluid.                                                                      IMPRESSION:    1.  Thin-walled simple left adnexal cyst is similar in size to previous ultrasound from 2/13/2023, and slightly increased in size compared with 11/8/2022 CT. This could be followed up with ultrasound in one year, if no surgical intervention.    EXAM: CT ABDOMEN PELVIS W CONTRAST  LOCATION: [facility]  DATE: 4/26/2024     INDICATION:  Mesenteric cyst, Adnexal cyst  COMPARISON: CT chest, abdomen and pelvis performed on 11/08/2022  TECHNIQUE: CT scan of the abdomen and pelvis was performed following injection of IV contrast. Multiplanar reformats were obtained. Dose reduction techniques were used.  CONTRAST: 90mL Isovue 370     FINDINGS:   LOWER CHEST: Subsegmental atelectasis is seen in the lung bases.     HEPATOBILIARY: Stable appearance of multiple hepatic cysts measuring up to 6.4 cm in the right hepatic lobe. Other subcentimeter hypoattenuating lesions are too small to characterize but appear unchanged and likely reflect smaller cysts. Gallbladder is   unremarkable.     PANCREAS: No significant mass, duct dilatation, or inflammatory change.     SPLEEN: Normal size.     ADRENAL GLANDS: No significant nodules.     KIDNEYS/BLADDER: No hydronephrosis is present. Stable subcentimeter hypoattenuating lesion along the upper pole of the left kidney which is too small to characterize and likely reflects a small cyst.     BOWEL: Small hiatal hernia is present. Small and  large bowel loops are nondilated. Increased size of cystic lesion along the right central mesentery measuring 6.5 x 5.2 cm compared with the prior measurement of 5.7 x 5.4 cm (series 2, image 33).     LYMPH NODES: No lymphadenopathy.     VASCULATURE: Moderate vascular calcifications are seen in the abdominal aorta.     PELVIC ORGANS: Left adnexal cyst measures 6.0 cm, slightly increased from the prior measurement of 5.6 cm (series 2, image 50).     MUSCULOSKELETAL: Multilevel degenerative changes are seen in the spine. Age-indeterminate, mild compression deformity is seen at the T12 vertebral level.                                                                      IMPRESSION:   1.  Enlarging size of mesenteric and left adnexal cysts as described above.  2.  Age indeterminate, mild compression deformity at the T12 vertebral level.       Ca 125 6 5/9/24    ASSESSMENT    80 y/o postmenopausal  with known h/o left ovarian and mesenteric cysts that are benign appearing.      Plan    Recommended consultation with Dr. Ferguson in general surgery for the mesenteric cyst.  If no intervention is recommended, would just monitor the ovarian cyst with ultrasound in 1 year.  If surgery is recommended, would advise a co-procedure with our group for BSO.  Reviewed the recommendation for bilateral oophorectomy in her age group if surgery for removal of one ovary is indicated.      Osiris Carter MD, FACOG

## 2024-09-30 LAB — SLPCOMP: NORMAL

## 2024-10-09 ASSESSMENT — ASTHMA QUESTIONNAIRES
QUESTION_5 LAST FOUR WEEKS HOW WOULD YOU RATE YOUR ASTHMA CONTROL: WELL CONTROLLED
ACT_TOTALSCORE: 23
ACT_TOTALSCORE: 23
QUESTION_3 LAST FOUR WEEKS HOW OFTEN DID YOUR ASTHMA SYMPTOMS (WHEEZING, COUGHING, SHORTNESS OF BREATH, CHEST TIGHTNESS OR PAIN) WAKE YOU UP AT NIGHT OR EARLIER THAN USUAL IN THE MORNING: NOT AT ALL
QUESTION_1 LAST FOUR WEEKS HOW MUCH OF THE TIME DID YOUR ASTHMA KEEP YOU FROM GETTING AS MUCH DONE AT WORK, SCHOOL OR AT HOME: NONE OF THE TIME
QUESTION_4 LAST FOUR WEEKS HOW OFTEN HAVE YOU USED YOUR RESCUE INHALER OR NEBULIZER MEDICATION (SUCH AS ALBUTEROL): NOT AT ALL
QUESTION_2 LAST FOUR WEEKS HOW OFTEN HAVE YOU HAD SHORTNESS OF BREATH: ONCE OR TWICE A WEEK

## 2024-10-11 ENCOUNTER — OFFICE VISIT (OUTPATIENT)
Dept: PULMONOLOGY | Facility: CLINIC | Age: 79
End: 2024-10-11
Payer: COMMERCIAL

## 2024-10-11 VITALS
BODY MASS INDEX: 24.41 KG/M2 | OXYGEN SATURATION: 96 % | DIASTOLIC BLOOD PRESSURE: 72 MMHG | WEIGHT: 135.6 LBS | HEART RATE: 71 BPM | SYSTOLIC BLOOD PRESSURE: 133 MMHG

## 2024-10-11 DIAGNOSIS — J45.909 UNCOMPLICATED ASTHMA, UNSPECIFIED ASTHMA SEVERITY, UNSPECIFIED WHETHER PERSISTENT: ICD-10-CM

## 2024-10-11 PROCEDURE — 99213 OFFICE O/P EST LOW 20 MIN: CPT | Performed by: INTERNAL MEDICINE

## 2024-10-11 PROCEDURE — G2211 COMPLEX E/M VISIT ADD ON: HCPCS | Performed by: INTERNAL MEDICINE

## 2024-10-11 RX ORDER — FLUTICASONE PROPIONATE AND SALMETEROL XINAFOATE 115; 21 UG/1; UG/1
2 AEROSOL, METERED RESPIRATORY (INHALATION) 2 TIMES DAILY
Qty: 12 G | Refills: 11 | Status: SHIPPED | OUTPATIENT
Start: 2024-10-11

## 2024-10-11 NOTE — LETTER
10/11/2024      Morenita Moore  [address]  [address]  [place] [place] [address]      Dear Colleague,    Thank you for referring your patient, Morenita Moore, to the [facility]. Please see a copy of my visit note below.    Pulmonary Clinic Follow-up Visit    Impression: 78F w/ history of percarditis, previously diagnosed asthma (followed at [place] and [facility]), minimal smoking history, presents for follow up of asthma and pulmonary nodules. She is doing well on medium dose ICS/LABA. She had a rough year with multiple cardiac issues. As noted previously, PFTs showed very mild reduction in FEV1 with normal post-BD FEV/FVC ratio. She does have reduction in mid flow rates which could be consistent with small airways disease. Lung exam and SpO2 are both normal today.     Recommendations:  #Asthma, hx elevated Alverto, improved control since adding Spacer. Doing well after dose reduction last year.   - Continue the advair HFA medium dose, 115/21 1-2 puffs bid with spacer. Rinse/gargle/spit after use. Recommended she use it more consistently and with the spacer.  - continue albuterol rescue inhaler prn  - allergen avoidance discussed  - encouraged her to exercise and remain active as able  - declined pulmonary rehab referal previously    Marlen's action plan: prednisone 40mg x5 days and azithromycin (z-pack)     #Multiple pulmonary nodules, mostly stable:  Last CT chest stable, 2mm does not require follow up.    #Protein-calorie malnutrition. Has improved with her intake and gained some weight.  - continue follow up with her PMD to discuss strategies to increase weight. Consider protein supplement shakes.     #RHM:  - declines PCV20 today - defer to PMD.  - she'll get the flu shot in a few weeks  - UTD with RSV vaccine  - defer COVID-19 booster to her PMD.     Follow up in 1 year or sooner if needed.    The longitudinal plan of care for the diagnosis(es)/condition(s) as documented were  addressed during this visit. Due to the added complexity in care, I will continue to support Morenita in the subsequent management and with ongoing continuity of care.     Pacheco Perdomo MD (Avi)  [facility] Pulmonary & Critical Care  Clinic [phone]  Fax [phone]      CCx: asthma, pulmonary nodules follow up    HPI: Interim history: I last saw Marlen on 10/27/2023.  Since that time, she reports she's generally doing well. She has a mild dry cough and occasionally SOB especially when she cuts back the advair.  Not using rescue inhaler much at all.    ROS:  A 12-system review was obtained and was negative with the exception of the symptoms endorsed in the history of present illness.    PMH:  Past Medical History:   Diagnosis Date     Anxiety      Arthritis 2016    in knees     Asthma 2012    adult-onset asthma diagnosed in 2012     Cancer (H)      DCIS (ductal carcinoma in situ) 2001    stage 0 status post left mastectomy in 2001     Depressive disorder      Heart disease 2018 -2021    still feel pain w pericarditis.     PONV (postoperative nausea and vomiting)      Torticollis      Tremor        PSH:  Past Surgical History:   Procedure Laterality Date     BIOPSY  breasr right     BREAST SURGERY       CARDIAC SURGERY  May 2023    Ablation surgery at [facility] looking for faulty electrical nodes' not found by Dr. FARHANA Brooks     CATARACT IOL, RT/LT       COLONOSCOPY  2018     MASTECTOMY      DCIS     ODONTECTOMY Left 08/02/2022    Procedure: SURGICAL EXTRACTION, TOOTH - Teeth #12, 14, 19;  Surgeon: Arvin Garza DDS;  Location: [facility] OR     RETINAL REATTACHMENT         Allergies:  Allergies   Allergen Reactions     Levalbuterol Shortness Of Breath     Diltiazem Dizziness     Cats Other (See Comments)     Itchy eyes     Dust Mites      Other reaction(s): Wheezing  Wheezing/shortness/breath/see (IRP 6/1)     Qvar [Beclomethasone]      Per patient- allergic to QVAR      Amitriptyline Palpitations     Escitalopram Anxiety       Family HX:  Family History   Problem Relation Age of Onset     Cancer Sister         breast cancer     Breast Cancer Sister         estrogen use     Asthma Sister      Breast Cancer Sister      Asthma Sister      Heart Failure Mother          at 97     Breast Cancer Mother         diagnosed at 94     Colon Cancer Sister      Anesthesia Reaction Sister      Asthma Sister      Other Cancer Son         Testicular     Anxiety Disorder Sister      Asthma Sister      Glaucoma No family hx of      Macular Degeneration No family hx of        Social Hx:  Social History     Socioeconomic History     Marital status: Single     Spouse name: Not on file     Number of children: Not on file     Years of education: Not on file     Highest education level: Not on file   Occupational History     Not on file   Tobacco Use     Smoking status: Former     Current packs/day: 0.00     Average packs/day: 1 pack/day for 9.0 years (9.0 ttl pk-yrs)     Types: Cigarettes     Start date: 1966     Quit date: 1975     Years since quittin.5     Smokeless tobacco: Never     Tobacco comments:     Quit in ..smoked again in  to   1/2 pk or less   Vaping Use     Vaping status: Never Used   Substance and Sexual Activity     Alcohol use: Yes     Comment: Holiday glass of wine. summer glass of beer  once in awhile     Drug use: Never     Sexual activity: Not Currently     Partners: Male     Birth control/protection: None     Comment: menopause   Other Topics Concern     Parent/sibling w/ CABG, MI or angioplasty before 65F 55M? No   Social History Narrative     Not on file     Social Determinants of Health     Financial Resource Strain: Low Risk  (2023)    Financial Resource Strain      Within the past 12 months, have you or your family members you live with been unable to get utilities (heat, electricity) when it was really needed?: No   Food Insecurity: Low Risk   (11/21/2023)    Food Insecurity      Within the past 12 months, did you worry that your food would run out before you got money to buy more?: No      Within the past 12 months, did the food you bought just not last and you didn t have money to get more?: No   Transportation Needs: Low Risk  (11/21/2023)    Transportation Needs      Within the past 12 months, has lack of transportation kept you from medical appointments, getting your medicines, non-medical meetings or appointments, work, or from getting things that you need?: No   Physical Activity: Not on file   Stress: Not on file   Social Connections: Unknown (3/30/2023)    [facility], [facility]    Social Connections      Frequency of Communication with Friends and Family: Not on file   Interpersonal Safety: Low Risk  (6/18/2024)    Interpersonal Safety      Do you feel physically and emotionally safe where you currently live?: Yes      Within the past 12 months, have you been hit, slapped, kicked or otherwise physically hurt by someone?: No      Within the past 12 months, have you been humiliated or emotionally abused in other ways by your partner or ex-partner?: No   Housing Stability: Low Risk  (11/21/2023)    Housing Stability      Do you have housing? : Yes      Are you worried about losing your housing?: No       Current Meds:  Current Outpatient Medications   Medication Sig Dispense Refill     acetaminophen (TYLENOL) 325 MG tablet Take 2 tablets (650 mg) by mouth every 4 hours as needed for mild pain       albuterol (PROAIR HFA/PROVENTIL HFA/VENTOLIN HFA) 108 (90 Base) MCG/ACT inhaler Inhale 2 puffs into the lungs every 6 hours as needed for shortness of breath / dyspnea       Blood Pressure Monitor KIT 1 Device as needed (for home BP moniotoring) 1 kit 0     calcium carbonate-vitamin D 600-125 MG-UNIT TABS Take 1 tablet by mouth once a week       calcium citrate-vitamin D  (CALCIUM CITRATE-VITAMIN D3) 315-6.25 MG-MCG TABS per tablet Take 1 tablet by mouth       Cyanocobalamin 50 MCG TABS Take 50 mcg by mouth once a week Sundays       fluticasone-salmeterol (ADVAIR HFA) 115-21 MCG/ACT inhaler Inhale 2 puffs into the lungs 2 times daily 12 g 11     LORazepam (ATIVAN) 0.5 MG tablet Take 1 tablet (0.5 mg) by mouth every 8 hours as needed for anxiety 20 tablet 0       Physical Exam:  There were no vitals taken for this visit.  Gen: awake, alert, oriented, no distress  HEENT: nasal turbinates are unremarkable, no oropharyngeal lesions, no cervical or supraclavicular lymphadenopathy  CV: RRR, no M/G/R  Resp: CTAB, no focal crackles or wheezes  Skin: no apparent rashes  Ext: no cyanosis, clubbing or edema  Neuro: alert, nonfocal    Labs:  Reviewed  CBC nrmal, no eos in 2020  ESR 11 in 2020  Chem panel wnl 10/20/21    Imaging studies:  Personally reviewed  EXAM: CT CHEST W/O CONTRAST  LOCATION: [facility]  DATE: 4/26/2024     INDICATION: follow up of pulmonary nodules.  COMPARISON: CT chest, abdomen and pelvis performed on 11/08/2022  TECHNIQUE: CT chest without IV contrast. Multiplanar reformats were obtained. Dose reduction techniques were used.  CONTRAST: None.     FINDINGS:   LUNGS AND PLEURA: No pleural effusion or pneumothorax is seen. Subsegmental atelectasis is seen in the lower lobes. 2 mm nodule in the left lower lobe was not seen previously (series 4, image 238). Stable cavitary nodule in the right lower lobe measuring   3 mm (series 4, image 172). Other pulmonary nodules are also unchanged measuring up to 5 mm in the left upper lobe (series 4, image 89).      MEDIASTINUM/AXILLAE: No lymphadenopathy. No thoracic aortic aneurysm. Scattered vascular calcifications are seen in the thoracic aorta.     CORONARY ARTERY CALCIFICATION: Mild.     UPPER ABDOMEN: Small hiatal hernia is present. Partially seen hepatic cysts.     MUSCULOSKELETAL: Multilevel degenerative  changes are seen in the spine. Age-indeterminate mild compression deformity seen at the T12 vertebral level.                                                                      IMPRESSION:   1.  2 mm pulmonary nodule in the left lower lobe was not seen previously. Other nodules including a 4 mm cavitary nodule in the right lower lobe appear unchanged. Suggest follow-up per guidelines below.  2.  Age indeterminate compression deformity seen at the T12 vertebral level.     REFERENCE:  Guidelines for Management of Incidental Pulmonary Nodules Detected on CT Images: From the Fleischner Society 2017.   Guidelines apply to incidental nodules in patients who are 35 years or older.  Guidelines do not apply to lung cancer screening, patients with immunosuppression, or patients with known primary cancer.     MULTIPLE NODULES  Nodule size <6 mm  Low-risk patients: No follow-up needed.  High-risk patients: Optional follow-up at 12 months.     Nodule size 6 mm or larger  Low-risk patients: Follow-up CT at 3-6 months, then consider CT at 18-24 months.  High-risk patients: Follow-up CT at 3-6 months, then at 18-24 months if no change.  -Use most suspicious nodule as guide to management.    TTE 2/15/2022  Interpretation Summary     Left ventricular size, wall motion and function are normal. The ejection  fraction is 60-65%.  Normal right ventricle size and systolic function.  Small pericardial effusion  There are no echocardiographic indications of cardiac tamponade.  No hemodynamically significant valvular abnormalities on 2D or color flow  imaging.    Pulmonary Function Testing  3/18/2022  FEV1 1,53L, 70%  FVC 84%  Ratio 0.64 (LLN 0.64)  No BD response.  TLC 85%  RV 89%  DLco 93% aung for hgb  Mid flow rates reduced with BD response.  Flow volume loop suggests small airways disease      Again, thank you for allowing me to participate in the care of your patient.        Sincerely,        Pacheco Perdomo MD

## 2024-10-11 NOTE — PROGRESS NOTES
Pulmonary Clinic Follow-up Visit    Impression: 78F w/ history of percarditis, previously diagnosed asthma (followed at [place] and [facility]), minimal smoking history, presents for follow up of asthma and pulmonary nodules. She is doing well on medium dose ICS/LABA. She had a rough year with multiple cardiac issues. As noted previously, PFTs showed very mild reduction in FEV1 with normal post-BD FEV/FVC ratio. She does have reduction in mid flow rates which could be consistent with small airways disease. Lung exam and SpO2 are both normal today.     Recommendations:  #Asthma, hx elevated Alverto, improved control since adding Spacer. Doing well after dose reduction last year.   - Continue the advair HFA medium dose, 115/21 1-2 puffs bid with spacer. Rinse/gargle/spit after use. Recommended she use it more consistently and with the spacer.  - continue albuterol rescue inhaler prn  - allergen avoidance discussed  - encouraged her to exercise and remain active as able  - declined pulmonary rehab referal previously    Marlen's action plan: prednisone 40mg x5 days and azithromycin (z-pack)     #Multiple pulmonary nodules, mostly stable:  Last CT chest stable, 2mm does not require follow up.    #Protein-calorie malnutrition. Has improved with her intake and gained some weight.  - continue follow up with her PMD to discuss strategies to increase weight. Consider protein supplement shakes.     #RHM:  - declines PCV20 today - defer to PMD.  - she'll get the flu shot in a few weeks  - UTD with RSV vaccine  - defer COVID-19 booster to her PMD.     Follow up in 1 year or sooner if needed.    The longitudinal plan of care for the diagnosis(es)/condition(s) as documented were addressed during this visit. Due to the added complexity in care, I will continue to support Morenita in the subsequent management and with ongoing continuity of care.     Pacheco (Bebo Perdomo MD  [facility] Pulmonary & Critical  [facility]  Clinic [phone]  Fax [phone]      CCx: asthma, pulmonary nodules follow up    HPI: Interim history: I last saw Marlen on 10/27/2023.  Since that time, she reports she's generally doing well. She has a mild dry cough and occasionally SOB especially when she cuts back the advair.  Not using rescue inhaler much at all.    ROS:  A 12-system review was obtained and was negative with the exception of the symptoms endorsed in the history of present illness.    PMH:  Past Medical History:   Diagnosis Date    Anxiety     Arthritis 2016    in knees    Asthma 2012    adult-onset asthma diagnosed in 2012    Cancer (H)     DCIS (ductal carcinoma in situ) 2001    stage 0 status post left mastectomy in 2001    Depressive disorder     Heart disease 2018 -2021    still feel pain w pericarditis.    PONV (postoperative nausea and vomiting)     Torticollis     Tremor        PSH:  Past Surgical History:   Procedure Laterality Date    BIOPSY  breasr right    BREAST SURGERY      CARDIAC SURGERY  May 2023    Ablation surgery at [facility] looking for faulty electrical nodes' not found by Dr. FARHANA Brooks    CATARACT IOL, RT/LT      COLONOSCOPY  2018    MASTECTOMY      DCIS    ODONTECTOMY Left 08/02/2022    Procedure: SURGICAL EXTRACTION, TOOTH - Teeth #12, 14, 19;  Surgeon: Arvin Garza DDS;  Location: [facility] OR    RETINAL REATTACHMENT         Allergies:  Allergies   Allergen Reactions    Levalbuterol Shortness Of Breath    Diltiazem Dizziness    Cats Other (See Comments)     Itchy eyes    Dust Mites      Other reaction(s): Wheezing  Wheezing/shortness/breath/see (IRP 6/1)    Qvar [Beclomethasone]      Per patient- allergic to QVAR    Amitriptyline Palpitations    Escitalopram Anxiety       Family HX:  Family History   Problem Relation Age of Onset    Cancer Sister         breast cancer    Breast Cancer Sister         estrogen use    Asthma Sister     Breast Cancer Sister     Asthma Sister     Heart Failure Mother           at 97    Breast Cancer Mother         diagnosed at 94    Colon Cancer Sister     Anesthesia Reaction Sister     Asthma Sister     Other Cancer Son         Testicular    Anxiety Disorder Sister     Asthma Sister     Glaucoma No family hx of     Macular Degeneration No family hx of        Social Hx:  Social History     Socioeconomic History    Marital status: Single     Spouse name: Not on file    Number of children: Not on file    Years of education: Not on file    Highest education level: Not on file   Occupational History    Not on file   Tobacco Use    Smoking status: Former     Current packs/day: 0.00     Average packs/day: 1 pack/day for 9.0 years (9.0 ttl pk-yrs)     Types: Cigarettes     Start date: 1966     Quit date: 1975     Years since quittin.5    Smokeless tobacco: Never    Tobacco comments:     Quit in ..smoked again in  to   1/2 pk or less   Vaping Use    Vaping status: Never Used   Substance and Sexual Activity    Alcohol use: Yes     Comment: Holiday glass of wine. summer glass of beer  once in awhile    Drug use: Never    Sexual activity: Not Currently     Partners: Male     Birth control/protection: None     Comment: menopause   Other Topics Concern    Parent/sibling w/ CABG, MI or angioplasty before 65F 55M? No   Social History Narrative    Not on file     Social Determinants of Health     Financial Resource Strain: Low Risk  (2023)    Financial Resource Strain     Within the past 12 months, have you or your family members you live with been unable to get utilities (heat, electricity) when it was really needed?: No   Food Insecurity: Low Risk  (2023)    Food Insecurity     Within the past 12 months, did you worry that your food would run out before you got money to buy more?: No     Within the past 12 months, did the food you bought just not last and you didn t have money to get more?: No   Transportation Needs: Low Risk  (2023)     Transportation Needs     Within the past 12 months, has lack of transportation kept you from medical appointments, getting your medicines, non-medical meetings or appointments, work, or from getting things that you need?: No   Physical Activity: Not on file   Stress: Not on file   Social Connections: Unknown (3/30/2023)    [facility], [facility]    Social Connections     Frequency of Communication with Friends and Family: Not on file   Interpersonal Safety: Low Risk  (6/18/2024)    Interpersonal Safety     Do you feel physically and emotionally safe where you currently live?: Yes     Within the past 12 months, have you been hit, slapped, kicked or otherwise physically hurt by someone?: No     Within the past 12 months, have you been humiliated or emotionally abused in other ways by your partner or ex-partner?: No   Housing Stability: Low Risk  (11/21/2023)    Housing Stability     Do you have housing? : Yes     Are you worried about losing your housing?: No       Current Meds:  Current Outpatient Medications   Medication Sig Dispense Refill    acetaminophen (TYLENOL) 325 MG tablet Take 2 tablets (650 mg) by mouth every 4 hours as needed for mild pain      albuterol (PROAIR HFA/PROVENTIL HFA/VENTOLIN HFA) 108 (90 Base) MCG/ACT inhaler Inhale 2 puffs into the lungs every 6 hours as needed for shortness of breath / dyspnea      Blood Pressure Monitor KIT 1 Device as needed (for home BP moniotoring) 1 kit 0    calcium carbonate-vitamin D 600-125 MG-UNIT TABS Take 1 tablet by mouth once a week      calcium citrate-vitamin D (CALCIUM CITRATE-VITAMIN D3) 315-6.25 MG-MCG TABS per tablet Take 1 tablet by mouth      Cyanocobalamin 50 MCG TABS Take 50 mcg by mouth once a week Sundays      fluticasone-salmeterol (ADVAIR HFA) 115-21 MCG/ACT inhaler Inhale 2 puffs into the lungs 2 times daily 12 g 11    LORazepam (ATIVAN) 0.5 MG tablet Take 1 tablet  (0.5 mg) by mouth every 8 hours as needed for anxiety 20 tablet 0       Physical Exam:  There were no vitals taken for this visit.  Gen: awake, alert, oriented, no distress  HEENT: nasal turbinates are unremarkable, no oropharyngeal lesions, no cervical or supraclavicular lymphadenopathy  CV: RRR, no M/G/R  Resp: CTAB, no focal crackles or wheezes  Skin: no apparent rashes  Ext: no cyanosis, clubbing or edema  Neuro: alert, nonfocal    Labs:  Reviewed  CBC nrmal, no eos in 2020  ESR 11 in 2020  Chem panel wnl 10/20/21    Imaging studies:  Personally reviewed  EXAM: CT CHEST W/O CONTRAST  LOCATION: [facility]  DATE: 4/26/2024     INDICATION: follow up of pulmonary nodules.  COMPARISON: CT chest, abdomen and pelvis performed on 11/08/2022  TECHNIQUE: CT chest without IV contrast. Multiplanar reformats were obtained. Dose reduction techniques were used.  CONTRAST: None.     FINDINGS:   LUNGS AND PLEURA: No pleural effusion or pneumothorax is seen. Subsegmental atelectasis is seen in the lower lobes. 2 mm nodule in the left lower lobe was not seen previously (series 4, image 238). Stable cavitary nodule in the right lower lobe measuring   3 mm (series 4, image 172). Other pulmonary nodules are also unchanged measuring up to 5 mm in the left upper lobe (series 4, image 89).      MEDIASTINUM/AXILLAE: No lymphadenopathy. No thoracic aortic aneurysm. Scattered vascular calcifications are seen in the thoracic aorta.     CORONARY ARTERY CALCIFICATION: Mild.     UPPER ABDOMEN: Small hiatal hernia is present. Partially seen hepatic cysts.     MUSCULOSKELETAL: Multilevel degenerative changes are seen in the spine. Age-indeterminate mild compression deformity seen at the T12 vertebral level.                                                                      IMPRESSION:   1.  2 mm pulmonary nodule in the left lower lobe was not seen previously. Other nodules including a 4 mm cavitary nodule in the right  lower lobe appear unchanged. Suggest follow-up per guidelines below.  2.  Age indeterminate compression deformity seen at the T12 vertebral level.     REFERENCE:  Guidelines for Management of Incidental Pulmonary Nodules Detected on CT Images: From the Fleischner Society 2017.   Guidelines apply to incidental nodules in patients who are 35 years or older.  Guidelines do not apply to lung cancer screening, patients with immunosuppression, or patients with known primary cancer.     MULTIPLE NODULES  Nodule size <6 mm  Low-risk patients: No follow-up needed.  High-risk patients: Optional follow-up at 12 months.     Nodule size 6 mm or larger  Low-risk patients: Follow-up CT at 3-6 months, then consider CT at 18-24 months.  High-risk patients: Follow-up CT at 3-6 months, then at 18-24 months if no change.  -Use most suspicious nodule as guide to management.    TTE 2/15/2022  Interpretation Summary     Left ventricular size, wall motion and function are normal. The ejection  fraction is 60-65%.  Normal right ventricle size and systolic function.  Small pericardial effusion  There are no echocardiographic indications of cardiac tamponade.  No hemodynamically significant valvular abnormalities on 2D or color flow  imaging.    Pulmonary Function Testing  3/18/2022  FEV1 1,53L, 70%  FVC 84%  Ratio 0.64 (LLN 0.64)  No BD response.  TLC 85%  RV 89%  DLco 93% aung for hgb  Mid flow rates reduced with BD response.  Flow volume loop suggests small airways disease

## 2024-10-15 ENCOUNTER — MEDICAL CORRESPONDENCE (OUTPATIENT)
Dept: HEALTH INFORMATION MANAGEMENT | Facility: CLINIC | Age: 79
End: 2024-10-15

## 2024-10-24 ENCOUNTER — IMMUNIZATION (OUTPATIENT)
Dept: FAMILY MEDICINE | Facility: CLINIC | Age: 79
End: 2024-10-24
Payer: COMMERCIAL

## 2024-10-24 DIAGNOSIS — Z23 NEED FOR PROPHYLACTIC VACCINATION AND INOCULATION AGAINST INFLUENZA: Primary | ICD-10-CM

## 2024-10-24 PROCEDURE — 90656 IIV3 VACC NO PRSV 0.5 ML IM: CPT

## 2024-10-24 PROCEDURE — G0008 ADMIN INFLUENZA VIRUS VAC: HCPCS

## 2024-10-24 NOTE — PROGRESS NOTES
Patient requested a Fluzone dose. States she was ill after the high dose. Confirmed order with MERCED Yanes MA

## 2024-10-26 ENCOUNTER — HEALTH MAINTENANCE LETTER (OUTPATIENT)
Age: 79
End: 2024-10-26

## 2024-10-28 ENCOUNTER — PATIENT OUTREACH (OUTPATIENT)
Dept: GERIATRIC MEDICINE | Facility: CLINIC | Age: 79
End: 2024-10-28
Payer: COMMERCIAL

## 2024-10-28 NOTE — PROGRESS NOTES
[facility] Care Coordination Contact    Called member and left a voice mail message to remind member to schedule Mammogram and Wellness  exam(s).  Left contact info.        CHW, received return call from Member she confirmed she is going to have surgery pre-op  and Wellness exam on 12/12  and she no longer does mammogram.    PURA Lunsford  [facility]  [phone]

## 2024-11-06 ENCOUNTER — TELEPHONE (OUTPATIENT)
Dept: PULMONOLOGY | Facility: CLINIC | Age: 79
End: 2024-11-06
Payer: COMMERCIAL

## 2024-11-06 NOTE — TELEPHONE ENCOUNTER
"[facility] Call Center    Phone Message    May a detailed message be left on voicemail: yes     Reason for Call: Other: Pt would like a call back from care team to discuss if she should get the \"pneumonia shot\". Per pt's went to the pharmacy and they said no, but was told yes by provider. Pt needs clarification.        Action Taken: Other: pulm     Travel Screening: Not Applicable     Date of Service:                                                                     "

## 2024-11-07 NOTE — TELEPHONE ENCOUNTER
Per Dr. Perdomo:  If she has not had a PCV20 in the last 5 years, then she should get the shot.     -Spoke with Morenita. Informed her of Dr. Perodmo recommendation to get the PCV20 from her PCP. She will reach out to her PCP to get this done.

## 2024-11-27 ENCOUNTER — NURSE TRIAGE (OUTPATIENT)
Dept: FAMILY MEDICINE | Facility: CLINIC | Age: 79
End: 2024-11-27
Payer: COMMERCIAL

## 2024-11-27 DIAGNOSIS — M25.561 ACUTE PAIN OF RIGHT KNEE: Primary | ICD-10-CM

## 2024-11-27 NOTE — TELEPHONE ENCOUNTER
Patient Contact    Attempt # 1    Was call answered?  No.  Left message on voicemail with information to call me back.    Yoli Seals RN

## 2024-11-27 NOTE — TELEPHONE ENCOUNTER
"Nurse Triage SBAR    Is this a 2nd Level Triage? NO    Situation: Called patient regarding her Susanhart message. She stated that for the past four days she has been having right knee pain.     Background: She stated that she has a history of osteoarthritis in the knee. There is no redness, swelling, or warmth in the knee.     Assessment: See below    Protocol Recommended Disposition:   See in Office Within 3 Days    Recommendation: Patient was wondering if she can get an order for a brace instead of scheduling an appointment. Routing to provider to review and advise.     Routed to provider    Does the patient meet one of the following criteria for ADS visit consideration? 16+ years old, with an [facility] PCP     TIP  Providers, please consider if this condition is appropriate for management at one of our Acute and Diagnostic Services sites.     If patient is a good candidate, please use dotphrase <dot>triageresponse and select Refer to ADS to document.      Reason for Disposition   MODERATE pain (e.g., symptoms interfere with work or school, limping) and present > 3 days    Additional Information   Negative: Sounds like a life-threatening emergency to the triager   Negative: Followed a knee injury   Negative: Swollen knee joint and fever   Negative: Patient sounds very sick or weak to the triager   Negative: Can't move swollen joint at all   Negative: Thigh or calf pain and only 1 side and present > 1 hour   Negative: Thigh or calf swelling and only 1 side   Negative: SEVERE pain (e.g., excruciating, unable to walk)   Negative: Very swollen knee joint   Negative: Painful rash with multiple small blisters grouped together (i.e., dermatomal distribution or 'band' or 'stripe')   Negative: Looks like a boil, infected sore, deep ulcer, or other infected rash (spreading redness, pus)    Answer Assessment - Initial Assessment Questions  1. LOCATION and RADIATION: \"Where is the pain located?\"         Right knee front of the " "knee    2. QUALITY: \"What does the pain feel like?\"  (e.g., sharp, dull, aching, burning)        Aching and burning     3. SEVERITY: \"How bad is the pain?\" \"What does it keep you from doing?\"   (Scale 1-10; or mild, moderate, severe)        5/10    4. ONSET: \"When did the pain start?\" \"Does it come and go, or is it there all the time?\"        4 days ago    5. RECURRENT: \"Have you had this pain before?\" If Yes, ask: \"When, and what happened then?\"        Yes, has arthritis     6. SETTING: \"Has there been any recent work, exercise or other activity that involved that part of the body?\"         No    7. AGGRAVATING FACTORS: \"What makes the knee pain worse?\" (e.g., walking, climbing stairs, running)        Walking     8. ASSOCIATED SYMPTOMS: \"Is there any swelling or redness of the knee?\"        None    9. OTHER SYMPTOMS: \"Do you have any other symptoms?\" (e.g., chest pain, difficulty breathing, fever, calf pain)        None    10. PREGNANCY: \"Is there any chance you are pregnant?\" \"When was your last menstrual period?\"          No    Protocols used: Knee Pain-[facility] Triage Team    "

## 2024-12-18 ENCOUNTER — OFFICE VISIT (OUTPATIENT)
Dept: FAMILY MEDICINE | Facility: CLINIC | Age: 79
End: 2024-12-18
Payer: COMMERCIAL

## 2024-12-18 VITALS
DIASTOLIC BLOOD PRESSURE: 64 MMHG | TEMPERATURE: 98.2 F | WEIGHT: 139.9 LBS | RESPIRATION RATE: 16 BRPM | HEIGHT: 63 IN | HEART RATE: 78 BPM | SYSTOLIC BLOOD PRESSURE: 130 MMHG | BODY MASS INDEX: 24.79 KG/M2 | OXYGEN SATURATION: 97 %

## 2024-12-18 DIAGNOSIS — I10 ESSENTIAL HYPERTENSION: ICD-10-CM

## 2024-12-18 DIAGNOSIS — F41.1 GAD (GENERALIZED ANXIETY DISORDER): ICD-10-CM

## 2024-12-18 DIAGNOSIS — M79.672 LEFT FOOT PAIN: ICD-10-CM

## 2024-12-18 DIAGNOSIS — F32.5 MAJOR DEPRESSION IN REMISSION (H): ICD-10-CM

## 2024-12-18 DIAGNOSIS — R25.1 TREMOR, PHYSIOLOGICAL: ICD-10-CM

## 2024-12-18 DIAGNOSIS — I42.8 OTHER CARDIOMYOPATHIES (H): ICD-10-CM

## 2024-12-18 DIAGNOSIS — G47.33 OSA (OBSTRUCTIVE SLEEP APNEA): ICD-10-CM

## 2024-12-18 DIAGNOSIS — M79.675 PAIN OF TOE OF LEFT FOOT: ICD-10-CM

## 2024-12-18 DIAGNOSIS — G24.3 CERVICAL DYSTONIA: ICD-10-CM

## 2024-12-18 DIAGNOSIS — I48.0 PAROXYSMAL ATRIAL FIBRILLATION (H): ICD-10-CM

## 2024-12-18 DIAGNOSIS — I47.10 SVT (SUPRAVENTRICULAR TACHYCARDIA) (H): ICD-10-CM

## 2024-12-18 DIAGNOSIS — Z12.31 VISIT FOR SCREENING MAMMOGRAM: ICD-10-CM

## 2024-12-18 DIAGNOSIS — Z01.818 PRE-OP EXAM: Primary | ICD-10-CM

## 2024-12-18 DIAGNOSIS — C50.919 PRIMARY MALIGNANT NEOPLASM OF FEMALE BREAST (H): ICD-10-CM

## 2024-12-18 PROBLEM — R56.9 SEIZURE (H): Status: RESOLVED | Noted: 2022-11-08 | Resolved: 2024-12-18

## 2024-12-18 LAB
ALBUMIN SERPL BCG-MCNC: 4.5 G/DL (ref 3.5–5.2)
ALP SERPL-CCNC: 80 U/L (ref 40–150)
ALT SERPL W P-5'-P-CCNC: 16 U/L (ref 0–50)
ANION GAP SERPL CALCULATED.3IONS-SCNC: 12 MMOL/L (ref 7–15)
AST SERPL W P-5'-P-CCNC: 22 U/L (ref 0–45)
BASOPHILS # BLD AUTO: 0 10E3/UL (ref 0–0.2)
BASOPHILS NFR BLD AUTO: 1 %
BILIRUB SERPL-MCNC: 0.5 MG/DL
BUN SERPL-MCNC: 16 MG/DL (ref 8–23)
CALCIUM SERPL-MCNC: 9.1 MG/DL (ref 8.8–10.4)
CHLORIDE SERPL-SCNC: 103 MMOL/L (ref 98–107)
CREAT SERPL-MCNC: 0.66 MG/DL (ref 0.51–0.95)
EGFRCR SERPLBLD CKD-EPI 2021: 90 ML/MIN/1.73M2
EOSINOPHIL # BLD AUTO: 0.2 10E3/UL (ref 0–0.7)
EOSINOPHIL NFR BLD AUTO: 3 %
ERYTHROCYTE [DISTWIDTH] IN BLOOD BY AUTOMATED COUNT: 13.8 % (ref 10–15)
GLUCOSE SERPL-MCNC: 92 MG/DL (ref 70–99)
HCO3 SERPL-SCNC: 23 MMOL/L (ref 22–29)
HCT VFR BLD AUTO: 39.5 % (ref 35–47)
HGB BLD-MCNC: 12.8 G/DL (ref 11.7–15.7)
IMM GRANULOCYTES # BLD: 0 10E3/UL
IMM GRANULOCYTES NFR BLD: 0 %
LYMPHOCYTES # BLD AUTO: 1 10E3/UL (ref 0.8–5.3)
LYMPHOCYTES NFR BLD AUTO: 16 %
MCH RBC QN AUTO: 30.5 PG (ref 26.5–33)
MCHC RBC AUTO-ENTMCNC: 32.4 G/DL (ref 31.5–36.5)
MCV RBC AUTO: 94 FL (ref 78–100)
MONOCYTES # BLD AUTO: 0.5 10E3/UL (ref 0–1.3)
MONOCYTES NFR BLD AUTO: 7 %
NEUTROPHILS # BLD AUTO: 4.8 10E3/UL (ref 1.6–8.3)
NEUTROPHILS NFR BLD AUTO: 74 %
PLATELET # BLD AUTO: 270 10E3/UL (ref 150–450)
POTASSIUM SERPL-SCNC: 4.4 MMOL/L (ref 3.4–5.3)
PROT SERPL-MCNC: 7.1 G/DL (ref 6.4–8.3)
RBC # BLD AUTO: 4.2 10E6/UL (ref 3.8–5.2)
SODIUM SERPL-SCNC: 138 MMOL/L (ref 135–145)
WBC # BLD AUTO: 6.5 10E3/UL (ref 4–11)

## 2024-12-18 PROCEDURE — G2211 COMPLEX E/M VISIT ADD ON: HCPCS | Performed by: PHYSICIAN ASSISTANT

## 2024-12-18 PROCEDURE — 80053 COMPREHEN METABOLIC PANEL: CPT | Performed by: PHYSICIAN ASSISTANT

## 2024-12-18 PROCEDURE — 99214 OFFICE O/P EST MOD 30 MIN: CPT | Performed by: PHYSICIAN ASSISTANT

## 2024-12-18 PROCEDURE — 85025 COMPLETE CBC W/AUTO DIFF WBC: CPT | Performed by: PHYSICIAN ASSISTANT

## 2024-12-18 PROCEDURE — 36415 COLL VENOUS BLD VENIPUNCTURE: CPT | Performed by: PHYSICIAN ASSISTANT

## 2024-12-18 RX ORDER — IBUPROFEN 200 MG
200 TABLET ORAL
COMMUNITY
Start: 2024-12-12

## 2024-12-18 ASSESSMENT — PAIN SCALES - GENERAL: PAINLEVEL_OUTOF10: MODERATE PAIN (4)

## 2024-12-18 NOTE — PROGRESS NOTES
Preoperative Evaluation  [facility]  [address] [facility]  [place] [address]  Phone: [phone]  Primary Provider: Alexis De Leon PA-C  Pre-op Performing Provider: Alexis De Leon PA-C  Dec 18, 2024             12/14/2024   Surgical Information   What procedure is being done? Left big toe joint fusion and pin correction adjacent toe    Facility or Hospital where procedure/surgery will be performed: [facility]    Who is doing the procedure / surgery? Alexis Hancock M.D.    Date of surgery / procedure: January 10, 2025    Time of surgery / procedure: Approximately 7:00 am    Where do you plan to recover after surgery? at home with Home Care        Patient-reported     Fax number for surgical facility: Note does not need to be faxed, will be available electronically in Epic.    Assessment & Plan     The proposed surgical procedure is considered INTERMEDIATE risk.    Pre-op exam  Pain of toe of left foot  Left foot pain    Optimized for procedure pending labs.  Check CBC and CMP.  Hold NSAIDs/vitamins/supplements 1 week prior to procedure.  Will send H&P, echo/EKG and labs when the labs result to surgery team.    - CBC with platelets and differential  - Comprehensive metabolic panel (BMP + Alb, Alk Phos, ALT, AST, Total. Bili, TP)    Visit for screening mammogram  Primary malignant neoplasm of female breast (H)  - MA Screening Bilateral w/ Tono    SVT (supraventricular tachycardia) (H)  Essential hypertension  Other cardiomyopathies (H)  Paroxysmal atrial fibrillation (H)  Completed recent echo/EKG which is agreeable.  Will fax to surgery team.    Cervical dystonia  Tremor, physiological  Stable    CELESTE (generalized anxiety disorder)  Major depression in remission (H)  Stable. Option to start Zoloft post-surgery.     KARMEN (obstructive sleep apnea)  Recently completed sleep study       - No identified additional risk factors other than previously  addressed      30 minutes spent by me on the date of the encounter doing chart review, review of test results, interpretation of tests, patient visit, and documentation   The longitudinal plan of care for the diagnosis(es)/condition(s) as documented were addressed during this visit. Due to the added complexity in care, I will continue to support Morenita in the subsequent management and with ongoing continuity of care.    Recommendation  Approval given to proceed with proposed procedure pending review of diagnostic evaluation.    Subjective   Morenita is a 79 year old, presenting for the following:  Pre-Op Exam    Here today for preoperative clearance for upcoming left foot/toe procedure.    No problems in the past with anesthesia.  No history of MI, CVA/TIA, DVT/PE.  Denies chest pain, shortness of breath, dyspnea on exertion, heart racing, or new leg swelling.    History of paroxysmal SVT/A-fib.  Recent echo/EKG reviewed from her cardiology team.    History of anxiety/MDD which is well-controlled currently without medication.  Has not started the sertraline.    History of hypertension without medication.  Well-controlled.    Moderate KARMEN per report, upcoming sleep medicine follow-up    Via the Health Maintenance questionnaire, the patient has reported the following services have been completed -Mammogram: [facility] 2023-09-01, this information has not been sent to the abstraction team.  HPI related to upcoming procedure:         12/14/2024   Pre-Op Questionnaire   Have you ever had a heart attack or stroke? No    Have you ever had surgery on your heart or blood vessels, such as a stent placement, a coronary artery bypass, or surgery on an artery in your head, neck, heart, or legs? (!) UNKNOWN    Do you have chest pain with activity? No    Do you have a history of heart failure? No    Do you currently have a cold, bronchitis or symptoms of other infection? No    Do you have a cough, shortness of breath,  or wheezing? No    Do you or anyone in your family have previous history of blood clots? No    Do you or does anyone in your family have a serious bleeding problem such as prolonged bleeding following surgeries or cuts? No    Have you ever had problems with anemia or been told to take iron pills? No    Have you had any abnormal blood loss such as black, tarry or bloody stools, or abnormal vaginal bleeding? No    Have you ever had a blood transfusion? No    Are you willing to have a blood transfusion if it is medically needed before, during, or after your surgery? Yes    Have you or any of your relatives ever had problems with anesthesia? (!) UNKNOWN    Do you have sleep apnea, excessive snoring or daytime drowsiness? (!) UNKNOWN    Do you have any artifical heart valves or other implanted medical devices like a pacemaker, defibrillator, or continuous glucose monitor? No    Do you have artificial joints? No    Are you allergic to latex? No        Patient-reported     Health Care Directive  Patient does not have a Health Care Directive: Patient states has Advance Directive and will bring in a copy to clinic.    Preoperative Review of    reviewed - no record of controlled substances prescribed.      Patient Active Problem List    Diagnosis Date Noted    Paroxysmal atrial fibrillation (H) 12/18/2024     Priority: Medium    Persistent postural-perceptual dizziness 08/14/2024     Priority: Medium    Primary malignant neoplasm of female breast (H) 08/02/2024     Priority: Medium    Cervical dystonia 06/18/2024     Priority: Medium     1985      Other cardiomyopathies (H) 01/16/2023     Priority: Medium    Seizure (H) 11/08/2022     Priority: Medium    Mild persistent asthma without complication 06/15/2022     Priority: Medium    Essential hypertension 08/10/2021     Priority: Medium    Dizziness 08/10/2021     Priority: Medium    Primary osteoarthritis of both knees 09/21/2020     Priority: Medium    Gastroesophageal  reflux disease without esophagitis 08/18/2020     Priority: Medium    CELESTE (generalized anxiety disorder) 12/26/2019     Priority: Medium    Pericarditis 12/18/2019     Priority: Medium    Tremor, physiological 01/01/2019     Priority: Medium     Formatting of this note might be different from the original.  ingrid Deras, [facility]. rec clonazepam 0.5 mg nightly and botox for torticollis. FU 3-4 mos  Formatting of this note might be different from the original.  ingrid Deras, [facility]. rec clonazepam 0.5 mg nightly and botox for torticollis. FU 3-4 mos      Acute internal derangement of left knee 01/03/2018     Priority: Medium    Major depression in remission (H) 01/09/2017     Priority: Medium    Breast cancer in situ 12/15/2015     Priority: Medium     Overview:   S/p mastectomy      SVT (supraventricular tachycardia) (H) 12/15/2015     Priority: Medium    Presbyopia 07/16/2009     Priority: Medium    Senile nuclear sclerosis 07/16/2009     Priority: Medium     Formatting of this note might be different from the original.  Senile Nuclear Sclerosis L>R        Past Medical History:   Diagnosis Date    Anxiety     Arthritis 2016    in knees    Asthma 2012    adult-onset asthma diagnosed in 2012    Cancer (H)     DCIS (ductal carcinoma in situ) 2001    stage 0 status post left mastectomy in 2001    Depressive disorder     Heart disease 2018 -2021    still feel pain w pericarditis.    PONV (postoperative nausea and vomiting)     Torticollis     Tremor      Past Surgical History:   Procedure Laterality Date    BIOPSY  breasr right    BREAST SURGERY      CARDIAC SURGERY  May 2023    Ablation surgery at [facility] looking for faulty electrical nodes' not found by Dr. FARHANA Brooks    CATARACT IOL, RT/LT      COLONOSCOPY  2018    MASTECTOMY      DCIS    ODONTECTOMY Left 08/02/2022    Procedure: SURGICAL EXTRACTION, TOOTH - Teeth #12, 14, 19;  Surgeon: Arvin Garza DDS;  Location:  OR     RETINAL REATTACHMENT       Current Outpatient Medications   Medication Sig Dispense Refill    acetaminophen (TYLENOL) 325 MG tablet Take 2 tablets (650 mg) by mouth every 4 hours as needed for mild pain      albuterol (PROAIR HFA/PROVENTIL HFA/VENTOLIN HFA) 108 (90 Base) MCG/ACT inhaler Inhale 2 puffs into the lungs every 6 hours as needed for shortness of breath / dyspnea      Blood Pressure Monitor KIT 1 Device as needed (for home BP moniotoring) 1 kit 0    calcium carbonate-vitamin D 600-125 MG-UNIT TABS Take 1 tablet by mouth once a week      calcium citrate-vitamin D (CALCIUM CITRATE-VITAMIN D3) 315-6.25 MG-MCG TABS per tablet Take 1 tablet by mouth      Cyanocobalamin 50 MCG TABS Take 50 mcg by mouth once a week Sundays      fluticasone-salmeterol (ADVAIR HFA) 115-21 MCG/ACT inhaler Inhale 2 puffs into the lungs 2 times daily. 12 g 11    ibuprofen (ADVIL/MOTRIN) 200 MG tablet Take 200 mg by mouth.      LORazepam (ATIVAN) 0.5 MG tablet Take 1 tablet (0.5 mg) by mouth every 8 hours as needed for anxiety 20 tablet 0       Allergies   Allergen Reactions    Levalbuterol Shortness Of Breath    Diltiazem Dizziness    Cats Other (See Comments)     Itchy eyes    Dust Mites      Other reaction(s): Wheezing  Wheezing/shortness/breath/see (IRP )    Qvar [Beclomethasone]      Per patient- allergic to QVAR    Amitriptyline Palpitations    Escitalopram Anxiety        Social History     Tobacco Use    Smoking status: Former     Current packs/day: 0.00     Average packs/day: 1 pack/day for 9.0 years (9.0 ttl pk-yrs)     Types: Cigarettes     Start date: 1966     Quit date: 1975     Years since quittin.7    Smokeless tobacco: Never    Tobacco comments:     Quit in ..smoked again in  to   1/2 pk or less   Substance Use Topics    Alcohol use: Not Currently     Comment: Holiday glass of wine. summer glass of beer  once in awhile     Family History   Problem Relation Age of Onset    Cancer Sister       "   breast cancer    Breast Cancer Sister         estrogen use    Asthma Sister     Breast Cancer Sister     Asthma Sister     Heart Failure Mother          at 97    Breast Cancer Mother         diagnosed at 94    Colon Cancer Sister     Anesthesia Reaction Sister     Asthma Sister     Other Cancer Son         Testicular    Anxiety Disorder Sister     Asthma Sister     Glaucoma No family hx of     Macular Degeneration No family hx of      History   Drug Use Unknown             Review of Systems  Constitutional, neuro, ENT, endocrine, pulmonary, cardiac, gastrointestinal, genitourinary, musculoskeletal, integument and psychiatric systems are negative, except as otherwise noted.    Objective    /64 (BP Location: Right arm, Patient Position: Sitting, Cuff Size: Adult Regular)   Pulse 78   Temp 98.2  F (36.8  C) (Temporal)   Resp 16   Ht 1.588 m (5' 2.5\")   Wt 63.5 kg (139 lb 14.4 oz)   SpO2 97%   BMI 25.18 kg/m     Estimated body mass index is 25.18 kg/m  as calculated from the following:    Height as of this encounter: 1.588 m (5' 2.5\").    Weight as of this encounter: 63.5 kg (139 lb 14.4 oz).  Physical Exam  GENERAL: alert and no distress  EYES: Eyes grossly normal to inspection, PERRL and conjunctivae and sclerae normal  NECK: no adenopathy, no asymmetry, masses, or scars  RESP: lungs clear to auscultation - no rales, rhonchi or wheezes  CV: regular rate and rhythm, normal S1 S2, no S3 or S4, no murmur, click or rub, no peripheral edema  MS: no gross musculoskeletal defects noted, no edema  SKIN: no suspicious lesions or rashes  NEURO: Normal strength and tone, mentation intact and speech normal  PSYCH: mentation appears normal, affect normal/bright    Recent Labs   Lab Test 24  1015   HGB 13.3         POTASSIUM 4.4   CR 0.65        Diagnostics  Labs pending at this time.  Results will be reviewed when available.   No EKG this visit, completed in the last 90 days.    Revised " Cardiac Risk Index (RCRI)  The patient has the following serious cardiovascular risks for perioperative complications:   - No serious cardiac risks = 0 points     RCRI Interpretation: 0 points: Class I (very low risk - 0.4% complication rate)    The likelihood of other entities in the differential is insufficient to justify any further testing for them at this time. This was explained to the patient. The patient was advised that persistent or worsening symptoms would require further evaluation. Patient advised to call the office and if unable to reach to go to the emergency room if they develop any new or worsening symptoms. Expressed understanding and agreement with above stated plan.     Signed Electronically by: Alexis De Leon PA-C  A copy of this evaluation report is provided to the requesting physician.

## 2024-12-18 NOTE — RESULT ENCOUNTER NOTE
Darnell Xavier,     -Stable blood counts. No signs of anemia.     -Your comprehensive metabolic panel which includes tests of liver function (ALT, AST, total bilirubin, alkaline phosphatase), kidney function (creatinine, BUN), electrolytes (sodium, potassium, calcium), and blood sugar (glucose) returned stable for you.    Good to go for your procedure! I did review your sleep study results. Appears you do have sleep apnea and I noted to your surgery team. Keep your upcoming appt with your sleep medicine team to review further.     Let me know if you have any questions or concerns,     Alexis De Leon PA-C  [facility]

## 2024-12-18 NOTE — PATIENT INSTRUCTIONS
-1 week prior to the procedure hold vitamins/supplements + NSAIDs (ibuprofen, aleve, advil, aspirin). Tylenol/acetaminophen is safe     -Continue all other meds    -Do not eat anything after midnight  (lobo of the surgery) and nothing the morning of the surgery.     -Follow all instructions given by the surgery team. They usually give out a packet. Read it and please follow it precisely. This helps surgical experience and outcomes.     -If you have any questions do not hesitate to call me or the surgeon/surgical team.

## 2024-12-18 NOTE — RESULT ENCOUNTER NOTE
Labs are stable. Patient is optimized for surgery. Please send H&P and labs to their surgical team.

## 2024-12-19 ENCOUNTER — PATIENT OUTREACH (OUTPATIENT)
Dept: CARE COORDINATION | Facility: CLINIC | Age: 79
End: 2024-12-19

## 2024-12-20 ENCOUNTER — TRANSFERRED RECORDS (OUTPATIENT)
Dept: HEALTH INFORMATION MANAGEMENT | Facility: CLINIC | Age: 79
End: 2024-12-20
Payer: COMMERCIAL

## 2025-01-02 ENCOUNTER — NURSE TRIAGE (OUTPATIENT)
Dept: FAMILY MEDICINE | Facility: CLINIC | Age: 80
End: 2025-01-02
Payer: COMMERCIAL

## 2025-01-02 DIAGNOSIS — R11.0 NAUSEA: Primary | ICD-10-CM

## 2025-01-02 RX ORDER — ONDANSETRON 4 MG/1
4 TABLET, ORALLY DISINTEGRATING ORAL EVERY 8 HOURS PRN
Qty: 20 TABLET | Refills: 0 | Status: SHIPPED | OUTPATIENT
Start: 2025-01-02

## 2025-01-02 RX ORDER — ONDANSETRON 4 MG/1
4 TABLET, ORALLY DISINTEGRATING ORAL EVERY 8 HOURS PRN
Status: CANCELLED
Start: 2025-01-02

## 2025-01-02 NOTE — TELEPHONE ENCOUNTER
Nurse Triage SBAR    Is this a 2nd Level Triage? NO      Situation: Pt complains of nausea, chills, body aches nasal congestion and fatigue for 3 days and wants nausea medication.     Background: Pt has not been able to eat much the last 3 days. She is unable to check her temperature but has chills. She has one episode of vomiting this morning.     Assessment: Pt needs to be evaluated. Rule out COVID-19.      Protocol Recommended Disposition:   Go To Office Now     Recommendation: Do COVID-19 test at home. Schedule OV or see UC if test is negative. Pt wants Rx for nausea without an appt. Pt agreed to do in home COVID-19 test and call clinic back with results today.     HC- push fluids with electrolytes or ginger ale, BRAT diet.     Routed to provider- Please advice if nausea medication can be prescribed.    Does the patient meet one of the following criteria for ADS visit consideration? No        Reason for Disposition   Unexplained nausea   Fever > 101 F  (38.3 C) and over 60 years of age    Additional Information   Negative: Shock suspected (e.g., cold/pale/clammy skin, too weak to stand, low BP, rapid pulse)   Negative: Sounds like a life-threatening emergency to the triager   Negative: Nausea or vomiting and pregnancy < 20 weeks   Negative: Menstrual Period - Missed or Late (i.e., pregnancy suspected)   Negative: Heat exhaustion suspected (i.e., dehydration from heat exposure)   Negative: Anxiety or stress suspected (i.e., nausea with anxiety attacks or stressful situations)   Negative: Traumatic Brain Injury (TBI) suspected   Negative: Vomiting occurs   Negative: Other symptom is present, see that guideline.  (e.g., chest pain, headache, dizziness, abdominal pain, colds, sore throat, etc.).   Negative: Unable to walk, or can only walk with assistance (e.g., requires support)   Negative: Difficulty breathing   Negative: Insulin-dependent diabetes (Type I) and glucose > 400 mg/dL (22 mmol/L)   Negative: Drinking  very little and dehydration suspected (e.g., no urine > 12 hours, very dry mouth, very lightheaded)   Negative: Patient sounds very sick or weak to the triager   Negative: Fever > 104 F  (40 C)    Protocols used: Nausea-A-[place]

## 2025-01-02 NOTE — TELEPHONE ENCOUNTER
Pt informed of Rx approval. Pt did take COVID-19 test at home, and results is negative. Triage advised pt to seek care at ER if unable to keep food or fluids down or has new or worsening symptoms. Pt verbalized understanding and agreement with plan.

## 2025-01-02 NOTE — TELEPHONE ENCOUNTER
"Pt calling back and states \"I think this my test is positive\". Pt was not interested in Paxlovid.     Pt advised on when to call clinic back and when to go to ER. Pt in agreement with plan.    Dagmar Apple RN    "

## 2025-01-07 ENCOUNTER — PATIENT OUTREACH (OUTPATIENT)
Dept: GERIATRIC MEDICINE | Facility: CLINIC | Age: 80
End: 2025-01-07

## 2025-01-07 NOTE — PROGRESS NOTES
[facility] Care Coordination Contact    Care Coordinator received call from Morenita. She is asking for 3 MOW weekly for her foot surgery.   Care Coordinator completed request and tasked CMS.     ASHLEIGH Ramsay  [facility]  Phone: [phone]

## 2025-01-07 NOTE — PROGRESS NOTES
[facility] Care Coordination Contact    Change in services- meals added.    Submitted referrals/auths for meals and Updated services in Database.    Provider Signature - No Support Plan Shared:  Member indicates that they do not want their support plan shared with any EW providers. and Member Signature - Support Plan Change:  Per CC, member has made a change to their support plan.  Care Plan Change Letter mailed to member for signature with a self-addressed return envelope.    Virginie Ha  [facility]  Senior Care Management Specialist  [phone]

## 2025-01-08 ENCOUNTER — OFFICE VISIT (OUTPATIENT)
Dept: URGENT CARE | Facility: URGENT CARE | Age: 80
End: 2025-01-08
Payer: COMMERCIAL

## 2025-01-08 ENCOUNTER — ANCILLARY PROCEDURE (OUTPATIENT)
Dept: GENERAL RADIOLOGY | Facility: CLINIC | Age: 80
End: 2025-01-08
Attending: EMERGENCY MEDICINE
Payer: COMMERCIAL

## 2025-01-08 VITALS
SYSTOLIC BLOOD PRESSURE: 138 MMHG | DIASTOLIC BLOOD PRESSURE: 72 MMHG | WEIGHT: 135 LBS | HEART RATE: 86 BPM | TEMPERATURE: 98.6 F | OXYGEN SATURATION: 98 % | BODY MASS INDEX: 24.3 KG/M2

## 2025-01-08 DIAGNOSIS — R10.11 RUQ ABDOMINAL PAIN: ICD-10-CM

## 2025-01-08 DIAGNOSIS — R10.11 RUQ ABDOMINAL PAIN: Primary | ICD-10-CM

## 2025-01-08 DIAGNOSIS — U07.1 INFECTION DUE TO 2019 NOVEL CORONAVIRUS: ICD-10-CM

## 2025-01-08 LAB
ALBUMIN UR-MCNC: 30 MG/DL
APPEARANCE UR: CLEAR
BACTERIA #/AREA URNS HPF: ABNORMAL /HPF
BILIRUB UR QL STRIP: NEGATIVE
COLOR UR AUTO: YELLOW
GLUCOSE UR STRIP-MCNC: NEGATIVE MG/DL
HGB UR QL STRIP: NEGATIVE
KETONES UR STRIP-MCNC: ABNORMAL MG/DL
LEUKOCYTE ESTERASE UR QL STRIP: NEGATIVE
MUCOUS THREADS #/AREA URNS LPF: PRESENT /LPF
NITRATE UR QL: NEGATIVE
PH UR STRIP: 5.5 [PH] (ref 5–7)
RBC #/AREA URNS AUTO: ABNORMAL /HPF
SP GR UR STRIP: >=1.03 (ref 1–1.03)
UROBILINOGEN UR STRIP-ACNC: 0.2 E.U./DL
WBC #/AREA URNS AUTO: ABNORMAL /HPF

## 2025-01-08 PROCEDURE — 81001 URINALYSIS AUTO W/SCOPE: CPT | Performed by: EMERGENCY MEDICINE

## 2025-01-08 PROCEDURE — 71101 X-RAY EXAM UNILAT RIBS/CHEST: CPT | Mod: TC | Performed by: RADIOLOGY

## 2025-01-08 PROCEDURE — 99214 OFFICE O/P EST MOD 30 MIN: CPT | Performed by: EMERGENCY MEDICINE

## 2025-01-08 NOTE — PROGRESS NOTES
Assessment & Plan     Diagnosis:    ICD-10-CM    1. RUQ abdominal pain  R10.11 UA Macroscopic with reflex to Microscopic and Culture - Clinic Collect     UA Microscopic with Reflex to Culture     XR Ribs & Chest Right G/E 3 Views      2. Infection due to 2019 novel coronavirus  U07.1 XR Ribs & Chest Right G/E 3 Views          Medical Decision Making:  Patient with history of recent COVID-19 infection (tested positive 12/31/2024) presented to urgent care with RUQ abdominal pain starting this morning. She notes this started after coughing fits this morning. Rib series x-ray without signs of obvious fracture or pneumonia.  Patient clearly has positive Wilder sign, at least pain in this location to palpation.  Otherwise pain is very minimal.  Low suspicion for PE or cardiac cause.  UA unremarkable.  Discussed with patient and given her recent nausea symptoms I do feel cholecystitis should be ruled out amongst other etiologies.  They do not availability today and she is stable, afebrile and not in severe distress, therefore we discussed going to the [facility] tomorrow morning for further evaluation.  Patient amenable to plan and understands indications to go to the ER tonight.  Questions answered.    Referral to Acute and Diagnostic Services    [phone] ([place]) [address], [address], [place] [address]    Transition to Acute & Diagnostic Services Clinic has been discussed with patient, and she agrees with next level of care.   Patient understands that evaluation/treatment at [facility] typically takes significantly longer than in clinic/urgent care (>2 hours).  The [facility] has been contacted by provider/staff to confirm patient acceptance.         Special issues:      None                     The following provider has assessed this patient for intervention at [facility], and directed the patient for referral: KELLY Ramirez PA-C M  [facility]    Subjective     Morenita Moore is a 79 year old female who presents to clinic today for the following health issues:  Chief Complaint   Patient presents with    Urgent Care     Sharp pain in right lower abdomen, intermittent -worse with movement or touch, frequent bowel movement  x today - had COVID recently, on day 10 -taking a lot of tylenol for knee pain        HPI  Patient on day 10 of COVID, tested +8 days ago.  She has had some nausea, vomiting and diarrhea, this has been waxing and waning in severity but overall getting better.  Cough is also improving and she does feel a lot better overall but this morning she started to have sharp pains in her right upper quadrant, worse with movement or touch.  She notes it happened when she was bending down and feels it more with coughing.  She denies any fever, lower abdominal pain, dysuria, hematuria, history of kidney stones.  He continues to feel somewhat nauseated and has not had the greatest appetite lately.  No dark or bloody stools, chest pain, shortness of breath, wheezing or other concerns.    Review of Systems    See HPI    Objective      Vitals: /72   Pulse 86   Temp 98.6  F (37  C) (Tympanic)   Wt 61.2 kg (135 lb)   SpO2 98%   BMI 24.30 kg/m      Patient Vitals for the past 24 hrs:   BP Temp Temp src Pulse SpO2 Weight   01/08/25 1230 138/72 98.6  F (37  C) Tympanic 86 98 % 61.2 kg (135 lb)       Vital signs reviewed by: Frandy Matthews PA-C    Physical Exam   Constitutional: Patient is alert and cooperative. No acute distress.  Mouth: Mucous membranes are moist.   Cardiovascular: Regular rate and rhythm  Pulmonary/Chest: Lungs are clear to auscultation throughout. Effort normal. No respiratory distress. No wheezes, rales or rhonchi.  GI: Right upper quadrant abdominal tenderness to palpation.  Positive Wilder sign.  No CVA tenderness bilaterally.  Neurological: Alert and oriented x3.  Skin: No rash noted on  visualized skin.  Psychiatric:The patient has a normal mood and affect.     Labs/Imaging:  Results for orders placed or performed in visit on 01/08/25   XR Ribs & Chest Right G/E 3 Views     Status: None    Narrative    EXAM: XR RIBS and CHEST RT 3VW  LOCATION: [facility]  DATE: 1/8/2025    INDICATION: Right upper quadrant abdominal pain, Infection due to 2019 novel coronavirus  COMPARISON: Chest CT 4/26/2024.      Impression    IMPRESSION: The visualized heart and lungs are negative. Loop recorder is again noted overlying the heart. No displaced rib fractures.   Results for orders placed or performed in visit on 01/08/25   UA Macroscopic with reflex to Microscopic and Culture - Clinic Collect     Status: Abnormal    Specimen: Urine, Clean Catch   Result Value Ref Range    Color Urine Yellow Colorless, Straw, Light Yellow, Yellow    Appearance Urine Clear Clear    Glucose Urine Negative Negative mg/dL    Bilirubin Urine Negative Negative    Ketones Urine Trace (A) Negative mg/dL    Specific Gravity Urine >=1.030 1.003 - 1.035    Blood Urine Negative Negative    pH Urine 5.5 5.0 - 7.0    Protein Albumin Urine 30 (A) Negative mg/dL    Urobilinogen Urine 0.2 0.2, 1.0 E.U./dL    Nitrite Urine Negative Negative    Leukocyte Esterase Urine Negative Negative   UA Microscopic with Reflex to Culture     Status: Abnormal   Result Value Ref Range    Bacteria Urine Few (A) None Seen /HPF    RBC Urine 0-2 0-2 /HPF /HPF    WBC Urine 0-5 0-5 /HPF /HPF    Mucus Urine Present (A) None Seen /LPF    Narrative    Urine Culture not indicated         Frandy Matthews PA-C, January 8, 2025

## 2025-01-09 ENCOUNTER — OFFICE VISIT (OUTPATIENT)
Dept: PEDIATRICS | Facility: CLINIC | Age: 80
End: 2025-01-09
Payer: COMMERCIAL

## 2025-01-09 ENCOUNTER — ANCILLARY PROCEDURE (OUTPATIENT)
Dept: GENERAL RADIOLOGY | Facility: CLINIC | Age: 80
End: 2025-01-09
Attending: INTERNAL MEDICINE
Payer: COMMERCIAL

## 2025-01-09 VITALS
HEIGHT: 63 IN | DIASTOLIC BLOOD PRESSURE: 74 MMHG | OXYGEN SATURATION: 99 % | TEMPERATURE: 97.3 F | BODY MASS INDEX: 23.92 KG/M2 | HEART RATE: 81 BPM | WEIGHT: 135 LBS | RESPIRATION RATE: 16 BRPM | SYSTOLIC BLOOD PRESSURE: 130 MMHG

## 2025-01-09 DIAGNOSIS — M81.8 OTHER OSTEOPOROSIS WITHOUT CURRENT PATHOLOGICAL FRACTURE: ICD-10-CM

## 2025-01-09 DIAGNOSIS — R10.11 RUQ ABDOMINAL PAIN: ICD-10-CM

## 2025-01-09 DIAGNOSIS — K59.00 CONSTIPATION, UNSPECIFIED CONSTIPATION TYPE: ICD-10-CM

## 2025-01-09 DIAGNOSIS — R10.11 RUQ ABDOMINAL PAIN: Primary | ICD-10-CM

## 2025-01-09 LAB
ALBUMIN SERPL-MCNC: 4.1 G/DL (ref 3.4–5)
ALP SERPL-CCNC: 74 U/L (ref 40–150)
ALT SERPL W P-5'-P-CCNC: 16 U/L (ref 0–50)
ANION GAP SERPL CALCULATED.3IONS-SCNC: 12 MMOL/L (ref 3–14)
AST SERPL W P-5'-P-CCNC: 20 U/L (ref 0–45)
BASOPHILS # BLD AUTO: 0 10E3/UL (ref 0–0.2)
BASOPHILS NFR BLD AUTO: 1 %
BILIRUB SERPL-MCNC: 0.8 MG/DL (ref 0.2–1.3)
BUN SERPL-MCNC: 8 MG/DL (ref 7–30)
CALCIUM SERPL-MCNC: 9.8 MG/DL (ref 8.5–10.1)
CHLORIDE BLD-SCNC: 107 MMOL/L (ref 94–109)
CO2 SERPL-SCNC: 27 MMOL/L (ref 20–32)
CREAT SERPL-MCNC: 0.5 MG/DL (ref 0.52–1.04)
CRP SERPL-MCNC: <3 MG/L
EGFRCR SERPLBLD CKD-EPI 2021: >90 ML/MIN/1.73M2
EOSINOPHIL # BLD AUTO: 0.1 10E3/UL (ref 0–0.7)
EOSINOPHIL NFR BLD AUTO: 2 %
ERYTHROCYTE [DISTWIDTH] IN BLOOD BY AUTOMATED COUNT: 13.5 % (ref 10–15)
GLUCOSE BLD-MCNC: 104 MG/DL (ref 70–99)
HCT VFR BLD AUTO: 39.9 % (ref 35–47)
HGB BLD-MCNC: 13 G/DL (ref 11.7–15.7)
IMM GRANULOCYTES # BLD: 0 10E3/UL
IMM GRANULOCYTES NFR BLD: 0 %
LYMPHOCYTES # BLD AUTO: 0.9 10E3/UL (ref 0.8–5.3)
LYMPHOCYTES NFR BLD AUTO: 16 %
MCH RBC QN AUTO: 30 PG (ref 26.5–33)
MCHC RBC AUTO-ENTMCNC: 32.6 G/DL (ref 31.5–36.5)
MCV RBC AUTO: 92 FL (ref 78–100)
MONOCYTES # BLD AUTO: 0.3 10E3/UL (ref 0–1.3)
MONOCYTES NFR BLD AUTO: 6 %
NEUTROPHILS # BLD AUTO: 3.9 10E3/UL (ref 1.6–8.3)
NEUTROPHILS NFR BLD AUTO: 75 %
PLATELET # BLD AUTO: 251 10E3/UL (ref 150–450)
POTASSIUM BLD-SCNC: 4.3 MMOL/L (ref 3.4–5.3)
PROT SERPL-MCNC: 7.2 G/DL (ref 6.8–8.8)
RBC # BLD AUTO: 4.33 10E6/UL (ref 3.8–5.2)
SODIUM SERPL-SCNC: 146 MMOL/L (ref 135–145)
WBC # BLD AUTO: 5.2 10E3/UL (ref 4–11)

## 2025-01-09 NOTE — PROGRESS NOTES
Acute and Diagnostic Services Clinic Visit    ASSESSMENT:      1.  Lower chest/upper abdominal wall tenderness, from coughing due to COVID.  No suggestion of significant intra-abdominal process.   Patient reassured in this regard.  2.  Constipation, likely complication of not eating/drinking much from COVID  4.  Osteoporosis, severe (noted with review of CT from last spring).   Has not been on treatment, discussed options.   Patient would like to discuss with PCP.    5.  History of colon polyps, most recent colonoscopy not available.    PLAN:  1.  Use either Fleets enema or suppository today (Dulcolax or glycerin)  2.  Take 17 grams (1 capful) of Miralax now, and repeat tonight or in the morning.  (Polyethelene glycol).  May continue to take additional doses daily until having regular bowel movements.    3.  Rest and Fluids   4.  Use tylenol for pain.   Hold calcium until bowel movements improve.    5.  Discuss osteoporosis treatment with PCP    I have called [facility] with request to fax colonoscopy report and path results.   As of the end of this shift, this has not been received.  Will defer to PCP.      Greater than 40 minutes were spent on chart review, patient care and assessment, and other management of this patient for this visit.           Joan Xavier is a 79 year old, presenting for the following health issues:  Abdominal Pain    HPI     Abdominal/Flank Pain  Onset/Duration: 2 days  Description:   Character: sharp pain with movement.  Location: right upper quadrant  Radiation: None  Intensity: mild, moderate  Progression of Symptoms:  same  Accompanying Signs & Symptoms:  Fever/chills: no   Gas/Bloating: no   Nausea: no   Vomitting: no   Diarrhea: no   Constipation:YES  Dysuria: no            Hematuria: no            Frequency: no            Incontinence of urine: no   History:            Last bowel movement:  unsure of last good bowel movement .  Very small today,  "cari.  Trauma: no   Previous similar pain: no    Previous tests done: CT           Previous Abdominal surgery: no   Precipitating factors:   Does the pain change with:     Food: no      Bowel Movement: some pain with straining     Urination: no              Other factors: worse  Therapies tried and outcome:  None  When food last eaten: some popcorn this am.    Over the past month, has been taking a lot of Tylenol for knee pain.    Tested positive for COVID on 12/31, with both gastrointestinal and respiratory symptoms.   Feeling better but not fully.   Still coughing with clear phlegm, no dyspnea (using Advair usually once daily), energy low.  With COVID, had vomiting without pain, poor appetite.  Nausea is now minimal, appetite still low.  No diarrhea, perhaps mild constipation.    Patient reports last colonoscopy perhaps 5 years ago, showed benign polyp.   Was told she doesn't need more colonoscopies.   Results not in chart.    Sister with history of colon cancer.     Patient with history of cysts in mesentery, liver, ovarian.   Was seen at [place].   Was advised to watch things. Had virtual visit with hepatologist who reassured her liver cysts were benign.   No vaginal symptoms at this time.          Objective    /74 (BP Location: Right arm, Patient Position: Sitting, Cuff Size: Adult Regular)   Pulse 81   Temp 97.3  F (36.3  C)   Resp 16   Ht 1.588 m (5' 2.5\")   Wt 61.2 kg (135 lb)   SpO2 99%   BMI 24.30 kg/m    There is no height or weight on file to calculate BMI.  Physical Exam       Results for orders placed or performed in visit on 01/09/25 (from the past 24 hours)   CBC with platelets differential    Narrative    The following orders were created for panel order CBC with platelets differential.  Procedure                               Abnormality         Status                     ---------                               -----------         ------                     CBC with platelets and " radha..[[id number]]                      Final result                 Please view results for these tests on the individual orders.   Comprehensive metabolic panel   Result Value Ref Range    Sodium 146 (H) 135 - 145 mmol/L    Potassium 4.3 3.4 - 5.3 mmol/L    Chloride 107 94 - 109 mmol/L    Carbon Dioxide (CO2) 27 20 - 32 mmol/L    Anion Gap 12 3 - 14 mmol/L    Urea Nitrogen 8 7 - 30 mg/dL    Creatinine 0.50 (L) 0.52 - 1.04 mg/dL    GFR Estimate >90 >60 mL/min/1.73m2    Calcium 9.8 8.5 - 10.1 mg/dL    Glucose 104 (H) 70 - 99 mg/dL    Alkaline Phosphatase 74 40 - 150 U/L    AST 20 0 - 45 U/L    ALT 16 0 - 50 U/L    Protein Total 7.2 6.8 - 8.8 g/dL    Albumin 4.1 3.4 - 5.0 g/dL    Bilirubin Total 0.8 0.2 - 1.3 mg/dL   CBC with platelets and differential   Result Value Ref Range    WBC Count 5.2 4.0 - 11.0 10e3/uL    RBC Count 4.33 3.80 - 5.20 10e6/uL    Hemoglobin 13.0 11.7 - 15.7 g/dL    Hematocrit 39.9 35.0 - 47.0 %    MCV 92 78 - 100 fL    MCH 30.0 26.5 - 33.0 pg    MCHC 32.6 31.5 - 36.5 g/dL    RDW 13.5 10.0 - 15.0 %    Platelet Count 251 150 - 450 10e3/uL    % Neutrophils 75 %    % Lymphocytes 16 %    % Monocytes 6 %    % Eosinophils 2 %    % Basophils 1 %    % Immature Granulocytes 0 %    Absolute Neutrophils 3.9 1.6 - 8.3 10e3/uL    Absolute Lymphocytes 0.9 0.8 - 5.3 10e3/uL    Absolute Monocytes 0.3 0.0 - 1.3 10e3/uL    Absolute Eosinophils 0.1 0.0 - 0.7 10e3/uL    Absolute Basophils 0.0 0.0 - 0.2 10e3/uL    Absolute Immature Granulocytes 0.0 <=0.4 10e3/uL       ABDOMINAL XRAY IMPRESSION: Moderate to large amount of stool throughout the colon consistent with history. Bowel gas pattern is nonobstructed.     Signed Electronically by: Kervin Rod MD

## 2025-01-09 NOTE — Clinical Note
Marky.  I saw Morenita for chest/abdominal wall pain due to coughing from COVID.   Also probable secondary constipation.  As we discussed, I called [facility] with request to fax colonoscopy report and path results.   I just checked the fax bin before leaving today and this has not been received.  Will defer to you whether patient warrants a follow-up colonoscopy at this time.   In my opinion, she probably does.  Also, I saw a T12 fracture on a prior CT.   Her DXA shows low BMD, so she has severe osteoporosis and a high future fracture risk.   I mentioned intervention but she is hesitant and want to speak to you further about this.   Thanks, Kervin

## 2025-01-09 NOTE — PATIENT INSTRUCTIONS
PLAN:  1.  Use either Fleets enema or (Dulcolax or glycerin) suppository today   2.  Take 17 grams (1 capful) of Miralax now, and repeat tonight or in the morning.  (Polyethelene glycol).  May continue to take additional doses daily until having regular bowel movements.    3.  Rest and Fluids   4.  Use tylenol for pain.   Hold calcium until bowel movements improve.    5.  Discuss osteoporosis treatment with PCP  6.  Await colonoscopy report from [facility]

## 2025-01-27 ENCOUNTER — ANESTHESIA EVENT (OUTPATIENT)
Dept: SURGERY | Facility: CLINIC | Age: 80
End: 2025-01-27
Payer: COMMERCIAL

## 2025-01-27 ENCOUNTER — ANESTHESIA (OUTPATIENT)
Dept: SURGERY | Facility: CLINIC | Age: 80
End: 2025-01-27
Payer: COMMERCIAL

## 2025-01-27 ENCOUNTER — HOSPITAL ENCOUNTER (OUTPATIENT)
Facility: CLINIC | Age: 80
Discharge: HOME | End: 2025-01-27
Attending: ORTHOPAEDIC SURGERY | Admitting: ORTHOPAEDIC SURGERY
Payer: COMMERCIAL

## 2025-01-27 VITALS
TEMPERATURE: 98.6 F | SYSTOLIC BLOOD PRESSURE: 128 MMHG | HEART RATE: 89 BPM | BODY MASS INDEX: 24.46 KG/M2 | RESPIRATION RATE: 16 BRPM | WEIGHT: 135.9 LBS | DIASTOLIC BLOOD PRESSURE: 81 MMHG | OXYGEN SATURATION: 96 %

## 2025-01-27 DIAGNOSIS — S98.132A AMPUTATION OF TOE OF LEFT FOOT: Primary | ICD-10-CM

## 2025-01-27 PROCEDURE — 250N000011 HC RX IP 250 OP 636: Performed by: ORTHOPAEDIC SURGERY

## 2025-01-27 PROCEDURE — 258N000003 HC RX IP 258 OP 636: Performed by: ANESTHESIOLOGY

## 2025-01-27 PROCEDURE — 250N000009 HC RX 250: Performed by: NURSE ANESTHETIST, CERTIFIED REGISTERED

## 2025-01-27 PROCEDURE — 272N000001 HC OR GENERAL SUPPLY STERILE: Performed by: ORTHOPAEDIC SURGERY

## 2025-01-27 PROCEDURE — 250N000011 HC RX IP 250 OP 636

## 2025-01-27 PROCEDURE — 258N000003 HC RX IP 258 OP 636: Performed by: NURSE ANESTHETIST, CERTIFIED REGISTERED

## 2025-01-27 PROCEDURE — 250N000025 HC SEVOFLURANE, PER MIN: Performed by: ORTHOPAEDIC SURGERY

## 2025-01-27 PROCEDURE — 999N000141 HC STATISTIC PRE-PROCEDURE NURSING ASSESSMENT: Performed by: ORTHOPAEDIC SURGERY

## 2025-01-27 PROCEDURE — 710N000012 HC RECOVERY PHASE 2, PER MINUTE: Performed by: ORTHOPAEDIC SURGERY

## 2025-01-27 PROCEDURE — 88311 DECALCIFY TISSUE: CPT | Mod: TC | Performed by: ORTHOPAEDIC SURGERY

## 2025-01-27 PROCEDURE — 88305 TISSUE EXAM BY PATHOLOGIST: CPT | Mod: TC | Performed by: ORTHOPAEDIC SURGERY

## 2025-01-27 PROCEDURE — 710N000009 HC RECOVERY PHASE 1, LEVEL 1, PER MIN: Performed by: ORTHOPAEDIC SURGERY

## 2025-01-27 PROCEDURE — 370N000017 HC ANESTHESIA TECHNICAL FEE, PER MIN: Performed by: ORTHOPAEDIC SURGERY

## 2025-01-27 PROCEDURE — 360N000076 HC SURGERY LEVEL 3, PER MIN: Performed by: ORTHOPAEDIC SURGERY

## 2025-01-27 PROCEDURE — 250N000011 HC RX IP 250 OP 636: Performed by: NURSE ANESTHETIST, CERTIFIED REGISTERED

## 2025-01-27 RX ORDER — SODIUM CHLORIDE, SODIUM LACTATE, POTASSIUM CHLORIDE, CALCIUM CHLORIDE 600; 310; 30; 20 MG/100ML; MG/100ML; MG/100ML; MG/100ML
INJECTION, SOLUTION INTRAVENOUS CONTINUOUS PRN
Status: DISCONTINUED | OUTPATIENT
Start: 2025-01-27 | End: 2025-01-27

## 2025-01-27 RX ORDER — NALOXONE HYDROCHLORIDE 0.4 MG/ML
0.1 INJECTION, SOLUTION INTRAMUSCULAR; INTRAVENOUS; SUBCUTANEOUS
Status: DISCONTINUED | OUTPATIENT
Start: 2025-01-27 | End: 2025-01-27 | Stop reason: HOSPADM

## 2025-01-27 RX ORDER — LABETALOL HYDROCHLORIDE 5 MG/ML
10 INJECTION, SOLUTION INTRAVENOUS
Status: DISCONTINUED | OUTPATIENT
Start: 2025-01-27 | End: 2025-01-27 | Stop reason: HOSPADM

## 2025-01-27 RX ORDER — OXYCODONE HYDROCHLORIDE 5 MG/1
5 TABLET ORAL
Status: CANCELLED | OUTPATIENT
Start: 2025-01-27

## 2025-01-27 RX ORDER — CEFAZOLIN SODIUM/WATER 2 G/20 ML
2 SYRINGE (ML) INTRAVENOUS
Status: COMPLETED | OUTPATIENT
Start: 2025-01-27 | End: 2025-01-27

## 2025-01-27 RX ORDER — HYDROCODONE BITARTRATE AND ACETAMINOPHEN 5; 325 MG/1; MG/1
1 TABLET ORAL EVERY 6 HOURS PRN
Status: DISCONTINUED | OUTPATIENT
Start: 2025-01-27 | End: 2025-01-27 | Stop reason: HOSPADM

## 2025-01-27 RX ORDER — PROMETHAZINE HYDROCHLORIDE 12.5 MG/1
12.5 TABLET ORAL EVERY 6 HOURS PRN
Qty: 28 TABLET | Refills: 0 | Status: SHIPPED | OUTPATIENT
Start: 2025-01-27

## 2025-01-27 RX ORDER — HYDROCODONE BITARTRATE AND ACETAMINOPHEN 5; 325 MG/1; MG/1
1 TABLET ORAL EVERY 6 HOURS PRN
Qty: 10 TABLET | Refills: 0 | Status: SHIPPED | OUTPATIENT
Start: 2025-01-27

## 2025-01-27 RX ORDER — HYDRALAZINE HYDROCHLORIDE 20 MG/ML
2.5-5 INJECTION INTRAMUSCULAR; INTRAVENOUS EVERY 10 MIN PRN
Status: DISCONTINUED | OUTPATIENT
Start: 2025-01-27 | End: 2025-01-27 | Stop reason: HOSPADM

## 2025-01-27 RX ORDER — OXYCODONE HYDROCHLORIDE 5 MG/1
10 TABLET ORAL
Status: CANCELLED | OUTPATIENT
Start: 2025-01-27

## 2025-01-27 RX ORDER — BUPIVACAINE HYDROCHLORIDE 5 MG/ML
INJECTION, SOLUTION EPIDURAL; INTRACAUDAL PRN
Status: DISCONTINUED | OUTPATIENT
Start: 2025-01-27 | End: 2025-01-27 | Stop reason: HOSPADM

## 2025-01-27 RX ORDER — SODIUM CHLORIDE, SODIUM LACTATE, POTASSIUM CHLORIDE, CALCIUM CHLORIDE 600; 310; 30; 20 MG/100ML; MG/100ML; MG/100ML; MG/100ML
INJECTION, SOLUTION INTRAVENOUS CONTINUOUS
Status: DISCONTINUED | OUTPATIENT
Start: 2025-01-27 | End: 2025-01-27 | Stop reason: HOSPADM

## 2025-01-27 RX ORDER — DEXAMETHASONE SODIUM PHOSPHATE 4 MG/ML
4 INJECTION, SOLUTION INTRA-ARTICULAR; INTRALESIONAL; INTRAMUSCULAR; INTRAVENOUS; SOFT TISSUE
Status: DISCONTINUED | OUTPATIENT
Start: 2025-01-27 | End: 2025-01-27 | Stop reason: HOSPADM

## 2025-01-27 RX ORDER — HYDROMORPHONE HCL IN WATER/PF 6 MG/30 ML
0.2 PATIENT CONTROLLED ANALGESIA SYRINGE INTRAVENOUS EVERY 5 MIN PRN
Status: DISCONTINUED | OUTPATIENT
Start: 2025-01-27 | End: 2025-01-27 | Stop reason: HOSPADM

## 2025-01-27 RX ORDER — LIDOCAINE HYDROCHLORIDE 20 MG/ML
INJECTION, SOLUTION INFILTRATION; PERINEURAL PRN
Status: DISCONTINUED | OUTPATIENT
Start: 2025-01-27 | End: 2025-01-27

## 2025-01-27 RX ORDER — CEFAZOLIN SODIUM/WATER 2 G/20 ML
2 SYRINGE (ML) INTRAVENOUS SEE ADMIN INSTRUCTIONS
Status: DISCONTINUED | OUTPATIENT
Start: 2025-01-27 | End: 2025-01-27 | Stop reason: HOSPADM

## 2025-01-27 RX ORDER — FENTANYL CITRATE 50 UG/ML
50 INJECTION, SOLUTION INTRAMUSCULAR; INTRAVENOUS EVERY 5 MIN PRN
Status: DISCONTINUED | OUTPATIENT
Start: 2025-01-27 | End: 2025-01-27 | Stop reason: HOSPADM

## 2025-01-27 RX ORDER — ONDANSETRON 2 MG/ML
INJECTION INTRAMUSCULAR; INTRAVENOUS PRN
Status: DISCONTINUED | OUTPATIENT
Start: 2025-01-27 | End: 2025-01-27

## 2025-01-27 RX ORDER — ONDANSETRON 4 MG/1
4 TABLET, ORALLY DISINTEGRATING ORAL EVERY 30 MIN PRN
Status: DISCONTINUED | OUTPATIENT
Start: 2025-01-27 | End: 2025-01-27 | Stop reason: HOSPADM

## 2025-01-27 RX ORDER — FENTANYL CITRATE 50 UG/ML
25 INJECTION, SOLUTION INTRAMUSCULAR; INTRAVENOUS EVERY 5 MIN PRN
Status: DISCONTINUED | OUTPATIENT
Start: 2025-01-27 | End: 2025-01-27 | Stop reason: HOSPADM

## 2025-01-27 RX ORDER — DEXAMETHASONE SODIUM PHOSPHATE 4 MG/ML
INJECTION, SOLUTION INTRA-ARTICULAR; INTRALESIONAL; INTRAMUSCULAR; INTRAVENOUS; SOFT TISSUE PRN
Status: DISCONTINUED | OUTPATIENT
Start: 2025-01-27 | End: 2025-01-27

## 2025-01-27 RX ORDER — PROPOFOL 10 MG/ML
INJECTION, EMULSION INTRAVENOUS PRN
Status: DISCONTINUED | OUTPATIENT
Start: 2025-01-27 | End: 2025-01-27

## 2025-01-27 RX ORDER — ONDANSETRON 2 MG/ML
4 INJECTION INTRAMUSCULAR; INTRAVENOUS EVERY 30 MIN PRN
Status: DISCONTINUED | OUTPATIENT
Start: 2025-01-27 | End: 2025-01-27 | Stop reason: HOSPADM

## 2025-01-27 RX ORDER — PROPOFOL 10 MG/ML
INJECTION, EMULSION INTRAVENOUS CONTINUOUS PRN
Status: DISCONTINUED | OUTPATIENT
Start: 2025-01-27 | End: 2025-01-27

## 2025-01-27 RX ORDER — HYDROMORPHONE HCL IN WATER/PF 6 MG/30 ML
0.4 PATIENT CONTROLLED ANALGESIA SYRINGE INTRAVENOUS EVERY 5 MIN PRN
Status: DISCONTINUED | OUTPATIENT
Start: 2025-01-27 | End: 2025-01-27 | Stop reason: HOSPADM

## 2025-01-27 RX ORDER — FENTANYL CITRATE 50 UG/ML
INJECTION, SOLUTION INTRAMUSCULAR; INTRAVENOUS PRN
Status: DISCONTINUED | OUTPATIENT
Start: 2025-01-27 | End: 2025-01-27

## 2025-01-27 RX ORDER — IBUPROFEN 800 MG/1
800 TABLET, FILM COATED ORAL EVERY 6 HOURS PRN
Qty: 50 TABLET | Refills: 0 | Status: SHIPPED | OUTPATIENT
Start: 2025-01-27

## 2025-01-27 RX ORDER — ALBUTEROL SULFATE 0.83 MG/ML
2.5 SOLUTION RESPIRATORY (INHALATION) EVERY 4 HOURS PRN
Status: DISCONTINUED | OUTPATIENT
Start: 2025-01-27 | End: 2025-01-27 | Stop reason: HOSPADM

## 2025-01-27 RX ORDER — HYDROXYZINE HYDROCHLORIDE 10 MG/1
10 TABLET, FILM COATED ORAL 3 TIMES DAILY PRN
Qty: 20 TABLET | Refills: 0 | Status: SHIPPED | OUTPATIENT
Start: 2025-01-27

## 2025-01-27 RX ADMIN — PROPOFOL 200 MG: 10 INJECTION, EMULSION INTRAVENOUS at 08:20

## 2025-01-27 RX ADMIN — MIDAZOLAM 1 MG: 1 INJECTION INTRAMUSCULAR; INTRAVENOUS at 08:16

## 2025-01-27 RX ADMIN — PROPOFOL 50 MCG/KG/MIN: 10 INJECTION, EMULSION INTRAVENOUS at 08:17

## 2025-01-27 RX ADMIN — DEXAMETHASONE SODIUM PHOSPHATE 4 MG: 4 INJECTION, SOLUTION INTRA-ARTICULAR; INTRALESIONAL; INTRAMUSCULAR; INTRAVENOUS; SOFT TISSUE at 08:20

## 2025-01-27 RX ADMIN — Medication 2 G: at 08:17

## 2025-01-27 RX ADMIN — SODIUM CHLORIDE, POTASSIUM CHLORIDE, SODIUM LACTATE AND CALCIUM CHLORIDE: 600; 310; 30; 20 INJECTION, SOLUTION INTRAVENOUS at 07:48

## 2025-01-27 RX ADMIN — SODIUM CHLORIDE, POTASSIUM CHLORIDE, SODIUM LACTATE AND CALCIUM CHLORIDE: 600; 310; 30; 20 INJECTION, SOLUTION INTRAVENOUS at 07:03

## 2025-01-27 RX ADMIN — ONDANSETRON 4 MG: 2 INJECTION INTRAMUSCULAR; INTRAVENOUS at 08:46

## 2025-01-27 RX ADMIN — FENTANYL CITRATE 100 MCG: 50 INJECTION INTRAMUSCULAR; INTRAVENOUS at 08:33

## 2025-01-27 RX ADMIN — FENTANYL CITRATE 100 MCG: 50 INJECTION INTRAMUSCULAR; INTRAVENOUS at 08:16

## 2025-01-27 RX ADMIN — LIDOCAINE HYDROCHLORIDE 50 MG: 20 INJECTION, SOLUTION INFILTRATION; PERINEURAL at 08:17

## 2025-01-27 RX ADMIN — PHENYLEPHRINE HYDROCHLORIDE 50 MCG: 10 INJECTION INTRAVENOUS at 08:38

## 2025-01-27 ASSESSMENT — ACTIVITIES OF DAILY LIVING (ADL)
ADLS_ACUITY_SCORE: 54
ADLS_ACUITY_SCORE: 50
ADLS_ACUITY_SCORE: 51
ADLS_ACUITY_SCORE: 57
ADLS_ACUITY_SCORE: 51

## 2025-01-27 NOTE — ANESTHESIA POSTPROCEDURE EVALUATION
Patient: Morenita Moore    Procedure: Procedure(s):  Left second toe amputation       Anesthesia Type:  General    Note:  Disposition: Outpatient   Postop Pain Control: Uneventful            Sign Out: Well controlled pain   PONV: No   Neuro/Psych: Uneventful            Sign Out: Acceptable/Baseline neuro status   Airway/Respiratory: Uneventful            Sign Out: Acceptable/Baseline resp. status   CV/Hemodynamics: Uneventful            Sign Out: Acceptable CV status; No obvious hypovolemia; No obvious fluid overload   Other NRE:    DID A NON-ROUTINE EVENT OCCUR? No           Last vitals:  Vitals Value Taken Time   /86 01/27/25 1000   Temp 98.06  F (36.7  C) 01/27/25 1001   Pulse 75 01/27/25 1002   Resp 18 01/27/25 1002   SpO2 96 % 01/27/25 1005   Vitals shown include unfiled device data.    Electronically Signed By: Makenna Rodríguez MD  January 27, 2025  10:19 AM

## 2025-01-27 NOTE — ANESTHESIA PROCEDURE NOTES
Airway       Patient location during procedure: OR  Staff -        CRNA: Rajan Hoff APRN CRNA       Performed By: CRNA  Consent for Airway        Urgency: elective  Indications and Patient Condition       Indications for airway management: gio-procedural       Induction type:intravenous       Mask difficulty assessment: 1 - vent by mask    Final Airway Details       Final airway type: supraglottic airway    Supraglottic Airway Details        Type: LMA       Brand: I-Gel       LMA size: 4    Post intubation assessment        Placement verified by: capnometry, equal breath sounds and chest rise        Number of attempts at approach: 1       Number of other approaches attempted: 0       Secured with: tape       Ease of procedure: easy       Dentition: Intact and Unchanged

## 2025-01-27 NOTE — ANESTHESIA CARE TRANSFER NOTE
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    Anesthesia Type:   General     Note:    Oropharynx: oral airway in place  Level of Consciousness: drowsy  Oxygen Supplementation: face mask  Level of Supplemental Oxygen (L/min / FiO2): 8  Independent Airway: airway patency satisfactory and stable  Dentition: dentition unchanged  Vital Signs Stable: post-procedure vital signs reviewed and stable  Report to RN Given: handoff report given  Patient transferred to: PACU    Handoff Report: Identifed the Patient, Identified the Reponsible Provider, Reviewed the pertinent medical history, Discussed the surgical course, Reviewed Intra-OP anesthesia mangement and issues during anesthesia, Set expectations for post-procedure period and Allowed opportunity for questions and acknowledgement of understanding      Vitals:  Vitals Value Taken Time   /82    Temp 37    Pulse 72 01/27/25 0855   Resp 11 01/27/25 0855   SpO2 99 % 01/27/25 0855   Vitals shown include unfiled device data.    Electronically Signed By: JANELLE Verdugo CRNA  January 27, 2025  8:56 AM

## 2025-01-27 NOTE — OP NOTE
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]  Acquired hallux limitus of left foot [M20.5X2]  Dislocation of metatarsophalangeal joint of lesser toe of left foot [S93.125A]  Recurrent dislocation of toe of left foot [M24.478]   Post-operative diagnosis Same   Procedure: Procedure(s):  Left second toe amputation   Surgeon(s): Surgeons and Role:     * Antelmo Hancock MD - Primary     * Jasmin Perez MD - Assisting     * Bessie Pink PA-C - Assisting   Estimated blood loss: 5 mL    Specimens: ID Type Source Tests Collected by Time Destination   1 : LEFT 2ND TOE Amputation, Disarticulation Toe, Left SURGICAL PATHOLOGY EXAM Antelmo Hancock MD 1/27/2025  8:42 AM       Findings: Left second crossover toe     Indications: This is a 79-year-old female seen in my clinic was diagnosed with a second crossover toe severe hallux valgus deformity and a dislocation of the second metatarsophalangeal joint.  I had a long discussion with Morenita regarding her options at this point.  We could continue nonsurgical management or could consider surgical management.  She did fail nonsurgical treatment measures including shoewear modification and toe spacers.  We did discuss multiple surgical treatment options.  Her major problem is that she has such substantial hallux valgus deformity and a second MTP dislocation.  In order to correct her foot she would need a first metatarsophalangeal joint arthrodesis and a crossover toe repair.  She also may need a third claw toe correction.  Our other option is to simply performed a disarticulation at the MTP joint of the second toe and this would be a much easier recovery for a patient who is very nervous about her balance and healing process.  We did discuss this at length.  She initially wanted to perform the entire reconstruction and save the second toe but after thinking about it she changed her mind and wanted to have the  second toe amputated as I believe this is a much easier path to success for her.  We did discuss the risks and benefits of this procedure.  After discussing of these details she elected undergo the above-mentioned procedures.    Description of procedure:   The patient was met in the preoperative area and the operative site, the left foot, signed by myself.  Preoperative antibiotics were given.  The patient but back to the operating room and was placed in the supine position on the operating table.  All bony prominences were padded at this time.  She then underwent general anesthesia.  She was then prepped and draped in normal sterile fashion.  A timeout was then called ensuring we are operating on the correct site and the correct patient.  All staff concerns were addressed this time.  Following the timeout the left lower extremity was exsanguinated using an Esmarch and a Tourniquet was placed to 250 mmHg.  A fishmouth incision was made over the end of the second toe and dissection was carried down through the skin and subcutaneous tissue.  Hemostasis was achieved.  We identified the extensor and flexor tendons and these were divided sharply.  We then identified the neurovascular bundle and these were also divided sharply.  We then were able to easily see the MTP joint and we carefully used an inside-out technique in order to peel subperiosteally and not devitalized any of the subcutaneous or skin.  Once we disarticulated the toe we had 2 nice skin flaps remaining.  These were closed using 3-0 Monocryl and 3-0 nylon suture.  We did not need to even trim any skin as our flap was so well planned.  We then placed 15 cc of half percent Marcaine throughout the entirety of the foot.  Sterile bandages were placed and the patient she was transferred off the operative table and brought back to the postanesthesia care unit where she woke without any difficulty.    All counts correct at the end of the case.    Postoperative  plan: The patient be weightbearing as tolerated on the left lower extremity in a postoperative shoe.  Should be seen by clinic in 2 weeks for suture removal.  At that time she can transition into a normal shoe if her swelling and incision allow it.  She also may want to consider a Budin splint for the third and fourth toes.  She does not need to be on DVT chemoprophylaxis as she is weightbearing as tolerated.

## 2025-01-27 NOTE — ANESTHESIA PREPROCEDURE EVALUATION
Anesthesia Pre-Procedure Evaluation    Patient: Morenita Moore   MRN: [id number] : 1945        Procedure : Procedure(s):  Left second toe amputation          Past Medical History:   Diagnosis Date    Anxiety     Arthritis 2016    in knees    Asthma 2012    adult-onset asthma diagnosed in     Cancer (H)     DCIS (ductal carcinoma in situ)     stage 0 status post left mastectomy in     Depressive disorder     Heart disease  -    still feel pain w pericarditis.    Irregular heart beat     Pneumonia     PONV (postoperative nausea and vomiting)     Sleep apnea     Torticollis     Tremor       Past Surgical History:   Procedure Laterality Date    BIOPSY  breasr right    BREAST SURGERY      CARDIAC SURGERY  May 2023    Ablation surgery at [facility] looking for faulty electrical nodes' not found by Dr. FARHANA Brooks    CATARACT IOL, RT/LT      COLONOSCOPY      MASTECTOMY      DCIS    ODONTECTOMY Left 2022    Procedure: SURGICAL EXTRACTION, TOOTH - Teeth #12, 14, 19;  Surgeon: Arvin Garza DDS;  Location: [facility] OR    RETINAL REATTACHMENT        Allergies   Allergen Reactions    Levalbuterol Shortness Of Breath    Diltiazem Dizziness    Cats Other (See Comments)     Itchy eyes    Dust Mites      Other reaction(s): Wheezing  Wheezing/shortness/breath/see (IRP )    Qvar [Beclomethasone]      Per patient- allergic to QVAR    Amitriptyline Palpitations    Escitalopram Anxiety      Social History     Tobacco Use    Smoking status: Former     Current packs/day: 0.00     Average packs/day: 1 pack/day for 9.0 years (9.0 ttl pk-yrs)     Types: Cigarettes     Start date: 1966     Quit date: 1975     Years since quittin.8    Smokeless tobacco: Never    Tobacco comments:     Quit in ..smoked again in  to   1/2 pk or less   Substance Use Topics    Alcohol use: Not Currently     Comment: Holiday glass of wine. summer glass of beer  once in awhile      Wt Readings  from Last 1 Encounters:   01/27/25 61.6 kg (135 lb 14.4 oz)        Anesthesia Evaluation            ROS/MED HX  ENT/Pulmonary:     (+) sleep apnea,                     asthma                  Neurologic:       Cardiovascular: Comment: History of pericarditis and idiopathic cardiomyopathy - no recent issues      12/2022  Interpretation Summary     Left ventricular systolic function is normal.  The visual ejection fraction is 60-65%.  No regional wall motion abnormalities noted.  The right ventricle is normal in size and function.  No significant valve disease.  Trivial to small anterior pericardial effusion.  No echocardiographic evidence of ventricular interdependence or tamponade.  Normal inferior vena cava.  Hepatic cysts noted.     No significant changes compared to study dated 11/18/2022.      (+)  hypertension- -   -  - -                          Irregular Heartbeat/Palpitations (SVT and paroxysmal afib, s/p ablation 2023, not on medication),            METS/Exercise Tolerance:     Hematologic:       Musculoskeletal:       GI/Hepatic:     (+) GERD,                   Renal/Genitourinary:       Endo:       Psychiatric/Substance Use:       Infectious Disease:       Malignancy:   (+) Malignancy, History of Breast.    Other:            Physical Exam    Airway        Mallampati: II   TM distance: > 3 FB   Neck ROM: full   Mouth opening: > 3 cm    Respiratory Devices and Support         Dental           Cardiovascular   cardiovascular exam normal          Pulmonary   pulmonary exam normal                OUTSIDE LABS:  CBC:   Lab Results   Component Value Date    WBC 5.2 01/09/2025    WBC 6.5 12/18/2024    HGB 13.0 01/09/2025    HGB 12.8 12/18/2024    HCT 39.9 01/09/2025    HCT 39.5 12/18/2024     01/09/2025     12/18/2024     BMP:   Lab Results   Component Value Date     (H) 01/09/2025     12/18/2024    POTASSIUM 4.3 01/09/2025    POTASSIUM 4.4 12/18/2024    CHLORIDE 107 01/09/2025     "CHLORIDE 103 12/18/2024    CO2 27 01/09/2025    CO2 23 12/18/2024    BUN 8 01/09/2025    BUN 16.0 12/18/2024    CR 0.50 (L) 01/09/2025    CR 0.66 12/18/2024     (H) 01/09/2025    GLC 92 12/18/2024     COAGS:   Lab Results   Component Value Date    PTT 33 05/01/2020    INR 0.99 05/01/2020     POC: No results found for: \"BGM\", \"HCG\", \"HCGS\"  HEPATIC:   Lab Results   Component Value Date    ALBUMIN 4.1 01/09/2025    PROTTOTAL 7.2 01/09/2025    ALT 16 01/09/2025    AST 20 01/09/2025    ALKPHOS 74 01/09/2025    BILITOTAL 0.8 01/09/2025     OTHER:   Lab Results   Component Value Date    PH 7.02 (LL) 11/08/2022    LACT 0.9 11/09/2022    MARIBEL 9.8 01/09/2025    PHOS 3.5 11/14/2022    MAG 2.3 11/14/2022    LIPASE 138 11/08/2022    TSH 2.33 09/20/2023    CRP <0.1 10/20/2021    SED 11 06/15/2020       Anesthesia Plan    ASA Status:  3    NPO Status:  NPO Appropriate    Anesthesia Type: General.     - Airway: LMA   Induction: Intravenous.   Maintenance: Balanced.        Consents    Anesthesia Plan(s) and associated risks, benefits, and realistic alternatives discussed. Questions answered and patient/representative(s) expressed understanding.     - Discussed:     - Discussed with:  Patient            Postoperative Care    Pain management: IV analgesics, Oral pain medications, Multi-modal analgesia.   PONV prophylaxis: Ondansetron (or other 5HT-3), Dexamethasone or Solumedrol     Comments:               Makenna Rodríguez MD    I have reviewed the pertinent notes and labs in the chart from the past 30 days and (re)examined the patient.  Any updates or changes from those notes are reflected in this note.    Clinically Significant Risk Factors Present on Admission                   # Hypertension: Noted on problem list               # Asthma: noted on problem list          "

## 2025-01-27 NOTE — DISCHARGE INSTRUCTIONS
Maximum acetaminophen (Tylenol) dose from all sources should not exceed 4 grams (4000 mg) per day.    DR. EBENEZER MURILLO M.D.   CLINIC PHONE NUMBER:  [phone]  [facility]

## 2025-01-29 ENCOUNTER — PATIENT OUTREACH (OUTPATIENT)
Dept: GERIATRIC MEDICINE | Facility: CLINIC | Age: 80
End: 2025-01-29
Payer: COMMERCIAL

## 2025-01-29 LAB
PATH REPORT.COMMENTS IMP SPEC: NORMAL
PATH REPORT.FINAL DX SPEC: NORMAL
PATH REPORT.GROSS SPEC: NORMAL
PATH REPORT.MICROSCOPIC SPEC OTHER STN: NORMAL
PATH REPORT.RELEVANT HX SPEC: NORMAL
PHOTO IMAGE: NORMAL

## 2025-01-29 PROCEDURE — 88305 TISSUE EXAM BY PATHOLOGIST: CPT | Mod: 26 | Performed by: PATHOLOGY

## 2025-01-29 PROCEDURE — 88311 DECALCIFY TISSUE: CPT | Mod: 26 | Performed by: PATHOLOGY

## 2025-01-29 NOTE — PROGRESS NOTES
[facility] Care Coordination Contact    Per CC, meals provider is Pat Hernandez, not [facility]. Confirmed with CC that services never started.     Faxed auth to [facility] to update meals provider.     Virginie Ha  [facility]  Senior Care Management Specialist  [phone]

## 2025-02-18 ENCOUNTER — PATIENT OUTREACH (OUTPATIENT)
Dept: GERIATRIC MEDICINE | Facility: CLINIC | Age: 80
End: 2025-02-18
Payer: COMMERCIAL

## 2025-02-18 NOTE — PROGRESS NOTES
[facility] Care Coordination Contact    Per [employer]elsi[employer], she hasn't received copy of approval letter for [facility] transportation.    Sent transportation auth # to Kelsi. She confirmed receipt. Nothing else needed.     CC updated.     Virginie Ha  [facility]  Senior Care Management Specialist  [phone]

## 2025-02-20 ENCOUNTER — PATIENT OUTREACH (OUTPATIENT)
Dept: CARE COORDINATION | Facility: CLINIC | Age: 80
End: 2025-02-20
Payer: COMMERCIAL

## 2025-03-03 ASSESSMENT — SLEEP AND FATIGUE QUESTIONNAIRES
HOW LIKELY ARE YOU TO NOD OFF OR FALL ASLEEP IN A CAR, WHILE STOPPED FOR A FEW MINUTES IN TRAFFIC: WOULD NEVER DOZE
HOW LIKELY ARE YOU TO NOD OFF OR FALL ASLEEP WHILE SITTING AND TALKING TO SOMEONE: WOULD NEVER DOZE
HOW LIKELY ARE YOU TO NOD OFF OR FALL ASLEEP WHILE LYING DOWN TO REST IN THE AFTERNOON WHEN CIRCUMSTANCES PERMIT: HIGH CHANCE OF DOZING
HOW LIKELY ARE YOU TO NOD OFF OR FALL ASLEEP WHILE SITTING AND READING: MODERATE CHANCE OF DOZING
HOW LIKELY ARE YOU TO NOD OFF OR FALL ASLEEP WHILE WATCHING TV: MODERATE CHANCE OF DOZING
HOW LIKELY ARE YOU TO NOD OFF OR FALL ASLEEP WHILE SITTING INACTIVE IN A PUBLIC PLACE: SLIGHT CHANCE OF DOZING
HOW LIKELY ARE YOU TO NOD OFF OR FALL ASLEEP WHILE SITTING QUIETLY AFTER LUNCH WITHOUT ALCOHOL: SLIGHT CHANCE OF DOZING
HOW LIKELY ARE YOU TO NOD OFF OR FALL ASLEEP WHEN YOU ARE A PASSENGER IN A CAR FOR AN HOUR WITHOUT A BREAK: SLIGHT CHANCE OF DOZING

## 2025-03-06 ENCOUNTER — OFFICE VISIT (OUTPATIENT)
Dept: SLEEP MEDICINE | Facility: CLINIC | Age: 80
End: 2025-03-06
Payer: COMMERCIAL

## 2025-03-06 VITALS — WEIGHT: 136 LBS | HEIGHT: 63 IN | BODY MASS INDEX: 24.1 KG/M2 | OXYGEN SATURATION: 95 % | HEART RATE: 81 BPM

## 2025-03-06 DIAGNOSIS — G47.33 OSA (OBSTRUCTIVE SLEEP APNEA): Primary | ICD-10-CM

## 2025-03-06 NOTE — NURSING NOTE
"Chief Complaint   Patient presents with    Study Results       Initial Pulse 81   Ht 1.6 m (5' 3\")   Wt 61.7 kg (136 lb)   SpO2 95%   BMI 24.09 kg/m   Estimated body mass index is 24.09 kg/m  as calculated from the following:    Height as of this encounter: 1.6 m (5' 3\").    Weight as of this encounter: 61.7 kg (136 lb).    Medication Reconciliation: complete  ESS 10  Ivone Venegas MA   "

## 2025-03-06 NOTE — PROGRESS NOTES
[facility]   Outpatient Sleep Medicine  Mar 6, 2025       Name: Morenita Moore MRN# [id number]   Age: 79 year old YOB: 1945            Assessment and Plan:   1. KARMEN (obstructive sleep apnea) (Primary)    During this visit, we reviewed the summary of the study including stage, position, event distribution, oximetry and heart rate.  Moderate sleep apnea identified without significant hypoxemia - AHI 23.1, RDI 25.1, Supine AHI 34.3, Nonsupine AHI 13.8, REM AHI -, Low O2 86.0%, Time Spent <=88% 1.6 minutes / Time Spent <=89% 6.9 minutes.  Events were both obstructive and central, central apneas were mostly post arousal events.  Sleep architecture showed all stages of sleep present though poor sleep efficiency due to prolonged initial sleep latency, total sleep time 215.5 minutes.  Periodic limb movement index 29.0 with PLM arousal index 13.1.  No abnormal behaviors.  Normal sinus rhythm on cardiac monitoring.    Most recent echo 12/2022 showed EF 60-65%.     Reviewed results of study in detail today. Predominant finding was moderate sleep apnea and discussed pathophysiology and benefits of treatment. Discussed potential treatment options and patient would like to trial CPAP with nasal pillow mask.  Discussed titration PSG versus empiric auto CPAP at home with close monitoring for central events and patient elected the latter.  Can consider titration PSG pending progress.  Will be enrolled in [facility].  - Comprehensive DME    Reviewed importance of nightly therapy for effective treatment of KARMEN, and she voiced understanding and agreement.  We also reviewed importance of using PAP during the entire sleep duration to achieve maximal benefits, but reviewed that a minimum of 4 hours per night was required.     Discussed mildly increased PLMS on study, reports rare/intermittent RLS symptoms, no treatment indicated at this time and will focus first on addressing sleep disordered  "breathing.    She will be seen back approximately 2 months after starting PAP therapy to ensure compliance and review treatment outcomes.  However, if she develops any difficulties with treatment she is instructed to contact us or DME company immediately to troubleshoot problems.         Chief Complaint      Chief Complaint   Patient presents with    Study Results          History of Present Illness:   Morenita Moore is a 79-year-old female with history of breast cancer, GERD, SVT, MDD, CELESTE, HTN, asthma, A-fib, who presents to clinic today for sleep study follow-up visit.      Patient previously seen by my colleague Dr. Chamorro for consultation 7/16/2024.  At that time presented with snoring, nonrestorative sleep, fatigue and excessive daytime sleepiness, frequent nocturia raising concern for possible KARMEN.    Reviewed results of sleep study with patient and summarized as follows:  Sleep latency increased at 117.5 minutes without use of sleep aid. REM achieved. REM latency 116.5 minutes.  Sleep efficiency 46.5%. Total sleep time 215.5 minutes.  Sleep architecture:  Stage 1, 16.7 % (5%), stage 2, 54.8 % (45-55%), stage 3, 10.0 % (15-20%), stage REM, 18.6 % (20-25%).    AHI was 23.1 (central AHI 11.4). RDI 25.1.  REM AHI - .  Supine AHI 34.3 , consistent with severe  SUPINE KARMEN.    Baseline oxygen saturation was 93.4%. Lowest oxygen saturation was 86%. Time spent <88% was 1.6 minutes. Time spent <89% was 6.9 minutes.   Periodic Limb Movement Index 29/hour, arousal index 13.1 /hour.       Past medical/surgical history, family history, social history, medications and allergies were reviewed.           Physical Examination:   Pulse 81   Ht 1.6 m (5' 3\")   Wt 61.7 kg (136 lb)   SpO2 95%   BMI 24.09 kg/m    General appearance: Awake, alert, cooperative. Well groomed. Sitting comfortably in chair. In no apparent distress.  HEENT: Head: Normocephalic, atraumatic. Eyes:Conjunctiva clear. Sclera normal. Nose: " External appearance without deformity.   Pulmonary:  Able to speak easily in full sentences. No cough or wheeze.   Skin:  No rashes or significant lesions on visible skin.   Neurologic: Alert, oriented x3.   Psychiatric: Mood euthymic. Affect congruent with full range and intensity.      CC:  Alexis eD Leon PA-C  Mar 6, 2025     [facility]  [address] [place] [address]     [facility]  [address] [address]    Chart documentation was completed, in part, with [employer] voice-recognition software. Even though reviewed, some grammatical, spelling, and word errors may remain.    48 minutes spent on day of encounter doing chart review, history and exam, documentation, and further activities as noted above

## 2025-03-11 ENCOUNTER — PATIENT OUTREACH (OUTPATIENT)
Dept: GERIATRIC MEDICINE | Facility: CLINIC | Age: 80
End: 2025-03-11
Payer: COMMERCIAL

## 2025-03-11 NOTE — PROGRESS NOTES
[facility] Care Coordination Contact    Received a request to submit a DTR for the terminated of Meals. Documentation completed and faxed to the health plan. Care Coordinator aware.    Chapis Nguyen RN  Utilization   [facility]  [phone]

## 2025-03-18 ENCOUNTER — MYC MEDICAL ADVICE (OUTPATIENT)
Dept: SLEEP MEDICINE | Facility: CLINIC | Age: 80
End: 2025-03-18

## 2025-03-18 ENCOUNTER — TELEPHONE (OUTPATIENT)
Dept: FAMILY MEDICINE | Facility: CLINIC | Age: 80
End: 2025-03-18

## 2025-03-18 NOTE — TELEPHONE ENCOUNTER
General Call      Reason for Call:  QUESTION    What are your questions or concerns:  Morenita wants to know the expiration date of her DME order for Cpap machine. She has a lot going on right now and needs some time to sort some things out before getting the machine. She also wants a bit of time to consider the additional testing that was suggested.     Date of last appointment with provider: 03/06/25    Could we send this information to you in [employer] or would you prefer to receive a phone call?:   Patient would like to be contacted via [employer]

## 2025-03-19 ENCOUNTER — PATIENT OUTREACH (OUTPATIENT)
Dept: CARE COORDINATION | Facility: CLINIC | Age: 80
End: 2025-03-19
Payer: COMMERCIAL

## 2025-03-25 ENCOUNTER — ANCILLARY ORDERS (OUTPATIENT)
Dept: FAMILY MEDICINE | Facility: CLINIC | Age: 80
End: 2025-03-25

## 2025-03-25 ENCOUNTER — HOSPITAL ENCOUNTER (OUTPATIENT)
Dept: ULTRASOUND IMAGING | Facility: CLINIC | Age: 80
Discharge: HOME | End: 2025-03-25
Attending: PHYSICIAN ASSISTANT | Admitting: PHYSICIAN ASSISTANT
Payer: COMMERCIAL

## 2025-03-25 DIAGNOSIS — N83.8 OTHER NONINFLAMMATORY DISORDERS OF OVARY, FALLOPIAN TUBE AND BROAD LIGAMENT: ICD-10-CM

## 2025-03-25 DIAGNOSIS — N94.9 ADNEXAL CYST: Primary | ICD-10-CM

## 2025-03-25 DIAGNOSIS — N94.9 ADNEXAL CYST: ICD-10-CM

## 2025-03-25 PROCEDURE — 93976 VASCULAR STUDY: CPT

## 2025-03-25 PROCEDURE — 76856 US EXAM PELVIC COMPLETE: CPT

## 2025-03-25 NOTE — RESULT ENCOUNTER NOTE
Darnell Xavier,     Ovarian cyst has decreased in size.  Radiologist says this is very likely benign.  Recommend follow-up in 1 year.  Normal appearance of uterus.    Alexis De Leon PA-C  [facility]

## 2025-03-29 ENCOUNTER — MYC MEDICAL ADVICE (OUTPATIENT)
Dept: SLEEP MEDICINE | Facility: CLINIC | Age: 80
End: 2025-03-29
Payer: COMMERCIAL

## 2025-03-29 DIAGNOSIS — G47.33 OSA (OBSTRUCTIVE SLEEP APNEA): Primary | ICD-10-CM

## 2025-04-23 ENCOUNTER — PATIENT OUTREACH (OUTPATIENT)
Dept: GERIATRIC MEDICINE | Facility: CLINIC | Age: 80
End: 2025-04-23
Payer: COMMERCIAL

## 2025-04-23 NOTE — PROGRESS NOTES
[facility] Care Coordination Contact    Received a request to submit a DTR for the terminated of [employer]CL[employer]. Documentation completed and faxed to the health plan. Care Coordinator aware.    Chapis Nguyen RN  Utilization   [facility]  [phone]

## 2025-04-28 DIAGNOSIS — F41.1 GAD (GENERALIZED ANXIETY DISORDER): ICD-10-CM

## 2025-04-28 RX ORDER — LORAZEPAM 0.5 MG/1
0.5 TABLET ORAL EVERY 8 HOURS PRN
Qty: 20 TABLET | Refills: 0 | Status: SHIPPED | OUTPATIENT
Start: 2025-04-28

## 2025-04-28 NOTE — TELEPHONE ENCOUNTER
Pt asking for refill of the lorazapam. Last ordered 8/2024. States she is scheduled for August with PCP.     Pended medication and pharmacy.    Emelina Carballo RN

## 2025-05-28 ENCOUNTER — TELEPHONE (OUTPATIENT)
Dept: PULMONOLOGY | Facility: CLINIC | Age: 80
End: 2025-05-28
Payer: COMMERCIAL

## 2025-05-28 DIAGNOSIS — J45.909 UNCOMPLICATED ASTHMA, UNSPECIFIED ASTHMA SEVERITY, UNSPECIFIED WHETHER PERSISTENT: Primary | ICD-10-CM

## 2025-05-28 RX ORDER — INHALER, ASSIST DEVICES
SPACER (EA) MISCELLANEOUS
Qty: 1 EACH | Refills: 1 | Status: SHIPPED | OUTPATIENT
Start: 2025-05-28

## 2025-05-28 NOTE — TELEPHONE ENCOUNTER
"Spoke Morenita in regards to spacer. She reports she has a \"mask\" spacer that she received from our office. However, we do not have a product like this. She wants an extender/exhaust (like what is put on the end of a nebulizer). Advised her we can not order these. Will send in an order for the spacer to her [facility] per Dr. Perdomo.   "

## 2025-05-28 NOTE — TELEPHONE ENCOUNTER
"[facility] Call Center    Phone Message    May a detailed message be left on voicemail: yes     Reason for Call: Medication Refill Request    Has the patient contacted the pharmacy for the refill? Yes   Name of medication being requested: Spacer for her inhalers  Provider who prescribed the medication: Leanne  Pharmacy: [facility]  Date medication is needed: Routine     Pt specifies that she does not want the \"mask\" version of a spacer, she wants the one that essentially looks like a short tube that attaches to the end of the inhaler. Would like to get a few of them if possible.      Action Taken: Other: Pulm    Travel Screening: Not Applicable         "

## 2025-06-05 ENCOUNTER — TRANSFERRED RECORDS (OUTPATIENT)
Dept: HEALTH INFORMATION MANAGEMENT | Facility: CLINIC | Age: 80
End: 2025-06-05
Payer: COMMERCIAL

## 2025-06-30 ENCOUNTER — DOCUMENTATION ONLY (OUTPATIENT)
Dept: SLEEP MEDICINE | Facility: CLINIC | Age: 80
End: 2025-06-30
Payer: COMMERCIAL

## 2025-06-30 DIAGNOSIS — G47.33 OBSTRUCTIVE SLEEP APNEA (ADULT) (PEDIATRIC): Primary | ICD-10-CM

## 2025-06-30 NOTE — PROGRESS NOTES
Patient was offered choice of vendor and chose [facility].  Patient Morenita Moore was set up at [facility]  on June 30, 2025. Patient received a Resmed Airsense 11 Pressures were set at 5-15 cm H2O.   Patient s ramp is 5 cm H2O for Auto and FLEX/EPR is EPR2.  Patient received a Resmed Mask name: AIRFIT P10  Pillow mask size Standard, heated tubing and heated humidifier.  Patient has the following compliance requirements: usage only.  Rafy Pritchett

## 2025-07-03 ENCOUNTER — DOCUMENTATION ONLY (OUTPATIENT)
Dept: SLEEP MEDICINE | Facility: CLINIC | Age: 80
End: 2025-07-03
Payer: COMMERCIAL

## 2025-07-03 NOTE — PROGRESS NOTES
3 day Sleep therapy management telephone visit    Diagnostic AHI:PS.1         Confirmed with patient at time of call- N/A Patient is still interested in STM service       Message left for patient to return call        Objective data     Order Settings for PAP  CPAP min     CPAP max              Device settings from machine CPAP min 5     CPAP max 15                      Assessment: No usage but has account in [employer]      Patient has the following upcoming sleep appts:  Future Sleep Appointments         Provider Department    9/3/2025 2:00 PM (Arrive by 1:45 PM) Mallika Reed PA-C [facility] [place]            Replacement device: No  STM ordered by provider: Yes     Total time spent on accessing and  interpreting remote patient PAP therapy data  10 minutes    Total time spent counseling, coaching  and reviewing PAP therapy data with patient  1 minutes    [id number] no

## 2025-07-08 NOTE — TELEPHONE ENCOUNTER
"Medication Question or Clarification  Who is calling: The patient   What medication are you calling about (include dose and sig)?:  Prednisone  Who prescribed the medication?: Dr. Roper   What is your question/concern?: Patient is asking to taper off of the Prednisone. Patient states Dr. Roper told her \"there's a way to do it but she cant remember \". The patient is asking that Dr. Roper call the patient to advise. Patient states \"I would also like to get in to see Dr. Roper\". The patient was transferred to scheduling. Please call the patient to advise on tapering the prednisone if the patient was not able to schedule on 3/6/20.    Requested Pharmacy:   Okay to leave a detailed message?: Yes          "
Called pt. She is coming today at 920 and will discuss this at her visit.  
Her prescription was for 20 mg twice daily for 5 days so she does not need to taper off of it.  Please let her know.  Thanks.  
No

## 2025-07-16 ENCOUNTER — PATIENT OUTREACH (OUTPATIENT)
Dept: CARE COORDINATION | Facility: CLINIC | Age: 80
End: 2025-07-16
Payer: COMMERCIAL

## 2025-07-16 ENCOUNTER — DOCUMENTATION ONLY (OUTPATIENT)
Dept: SLEEP MEDICINE | Facility: CLINIC | Age: 80
End: 2025-07-16
Payer: COMMERCIAL

## 2025-07-16 NOTE — PROGRESS NOTES
14  DAY STM VISIT    Diagnostic AHI: PS.1         Message left for patient to return call     Assessment: Pt not meeting objective benchmarks for compliance       Action plan: waiting for patient to return call.  and pt to have 30 day STM visit.      Device type: Auto-CPAP    PAP settings: CPAP 5 min  cm  H20       CPAP max 15 cm  H20          Mask type:      Objective measures: 14 day rolling measures      Compliance   %      Leak    lpm  last  upload      AHI    last  upload      Average number of minutes       Objective measure goal  Compliance   Goal >70%  Leak   Goal < 24 lpm  AHI  Goal < 5  Usage  Goal >240    Patient has the following upcoming sleep appts:  Future Sleep Appointments         Provider Department    9/3/2025 2:00 PM (Arrive by 1:45 PM) Mallika Reed PA-C [facility]            Total time spent on accessing and interpreting remote patient PAP therapy data  10 minutes    Total time spent counseling, coaching  and reviewing PAP therapy data with patient  1 minutes    [id number]  [id number]  no (3 day STM)

## 2025-07-25 ENCOUNTER — PATIENT OUTREACH (OUTPATIENT)
Dept: GERIATRIC MEDICINE | Facility: CLINIC | Age: 80
End: 2025-07-25
Payer: COMMERCIAL

## 2025-08-05 ENCOUNTER — NURSE TRIAGE (OUTPATIENT)
Dept: FAMILY MEDICINE | Facility: CLINIC | Age: 80
End: 2025-08-05
Payer: COMMERCIAL

## 2025-08-06 ENCOUNTER — VIRTUAL VISIT (OUTPATIENT)
Dept: FAMILY MEDICINE | Facility: CLINIC | Age: 80
End: 2025-08-06
Payer: COMMERCIAL

## 2025-08-06 DIAGNOSIS — F33.0 MILD EPISODE OF RECURRENT MAJOR DEPRESSIVE DISORDER: ICD-10-CM

## 2025-08-06 DIAGNOSIS — F41.1 GAD (GENERALIZED ANXIETY DISORDER): Primary | ICD-10-CM

## 2025-08-06 PROCEDURE — 98013 SYNCH AUDIO-ONLY EST LOW 20: CPT | Performed by: PHYSICIAN ASSISTANT

## 2025-08-06 RX ORDER — SERTRALINE HYDROCHLORIDE 25 MG/1
25 TABLET, FILM COATED ORAL DAILY
Qty: 90 TABLET | Refills: 0 | Status: SHIPPED | OUTPATIENT
Start: 2025-08-06

## 2025-08-06 ASSESSMENT — ANXIETY QUESTIONNAIRES
1. FEELING NERVOUS, ANXIOUS, OR ON EDGE: SEVERAL DAYS
5. BEING SO RESTLESS THAT IT IS HARD TO SIT STILL: NOT AT ALL
3. WORRYING TOO MUCH ABOUT DIFFERENT THINGS: NOT AT ALL
GAD7 TOTAL SCORE: 4
2. NOT BEING ABLE TO STOP OR CONTROL WORRYING: NOT AT ALL
6. BECOMING EASILY ANNOYED OR IRRITABLE: MORE THAN HALF THE DAYS
GAD7 TOTAL SCORE: 4
GAD7 TOTAL SCORE: 4
8. IF YOU CHECKED OFF ANY PROBLEMS, HOW DIFFICULT HAVE THESE MADE IT FOR YOU TO DO YOUR WORK, TAKE CARE OF THINGS AT HOME, OR GET ALONG WITH OTHER PEOPLE?: SOMEWHAT DIFFICULT
7. FEELING AFRAID AS IF SOMETHING AWFUL MIGHT HAPPEN: NOT AT ALL
4. TROUBLE RELAXING: SEVERAL DAYS
IF YOU CHECKED OFF ANY PROBLEMS ON THIS QUESTIONNAIRE, HOW DIFFICULT HAVE THESE PROBLEMS MADE IT FOR YOU TO DO YOUR WORK, TAKE CARE OF THINGS AT HOME, OR GET ALONG WITH OTHER PEOPLE: SOMEWHAT DIFFICULT
7. FEELING AFRAID AS IF SOMETHING AWFUL MIGHT HAPPEN: NOT AT ALL

## 2025-08-06 ASSESSMENT — ASTHMA QUESTIONNAIRES
QUESTION_2 LAST FOUR WEEKS HOW OFTEN HAVE YOU HAD SHORTNESS OF BREATH: ONCE OR TWICE A WEEK
QUESTION_4 LAST FOUR WEEKS HOW OFTEN HAVE YOU USED YOUR RESCUE INHALER OR NEBULIZER MEDICATION (SUCH AS ALBUTEROL): NOT AT ALL
QUESTION_3 LAST FOUR WEEKS HOW OFTEN DID YOUR ASTHMA SYMPTOMS (WHEEZING, COUGHING, SHORTNESS OF BREATH, CHEST TIGHTNESS OR PAIN) WAKE YOU UP AT NIGHT OR EARLIER THAN USUAL IN THE MORNING: NOT AT ALL
QUESTION_1 LAST FOUR WEEKS HOW MUCH OF THE TIME DID YOUR ASTHMA KEEP YOU FROM GETTING AS MUCH DONE AT WORK, SCHOOL OR AT HOME: NONE OF THE TIME
ACT_TOTALSCORE: 23
QUESTION_5 LAST FOUR WEEKS HOW WOULD YOU RATE YOUR ASTHMA CONTROL: WELL CONTROLLED

## 2025-08-06 ASSESSMENT — ENCOUNTER SYMPTOMS: NERVOUS/ANXIOUS: 1

## 2025-08-06 ASSESSMENT — PATIENT HEALTH QUESTIONNAIRE - PHQ9
SUM OF ALL RESPONSES TO PHQ QUESTIONS 1-9: 6
10. IF YOU CHECKED OFF ANY PROBLEMS, HOW DIFFICULT HAVE THESE PROBLEMS MADE IT FOR YOU TO DO YOUR WORK, TAKE CARE OF THINGS AT HOME, OR GET ALONG WITH OTHER PEOPLE: SOMEWHAT DIFFICULT
SUM OF ALL RESPONSES TO PHQ QUESTIONS 1-9: 6

## 2025-08-07 ASSESSMENT — ACTIVITIES OF DAILY LIVING (ADL): DEPENDENT_IADLS:: TRANSPORTATION

## 2025-08-07 ASSESSMENT — PATIENT HEALTH QUESTIONNAIRE - PHQ9: SUM OF ALL RESPONSES TO PHQ QUESTIONS 1-9: 0

## 2025-08-11 SDOH — HEALTH STABILITY: PHYSICAL HEALTH: ON AVERAGE, HOW MANY MINUTES DO YOU ENGAGE IN EXERCISE AT THIS LEVEL?: 40 MIN

## 2025-08-11 SDOH — HEALTH STABILITY: PHYSICAL HEALTH: ON AVERAGE, HOW MANY DAYS PER WEEK DO YOU ENGAGE IN MODERATE TO STRENUOUS EXERCISE (LIKE A BRISK WALK)?: 2 DAYS

## 2025-08-11 ASSESSMENT — SOCIAL DETERMINANTS OF HEALTH (SDOH): HOW OFTEN DO YOU GET TOGETHER WITH FRIENDS OR RELATIVES?: THREE TIMES A WEEK

## 2025-08-14 ENCOUNTER — PATIENT OUTREACH (OUTPATIENT)
Dept: GERIATRIC MEDICINE | Facility: CLINIC | Age: 80
End: 2025-08-14

## 2025-08-14 ENCOUNTER — OFFICE VISIT (OUTPATIENT)
Dept: FAMILY MEDICINE | Facility: CLINIC | Age: 80
End: 2025-08-14
Payer: COMMERCIAL

## 2025-08-14 VITALS
RESPIRATION RATE: 16 BRPM | TEMPERATURE: 98.5 F | HEIGHT: 63 IN | WEIGHT: 135.8 LBS | SYSTOLIC BLOOD PRESSURE: 128 MMHG | HEART RATE: 84 BPM | DIASTOLIC BLOOD PRESSURE: 79 MMHG | BODY MASS INDEX: 24.06 KG/M2 | OXYGEN SATURATION: 95 %

## 2025-08-14 DIAGNOSIS — Z12.11 SCREEN FOR COLON CANCER: ICD-10-CM

## 2025-08-14 DIAGNOSIS — F41.1 GAD (GENERALIZED ANXIETY DISORDER): ICD-10-CM

## 2025-08-14 DIAGNOSIS — Z13.1 SCREENING FOR DIABETES MELLITUS (DM): ICD-10-CM

## 2025-08-14 DIAGNOSIS — I48.0 PAROXYSMAL ATRIAL FIBRILLATION (H): ICD-10-CM

## 2025-08-14 DIAGNOSIS — J45.50 SEVERE PERSISTENT ASTHMA WITHOUT COMPLICATION (H): ICD-10-CM

## 2025-08-14 DIAGNOSIS — G40.909 SEIZURE DISORDER (H): ICD-10-CM

## 2025-08-14 DIAGNOSIS — F33.0 MILD EPISODE OF RECURRENT MAJOR DEPRESSIVE DISORDER: ICD-10-CM

## 2025-08-14 DIAGNOSIS — I10 ESSENTIAL HYPERTENSION: ICD-10-CM

## 2025-08-14 DIAGNOSIS — Z00.00 ENCOUNTER FOR ANNUAL WELLNESS EXAM IN MEDICARE PATIENT: Primary | ICD-10-CM

## 2025-08-14 DIAGNOSIS — R25.1 TREMOR, PHYSIOLOGICAL: ICD-10-CM

## 2025-08-14 DIAGNOSIS — C50.919 PRIMARY MALIGNANT NEOPLASM OF FEMALE BREAST (H): ICD-10-CM

## 2025-08-14 DIAGNOSIS — I47.10 SVT (SUPRAVENTRICULAR TACHYCARDIA): ICD-10-CM

## 2025-08-14 DIAGNOSIS — M81.8 OTHER OSTEOPOROSIS WITHOUT CURRENT PATHOLOGICAL FRACTURE: ICD-10-CM

## 2025-08-14 DIAGNOSIS — N94.9 ADNEXAL CYST: ICD-10-CM

## 2025-08-14 PROBLEM — I42.8 OTHER CARDIOMYOPATHIES (H): Status: RESOLVED | Noted: 2023-01-16 | Resolved: 2025-08-14

## 2025-08-14 LAB
ALBUMIN SERPL BCG-MCNC: 4.6 G/DL (ref 3.5–5.2)
ALP SERPL-CCNC: 83 U/L (ref 40–150)
ALT SERPL W P-5'-P-CCNC: 17 U/L (ref 0–50)
ANION GAP SERPL CALCULATED.3IONS-SCNC: 11 MMOL/L (ref 7–15)
AST SERPL W P-5'-P-CCNC: 24 U/L (ref 0–45)
BASOPHILS # BLD AUTO: 0.04 10E3/UL (ref 0–0.2)
BASOPHILS NFR BLD AUTO: 0.6 %
BILIRUB SERPL-MCNC: 0.5 MG/DL
BUN SERPL-MCNC: 15.8 MG/DL (ref 8–23)
CALCIUM SERPL-MCNC: 9.7 MG/DL (ref 8.8–10.4)
CHLORIDE SERPL-SCNC: 102 MMOL/L (ref 98–107)
CREAT SERPL-MCNC: 0.7 MG/DL (ref 0.51–0.95)
EGFRCR SERPLBLD CKD-EPI 2021: 88 ML/MIN/1.73M2
EOSINOPHIL # BLD AUTO: 0.2 10E3/UL (ref 0–0.7)
EOSINOPHIL NFR BLD AUTO: 2.8 %
ERYTHROCYTE [DISTWIDTH] IN BLOOD BY AUTOMATED COUNT: 13.6 % (ref 10–15)
EST. AVERAGE GLUCOSE BLD GHB EST-MCNC: 105 MG/DL
GLUCOSE SERPL-MCNC: 92 MG/DL (ref 70–99)
HBA1C MFR BLD: 5.3 % (ref 0–5.6)
HCO3 SERPL-SCNC: 24 MMOL/L (ref 22–29)
HCT VFR BLD AUTO: 40.2 % (ref 35–47)
HGB BLD-MCNC: 13.4 G/DL (ref 11.7–15.7)
IMM GRANULOCYTES # BLD: <0.04 10E3/UL
IMM GRANULOCYTES NFR BLD: 0.1 %
LYMPHOCYTES # BLD AUTO: 1.25 10E3/UL (ref 0.8–5.3)
LYMPHOCYTES NFR BLD AUTO: 17.7 %
MCH RBC QN AUTO: 30.8 PG (ref 26.5–33)
MCHC RBC AUTO-ENTMCNC: 33.3 G/DL (ref 31.5–36.5)
MCV RBC AUTO: 92.4 FL (ref 78–100)
MONOCYTES # BLD AUTO: 0.47 10E3/UL (ref 0–1.3)
MONOCYTES NFR BLD AUTO: 6.6 %
NEUTROPHILS # BLD AUTO: 5.1 10E3/UL (ref 1.6–8.3)
NEUTROPHILS NFR BLD AUTO: 72.2 %
PLATELET # BLD AUTO: 299 10E3/UL (ref 150–450)
POTASSIUM SERPL-SCNC: 4.2 MMOL/L (ref 3.4–5.3)
PROT SERPL-MCNC: 7.4 G/DL (ref 6.4–8.3)
RBC # BLD AUTO: 4.35 10E6/UL (ref 3.8–5.2)
SODIUM SERPL-SCNC: 137 MMOL/L (ref 135–145)
TSH SERPL DL<=0.005 MIU/L-ACNC: 2.11 UIU/ML (ref 0.3–4.2)
WBC # BLD AUTO: 7.07 10E3/UL (ref 4–11)

## 2025-08-14 SDOH — HEALTH STABILITY: PHYSICAL HEALTH: ON AVERAGE, HOW MANY DAYS PER WEEK DO YOU ENGAGE IN MODERATE TO STRENUOUS EXERCISE (LIKE A BRISK WALK)?: 2 DAYS

## 2025-08-14 SDOH — HEALTH STABILITY: PHYSICAL HEALTH: ON AVERAGE, HOW MANY MINUTES DO YOU ENGAGE IN EXERCISE AT THIS LEVEL?: 40 MIN

## 2025-08-14 ASSESSMENT — SOCIAL DETERMINANTS OF HEALTH (SDOH): HOW OFTEN DO YOU GET TOGETHER WITH FRIENDS OR RELATIVES?: THREE TIMES A WEEK

## 2025-08-14 ASSESSMENT — PATIENT HEALTH QUESTIONNAIRE - PHQ9
10. IF YOU CHECKED OFF ANY PROBLEMS, HOW DIFFICULT HAVE THESE PROBLEMS MADE IT FOR YOU TO DO YOUR WORK, TAKE CARE OF THINGS AT HOME, OR GET ALONG WITH OTHER PEOPLE: SOMEWHAT DIFFICULT
SUM OF ALL RESPONSES TO PHQ QUESTIONS 1-9: 6
SUM OF ALL RESPONSES TO PHQ QUESTIONS 1-9: 6

## 2025-08-14 ASSESSMENT — PAIN SCALES - GENERAL: PAINLEVEL_OUTOF10: NO PAIN (0)

## 2025-08-18 ENCOUNTER — TRANSFERRED RECORDS (OUTPATIENT)
Dept: HEALTH INFORMATION MANAGEMENT | Facility: CLINIC | Age: 80
End: 2025-08-18
Payer: COMMERCIAL

## 2025-08-20 ENCOUNTER — MYC MEDICAL ADVICE (OUTPATIENT)
Dept: FAMILY MEDICINE | Facility: CLINIC | Age: 80
End: 2025-08-20
Payer: COMMERCIAL

## 2025-08-20 DIAGNOSIS — F41.1 GAD (GENERALIZED ANXIETY DISORDER): ICD-10-CM

## 2025-08-21 ENCOUNTER — TELEPHONE (OUTPATIENT)
Dept: NEUROLOGY | Facility: CLINIC | Age: 80
End: 2025-08-21
Payer: COMMERCIAL

## 2025-08-21 RX ORDER — LORAZEPAM 0.5 MG/1
0.5 TABLET ORAL EVERY 8 HOURS PRN
Qty: 20 TABLET | Refills: 0 | Status: SHIPPED | OUTPATIENT
Start: 2025-08-21

## 2025-08-25 ENCOUNTER — PATIENT OUTREACH (OUTPATIENT)
Dept: CARE COORDINATION | Facility: CLINIC | Age: 80
End: 2025-08-25
Payer: COMMERCIAL

## 2025-08-25 ENCOUNTER — TELEPHONE (OUTPATIENT)
Dept: NEUROLOGY | Facility: CLINIC | Age: 80
End: 2025-08-25
Payer: COMMERCIAL

## 2025-08-27 ENCOUNTER — PATIENT OUTREACH (OUTPATIENT)
Dept: CARE COORDINATION | Facility: CLINIC | Age: 80
End: 2025-08-27
Payer: COMMERCIAL

## 2025-09-02 ENCOUNTER — MYC MEDICAL ADVICE (OUTPATIENT)
Dept: FAMILY MEDICINE | Facility: CLINIC | Age: 80
End: 2025-09-02
Payer: COMMERCIAL

## 2025-09-03 ENCOUNTER — OFFICE VISIT (OUTPATIENT)
Dept: SLEEP MEDICINE | Facility: CLINIC | Age: 80
End: 2025-09-03
Payer: COMMERCIAL

## 2025-09-03 VITALS
DIASTOLIC BLOOD PRESSURE: 76 MMHG | OXYGEN SATURATION: 96 % | BODY MASS INDEX: 24.36 KG/M2 | HEART RATE: 76 BPM | SYSTOLIC BLOOD PRESSURE: 144 MMHG | HEIGHT: 63 IN | WEIGHT: 137.5 LBS

## 2025-09-03 DIAGNOSIS — I10 PRIMARY HYPERTENSION: ICD-10-CM

## 2025-09-03 DIAGNOSIS — G47.33 OSA (OBSTRUCTIVE SLEEP APNEA): Primary | ICD-10-CM

## 2025-09-03 PROBLEM — G40.909 SEIZURE DISORDER (H): Status: RESOLVED | Noted: 2022-11-08 | Resolved: 2025-09-03

## 2025-09-03 PROBLEM — J45.50 SEVERE PERSISTENT ASTHMA WITHOUT COMPLICATION (H): Status: RESOLVED | Noted: 2025-08-14 | Resolved: 2025-09-03

## 2025-09-03 PROCEDURE — 1125F AMNT PAIN NOTED PAIN PRSNT: CPT | Performed by: PHYSICIAN ASSISTANT

## 2025-09-03 PROCEDURE — 3077F SYST BP >= 140 MM HG: CPT | Performed by: PHYSICIAN ASSISTANT

## 2025-09-03 PROCEDURE — 99215 OFFICE O/P EST HI 40 MIN: CPT | Performed by: PHYSICIAN ASSISTANT

## 2025-09-03 PROCEDURE — 3078F DIAST BP <80 MM HG: CPT | Performed by: PHYSICIAN ASSISTANT

## 2025-09-03 ASSESSMENT — SLEEP AND FATIGUE QUESTIONNAIRES
HOW LIKELY ARE YOU TO NOD OFF OR FALL ASLEEP WHILE SITTING AND TALKING TO SOMEONE: WOULD NEVER DOZE
HOW LIKELY ARE YOU TO NOD OFF OR FALL ASLEEP IN A CAR, WHILE STOPPED FOR A FEW MINUTES IN TRAFFIC: WOULD NEVER DOZE
HOW LIKELY ARE YOU TO NOD OFF OR FALL ASLEEP WHILE LYING DOWN TO REST IN THE AFTERNOON WHEN CIRCUMSTANCES PERMIT: HIGH CHANCE OF DOZING
HOW LIKELY ARE YOU TO NOD OFF OR FALL ASLEEP WHILE SITTING QUIETLY AFTER LUNCH WITHOUT ALCOHOL: WOULD NEVER DOZE
HOW LIKELY ARE YOU TO NOD OFF OR FALL ASLEEP WHILE SITTING INACTIVE IN A PUBLIC PLACE: SLIGHT CHANCE OF DOZING
HOW LIKELY ARE YOU TO NOD OFF OR FALL ASLEEP WHILE SITTING AND READING: SLIGHT CHANCE OF DOZING
HOW LIKELY ARE YOU TO NOD OFF OR FALL ASLEEP WHEN YOU ARE A PASSENGER IN A CAR FOR AN HOUR WITHOUT A BREAK: SLIGHT CHANCE OF DOZING
HOW LIKELY ARE YOU TO NOD OFF OR FALL ASLEEP WHILE WATCHING TV: MODERATE CHANCE OF DOZING

## (undated) DEVICE — BLADE KNIFE SURG 15 371115

## (undated) DEVICE — CAST PADDING 4" STERILE 9044S

## (undated) DEVICE — LINEN HALF SHEET 5512

## (undated) DEVICE — DRSG GAUZE 4X4" TRAY 6939

## (undated) DEVICE — LINEN ORTHO ACL PACK 5447

## (undated) DEVICE — SOL NACL 0.9% IRRIG 1000ML BOTTLE 2F7124

## (undated) DEVICE — GOWN IMPERVIOUS SPECIALTY XLG/XLONG 32474

## (undated) DEVICE — PACK NEURO MINOR UMMC SNE32MNMU4

## (undated) DEVICE — BAG CLEAR TRASH 1.3M 39X33" P4040C

## (undated) DEVICE — BUR STRK SIDE CUT 2.1X5.1X44.8MM CARBIDE 6FLUTE 1607-002-109

## (undated) DEVICE — SU ETHILON 3-0 PS-2 18" 1669H

## (undated) DEVICE — PACK LOWER EXTREMITY RIDGES

## (undated) DEVICE — LINEN TOWEL PACK X5 5464

## (undated) DEVICE — SPONGE SURGIFOAM 100 1974

## (undated) DEVICE — LINEN TOWEL PACK X6 WHITE 5487

## (undated) DEVICE — GLOVE BIOGEL PI MICRO SZ 8.5 48585

## (undated) DEVICE — LINEN FULL SHEET 5511

## (undated) DEVICE — TOURNIQUET SGL BLADDER 18"X4" RED 5921-218-135

## (undated) DEVICE — BNDG ELASTIC 4"X5YDS UNSTERILE 6611-40

## (undated) DEVICE — GLOVE BIOGEL PI MICRO SZ 6.5 48565

## (undated) DEVICE — SU CHROMIC 3-0 FS-2 27" 636

## (undated) DEVICE — ESU GROUND PAD ADULT W/CORD E7507

## (undated) DEVICE — SU MONOCRYL 3-0 PS-2 27" Y427H

## (undated) DEVICE — PREP POVIDONE IODINE SOLUTION 10% 4OZ BOTTLE 29906-004

## (undated) DEVICE — SUCTION MANIFOLD NEPTUNE 2 SYS 4 PORT 0702-020-000

## (undated) DEVICE — GLOVE BIOGEL PI SZ 8.0 40880

## (undated) DEVICE — PREP DURAPREP 26ML APL 8630

## (undated) DEVICE — SUCTION TIP YANKAUER W/O VENT K86

## (undated) RX ORDER — FENTANYL CITRATE 50 UG/ML
INJECTION, SOLUTION INTRAMUSCULAR; INTRAVENOUS
Status: DISPENSED
Start: 2025-01-27

## (undated) RX ORDER — LIDOCAINE HYDROCHLORIDE 10 MG/ML
INJECTION, SOLUTION EPIDURAL; INFILTRATION; INTRACAUDAL; PERINEURAL
Status: DISPENSED
Start: 2025-01-27

## (undated) RX ORDER — ONDANSETRON 2 MG/ML
INJECTION INTRAMUSCULAR; INTRAVENOUS
Status: DISPENSED
Start: 2025-01-27

## (undated) RX ORDER — PROPOFOL 10 MG/ML
INJECTION, EMULSION INTRAVENOUS
Status: DISPENSED
Start: 2025-01-27

## (undated) RX ORDER — BUPIVACAINE HYDROCHLORIDE 5 MG/ML
INJECTION, SOLUTION EPIDURAL; INTRACAUDAL
Status: DISPENSED
Start: 2025-01-27

## (undated) RX ORDER — DEXAMETHASONE SODIUM PHOSPHATE 4 MG/ML
INJECTION, SOLUTION INTRA-ARTICULAR; INTRALESIONAL; INTRAMUSCULAR; INTRAVENOUS; SOFT TISSUE
Status: DISPENSED
Start: 2025-01-27

## (undated) RX ORDER — PROPOFOL 10 MG/ML
INJECTION, EMULSION INTRAVENOUS
Status: DISPENSED
Start: 2022-08-02

## (undated) RX ORDER — CEFAZOLIN SODIUM/WATER 2 G/20 ML
SYRINGE (ML) INTRAVENOUS
Status: DISPENSED
Start: 2022-08-02

## (undated) RX ORDER — CEFAZOLIN SODIUM/WATER 2 G/20 ML
SYRINGE (ML) INTRAVENOUS
Status: DISPENSED
Start: 2025-01-27

## (undated) RX ORDER — FENTANYL CITRATE-0.9 % NACL/PF 10 MCG/ML
PLASTIC BAG, INJECTION (ML) INTRAVENOUS
Status: DISPENSED
Start: 2025-01-27

## (undated) RX ORDER — CHLORHEXIDINE GLUCONATE ORAL RINSE 1.2 MG/ML
SOLUTION DENTAL
Status: DISPENSED
Start: 2022-08-02